# Patient Record
Sex: FEMALE | Race: WHITE | NOT HISPANIC OR LATINO | Employment: OTHER | URBAN - METROPOLITAN AREA
[De-identification: names, ages, dates, MRNs, and addresses within clinical notes are randomized per-mention and may not be internally consistent; named-entity substitution may affect disease eponyms.]

---

## 2017-02-27 ENCOUNTER — APPOINTMENT (OUTPATIENT)
Dept: CT IMAGING | Facility: HOSPITAL | Age: 67
End: 2017-02-27

## 2017-02-27 ENCOUNTER — HOSPITAL ENCOUNTER (INPATIENT)
Facility: HOSPITAL | Age: 67
LOS: 4 days | Discharge: NURSING, REHAB OR LONG-TERM CARE | End: 2017-03-03
Attending: EMERGENCY MEDICINE | Admitting: INTERNAL MEDICINE

## 2017-02-27 ENCOUNTER — APPOINTMENT (OUTPATIENT)
Dept: GENERAL RADIOLOGY | Facility: HOSPITAL | Age: 67
End: 2017-02-27

## 2017-02-27 DIAGNOSIS — S72.002A HIP FX, LEFT, CLOSED, INITIAL ENCOUNTER (HCC): Primary | ICD-10-CM

## 2017-02-27 DIAGNOSIS — Z74.09 IMPAIRED FUNCTIONAL MOBILITY, BALANCE, GAIT, AND ENDURANCE: ICD-10-CM

## 2017-02-27 PROBLEM — G62.9 NEUROPATHY: Status: ACTIVE | Noted: 2017-02-27

## 2017-02-27 PROBLEM — S72.009A HIP FX: Status: ACTIVE | Noted: 2017-02-27

## 2017-02-27 PROBLEM — K21.9 GERD (GASTROESOPHAGEAL REFLUX DISEASE): Status: ACTIVE | Noted: 2017-02-27
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
## 2017-02-27 PROBLEM — Z79.01 CHRONIC ANTICOAGULATION: Status: ACTIVE | Noted: 2017-02-27

## 2017-02-27 PROBLEM — S72.92XA CLOSED FRACTURE OF LEFT FEMUR: Status: ACTIVE | Noted: 2017-02-27

## 2017-02-27 PROBLEM — M79.7 FIBROMYALGIA: Status: ACTIVE | Noted: 2017-02-27

## 2017-02-27 PROBLEM — G43.909 MIGRAINE: Status: ACTIVE | Noted: 2017-02-27

## 2017-02-27 PROBLEM — I10 HYPERTENSION: Status: ACTIVE | Noted: 2017-02-27

## 2017-02-27 PROBLEM — E78.5 HYPERLIPIDEMIA: Status: ACTIVE | Noted: 2017-02-27

## 2017-02-27 PROBLEM — I63.9 STROKE (HCC): Status: ACTIVE | Noted: 2017-02-27

## 2017-02-27 PROBLEM — I48.91 ATRIAL FIBRILLATION: Status: ACTIVE | Noted: 2017-02-27

## 2017-02-27 LAB
ABO GROUP BLD: NORMAL
ABO GROUP BLD: NORMAL
ALBUMIN SERPL-MCNC: 4.5 G/DL (ref 3.2–4.8)
ALBUMIN/GLOB SERPL: 1.3 G/DL (ref 1.5–2.5)
ALP SERPL-CCNC: 59 U/L (ref 25–100)
ALT SERPL W P-5'-P-CCNC: 16 U/L (ref 7–40)
ANION GAP SERPL CALCULATED.3IONS-SCNC: 7 MMOL/L (ref 3–11)
APTT PPP: 27.4 SECONDS (ref 24–31)
AST SERPL-CCNC: 23 U/L (ref 0–33)
BASOPHILS # BLD AUTO: 0 10*3/MM3 (ref 0–0.2)
BASOPHILS NFR BLD AUTO: 0 % (ref 0–1)
BILIRUB SERPL-MCNC: 1.2 MG/DL (ref 0.3–1.2)
BILIRUB UR QL STRIP: NEGATIVE
BLD GP AB SCN SERPL QL: NEGATIVE
BUN BLD-MCNC: 10 MG/DL (ref 9–23)
BUN/CREAT SERPL: 14.3 (ref 7–25)
CALCIUM SPEC-SCNC: 9.6 MG/DL (ref 8.7–10.4)
CHLORIDE SERPL-SCNC: 112 MMOL/L (ref 99–109)
CLARITY UR: CLEAR
CO2 SERPL-SCNC: 25 MMOL/L (ref 20–31)
COLOR UR: YELLOW
CREAT BLD-MCNC: 0.7 MG/DL (ref 0.6–1.3)
DEPRECATED RDW RBC AUTO: 47.1 FL (ref 37–54)
EOSINOPHIL # BLD AUTO: 0.03 10*3/MM3 (ref 0.1–0.3)
EOSINOPHIL NFR BLD AUTO: 0.2 % (ref 0–3)
ERYTHROCYTE [DISTWIDTH] IN BLOOD BY AUTOMATED COUNT: 12.9 % (ref 11.3–14.5)
GFR SERPL CREATININE-BSD FRML MDRD: 84 ML/MIN/1.73
GLOBULIN UR ELPH-MCNC: 3.4 GM/DL
GLUCOSE BLD-MCNC: 100 MG/DL (ref 70–100)
GLUCOSE UR STRIP-MCNC: NEGATIVE MG/DL
HCT VFR BLD AUTO: 47.6 % (ref 34.5–44)
HGB BLD-MCNC: 15.8 G/DL (ref 11.5–15.5)
HGB UR QL STRIP.AUTO: NEGATIVE
IMM GRANULOCYTES # BLD: 0.03 10*3/MM3 (ref 0–0.03)
IMM GRANULOCYTES NFR BLD: 0.2 % (ref 0–0.6)
INR PPP: 1.07
KETONES UR QL STRIP: NEGATIVE
LEUKOCYTE ESTERASE UR QL STRIP.AUTO: NEGATIVE
LYMPHOCYTES # BLD AUTO: 1.69 10*3/MM3 (ref 0.6–4.8)
LYMPHOCYTES NFR BLD AUTO: 13.4 % (ref 24–44)
MCH RBC QN AUTO: 33.1 PG (ref 27–31)
MCHC RBC AUTO-ENTMCNC: 33.2 G/DL (ref 32–36)
MCV RBC AUTO: 99.6 FL (ref 80–99)
MONOCYTES # BLD AUTO: 0.8 10*3/MM3 (ref 0–1)
MONOCYTES NFR BLD AUTO: 6.4 % (ref 0–12)
NEUTROPHILS # BLD AUTO: 10.03 10*3/MM3 (ref 1.5–8.3)
NEUTROPHILS NFR BLD AUTO: 79.8 % (ref 41–71)
NITRITE UR QL STRIP: NEGATIVE
PH UR STRIP.AUTO: 7.5 [PH] (ref 5–8)
PLATELET # BLD AUTO: 173 10*3/MM3 (ref 150–450)
PMV BLD AUTO: 12.6 FL (ref 6–12)
POTASSIUM BLD-SCNC: 4.2 MMOL/L (ref 3.5–5.5)
PROT SERPL-MCNC: 7.9 G/DL (ref 5.7–8.2)
PROT UR QL STRIP: NEGATIVE
PROTHROMBIN TIME: 11.7 SECONDS (ref 9.6–11.5)
RBC # BLD AUTO: 4.78 10*6/MM3 (ref 3.89–5.14)
RH BLD: NEGATIVE
RH BLD: NEGATIVE
SODIUM BLD-SCNC: 144 MMOL/L (ref 132–146)
SP GR UR STRIP: 1.02 (ref 1–1.03)
UROBILINOGEN UR QL STRIP: NORMAL
WBC NRBC COR # BLD: 12.58 10*3/MM3 (ref 3.5–10.8)

## 2017-02-27 PROCEDURE — 80053 COMPREHEN METABOLIC PANEL: CPT | Performed by: PHYSICIAN ASSISTANT

## 2017-02-27 PROCEDURE — 25010000002 PROMETHAZINE PER 50 MG: Performed by: INTERNAL MEDICINE

## 2017-02-27 PROCEDURE — 71010 HC CHEST PA OR AP: CPT

## 2017-02-27 PROCEDURE — 86901 BLOOD TYPING SEROLOGIC RH(D): CPT

## 2017-02-27 PROCEDURE — 25010000002 HYDROMORPHONE PER 4 MG

## 2017-02-27 PROCEDURE — 25010000002 HYDROMORPHONE PER 4 MG: Performed by: FAMILY MEDICINE

## 2017-02-27 PROCEDURE — 99223 1ST HOSP IP/OBS HIGH 75: CPT | Performed by: FAMILY MEDICINE

## 2017-02-27 PROCEDURE — 72170 X-RAY EXAM OF PELVIS: CPT

## 2017-02-27 PROCEDURE — 86900 BLOOD TYPING SEROLOGIC ABO: CPT

## 2017-02-27 PROCEDURE — 85730 THROMBOPLASTIN TIME PARTIAL: CPT | Performed by: PHYSICIAN ASSISTANT

## 2017-02-27 PROCEDURE — 85610 PROTHROMBIN TIME: CPT | Performed by: PHYSICIAN ASSISTANT

## 2017-02-27 PROCEDURE — 85025 COMPLETE CBC W/AUTO DIFF WBC: CPT | Performed by: PHYSICIAN ASSISTANT

## 2017-02-27 PROCEDURE — 25010000002 ONDANSETRON PER 1 MG: Performed by: EMERGENCY MEDICINE

## 2017-02-27 PROCEDURE — 94799 UNLISTED PULMONARY SVC/PX: CPT

## 2017-02-27 PROCEDURE — 25010000002 HYDROMORPHONE PER 4 MG: Performed by: EMERGENCY MEDICINE

## 2017-02-27 PROCEDURE — 70450 CT HEAD/BRAIN W/O DYE: CPT

## 2017-02-27 PROCEDURE — 25010000002 ONDANSETRON PER 1 MG: Performed by: FAMILY MEDICINE

## 2017-02-27 PROCEDURE — 73552 X-RAY EXAM OF FEMUR 2/>: CPT

## 2017-02-27 PROCEDURE — 72192 CT PELVIS W/O DYE: CPT

## 2017-02-27 PROCEDURE — 81003 URINALYSIS AUTO W/O SCOPE: CPT | Performed by: PHYSICIAN ASSISTANT

## 2017-02-27 PROCEDURE — 99284 EMERGENCY DEPT VISIT MOD MDM: CPT

## 2017-02-27 PROCEDURE — 86850 RBC ANTIBODY SCREEN: CPT

## 2017-02-27 PROCEDURE — 93005 ELECTROCARDIOGRAM TRACING: CPT | Performed by: PHYSICIAN ASSISTANT

## 2017-02-27 RX ORDER — RANITIDINE 300 MG/1
300 TABLET ORAL NIGHTLY
COMMUNITY
End: 2021-06-15

## 2017-02-27 RX ORDER — PREGABALIN 300 MG/1
300 CAPSULE ORAL 2 TIMES DAILY
COMMUNITY
End: 2022-04-26 | Stop reason: SDUPTHER

## 2017-02-27 RX ORDER — ESOMEPRAZOLE MAGNESIUM 40 MG/1
40 CAPSULE, DELAYED RELEASE ORAL
COMMUNITY
End: 2019-07-03 | Stop reason: HOSPADM

## 2017-02-27 RX ORDER — ONDANSETRON 2 MG/ML
4 INJECTION INTRAMUSCULAR; INTRAVENOUS EVERY 6 HOURS PRN
Status: DISCONTINUED | OUTPATIENT
Start: 2017-02-27 | End: 2017-03-03 | Stop reason: HOSPADM

## 2017-02-27 RX ORDER — PROMETHAZINE HYDROCHLORIDE 25 MG/ML
12.5 INJECTION, SOLUTION INTRAMUSCULAR; INTRAVENOUS EVERY 6 HOURS PRN
Status: DISCONTINUED | OUTPATIENT
Start: 2017-02-27 | End: 2017-03-03 | Stop reason: HOSPADM

## 2017-02-27 RX ORDER — DOCUSATE SODIUM 100 MG/1
100 CAPSULE, LIQUID FILLED ORAL 2 TIMES DAILY
Status: DISCONTINUED | OUTPATIENT
Start: 2017-02-27 | End: 2017-03-03 | Stop reason: HOSPADM

## 2017-02-27 RX ORDER — NALOXONE HCL 0.4 MG/ML
0.4 VIAL (ML) INJECTION
Status: DISCONTINUED | OUTPATIENT
Start: 2017-02-27 | End: 2017-03-03 | Stop reason: HOSPADM

## 2017-02-27 RX ORDER — OXYCODONE HYDROCHLORIDE AND ACETAMINOPHEN 5; 325 MG/1; MG/1
1 TABLET ORAL EVERY 4 HOURS PRN
Status: DISCONTINUED | OUTPATIENT
Start: 2017-02-27 | End: 2017-03-03 | Stop reason: HOSPADM

## 2017-02-27 RX ORDER — FAMOTIDINE 20 MG/1
20 TABLET, FILM COATED ORAL NIGHTLY
Status: DISCONTINUED | OUTPATIENT
Start: 2017-02-27 | End: 2017-03-03 | Stop reason: HOSPADM

## 2017-02-27 RX ORDER — ONDANSETRON 2 MG/ML
4 INJECTION INTRAMUSCULAR; INTRAVENOUS ONCE
Status: COMPLETED | OUTPATIENT
Start: 2017-02-27 | End: 2017-02-27

## 2017-02-27 RX ORDER — SENNOSIDES 8.6 MG
650 CAPSULE ORAL 2 TIMES DAILY
COMMUNITY
End: 2018-10-08 | Stop reason: HOSPADM

## 2017-02-27 RX ORDER — DOCUSATE SODIUM 100 MG/1
100 CAPSULE, LIQUID FILLED ORAL 2 TIMES DAILY
COMMUNITY

## 2017-02-27 RX ORDER — SODIUM CHLORIDE 9 MG/ML
100 INJECTION, SOLUTION INTRAVENOUS CONTINUOUS
Status: DISCONTINUED | OUTPATIENT
Start: 2017-02-27 | End: 2017-02-28 | Stop reason: ALTCHOICE

## 2017-02-27 RX ORDER — DILTIAZEM HYDROCHLORIDE 180 MG/1
360 CAPSULE, EXTENDED RELEASE ORAL DAILY
Status: DISCONTINUED | OUTPATIENT
Start: 2017-02-27 | End: 2017-03-02 | Stop reason: CLARIF

## 2017-02-27 RX ORDER — SODIUM CHLORIDE 0.9 % (FLUSH) 0.9 %
1-10 SYRINGE (ML) INJECTION AS NEEDED
Status: DISCONTINUED | OUTPATIENT
Start: 2017-02-27 | End: 2017-03-03 | Stop reason: HOSPADM

## 2017-02-27 RX ORDER — BACLOFEN 10 MG/1
5 TABLET ORAL 2 TIMES DAILY
Status: DISCONTINUED | OUTPATIENT
Start: 2017-02-27 | End: 2017-03-03 | Stop reason: HOSPADM

## 2017-02-27 RX ORDER — ATORVASTATIN CALCIUM 10 MG/1
10 TABLET, FILM COATED ORAL NIGHTLY
Status: DISCONTINUED | OUTPATIENT
Start: 2017-02-27 | End: 2017-03-03 | Stop reason: HOSPADM

## 2017-02-27 RX ORDER — DIAPER,BRIEF,INFANT-TODD,DISP
1 EACH MISCELLANEOUS 4 TIMES DAILY PRN
COMMUNITY

## 2017-02-27 RX ORDER — TOPIRAMATE 25 MG/1
100 TABLET ORAL 2 TIMES DAILY
Status: DISCONTINUED | OUTPATIENT
Start: 2017-02-27 | End: 2017-03-03 | Stop reason: HOSPADM

## 2017-02-27 RX ORDER — PREGABALIN 100 MG/1
300 CAPSULE ORAL 2 TIMES DAILY
Status: DISCONTINUED | OUTPATIENT
Start: 2017-02-27 | End: 2017-03-03 | Stop reason: HOSPADM

## 2017-02-27 RX ORDER — ASPIRIN 81 MG/1
81 TABLET ORAL DAILY
Status: DISCONTINUED | OUTPATIENT
Start: 2017-02-27 | End: 2017-03-03 | Stop reason: HOSPADM

## 2017-02-27 RX ORDER — BUDESONIDE AND FORMOTEROL FUMARATE DIHYDRATE 80; 4.5 UG/1; UG/1
2 AEROSOL RESPIRATORY (INHALATION)
Status: DISCONTINUED | OUTPATIENT
Start: 2017-02-27 | End: 2017-03-03 | Stop reason: HOSPADM

## 2017-02-27 RX ORDER — PANTOPRAZOLE SODIUM 40 MG/1
40 TABLET, DELAYED RELEASE ORAL
Status: DISCONTINUED | OUTPATIENT
Start: 2017-02-28 | End: 2017-03-03 | Stop reason: HOSPADM

## 2017-02-27 RX ORDER — CETIRIZINE HYDROCHLORIDE 10 MG/1
10 TABLET ORAL DAILY
Status: DISCONTINUED | OUTPATIENT
Start: 2017-02-27 | End: 2017-03-03 | Stop reason: HOSPADM

## 2017-02-27 RX ORDER — LORATADINE 10 MG/1
10 TABLET ORAL DAILY
COMMUNITY

## 2017-02-27 RX ORDER — ACETAMINOPHEN 500 MG
500 TABLET ORAL EVERY 6 HOURS PRN
COMMUNITY
End: 2017-02-27

## 2017-02-27 RX ORDER — GUAIFENESIN 600 MG/1
1200 TABLET, EXTENDED RELEASE ORAL 2 TIMES DAILY
Status: ON HOLD | COMMUNITY
End: 2018-10-08

## 2017-02-27 RX ORDER — ONDANSETRON 2 MG/ML
4 INJECTION INTRAMUSCULAR; INTRAVENOUS ONCE
Status: DISCONTINUED | OUTPATIENT
Start: 2017-02-27 | End: 2017-03-03 | Stop reason: HOSPADM

## 2017-02-27 RX ORDER — ACETAMINOPHEN 325 MG/1
650 TABLET ORAL EVERY 4 HOURS PRN
Status: DISCONTINUED | OUTPATIENT
Start: 2017-02-27 | End: 2017-03-03 | Stop reason: HOSPADM

## 2017-02-27 RX ORDER — DILTIAZEM HYDROCHLORIDE 360 MG/1
360 CAPSULE, EXTENDED RELEASE ORAL DAILY
COMMUNITY
End: 2021-06-15 | Stop reason: SDUPTHER

## 2017-02-27 RX ORDER — BACLOFEN 10 MG/1
5 TABLET ORAL 2 TIMES DAILY
Status: ON HOLD | COMMUNITY
End: 2018-09-27

## 2017-02-27 RX ORDER — LOPERAMIDE HYDROCHLORIDE 2 MG/1
2 CAPSULE ORAL DAILY PRN
COMMUNITY

## 2017-02-27 RX ORDER — RANITIDINE 150 MG/1
300 TABLET ORAL NIGHTLY
COMMUNITY
End: 2017-02-27

## 2017-02-27 RX ORDER — SODIUM CHLORIDE 9 MG/ML
125 INJECTION, SOLUTION INTRAVENOUS CONTINUOUS
Status: DISCONTINUED | OUTPATIENT
Start: 2017-02-27 | End: 2017-02-28

## 2017-02-27 RX ORDER — BUDESONIDE AND FORMOTEROL FUMARATE DIHYDRATE 80; 4.5 UG/1; UG/1
2 AEROSOL RESPIRATORY (INHALATION)
COMMUNITY
End: 2022-06-01

## 2017-02-27 RX ORDER — LEVOTHYROXINE SODIUM 0.15 MG/1
150 TABLET ORAL DAILY
Status: DISCONTINUED | OUTPATIENT
Start: 2017-02-27 | End: 2017-03-03 | Stop reason: HOSPADM

## 2017-02-27 RX ADMIN — SODIUM CHLORIDE 125 ML/HR: 9 INJECTION, SOLUTION INTRAVENOUS at 11:16

## 2017-02-27 RX ADMIN — ONDANSETRON 4 MG: 2 INJECTION INTRAMUSCULAR; INTRAVENOUS at 22:09

## 2017-02-27 RX ADMIN — BUDESONIDE AND FORMOTEROL FUMARATE DIHYDRATE 2 PUFF: 80; 4.5 AEROSOL RESPIRATORY (INHALATION) at 19:32

## 2017-02-27 RX ADMIN — HYDROMORPHONE HYDROCHLORIDE 0.5 MG: 1 INJECTION, SOLUTION INTRAMUSCULAR; INTRAVENOUS; SUBCUTANEOUS at 22:17

## 2017-02-27 RX ADMIN — PROMETHAZINE HYDROCHLORIDE 12.5 MG: 25 INJECTION, SOLUTION INTRAMUSCULAR; INTRAVENOUS at 18:19

## 2017-02-27 RX ADMIN — SODIUM CHLORIDE 125 ML/HR: 9 INJECTION, SOLUTION INTRAVENOUS at 22:16

## 2017-02-27 RX ADMIN — ONDANSETRON 4 MG: 2 INJECTION INTRAMUSCULAR; INTRAVENOUS at 11:19

## 2017-02-27 RX ADMIN — FAMOTIDINE 20 MG: 20 TABLET ORAL at 20:29

## 2017-02-27 RX ADMIN — Medication 0.5 MG: at 14:30

## 2017-02-27 RX ADMIN — HYDROMORPHONE HYDROCHLORIDE 0.5 MG: 1 INJECTION, SOLUTION INTRAMUSCULAR; INTRAVENOUS; SUBCUTANEOUS at 11:19

## 2017-02-27 RX ADMIN — HYDROMORPHONE HYDROCHLORIDE 0.5 MG: 1 INJECTION, SOLUTION INTRAMUSCULAR; INTRAVENOUS; SUBCUTANEOUS at 14:30

## 2017-02-27 RX ADMIN — ATORVASTATIN CALCIUM 10 MG: 10 TABLET, FILM COATED ORAL at 20:29

## 2017-02-28 ENCOUNTER — ANESTHESIA (OUTPATIENT)
Dept: PERIOP | Facility: HOSPITAL | Age: 67
End: 2017-02-28

## 2017-02-28 ENCOUNTER — APPOINTMENT (OUTPATIENT)
Dept: GENERAL RADIOLOGY | Facility: HOSPITAL | Age: 67
End: 2017-02-28

## 2017-02-28 ENCOUNTER — ANESTHESIA EVENT (OUTPATIENT)
Dept: PERIOP | Facility: HOSPITAL | Age: 67
End: 2017-02-28

## 2017-02-28 PROBLEM — S72.009A HIP FX: Status: RESOLVED | Noted: 2017-02-27 | Resolved: 2017-02-28

## 2017-02-28 LAB
HCT VFR BLD AUTO: 45.9 % (ref 34.5–44)
HGB BLD-MCNC: 14.9 G/DL (ref 11.5–15.5)

## 2017-02-28 PROCEDURE — 25010000002 ENOXAPARIN PER 10 MG: Performed by: FAMILY MEDICINE

## 2017-02-28 PROCEDURE — 25010000002 PROMETHAZINE PER 50 MG: Performed by: INTERNAL MEDICINE

## 2017-02-28 PROCEDURE — 25010000002 DEXAMETHASONE PER 1 MG: Performed by: NURSE ANESTHETIST, CERTIFIED REGISTERED

## 2017-02-28 PROCEDURE — 25010000002 PROPOFOL 10 MG/ML EMULSION: Performed by: NURSE ANESTHETIST, CERTIFIED REGISTERED

## 2017-02-28 PROCEDURE — C1713 ANCHOR/SCREW BN/BN,TIS/BN: HCPCS | Performed by: ORTHOPAEDIC SURGERY

## 2017-02-28 PROCEDURE — 85014 HEMATOCRIT: CPT | Performed by: FAMILY MEDICINE

## 2017-02-28 PROCEDURE — 93010 ELECTROCARDIOGRAM REPORT: CPT | Performed by: INTERNAL MEDICINE

## 2017-02-28 PROCEDURE — 93005 ELECTROCARDIOGRAM TRACING: CPT | Performed by: NURSE PRACTITIONER

## 2017-02-28 PROCEDURE — 94799 UNLISTED PULMONARY SVC/PX: CPT

## 2017-02-28 PROCEDURE — 25010000002 HYDROMORPHONE PER 4 MG: Performed by: FAMILY MEDICINE

## 2017-02-28 PROCEDURE — 85018 HEMOGLOBIN: CPT | Performed by: FAMILY MEDICINE

## 2017-02-28 PROCEDURE — 25010000002 FENTANYL CITRATE (PF) 100 MCG/2ML SOLUTION: Performed by: NURSE ANESTHETIST, CERTIFIED REGISTERED

## 2017-02-28 PROCEDURE — 76000 FLUOROSCOPY <1 HR PHYS/QHP: CPT

## 2017-02-28 PROCEDURE — 94760 N-INVAS EAR/PLS OXIMETRY 1: CPT

## 2017-02-28 PROCEDURE — 0QH734Z INSERTION OF INTERNAL FIXATION DEVICE INTO LEFT UPPER FEMUR, PERCUTANEOUS APPROACH: ICD-10-PCS | Performed by: ORTHOPAEDIC SURGERY

## 2017-02-28 PROCEDURE — 94640 AIRWAY INHALATION TREATMENT: CPT

## 2017-02-28 PROCEDURE — 99233 SBSQ HOSP IP/OBS HIGH 50: CPT | Performed by: INTERNAL MEDICINE

## 2017-02-28 PROCEDURE — 25010000002 DIGOXIN PER 500 MCG: Performed by: INTERNAL MEDICINE

## 2017-02-28 PROCEDURE — 25010000002 PHENYLEPHRINE PER 1 ML: Performed by: NURSE ANESTHETIST, CERTIFIED REGISTERED

## 2017-02-28 PROCEDURE — 25010000002 ONDANSETRON PER 1 MG: Performed by: FAMILY MEDICINE

## 2017-02-28 PROCEDURE — 73502 X-RAY EXAM HIP UNI 2-3 VIEWS: CPT

## 2017-02-28 DEVICE — CANNULATED SCREW
Type: IMPLANTABLE DEVICE | Status: FUNCTIONAL
Brand: ASNIS

## 2017-02-28 DEVICE — WASHER: Type: IMPLANTABLE DEVICE | Status: FUNCTIONAL

## 2017-02-28 RX ORDER — CLINDAMYCIN PHOSPHATE 600 MG/50ML
600 INJECTION INTRAVENOUS ONCE
Status: COMPLETED | OUTPATIENT
Start: 2017-02-28 | End: 2017-02-28

## 2017-02-28 RX ORDER — NALOXONE HCL 0.4 MG/ML
0.4 VIAL (ML) INJECTION AS NEEDED
Status: DISCONTINUED | OUTPATIENT
Start: 2017-02-28 | End: 2017-02-28 | Stop reason: HOSPADM

## 2017-02-28 RX ORDER — HYDROMORPHONE HYDROCHLORIDE 1 MG/ML
0.5 INJECTION, SOLUTION INTRAMUSCULAR; INTRAVENOUS; SUBCUTANEOUS
Status: DISCONTINUED | OUTPATIENT
Start: 2017-02-28 | End: 2017-02-28 | Stop reason: HOSPADM

## 2017-02-28 RX ORDER — SODIUM CHLORIDE, SODIUM LACTATE, POTASSIUM CHLORIDE, CALCIUM CHLORIDE 600; 310; 30; 20 MG/100ML; MG/100ML; MG/100ML; MG/100ML
100 INJECTION, SOLUTION INTRAVENOUS CONTINUOUS
Status: DISCONTINUED | OUTPATIENT
Start: 2017-02-28 | End: 2017-03-02

## 2017-02-28 RX ORDER — LABETALOL HYDROCHLORIDE 5 MG/ML
5 INJECTION, SOLUTION INTRAVENOUS
Status: DISCONTINUED | OUTPATIENT
Start: 2017-02-28 | End: 2017-02-28 | Stop reason: HOSPADM

## 2017-02-28 RX ORDER — DEXAMETHASONE SODIUM PHOSPHATE 4 MG/ML
INJECTION, SOLUTION INTRA-ARTICULAR; INTRALESIONAL; INTRAMUSCULAR; INTRAVENOUS; SOFT TISSUE AS NEEDED
Status: DISCONTINUED | OUTPATIENT
Start: 2017-02-28 | End: 2017-02-28 | Stop reason: SURG

## 2017-02-28 RX ORDER — MEPERIDINE HYDROCHLORIDE 25 MG/ML
12.5 INJECTION INTRAMUSCULAR; INTRAVENOUS; SUBCUTANEOUS
Status: DISCONTINUED | OUTPATIENT
Start: 2017-02-28 | End: 2017-02-28 | Stop reason: HOSPADM

## 2017-02-28 RX ORDER — SODIUM CHLORIDE 0.9 % (FLUSH) 0.9 %
1-10 SYRINGE (ML) INJECTION AS NEEDED
Status: DISCONTINUED | OUTPATIENT
Start: 2017-02-28 | End: 2017-02-28 | Stop reason: HOSPADM

## 2017-02-28 RX ORDER — FENTANYL CITRATE 50 UG/ML
50 INJECTION, SOLUTION INTRAMUSCULAR; INTRAVENOUS
Status: DISCONTINUED | OUTPATIENT
Start: 2017-02-28 | End: 2017-02-28 | Stop reason: HOSPADM

## 2017-02-28 RX ORDER — DIGOXIN 0.25 MG/ML
125 INJECTION INTRAMUSCULAR; INTRAVENOUS ONCE
Status: COMPLETED | OUTPATIENT
Start: 2017-02-28 | End: 2017-02-28

## 2017-02-28 RX ORDER — METOPROLOL TARTRATE 5 MG/5ML
2.5 INJECTION INTRAVENOUS ONCE
Status: COMPLETED | OUTPATIENT
Start: 2017-02-28 | End: 2017-02-28

## 2017-02-28 RX ORDER — PROPOFOL 10 MG/ML
VIAL (ML) INTRAVENOUS AS NEEDED
Status: DISCONTINUED | OUTPATIENT
Start: 2017-02-28 | End: 2017-02-28 | Stop reason: SURG

## 2017-02-28 RX ORDER — FENTANYL CITRATE 50 UG/ML
INJECTION, SOLUTION INTRAMUSCULAR; INTRAVENOUS AS NEEDED
Status: DISCONTINUED | OUTPATIENT
Start: 2017-02-28 | End: 2017-02-28 | Stop reason: SURG

## 2017-02-28 RX ORDER — DILTIAZEM HCL/D5W 125 MG/125
5-15 PLASTIC BAG, INJECTION (ML) INTRAVENOUS
Status: DISCONTINUED | OUTPATIENT
Start: 2017-02-28 | End: 2017-03-02

## 2017-02-28 RX ORDER — CLINDAMYCIN PHOSPHATE 600 MG/50ML
600 INJECTION INTRAVENOUS EVERY 8 HOURS
Status: COMPLETED | OUTPATIENT
Start: 2017-03-01 | End: 2017-03-01

## 2017-02-28 RX ORDER — FAMOTIDINE 20 MG/1
20 TABLET, FILM COATED ORAL
Status: DISCONTINUED | OUTPATIENT
Start: 2017-02-28 | End: 2017-02-28 | Stop reason: HOSPADM

## 2017-02-28 RX ORDER — LIDOCAINE HYDROCHLORIDE 10 MG/ML
INJECTION, SOLUTION EPIDURAL; INFILTRATION; INTRACAUDAL; PERINEURAL AS NEEDED
Status: DISCONTINUED | OUTPATIENT
Start: 2017-02-28 | End: 2017-02-28 | Stop reason: SURG

## 2017-02-28 RX ORDER — ONDANSETRON 2 MG/ML
4 INJECTION INTRAMUSCULAR; INTRAVENOUS ONCE AS NEEDED
Status: DISCONTINUED | OUTPATIENT
Start: 2017-02-28 | End: 2017-02-28 | Stop reason: HOSPADM

## 2017-02-28 RX ORDER — SODIUM CHLORIDE, SODIUM LACTATE, POTASSIUM CHLORIDE, CALCIUM CHLORIDE 600; 310; 30; 20 MG/100ML; MG/100ML; MG/100ML; MG/100ML
9 INJECTION, SOLUTION INTRAVENOUS CONTINUOUS PRN
Status: DISCONTINUED | OUTPATIENT
Start: 2017-02-28 | End: 2017-03-03 | Stop reason: HOSPADM

## 2017-02-28 RX ADMIN — DILTIAZEM HYDROCHLORIDE 360 MG: 180 CAPSULE, EXTENDED RELEASE ORAL at 01:35

## 2017-02-28 RX ADMIN — FENTANYL CITRATE 100 MCG: 50 INJECTION, SOLUTION INTRAMUSCULAR; INTRAVENOUS at 16:12

## 2017-02-28 RX ADMIN — ONDANSETRON 4 MG: 2 INJECTION INTRAMUSCULAR; INTRAVENOUS at 17:26

## 2017-02-28 RX ADMIN — SODIUM CHLORIDE, POTASSIUM CHLORIDE, SODIUM LACTATE AND CALCIUM CHLORIDE 9 ML/HR: 600; 310; 30; 20 INJECTION, SOLUTION INTRAVENOUS at 14:22

## 2017-02-28 RX ADMIN — CLINDAMYCIN PHOSPHATE 600 MG: 12 INJECTION, SOLUTION INTRAVENOUS at 16:16

## 2017-02-28 RX ADMIN — PANTOPRAZOLE SODIUM 40 MG: 40 TABLET, DELAYED RELEASE ORAL at 05:21

## 2017-02-28 RX ADMIN — FAMOTIDINE 20 MG: 20 TABLET, FILM COATED ORAL at 14:22

## 2017-02-28 RX ADMIN — BACLOFEN 5 MG: 10 TABLET ORAL at 21:24

## 2017-02-28 RX ADMIN — PHENYLEPHRINE HYDROCHLORIDE 100 MCG: 10 INJECTION INTRAVENOUS at 16:20

## 2017-02-28 RX ADMIN — METOPROLOL TARTRATE 2.5 MG: 5 INJECTION, SOLUTION INTRAVENOUS at 02:43

## 2017-02-28 RX ADMIN — ATORVASTATIN CALCIUM 10 MG: 10 TABLET, FILM COATED ORAL at 21:24

## 2017-02-28 RX ADMIN — PROPOFOL 150 MG: 10 INJECTION, EMULSION INTRAVENOUS at 16:12

## 2017-02-28 RX ADMIN — Medication 5 MG/HR: at 07:44

## 2017-02-28 RX ADMIN — PHENYLEPHRINE HYDROCHLORIDE 100 MCG: 10 INJECTION INTRAVENOUS at 17:00

## 2017-02-28 RX ADMIN — BUDESONIDE AND FORMOTEROL FUMARATE DIHYDRATE 2 PUFF: 80; 4.5 AEROSOL RESPIRATORY (INHALATION) at 20:42

## 2017-02-28 RX ADMIN — FAMOTIDINE 20 MG: 20 TABLET ORAL at 21:23

## 2017-02-28 RX ADMIN — PHENYLEPHRINE HYDROCHLORIDE 100 MCG: 10 INJECTION INTRAVENOUS at 17:05

## 2017-02-28 RX ADMIN — BACLOFEN 5 MG: 10 TABLET ORAL at 08:18

## 2017-02-28 RX ADMIN — PHENYLEPHRINE HYDROCHLORIDE 100 MCG: 10 INJECTION INTRAVENOUS at 16:47

## 2017-02-28 RX ADMIN — APIXABAN 5 MG: 5 TABLET, FILM COATED ORAL at 21:22

## 2017-02-28 RX ADMIN — PHENYLEPHRINE HYDROCHLORIDE 100 MCG: 10 INJECTION INTRAVENOUS at 16:42

## 2017-02-28 RX ADMIN — PHENYLEPHRINE HYDROCHLORIDE 200 MCG: 10 INJECTION INTRAVENOUS at 16:35

## 2017-02-28 RX ADMIN — LEVOTHYROXINE SODIUM 150 MCG: 150 TABLET ORAL at 08:18

## 2017-02-28 RX ADMIN — OXYCODONE AND ACETAMINOPHEN 1 TABLET: 5; 325 TABLET ORAL at 22:18

## 2017-02-28 RX ADMIN — ASPIRIN 81 MG: 81 TABLET, COATED ORAL at 08:19

## 2017-02-28 RX ADMIN — HYDROMORPHONE HYDROCHLORIDE 0.5 MG: 1 INJECTION, SOLUTION INTRAMUSCULAR; INTRAVENOUS; SUBCUTANEOUS at 05:21

## 2017-02-28 RX ADMIN — METOPROLOL TARTRATE 2.5 MG: 5 INJECTION, SOLUTION INTRAVENOUS at 04:36

## 2017-02-28 RX ADMIN — ENOXAPARIN SODIUM 40 MG: 40 INJECTION SUBCUTANEOUS at 08:16

## 2017-02-28 RX ADMIN — ENOXAPARIN SODIUM 40 MG: 40 INJECTION SUBCUTANEOUS at 01:16

## 2017-02-28 RX ADMIN — BUDESONIDE AND FORMOTEROL FUMARATE DIHYDRATE 2 PUFF: 80; 4.5 AEROSOL RESPIRATORY (INHALATION) at 09:11

## 2017-02-28 RX ADMIN — LIDOCAINE HYDROCHLORIDE 30 MG: 10 INJECTION, SOLUTION EPIDURAL; INFILTRATION; INTRACAUDAL; PERINEURAL at 16:12

## 2017-02-28 RX ADMIN — DIGOXIN 125 MCG: 0.25 INJECTION INTRAMUSCULAR; INTRAVENOUS at 12:38

## 2017-02-28 RX ADMIN — TOPIRAMATE 100 MG: 25 TABLET, FILM COATED ORAL at 08:21

## 2017-02-28 RX ADMIN — PROMETHAZINE HYDROCHLORIDE 12.5 MG: 25 INJECTION, SOLUTION INTRAMUSCULAR; INTRAVENOUS at 05:22

## 2017-02-28 RX ADMIN — PHENYLEPHRINE HYDROCHLORIDE 100 MCG: 10 INJECTION INTRAVENOUS at 16:30

## 2017-02-28 RX ADMIN — SODIUM CHLORIDE, POTASSIUM CHLORIDE, SODIUM LACTATE AND CALCIUM CHLORIDE 100 ML/HR: 600; 310; 30; 20 INJECTION, SOLUTION INTRAVENOUS at 18:43

## 2017-02-28 RX ADMIN — CETIRIZINE HYDROCHLORIDE 10 MG: 10 TABLET, FILM COATED ORAL at 08:19

## 2017-02-28 RX ADMIN — PREGABALIN 300 MG: 100 CAPSULE ORAL at 21:23

## 2017-02-28 RX ADMIN — DEXAMETHASONE SODIUM PHOSPHATE 8 MG: 4 INJECTION, SOLUTION INTRAMUSCULAR; INTRAVENOUS at 16:12

## 2017-02-28 RX ADMIN — DILTIAZEM HYDROCHLORIDE 360 MG: 180 CAPSULE, EXTENDED RELEASE ORAL at 09:00

## 2017-02-28 RX ADMIN — PREGABALIN 300 MG: 100 CAPSULE ORAL at 08:17

## 2017-03-01 LAB
ANION GAP SERPL CALCULATED.3IONS-SCNC: 9 MMOL/L (ref 3–11)
BUN BLD-MCNC: 11 MG/DL (ref 9–23)
BUN/CREAT SERPL: 15.7 (ref 7–25)
CALCIUM SPEC-SCNC: 8.9 MG/DL (ref 8.7–10.4)
CHLORIDE SERPL-SCNC: 112 MMOL/L (ref 99–109)
CO2 SERPL-SCNC: 19 MMOL/L (ref 20–31)
CREAT BLD-MCNC: 0.7 MG/DL (ref 0.6–1.3)
GFR SERPL CREATININE-BSD FRML MDRD: 84 ML/MIN/1.73
GLUCOSE BLD-MCNC: 144 MG/DL (ref 70–100)
HCT VFR BLD AUTO: 37.9 % (ref 34.5–44)
HGB BLD-MCNC: 12.7 G/DL (ref 11.5–15.5)
POTASSIUM BLD-SCNC: 3.8 MMOL/L (ref 3.5–5.5)
SODIUM BLD-SCNC: 140 MMOL/L (ref 132–146)

## 2017-03-01 PROCEDURE — 25010000002 PROMETHAZINE PER 50 MG: Performed by: INTERNAL MEDICINE

## 2017-03-01 PROCEDURE — 85018 HEMOGLOBIN: CPT | Performed by: ORTHOPAEDIC SURGERY

## 2017-03-01 PROCEDURE — 97167 OT EVAL HIGH COMPLEX 60 MIN: CPT

## 2017-03-01 PROCEDURE — 94760 N-INVAS EAR/PLS OXIMETRY 1: CPT

## 2017-03-01 PROCEDURE — 85014 HEMATOCRIT: CPT | Performed by: ORTHOPAEDIC SURGERY

## 2017-03-01 PROCEDURE — 25010000002 HYDROMORPHONE PER 4 MG: Performed by: FAMILY MEDICINE

## 2017-03-01 PROCEDURE — 94799 UNLISTED PULMONARY SVC/PX: CPT

## 2017-03-01 PROCEDURE — 94640 AIRWAY INHALATION TREATMENT: CPT

## 2017-03-01 PROCEDURE — 80048 BASIC METABOLIC PNL TOTAL CA: CPT | Performed by: ORTHOPAEDIC SURGERY

## 2017-03-01 PROCEDURE — 99232 SBSQ HOSP IP/OBS MODERATE 35: CPT | Performed by: INTERNAL MEDICINE

## 2017-03-01 PROCEDURE — 97163 PT EVAL HIGH COMPLEX 45 MIN: CPT

## 2017-03-01 RX ADMIN — FAMOTIDINE 20 MG: 20 TABLET ORAL at 20:40

## 2017-03-01 RX ADMIN — ATORVASTATIN CALCIUM 10 MG: 10 TABLET, FILM COATED ORAL at 20:40

## 2017-03-01 RX ADMIN — OXYCODONE AND ACETAMINOPHEN 1 TABLET: 5; 325 TABLET ORAL at 21:07

## 2017-03-01 RX ADMIN — HYDROMORPHONE HYDROCHLORIDE 0.5 MG: 1 INJECTION, SOLUTION INTRAMUSCULAR; INTRAVENOUS; SUBCUTANEOUS at 04:05

## 2017-03-01 RX ADMIN — CLINDAMYCIN PHOSPHATE 600 MG: 12 INJECTION, SOLUTION INTRAVENOUS at 15:11

## 2017-03-01 RX ADMIN — CETIRIZINE HYDROCHLORIDE 10 MG: 10 TABLET, FILM COATED ORAL at 09:33

## 2017-03-01 RX ADMIN — SODIUM CHLORIDE, POTASSIUM CHLORIDE, SODIUM LACTATE AND CALCIUM CHLORIDE 100 ML/HR: 600; 310; 30; 20 INJECTION, SOLUTION INTRAVENOUS at 17:31

## 2017-03-01 RX ADMIN — DILTIAZEM HYDROCHLORIDE 360 MG: 180 CAPSULE, EXTENDED RELEASE ORAL at 12:35

## 2017-03-01 RX ADMIN — TOPIRAMATE 100 MG: 25 TABLET, FILM COATED ORAL at 11:21

## 2017-03-01 RX ADMIN — BACLOFEN 5 MG: 10 TABLET ORAL at 09:32

## 2017-03-01 RX ADMIN — BACLOFEN 5 MG: 10 TABLET ORAL at 17:24

## 2017-03-01 RX ADMIN — DOCUSATE SODIUM 100 MG: 100 CAPSULE, LIQUID FILLED ORAL at 09:31

## 2017-03-01 RX ADMIN — APIXABAN 5 MG: 5 TABLET, FILM COATED ORAL at 09:31

## 2017-03-01 RX ADMIN — PANTOPRAZOLE SODIUM 40 MG: 40 TABLET, DELAYED RELEASE ORAL at 05:24

## 2017-03-01 RX ADMIN — CLINDAMYCIN PHOSPHATE 600 MG: 12 INJECTION, SOLUTION INTRAVENOUS at 00:45

## 2017-03-01 RX ADMIN — SODIUM CHLORIDE, POTASSIUM CHLORIDE, SODIUM LACTATE AND CALCIUM CHLORIDE 100 ML/HR: 600; 310; 30; 20 INJECTION, SOLUTION INTRAVENOUS at 04:09

## 2017-03-01 RX ADMIN — TOPIRAMATE 100 MG: 25 TABLET, FILM COATED ORAL at 17:25

## 2017-03-01 RX ADMIN — PROMETHAZINE HYDROCHLORIDE 12.5 MG: 25 INJECTION, SOLUTION INTRAMUSCULAR; INTRAVENOUS at 09:38

## 2017-03-01 RX ADMIN — PREGABALIN 300 MG: 100 CAPSULE ORAL at 09:33

## 2017-03-01 RX ADMIN — ASPIRIN 81 MG: 81 TABLET, COATED ORAL at 09:33

## 2017-03-01 RX ADMIN — PREGABALIN 300 MG: 100 CAPSULE ORAL at 17:25

## 2017-03-01 RX ADMIN — LEVOTHYROXINE SODIUM 150 MCG: 150 TABLET ORAL at 09:33

## 2017-03-01 RX ADMIN — BUDESONIDE AND FORMOTEROL FUMARATE DIHYDRATE 2 PUFF: 80; 4.5 AEROSOL RESPIRATORY (INHALATION) at 21:06

## 2017-03-01 RX ADMIN — CLINDAMYCIN PHOSPHATE 600 MG: 12 INJECTION, SOLUTION INTRAVENOUS at 09:30

## 2017-03-01 RX ADMIN — OXYCODONE AND ACETAMINOPHEN 1 TABLET: 5; 325 TABLET ORAL at 13:54

## 2017-03-01 RX ADMIN — APIXABAN 5 MG: 5 TABLET, FILM COATED ORAL at 20:40

## 2017-03-01 RX ADMIN — TOPIRAMATE 100 MG: 25 TABLET, FILM COATED ORAL at 00:47

## 2017-03-01 RX ADMIN — DOCUSATE SODIUM 100 MG: 100 CAPSULE, LIQUID FILLED ORAL at 17:25

## 2017-03-01 RX ADMIN — BUDESONIDE AND FORMOTEROL FUMARATE DIHYDRATE 2 PUFF: 80; 4.5 AEROSOL RESPIRATORY (INHALATION) at 08:35

## 2017-03-01 NOTE — OP NOTE
DATE OF PROCEDURE:  02/28/2017    PREOPERATIVE DIAGNOSIS: Left minimally displaced valgus impacted femoral neck fracture.    POSTOPERATIVE DIAGNOSIS: Left minimally displaced valgus impacted femoral neck fracture.    PROCEDURE PERFORMED: Closed reduction with percutaneous fixation of the left minimally displaced valgus impacted femoral neck fracture.    SURGEON: Ezra Barlow MD    ASSISTANT: None.    ANESTHESIA: General.    ESTIMATED BLOOD LOSS: 50 mL.    IMPLANTS: [place] 6.5 mm cannulated screws, all length 80 mm, 2 short partially threaded and 1 long partially threaded.    FINDINGS: Minimally displaced valgus impacted femoral neck fracture.    COMPLICATIONS: None.    INDICATIONS: This patient was previously status post a stroke with significant weakness and poor utilization of her left upper and lower extremities. She sustained a fall from her bed on the day of admission on 02/27/2017, resulting in a minimally displaced valgus impacted left femoral neck fracture. The decision was made to proceed with operative fixation to provide stability across the hip for pain control and mobilization.     Additional details available in the consultation note.    DESCRIPTION OF PROCEDURE: The patient was transported to the operating room and intubated in the supine position on her hospital stretcher. She was then transferred over to the [place] fracture table where both feet were padded and secured in the traction boots. The right leg was dropped out of the way to provide adequate visualization of the left hip with fluoroscopy. A preoperative time was completed prior to initiation of the procedure. Slight traction in rotation was performed on the operative extremity and the C-arm was used to obtain preoperative imaging to verify adequate visualization of the hip in both the AP and lateral planes. The left leg was then prepped and draped in the normal sterile fashion. The patient did receive IV clindamycin 600 mg within 30  minutes prior to the incision. The C-arm was used to raghav the location for the percutaneous screw insertion and a small longitudinal incision was made in this location. The guidewire for the inferior calcar screw was then placed with positioning verified on both the AP and lateral planes. A cannulated guide was then used to position the 2 superior pins with 1 running along the anterior portion of the femoral neck and 1 running along the posterior portion of the femoral neck. Following placement of all 3 guidewires and confirmation of adequate placement and alignment on fluoroscopy, they were then measured for length and the [place] 6.5 mm cannulated screws were placed over the guidewires beginning first with the inferior calcar screw. She did have significantly soft osteoporotic bone and this drilling was not necessary. In addition to this, decision was made to proceed with utilization of washers given her poor underlying bone quality. An 80 mm length short partially threaded screw was then inserted over the inferior calcar screw. Similarly, an 80 mm length partially threaded 6.5 mm cannulated screw was inserted over the posterior guidewire. Initially, an 85 mm length short partially threaded 6.5 mm cannulated screw was inserted over the anterior guidewire, but this was felt to be too close to the articular surface and this was exchanged for an 80 mm length screw. However, there is no short threaded 80 mm screws and thus the decision was made to proceed with insertion of a long partially threaded 80 mm screw over the interior guidewire as compression had already been obtained through placement of the 2 previous short threaded screws. This completed the fixation construct and final x-rays were obtained, verifying adequate fracture alignment and hardware placement without evidence of complication. The hip was brought through full range of motion under live fluoroscopy with no evidence of intraarticular penetration of  the screws. The wound was then thoroughly irrigated with normal sterile saline and was closed in layers with staple closure of the skin. The wound was covered with clean dry sterile bandage. The patient was transferred back to her hospital bed and awakened from anesthesia and transported to PACU in stable condition.    DISPOSITION: The patient will be nonweightbearing on her left lower extremity for 6 weeks. She will require inpatient rehab for mobilization and transfers. She can resume her Eliquis on postoperative day #1. She will followup with me in the clinic in 2 weeks for repeat evaluation, x-rays and staple removal.          Ezra Barlow M.D.  Ang  DD: 02/28/2017 21:14:57  DT: 02/28/2017 22:54:02  Voice Rec. ID #[id number]  Voice Original ID #[id number]  Doc ID #[id number]  Rev. #0  cc:

## 2017-03-01 NOTE — PROGRESS NOTES
Subjective:  The patient rested comfortably overnight.  No events. Minimal pain in the hip at this time.  She is tolerating PO intake, voiding on own, and passing flatus.  She is afebrile.    Medications/Allergies:  Scheduled Meds:  apixaban 5 mg Oral Q12H   aspirin 81 mg Oral Daily   atorvastatin 10 mg Oral Nightly   baclofen 5 mg Oral BID   budesonide-formoterol 2 puff Inhalation BID - RT   cetirizine 10 mg Oral Daily   clindamycin 600 mg Intravenous Q8H   diltiazem  mg Oral Daily   docusate sodium 100 mg Oral BID   famotidine 20 mg Oral Nightly   levothyroxine 150 mcg Oral Daily   ondansetron 4 mg Intravenous Once   pantoprazole 40 mg Oral Q AM   pregabalin 300 mg Oral BID   topiramate 100 mg Oral BID     Continuous Infusions:  diltiaZEM 5-15 mg/hr Last Rate: Stopped (02/28/17 0815)   lactated ringers 9 mL/hr Last Rate: 9 mL/hr (02/28/17 1422)   lactated ringers 100 mL/hr Last Rate: 100 mL/hr (03/01/17 0409)     PRN Meds:.•  acetaminophen  •  HYDROmorphone **AND** naloxone  •  lactated ringers  •  ondansetron  •  oxyCODONE-acetaminophen  •  promethazine  •  sodium chloride  Cephalexin; Penicillins; and Sulfa antibiotics    Objective:  Vital Signs   Temp:  [97.4 °F (36.3 °C)-99.9 °F (37.7 °C)] 97.4 °F (36.3 °C)  Heart Rate:  [62-87] 84  Resp:  [16] 16  BP: ()/(65-86) 121/80    Results Review:   I reviewed the patient's new clinical results.    Results from last 7 days  Lab Units 03/01/17  0516  02/27/17  1102   WBC 10*3/mm3  --   --  12.58*   HEMOGLOBIN g/dL 12.7  < > 15.8*   HEMATOCRIT % 37.9  < > 47.6*   PLATELETS 10*3/mm3  --   --  173   < > = values in this interval not displayed.    Results from last 7 days  Lab Units 03/01/17  0516   SODIUM mmol/L 140   POTASSIUM mmol/L 3.8   CHLORIDE mmol/L 112*   TOTAL CO2 mmol/L 19.0*   BUN mg/dL 11   CREATININE mg/dL 0.70   GLUCOSE mg/dL 144*   CALCIUM mg/dL 8.9       Results from last 7 days  Lab Units 02/27/17  1102   INR  1.07       Results from last 7  days  Lab Units 03/01/17  0516 02/27/17  1102   SODIUM mmol/L 140 144   POTASSIUM mmol/L 3.8 4.2   CHLORIDE mmol/L 112* 112*   TOTAL CO2 mmol/L 19.0* 25.0   BUN mg/dL 11 10   CREATININE mg/dL 0.70 0.70   CALCIUM mg/dL 8.9 9.6   ALK PHOS U/L  --  59   ALT (SGPT) U/L  --  16   AST (SGOT) U/L  --  23   GLUCOSE mg/dL 144* 100       Imaging Results (last 24 hours)     Procedure Component Value Units Date/Time    XR Chest 1 View [[id number]] Collected:  02/27/17 1118     Updated:  02/28/17 1143    Narrative:       EXAMINATION: XR CHEST 1 VW- 02/27/2017     INDICATION: fall hip fx      COMPARISON: 12/17/2015     FINDINGS: Portable chest reveals cardiac and mediastinal silhouettes  within normal limits. The lung fields are grossly clear. No focal  parenchymal opacification present. No pleural effusion or pneumothorax.  Degenerative changes seen within the spine. Pulmonary vascularity is  within normal limits.           Impression:       No acute cardiopulmonary disease.     D:  02/27/2017  E:  02/27/2017     This report was finalized on 2/28/2017 11:41 AM by Dr. Rae Cheung MD.       CT Head Without Contrast [[id number]] Collected:  02/27/17 1226     Updated:  02/28/17 1144    Narrative:       EXAMINATION: CT HEAD WITHOUT CONTRAST-02/27/2017:      INDICATION: Head injury on [place], head trauma.     TECHNIQUE: Multiple axial CT imaging was obtained of the head from the  skull base to the skull vertex without the administration of intravenous  contrast.     COMPARISON: NONE     FINDINGS: Old encephalomalacic change seen within the right basal  ganglia from prior insult. There is no hemorrhage or hydrocephalus. No  mass, mass effect, or midline shift. No abnormal extra-axial fluid  collection is identified. The bony structures are unremarkable. The  visualized paranasal sinuses are clear. The mastoid air cells are  patent.       Impression:       Old insult seen to the right basal ganglia. No acute  intracranial  abnormality is identified.     D:  02/27/2017  E:  02/27/2017           This report was finalized on 2/28/2017 11:42 AM by Dr. Rae Cheung MD.       CT Pelvis Without Contrast [[id number]] Collected:  02/27/17 1228     Updated:  02/28/17 1144    Narrative:       EXAMINATION: CT PELVIS WITHOUT CONTRAST-02/27/2017:      INDICATION: Left hip fracture, left hip pain, fall.     TECHNIQUE: Multiple axial CT imaging was obtained of the pelvis without  the administration of intravenous contrast.     The radiation dose reduction device was turned on for each scan per the  ALARA (As Low as Reasonably Achievable) protocol.     COMPARISON: NONE     FINDINGS: The pelvic organs are unremarkable in appearance.  Diverticulosis of the colon without evidence of diverticulitis. No free  fluid or free air. No abnormal mass or fluid collection is identified.  The bony structures reveal nondisplaced fractures seen of the left  femoral neck. The remainder of the pelvis is unremarkable. No additional  bony abnormality is identified. Some degenerative changes seen within  the sacroiliac joints bilaterally and lower lumbar spine. Severe  osteopenia identified of the bony structures. There is soft tissue  swelling seen in the surrounding subcutaneous tissues of the left hip.       Impression:       Nondisplaced fracture seen of the left femoral neck with  surrounding hematoma. No acute intrapelvic abnormality is identified.     D:  02/27/2017  E:  02/27/2017     This report was finalized on 2/28/2017 11:42 AM by Dr. Rae Cheung MD.       FL C Arm During Surgery [[id number]]      Updated:  02/28/17 1745    XR Hip With or Without Pelvis 2 - 3 View Left [[id number]]      Updated:  02/28/17 1809                                    Physical Exam:  Focused exam of the left lower extremity reveals postoperative bandage place, which is dry and intact.  Mild tenderness over the surgical site.  No significant pain with gentle log roll of the  leg.  Demonstrates active toe motion, has minimal motion around the ankle at baseline, which is unchanged.  Sensation grossly intact to light touch throughout the foot.  Foot is well-perfused.    Assessment/Plan:     66-year-old female with previous history of stroke and significant left-sided weakness, admitted status post fall with left minimally displaced valgus impacted femoral neck fracture.  Patient is now status post closed reduction and percutaneous fixation of her left femoral neck fracture on 2/28/2017     - Ok to resume patient's home [facility] today.  - She will be touchdown weightbearing on the left lower extremity for a total of 6 weeks with transition to weightbearing as tolerated at that time pending adequate fracture healing. However, she reports minimal weightbearing on her left lower extremity at baseline.  - PT/OT in house  - H/H stable  - She will require disposition to rehabilitation facility at time of discharge.  - Patient is okay for discharge from an orthopedic perspective once cleared by her primary medical team.  She will follow up with me in the office on 3/16/17.  Please contact me with any additional questions or concerns.      I discussed the patients findings and my recommendations with patient    Ezra Barlow MD  03/01/17  9:30 AM

## 2017-03-01 NOTE — PROGRESS NOTES
[facility] Medicine Services  INPATIENT PROGRESS NOTE    Date of Admission: 2/27/2017  Length of Stay: 2  Primary Care Physician: Dav Bryan MD    Subjective     Chief Complaint: fall  HPI:  Surgery went well.  Complains that the food is hurting her stomach.  Working with PT.  Pain is ok.    Review Of Systems:   Review of Systems   Constitutional: Negative for fever.   Cardiovascular: Negative for chest pain and palpitations.   Gastrointestinal: Negative for abdominal distention.   Musculoskeletal: Positive for arthralgias.   Neurological: Positive for weakness.   All other systems reviewed and are negative.      Objective      Temp:  [97 °F (36.1 °C)-99.9 °F (37.7 °C)] 97 °F (36.1 °C)  Heart Rate:  [62-99] 99  Resp:  [16-18] 18  BP: ()/(65-86) 104/72  Physical Exam   Constitutional: She is oriented to person, place, and time. She appears well-developed and well-nourished. No distress.   HENT:   Head: Normocephalic.   Eyes: Pupils are equal, round, and reactive to light.   Cardiovascular: Normal rate.    Rate around , irregular  No m/r/g   Pulmonary/Chest: Effort normal and breath sounds normal. No respiratory distress.   Abdominal: Soft. Bowel sounds are normal. She exhibits no distension.   Musculoskeletal: She exhibits no edema.   Neurological: She is alert and oriented to person, place, and time.   Skin: Skin is warm and dry.   Vitals reviewed.        Results Review:    I have reviewed the labs, radiology results and diagnostic studies.      Results from last 7 days  Lab Units 03/01/17  0516  02/27/17  1102   WBC 10*3/mm3  --   --  12.58*   HEMOGLOBIN g/dL 12.7  < > 15.8*   PLATELETS 10*3/mm3  --   --  173   < > = values in this interval not displayed.    Results from last 7 days  Lab Units 03/01/17  0516   SODIUM mmol/L 140   POTASSIUM mmol/L 3.8   TOTAL CO2 mmol/L 19.0*   CREATININE mg/dL 0.70   GLUCOSE mg/dL 144*     ekg - rapid a-fib    Culture Data:     Radiology Data:   CT pelvis reviewed  I have reviewed the medications.    Assessment/Plan     Assessment/Problem List  Principal Problem:    Closed fracture of neck of left femur  Active Problems:    Rapid atrial fibrillation    Fall    Hyperlipidemia    Hypertension    Fibromyalgia    Neuropathy    Chronic anticoagulation on Eliquis    Hx of Stroke with residual left hemiparesis    Plan  - POD #0 percutaneous fixation  - TDWB LLE x 6 weeks  - home eliquis resumed  - a-fib better rate controlled, continue PO dilt  - continue PT/OT  - await word from [facility], will be medically ready when bed found.    DVT prophylaxis: robert Green MD   03/01/17   1:54 PM    Please note that portions of this note may have been completed with a voice recognition program. Efforts were made to edit the dictations, but occasionally words are mistranscribed.

## 2017-03-01 NOTE — PLAN OF CARE
Problem: Patient Care Overview (Adult)  Goal: Plan of Care Review  Outcome: Ongoing (interventions implemented as appropriate)    03/01/17 1415   Coping/Psychosocial Response Interventions   Plan Of Care Reviewed With patient   Outcome Evaluation   Outcome Summary/Follow up Plan Pt presents with fxl decline from PLOF, deficits in ADL performance, fxl mobility, occupational endurance; will benefit from skilled OT services to address deficits, facilitate increased fxl I, safe transition to next level of care. Recommend IP rehab prior to return home.         Problem: Inpatient Occupational Therapy  Goal: Bed Mobility Goal LTG- OT  Outcome: Ongoing (interventions implemented as appropriate)    03/01/17 1415   Bed Mobility OT LTG   Bed Mobility OT LTG, Date Established 03/01/17   Bed Mobility OT LTG, Time to Achieve 1 wk   Bed Mobility OT LTG, Activity Type supine to sit/sit to supine   Bed Mobility OT LTG, [place] Level moderate assist (50% patient effort);2 person assist required   Bed Mobility OT LTG, Outcome goal ongoing       Goal: Transfer Training Goal 1 LTG- OT  Outcome: Ongoing (interventions implemented as appropriate)    03/01/17 1415   Transfer Training OT LTG   Transfer Training OT LTG, Date Established 03/01/17   Transfer Training OT LTG, Time to Achieve 1 wk   Transfer Training OT LTG, Activity Type bed to chair /chair to bed;sit to stand/stand to sit;toilet   Transfer Training OT LTG, [place] Level moderate assist (50% patient effort);2 person assist required   Transfer Training OT LTG, Assist Device walker, daisy   Transfer Training OT LTG, Outcome goal ongoing       Goal: Patient Education Goal LTG- OT  Outcome: Ongoing (interventions implemented as appropriate)    03/01/17 1415   Patient Education OT LTG   Patient Education OT LTG, Date Established 03/01/17   Patient Education OT LTG, Time to Achieve by discharge   Patient Education OT LTG, Education Type HEP;precautions per surgeon   Patient  Education OT LTG, Education Understanding verbalizes understanding   Patient Education OT LTG Outcome goal ongoing       Goal: Grooming Goal LTG- OT  Outcome: Ongoing (interventions implemented as appropriate)    03/01/17 1415   Grooming OT LTG   Grooming Goal OT LTG, Date Established 03/01/17   Grooming Goal OT LTG, Time to Achieve 1 wk   Grooming Goal OT LTG, [place] Level set up required;minimum assist (75% patient effort)   Grooming Goal OT LTG, Outcome goal ongoing       Goal: UB Dressing Goal STG- OT  Outcome: Ongoing (interventions implemented as appropriate)    03/01/17 1415   UB Dressing OT STG   UB Dressing Goal OT STG, Date Established 03/01/17   UB Dressing Goal OT STG, Time to Achieve 1 wk   UB Dressing Goal OT STG, [place] Level maximum assist (25% patient effort)  (Georges technique)   UB Dressing Goal OT STG, Outcome goal ongoing

## 2017-03-01 NOTE — PLAN OF CARE
Problem: Patient Care Overview (Adult)  Goal: Adult Individualization and Mutuality  Outcome: Ongoing (interventions implemented as appropriate)    Problem: Fractured Hip (Adult)  Goal: Signs and Symptoms of Listed Potential Problems Will be Absent or Manageable (Fractured Hip)  Outcome: Ongoing (interventions implemented as appropriate)    03/01/17 1631   Fractured Hip   Problems Assessed (Fractured Hip) all   Problems Present (Fractured Hip) situational response         Problem: Pressure Ulcer Risk (Joni Scale) (Adult,Obstetrics,Pediatric)  Goal: Skin Integrity  Outcome: Ongoing (interventions implemented as appropriate)    03/01/17 1631   Pressure Ulcer Risk (Joni Scale) (Adult,Obstetrics,Pediatric)   Skin Integrity making progress toward outcome         Problem: Fall Risk (Adult)  Goal: Absence of Falls  Outcome: Ongoing (interventions implemented as appropriate)    03/01/17 1631   Fall Risk (Adult)   Absence of Falls making progress toward outcome         Problem: Perioperative Period (Adult)  Goal: Signs and Symptoms of Listed Potential Problems Will be Absent or Manageable (Perioperative Period)  Outcome: Ongoing (interventions implemented as appropriate)    03/01/17 1631   Perioperative Period   Problems Assessed (Perioperative Period) all   Problems Present (Perioperative Period) pain;situational response;physiologic stress response

## 2017-03-01 NOTE — PROGRESS NOTES
Acute Care - Occupational Therapy Initial Evaluation   [place]     Patient Name: Vickie Joshi  : 1950  MRN: [id number]  Today's Date: 3/1/2017  Onset of Illness/Injury or Date of Surgery Date: 17  Date of Referral to OT: 17  Referring Physician: Dr Barlow    Admit Date: 2017       ICD-10-CM ICD-9-CM   1. Hip fx, left, closed, initial encounter S72.002A 820.8     Patient Active Problem List   Diagnosis   • Fall   • Closed fracture of neck of left femur   • Hyperlipidemia   • Hypertension   • Hx of Migraine   • GERD (gastroesophageal reflux disease)   • Fibromyalgia   • Neuropathy   • Rapid atrial fibrillation   • Chronic anticoagulation on Eliquis   • Hx of Stroke with residual left hemiparesis     Past Medical History   Diagnosis Date   • Arthritis    • Atrial fibrillation    • Cardiac disorder    • Disease of thyroid gland    • Fibromyalgia    • GERD (gastroesophageal reflux disease)    • Hyperlipidemia    • Hypertension    • Migraine    • Neuropathy    • Stroke      Past Surgical History   Procedure Laterality Date   • Shoulder surgery     • Cholecystectomy     • Hysterectomy     • Pr closed rx traumatic hip dislocatn Left 2017     Procedure: LEFT HIP CLOSED REDUCTION;  Surgeon: Ezra Barlow MD;  Location:  [facility];  Service: Orthopedics   • Hip percutaneous pinning Left 2017     Procedure: LEFT HIP PERCUTANEOUS PINNING FEMORAL NECK;  Surgeon: Ezra Barlow MD;  Location:  [facility];  Service:           OT ASSESSMENT FLOWSHEET (last 72 hours)      OT Evaluation       17 1310 17 1309 17 1214 17 1035 17 0940    Rehab Evaluation    Document Type evaluation  -TA evaluation  -SJ       Subjective Information agree to therapy;complains of;fatigue;pain  -TA agree to therapy;complains of;pain  -SJ       Patient Effort, Rehab Treatment adequate  -TA adequate  -SJ       Symptoms Noted During/After Treatment increased pain  -TA increased pain  -SJ        General Information    Patient Profile Review yes  -TA yes  -SJ       Onset of Illness/Injury or Date of Surgery Date 02/27/17  -TA 02/27/17  -SJ       Referring Physician Dr Barlow  -TA Dr. Barlow  -SJ       General Observations Pt supine, HOB elevated, IV intact RUE.  -TA Pt supine, awake, with IV, tele,  present  -SJ       Pertinent History Of Current Problem Pt recently discharged from [facility], was transferring with spouse and fell to L side and experienced immediate pain; admitted with L femur fx, now s/p L repair. H/o CVA with L side residual hemiparesis.  -TA 66 y.o.F with fall at home attempting to get OOB, sustained femoral neck fx s/p repair  -SJ       Precautions/Limitations fall precautions;oxygen therapy device and L/min;hip precautions- left  -TA fall precautions;oxygen therapy device and L/min;hip precautions- left;other (see comments)   TDWB LLE  -SJ       Prior Level of Function max assist:;ADL's;min assist:;all household mobility;gait;transfer;bed mobility  -TA min assist:;all household mobility;gait;transfer;bed mobility;max assist:;ADL's;dependent:;community mobility  -SJ       Equipment Currently Used at Home cane, straight;shower chair;commode;hospital bed;wheelchair   Lift chair  -TA shower chair;commode;hospital bed;cane, straight;wheelchair;other (see comments)   Pt used AFO and knee brace at home  -SJ       Plans/Goals Discussed With patient and family;agreed upon  -TA patient;spouse/S.O.;agreed upon  -SJ       Risks Reviewed patient:;LOB;dizziness;change in vital signs;increased discomfort;lines disloged  -TA patient:;family:;LOB;increased discomfort;lines disloged  -SJ       Benefits Reviewed patient:;improve function;increase independence;increase strength;increase balance;decrease pain;decrease risk of DVT;increase knowledge  -TA patient:;spouse/S.O.:;improve function;increase independence;increase strength;increase balance;decrease pain;decrease risk of DVT;increase knowledge   -SJ       Barriers to Rehab previous functional deficit  -TA medically complex;previous functional deficit;ineffective coping  -SJ       Living Environment    Lives With spouse;child(jeremy), dependent  -TA spouse;child(jeremy), dependent  -SJ       Living Arrangements house  -TA house  -SJ       Home Accessibility ramps present at home  -TA no concerns;ramps present at home  -SJ       Clinical Impression    Date of Referral to OT 02/28/17  -TA        OT Diagnosis Impaired mobility and ADLs  -TA        Impairments Found (describe specific impairments) aerobic capacity/endurance;ergonomics and body mechanics;gait, locomotion, and balance;motor function;muscle performance  -TA        Patient/Family Goals Statement [facility] to [facility]  -TA        Criteria for Skilled Therapeutic Interventions Met yes;treatment indicated  -TA        Rehab Potential good, to achieve stated therapy goals  -TA        Therapy Frequency daily   Per priority policy  -TA        Anticipated Equipment Needs At Discharge --   ADL kit issued, education initiated.  -TA        Anticipated Discharge Disposition inpatient rehabilitation facility  -TA        Vital Signs    Pre Systolic BP Rehab 104  -  -SJ       Pre Treatment Diastolic BP 72  -TA 72  -SJ       Pretreatment Heart Rate (beats/min) 101  -  -SJ       Posttreatment Heart Rate (beats/min) 98  -TA        Pre Patient Position Supine  -TA Supine  -SJ       Intra Patient Position Sitting  -TA Sitting  -SJ       Post Patient Position Supine  -TA Supine  -SJ       Pain Assessment    Pain Assessment 0-10  -TA 0-10  -SJ       Pain Score 2  -TA 2  -SJ       Post Pain Score 5  -TA 5  -SJ       Pain Type Acute pain  -TA Acute pain  -SJ       Pain Location Hip  -TA Hip  -SJ       Pain Orientation Left  -TA Left  -SJ       Pain Intervention(s) Medication (See MAR);Repositioned;Ambulation/increased activity  -TA Repositioned;Ambulation/increased activity;Elevated  -SJ       Response to Interventions  tolerated  -TA        Vision Assessment/Intervention    Visual Impairment visual impairment, left;inattention to the left;needs cues to attend visually  -TA        Cognitive Assessment/Intervention    Current Cognitive/Communication Assessment functional  -TA functional  -SJ       Orientation Status oriented x 4  -TA oriented x 4  -SJ       Follows Commands/Answers Questions 100% of the time;able to follow single-step instructions;needs cueing;needs increased time;needs repetition  -TA 75% of the time;able to follow single-step instructions;needs cueing;needs increased time  -SJ       Personal Safety mild impairment;decreased insight to deficits  -TA mild impairment;decreased awareness, need for assist  -SJ       Personal Safety Interventions fall prevention program maintained;gait belt;nonskid shoes/slippers when out of bed;supervised activity  -TA fall prevention program maintained;gait belt;muscle strengthening facilitated;nonskid shoes/slippers when out of bed  -SJ       ROM (Range of Motion)    General ROM upper extremity range of motion deficits identified  -TA lower extremity range of motion deficits identified  -SJ       General ROM Detail RUE WFLs, LUE PROM WFL  -TA RLE WFL, LLE limited by 50%  -       MMT (Manual Muscle Testing)    General MMT Assessment upper extremity strength deficits identified  -TA lower extremity strength deficits identified  -SJ       General MMT Assessment Detail RUE 4/5, LUE 1-/5 (baseline)  -TA LLE 0/5, RLE 4-/5  -SJ       Muscle Tone Assessment    Left-Side Extremities Muscle Tone Assessment   flaccid  -AH flaccid  -AH flaccid  -AH    Mobility Assessment/Training    Extremity Weight-Bearing Status left lower extremity  -TA left lower extremity  -SJ       Left Lower Extremity Weight-Bearing touch down weight-bearing  -TA touch down weight-bearing  -SJ       Bed Mobility, Assessment/Treatment    Bed Mobility, Assistive Device bed rails;draw sheet;head of bed elevated  -TA bed  rails;head of bed elevated;draw sheet  -SJ       Bed Mobility, Roll Left, [place] maximum assist (25% patient effort);2 person assist required;verbal cues required  -TA maximum assist (25% patient effort);2 person assist required;verbal cues required  -SJ       Bed Mobility, Roll Right, [place] maximum assist (25% patient effort);2 person assist required;verbal cues required  -TA maximum assist (25% patient effort);2 person assist required;verbal cues required  -SJ       Bed Mobility, Scoot/Bridge, [place] dependent (less than 25% patient effort);2 person assist required  -TA maximum assist (25% patient effort);2 person assist required;verbal cues required  -SJ       Bed Mob, Supine to Sit, [place] maximum assist (25% patient effort);2 person assist required;verbal cues required  -TA maximum assist (25% patient effort);2 person assist required;verbal cues required  -SJ       Bed Mob, Sit to Supine, [place] maximum assist (25% patient effort);2 person assist required;verbal cues required  -TA maximum assist (25% patient effort);2 person assist required;verbal cues required  -SJ       Bed Mobility, Safety Issues decreased use of arms for pushing/pulling;decreased use of legs for bridging/pushing;impaired trunk control for bed mobility  -TA decreased use of arms for pushing/pulling;decreased use of legs for bridging/pushing;impaired trunk control for bed mobility  -SJ       Bed Mobility, Impairments muscle tone abnormal;strength decreased;impaired balance;coordination impaired;motor control impaired;postural control impaired;impaired vision;pain  -TA ROM decreased;strength decreased;impaired balance;coordination impaired;muscle tone abnormal  -       Bed Mobility, Comment Pt with L hemiparesis from CVA; VCs for sequencing.  -TA Pt req max cues for bed mobility, assist for LLE, pt very fearful of falling  -SJ       Transfer Assessment/Treatment    Transfers, Bed-Chair [place]  not tested   -SJ       Transfers, Sit-Stand [place] not tested  -TA not tested   deferred due to pain  -SJ       Transfers, Stand-Sit [place] not tested  -TA        Transfer, Comment Deferred 2/2 pain  -TA        Lower Body Dressing Assessment/Training    LB Dressing Assess/Train, Clothing Type donning:;slipper socks  -TA        LB Dressing Assess/Train, Position supine  -TA        LB Dressing Assess/Train, [place] dependent (less than 25% patient effort)  -TA        LB Dressing Assess/Train, Comment Near total dependence at baseline  -TA        Motor Skills/Interventions    Additional Documentation Balance Skills Training (Group);Fine Motor Coordination Training (Group)  -TA Balance Skills Training (Group)  -SJ       Balance Skills Training    Sitting-Level of Assistance Maximum assistance   Max A progressed to CGA briefly  -TA Maximum assistance  -SJ       Sitting-Balance Support Right upper extremity supported;Feet supported  -TA Right upper extremity supported;Feet supported  -SJ       Sitting-Balance Activities Forward lean;Lateral lean;Right UE Weight Bearing;Left UE Weight Bearing;Trunk control activities  -TA Trunk control activities  -SJ       Sitting # of Minutes 7  -TA 7  -SJ       Fine Motor Coordination Training    Opposition Left:;impaired;other (comment)   L [id number] impaired (baseline)  -TA        Orthotics Prosthetics    Additional Documentation Orthosis Location (Group)  -TA        Orthosis Location    Orthosis Location/Type lower extremity  -TA        Orthosis, Lower Extremity Left:;AFO (ankle foot orthosis);knee orthosis  -TA        Sensory Assessment/Intervention    Light Touch LUE;RUE  -TA        LUE Light Touch mild impairment  -TA        RUE Light Touch WNL  -TA        General Therapy Interventions    Planned Therapy Interventions activity intolerance;adaptive equipment training;ADL retraining;balance training;bed mobility training;home exercise program;ROM (Range of  Motion);strengthening;transfer training  -TA        Positioning and Restraints    Pre-Treatment Position in bed  -TA in bed  -SJ       Post Treatment Position bed  -TA bed  -SJ       In Bed notified nsg;supine;call light within reach;encouraged to call for assist;with family/caregiver;side rails up x2;LUE elevated;SCD pump applied;LLE elevated;L heel elevated  -TA notified nsg;supine;encouraged to call for assist;call light within reach;with family/caregiver;SCD pump applied;L heel elevated  -SJ         02/28/17 2030 02/28/17 1200 02/28/17 0800 02/27/17 2010 02/27/17 1900    Living Environment    Lives With     spouse;child(jeremy), dependent  -KL    Living Arrangements     house  -KL    Home Accessibility     no concerns  -KL    Stair Railings at Home     other (see comments)   ramps in place  -KL    Type of Financial/Environmental Concern     none  -KL    Transportation Available     family or friend will provide  -KL    Living Environment Comment     na  -KL    Functional Level Prior    Ambulation     3-->assistive equipment and person  -KL    Transferring     3-->assistive equipment and person  -KL    Toileting     3-->assistive equipment and person  -KL    Bathing     2-->assistive person  -KL    Dressing     2-->assistive person  -KL    Eating     0-->independent  -KL    Communication     0-->understands/communicates without difficulty  -KL    Swallowing     0-->swallows foods/liquids without difficulty  -KL    Prior Functional Level Comment     na  -KL    Muscle Tone Assessment    Muscle Tone Assessment    Left-Side Extremities  -TL     Left-Side Extremities Muscle Tone Assessment flaccid  -TL flaccid  -AS flaccid  -AS flaccid  -TL       02/27/17 1135 02/27/17 1134             General Information    Equipment Currently Used at Home shower chair;commode;hospital bed;cane, straight;wheelchair;other (see comments)   Standing recliner  -ST        Living Environment    Lives With spouse;child(jeremy), dependent  -ST         Living Arrangements house  -ST        Transportation Available family or friend will provide  -ST        Functional Level Prior    Ambulation  3-->assistive equipment and person  -ST       Transferring  3-->assistive equipment and person  -ST       Toileting  0-->independent  -ST       Bathing  0-->independent  -ST       Dressing  2-->assistive person  -ST       Eating  0-->independent  -ST       Communication  0-->understands/communicates without difficulty  -ST       Swallowing  0-->swallows foods/liquids without difficulty  -ST         User Key  (r) = Recorded By, (t) = Taken By, (c) = Cosigned By    Initials Name Effective Dates    SJ Jacki Royal, PT 06/19/15 -     ST Trice Chang, RN 03/03/16 -     TA Dav Jackman OT 03/14/16 -     AVILA Mullins, RN 06/16/16 -     TERESITA Wiseman LPN 03/14/16 -     ABHIJIT Thibodeaux RN 06/16/16 -     AS Joaquim Gonzalez, CRISTINO 06/16/16 -            Occupational Therapy Education     Title: PT OT SLP Therapies (Active)     Topic: Occupational Therapy (Done)     Point: ADL training (Done)    Description: Instruct learner(s) on proper safety adaptation and remediation techniques during self care or transfers.   Instruct in proper use of assistive devices.    Learning Progress Summary    Learner Readiness Method Response Comment Documented by Status   Patient Acceptance E,D VU,NR ADL kit issued and education initiated; body mechanics with bed mobility; reinforced neeed for call for assist for OOB activities. TA 03/01/17 1409 Done               Point: Home exercise program (Done)    Description: Instruct learner(s) on appropriate technique for monitoring, assisting and/or progressing therapeutic exercises/activities.    Learning Progress Summary    Learner Readiness Method Response Comment Documented by Status   Patient Acceptance E,D VU,NR ADL kit issued and education initiated; body mechanics with bed mobility; reinforced neeed for call for assist for OOB  activities. TA 03/01/17 1409 Done               Point: Precautions (Done)    Description: Instruct learner(s) on prescribed precautions during self-care and functional transfers.    Learning Progress Summary    Learner Readiness Method Response Comment Documented by Status   Patient Acceptance E,D VU,NR ADL kit issued and education initiated; body mechanics with bed mobility; reinforced neeed for call for assist for OOB activities. TA 03/01/17 1409 Done               Point: Body mechanics (Done)    Description: Instruct learner(s) on proper positioning and spine alignment during self-care, functional mobility activities and/or exercises.    Learning Progress Summary    Learner Readiness Method Response Comment Documented by Status   Patient Acceptance E,D VU,NR ADL kit issued and education initiated; body mechanics with bed mobility; reinforced neeed for call for assist for OOB activities. TA 03/01/17 1409 Done                      User Key     Initials Effective Dates Name Provider Type Discipline    TA 03/14/16 -  Dav Jackman OT Occupational Therapist OT                  OT Recommendation and Plan  Anticipated Equipment Needs At Discharge:  (ADL kit issued, education initiated.)  Anticipated Discharge Disposition: inpatient rehabilitation facility  Planned Therapy Interventions: activity intolerance, adaptive equipment training, ADL retraining, balance training, bed mobility training, home exercise program, ROM (Range of Motion), strengthening, transfer training  Therapy Frequency: daily (Per priority policy)  Plan of Care Review  Plan Of Care Reviewed With: patient  Outcome Summary/Follow up Plan: Pt presents with fxl decline from PLOF, deficits in ADL performance, fxl mobility, occupational endurance; will benefit from skilled OT services to address deficits, facilitate increased fxl I, safe transition to next level of care. Recommend IP rehab prior to return home.          OT Goals       03/01/17 0314           Bed Mobility OT LTG    Bed Mobility OT LTG, Date Established 03/01/17  -TA      Bed Mobility OT LTG, Time to Achieve 1 wk  -TA      Bed Mobility OT LTG, Activity Type supine to sit/sit to supine  -TA      Bed Mobility OT LTG, [place] Level moderate assist (50% patient effort);2 person assist required  -TA      Bed Mobility OT LTG, Outcome goal ongoing  -TA      Transfer Training OT LTG    Transfer Training OT LTG, Date Established 03/01/17  -TA      Transfer Training OT LTG, Time to Achieve 1 wk  -TA      Transfer Training OT LTG, Activity Type bed to chair /chair to bed;sit to stand/stand to sit;toilet  -TA      Transfer Training OT LTG, [place] Level moderate assist (50% patient effort);2 person assist required  -TA      Transfer Training OT LTG, Assist Device walker, [place]  -TA      Transfer Training OT LTG, Outcome goal ongoing  -TA      Patient Education OT LTG    Patient Education OT LTG, Date Established 03/01/17  -TA      Patient Education OT LTG, Time to Achieve by discharge  -TA      Patient Education OT LTG, Education Type HEP;precautions per surgeon  -TA      Patient Education OT LTG, Education Understanding verbalizes understanding  -TA      Patient Education OT LTG Outcome goal ongoing  -TA      Grooming OT LTG    Grooming Goal OT LTG, Date Established 03/01/17  -TA      Grooming Goal OT LTG, Time to Achieve 1 wk  -TA      Grooming Goal OT LTG, [place] Level set up required;minimum assist (75% patient effort)  -TA      Grooming Goal OT LTG, Outcome goal ongoing  -TA      UB Dressing OT STG    UB Dressing Goal OT STG, Date Established 03/01/17  -TA      UB Dressing Goal OT STG, Time to Achieve 1 wk  -TA      UB Dressing Goal OT STG, [place] Level maximum assist (25% patient effort)   [place] technique  -TA      UB Dressing Goal OT STG, Outcome goal ongoing  -TA        User Key  (r) = Recorded By, (t) = Taken By, (c) = Cosigned By    Initials Name Provider Type    NICHOLAS BOTELLO  RAMAKRISHNA Jackman Occupational Therapist                Outcome Measures       03/01/17 1310 03/01/17 1309       How much help from another person do you currently need...    Turning from your back to your side while in flat bed without using bedrails?  2  -SJ     Moving from lying on back to sitting on the side of a flat bed without bedrails?  2  -SJ     Moving to and from a bed to a chair (including a wheelchair)?  1  -SJ     Standing up from a chair using your arms (e.g., wheelchair, bedside chair)?  1  -SJ     Climbing 3-5 steps with a railing?  1  -SJ     To walk in hospital room?  1  -SJ     AM-PAC 6 Clicks Score  8  -SJ     How much help from another is currently needed...    Putting on and taking off regular lower body clothing? 1  -TA      Bathing (including washing, rinsing, and drying) 2  -TA      Toileting (which includes using toilet bed pan or urinal) 1  -TA      Putting on and taking off regular upper body clothing 2  -TA      Taking care of personal grooming (such as brushing teeth) 2  -TA      Eating meals 3  -TA      Score 11  -TA      Functional Assessment    Outcome Measure Options AM-PAC 6 Clicks Daily Activity (OT)  -TA AM-PAC 6 Clicks Basic Mobility (PT)  -SJ       User Key  (r) = Recorded By, (t) = Taken By, (c) = Cosigned By    Initials Name Provider Type    MARIO Royal, PT Physical Therapist    TA Dav Jackman OT Occupational Therapist          Time Calculation:   OT Start Time: 1310 (ttc 0 minutes)    Therapy Charges for Today     Code Description Service Date Service Provider Modifiers Qty    [id number]  OT EVAL HIGH COMPLEXITY 4 3/1/2017 Dav Jackman OT GO 1               Dav Jackman OT  3/1/2017

## 2017-03-01 NOTE — PROGRESS NOTES
Acute Care - Physical Therapy Initial Evaluation  [facility]     Patient Name: Vickie Joshi  : 1950  MRN: [id number]  Today's Date: 3/1/2017   Onset of Illness/Injury or Date of Surgery Date: 17  Date of Referral to PT: 17  Referring Physician: Dr Barlow      Admit Date: 2017     Visit Dx:    ICD-10-CM ICD-9-CM   1. Hip fx, left, closed, initial encounter S72.002A 820.8     Patient Active Problem List   Diagnosis   • Fall   • Closed fracture of neck of left femur   • Hyperlipidemia   • Hypertension   • Hx of Migraine   • GERD (gastroesophageal reflux disease)   • Fibromyalgia   • Neuropathy   • Rapid atrial fibrillation   • Chronic anticoagulation on Eliquis   • Hx of Stroke with residual left hemiparesis     Past Medical History   Diagnosis Date   • Arthritis    • Atrial fibrillation    • Cardiac disorder    • Disease of thyroid gland    • Fibromyalgia    • GERD (gastroesophageal reflux disease)    • Hyperlipidemia    • Hypertension    • Migraine    • Neuropathy    • Stroke      Past Surgical History   Procedure Laterality Date   • Shoulder surgery     • Cholecystectomy     • Hysterectomy     • Pr closed rx traumatic hip dislocatn Left 2017     Procedure: LEFT HIP CLOSED REDUCTION;  Surgeon: Ezra Barlow MD;  Location: [facility];  Service: Orthopedics   • Hip percutaneous pinning Left 2017     Procedure: LEFT HIP PERCUTANEOUS PINNING FEMORAL NECK;  Surgeon: Ezra Barlow MD;  Location: [facility];  Service:           PT ASSESSMENT (last 72 hours)      PT Evaluation       17 1310 17 1309    Rehab Evaluation    Document Type evaluation  -TA evaluation  -SJ    Subjective Information agree to therapy;complains of;fatigue;pain  -TA agree to therapy;complains of;pain  -SJ    Patient Effort, Rehab Treatment adequate  -TA adequate  -SJ    Symptoms Noted During/After Treatment increased pain  -TA increased pain  -SJ    General Information    Patient Profile Review  yes  -TA yes  -SJ    Onset of Illness/Injury or Date of Surgery Date 02/27/17  -TA 02/27/17  -SJ    Referring Physician Dr Barlow  -TA Dr. Barlow  -SJ    General Observations Pt supine, HOB elevated, IV intact RUE.  -TA Pt supine, awake, with IV, tele,  present  -SJ    Pertinent History Of Current Problem Pt recently discharged from [facility], was transferring with spouse and fell to L side and experienced immediate pain; admitted with L femur fx, now s/p L repair. H/o CVA with L side residual hemiparesis.  -TA 66 y.o.F with fall at home attempting to get OOB, sustained femoral neck fx s/p repair  -SJ    Precautions/Limitations fall precautions;oxygen therapy device and L/min;hip precautions- left  -TA fall precautions;oxygen therapy device and L/min;hip precautions- left;other (see comments)   TDWB LLE  -SJ    Prior Level of Function max assist:;ADL's;min assist:;all household mobility;gait;transfer;bed mobility  -TA min assist:;all household mobility;gait;transfer;bed mobility;max assist:;ADL's;dependent:;community mobility  -SJ    Equipment Currently Used at Home cane, straight;shower chair;commode;hospital bed;wheelchair   Lift chair  -TA shower chair;commode;hospital bed;cane, straight;wheelchair;other (see comments)   Pt used AFO and knee brace at home  -SJ    Plans/Goals Discussed With patient and family;agreed upon  -TA patient;spouse/S.O.;agreed upon  -SJ    Risks Reviewed patient:;LOB;dizziness;change in vital signs;increased discomfort;lines disloged  -TA patient:;family:;LOB;increased discomfort;lines disloged  -SJ    Benefits Reviewed patient:;improve function;increase independence;increase strength;increase balance;decrease pain;decrease risk of DVT;increase knowledge  -TA patient:;spouse/S.O.:;improve function;increase independence;increase strength;increase balance;decrease pain;decrease risk of DVT;increase knowledge  -SJ    Barriers to Rehab previous functional deficit  -TA medically  complex;previous functional deficit;ineffective coping  -SJ    Living Environment    Lives With spouse;child(jeremy), dependent  -TA spouse;child(jeremy), dependent  -SJ    Living Arrangements house  -TA house  -SJ    Home Accessibility ramps present at home  -TA no concerns;ramps present at home  -SJ    Clinical Impression    Date of Referral to PT  02/28/17  -SJ    PT Diagnosis  impaired mobility  -SJ    Patient/Family Goals Statement  d/c to rehab then home  -SJ    Criteria for Skilled Therapeutic Interventions Met  yes;treatment indicated  -SJ    Pathology/Pathophysiology Noted (Describe Specifically for Each System)  musculoskeletal;neuromuscular  -SJ    Impairments Found (describe specific impairments)  aerobic capacity/endurance;gait, locomotion, and balance  -SJ    Functional Limitations in Following Categories (Describe Specific Limitations)  self-care  -SJ    Rehab Potential  fair, will monitor progress closely  -SJ    Predicted Duration of Therapy Intervention (days/wks)  2wks  -SJ    Vital Signs    Pre Systolic BP Rehab 104  -  -SJ    Pre Treatment Diastolic BP 72  -TA 72  -SJ    Pretreatment Heart Rate (beats/min) 101  -  -SJ    Posttreatment Heart Rate (beats/min) 98  -TA     Pre Patient Position Supine  -TA Supine  -SJ    Intra Patient Position Sitting  -TA Sitting  -SJ    Post Patient Position Supine  -TA Supine  -SJ    Pain Assessment    Pain Assessment 0-10  -TA 0-10  -SJ    Pain Score 2  -TA 2  -SJ    Post Pain Score 5  -TA 5  -SJ    Pain Type Acute pain  -TA Acute pain  -SJ    Pain Location Hip  -TA Hip  -SJ    Pain Orientation Left  -TA Left  -SJ    Pain Intervention(s) Medication (See MAR);Repositioned;Ambulation/increased activity  -TA Repositioned;Ambulation/increased activity;Elevated  -SJ    Response to Interventions tolerated  -TA     Vision Assessment/Intervention    Visual Impairment visual impairment, left;inattention to the left;needs cues to attend visually  -TA     Cognitive  Assessment/Intervention    Current Cognitive/Communication Assessment functional  -TA functional  -SJ    Orientation Status oriented x 4  -TA oriented x 4  -SJ    Follows Commands/Answers Questions 100% of the time;able to follow single-step instructions;needs cueing;needs increased time;needs repetition  -TA 75% of the time;able to follow single-step instructions;needs cueing;needs increased time  -SJ    Personal Safety mild impairment;decreased insight to deficits  -TA mild impairment;decreased awareness, need for assist  -SJ    Personal Safety Interventions fall prevention program maintained;gait belt;nonskid shoes/slippers when out of bed;supervised activity  -TA fall prevention program maintained;gait belt;muscle strengthening facilitated;nonskid shoes/slippers when out of bed  -SJ    ROM (Range of Motion)    General ROM upper extremity range of motion deficits identified  -TA lower extremity range of motion deficits identified  -SJ    General ROM Detail RUE WFLs, LUE PROM WFL  -TA RLE WFL, LLE limited by 50%  -SJ    MMT (Manual Muscle Testing)    General MMT Assessment upper extremity strength deficits identified  -TA lower extremity strength deficits identified  -SJ    General MMT Assessment Detail RUE 4/5, LUE 1-/5 (baseline)  -TA LLE 0/5, RLE 4-/5  -SJ    Mobility Assessment/Training    Extremity Weight-Bearing Status left lower extremity  -TA left lower extremity  -SJ    Left Lower Extremity Weight-Bearing touch down weight-bearing  -TA touch down weight-bearing  -SJ    Bed Mobility, Assessment/Treatment    Bed Mobility, Assistive Device bed rails;draw sheet;head of bed elevated  -TA bed rails;head of bed elevated;draw sheet  -SJ    Bed Mobility, Roll Left, [place] maximum assist (25% patient effort);2 person assist required;verbal cues required  -TA maximum assist (25% patient effort);2 person assist required;verbal cues required  -SJ    Bed Mobility, Roll Right, [place] maximum assist (25%  patient effort);2 person assist required;verbal cues required  -TA maximum assist (25% patient effort);2 person assist required;verbal cues required  -SJ    Bed Mobility, Scoot/Bridge, [place] dependent (less than 25% patient effort);2 person assist required  -TA maximum assist (25% patient effort);2 person assist required;verbal cues required  -SJ    Bed Mob, Supine to Sit, [place] maximum assist (25% patient effort);2 person assist required;verbal cues required  -TA maximum assist (25% patient effort);2 person assist required;verbal cues required  -SJ    Bed Mob, Sit to Supine, [place] maximum assist (25% patient effort);2 person assist required;verbal cues required  -TA maximum assist (25% patient effort);2 person assist required;verbal cues required  -SJ    Bed Mobility, Safety Issues decreased use of arms for pushing/pulling;decreased use of legs for bridging/pushing;impaired trunk control for bed mobility  -TA decreased use of arms for pushing/pulling;decreased use of legs for bridging/pushing;impaired trunk control for bed mobility  -SJ    Bed Mobility, Impairments muscle tone abnormal;strength decreased;impaired balance;coordination impaired;motor control impaired;postural control impaired;impaired vision;pain  -TA ROM decreased;strength decreased;impaired balance;coordination impaired;muscle tone abnormal  -SJ    Bed Mobility, Comment Pt with L hemiparesis from CVA; VCs for sequencing.  -TA Pt req max cues for bed mobility, assist for LLE, pt very fearful of falling  -SJ    Transfer Assessment/Treatment    Transfers, Bed-Chair [place]  not tested  -SJ    Transfers, Sit-Stand [place] not tested  -TA not tested   deferred due to pain  -SJ    Transfers, Stand-Sit [place] not tested  -TA     Transfer, Comment Deferred 2/2 pain  -TA     Gait Assessment/Treatment    Gait, [place] Level  not tested  -SJ    Motor Skills/Interventions    Additional Documentation Balance Skills  Training (Group);Fine Motor Coordination Training (Group)  -TA Balance Skills Training (Group)  -SJ    Balance Skills Training    Sitting-Level of Assistance Maximum assistance   Max A progressed to CGA briefly  -TA Maximum assistance  -SJ    Sitting-Balance Support Right upper extremity supported;Feet supported  -TA Right upper extremity supported;Feet supported  -SJ    Sitting-Balance Activities Forward lean;Lateral lean;Right UE Weight Bearing;Left UE Weight Bearing;Trunk control activities  -TA Trunk control activities  -SJ    Sitting # of Minutes 7  -TA 7  -SJ    Fine Motor Coordination Training    Opposition Left:;impaired;other (comment)   L [id number] impaired (baseline)  -TA     Orthotics Prosthetics    Additional Documentation Orthosis Location (Group)  -TA     Orthosis Location    Orthosis Location/Type lower extremity  -TA     Orthosis, Lower Extremity Left:;AFO (ankle foot orthosis);knee orthosis  -TA     Sensory Assessment/Intervention    Light Touch LUE;RUE  -TA     LUE Light Touch mild impairment  -TA     RUE Light Touch WNL  -TA     Positioning and Restraints    Pre-Treatment Position in bed  -TA in bed  -SJ    Post Treatment Position bed  -TA bed  -SJ    In Bed notified nsg;supine;call light within reach;encouraged to call for assist;with family/caregiver;side rails up x2;LUE elevated;SCD pump applied;LLE elevated;L heel elevated  -TA notified nsg;supine;encouraged to call for assist;call light within reach;with family/caregiver;SCD pump applied;L heel elevated  -SJ      03/01/17 1214 03/01/17 1035    Muscle Tone Assessment    Left-Side Extremities Muscle Tone Assessment flaccid  -AH flaccid  -AH      03/01/17 0940 02/28/17 2030    Muscle Tone Assessment    Left-Side Extremities Muscle Tone Assessment flaccid  -AH flaccid  -TL      02/28/17 1200 02/28/17 0800    Muscle Tone Assessment    Left-Side Extremities Muscle Tone Assessment flaccid  -AS flaccid  -AS      02/27/17 2010 02/27/17 1900     Living Environment    Lives With  spouse;child(jeremy), dependent  -KL    Living Arrangements  house  -KL    Home Accessibility  no concerns  -KL    Stair Railings at Home  other (see comments)   ramps in place  -    Type of Financial/Environmental Concern  none  -KL    Transportation Available  family or friend will provide  -KL    Living Environment Comment  na  -KL    Muscle Tone Assessment    Muscle Tone Assessment Left-Side Extremities  -TL     Left-Side Extremities Muscle Tone Assessment flaccid  -TL       02/27/17 1135       General Information    Equipment Currently Used at Home shower chair;commode;hospital bed;cane, straight;wheelchair;other (see comments)   Standing recliner  -ST     Living Environment    Lives With spouse;child(jeremy), dependent  -ST     Living Arrangements house  -ST     Transportation Available family or friend will provide  -       User Key  (r) = Recorded By, (t) = Taken By, (c) = Cosigned By    Initials Name Provider Type    MARIO Royal, PT Physical Therapist    ST Trice Chang, RN Case Manager    NICHOLAS Jackman, OT Occupational Therapist    AVILA Mullins, RN Registered Nurse    TERESITA Wiseman LPN Licensed Nurse    ABHIJIT Thibodeaux RN Registered Nurse    AS Joaquim Gonzalez RN Registered Nurse          Physical Therapy Education     Title: PT OT SLP Therapies (Active)     Topic: Physical Therapy (Active)     Point: Mobility training (Active)    Learning Progress Summary    Learner Readiness Method Response Comment Documented by Status   Patient Acceptance E NR   03/01/17 1415 Active   Significant Other Acceptance E NR   03/01/17 1415 Active               Point: Body mechanics (Active)    Learning Progress Summary    Learner Readiness Method Response Comment Documented by Status   Patient Acceptance E NR   03/01/17 1415 Active   Significant Other Acceptance E NR   03/01/17 1415 Active               Point: Precautions (Active)    Learning Progress  Summary    Learner Readiness Method Response Comment Documented by Status   Patient Acceptance E NR   03/01/17 1415 Active   Significant Other Acceptance E NR   03/01/17 1415 Active                      User Key     Initials Effective Dates Name Provider Type Discipline     06/19/15 -  Jacki Royal PT Physical Therapist PT                PT Recommendation and Plan  Anticipated Discharge Disposition: inpatient rehabilitation facility  Planned Therapy Interventions: balance training, bed mobility training, gait training, home exercise program, patient/family education, strengthening, transfer training  PT Frequency: daily, per priority policy  Plan of Care Review  Plan Of Care Reviewed With: patient  Progress: progress toward functional goals as expected  Outcome Summary/Follow up Plan: PT eval completed. Pt dem pain, weakness, balance deficits, and decreased independence with mobility per PLOF and will benefit from PT services to address limitations to allow for safe return to home with . PT recommends acute rehab at d/c.          IP PT Goals       03/01/17 1410          Transfer Training PT LTG    Transfer Training PT LTG, Date Established 03/01/17  -SJ      Transfer Training PT LTG, Time to Achieve 2 wks  -SJ      Transfer Training PT LTG, Activity Type bed to chair /chair to bed;sit to stand/stand to sit  -SJ      Transfer Training PT LTG, [place] Level moderate assist (50% patient effort);2 person assist required  -SJ      Transfer Training PT LTG, Assist Device walker, daisy  -SJ      Gait Training PT LTG    Gait Training Goal PT LTG, Date Established 03/01/17  -SJ      Gait Training Goal PT LTG, Time to Achieve 2 wks  -SJ      Gait Training Goal PT LTG, [place] Level moderate assist (50% patient effort);2 person assist required  -SJ      Gait Training Goal PT LTG, Assist Device walker, daisy  -SJ      Gait Training Goal PT LTG, Distance to Achieve 10  -SJ      Static Sitting Balance PT  LTG    Static Sitting Balance PT LTG, Date Established 03/01/17  -      Static Sitting Balance PT LTG, Time to Achieve 2 wks  -      Static Sitting Balance PT LTG, [place] Level supervision required  -      Static Sitting Balance PT LTG, Assist Device UE Support  -        User Key  (r) = Recorded By, (t) = Taken By, (c) = Cosigned By    Initials Name Provider Type    MARIO Royal PT Physical Therapist                Outcome Measures       03/01/17 1310 03/01/17 1309       How much help from another person do you currently need...    Turning from your back to your side while in flat bed without using bedrails?  2  -SJ     Moving from lying on back to sitting on the side of a flat bed without bedrails?  2  -SJ     Moving to and from a bed to a chair (including a wheelchair)?  1  -SJ     Standing up from a chair using your arms (e.g., wheelchair, bedside chair)?  1  -SJ     Climbing 3-5 steps with a railing?  1  -SJ     To walk in hospital room?  1  -SJ     AM-PAC 6 Clicks Score  8  -SJ     How much help from another is currently needed...    Putting on and taking off regular lower body clothing? 1  -TA      Bathing (including washing, rinsing, and drying) 2  -TA      Toileting (which includes using toilet bed pan or urinal) 1  -TA      Putting on and taking off regular upper body clothing 2  -TA      Taking care of personal grooming (such as brushing teeth) 2  -TA      Eating meals 3  -TA      Score 11  -TA      Functional Assessment    Outcome Measure Options AM-PAC 6 Clicks Daily Activity (OT)  -TA AM-PAC 6 Clicks Basic Mobility (PT)  -       User Key  (r) = Recorded By, (t) = Taken By, (c) = Cosigned By    Initials Name Provider Type    MARIO Royal PT Physical Therapist    NICHOLAS Jackman OT Occupational Therapist           Time Calculation:         PT Charges       03/01/17 1416          Time Calculation    Start Time 1309  -SJ      PT Received On 03/01/17  -      PT Goal  Re-Cert Due Date 03/11/17  -        User Key  (r) = Recorded By, (t) = Taken By, (c) = Cosigned By    Initials Name Provider Type     Jacki Royal PT Physical Therapist          Therapy Charges for Today     Code Description Service Date Service Provider Modifiers Qty    82128566214 HC PT EVAL HIGH COMPLEXITY 4 3/1/2017 Jakci Royal, PT GP 1          PT G-Codes  Outcome Measure Options: AM-PAC 6 Clicks Daily Activity (OT)      Jacki Royal PT  3/1/2017

## 2017-03-01 NOTE — PLAN OF CARE
Problem: Patient Care Overview (Adult)  Goal: Plan of Care Review  Outcome: Ongoing (interventions implemented as appropriate)    03/01/17 1410   Coping/Psychosocial Response Interventions   Plan Of Care Reviewed With patient   Patient Care Overview   Progress progress toward functional goals as expected   Outcome Evaluation   Outcome Summary/Follow up Plan PT eval completed. Pt dem pain, weakness, balance deficits, and decreased independence with mobility per PLOF and will benefit from PT services to address limitations to allow for safe return to home with . PT recommends acute rehab at d/c.         Problem: Inpatient Physical Therapy  Goal: Transfer Training Goal 1 LTG- PT  Outcome: Ongoing (interventions implemented as appropriate)    03/01/17 1410   Transfer Training PT LTG   Transfer Training PT LTG, Date Established 03/01/17   Transfer Training PT LTG, Time to Achieve 2 wks   Transfer Training PT LTG, Activity Type bed to chair /chair to bed;sit to stand/stand to sit   Transfer Training PT LTG, [place] Level moderate assist (50% patient effort);2 person assist required   Transfer Training PT LTG, Assist Device walker, daisy       Goal: Gait Training Goal LTG- PT  Outcome: Ongoing (interventions implemented as appropriate)    03/01/17 1410   Gait Training PT LTG   Gait Training Goal PT LTG, Date Established 03/01/17   Gait Training Goal PT LTG, Time to Achieve 2 wks   Gait Training Goal PT LTG, [place] Level moderate assist (50% patient effort);2 person assist required   Gait Training Goal PT LTG, Assist Device walker, daisy   Gait Training Goal PT LTG, Distance to Achieve 10       Goal: Static Sitting Balance Goal LTG- PT  Outcome: Ongoing (interventions implemented as appropriate)    03/01/17 1410   Static Sitting Balance PT LTG   Static Sitting Balance PT LTG, Date Established 03/01/17   Static Sitting Balance PT LTG, Time to Achieve 2 wks   Static Sitting Balance PT LTG, [place] Level  supervision required   Static Sitting Balance PT LTG, Assist Device UE Support

## 2017-03-02 PROCEDURE — 25010000002 PROMETHAZINE PER 50 MG: Performed by: INTERNAL MEDICINE

## 2017-03-02 PROCEDURE — 99232 SBSQ HOSP IP/OBS MODERATE 35: CPT | Performed by: INTERNAL MEDICINE

## 2017-03-02 PROCEDURE — 97110 THERAPEUTIC EXERCISES: CPT

## 2017-03-02 PROCEDURE — 25010000002 HYDROMORPHONE PER 4 MG: Performed by: FAMILY MEDICINE

## 2017-03-02 PROCEDURE — 97530 THERAPEUTIC ACTIVITIES: CPT | Performed by: OCCUPATIONAL THERAPIST

## 2017-03-02 PROCEDURE — 94640 AIRWAY INHALATION TREATMENT: CPT

## 2017-03-02 PROCEDURE — 25010000002 ONDANSETRON PER 1 MG: Performed by: FAMILY MEDICINE

## 2017-03-02 RX ORDER — DILTIAZEM HYDROCHLORIDE 180 MG/1
360 CAPSULE, COATED, EXTENDED RELEASE ORAL
Status: DISCONTINUED | OUTPATIENT
Start: 2017-03-02 | End: 2017-03-03 | Stop reason: HOSPADM

## 2017-03-02 RX ADMIN — TOPIRAMATE 100 MG: 25 TABLET, FILM COATED ORAL at 09:44

## 2017-03-02 RX ADMIN — ASPIRIN 81 MG: 81 TABLET, COATED ORAL at 09:44

## 2017-03-02 RX ADMIN — OXYCODONE AND ACETAMINOPHEN 1 TABLET: 5; 325 TABLET ORAL at 02:23

## 2017-03-02 RX ADMIN — ONDANSETRON 4 MG: 2 INJECTION INTRAMUSCULAR; INTRAVENOUS at 13:48

## 2017-03-02 RX ADMIN — OXYCODONE AND ACETAMINOPHEN 1 TABLET: 5; 325 TABLET ORAL at 13:25

## 2017-03-02 RX ADMIN — APIXABAN 5 MG: 5 TABLET, FILM COATED ORAL at 09:44

## 2017-03-02 RX ADMIN — BUDESONIDE AND FORMOTEROL FUMARATE DIHYDRATE 2 PUFF: 80; 4.5 AEROSOL RESPIRATORY (INHALATION) at 08:08

## 2017-03-02 RX ADMIN — HYDROMORPHONE HYDROCHLORIDE 0.5 MG: 1 INJECTION, SOLUTION INTRAMUSCULAR; INTRAVENOUS; SUBCUTANEOUS at 16:47

## 2017-03-02 RX ADMIN — DOCUSATE SODIUM 100 MG: 100 CAPSULE, LIQUID FILLED ORAL at 09:45

## 2017-03-02 RX ADMIN — DILTIAZEM HYDROCHLORIDE 360 MG: 180 CAPSULE, EXTENDED RELEASE ORAL at 13:48

## 2017-03-02 RX ADMIN — PREGABALIN 300 MG: 100 CAPSULE ORAL at 09:44

## 2017-03-02 RX ADMIN — LEVOTHYROXINE SODIUM 150 MCG: 150 TABLET ORAL at 09:44

## 2017-03-02 RX ADMIN — BACLOFEN 10 MG: 10 TABLET ORAL at 09:45

## 2017-03-02 RX ADMIN — PROMETHAZINE HYDROCHLORIDE 12.5 MG: 25 INJECTION, SOLUTION INTRAMUSCULAR; INTRAVENOUS at 16:47

## 2017-03-02 RX ADMIN — OXYCODONE AND ACETAMINOPHEN 1 TABLET: 5; 325 TABLET ORAL at 09:44

## 2017-03-02 RX ADMIN — PANTOPRAZOLE SODIUM 40 MG: 40 TABLET, DELAYED RELEASE ORAL at 06:00

## 2017-03-02 RX ADMIN — CETIRIZINE HYDROCHLORIDE 10 MG: 10 TABLET, FILM COATED ORAL at 09:44

## 2017-03-02 NOTE — PLAN OF CARE
Problem: Patient Care Overview (Adult)  Goal: Plan of Care Review  Outcome: Ongoing (interventions implemented as appropriate)    03/02/17 1659   Coping/Psychosocial Response Interventions   Plan Of Care Reviewed With patient;spouse   Patient Care Overview   Progress progress toward functional goals as expected   Outcome Evaluation   Outcome Summary/Follow up Plan Pt required max assist x 3 for transfer to chair. Recommend pt bemoved to lift room, nurse manager notified. Pt with edema/warmth to LUE- PROM deferred and nurse notified. Pt anxious d/t fear of falling, yet reponds well to gentle encouragement. Recommend IP rehab at [facility].          03/02/17 1659   Coping/Psychosocial Response Interventions   Plan Of Care Reviewed With patient;spouse   Patient Care Overview   Progress progress toward functional goals as expected   Outcome Evaluation   Outcome Summary/Follow up Plan Pt required max assist x 3 for transfer to chair. Recommend pt be moved to lift room, nurse manager notified. Pt with edema/warmth to LUE (hemiparetic arm)- PROM deferred and nurse notified. Pt anxious d/t fear of falling, yet reponds well to gentle encouragement. Recommend IP rehab at [facility].          Problem: Fractured Hip (Adult)  Goal: Signs and Symptoms of Listed Potential Problems Will be Absent or Manageable (Fractured Hip)  Outcome: Ongoing (interventions implemented as appropriate)    Problem: Inpatient Occupational Therapy  Goal: Bed Mobility Goal LTG- OT  Outcome: Ongoing (interventions implemented as appropriate)    03/01/17 1415 03/02/17 1659   Bed Mobility OT LTG   Bed Mobility OT LTG, Date Established 03/01/17 --    Bed Mobility OT LTG, Time to Achieve 1 wk --    Bed Mobility OT LTG, Activity Type supine to sit/sit to supine --    Bed Mobility OT LTG, [place] Level moderate assist (50% patient effort);2 person assist required --    Bed Mobility OT LTG, Outcome --  goal ongoing       Goal: Transfer Training Goal 1 LTG- OT  Outcome:  Ongoing (interventions implemented as appropriate)    03/01/17 1415 03/02/17 1659   Transfer Training OT LTG   Transfer Training OT LTG, Date Established 03/01/17 --    Transfer Training OT LTG, Time to Achieve 1 wk --    Transfer Training OT LTG, Activity Type bed to chair /chair to bed;sit to stand/stand to sit;toilet --    Transfer Training OT LTG, [place] Level moderate assist (50% patient effort);2 person assist required --    Transfer Training OT LTG, Assist Device walker, georges --    Transfer Training OT LTG, Outcome --  goal ongoing       Goal: Patient Education Goal LTG- OT  Outcome: Ongoing (interventions implemented as appropriate)    03/01/17 1415 03/02/17 1659   Patient Education OT LTG   Patient Education OT LTG, Date Established 03/01/17 --    Patient Education OT LTG, Time to Achieve by discharge --    Patient Education OT LTG, Education Type HEP;precautions per surgeon --    Patient Education OT LTG, Education Understanding verbalizes understanding --    Patient Education OT LTG Outcome --  goal ongoing       Goal: Grooming Goal LTG- OT  Outcome: Ongoing (interventions implemented as appropriate)    03/01/17 1415 03/02/17 1659   Grooming OT LTG   Grooming Goal OT LTG, Date Established 03/01/17 --    Grooming Goal OT LTG, Time to Achieve 1 wk --    Grooming Goal OT LTG, [place] Level set up required;minimum assist (75% patient effort) --    Grooming Goal OT LTG, Outcome --  goal ongoing       Goal: UB Dressing Goal STG- OT  Outcome: Ongoing (interventions implemented as appropriate)    03/01/17 1415 03/02/17 1659   UB Dressing OT STG   UB Dressing Goal OT STG, Date Established 03/01/17 --    UB Dressing Goal OT STG, Time to Achieve 1 wk --    UB Dressing Goal OT STG, [place] Level maximum assist (25% patient effort)  (Georges technique) --    UB Dressing Goal OT STG, Outcome --  goal ongoing

## 2017-03-02 NOTE — PROGRESS NOTES
Discharge Planning Assessment  [facility]     Patient Name: Vickie Joshi  MRN: [id number]  Today's Date: 3/2/2017    Admit Date: 2/27/2017          Discharge Needs Assessment     None            Discharge Plan       03/02/17 1137    Case Management/Social Work Plan    Additional Comments [facility] is attempting to get precert from pts insurance for transfer.  CM to follow.        Discharge Placement     No information found        Expected Discharge Date and Time     Expected Discharge Date Expected Discharge Time    Mar 2, 2017               Demographic Summary     None            Functional Status     None            Psychosocial     None            Abuse/Neglect     None            Legal     None            Substance Abuse     None            Patient Forms     None          Libby Milton RN

## 2017-03-02 NOTE — PLAN OF CARE
Problem: Patient Care Overview (Adult)  Goal: Plan of Care Review  Outcome: Ongoing (interventions implemented as appropriate)    Problem: Fractured Hip (Adult)  Goal: Signs and Symptoms of Listed Potential Problems Will be Absent or Manageable (Fractured Hip)  Outcome: Ongoing (interventions implemented as appropriate)    Problem: Pressure Ulcer Risk (Joni Scale) (Adult,Obstetrics,Pediatric)  Goal: Identify Related Risk Factors and Signs and Symptoms  Outcome: Ongoing (interventions implemented as appropriate)    Problem: Fall Risk (Adult)  Goal: Identify Related Risk Factors and Signs and Symptoms  Outcome: Ongoing (interventions implemented as appropriate)    Problem: Perioperative Period (Adult)  Goal: Signs and Symptoms of Listed Potential Problems Will be Absent or Manageable (Perioperative Period)  Outcome: Ongoing (interventions implemented as appropriate)    03/02/17 0227   Perioperative Period   Problems Assessed (Perioperative Period) all   Problems Present (Perioperative Period) pain

## 2017-03-02 NOTE — PROGRESS NOTES
Subjective:  Patient just received phenergan and is very lethargic. Arouses to answer that she is not having pain in the hip at this time, but otherwise will not stay awake long enough to participate in exam    Medications/Allergies:  Scheduled Meds:    apixaban 5 mg Oral Q12H   aspirin 81 mg Oral Daily   atorvastatin 10 mg Oral Nightly   baclofen 5 mg Oral BID   budesonide-formoterol 2 puff Inhalation BID - RT   cetirizine 10 mg Oral Daily   diltiaZEM  mg Oral Q24H   docusate sodium 100 mg Oral BID   famotidine 20 mg Oral Nightly   levothyroxine 150 mcg Oral Daily   ondansetron 4 mg Intravenous Once   pantoprazole 40 mg Oral Q AM   pregabalin 300 mg Oral BID   topiramate 100 mg Oral BID     Continuous Infusions:    lactated ringers 9 mL/hr Last Rate: 9 mL/hr (02/28/17 1422)     PRN Meds:.•  acetaminophen  •  HYDROmorphone **AND** naloxone  •  lactated ringers  •  ondansetron  •  oxyCODONE-acetaminophen  •  promethazine  •  sodium chloride  Cephalexin; Penicillins; and Sulfa antibiotics    Objective:  Vital Signs   Temp:  [97.3 °F (36.3 °C)-97.9 °F (36.6 °C)] 97.7 °F (36.5 °C)  Heart Rate:  [] 90  Resp:  [16-18] 16  BP: (117-154)/() 139/83    Results Review:   I reviewed the patient's new clinical results.    Results from last 7 days  Lab Units 03/01/17  0516  02/27/17  1102   WBC 10*3/mm3  --   --  12.58*   HEMOGLOBIN g/dL 12.7  < > 15.8*   HEMATOCRIT % 37.9  < > 47.6*   PLATELETS 10*3/mm3  --   --  173   < > = values in this interval not displayed.    Results from last 7 days  Lab Units 03/01/17  0516   SODIUM mmol/L 140   POTASSIUM mmol/L 3.8   CHLORIDE mmol/L 112*   TOTAL CO2 mmol/L 19.0*   BUN mg/dL 11   CREATININE mg/dL 0.70   GLUCOSE mg/dL 144*   CALCIUM mg/dL 8.9       Results from last 7 days  Lab Units 02/27/17  1102   INR  1.07       Results from last 7 days  Lab Units 03/01/17  0516 02/27/17  1102   SODIUM mmol/L 140 144   POTASSIUM mmol/L 3.8 4.2   CHLORIDE mmol/L 112* 112*   TOTAL CO2  mmol/L 19.0* 25.0   BUN mg/dL 11 10   CREATININE mg/dL 0.70 0.70   CALCIUM mg/dL 8.9 9.6   ALK PHOS U/L  --  59   ALT (SGPT) U/L  --  16   AST (SGOT) U/L  --  23   GLUCOSE mg/dL 144* 100       Imaging Results (last 24 hours)     Procedure Component Value Units Date/Time    XR Chest 1 View [[id number]] Collected:  02/27/17 1118     Updated:  02/28/17 1143    Narrative:       EXAMINATION: XR CHEST 1 VW- 02/27/2017     INDICATION: fall hip fx      COMPARISON: 12/17/2015     FINDINGS: Portable chest reveals cardiac and mediastinal silhouettes  within normal limits. The lung fields are grossly clear. No focal  parenchymal opacification present. No pleural effusion or pneumothorax.  Degenerative changes seen within the spine. Pulmonary vascularity is  within normal limits.           Impression:       No acute cardiopulmonary disease.     D:  02/27/2017  E:  02/27/2017     This report was finalized on 2/28/2017 11:41 AM by Dr. Rae Cheung MD.       CT Head Without Contrast [[id number]] Collected:  02/27/17 1226     Updated:  02/28/17 1144    Narrative:       EXAMINATION: CT HEAD WITHOUT CONTRAST-02/27/2017:      INDICATION: Head injury on [place], head trauma.     TECHNIQUE: Multiple axial CT imaging was obtained of the head from the  skull base to the skull vertex without the administration of intravenous  contrast.     COMPARISON: NONE     FINDINGS: Old encephalomalacic change seen within the right basal  ganglia from prior insult. There is no hemorrhage or hydrocephalus. No  mass, mass effect, or midline shift. No abnormal extra-axial fluid  collection is identified. The bony structures are unremarkable. The  visualized paranasal sinuses are clear. The mastoid air cells are  patent.       Impression:       Old insult seen to the right basal ganglia. No acute  intracranial abnormality is identified.     D:  02/27/2017  E:  02/27/2017           This report was finalized on 2/28/2017 11:42 AM by   Rae Cheung MD.       CT Pelvis Without Contrast [[id number]] Collected:  02/27/17 1228     Updated:  02/28/17 1144    Narrative:       EXAMINATION: CT PELVIS WITHOUT CONTRAST-02/27/2017:      INDICATION: Left hip fracture, left hip pain, fall.     TECHNIQUE: Multiple axial CT imaging was obtained of the pelvis without  the administration of intravenous contrast.     The radiation dose reduction device was turned on for each scan per the  ALARA (As Low as Reasonably Achievable) protocol.     COMPARISON: NONE     FINDINGS: The pelvic organs are unremarkable in appearance.  Diverticulosis of the colon without evidence of diverticulitis. No free  fluid or free air. No abnormal mass or fluid collection is identified.  The bony structures reveal nondisplaced fractures seen of the left  femoral neck. The remainder of the pelvis is unremarkable. No additional  bony abnormality is identified. Some degenerative changes seen within  the sacroiliac joints bilaterally and lower lumbar spine. Severe  osteopenia identified of the bony structures. There is soft tissue  swelling seen in the surrounding subcutaneous tissues of the left hip.       Impression:       Nondisplaced fracture seen of the left femoral neck with  surrounding hematoma. No acute intrapelvic abnormality is identified.     D:  02/27/2017  E:  02/27/2017     This report was finalized on 2/28/2017 11:42 AM by Dr. Rae Cheung MD.       FL C Arm During Surgery [[id number]]      Updated:  02/28/17 1745    XR Hip With or Without Pelvis 2 - 3 View Left [[id number]]      Updated:  02/28/17 1809                                    Physical Exam:  Focused exam of the left lower extremity reveals bandage in place, which is dry and intact. Does not around to pain with gentle log roll of the leg. Foot is well-perfused.    Assessment/Plan:     66-year-old female with previous history of stroke and significant left-sided weakness, admitted status post fall with left  minimally displaced valgus impacted femoral neck fracture.  Patient is now status post closed reduction and percutaneous fixation of her left femoral neck fracture on 2/28/2017     - DVT proph: home dose Eliquis   - She will be touchdown weightbearing on the left lower extremity for a total of 6 weeks with transition to weightbearing as tolerated at that time pending adequate fracture healing. However, she reports minimal weightbearing on her left lower extremity at baseline.  - PT/OT in house  - She will require disposition to rehabilitation facility at time of discharge.  - Patient is okay for discharge from an orthopedic perspective once cleared by her primary medical team. Currently awaiting placement. She will follow up with me in the office on 3/16/17.  Please contact me with any additional questions or concerns.      I discussed the patients findings and my recommendations with patient    Ezra Barlow MD  03/02/17  6:21 PM

## 2017-03-02 NOTE — PLAN OF CARE
Problem: Patient Care Overview (Adult)  Goal: Plan of Care Review  Outcome: Ongoing (interventions implemented as appropriate)    03/02/17 1553   Coping/Psychosocial Response Interventions   Plan Of Care Reviewed With patient   Patient Care Overview   Progress improving   Outcome Evaluation   Outcome Summary/Follow up Plan Pt performs sit to stand t/f then bed to chair transfer for total of 2 t/f with assist of 3 people. Maintains WB status well, shaking with anxiety immediately prior and after t/f. BP WNL post t/f. Swelling/redness present in LUE.          Problem: Inpatient Physical Therapy  Goal: Transfer Training Goal 1 LTG- PT  Outcome: Ongoing (interventions implemented as appropriate)    03/01/17 1410 03/02/17 1553   Transfer Training PT LTG   Transfer Training PT LTG, Date Established 03/01/17 --    Transfer Training PT LTG, Time to Achieve 2 wks --    Transfer Training PT LTG, Activity Type bed to chair /chair to bed;sit to stand/stand to sit --    Transfer Training PT LTG, [place] Level moderate assist (50% patient effort);2 person assist required --    Transfer Training PT LTG, Assist Device walker, daisy --    Transfer Training PT LTG, Outcome --  goal ongoing       Goal: Gait Training Goal LTG- PT  Outcome: Ongoing (interventions implemented as appropriate)    03/01/17 1410 03/02/17 1553   Gait Training PT LTG   Gait Training Goal PT LTG, Date Established 03/01/17 --    Gait Training Goal PT LTG, Time to Achieve 2 wks --    Gait Training Goal PT LTG, [place] Level moderate assist (50% patient effort);2 person assist required --    Gait Training Goal PT LTG, Assist Device walker, daisy --    Gait Training Goal PT LTG, Distance to Achieve 10 --    Gait Training Goal PT LTG, Outcome --  goal ongoing       Goal: Static Sitting Balance Goal LTG- PT  Outcome: Ongoing (interventions implemented as appropriate)    03/01/17 1410 03/02/17 1553   Static Sitting Balance PT LTG   Static Sitting Balance PT  LTG, Date Established 03/01/17 --    Static Sitting Balance PT LTG, Time to Achieve 2 wks --    Static Sitting Balance PT LTG, [place] Level supervision required --    Static Sitting Balance PT LTG, Assist Device UE Support --    Static Sitting Balance PT LTG, Outcome --  goal ongoing

## 2017-03-02 NOTE — PROGRESS NOTES
Acute Care - Occupational Therapy Treatment Note  [facility]     Patient Name: Vickie Joshi  : 1950  MRN: [id number]  Today's Date: 3/2/2017  Onset of Illness/Injury or Date of Surgery Date: 17  Date of Referral to OT: 17  Referring Physician: Dr Barlow      Admit Date: 2017    Visit Dx:     ICD-10-CM ICD-9-CM   1. Hip fx, left, closed, initial encounter S72.002A 820.8   2. Impaired functional mobility, balance, gait, and endurance Z74.09 V49.89     Patient Active Problem List   Diagnosis   • Fall   • Closed fracture of neck of left femur   • Hyperlipidemia   • Hypertension   • Hx of Migraine   • GERD (gastroesophageal reflux disease)   • Fibromyalgia   • Neuropathy   • Rapid atrial fibrillation   • Chronic anticoagulation on Eliquis   • Hx of Stroke with residual left hemiparesis             Adult Rehabilitation Note       17 1430 17 1429       Rehab Assessment/Intervention    Discipline occupational therapist  -AR physical therapist  -EH     Document Type therapy note (daily note)  -AR      Subjective Information agree to therapy;complains of;fatigue;swelling  -AR      Patient Effort, Rehab Treatment excellent  -AR      Symptoms Noted During/After Treatment none  -AR      Symptoms Noted Comment swelling/renedd/warmth LUE (hemiparetic arm), pt reports this is not baseline; RN notified  -AR Swelling in LUE noted; OT notified NSG  -EH     Precautions/Limitations fall precautions;other (see comments);non-weight bearing status   hemiplegic LUE/LLE, TTWB LLE  -AR fall precautions   hemiplegic with L side weakness  -EH     Recorded by [AR] Annalise Avila, OT [EH] Rachelle Jolly, PT     Vital Signs    Pre Systolic BP Rehab 139  -  -EH     Pre Treatment Diastolic BP 83  -AR 83  -EH     Recorded by [AR] Annalise Avila, OT [EH] Rachelle Jolly, PT     Pain Assessment    Pain Assessment 0-10  -AR Williamson-Estes FACES  -EH     Williamson-Baker FACES Pain Rating  4  -EH      Pain Score 2  -AR      Post Pain Score 2  -AR 6   WBF  -EH     Pain Type Acute pain  -AR Acute pain  -EH     Pain Location --   headache  -AR Hip  -EH     Pain Orientation  Left  -EH     Pain Intervention(s) Medication (See MAR);Repositioned;Ambulation/increased activity  -AR Medication (See MAR);Repositioned;Ambulation/increased activity  -EH     Response to Interventions tolerated, RN notified of c/o headache  -AR tolerated  -EH     Recorded by [AR] Annalise Avila OT [EH] Rachelle Jolly, PT     Vision Assessment/Intervention    Visual Impairment  visual impairment, left;inattention to the left;needs cues to attend visually  -EH     Recorded by  [EH] Rachelle Jolly PT     Cognitive Assessment/Intervention    Current Cognitive/Communication Assessment functional  -AR functional  -EH     Orientation Status oriented x 4  -AR oriented x 4  -EH     Follows Commands/Answers Questions 100% of the time;able to follow single-step instructions  -% of the time;able to follow single-step instructions;needs cueing;needs repetition;needs increased time  -EH     Personal Safety mild impairment;other (see comments)   significant anxiety d/t fear of falling  -AR mild impairment;decreased awareness, need for assist;decreased awareness, need for safety  -EH     Personal Safety Interventions fall prevention program maintained  -AR fall prevention program maintained;gait belt;nonskid shoes/slippers when out of bed;supervised activity  -EH     Recorded by [AR] Annalise Avila OT [EH] Rachelle Jolly, PT     Mobility Assessment/Training    Extremity Weight-Bearing Status left lower extremity  -AR left lower extremity  -EH     Left Lower Extremity Weight-Bearing toe touch weight-bearing  -AR touch down weight-bearing  -EH     Recorded by [AR] Annalise Avila OT [EH] Rachelle Jolly, PT     Bed Mobility, Assessment/Treatment    Bed Mobility, Assistive Device bed rails;head of bed elevated;draw sheet  -AR  bed rails;draw sheet;head of bed elevated  -     Bed Mobility, Roll Left, [place] verbal cues required;moderate assist (50% patient effort)  -AR minimum assist (75% patient effort);verbal cues required   used RLE knee bent; RUE reach across;2x for bed pan  -EH     Bed Mobility, Scoot/Bridge, [place] moderate assist (50% patient effort);2 person assist required;verbal cues required  -AR moderate assist (50% patient effort);2 person assist required  -EH     Bed Mob, Supine to Sit, [place] maximum assist (25% patient effort);2 person assist required;verbal cues required  -AR maximum assist (25% patient effort);2 person assist required  -EH     Bed Mob, Sit to Supine, [place]  not tested  -     Bed Mobility, Safety Issues  decreased use of arms for pushing/pulling;decreased use of legs for bridging/pushing  -     Bed Mobility, Impairments ROM decreased;strength decreased;impaired balance;pain  -AR pain;strength decreased;impaired balance;ROM decreased;impaired vision  -     Bed Mobility, Comment cues to sequence and maintain NWB LLE, pt prefers to exit bed on right side   -AR VC for sequencing, perfers to exit to R EOB.  -EH     Recorded by [AR] Annalise Avila, OT [EH] Rachelle Jolly PT     Transfer Assessment/Treatment    Transfers, Bed-Chair [place] maximum assist (25% patient effort);2 person assist required;verbal cues required  -AR maximum assist (25% patient effort);other (see comments)   3 assisted; pt foot on PT foot,1 assisting behind pt;R pivot  -     Transfers, Chair-Bed [place]  not tested;other (see comments)   Recommended to NSG to use lift.  -     Transfers, Bed-Chair-Bed, Assist Device elevated surface;other (see comments)   RUE support  -AR elevated surface;other (see comments)   RUE on therapist for support  -     Transfers, Stand-Sit [place] maximum assist (25% patient effort);verbal cues required;2 person assist required  -AR maximum assist  (25% patient effort);other (see comments)   3 assisted. Pt demos good strength in R side  -EH     Transfers, Sit-Stand-Sit, Assist Device elevated surface;other (see comments)   RUE support  -AR elevated surface  -EH     Transfer, Impairments ROM decreased;strength decreased;impaired balance;other (see comments)   TTWB LLE  -AR      Transfer, Comment pt required cues to sequence, assist x 3, pt very fearful of falling, recommend pt be moved to lift room- nurse manager notified  -AR Sit<>stand followed by stand pivot with assist of 3; LLE on foot of therapist to ensure maintanace of weightbearing.  -EH     Recorded by [AR] Annalise Avila OT [EH] Rachelle Jolly, PT     Gait Assessment/Treatment    Gait, Comment  not tested  -EH     Recorded by  [EH] Rachelle Jolly, PT     Lower Body Dressing Assessment/Training    LB Dressing Assess/Train, Clothing Type socks;shoes   RLE only  -AR      LB Dressing Assess/Train, Position supine  -AR      LB Dressing Assess/Train, [place] dependent (less than 25% patient effort)  -AR      LB Dressing Assess/Train, Comment Pt prefers to have R shoe on for xfer, donned RLE for height and LLE in slipper sock. Pt normally wears AFO and knee brace to LLE- deferred due to NWB status and pt in agreement with this. She required dependence to doff at end of session per pt request  -AR      Recorded by [AR] Annalise Avila OT      Toileting Assessment/Training    Toileting Assess/Train, Assistive Device other (see comments)   bedpan  -AR      Toileting Assess/Train, Position supine  -AR      Toileting Assess/Train, Indepen Level dependent (less than 25% patient effort)  -AR      Recorded by [AR] Annalise Avila OT      Motor Skills/Interventions    Additional Documentation Balance Skills Training (Group)  -AR Balance Skills Training (Group)  -EH     Recorded by [AR] Annalise Avila OT [EH] Rachelle Jolly, PT     Balance Skills Training    Sitting-Level of  Assistance Maximum assistance   progressed to CGA with RUE support  -AR Maximum assistance   with progression to close supervision  -EH     Sitting-Balance Support Right upper extremity supported  -AR Right upper extremity supported  -EH     Sitting-Balance Activities Lateral lean   left lateral lean   -AR Lateral lean   practicing for sit>stand  -EH     Standing-Level of Assistance Maximum assistance   x3,   -AR Maximum assistance   3 assisting for safety/pt anxiety  -EH     Static Standing Balance Support Right upper extremity supported  -AR Right upper extremity supported   on therapist  -     Standing-Balance Activities  Weight Shift A-P   pt initially leans on bed for balance;forward WS to prevent.  -EH     Recorded by [AR] Annalise Avila OT [EH] Rachelle Jolly PT     Therapy Exercises    Left Lower Extremity  10 reps;PROM:;LAQ  -     Left Upper Extremity --   deferred d/t edema/warmth and notified nurse  -AR      Recorded by [AR] Annalise Avila OT [EH] Rachelle Jolly PT     Orthosis Location    Orthosis Location/Type  lower extremity  -EH     Orthosis, Lower Extremity  AFO (ankle foot orthosis);knee orthosis   NOT DONNED FOR THERAPY 2/2 TTWB  -EH     Recorded by  [EH] Rachelle Jolly PT     Positioning and Restraints    Pre-Treatment Position in bed  -AR in bed  -     Post Treatment Position chair  -AR chair  -EH     In Chair notified nsg;reclined;call light within reach;encouraged to call for assist;exit alarm on;on mechanical lift sling;waffle cushion;LUE elevated   pt declined SCD, notified RN and PCAof useof lift for xfers   -AR notified nsg;reclined;call light within reach;encouraged to call for assist;exit alarm on;LUE elevated;on mechanical lift sling;waffle cushion  -     Recorded by [AR] Annalise Avila OT [EH] Rachelle Jolly PT       User Key  (r) = Recorded By, (t) = Taken By, (c) = Cosigned By    Initials Name Effective Dates     Rachelle Jolly PT  06/19/15 -     AR Annaliseniurka Mishra [place], OT 06/22/15 -                 OT Goals       03/02/17 1659 03/01/17 1415       Bed Mobility OT LTG    Bed Mobility OT LTG, Date Established  03/01/17  -TA     Bed Mobility OT LTG, Time to Achieve  1 wk  -TA     Bed Mobility OT LTG, Activity Type  supine to sit/sit to supine  -TA     Bed Mobility OT LTG, [place] Level  moderate assist (50% patient effort);2 person assist required  -TA     Bed Mobility OT LTG, Outcome goal ongoing  -AR goal ongoing  -TA     Transfer Training OT LTG    Transfer Training OT LTG, Date Established  03/01/17  -TA     Transfer Training OT LTG, Time to Achieve  1 wk  -TA     Transfer Training OT LTG, Activity Type  bed to chair /chair to bed;sit to stand/stand to sit;toilet  -TA     Transfer Training OT LTG, [place] Level  moderate assist (50% patient effort);2 person assist required  -TA     Transfer Training OT LTG, Assist Device  walker, [place]  -TA     Transfer Training OT LTG, Outcome goal ongoing  -AR goal ongoing  -TA     Patient Education OT LTG    Patient Education OT LTG, Date Established  03/01/17  -TA     Patient Education OT LTG, Time to Achieve  by discharge  -TA     Patient Education OT LTG, Education Type  HEP;precautions per surgeon  -TA     Patient Education OT LTG, Education Understanding  verbalizes understanding  -TA     Patient Education OT LTG Outcome goal ongoing  -AR goal ongoing  -TA     Grooming OT LTG    Grooming Goal OT LTG, Date Established  03/01/17  -TA     Grooming Goal OT LTG, Time to Achieve  1 wk  -TA     Grooming Goal OT LTG, [place] Level  set up required;minimum assist (75% patient effort)  -TA     Grooming Goal OT LTG, Outcome goal ongoing  -AR goal ongoing  -TA     UB Dressing OT STG    UB Dressing Goal OT STG, Date Established  03/01/17  -TA     UB Dressing Goal OT STG, Time to Achieve  1 wk  -TA     UB Dressing Goal OT STG, [place] Level  maximum assist (25% patient effort)   [place] technique   -TA     UB Dressing Goal OT STG, Outcome goal ongoing  -AR goal ongoing  -TA       User Key  (r) = Recorded By, (t) = Taken By, (c) = Cosigned By    Initials Name Provider Type    AR Annalise Avila, OT Occupational Therapist    NICHOLAS Jackman, OT Occupational Therapist          Occupational Therapy Education     Title: PT OT SLP Therapies (Active)     Topic: Occupational Therapy (Done)     Point: ADL training (Done)    Description: Instruct learner(s) on proper safety adaptation and remediation techniques during self care or transfers.   Instruct in proper use of assistive devices.    Learning Progress Summary    Learner Readiness Method Response Comment Documented by Status   Patient Eager E,AMY,ALYSHA VU,NR Reviewed TTWB LLE, ADL retraining, transfer training, bed mobility, use of lift for xfer back to bed AR 03/02/17 1658 Done    Acceptance ALYSHA PERALTA,NR ADL kit issued and education initiated; body mechanics with bed mobility; reinforced neeed for call for assist for OOB activities. TA 03/01/17 1409 Done   Significant Other Suhaer AMY PERALTA D VU,NR Reviewed TTWB LLE, ADL retraining, transfer training, bed mobility, use of lift for xfer back to bed AR 03/02/17 1658 Done               Point: Home exercise program (Done)    Description: Instruct learner(s) on appropriate technique for monitoring, assisting and/or progressing therapeutic exercises/activities.    Learning Progress Summary    Learner Readiness Method Response Comment Documented by Status   Patient Eager E,AMY,ALYSHA BURKS,NR Reviewed TTWB LLE, ADL retraining, transfer training, bed mobility, use of lift for xfer back to bed AR 03/02/17 1658 Done    Acceptance ALYSHA PERALTA,NR ADL kit issued and education initiated; body mechanics with bed mobility; reinforced neeed for call for assist for OOB activities. TA 03/01/17 1409 Done   Significant Other Eager EAMY D VU,NR Reviewed TTWB LLE, ADL retraining, transfer training, bed mobility, use of lift for xfer back to bed AR  03/02/17 1658 Done               Point: Precautions (Done)    Description: Instruct learner(s) on prescribed precautions during self-care and functional transfers.    Learning Progress Summary    Learner Readiness Method Response Comment Documented by Status   Patient Eager AMY PERALTA D VU,NR Reviewed TTWB LLE, ADL retraining, transfer training, bed mobility, use of lift for xfer back to bed AR 03/02/17 1658 Done    Acceptance ALYSHA PERALTA,NR ADL kit issued and education initiated; body mechanics with bed mobility; reinforced neeed for call for assist for OOB activities. TA 03/01/17 1409 Done   Significant Other Suhaer AMY PERALTA D VU,NR Reviewed TTWB LLE, ADL retraining, transfer training, bed mobility, use of lift for xfer back to bed AR 03/02/17 1658 Done               Point: Body mechanics (Done)    Description: Instruct learner(s) on proper positioning and spine alignment during self-care, functional mobility activities and/or exercises.    Learning Progress Summary    Learner Readiness Method Response Comment Documented by Status   Patient Suhaer AMY PERALTA D VU,NR Reviewed TTWB LLE, ADL retraining, transfer training, bed mobility, use of lift for xfer back to bed AR 03/02/17 1658 Done    Acceptance ALYSHA PERALTA,NR ADL kit issued and education initiated; body mechanics with bed mobility; reinforced neeed for call for assist for OOB activities. TA 03/01/17 1409 Done   Significant Other AMY Bull D VU,NR Reviewed TTWB LLE, ADL retraining, transfer training, bed mobility, use of lift for xfer back to bed AR 03/02/17 1658 Done                      User Key     Initials Effective Dates Name Provider Type Discipline    AR 06/22/15 -  Annalise Avila, OT Occupational Therapist OT    TA 03/14/16 -  Dav Jackman, OT Occupational Therapist OT                  OT Recommendation and Plan  Anticipated Equipment Needs At Discharge:  (ADL kit issued, education initiated.)  Anticipated Discharge Disposition: inpatient rehabilitation  facility  Planned Therapy Interventions: activity intolerance, adaptive equipment training, ADL retraining, balance training, bed mobility training, home exercise program, ROM (Range of Motion), strengthening, transfer training  Therapy Frequency: daily (Per priority policy)  Plan of Care Review  Plan Of Care Reviewed With: patient, spouse  Progress: progress toward functional goals as expected  Outcome Summary/Follow up Plan: Pt required max assist x 3 for transfer to chair. Recommend pt be moved to lift room, nurse manager notified. Pt with edema/warmth to LUE (hemiparetic arm)- PROM deferred and nurse notified. Pt anxious d/t fear of falling, yet reponds well to gentle encouragement. Recommend IP rehab at [place].          Outcome Measures       03/02/17 1430 03/02/17 1429 03/01/17 1310    How much help from another person do you currently need...    Turning from your back to your side while in flat bed without using bedrails?  2  -EH     Moving from lying on back to sitting on the side of a flat bed without bedrails?  2  -EH     Moving to and from a bed to a chair (including a wheelchair)?  2  -EH     Standing up from a chair using your arms (e.g., wheelchair, bedside chair)?  2  -EH     Climbing 3-5 steps with a railing?  1  -EH     To walk in hospital room?  1  -EH     AM-PAC 6 Clicks Score  10  -EH     How much help from another is currently needed...    Putting on and taking off regular lower body clothing? 1  -AR  1  -TA    Bathing (including washing, rinsing, and drying) 1  -AR  2  -TA    Toileting (which includes using toilet bed pan or urinal) 1  -AR  1  -TA    Putting on and taking off regular upper body clothing 2  -AR  2  -TA    Taking care of personal grooming (such as brushing teeth) 2  -AR  2  -TA    Eating meals 3  -AR  3  -TA    Score 10  -AR  11  -TA    Functional Assessment    Outcome Measure Options AM-PAC 6 Clicks Daily Activity (OT)  -AR AM-PAC 6 Clicks Basic Mobility (PT)  -EH AM-PAC 6 Clicks  Daily Activity (OT)  -TA      03/01/17 1309          How much help from another person do you currently need...    Turning from your back to your side while in flat bed without using bedrails? 2  -SJ      Moving from lying on back to sitting on the side of a flat bed without bedrails? 2  -SJ      Moving to and from a bed to a chair (including a wheelchair)? 1  -SJ      Standing up from a chair using your arms (e.g., wheelchair, bedside chair)? 1  -SJ      Climbing 3-5 steps with a railing? 1  -SJ      To walk in hospital room? 1  -SJ      AM-PAC 6 Clicks Score 8  -SJ      Functional Assessment    Outcome Measure Options AM-PAC 6 Clicks Basic Mobility (PT)  -SJ        User Key  (r) = Recorded By, (t) = Taken By, (c) = Cosigned By    Initials Name Provider Type     Rachelle Jolly, PT Physical Therapist    SJ Jacki Royal, PT Physical Therapist    AR Annalise Avila, OT Occupational Therapist    NICHOLAS Jackman, OT Occupational Therapist           Time Calculation:         Time Calculation- OT       03/02/17 1703          Time Calculation- OT    OT Start Time 1430  -AR      Total Timed Code Minutes- OT 35 minute(s)  -AR      OT Received On 03/02/17  -AR      OT Goal Re-Cert Due Date 03/11/17  -AR        User Key  (r) = Recorded By, (t) = Taken By, (c) = Cosigned By    Initials Name Provider Type    AR Annalise Avila, OT Occupational Therapist           Therapy Charges for Today     Code Description Service Date Service Provider Modifiers Qty    [id number]  OT THERAPEUTIC ACT EA 15 MIN 3/2/2017 Annalise Avila OT GO 2    [id number]  OT THER SUPP EA 15 MIN 3/2/2017 Annalise Avila OT GO 1               Annalise Avila OT  3/2/2017

## 2017-03-02 NOTE — PROGRESS NOTES
Acute Care - Physical Therapy Treatment Note  [facility]     Patient Name: Vickie Joshi  : 1950  MRN: [id number]  Today's Date: 3/2/2017  Onset of Illness/Injury or Date of Surgery Date: 17  Date of Referral to PT: 17  Referring Physician: Dr Barlow    Admit Date: 2017    Visit Dx:    ICD-10-CM ICD-9-CM   1. Hip fx, left, closed, initial encounter S72.002A 820.8   2. Impaired functional mobility, balance, gait, and endurance Z74.09 V49.89     Patient Active Problem List   Diagnosis   • Fall   • Closed fracture of neck of left femur   • Hyperlipidemia   • Hypertension   • Hx of Migraine   • GERD (gastroesophageal reflux disease)   • Fibromyalgia   • Neuropathy   • Rapid atrial fibrillation   • Chronic anticoagulation on Eliquis   • Hx of Stroke with residual left hemiparesis               Adult Rehabilitation Note       17 1429          Rehab Assessment/Intervention    Discipline physical therapist  -      Symptoms Noted Comment Swelling in LUE noted; OT notified NSG  -      Precautions/Limitations fall precautions   hemiplegic with L side weakness  -EH      Recorded by [EH] Rachelle Jolly, PT      Vital Signs    Pre Systolic BP Rehab 139  -EH      Pre Treatment Diastolic BP 83  -EH      Recorded by [] Rachelle Jolly, PT      Pain Assessment    Pain Assessment Williamson-Estes FACES  -      Williamson-Baker FACES Pain Rating 4  -EH      Post Pain Score 6   WBF  -EH      Pain Type Acute pain  -EH      Pain Location Hip  -EH      Pain Orientation Left  -EH      Pain Intervention(s) Medication (See MAR);Repositioned;Ambulation/increased activity  -EH      Response to Interventions tolerated  -EH      Recorded by [EH] Rachelle Jolly, PT      Vision Assessment/Intervention    Visual Impairment visual impairment, left;inattention to the left;needs cues to attend visually  -EH      Recorded by [EH] Rachelle Jolly, PT      Cognitive Assessment/Intervention    Current  Cognitive/Communication Assessment functional  -      Orientation Status oriented x 4  -      Follows Commands/Answers Questions 100% of the time;able to follow single-step instructions;needs cueing;needs repetition;needs increased time  -      Personal Safety mild impairment;decreased awareness, need for assist;decreased awareness, need for safety  -      Personal Safety Interventions fall prevention program maintained;gait belt;nonskid shoes/slippers when out of bed;supervised activity  -      Recorded by [] Rachelle Jolly, PT      Mobility Assessment/Training    Extremity Weight-Bearing Status left lower extremity  -      Left Lower Extremity Weight-Bearing touch down weight-bearing  -      Recorded by [] Rachelle Jolly, PT      Bed Mobility, Assessment/Treatment    Bed Mobility, Assistive Device bed rails;draw sheet;head of bed elevated  -      Bed Mobility, Roll Left, [place] minimum assist (75% patient effort);verbal cues required   used RLE knee bent; RUE reach across;2x for bed pan  -      Bed Mobility, Scoot/Bridge, [place] moderate assist (50% patient effort);2 person assist required  -      Bed Mob, Supine to Sit, [place] maximum assist (25% patient effort);2 person assist required  -      Bed Mob, Sit to Supine, [place] not tested  -      Bed Mobility, Safety Issues decreased use of arms for pushing/pulling;decreased use of legs for bridging/pushing  -      Bed Mobility, Impairments pain;strength decreased;impaired balance;ROM decreased;impaired vision  -      Bed Mobility, Comment VC for sequencing, perfers to exit to R EOB.  -      Recorded by [] Rachelle Jolly, PT      Transfer Assessment/Treatment    Transfers, Bed-Chair [place] maximum assist (25% patient effort);other (see comments)   3 assisted; pt foot on PT foot,1 assisting behind pt;R pivot  -      Transfers, Chair-Bed [place] not tested;other (see comments)    Recommended to NSG to use lift.  -EH      Transfers, Bed-Chair-Bed, Assist Device elevated surface;other (see comments)   RUE on therapist for support  -      Transfers, Stand-Sit [place] maximum assist (25% patient effort);other (see comments)   3 assisted. Pt demos good strength in R side  -EH      Transfers, Sit-Stand-Sit, Assist Device elevated surface  -      Transfer, Comment Sit<>stand followed by stand pivot with assist of 3; LLE on foot of therapist to ensure maintanace of weightbearing.  -EH      Recorded by [] Rachelle Jolly PT      Gait Assessment/Treatment    Gait, Comment not tested  -EH      Recorded by [] Rachelle Jolly PT      Motor Skills/Interventions    Additional Documentation Balance Skills Training (Group)  -EH      Recorded by [] Rachelle Jolly PT      Balance Skills Training    Sitting-Level of Assistance Maximum assistance   with progression to close supervision  -      Sitting-Balance Support Right upper extremity supported  -      Sitting-Balance Activities Lateral lean   practicing for sit>stand  -      Standing-Level of Assistance Maximum assistance   3 assisting for safety/pt anxiety  -      Static Standing Balance Support Right upper extremity supported   on therapist  -      Standing-Balance Activities Weight Shift A-P   pt initially leans on bed for balance;forward WS to prevent.  -EH      Recorded by [] Rachelle Jolly PT      Therapy Exercises    Left Lower Extremity 10 reps;PROM:;LAQ  -EH      Recorded by [] Rachelle Jolly PT      Orthosis Location    Orthosis Location/Type lower extremity  -      Orthosis, Lower Extremity AFO (ankle foot orthosis);knee orthosis   NOT DONNED FOR THERAPY 2/2 TTWB  -EH      Recorded by [] Rachelle Jolly PT      Positioning and Restraints    Pre-Treatment Position in bed  -EH      Post Treatment Position chair  -EH      In Chair notified nsg;reclined;call light within reach;encouraged  to call for assist;exit alarm on;LUE elevated;on mechanical lift sling;waffle cushion  -      Recorded by [] Rachelle Jolly, PT        User Key  (r) = Recorded By, (t) = Taken By, (c) = Cosigned By    Initials Name Effective Dates     Rachelle Jolly, PT 06/19/15 -                 IP PT Goals       03/02/17 1553 03/01/17 1410       Transfer Training PT LTG    Transfer Training PT LTG, Date Established  03/01/17  -SJ     Transfer Training PT LTG, Time to Achieve  2 wks  -SJ     Transfer Training PT LTG, Activity Type  bed to chair /chair to bed;sit to stand/stand to sit  -SJ     Transfer Training PT LTG, [place] Level  moderate assist (50% patient effort);2 person assist required  -SJ     Transfer Training PT LTG, Assist Device  walker, daisy  -SJ     Transfer Training PT LTG, Outcome goal ongoing  [facility]      Gait Training PT LTG    Gait Training Goal PT LTG, Date Established  03/01/17  -SJ     Gait Training Goal PT LTG, Time to Achieve  2 wks  -SJ     Gait Training Goal PT LTG, [place] Level  moderate assist (50% patient effort);2 person assist required  -SJ     Gait Training Goal PT LTG, Assist Device  walker, daisy  -SJ     Gait Training Goal PT LTG, Distance to Achieve  10  -SJ     Gait Training Goal PT LTG, Outcome goal ongoing  -      Static Sitting Balance PT LTG    Static Sitting Balance PT LTG, Date Established  03/01/17  -SJ     Static Sitting Balance PT LTG, Time to Achieve  2 wks  -SJ     Static Sitting Balance PT LTG, [place] Level  supervision required  -SJ     Static Sitting Balance PT LTG, Assist Device  UE Support  -SJ     Static Sitting Balance PT LTG, Outcome goal ongoing  -        User Key  (r) = Recorded By, (t) = Taken By, (c) = Cosigned By    Initials Name Provider Type     Rachelle Jolly, PT Physical Therapist     Jacki Royal PT Physical Therapist          Physical Therapy Education     Title: PT OT SLP Therapies (Active)     Topic: Physical Therapy  (Active)     Point: Mobility training (Active)    Learning Progress Summary    Learner Readiness Method Response Comment Documented by Status   Patient Acceptance E NR   03/02/17 1553 Active    Acceptance E NR   03/01/17 1415 Active   Significant Other Acceptance E [facility] 03/01/17 1415 Active               Point: Body mechanics (Active)    Learning Progress Summary    Learner Readiness Method Response Comment Documented by Status   Patient Acceptance E NR   03/02/17 1553 Active    Acceptance E NR   03/01/17 1415 Active   Significant Other Acceptance E NR   03/01/17 1415 Active               Point: Precautions (Active)    Learning Progress Summary    Learner Readiness Method Response Comment Documented by Status   Patient Acceptance E NR   03/02/17 1553 Active    Acceptance E NR   03/01/17 1415 Active   Significant Other Acceptance E [facility] 03/01/17 1415 Active                      User Key     Initials Effective Dates Name Provider Type Discipline     06/19/15 -  Rachelle Jolly, PT Physical Therapist PT     06/19/15 -  Jacki Royal, PT Physical Therapist PT                    PT Recommendation and Plan  Anticipated Discharge Disposition: inpatient rehabilitation facility  Planned Therapy Interventions: balance training, bed mobility training, gait training, home exercise program, patient/family education, strengthening, transfer training  PT Frequency: daily, per priority policy  Plan of Care Review  Plan Of Care Reviewed With: patient  Progress: improving  Outcome Summary/Follow up Plan: Pt performs sit to stand t/f then bed to chair transfer for total of 2 t/f with assist of 3 people. Maintains WB status well, shaking with anxiety immediately prior and after t/f.  BP WNL post t/f. Swelling/redness present in LUE.           Outcome Measures       03/02/17 1429 03/01/17 1310 03/01/17 1309    How much help from another person do you currently need...    Turning from your back to your side  while in flat bed without using bedrails? 2  -EH  2  -SJ    Moving from lying on back to sitting on the side of a flat bed without bedrails? 2  -EH  2  -SJ    Moving to and from a bed to a chair (including a wheelchair)? 2  -EH  1  -SJ    Standing up from a chair using your arms (e.g., wheelchair, bedside chair)? 2  -EH  1  -SJ    Climbing 3-5 steps with a railing? 1  -EH  1  -SJ    To walk in hospital room? 1  -EH  1  -SJ    AM-PAC 6 Clicks Score 10  -EH  8  -SJ    How much help from another is currently needed...    Putting on and taking off regular lower body clothing?  1  -TA     Bathing (including washing, rinsing, and drying)  2  -TA     Toileting (which includes using toilet bed pan or urinal)  1  -TA     Putting on and taking off regular upper body clothing  2  -TA     Taking care of personal grooming (such as brushing teeth)  2  -TA     Eating meals  3  -TA     Score  11  -TA     Functional Assessment    Outcome Measure Options AM-PAC 6 Clicks Basic Mobility (PT)  -EH AM-PAC 6 Clicks Daily Activity (OT)  -TA AM-PAC 6 Clicks Basic Mobility (PT)  -      User Key  (r) = Recorded By, (t) = Taken By, (c) = Cosigned By    Initials Name Provider Type     Rachelle Jolly, PT Physical Therapist    MARIO Royal, PT Physical Therapist    NICHOLAS Jackman, OT Occupational Therapist           Time Calculation:         PT Charges       03/02/17 1557          Time Calculation    Start Time 1429  -      PT Received On 03/02/17  -      PT Goal Re-Cert Due Date 03/11/17  [facility]      Time Calculation- PT    Total Timed Code Minutes- PT 23 minute(s)  -        User Key  (r) = Recorded By, (t) = Taken By, (c) = Cosigned By    Initials Name Provider Type     Rachelle Jolly, PT Physical Therapist          Therapy Charges for Today     Code Description Service Date Service Provider Modifiers Qty    49303269711  PT THER PROC EA 15 MIN 3/2/2017 Rachelle Jolly, PT GP 2    [id number]  PT THER SUPP EA  15 MIN 3/2/2017 Rachelle Jolly, PT GP 1          PT G-Codes  Outcome Measure Options: AM-PAC 6 Clicks Basic Mobility (PT)    Rachelle Jolly, PT  3/2/2017

## 2017-03-02 NOTE — PROGRESS NOTES
[facility] Medicine Services  INPATIENT PROGRESS NOTE    Date of Admission: 2/27/2017  Length of Stay: 3  Primary Care Physician: Dav Bryan MD    Subjective     Chief Complaint: fall  HPI:  No problems overnight.  Eating better overall, BM x 1 so far.    Review Of Systems:   Review of Systems   Constitutional: Negative for fever.   Cardiovascular: Negative for chest pain and palpitations.   Gastrointestinal: Negative for abdominal distention.   Musculoskeletal: Positive for arthralgias.   Neurological: Positive for weakness.   All other systems reviewed and are negative.      Objective      Temp:  [97.3 °F (36.3 °C)-98.3 °F (36.8 °C)] 97.7 °F (36.5 °C)  Heart Rate:  [] 74  Resp:  [16-18] 16  BP: (112-154)/() 154/100  Physical Exam   Constitutional: She is oriented to person, place, and time. She appears well-developed and well-nourished. No distress.   Sitting up in bed, feeding herself   HENT:   Head: Normocephalic.   Eyes: Pupils are equal, round, and reactive to light.   Cardiovascular: Normal rate.    Irregular, rate in 70s now  No m/r/g   Pulmonary/Chest: Effort normal and breath sounds normal. No respiratory distress.   Abdominal: Soft. Bowel sounds are normal. She exhibits no distension.   Musculoskeletal: She exhibits no edema.   Neurological: She is alert and oriented to person, place, and time.   Chronic left sided weakness   Skin: Skin is warm and dry.   Psychiatric: She has a normal mood and affect.   Vitals reviewed.    Results Review:    I have reviewed the labs, radiology results and diagnostic studies.      Results from last 7 days  Lab Units 03/01/17  0516  02/27/17  1102   WBC 10*3/mm3  --   --  12.58*   HEMOGLOBIN g/dL 12.7  < > 15.8*   PLATELETS 10*3/mm3  --   --  173   < > = values in this interval not displayed.    Results from last 7 days  Lab Units 03/01/17  0516   SODIUM mmol/L 140   POTASSIUM mmol/L 3.8   TOTAL CO2 mmol/L 19.0*   CREATININE  mg/dL 0.70   GLUCOSE mg/dL 144*     ekg - rapid a-fib    Culture Data:    Radiology Data:   CT pelvis reviewed  I have reviewed the medications.    Assessment/Plan     Assessment/Problem List  Principal Problem:    Closed fracture of neck of left femur  Active Problems:    Rapid atrial fibrillation    Fall    Hyperlipidemia    Hypertension    Fibromyalgia    Neuropathy    Chronic anticoagulation on Eliquis    Hx of Stroke with residual left hemiparesis    Plan  - POD #2 percutaneous fixation of left femur fracture  - TDWB LLE x 6 weeks  - home eliquis resumed  - a-fib better rate controlled, continue PO dilt  - continue PT/OT  - reviewed CM note, awaiting insurance pre-cert.  Patient is medically ready for transfer to [facility] when bed available.    DVT prophylaxis: robert Green MD   03/02/17   12:49 PM    Please note that portions of this note may have been completed with a voice recognition program. Efforts were made to edit the dictations, but occasionally words are mistranscribed.

## 2017-03-02 NOTE — PROGRESS NOTES
Discharge Planning Assessment  [facility]     Patient Name: Vikcie Joshi  MRN: [id number]  Today's Date: 3/2/2017    Admit Date: 2/27/2017          Discharge Needs Assessment     None            Discharge Plan       03/02/17 1433    Case Management/Social Work Plan    Additional Comments  has arranged an ambulance transfer via [facility] pending insurance precert for 3/3/17 at 1500      03/02/17 1137    Case Management/Social Work Plan    Additional Comments [facility] is attempting to get precert from pts insurance for transfer.  CM to follow.        Discharge Placement     No information found        Expected Discharge Date and Time     Expected Discharge Date Expected Discharge Time    Mar 2, 2017               Demographic Summary     None            Functional Status     None            Psychosocial     None            Abuse/Neglect     None            Legal     None            Substance Abuse     None            Patient Forms     None          Libby Milton RN

## 2017-03-03 VITALS
OXYGEN SATURATION: 97 % | HEIGHT: 65 IN | DIASTOLIC BLOOD PRESSURE: 105 MMHG | BODY MASS INDEX: 33.32 KG/M2 | WEIGHT: 200 LBS | SYSTOLIC BLOOD PRESSURE: 120 MMHG | RESPIRATION RATE: 20 BRPM | HEART RATE: 78 BPM | TEMPERATURE: 96.9 F

## 2017-03-03 PROCEDURE — 97110 THERAPEUTIC EXERCISES: CPT

## 2017-03-03 PROCEDURE — 97530 THERAPEUTIC ACTIVITIES: CPT

## 2017-03-03 PROCEDURE — 99239 HOSP IP/OBS DSCHRG MGMT >30: CPT | Performed by: NURSE PRACTITIONER

## 2017-03-03 RX ORDER — OXYCODONE HYDROCHLORIDE AND ACETAMINOPHEN 5; 325 MG/1; MG/1
1 TABLET ORAL EVERY 4 HOURS PRN
Refills: 0 | Status: CANCELLED
Start: 2017-03-03 | End: 2017-03-09

## 2017-03-03 RX ORDER — OXYCODONE HYDROCHLORIDE AND ACETAMINOPHEN 5; 325 MG/1; MG/1
1 TABLET ORAL EVERY 4 HOURS PRN
Refills: 0
Start: 2017-03-03 | End: 2018-10-08 | Stop reason: HOSPADM

## 2017-03-03 RX ADMIN — OXYCODONE AND ACETAMINOPHEN 1 TABLET: 5; 325 TABLET ORAL at 10:23

## 2017-03-03 RX ADMIN — PREGABALIN 300 MG: 100 CAPSULE ORAL at 08:30

## 2017-03-03 RX ADMIN — ATORVASTATIN CALCIUM 10 MG: 10 TABLET, FILM COATED ORAL at 00:52

## 2017-03-03 RX ADMIN — OXYCODONE AND ACETAMINOPHEN 1 TABLET: 5; 325 TABLET ORAL at 01:29

## 2017-03-03 RX ADMIN — ASPIRIN 81 MG: 81 TABLET, COATED ORAL at 08:30

## 2017-03-03 RX ADMIN — PANTOPRAZOLE SODIUM 40 MG: 40 TABLET, DELAYED RELEASE ORAL at 05:33

## 2017-03-03 RX ADMIN — LEVOTHYROXINE SODIUM 150 MCG: 150 TABLET ORAL at 08:30

## 2017-03-03 RX ADMIN — BACLOFEN 5 MG: 10 TABLET ORAL at 08:30

## 2017-03-03 RX ADMIN — DOCUSATE SODIUM 100 MG: 100 CAPSULE, LIQUID FILLED ORAL at 08:30

## 2017-03-03 RX ADMIN — APIXABAN 5 MG: 5 TABLET, FILM COATED ORAL at 08:30

## 2017-03-03 RX ADMIN — FAMOTIDINE 20 MG: 20 TABLET ORAL at 00:52

## 2017-03-03 RX ADMIN — APIXABAN 5 MG: 5 TABLET, FILM COATED ORAL at 00:52

## 2017-03-03 RX ADMIN — CETIRIZINE HYDROCHLORIDE 10 MG: 10 TABLET, FILM COATED ORAL at 08:30

## 2017-03-03 RX ADMIN — TOPIRAMATE 100 MG: 25 TABLET, FILM COATED ORAL at 08:30

## 2017-03-03 RX ADMIN — DILTIAZEM HYDROCHLORIDE 360 MG: 180 CAPSULE, EXTENDED RELEASE ORAL at 08:30

## 2017-03-03 NOTE — PROGRESS NOTES
Acute Care - Physical Therapy Treatment Note  [facility]     Patient Name: Vickie Joshi  : 1950  MRN: [id number]  Today's Date: 3/3/2017  Onset of Illness/Injury or Date of Surgery Date: 17  Date of Referral to PT: 17  Referring Physician: Dr Barlow    Admit Date: 2017    Visit Dx:    ICD-10-CM ICD-9-CM   1. Hip fx, left, closed, initial encounter S72.002A 820.8   2. Impaired functional mobility, balance, gait, and endurance Z74.09 V49.89     Patient Active Problem List   Diagnosis   • Fall   • Closed fracture of neck of left femur   • Hyperlipidemia   • Hypertension   • Hx of Migraine   • GERD (gastroesophageal reflux disease)   • Fibromyalgia   • Neuropathy   • Rapid atrial fibrillation   • Chronic anticoagulation on Eliquis   • Hx of Stroke with residual left hemiparesis               Adult Rehabilitation Note       17 1330 17 1430 17 1429    Rehab Assessment/Intervention    Discipline physical therapist  -EH occupational therapist  -AR physical therapist  -    Document Type therapy note (daily note)  -EH therapy note (daily note)  -AR     Subjective Information agree to therapy  -EH agree to therapy;complains of;fatigue;swelling  -AR     Patient Effort, Rehab Treatment  excellent  -AR     Symptoms Noted During/After Treatment other (see comments)  - none  -AR     Symptoms Noted Comment Headache upon sitting  -EH swelling/renedd/warmth LUE (hemiparetic arm), pt reports this is not baseline; RN notified  -AR Swelling in LUE noted; OT notified NSG  -EH    Precautions/Limitations fall precautions;other (see comments);non-weight bearing status   TTWB hemiplegic LUE/LLE.   -EH fall precautions;other (see comments);non-weight bearing status   hemiplegic LUE/LLE, TTWB LLE  -AR fall precautions   hemiplegic with L side weakness  -EH    Recorded by [EH] Rachelle Jolly, PT [AR] Annalise Avila, OT [EH] Rachelle Jolly, PT    Vital Signs    Pre Systolic BP  Rehab 143  -  -  -EH    Pre Treatment Diastolic BP 88  -EH 83  -AR 83  -EH    Post Systolic BP Rehab 140  -EH      Post Treatment Diastolic BP 83  -EH      Pre Patient Position Supine  -EH      Intra Patient Position Sitting  -EH      Post Patient Position Sitting  -EH      Recorded by [] Rachelle Jolly, PT [AR] Annalise Avila, OT [EH] Rachelle Jolly, PT    Pain Assessment    Pain Assessment 0-10  -EH 0-10  -AR Williamson-Baker FACES  -EH    Williamson-Baker FACES Pain Rating 0  -EH  4  -EH    Pain Score 3  -EH 2  -AR     Post Pain Score  2  -AR 6   WBF  -EH    Pain Type Acute pain  -EH Acute pain  -AR Acute pain  -EH    Pain Location Head  -EH --   headache  -AR Hip  -EH    Pain Orientation   Left  -EH    Pain Intervention(s) --   notified NS  - Medication (See MAR);Repositioned;Ambulation/increased activity  -AR Medication (See MAR);Repositioned;Ambulation/increased activity  -    Response to Interventions pt calming in chair when reclined  - tolerated, RN notified of c/o headache  -AR tolerated  -EH    Recorded by [] Rachelle Jolly, PT [AR] Annalise Avila, OT [EH] Rachelle Jolly, PT    Vision Assessment/Intervention    Visual Impairment   visual impairment, left;inattention to the left;needs cues to attend visually  -    Recorded by   [] Rachelle Jolly PT    Cognitive Assessment/Intervention    Current Cognitive/Communication Assessment functional  -EH functional  -AR functional  -EH    Orientation Status oriented x 4  -EH oriented x 4  -AR oriented x 4  -EH    Follows Commands/Answers Questions 100% of the time;able to follow single-step instructions  -% of the time;able to follow single-step instructions  -% of the time;able to follow single-step instructions;needs cueing;needs repetition;needs increased time  -EH    Personal Safety mild impairment  -EH mild impairment;other (see comments)   significant anxiety d/t fear of falling  -AR mild  impairment;decreased awareness, need for assist;decreased awareness, need for safety  -    Personal Safety Interventions fall prevention program maintained;elopement precautions initiated;gait belt;nonskid shoes/slippers when out of bed  -EH fall prevention program maintained  -AR fall prevention program maintained;gait belt;nonskid shoes/slippers when out of bed;supervised activity  -EH    Recorded by [EH] Rachelle Jolly, PT [AR] Annalise Avila OT [EH] Rachelle Jolly PT    ROM (Range of Motion)    General ROM --  -EH      Recorded by [EH] Rachelle Jolly PT      Mobility Assessment/Training    Extremity Weight-Bearing Status  left lower extremity  -AR left lower extremity  -EH    Left Lower Extremity Weight-Bearing  toe touch weight-bearing  -AR touch down weight-bearing  -EH    Recorded by  [AR] Annalise Avila OT [EH] Rachelle Jolly PT    Bed Mobility, Assessment/Treatment    Bed Mobility, Assistive Device bed rails;head of bed elevated;draw sheet  - bed rails;head of bed elevated;draw sheet  -AR bed rails;draw sheet;head of bed elevated  -    Bed Mobility, Roll Left, [place] maximum assist (25% patient effort);2 person assist required  - verbal cues required;moderate assist (50% patient effort)  -AR minimum assist (75% patient effort);verbal cues required   used RLE knee bent; RUE reach across;2x for bed pan  -    Bed Mobility, Roll Right, [place] maximum assist (25% patient effort);2 person assist required  -      Bed Mobility, Scoot/Bridge, [place] maximum assist (25% patient effort);2 person assist required  - moderate assist (50% patient effort);2 person assist required;verbal cues required  -AR moderate assist (50% patient effort);2 person assist required  -    Bed Mob, Supine to Sit, [place] maximum assist (25% patient effort);2 person assist required  -EH maximum assist (25% patient effort);2 person assist required;verbal cues required  -AR  maximum assist (25% patient effort);2 person assist required  -    Bed Mob, Sit to Supine, [place] not tested  -EH  not tested  -    Bed Mobility, Safety Issues decreased use of arms for pushing/pulling;decreased use of legs for bridging/pushing  -EH  decreased use of arms for pushing/pulling;decreased use of legs for bridging/pushing  -    Bed Mobility, Impairments ROM decreased;strength decreased;impaired balance;pain  -EH ROM decreased;strength decreased;impaired balance;pain  -AR pain;strength decreased;impaired balance;ROM decreased;impaired vision  -    Bed Mobility, Comment Exits bed toward R; Cues for sequencing. Needs assist for movement of LLE.  -EH cues to sequence and maintain NWB LLE, pt prefers to exit bed on right side   -AR VC for sequencing, perfers to exit to R EOB.  -EH    Recorded by [EH] Rachelle Jolly, PT [AR] Annalise Avila, OT [EH] Rachelle Jolly, PT    Transfer Assessment/Treatment    Transfers, Bed-Chair [place] maximum assist (25% patient effort);verbal cues required;other (see comments)   3 person assist  -EH maximum assist (25% patient effort);2 person assist required;verbal cues required  -AR maximum assist (25% patient effort);other (see comments)   3 assisted; pt foot on PT foot,1 assisting behind pt;R pivot  -    Transfers, Chair-Bed [place] not tested  -EH  not tested;other (see comments)   Recommended to NSG to use lift.  -    Transfers, Bed-Chair-Bed, Assist Device elevated surface  -EH elevated surface;other (see comments)   RUE support  -AR elevated surface;other (see comments)   RUE on therapist for support  -    Transfers, Sit-Stand [place] maximum assist (25% patient effort);other (see comments)   3 person assist  -EH      Transfers, Stand-Sit [place] maximum assist (25% patient effort)   3 person assist  -EH maximum assist (25% patient effort);verbal cues required;2 person assist required  -AR maximum assist (25% patient  effort);other (see comments)   3 assisted. Pt demos good strength in R side  -EH    Transfers, Sit-Stand-Sit, Assist Device elevated surface  -EH elevated surface;other (see comments)   RUE support  -AR elevated surface  -EH    Transfer, Impairments ROM decreased;strength decreased;pain  -EH ROM decreased;strength decreased;impaired balance;other (see comments)   TTWB LLE  -AR     Transfer, Comment  pt required cues to sequence, assist x 3, pt very fearful of falling, recommend pt be moved to lift room- nurse manager notified  -AR Sit<>stand followed by stand pivot with assist of 3; LLE on foot of therapist to ensure maintanace of weightbearing.  -EH    Recorded by [EH] Rachelle Jolly, PT [AR] Annalise Avila OT [EH] Rachelle Jolly, PT    Gait Assessment/Treatment    Gait, [place] Level not tested  -EH      Gait, Comment   not tested  -EH    Recorded by [EH] Rachelle Jolly, PT  [EH] Rachelle Jolly, PT    Lower Body Dressing Assessment/Training    LB Dressing Assess/Train, Clothing Type  socks;shoes   RLE only  -AR     LB Dressing Assess/Train, Position  supine  -AR     LB Dressing Assess/Train, [place]  dependent (less than 25% patient effort)  -AR     LB Dressing Assess/Train, Comment  Pt prefers to have R shoe on for xfer, donned RLE for height and LLE in slipper sock. Pt normally wears AFO and knee brace to LLE- deferred due to NWB status and pt in agreement with this. She required dependence to doff at end of session per pt request  -AR     Recorded by  [AR] Annalise Avila OT     Toileting Assessment/Training    Toileting Assess/Train, Assistive Device  other (see comments)   bedpan  -AR     Toileting Assess/Train, Position  supine  -AR     Toileting Assess/Train, Indepen Level  dependent (less than 25% patient effort)  -AR     Recorded by  [AR] Annalise Avila OT     Motor Skills/Interventions    Additional Documentation  Balance Skills Training (Group)  -AR Balance Skills  Training (Group)  -    Recorded by  [AR] Annalise Avila OT [] Rachelle Jolly, PT    Balance Skills Training    Sitting-Level of Assistance  Maximum assistance   progressed to [facility] with RUE support  -AR Maximum assistance   with progression to close supervision  -    Sitting-Balance Support  Right upper extremity supported  -AR Right upper extremity supported  -    Sitting-Balance Activities  Lateral lean   left lateral lean   -AR Lateral lean   practicing for sit>stand  -    Standing-Level of Assistance  Maximum assistance   x3,   -AR Maximum assistance   3 assisting for safety/pt anxiety  -EH    Static Standing Balance Support  Right upper extremity supported  -AR Right upper extremity supported   on therapist  -    Standing-Balance Activities   Weight Shift A-P   pt initially leans on bed for balance;forward WS to prevent.  -    Recorded by  [AR] Annalise Avila OT [] Rachelle Jolly PT    Therapy Exercises    Left Lower Extremity PROM:;ankle pumps/circles   in addition to AROM stated in BLEs  -  10 reps;PROM:;LAQ  -    Bilateral Lower Extremities AROM:;10 reps;ankle pumps/circles;glut sets;quad sets  -      Left Upper Extremity  --   deferred d/t edema/warmth and notified nurse  -AR     Recorded by [EH] Rachelle Jolly, PT [AR] Annalise Avila OT [] Rachelle Jolly PT    Orthosis Location    Orthosis Location/Type   lower extremity  -    Orthosis, Lower Extremity   AFO (ankle foot orthosis);knee orthosis   NOT DONNED FOR THERAPY 2/2 TTWB  -    Recorded by   [] Rachelle Jolly PT    Positioning and Restraints    Pre-Treatment Position in bed  - in bed  -AR in bed  -    Post Treatment Position chair  - chair  -AR chair  -    In Chair notified nsg;reclined;call light within reach;encouraged to call for assist;exit alarm on;on mechanical lift sling;with other staff  - notified nsg;reclined;call light within reach;encouraged to call for assist;exit  alarm on;on mechanical lift sling;waffle cushion;LUE elevated   pt declined SCD, notified RN and PCAof useof lift for xfers   -AR notified nsg;reclined;call light within reach;encouraged to call for assist;exit alarm on;LUE elevated;on mechanical lift sling;waffle cushion  -EH    Recorded by [EH] Rachelle Jolly, PT [AR] Annalise Avila, OT [EH] Rachelle Jolly, PT      User Key  (r) = Recorded By, (t) = Taken By, (c) = Cosigned By    Initials Name Effective Dates     Rachelle Jolly, PT 06/19/15 -     AR Annalise Aivla, OT 06/22/15 -                 IP PT Goals       03/03/17 1435 03/02/17 1553 03/01/17 1410    Transfer Training PT LTG    Transfer Training PT LTG, Date Established   03/01/17  -SJ    Transfer Training PT LTG, Time to Achieve   2 wks  -SJ    Transfer Training PT LTG, Activity Type   bed to chair /chair to bed;sit to stand/stand to sit  -SJ    Transfer Training PT LTG, [place] Level   moderate assist (50% patient effort);2 person assist required  -SJ    Transfer Training PT LTG, Assist Device   walker, daisy  -SJ    Transfer Training PT LTG, Outcome goal not met  - goal ongoing  -     Gait Training PT LTG    Gait Training Goal PT LTG, Date Established   03/01/17  -SJ    Gait Training Goal PT LTG, Time to Achieve   2 wks  -SJ    Gait Training Goal PT LTG, [place] Level   moderate assist (50% patient effort);2 person assist required  -SJ    Gait Training Goal PT LTG, Assist Device   walker, daisy  -SJ    Gait Training Goal PT LTG, Distance to Achieve   10  -SJ    Gait Training Goal PT LTG, Outcome goal not met  - goal ongoing  -     Static Sitting Balance PT LTG    Static Sitting Balance PT LTG, Date Established   03/01/17  -SJ    Static Sitting Balance PT LTG, Time to Achieve   2 wks  -SJ    Static Sitting Balance PT LTG, [place] Level   supervision required  -SJ    Static Sitting Balance PT LTG, Assist Device   UE Support  -SJ    Static Sitting Balance PT LTG,  Outcome goal not met  - goal ongoing  -       User Key  (r) = Recorded By, (t) = Taken By, (c) = Cosigned By    Initials Name Provider Type     Rachelle Jolly, PT Physical Therapist     Jacki Royal, PT Physical Therapist          Physical Therapy Education     Title: PT OT SLP Therapies (Active)     Topic: Physical Therapy (Active)     Point: Mobility training (Active)    Learning Progress Summary    Learner Readiness Method Response Comment Documented by Status   Patient Acceptance E VU,NR   03/03/17 1434 Done    Acceptance E [facility] 03/02/17 1553 Active    Acceptance E [facility] 03/01/17 1415 Active   Significant Other Acceptance E [facility] 03/01/17 1415 Active               Point: Home exercise program (Done)    Learning Progress Summary    Learner Readiness Method Response Comment Documented by Status   Patient Acceptance E VU,[facility] 03/03/17 1434 Done               Point: Body mechanics (Active)    Learning Progress Summary    Learner Readiness Method Response Comment Documented by Status   Patient Acceptance E VU,[facility] 03/03/17 1434 Done    Acceptance E [facility] 03/02/17 1553 Active    Acceptance E [facility] 03/01/17 1415 Active   Significant Other Acceptance E [facility] 03/01/17 1415 Active               Point: Precautions (Active)    Learning Progress Summary    Learner Readiness Method Response Comment Documented by Status   Patient Acceptance E VU,NR   03/03/17 1434 Done    Acceptance E [facility] 03/02/17 1553 Active    Acceptance E [facility] 03/01/17 1415 Active   Significant Other Acceptance E [facility] 03/01/17 1415 Active                      User Key     Initials Effective Dates Name Provider Type [facility] 06/19/15 -  Rachelle Jolly, PT Physical Therapist PT     06/19/15 -  Jacki Royal PT Physical Therapist PT                    PT Recommendation and Plan  Anticipated Discharge Disposition: inpatient rehabilitation facility  Planned Therapy Interventions: balance training, bed  mobility training, gait training, home exercise program, patient/family education, strengthening, transfer training  PT Frequency: daily, per priority policy  Plan of Care Review  Plan Of Care Reviewed With: patient  Progress: improving  Outcome Summary/Follow up Plan: Pt with max assist x 3 for t/f to chair. C/o Head ache upon sitting initially. NSG notified. BP stable. LUE edema/warmth decreased. Pt to d/c to [facility] today.          Outcome Measures       03/03/17 1330 03/02/17 1430 03/02/17 1429    How much help from another person do you currently need...    Turning from your back to your side while in flat bed without using bedrails? 2  -EH  2  -EH    Moving from lying on back to sitting on the side of a flat bed without bedrails? 2  -EH  2  -EH    Moving to and from a bed to a chair (including a wheelchair)? 2  -EH  2  -EH    Standing up from a chair using your arms (e.g., wheelchair, bedside chair)? 2  -EH  2  -EH    Climbing 3-5 steps with a railing? 1  -EH  1  -EH    To walk in hospital room? 1  -EH  1  -EH    AM-PAC 6 Clicks Score 10  -EH  10  -EH    How much help from another is currently needed...    Putting on and taking off regular lower body clothing?  1  -AR     Bathing (including washing, rinsing, and drying)  1  -AR     Toileting (which includes using toilet bed pan or urinal)  1  -AR     Putting on and taking off regular upper body clothing  2  -AR     Taking care of personal grooming (such as brushing teeth)  2  -AR     Eating meals  3  -AR     Score  10  -AR     Functional Assessment    Outcome Measure Options AM-PAC 6 Clicks Basic Mobility (PT)  -EH AM-PAC 6 Clicks Daily Activity (OT)  -AR AM-PAC 6 Clicks Basic Mobility (PT)  -EH      03/01/17 1310 03/01/17 1309       How much help from another person do you currently need...    Turning from your back to your side while in flat bed without using bedrails?  2  -SJ     Moving from lying on back to sitting on the side of a flat bed without bedrails?   2  -SJ     Moving to and from a bed to a chair (including a wheelchair)?  1  -SJ     Standing up from a chair using your arms (e.g., wheelchair, bedside chair)?  1  -SJ     Climbing 3-5 steps with a railing?  1  -SJ     To walk in hospital room?  1  -SJ     AM-PAC 6 Clicks Score  8  -SJ     How much help from another is currently needed...    Putting on and taking off regular lower body clothing? 1  -TA      Bathing (including washing, rinsing, and drying) 2  -TA      Toileting (which includes using toilet bed pan or urinal) 1  -TA      Putting on and taking off regular upper body clothing 2  -TA      Taking care of personal grooming (such as brushing teeth) 2  -TA      Eating meals 3  -TA      Score 11  -TA      Functional Assessment    Outcome Measure Options AM-PAC 6 Clicks Daily Activity (OT)  -TA AM-PAC 6 Clicks Basic Mobility (PT)  -       User Key  (r) = Recorded By, (t) = Taken By, (c) = Cosigned By    Initials Name Provider Type     Rachelle Jolly, PT Physical Therapist    MARIO Royal, PT Physical Therapist    AR Annalise Avila, OT Occupational Therapist    TA Dav Jackman, OT Occupational Therapist           Time Calculation:         PT Charges       03/03/17 1439          Time Calculation    Start Time 1330  -      PT Received On 03/03/17  -      PT Goal Re-Cert Due Date 03/11/17  -      Time Calculation- PT    Total Timed Code Minutes- PT 17 minute(s)  -        User Key  (r) = Recorded By, (t) = Taken By, (c) = Cosigned By    Initials Name Provider Type     Rachelle Jolly, PT Physical Therapist          Therapy Charges for Today     Code Description Service Date Service Provider Modifiers Qty    66641739074 HC PT THER PROC EA 15 MIN 3/2/2017 Rachelle Jolly, PT GP 2    30207609924 HC PT THER SUPP EA 15 MIN 3/2/2017 Rachelle Jolly, PT GP 1    72194028132 HC PT THER PROC EA 15 MIN 3/3/2017 Rachelle Jolly, PT GP 1    33214514186 HC PT THER SUPP EA 15 MIN  3/3/2017 Rachelle Jolly, PT GP 1          PT G-Codes  Outcome Measure Options: AM-PAC 6 Clicks Basic Mobility (PT)    Rachelle Jolly, PT  3/3/2017

## 2017-03-03 NOTE — PROGRESS NOTES
Continued Stay Note   [facility]     Patient Name: Vickie Joshi  MRN: [id number]  Today's Date: 3/3/2017    Admit Date: 2/27/2017          Discharge Plan       03/03/17 1409    Case Management/Social Work Plan    Plan [facility]    Patient/Family In Agreement With Plan yes    Additional Comments Patient is approved for [facility] and has a bed today on the subacute unit if medically ready. Ambulance scheduled for 1500. Nurse to call report to [phone], dc summary to [phone]. CM will follow.               Discharge Codes     None        Expected Discharge Date and Time     Expected Discharge Date Expected Discharge Time    Mar 3, 2017             Mandi Champagne RN

## 2017-03-03 NOTE — THERAPY DISCHARGE NOTE
Acute Care - Physical Therapy Discharge Summary  [facility]       Patient Name: Vickie Joshi  : 1950  MRN: [id number]    Today's Date: 3/3/2017  Onset of Illness/Injury or Date of Surgery Date: 17    Date of Referral to PT: 17  Referring Physician: Dr Barlow      Admit Date: 2017      PT Recommendation and Plan    Visit Dx:    ICD-10-CM ICD-9-CM   1. Hip fx, left, closed, initial encounter S72.002A 820.8   2. Impaired functional mobility, balance, gait, and endurance Z74.09 V49.89             Outcome Measures       17 1330 17 1430 17 1429    How much help from another person do you currently need...    Turning from your back to your side while in flat bed without using bedrails? 2  -EH  2  -EH    Moving from lying on back to sitting on the side of a flat bed without bedrails? 2  -EH  2  -EH    Moving to and from a bed to a chair (including a wheelchair)? 2  -EH  2  -EH    Standing up from a chair using your arms (e.g., wheelchair, bedside chair)? 2  -EH  2  -EH    Climbing 3-5 steps with a railing? 1  -EH  1  -EH    To walk in hospital room? 1  -EH  1  -EH    AM-PAC 6 Clicks Score 10  -EH  10  -EH    How much help from another is currently needed...    Putting on and taking off regular lower body clothing?  1  -AR     Bathing (including washing, rinsing, and drying)  1  -AR     Toileting (which includes using toilet bed pan or urinal)  1  -AR     Putting on and taking off regular upper body clothing  2  -AR     Taking care of personal grooming (such as brushing teeth)  2  -AR     Eating meals  3  -AR     Score  10  -AR     Functional Assessment    Outcome Measure Options AM-PAC 6 Clicks Basic Mobility (PT)  -EH AM-PAC 6 Clicks Daily Activity (OT)  -AR AM-PAC 6 Clicks Basic Mobility (PT)  -      17 1310 17 1309       How much help from another person do you currently need...    Turning from your back to your side while in flat bed without using bedrails?   2  -SJ     Moving from lying on back to sitting on the side of a flat bed without bedrails?  2  -SJ     Moving to and from a bed to a chair (including a wheelchair)?  1  -SJ     Standing up from a chair using your arms (e.g., wheelchair, bedside chair)?  1  -SJ     Climbing 3-5 steps with a railing?  1  -SJ     To walk in hospital room?  1  -SJ     AM-PAC 6 Clicks Score  8  -SJ     How much help from another is currently needed...    Putting on and taking off regular lower body clothing? 1  -TA      Bathing (including washing, rinsing, and drying) 2  -TA      Toileting (which includes using toilet bed pan or urinal) 1  -TA      Putting on and taking off regular upper body clothing 2  -TA      Taking care of personal grooming (such as brushing teeth) 2  -TA      Eating meals 3  -TA      Score 11  -TA      Functional Assessment    Outcome Measure Options AM-PAC 6 Clicks Daily Activity (OT)  -TA AM-[facility] 6 Clicks Basic Mobility (PT)  -       User Key  (r) = Recorded By, (t) = Taken By, (c) = Cosigned By    Initials Name Provider Type     Rachelle Jolly, PT Physical Therapist    MARIO Royal, PT Physical Therapist    AR Annalise Avila, OT Occupational Therapist    TA Dav Jackman, OT Occupational Therapist                PT Charges       03/03/17 1439          Time Calculation    Start Time 1330  -      PT Received On 03/03/17  -      PT Goal Re-Cert Due Date 03/11/17  -      Time Calculation- PT    Total Timed Code Minutes- PT 17 minute(s)  -        User Key  (r) = Recorded By, (t) = Taken By, (c) = Cosigned By    Initials Name Provider Type     Rachelle Jolly, PT Physical Therapist                  IP PT Goals       03/03/17 1435 03/02/17 1553 03/01/17 1410    Transfer Training PT LTG    Transfer Training PT LTG, Date Established   03/01/17  -    Transfer Training PT LTG, Time to Achieve   2 wks  -    Transfer Training PT LTG, Activity Type   bed to chair /chair to bed;sit to  stand/stand to sit  -SJ    Transfer Training PT LTG, [place] Level   moderate assist (50% patient effort);2 person assist required  -SJ    Transfer Training PT LTG, Assist Device   walker, daisy  -SJ    Transfer Training PT LTG, Outcome goal not met  - goal ongoing  -     Gait Training PT LTG    Gait Training Goal PT LTG, Date Established   03/01/17  -SJ    Gait Training Goal PT LTG, Time to Achieve   2 wks  -SJ    Gait Training Goal PT LTG, [place] Level   moderate assist (50% patient effort);2 person assist required  -SJ    Gait Training Goal PT LTG, Assist Device   walker, daisy  -SJ    Gait Training Goal PT LTG, Distance to Achieve   10  -SJ    Gait Training Goal PT LTG, Outcome goal not met  - goal ongoing  -     Static Sitting Balance PT LTG    Static Sitting Balance PT LTG, Date Established   03/01/17  -SJ    Static Sitting Balance PT LTG, Time to Achieve   2 wks  -SJ    Static Sitting Balance PT LTG, [place] Level   supervision required  -SJ    Static Sitting Balance PT LTG, Assist Device   UE Support  -SJ    Static Sitting Balance PT LTG, Outcome goal not met  - goal ongoing  -       User Key  (r) = Recorded By, (t) = Taken By, (c) = Cosigned By    Initials Name Provider Type     Rachelle Jolly, PT Physical Therapist     Jacki Royal PT Physical Therapist          Therapy Charges for Today     Code Description Service Date Service Provider Modifiers Qty    63605056636 HC PT THER PROC EA 15 MIN 3/2/2017 Rachelle Jolly, PT GP 2    00082072759 HC PT THER SUPP EA 15 MIN 3/2/2017 Rachelle Jolly, PT GP 1    47122971825 HC PT THER PROC EA 15 MIN 3/3/2017 Rachelle Jolly, PT GP 1    06475872763 HC PT THER SUPP EA 15 MIN 3/3/2017 Rachelle Jolly, PT GP 1          PT Discharge Summary  Anticipated Discharge Disposition: inpatient rehabilitation facility  Reason for Discharge: Discharge from facility  Outcomes Achieved: Other (pt had 2 sessions; very complex condition  with L sided weakness and fx on L hip.)  Discharge Destination: Inpatient rehabilitation facility      Rachelle Jolly, PT   3/3/2017

## 2017-03-03 NOTE — PROGRESS NOTES
[facility] Medicine Services  INPATIENT PROGRESS NOTE    Date of Admission: 2/27/2017  Length of Stay: 4  Primary Care Physician: Dav Bryan MD    Subjective     Chief Complaint: fall  HPI:  No new issues or complaints.  Eating better, last BM two days ago.   at bedside.  Pain is controlled with PRN medications.  Awaiting insurance approval for [facility].     Review Of Systems:   Review of Systems   Constitutional: Negative for fever.   Cardiovascular: Negative for chest pain and palpitations.   Gastrointestinal: Negative for abdominal distention.   Musculoskeletal: Positive for arthralgias.   Neurological: Positive for weakness.   All other systems reviewed and are negative.      Objective      Temp:  [96.5 °F (35.8 °C)-98.6 °F (37 °C)] 97.6 °F (36.4 °C)  Heart Rate:  [74-90] 74  Resp:  [16-20] 20  BP: (121-139)/() 127/86  Physical Exam   Constitutional: She is oriented to person, place, and time. She appears well-developed and well-nourished. No distress.   Sitting up in bed,  at bedside   HENT:   Head: Normocephalic.   Eyes: Pupils are equal, round, and reactive to light.   Cardiovascular: Normal rate.  An irregularly irregular rhythm present. Exam reveals no friction rub.    No murmur heard.  Pulmonary/Chest: Effort normal and breath sounds normal. No respiratory distress. She has no wheezes.   Abdominal: Soft. Bowel sounds are normal. She exhibits no distension. There is no tenderness.   Musculoskeletal: She exhibits no edema.   Neurological: She is alert and oriented to person, place, and time.   Chronic left sided weakness   Skin: Skin is warm and dry.   Psychiatric: She has a normal mood and affect. Her behavior is normal.   Vitals reviewed.    Results Review:    I have reviewed the labs, radiology results and diagnostic studies.      Results from last 7 days  Lab Units 03/01/17  0516  02/27/17  1102   WBC 10*3/mm3  --   --  12.58*   HEMOGLOBIN g/dL 12.7  < >  15.8*   PLATELETS 10*3/mm3  --   --  173   < > = values in this interval not displayed.    Results from last 7 days  Lab Units 03/01/17  0516   SODIUM mmol/L 140   POTASSIUM mmol/L 3.8   TOTAL CO2 mmol/L 19.0*   CREATININE mg/dL 0.70   GLUCOSE mg/dL 144*     ekg - rapid a-fib    Culture Data:    Radiology Data:   CT pelvis reviewed  I have reviewed the medications.    Assessment/Plan     Assessment/Problem List  Principal Problem:    Closed fracture of neck of left femur  Active Problems:    Fall    Hyperlipidemia    Hypertension    Fibromyalgia    Neuropathy    Rapid atrial fibrillation    Chronic anticoagulation on Eliquis    Hx of Stroke with residual left hemiparesis    Plan  - POD #3 percutaneous fixation of left femur fracture, follow up Dr. Barlow on 3/16/17.  - TDWB LLE x 6 weeks  - home eliquis resumed  - a-fib better rate controlled, continue PO dilt  - continue PT/OT  - reviewed CM note, awaiting insurance pre-cert, hopefully with be able to go tomorrow.  Patient is medically ready for transfer to [facility] when bed available.    DVT prophylaxis: robert Will, APRN   03/03/17   11:51 AM    Please note that portions of this note may have been completed with a voice recognition program. Efforts were made to edit the dictations, but occasionally words are mistranscribed.

## 2017-03-03 NOTE — PLAN OF CARE
Problem: Patient Care Overview (Adult)  Goal: Plan of Care Review  Outcome: Ongoing (interventions implemented as appropriate)    03/03/17 1603   Coping/Psychosocial Response Interventions   Plan Of Care Reviewed With patient   Patient Care Overview   Progress progress toward functional goals as expected   Outcome Evaluation   Outcome Summary/Follow up Plan Pt demo bed mobility and transfer to chair with max assist x3 person assist this date. Demo improved understanding of TTWB and improved sitting balance. Made good progress toward IP OT goals during admission.         Problem: Fractured Hip (Adult)  Goal: Signs and Symptoms of Listed Potential Problems Will be Absent or Manageable (Fractured Hip)  Outcome: Ongoing (interventions implemented as appropriate)    03/03/17 1603   Fractured Hip   Problems Assessed (Fractured Hip) functional deficit/self-care deficit   Problems Present (Fractured Hip) functional deficit/self-care deficit         Problem: Inpatient Occupational Therapy  Goal: Bed Mobility Goal LTG- OT  Outcome: Unable to achieve outcome(s) by discharge Date Met:  03/03/17 03/01/17 1415 03/03/17 1603   Bed Mobility OT LTG   Bed Mobility OT LTG, Date Established 03/01/17 --    Bed Mobility OT LTG, Time to Achieve 1 wk --    Bed Mobility OT LTG, Activity Type supine to sit/sit to supine --    Bed Mobility OT LTG, [place] Level moderate assist (50% patient effort);2 person assist required --    Bed Mobility OT LTG, Outcome --  goal not met   Bed Mobility OT LTG, Reason Goal Not Met --  discharged from facility       Goal: Transfer Training Goal 1 LTG- OT  Outcome: Unable to achieve outcome(s) by discharge Date Met:  03/03/17 03/01/17 1415 03/03/17 1603   Transfer Training OT LTG   Transfer Training OT LTG, Date Established 03/01/17 --    Transfer Training OT LTG, Time to Achieve 1 wk --    Transfer Training OT LTG, Activity Type bed to chair /chair to bed;sit to stand/stand to sit;toilet --     Transfer Training OT LTG, [place] Level moderate assist (50% patient effort);2 person assist required --    Transfer Training OT LTG, Assist Device walker, georges --    Transfer Training OT LTG, Outcome --  goal not met   Transfer Training OT LTG, Reason Goal Not Met --  discharged from facility       Goal: Patient Education Goal LTG- OT  Outcome: Outcome(s) achieved Date Met:  03/03/17 03/01/17 1415 03/03/17 1603   Patient Education OT LTG   Patient Education OT LTG, Date Established 03/01/17 --    Patient Education OT LTG, Time to Achieve by discharge --    Patient Education OT LTG, Education Type HEP;precautions per surgeon --    Patient Education OT LTG, Education Understanding verbalizes understanding --    Patient Education OT LTG Outcome --  goal met       Goal: Grooming Goal LTG- OT  Outcome: Unable to achieve outcome(s) by discharge Date Met:  03/03/17 03/01/17 1415 03/03/17 1603   Grooming OT LTG   Grooming Goal OT LTG, Date Established 03/01/17 --    Grooming Goal OT LTG, Time to Achieve 1 wk --    Grooming Goal OT LTG, [place] Level set up required;minimum assist (75% patient effort) --    Grooming Goal OT LTG, Outcome --  goal not met   Grooming Goal OT LTG, Reason Goal Not Met --  discharged from facility       Goal: UB Dressing Goal STG- OT  Outcome: Unable to achieve outcome(s) by discharge Date Met:  03/03/17 03/01/17 1415 03/03/17 1603   UB Dressing OT STG   UB Dressing Goal OT STG, Date Established 03/01/17 --    UB Dressing Goal OT STG, Time to Achieve 1 wk --    UB Dressing Goal OT STG, [place] Level maximum assist (25% patient effort)  (Georges technique) --    UB Dressing Goal OT STG, Outcome --  goal not met   UB Dressing Goal OT STG, Reason Goal Not Met --  discharged from facility

## 2017-03-03 NOTE — PLAN OF CARE
Problem: Patient Care Overview (Adult)  Goal: Plan of Care Review  Outcome: Ongoing (interventions implemented as appropriate)    Problem: Fractured Hip (Adult)  Goal: Signs and Symptoms of Listed Potential Problems Will be Absent or Manageable (Fractured Hip)  Outcome: Ongoing (interventions implemented as appropriate)    Problem: Pressure Ulcer Risk (Joni Scale) (Adult,Obstetrics,Pediatric)  Goal: Identify Related Risk Factors and Signs and Symptoms  Outcome: Ongoing (interventions implemented as appropriate)    Problem: Fall Risk (Adult)  Goal: Identify Related Risk Factors and Signs and Symptoms  Outcome: Ongoing (interventions implemented as appropriate)    03/03/17 0449   Fall Risk   Fall Risk: Related Risk Factors history of falls

## 2017-03-03 NOTE — DISCHARGE SUMMARY
[facility] Medicine Services  DISCHARGE SUMMARY       Date of Admission: 2/27/2017  Date of Discharge:  3/3/2017  Primary Care Physician: Dav Bryan MD    Discharge Diagnoses:  Active Hospital Problems (** Indicates Principal Problem)    Diagnosis Date Noted   • **Closed fracture of neck of left femur [S72.002A] 02/27/2017   • Fall [W19.XXXA] 02/27/2017   • Hyperlipidemia [E78.5] 02/27/2017   • Hypertension [I10] 02/27/2017   • Fibromyalgia [M79.7] 02/27/2017   • Neuropathy [G62.9] 02/27/2017   • Rapid atrial fibrillation [I48.91] 02/27/2017   • Chronic anticoagulation on Eliquis [Z79.01] 02/27/2017   • Hx of Stroke with residual left hemiparesis [I63.9] 02/27/2017      Resolved Hospital Problems    Diagnosis Date Noted Date Resolved   • Hip fx [S72.009A] 02/27/2017 02/28/2017       Presenting Problem/History of Present Illness  Hip fx, left, closed, initial encounter [S72.002A]     Chief Complaint on Day of Discharge: Fall    History of Present Illness on Day of Discharge: She without any new complaints or issues.  Stating she's eating better.  Has been at bedside.  Pain is controlled with when necessary medications.  She is ready to go to [facility] today.  Denies chest pain, shortness of breath, or abdominal pain.    Hospital Course  Patient is a 66 y.o. female presented to [facility] ED after experiencing a fall at home.  Patient has a known history of previous CVA with residual left hemiparesis, she was recently discharged from [facility] to home.  Her  was attempting to transfer her from the bed when she fell on her left side and immediately had left hip pain.  In [facility] ED patient's x-ray were consistent with closed nondisplaced left femoral neck fracture and Dr. Barlow the orthopedic surgeon was consulted to see the patient for operative intervention.  Patient has a history of chronic A. fib and takes  Eliquis.  She was admitted to the hospital medicine service for further evaluation and treatment.  Patient underwent a closed reduction with percutaneous fixation of the left femoral neck on 2/27/17.  Patient tolerated procedure well and has improved clinically.  Her Eliquis was held initially for surgery but has been resumed.  Patient has been working with PT and will continue with touchdown weightbearing of left lower extremity for 6 weeks.  She has a follow-up appointment with Dr. Barlow on 3/16/17.  She will follow-up with her PCP within 1 week of discharge from [facility].  Patient will be continued on current home medications.  She has been taking Percocet for pain.  Discharge plans and instructions were reviewed with patient and  and they verbalize understanding.      Procedures Performed  Procedure(s):  LEFT HIP CLOSED REDUCTION  LEFT HIP PERCUTANEOUS PINNING FEMORAL NECK       Consults:   Consults     Date and Time Order Name Status Description    2/27/2017 1521 Inpatient Consult to Orthopedic Surgery Completed         Pertinent Test Results:    Results from last 7 days  Lab Units 03/01/17  0516 02/28/17  0549 02/27/17  1102   WBC 10*3/mm3  --   --  12.58*   HEMOGLOBIN g/dL 12.7 14.9 15.8*   HEMATOCRIT % 37.9 45.9* 47.6*   PLATELETS 10*3/mm3  --   --  173       Results from last 7 days  Lab Units 03/01/17  0516 02/27/17  1102   SODIUM mmol/L 140 144   POTASSIUM mmol/L 3.8 4.2   CHLORIDE mmol/L 112* 112*   TOTAL CO2 mmol/L 19.0* 25.0   BUN mg/dL 11 10   CREATININE mg/dL 0.70 0.70   GLUCOSE mg/dL 144* 100   CALCIUM mg/dL 8.9 9.6     Imaging Results (last 72 hours)     Procedure Component Value Units Date/Time    XR Hip With or Without Pelvis 2 - 3 View Left [[id number]] Collected:  03/01/17 1100     Updated:  03/01/17 1208    Narrative:       EXAMINATION: AP PELVIS AND LEFT HIP SERIES     HISTORY: Followup surgery, left hip pinning.     FINDINGS:  1. Three compression pins have been placed  "through the left femoral neck  and the left femoral head in perfect position. The transcervical portion  of the left femoral neck has been well stabilized and is well aligned.  2. Pelvic bone structures and right hip are unremarkable. No other acute  findings are noted.       Impression:       Well positioned compression pins left femoral neck and head,  reduction and overall alignment excellent.     D:  02/28/2017  E:  03/01/2017     This report was finalized on 3/1/2017 12:06 PM by Dr. Joe Samaniego MD.       FL C Arm During Surgery [[id number]] Collected:  03/01/17 1406     Updated:  03/03/17 0947    Narrative:       EXAMINATION: FL C ARM DURING SURGERY- 02/28/2017     INDICATION: S72.002A-Fracture of unspecified part of neck of left femur,  initial encounter for closed fracture; left hip pain.       COMPARISON: NONE     FINDINGS: 3.4 minutes of fluoroscopy and 6 images used for pinning of  the left hip. Please see the procedure report for full details.       Impression:       Fluoroscopy for pinning of the left hip. Please see the  procedure report for full details.          D:  03/01/2017  E:  03/01/2017     This report was finalized on 3/3/2017 9:45 AM by Dr. aRe Cheung MD.           Condition on Discharge: stable    Physical Exam on Discharge:  Visit Vitals   • /83   • Pulse 78   • Temp 96.9 °F (36.1 °C) (Oral)   • Resp 20   • Ht 65\" (165.1 cm)   • Wt 200 lb (90.7 kg)   • SpO2 97%   • BMI 33.28 kg/m2     Physical Exam   Constitutional: She is oriented to person, place, and time. She appears well-developed and well-nourished. No distress.   HENT:   Head: Normocephalic and atraumatic.   Eyes: Conjunctivae are normal.   Neck: Normal range of motion. Neck supple.   Cardiovascular: Normal rate.    No murmur heard.  Irregular rhythm    Pulmonary/Chest: Effort normal and breath sounds normal. No respiratory distress. She has no wheezes.   Abdominal: Soft. Bowel sounds are normal. She exhibits no " distension. There is no tenderness.   Musculoskeletal: She exhibits no edema.   Chronic left leg edema-mild   Neurological: She is alert and oriented to person, place, and time.   Skin: Skin is warm and dry.   Left hip dressing c/d/i   Psychiatric: She has a normal mood and affect. Her behavior is normal. Judgment and thought content normal.     Discharge Disposition  Rehab Facility or Unit (DC - External)    Discharge Medications   Vickie Joshi   Home Medication Instructions LEXX:[id number]    Printed on:03/03/17 7914   Medication Information                      acetaminophen (TYLENOL) 650 MG 8 hr tablet  Take 650 mg by mouth 2 (Two) Times a Day.             apixaban (ELIQUIS) 5 MG tablet tablet  Take 5 mg by mouth 2 (Two) Times a Day.             aspirin 81 MG EC tablet  Take 81 mg by mouth daily.             atorvastatin (LIPITOR) 10 MG tablet  Take 10 mg by mouth Every Night.             baclofen (LIORESAL) 10 MG tablet  Take 5 mg by mouth 2 (Two) Times a Day.             budesonide-formoterol (SYMBICORT) 80-4.5 MCG/ACT inhaler  Inhale 2 puffs 2 (Two) Times a Day.             diltiaZEM (TIAZAC) 360 MG 24 hr capsule  Take 360 mg by mouth Daily.             docusate sodium (COLACE) 100 MG capsule  Take 100 mg by mouth 2 (Two) Times a Day.             esomeprazole (nexIUM) 40 MG capsule  Take 40 mg by mouth Every Morning Before Breakfast.             guaiFENesin (MUCINEX) 600 MG 12 hr tablet  Take 1,200 mg by mouth 2 (Two) Times a Day.             hydrocortisone (PREPARATION H HYDROCORTISONE) 1 % cream  Apply 1 application topically 4 (Four) Times a Day As Needed.             levothyroxine (SYNTHROID, LEVOTHROID) 150 MCG tablet  Take 150 mcg by mouth Daily.             loperamide (IMODIUM) 2 MG capsule  Take 2 mg by mouth Daily As Needed for diarrhea.             loratadine (CLARITIN) 10 MG tablet  Take 10 mg by mouth Daily.             oxyCODONE-acetaminophen (PERCOCET) 5-325 MG per tablet  Take 1 tablet  by mouth Every 4 (Four) Hours As Needed for severe pain (7-10).             pregabalin (LYRICA) 300 MG capsule  Take 300 mg by mouth 2 (Two) Times a Day.             raNITIdine (ZANTAC) 300 MG tablet  Take 300 mg by mouth Every Night.             topiramate (TOPAMAX) 100 MG tablet  Take 100 mg by mouth 2 (Two) Times a Day.                 Discharge Diet:   Diet Instructions     Diet: Regular, Cardiac; Thin Liquids, No Restrictions       Discharge Diet:   Regular  Cardiac      Fluid Consistency:  Thin Liquids, No Restrictions                 Activity at Discharge:   Activity Instructions     Other Instructions (Specify)       Touchdown weight bearing on LLE for 6 weeks                 Follow-up Appointments  No future appointments.  Additional Instructions for the Follow-ups that You Need to Schedule     Discharge Follow-up with PCP    As directed    Follow Up Details:  within 1 week after discharge from [facility]       Discharge Follow-up with Specialty    As directed    Specialty:  Dr. Barlow   Follow Up Details:  3/16/17                 Test Results Pending at Discharge       FABIANA Park 03/03/17 2:15 PM    Time: Discharge 50 min    Please note that portions of this note may have been completed with a voice recognition program. Efforts were made to edit the dictations, but occasionally words are mistranscribed.

## 2017-03-03 NOTE — THERAPY DISCHARGE NOTE
Acute Care - Occupational Therapy Treatment Note/Discharge   [place]     Patient Name: Vickie Joshi  : 1950  MRN: [id number]  Today's Date: 3/3/2017  Onset of Illness/Injury or Date of Surgery Date: 17  Date of Referral to OT: 17  Referring Physician: Dr Barlow      Admit Date: 2017    Visit Dx:     ICD-10-CM ICD-9-CM   1. Hip fx, left, closed, initial encounter S72.002A 820.8   2. Impaired functional mobility, balance, gait, and endurance Z74.09 V49.89     Patient Active Problem List   Diagnosis   • Fall   • Closed fracture of neck of left femur   • Hyperlipidemia   • Hypertension   • Hx of Migraine   • GERD (gastroesophageal reflux disease)   • Fibromyalgia   • Neuropathy   • Rapid atrial fibrillation   • Chronic anticoagulation on Eliquis   • Hx of Stroke with residual left hemiparesis             Adult Rehabilitation Note       17 1331 17 1330 17 1430    Rehab Assessment/Intervention    Discipline occupational therapist  -EL physical therapist  -EH occupational therapist  -AR    Document Type therapy note (daily note);discharge summary  -EL therapy note (daily note)  -EH therapy note (daily note)  -AR    Subjective Information agree to therapy;complains of;pain  -EL agree to therapy  -EH agree to therapy;complains of;fatigue;swelling  -AR    Patient Effort, Rehab Treatment   excellent  -AR    Symptoms Noted During/After Treatment increased pain   headache upon sitting EOB   -EL other (see comments)  -EH none  -AR    Symptoms Noted Comment  Headache upon sitting  -EH swelling/renedd/warmth LUE (hemiparetic arm), pt reports this is not baseline; RN notified  -AR    Precautions/Limitations fall precautions;non-weight bearing status   L LE TTWB; L side hemiplegia from previous CVA   -EL fall precautions;other (see comments);non-weight bearing status   TTWB hemiplegic LUE/LLE.   -EH fall precautions;other (see comments);non-weight bearing status   hemiplegic  LUE/LLE, TTWB LLE  -AR    Recorded by [EL] Josephine Fisher, OT [EH] Rachelle Jolly, PT [AR] Annalise Avila, OT    Vital Signs    Pre Systolic BP Rehab 143  -  -  -AR    Pre Treatment Diastolic BP 88  -EL 88  -EH 83  -AR    Post Systolic BP Rehab 140  -  -EH     Post Treatment Diastolic BP 83  -EL 83  -EH     Pre Patient Position Supine  -EL Supine  -EH     Intra Patient Position Sitting  -EL Sitting  -EH     Post Patient Position Sitting  -EL Sitting  -EH     Recorded by [EL] Josephine Fisher, OT [EH] Rachelle Jolly, PT [AR] Annalise Avila, OT    Pain Assessment    Pain Assessment 0-10  -EL 0-10  -EH 0-10  -AR    [place] FACES Pain Rating 0  -EL 0  -EH     Pain Score 3  -EL 3  -EH 2  -AR    Post Pain Score   2  -AR    Pain Type Acute pain  -EL Acute pain  -EH Acute pain  -AR    Pain Location Head  -EL Head  -EH --   headache  -AR    Pain Intervention(s) Repositioned  -EL --   notified NSG  - Medication (See MAR);Repositioned;Ambulation/increased activity  -AR    Response to Interventions tolerated   -EL pt calming in chair when reclined  -EH tolerated, RN notified of c/o headache  -AR    Recorded by [EL] Josephine Fisher, OT [EH] Rachelle Jolly, PT [AR] Annalise Avila, OT    Cognitive Assessment/Intervention    Current Cognitive/Communication Assessment functional  -EL functional  -EH functional  -AR    Orientation Status oriented x 4  -EL oriented x 4  -EH oriented x 4  -AR    Follows Commands/Answers Questions able to follow single-step instructions;100% of the time;needs cueing;needs increased time  -% of the time;able to follow single-step instructions  - 100% of the time;able to follow single-step instructions  -AR    Personal Safety mild impairment  -EL mild impairment  -EH mild impairment;other (see comments)   significant anxiety d/t fear of falling  -AR    Personal Safety Interventions fall prevention program maintained;gait belt;nonskid shoes/slippers when  out of bed  -EL fall prevention program maintained;elopement precautions initiated;gait belt;nonskid shoes/slippers when out of bed  -EH fall prevention program maintained  -AR    Recorded by [EL] Josephine Fisher OT [EH] Rachelle Jolly, PT [AR] Annalise Avila OT    ROM (Range of Motion)    General ROM  --  -EH     Recorded by  [EH] Rachelle Jolly, PT     Mobility Assessment/Training    Extremity Weight-Bearing Status   left lower extremity  -AR    Left Lower Extremity Weight-Bearing   toe touch weight-bearing  -AR    Recorded by   [AR] Annalise Avila OT    Bed Mobility, Assessment/Treatment    Bed Mobility, Assistive Device bed rails;head of bed elevated;draw sheet  -EL bed rails;head of bed elevated;draw sheet  -EH bed rails;head of bed elevated;draw sheet  -AR    Bed Mobility, Roll Left, [place] maximum assist (25% patient effort);2 person assist required;verbal cues required  -EL maximum assist (25% patient effort);2 person assist required  - verbal cues required;moderate assist (50% patient effort)  -AR    Bed Mobility, Roll Right, [place] maximum assist (25% patient effort);2 person assist required;verbal cues required  -EL maximum assist (25% patient effort);2 person assist required  -EH     Bed Mobility, Scoot/Bridge, [place] maximum assist (25% patient effort);2 person assist required;verbal cues required  -EL maximum assist (25% patient effort);2 person assist required  - moderate assist (50% patient effort);2 person assist required;verbal cues required  -AR    Bed Mob, Supine to Sit, [place] maximum assist (25% patient effort);2 person assist required;verbal cues required  -EL maximum assist (25% patient effort);2 person assist required  -EH maximum assist (25% patient effort);2 person assist required;verbal cues required  -AR    Bed Mob, Sit to Supine, [place]  not tested  -EH     Bed Mobility, Safety Issues decreased use of arms for pushing/pulling;decreased  use of legs for bridging/pushing;impaired trunk control for bed mobility  -EL decreased use of arms for pushing/pulling;decreased use of legs for bridging/pushing  -EH     Bed Mobility, Impairments ROM decreased;strength decreased;impaired balance  -EL ROM decreased;strength decreased;impaired balance;pain  -EH ROM decreased;strength decreased;impaired balance;pain  -AR    Bed Mobility, Comment transfer supine to sit to R side (L sided hemiplegia), requires max VC for one step sequencing due to anxiety with activity   -EL Exits bed toward R; Cues for sequencing. Needs assist for movement of LLE.  -EH cues to sequence and maintain NWB LLE, pt prefers to exit bed on right side   -AR    Recorded by [EL] Josephine Fisher, OT [EH] Rachelle Jolly, PT [AR] Annalise Avila, RAMAKRISHNA    Transfer Assessment/Treatment    Transfers, Bed-Chair [place] maximum assist (25% patient effort);verbal cues required   3 person assist   -EL maximum assist (25% patient effort);verbal cues required;other (see comments)   3 person assist  -EH maximum assist (25% patient effort);2 person assist required;verbal cues required  -AR    Transfers, Chair-Bed [place]  not tested  -EH     Transfers, Bed-Chair-Bed, Assist Device elevated surface  -EL elevated surface  -EH elevated surface;other (see comments)   RUE support  -AR    Transfers, Sit-Stand [place] maximum assist (25% patient effort)   3 person assist   -EL maximum assist (25% patient effort);other (see comments)   3 person assist  -EH     Transfers, Stand-Sit [place] maximum assist (25% patient effort)   3 person assist   -EL maximum assist (25% patient effort)   3 person assist  -EH maximum assist (25% patient effort);verbal cues required;2 person assist required  -AR    Transfers, Sit-Stand-Sit, Assist Device elevated surface  -EL elevated surface  -EH elevated surface;other (see comments)   RUE support  -AR    Transfer, Impairments strength decreased;impaired  balance;pain  -EL ROM decreased;strength decreased;pain  -EH ROM decreased;strength decreased;impaired balance;other (see comments)   TTWB LLE  -AR    Transfer, Comment max VC for sequencing   -EL  pt required cues to sequence, assist x 3, pt very fearful of falling, recommend pt be moved to lift room- nurse manager notified  -AR    Recorded by [EL] Josephine Fisher, OT [EH] Rachelle Jolly, PT [AR] Annalise Avila, RAMAKRISHNA    Gait Assessment/Treatment    Gait, [place] Level  not tested  -EH     Recorded by  [EH] Rachelle Jolly, PT     Lower Body Dressing Assessment/Training    LB Dressing Assess/Train, Clothing Type donning:;socks;shoes  -EL  socks;shoes   RLE only  -AR    LB Dressing Assess/Train, Position supine  -EL  supine  -AR    LB Dressing Assess/Train, [place] dependent (less than 25% patient effort)  -EL  dependent (less than 25% patient effort)  -AR    LB Dressing Assess/Train, Impairments strength decreased;impaired balance;ROM decreased;pain  -EL      LB Dressing Assess/Train, Comment   Pt prefers to have R shoe on for xfer, donned RLE for height and LLE in slipper sock. Pt normally wears AFO and knee brace to LLE- deferred due to NWB status and pt in agreement with this. She required dependence to doff at end of session per pt request  -AR    Recorded by [EL] Josephine Fisher, OT  [AR] Annalise Avila, RAMAKRISHNA    Toileting Assessment/Training    Toileting Assess/Train, Assistive Device   other (see comments)   bedpan  -AR    Toileting Assess/Train, Position   supine  -AR    Toileting Assess/Train, Indepen Level   dependent (less than 25% patient effort)  -AR    Recorded by   [AR] Annalise Avila OT    Motor Skills/Interventions    Additional Documentation   Balance Skills Training (Group)  -AR    Recorded by   [AR] Annalise Avila OT    Balance Skills Training    Sitting-Level of Assistance Moderate assistance  -EL  Maximum assistance   progressed to CGA with RUE support  -AR     Sitting-Balance Support Feet supported;Right upper extremity supported  -EL  Right upper extremity supported  -AR    Sitting-Balance Activities Trunk control activities  -EL  Lateral lean   left lateral lean   -AR    Standing-Level of Assistance Maximum assistance;x2  -EL  Maximum assistance   x3,   -AR    Static Standing Balance Support Right upper extremity supported;Left upper extremity supported  -EL  Right upper extremity supported  -AR    Standing-Balance Activities Weight Shift A-P;Weight Shift R-L  -EL      Recorded by [EL] Josephine Fisher, OT  [AR] Annalise Avila, OT    Therapy Exercises    Left Lower Extremity  PROM:;ankle pumps/circles   in addition to AROM stated in BLEs  -     Bilateral Lower Extremities  AROM:;10 reps;ankle pumps/circles;glut sets;quad sets  -     Left Upper Extremity   --   deferred d/t edema/warmth and notified nurse  -AR    Recorded by  [EH] Rachelle Jolly, PT [AR] Annalise Avila, RAMAKRISHNA    Positioning and Restraints    Pre-Treatment Position in bed  - in bed  - in bed  -AR    Post Treatment Position chair  -EL chair  - chair  -AR    In Chair notified nsg;reclined;sitting;call light within reach;encouraged to call for assist;with nsg;RUE elevated;LUE elevated;waffle cushion;on mechanical lift sling;legs elevated  - notified nsg;reclined;call light within reach;encouraged to call for assist;exit alarm on;on mechanical lift sling;with other staff  - notified nsg;reclined;call light within reach;encouraged to call for assist;exit alarm on;on mechanical lift sling;waffle cushion;LUE elevated   pt declined SCD, notified RN and PCAof useof lift for xfers   -AR    Recorded by [EL] Josephine Fisher, OT [EH] Rachelle Jolly, PT [AR] Annalise Avila, OT      03/02/17 1421          Rehab Assessment/Intervention    Discipline physical therapist  -      Symptoms Noted Comment Swelling in LUE noted; OT notified NSG  -      Precautions/Limitations fall precautions    hemiplegic with L side weakness  -EH      Recorded by [] Rachelle Jolly, PT      Vital Signs    Pre Systolic BP Rehab 139  -EH      Pre Treatment Diastolic BP 83  -EH      Recorded by [] Rachelle Jolly PT      Pain Assessment    Pain Assessment [place]es FACES  -      [place] FACES Pain Rating 4  -EH      Post Pain Score 6   WBF  -      Pain Type Acute pain  -EH      Pain Location Hip  -EH      Pain Orientation Left  -EH      Pain Intervention(s) Medication (See MAR);Repositioned;Ambulation/increased activity  -      Response to Interventions tolerated  -EH      Recorded by [] Rachelle Jolly, PT      Vision Assessment/Intervention    Visual Impairment visual impairment, left;inattention to the left;needs cues to attend visually  -EH      Recorded by [] Rachelle Jolly PT      Cognitive Assessment/Intervention    Current Cognitive/Communication Assessment functional  -      Orientation Status oriented x 4  -EH      Follows Commands/Answers Questions 100% of the time;able to follow single-step instructions;needs cueing;needs repetition;needs increased time  -      Personal Safety mild impairment;decreased awareness, need for assist;decreased awareness, need for safety  -      Personal Safety Interventions fall prevention program maintained;gait belt;nonskid shoes/slippers when out of bed;supervised activity  -EH      Recorded by [] Rachelle Jolly, PT      Mobility Assessment/Training    Extremity Weight-Bearing Status left lower extremity  -      Left Lower Extremity Weight-Bearing touch down weight-bearing  -EH      Recorded by [] Rachelle Jolly, PT      Bed Mobility, Assessment/Treatment    Bed Mobility, Assistive Device bed rails;draw sheet;head of bed elevated  -      Bed Mobility, Roll Left, [place] minimum assist (75% patient effort);verbal cues required   used RLE knee bent; RUE reach across;2x for bed pan  -      Bed Mobility, Scoot/Bridge,  [place] moderate assist (50% patient effort);2 person assist required  -      Bed Mob, Supine to Sit, [place] maximum assist (25% patient effort);2 person assist required  -      Bed Mob, Sit to Supine, [place] not tested  -      Bed Mobility, Safety Issues decreased use of arms for pushing/pulling;decreased use of legs for bridging/pushing  -      Bed Mobility, Impairments pain;strength decreased;impaired balance;ROM decreased;impaired vision  -      Bed Mobility, Comment VC for sequencing, perfers to exit to R EOB.  -EH      Recorded by [] Rachelle Jolly, PT      Transfer Assessment/Treatment    Transfers, Bed-Chair [place] maximum assist (25% patient effort);other (see comments)   3 assisted; pt foot on PT foot,1 assisting behind pt;R pivot  -      Transfers, Chair-Bed [place] not tested;other (see comments)   Recommended to NSG to use lift.  -      Transfers, Bed-Chair-Bed, Assist Device elevated surface;other (see comments)   RUE on therapist for support  -      Transfers, Stand-Sit [place] maximum assist (25% patient effort);other (see comments)   3 assisted. Pt demos good strength in R side  -EH      Transfers, Sit-Stand-Sit, Assist Device elevated surface  -      Transfer, Comment Sit<>stand followed by stand pivot with assist of 3; LLE on foot of therapist to ensure maintanace of weightbearing.  -EH      Recorded by [] Rachelle Jolly PT      Gait Assessment/Treatment    Gait, Comment not tested  -EH      Recorded by [] Rachelle Jolly PT      Motor Skills/Interventions    Additional Documentation Balance Skills Training (Group)  -      Recorded by [] Rachelle Jolly PT      Balance Skills Training    Sitting-Level of Assistance Maximum assistance   with progression to close supervision  -      Sitting-Balance Support Right upper extremity supported  -      Sitting-Balance Activities Lateral lean   practicing for sit>stand  -       Standing-Level of Assistance Maximum assistance   3 assisting for safety/pt anxiety  -EH      Static Standing Balance Support Right upper extremity supported   on therapist  -EH      Standing-Balance Activities Weight Shift A-P   pt initially leans on bed for balance;forward WS to prevent.  -EH      Recorded by [] Rachelle Jolly PT      Therapy Exercises    Left Lower Extremity 10 reps;PROM:;LAQ  -EH      Recorded by [] Rachelle Jolly PT      Orthosis Location    Orthosis Location/Type lower extremity  -EH      Orthosis, Lower Extremity AFO (ankle foot orthosis);knee orthosis   NOT DONNED FOR THERAPY 2/2 TTWB  -EH      Recorded by [] Rachelle Jolly PT      Positioning and Restraints    Pre-Treatment Position in bed  -EH      Post Treatment Position chair  -EH      In Chair notified nsg;reclined;call light within reach;encouraged to call for assist;exit alarm on;LUE elevated;on mechanical lift sling;waffle cushion  -EH      Recorded by [] Rachelle Jolly PT        User Key  (r) = Recorded By, (t) = Taken By, (c) = Cosigned By    Initials Name Effective Dates     Rachelle Jolly, PT 06/19/15 -     AR Annalise Avila, OT 06/22/15 -     EL Josephine Fisher, OT 06/08/16 -                 OT Goals       03/03/17 1603 03/02/17 1659 03/01/17 1415    Bed Mobility OT LTG    Bed Mobility OT LTG, Date Established   03/01/17  -TA    Bed Mobility OT LTG, Time to Achieve   1 wk  -TA    Bed Mobility OT LTG, Activity Type   supine to sit/sit to supine  -TA    Bed Mobility OT LTG, [place] Level   moderate assist (50% patient effort);2 person assist required  -TA    Bed Mobility OT LTG, Outcome goal not met  -EL goal ongoing  -AR goal ongoing  -TA    Bed Mobility OT LTG, Reason Goal Not Met discharged from facility  -EL      Transfer Training OT LTG    Transfer Training OT LTG, Date Established   03/01/17  -TA    Transfer Training OT LTG, Time to Achieve   1 wk  -TA    Transfer Training OT LTG,  Activity Type   bed to chair /chair to bed;sit to stand/stand to sit;toilet  -TA    Transfer Training OT LTG, [place] Level   moderate assist (50% patient effort);2 person assist required  -TA    Transfer Training OT LTG, Assist Device   walker, georges  -TA    Transfer Training OT LTG, Outcome goal not met  -EL goal ongoing  -AR goal ongoing  -TA    Transfer Training OT LTG, Reason Goal Not Met discharged from facility  -EL      Patient Education OT LTG    Patient Education OT LTG, Date Established   03/01/17  -TA    Patient Education OT LTG, Time to Achieve   by discharge  -TA    Patient Education OT LTG, Education Type   HEP;precautions per surgeon  -TA    Patient Education OT LTG, Education Understanding   verbalizes understanding  -TA    Patient Education OT LTG Outcome goal met  -EL goal ongoing  -AR goal ongoing  -TA    Grooming OT LTG    Grooming Goal OT LTG, Date Established   03/01/17  -TA    Grooming Goal OT LTG, Time to Achieve   1 wk  -TA    Grooming Goal OT LTG, [place] Level   set up required;minimum assist (75% patient effort)  -TA    Grooming Goal OT LTG, Outcome goal not met  -EL goal ongoing  -AR goal ongoing  -TA    Grooming Goal OT LTG, Reason Goal Not Met discharged from facility  -EL      UB Dressing OT STG    UB Dressing Goal OT STG, Date Established   03/01/17  -TA    UB Dressing Goal OT STG, Time to Achieve   1 wk  -TA    UB Dressing Goal OT STG, [place] Level   maximum assist (25% patient effort)   [place] technique  -TA    UB Dressing Goal OT STG, Outcome goal not met  -EL goal ongoing  -AR goal ongoing  -TA    UB Dressing Goal OT STG, Reason Goal Not Met discharged from facility  -EL        User Key  (r) = Recorded By, (t) = Taken By, (c) = Cosigned By    Initials Name Provider Type    WANDA Avila, OT Occupational Therapist    NICHOLAS Jackman, OT Occupational Therapist    SPENCER Fisher, OT Occupational Therapist          Occupational Therapy Education      Title: PT OT SLP Therapies (Active)     Topic: Occupational Therapy (Done)     Point: ADL training (Done)    Description: Instruct learner(s) on proper safety adaptation and remediation techniques during self care or transfers.   Instruct in proper use of assistive devices.    Learning Progress Summary    Learner Readiness Method Response Comment Documented by Status   Patient Acceptance FABIAN BURKS,GURJIT Educated on TTWB precautions, body mechanics for safe transfers, benefits of activity. EL 03/03/17 1603 Done    AMY Bull D VU,NR Reviewed TTWB LLE, ADL retraining, transfer training, bed mobility, use of lift for xfer back to bed AR 03/02/17 1658 Done    Acceptance ALYSHA PERALTA,NR ADL kit issued and education initiated; body mechanics with bed mobility; reinforced neeed for call for assist for OOB activities. TA 03/01/17 1409 Done   Significant Other AMY Bull D VU,NR Reviewed TTWB LLE, ADL retraining, transfer training, bed mobility, use of lift for xfer back to bed AR 03/02/17 1658 Done               Point: Home exercise program (Done)    Description: Instruct learner(s) on appropriate technique for monitoring, assisting and/or progressing therapeutic exercises/activities.    Learning Progress Summary    Learner Readiness Method Response Comment Documented by Status   Patient AMY Bull D VU,NR Reviewed TTWB LLE, ADL retraining, transfer training, bed mobility, use of lift for xfer back to bed AR 03/02/17 1658 Done    Acceptance ALYSHA PERALTANR ADL kit issued and education initiated; body mechanics with bed mobility; reinforced neeed for call for assist for OOB activities. TA 03/01/17 1409 Done   Significant Other AMY Bull D VU,NR Reviewed TTWB LLE, ADL retraining, transfer training, bed mobility, use of lift for xfer back to bed AR 03/02/17 1658 Done               Point: Precautions (Done)    Description: Instruct learner(s) on prescribed precautions during self-care and functional transfers.    Learning Progress Summary     Learner Readiness Method Response Comment Documented by Status   Patient Acceptance GURJIT HAMPTON Educated on TTWB precautions, body mechanics for safe transfers, benefits of activity. EL 03/03/17 1603 Done    AMY Bull D VU, NR Reviewed TTWB LLE, ADL retraining, transfer training, bed mobility, use of lift for xfer back to bed AR 03/02/17 1658 Done    Acceptance ALYSHA PERALTA NR ADL kit issued and education initiated; body mechanics with bed mobility; reinforced neeed for call for assist for OOB activities. TA 03/01/17 1409 Done   Significant Other AMY Bull D VU,GURJIT Reviewed TTWB LLE, ADL retraining, transfer training, bed mobility, use of lift for xfer back to bed AR 03/02/17 1658 Done               Point: Body mechanics (Done)    Description: Instruct learner(s) on proper positioning and spine alignment during self-care, functional mobility activities and/or exercises.    Learning Progress Summary    Learner Readiness Method Response Comment Documented by Status   Patient Acceptance GURJIT HAMPTON Educated on TTWB precautions, body mechanics for safe transfers, benefits of activity.  03/03/17 1603 Done    AMY Bull D VU, NR Reviewed TTWB LLE, ADL retraining, transfer training, bed mobility, use of lift for xfer back to bed AR 03/02/17 1658 Done    Acceptance ALYSHA PERALTA NR ADL kit issued and education initiated; body mechanics with bed mobility; reinforced neeed for call for assist for OOB activities. TA 03/01/17 1409 Done   Significant Other AMY Bull D VU, NR Reviewed TTWB LLE, ADL retraining, transfer training, bed mobility, use of lift for xfer back to bed AR 03/02/17 1658 Done                      User Key     Initials Effective Dates Name Provider Type Discipline    AR 06/22/15 -  Annalise Avila, OT Occupational Therapist OT    TA 03/14/16 -  Dav Jackman, OT Occupational Therapist OT    EL 06/08/16 -  Josephine Fisher, OT Occupational Therapist OT                OT Recommendation and Plan  Anticipated Equipment Needs At  Discharge:  (ADL kit issued, education initiated.)  Anticipated Discharge Disposition: inpatient rehabilitation facility  Planned Therapy Interventions: activity intolerance, adaptive equipment training, ADL retraining, balance training, bed mobility training, home exercise program, ROM (Range of Motion), strengthening, transfer training  Therapy Frequency: daily (Per priority policy)  Plan of Care Review  Plan Of Care Reviewed With: patient  Progress: progress toward functional goals as expected  Outcome Summary/Follow up Plan: Pt demo bed mobility and transfer to chair with max assist x3 person assist this date. Demo improved understanding of TTWB and improved sitting balance. Made good progress toward IP OT goals during admission.          Outcome Measures       03/03/17 1331 03/03/17 1330 03/02/17 1430    How much help from another person do you currently need...    Turning from your back to your side while in flat bed without using bedrails?  2  -EH     Moving from lying on back to sitting on the side of a flat bed without bedrails?  2  -EH     Moving to and from a bed to a chair (including a wheelchair)?  2  -EH     Standing up from a chair using your arms (e.g., wheelchair, bedside chair)?  2  -EH     Climbing 3-5 steps with a railing?  1  -EH     To walk in hospital room?  1  -EH     AM-PAC 6 Clicks Score  10  -EH     How much help from another is currently needed...    Putting on and taking off regular lower body clothing? 1  -EL  1  -AR    Bathing (including washing, rinsing, and drying) 1  -EL  1  -AR    Toileting (which includes using toilet bed pan or urinal) 1  -EL  1  -AR    Putting on and taking off regular upper body clothing 2  -EL  2  -AR    Taking care of personal grooming (such as brushing teeth) 2  -EL  2  -AR    Eating meals 3  -EL  3  -AR    Score 10  -EL  10  -AR    Functional Assessment    Outcome Measure Options AM-PAC 6 Clicks Daily Activity (OT)  -EL AM-PAC 6 Clicks Basic Mobility (PT)   - AM-PAC 6 Clicks Daily Activity (OT)  -AR      03/02/17 1429 03/01/17 1310 03/01/17 1309    How much help from another person do you currently need...    Turning from your back to your side while in flat bed without using bedrails? 2  -EH  2  -SJ    Moving from lying on back to sitting on the side of a flat bed without bedrails? 2  -EH  2  -SJ    Moving to and from a bed to a chair (including a wheelchair)? 2  -EH  1  -SJ    Standing up from a chair using your arms (e.g., wheelchair, bedside chair)? 2  -EH  1  -SJ    Climbing 3-5 steps with a railing? 1  -EH  1  -SJ    To walk in hospital room? 1  -EH  1  -SJ    AM-PAC 6 Clicks Score 10  -EH  8  -SJ    How much help from another is currently needed...    Putting on and taking off regular lower body clothing?  1  -TA     Bathing (including washing, rinsing, and drying)  2  -TA     Toileting (which includes using toilet bed pan or urinal)  1  -TA     Putting on and taking off regular upper body clothing  2  -TA     Taking care of personal grooming (such as brushing teeth)  2  -TA     Eating meals  3  -TA     Score  11  -TA     Functional Assessment    Outcome Measure Options AM-PAC 6 Clicks Basic Mobility (PT)  - AM-PAC 6 Clicks Daily Activity (OT)  -TA AM-PAC 6 Clicks Basic Mobility (PT)  -      User Key  (r) = Recorded By, (t) = Taken By, (c) = Cosigned By    Initials Name Provider Type     Rachelle Jolly, PT Physical Therapist    MARIO Royal, PT Physical Therapist    AR Annalise Avila, OT Occupational Therapist    TA Dav Jackman, OT Occupational Therapist    EL Josephine Fisher, OT Occupational Therapist           Time Calculation:          Time Calculation- OT       03/03/17 1606          Time Calculation- OT    OT Start Time 1331  -EL      Total Timed Code Minutes- OT 15 minute(s)  -EL      OT Received On 03/03/17  -EL      OT Goal Re-Cert Due Date 03/11/17  -EL        User Key  (r) = Recorded By, (t) = Taken By, (c) = Cosigned By     Initials Name Provider Type    SPENCER Fisher OT Occupational Therapist          Therapy Charges for Today     Code Description Service Date Service Provider Modifiers Qty    [id number]  OT THERAPEUTIC ACT EA 15 MIN 3/3/2017 Josephine Fisher OT GO 1    [id number]  OT THER SUPP EA 15 MIN 3/3/2017 Josephine Fisher OT GO 1                    Josephine Fisher OT  3/3/2017

## 2017-03-03 NOTE — PLAN OF CARE
Problem: Patient Care Overview (Adult)  Goal: Plan of Care Review  Outcome: Unable to achieve outcome(s) by discharge Date Met:  03/03/17 03/03/17 1435   Coping/Psychosocial Response Interventions   Plan Of Care Reviewed With patient   Patient Care Overview   Progress improving   Outcome Evaluation   Outcome Summary/Follow up Plan Pt with max assist x 3 for t/f to chair. C/o Head ache upon sitting initially. NSG notified. BP stable. LUE edema/warmth decreased. Pt to d/c to [facility] today.         Problem: Inpatient Physical Therapy  Goal: Transfer Training Goal 1 LTG- PT  Outcome: Unable to achieve outcome(s) by discharge Date Met:  03/03/17 03/01/17 1410 03/03/17 1435   Transfer Training PT LTG   Transfer Training PT LTG, Date Established 03/01/17 --    Transfer Training PT LTG, Time to Achieve 2 wks --    Transfer Training PT LTG, Activity Type bed to chair /chair to bed;sit to stand/stand to sit --    Transfer Training PT LTG, [place] Level moderate assist (50% patient effort);2 person assist required --    Transfer Training PT LTG, Assist Device walker, daisy --    Transfer Training PT LTG, Outcome --  goal not met       Goal: Gait Training Goal LTG- PT  Outcome: Unable to achieve outcome(s) by discharge Date Met:  03/03/17 03/01/17 1410 03/03/17 1435   Gait Training PT LTG   Gait Training Goal PT LTG, Date Established 03/01/17 --    Gait Training Goal PT LTG, Time to Achieve 2 wks --    Gait Training Goal PT LTG, [place] Level moderate assist (50% patient effort);2 person assist required --    Gait Training Goal PT LTG, Assist Device walker, daisy --    Gait Training Goal PT LTG, Distance to Achieve 10 --    Gait Training Goal PT LTG, Outcome --  goal not met       Goal: Static Sitting Balance Goal LTG- PT  Outcome: Unable to achieve outcome(s) by discharge Date Met:  03/03/17 03/01/17 1410 03/03/17 1435   Static Sitting Balance PT LTG   Static Sitting Balance PT LTG, Date Established 03/01/17  --    Static Sitting Balance PT LTG, Time to Achieve 2 wks --    Static Sitting Balance PT LTG, [place] Level supervision required --    Static Sitting Balance PT LTG, Assist Device UE Support --    Static Sitting Balance PT LTG, Outcome --  goal not met

## 2018-06-07 ENCOUNTER — LAB REQUISITION (OUTPATIENT)
Dept: LAB | Facility: HOSPITAL | Age: 68
End: 2018-06-07

## 2018-06-07 DIAGNOSIS — Z00.00 ROUTINE GENERAL MEDICAL EXAMINATION AT A HEALTH CARE FACILITY: ICD-10-CM

## 2018-06-07 PROCEDURE — 36415 COLL VENOUS BLD VENIPUNCTURE: CPT | Performed by: INTERNAL MEDICINE

## 2018-09-27 ENCOUNTER — APPOINTMENT (OUTPATIENT)
Dept: GENERAL RADIOLOGY | Facility: HOSPITAL | Age: 68
End: 2018-09-27

## 2018-09-27 ENCOUNTER — APPOINTMENT (OUTPATIENT)
Dept: CT IMAGING | Facility: HOSPITAL | Age: 68
End: 2018-09-27

## 2018-09-27 ENCOUNTER — HOSPITAL ENCOUNTER (INPATIENT)
Facility: HOSPITAL | Age: 68
LOS: 11 days | Discharge: HOME | End: 2018-10-08
Attending: EMERGENCY MEDICINE | Admitting: HOSPITALIST
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
## 2018-09-27 DIAGNOSIS — I48.20 ATRIAL FIBRILLATION, CHRONIC (HCC): ICD-10-CM

## 2018-09-27 DIAGNOSIS — Z78.9 IMPAIRED MOBILITY AND ADLS: ICD-10-CM

## 2018-09-27 DIAGNOSIS — Z74.09 IMPAIRED FUNCTIONAL MOBILITY, BALANCE, GAIT, AND ENDURANCE: ICD-10-CM

## 2018-09-27 DIAGNOSIS — R13.10 DYSPHAGIA, UNSPECIFIED TYPE: Primary | ICD-10-CM

## 2018-09-27 DIAGNOSIS — Z74.09 IMPAIRED MOBILITY AND ADLS: ICD-10-CM

## 2018-09-27 DIAGNOSIS — J18.9 PNEUMONIA OF RIGHT LUNG DUE TO INFECTIOUS ORGANISM, UNSPECIFIED PART OF LUNG: ICD-10-CM

## 2018-09-27 DIAGNOSIS — I63.9 CEREBROVASCULAR ACCIDENT (CVA), UNSPECIFIED MECHANISM (HCC): ICD-10-CM

## 2018-09-27 DIAGNOSIS — A41.9 SEPSIS, DUE TO UNSPECIFIED ORGANISM: ICD-10-CM

## 2018-09-27 PROBLEM — I48.91 ATRIAL FIBRILLATION: Status: ACTIVE | Noted: 2018-09-27

## 2018-09-27 LAB
ALBUMIN SERPL-MCNC: 4.18 G/DL (ref 3.2–4.8)
ALBUMIN/GLOB SERPL: 1.5 G/DL (ref 1.5–2.5)
ALP SERPL-CCNC: 80 U/L (ref 25–100)
ALT SERPL W P-5'-P-CCNC: 22 U/L (ref 7–40)
ANION GAP SERPL CALCULATED.3IONS-SCNC: 8 MMOL/L (ref 3–11)
AST SERPL-CCNC: 29 U/L (ref 0–33)
BASOPHILS # BLD AUTO: 0.01 10*3/MM3 (ref 0–0.2)
BASOPHILS NFR BLD AUTO: 0.1 % (ref 0–1)
BILIRUB SERPL-MCNC: 0.9 MG/DL (ref 0.3–1.2)
BNP SERPL-MCNC: 94 PG/ML (ref 0–100)
BUN BLD-MCNC: 10 MG/DL (ref 9–23)
BUN/CREAT SERPL: 11.8 (ref 7–25)
CALCIUM SPEC-SCNC: 8.3 MG/DL (ref 8.7–10.4)
CHLORIDE SERPL-SCNC: 112 MMOL/L (ref 99–109)
CO2 SERPL-SCNC: 18 MMOL/L (ref 20–31)
CREAT BLD-MCNC: 0.85 MG/DL (ref 0.6–1.3)
DEPRECATED RDW RBC AUTO: 48.6 FL (ref 37–54)
EOSINOPHIL # BLD AUTO: 0.01 10*3/MM3 (ref 0–0.3)
EOSINOPHIL NFR BLD AUTO: 0.1 % (ref 0–3)
ERYTHROCYTE [DISTWIDTH] IN BLOOD BY AUTOMATED COUNT: 13.7 % (ref 11.3–14.5)
GFR SERPL CREATININE-BSD FRML MDRD: 67 ML/MIN/1.73
GLOBULIN UR ELPH-MCNC: 2.7 GM/DL
GLUCOSE BLD-MCNC: 139 MG/DL (ref 70–100)
HBA1C MFR BLD: 6 % (ref 4.8–5.6)
HCT VFR BLD AUTO: 38.7 % (ref 34.5–44)
HGB BLD-MCNC: 12 G/DL (ref 11.5–15.5)
HOLD SPECIMEN: NORMAL
HOLD SPECIMEN: NORMAL
IMM GRANULOCYTES # BLD: 0.03 10*3/MM3 (ref 0–0.03)
IMM GRANULOCYTES NFR BLD: 0.2 % (ref 0–0.6)
LYMPHOCYTES # BLD AUTO: 0.81 10*3/MM3 (ref 0.6–4.8)
LYMPHOCYTES NFR BLD AUTO: 5.1 % (ref 24–44)
MAGNESIUM SERPL-MCNC: 1.6 MG/DL (ref 1.3–2.7)
MCH RBC QN AUTO: 29.8 PG (ref 27–31)
MCHC RBC AUTO-ENTMCNC: 31 G/DL (ref 32–36)
MCV RBC AUTO: 96 FL (ref 80–99)
MONOCYTES # BLD AUTO: 0.83 10*3/MM3 (ref 0–1)
MONOCYTES NFR BLD AUTO: 5.2 % (ref 0–12)
NEUTROPHILS # BLD AUTO: 14.19 10*3/MM3 (ref 1.5–8.3)
NEUTROPHILS NFR BLD AUTO: 89.5 % (ref 41–71)
PLATELET # BLD AUTO: 194 10*3/MM3 (ref 150–450)
PMV BLD AUTO: 12.8 FL (ref 6–12)
POTASSIUM BLD-SCNC: 4.3 MMOL/L (ref 3.5–5.5)
PROCALCITONIN SERPL-MCNC: <0.05 NG/ML
PROT SERPL-MCNC: 6.9 G/DL (ref 5.7–8.2)
RBC # BLD AUTO: 4.03 10*6/MM3 (ref 3.89–5.14)
SODIUM BLD-SCNC: 138 MMOL/L (ref 132–146)
TROPONIN I SERPL-MCNC: 0 NG/ML (ref 0–0.07)
TSH SERPL DL<=0.05 MIU/L-ACNC: 0.64 MIU/ML (ref 0.35–5.35)
WBC NRBC COR # BLD: 15.85 10*3/MM3 (ref 3.5–10.8)
WHOLE BLOOD HOLD SPECIMEN: NORMAL
WHOLE BLOOD HOLD SPECIMEN: NORMAL

## 2018-09-27 PROCEDURE — 93005 ELECTROCARDIOGRAM TRACING: CPT | Performed by: EMERGENCY MEDICINE

## 2018-09-27 PROCEDURE — 25010000002 METHYLPREDNISOLONE PER 125 MG: Performed by: EMERGENCY MEDICINE

## 2018-09-27 PROCEDURE — 87633 RESP VIRUS 12-25 TARGETS: CPT | Performed by: NURSE PRACTITIONER

## 2018-09-27 PROCEDURE — 84145 PROCALCITONIN (PCT): CPT | Performed by: NURSE PRACTITIONER

## 2018-09-27 PROCEDURE — 83735 ASSAY OF MAGNESIUM: CPT | Performed by: NURSE PRACTITIONER

## 2018-09-27 PROCEDURE — 0 IOPAMIDOL PER 1 ML: Performed by: HOSPITALIST

## 2018-09-27 PROCEDURE — 94799 UNLISTED PULMONARY SVC/PX: CPT

## 2018-09-27 PROCEDURE — 87205 SMEAR GRAM STAIN: CPT | Performed by: NURSE PRACTITIONER

## 2018-09-27 PROCEDURE — 25010000002 VANCOMYCIN 10 G RECONSTITUTED SOLUTION: Performed by: HOSPITALIST

## 2018-09-27 PROCEDURE — 87070 CULTURE OTHR SPECIMN AEROBIC: CPT | Performed by: NURSE PRACTITIONER

## 2018-09-27 PROCEDURE — 94760 N-INVAS EAR/PLS OXIMETRY 1: CPT

## 2018-09-27 PROCEDURE — 87147 CULTURE TYPE IMMUNOLOGIC: CPT | Performed by: EMERGENCY MEDICINE

## 2018-09-27 PROCEDURE — 92611 MOTION FLUOROSCOPY/SWALLOW: CPT

## 2018-09-27 PROCEDURE — 85025 COMPLETE CBC W/AUTO DIFF WBC: CPT

## 2018-09-27 PROCEDURE — 71045 X-RAY EXAM CHEST 1 VIEW: CPT

## 2018-09-27 PROCEDURE — 99223 1ST HOSP IP/OBS HIGH 75: CPT | Performed by: INTERNAL MEDICINE

## 2018-09-27 PROCEDURE — 25010000002 LEVOFLOXACIN PER 250 MG: Performed by: EMERGENCY MEDICINE

## 2018-09-27 PROCEDURE — 80053 COMPREHEN METABOLIC PANEL: CPT | Performed by: EMERGENCY MEDICINE

## 2018-09-27 PROCEDURE — 71275 CT ANGIOGRAPHY CHEST: CPT

## 2018-09-27 PROCEDURE — 84484 ASSAY OF TROPONIN QUANT: CPT

## 2018-09-27 PROCEDURE — 87040 BLOOD CULTURE FOR BACTERIA: CPT | Performed by: EMERGENCY MEDICINE

## 2018-09-27 PROCEDURE — 83880 ASSAY OF NATRIURETIC PEPTIDE: CPT | Performed by: EMERGENCY MEDICINE

## 2018-09-27 PROCEDURE — 83036 HEMOGLOBIN GLYCOSYLATED A1C: CPT | Performed by: NURSE PRACTITIONER

## 2018-09-27 PROCEDURE — 87150 DNA/RNA AMPLIFIED PROBE: CPT | Performed by: EMERGENCY MEDICINE

## 2018-09-27 PROCEDURE — 74230 X-RAY XM SWLNG FUNCJ C+: CPT

## 2018-09-27 PROCEDURE — 92610 EVALUATE SWALLOWING FUNCTION: CPT

## 2018-09-27 PROCEDURE — 25010000002 METHYLPREDNISOLONE PER 125 MG: Performed by: NURSE PRACTITIONER

## 2018-09-27 PROCEDURE — 84443 ASSAY THYROID STIM HORMONE: CPT | Performed by: NURSE PRACTITIONER

## 2018-09-27 PROCEDURE — 94640 AIRWAY INHALATION TREATMENT: CPT

## 2018-09-27 PROCEDURE — 99284 EMERGENCY DEPT VISIT MOD MDM: CPT

## 2018-09-27 PROCEDURE — 93005 ELECTROCARDIOGRAM TRACING: CPT

## 2018-09-27 PROCEDURE — 25010000002 VANCOMYCIN 10 G RECONSTITUTED SOLUTION: Performed by: EMERGENCY MEDICINE

## 2018-09-27 RX ORDER — DILTIAZEM HYDROCHLORIDE 180 MG/1
360 CAPSULE, COATED, EXTENDED RELEASE ORAL
Status: DISCONTINUED | OUTPATIENT
Start: 2018-09-27 | End: 2018-10-08 | Stop reason: HOSPADM

## 2018-09-27 RX ORDER — ONDANSETRON 2 MG/ML
4 INJECTION INTRAMUSCULAR; INTRAVENOUS EVERY 6 HOURS PRN
Status: DISCONTINUED | OUTPATIENT
Start: 2018-09-27 | End: 2018-10-08 | Stop reason: HOSPADM

## 2018-09-27 RX ORDER — FLUTICASONE PROPIONATE 50 MCG
2 SPRAY, SUSPENSION (ML) NASAL DAILY
Status: DISCONTINUED | OUTPATIENT
Start: 2018-09-27 | End: 2018-10-08 | Stop reason: HOSPADM

## 2018-09-27 RX ORDER — METHYLPREDNISOLONE SODIUM SUCCINATE 125 MG/2ML
60 INJECTION, POWDER, LYOPHILIZED, FOR SOLUTION INTRAMUSCULAR; INTRAVENOUS EVERY 12 HOURS
Status: DISCONTINUED | OUTPATIENT
Start: 2018-09-27 | End: 2018-09-27

## 2018-09-27 RX ORDER — FAMOTIDINE 20 MG/1
40 TABLET, FILM COATED ORAL NIGHTLY
Status: DISCONTINUED | OUTPATIENT
Start: 2018-09-27 | End: 2018-10-08 | Stop reason: HOSPADM

## 2018-09-27 RX ORDER — TOPIRAMATE 100 MG/1
100 TABLET, FILM COATED ORAL EVERY 12 HOURS SCHEDULED
Status: DISCONTINUED | OUTPATIENT
Start: 2018-09-27 | End: 2018-10-08 | Stop reason: HOSPADM

## 2018-09-27 RX ORDER — ONDANSETRON 4 MG/1
4 TABLET, FILM COATED ORAL EVERY 6 HOURS PRN
Status: DISCONTINUED | OUTPATIENT
Start: 2018-09-27 | End: 2018-10-08 | Stop reason: HOSPADM

## 2018-09-27 RX ORDER — GUAIFENESIN 600 MG/1
600 TABLET, EXTENDED RELEASE ORAL 2 TIMES DAILY
Status: DISCONTINUED | OUTPATIENT
Start: 2018-09-27 | End: 2018-09-28

## 2018-09-27 RX ORDER — DIAPER,BRIEF,INFANT-TODD,DISP
1 EACH MISCELLANEOUS 4 TIMES DAILY PRN
Status: DISCONTINUED | OUTPATIENT
Start: 2018-09-27 | End: 2018-10-08 | Stop reason: HOSPADM

## 2018-09-27 RX ORDER — PREGABALIN 100 MG/1
300 CAPSULE ORAL EVERY 12 HOURS SCHEDULED
Status: DISCONTINUED | OUTPATIENT
Start: 2018-09-27 | End: 2018-10-08 | Stop reason: HOSPADM

## 2018-09-27 RX ORDER — METHYLPREDNISOLONE SODIUM SUCCINATE 40 MG/ML
40 INJECTION, POWDER, LYOPHILIZED, FOR SOLUTION INTRAMUSCULAR; INTRAVENOUS DAILY
Status: DISCONTINUED | OUTPATIENT
Start: 2018-09-28 | End: 2018-09-29

## 2018-09-27 RX ORDER — IPRATROPIUM BROMIDE AND ALBUTEROL SULFATE 2.5; .5 MG/3ML; MG/3ML
3 SOLUTION RESPIRATORY (INHALATION) ONCE
Status: COMPLETED | OUTPATIENT
Start: 2018-09-27 | End: 2018-09-27

## 2018-09-27 RX ORDER — ALBUTEROL SULFATE 2.5 MG/3ML
2.5 SOLUTION RESPIRATORY (INHALATION) 4 TIMES DAILY PRN
Status: DISCONTINUED | OUTPATIENT
Start: 2018-09-27 | End: 2018-10-08 | Stop reason: HOSPADM

## 2018-09-27 RX ORDER — L.ACID,PARA/B.BIFIDUM/S.THERM 8B CELL
1 CAPSULE ORAL DAILY
Status: DISCONTINUED | OUTPATIENT
Start: 2018-09-27 | End: 2018-10-08 | Stop reason: HOSPADM

## 2018-09-27 RX ORDER — METHYLPREDNISOLONE SODIUM SUCCINATE 125 MG/2ML
125 INJECTION, POWDER, LYOPHILIZED, FOR SOLUTION INTRAMUSCULAR; INTRAVENOUS ONCE
Status: COMPLETED | OUTPATIENT
Start: 2018-09-27 | End: 2018-09-27

## 2018-09-27 RX ORDER — FAMOTIDINE 20 MG/1
20 TABLET, FILM COATED ORAL DAILY PRN
Status: DISCONTINUED | OUTPATIENT
Start: 2018-09-27 | End: 2018-10-08 | Stop reason: HOSPADM

## 2018-09-27 RX ORDER — CETIRIZINE HYDROCHLORIDE 10 MG/1
10 TABLET ORAL NIGHTLY
Status: DISCONTINUED | OUTPATIENT
Start: 2018-09-27 | End: 2018-10-08 | Stop reason: HOSPADM

## 2018-09-27 RX ORDER — ATORVASTATIN CALCIUM 10 MG/1
10 TABLET, FILM COATED ORAL NIGHTLY
Status: DISCONTINUED | OUTPATIENT
Start: 2018-09-27 | End: 2018-10-08 | Stop reason: HOSPADM

## 2018-09-27 RX ORDER — LEVOFLOXACIN 5 MG/ML
750 INJECTION, SOLUTION INTRAVENOUS ONCE
Status: COMPLETED | OUTPATIENT
Start: 2018-09-27 | End: 2018-09-27

## 2018-09-27 RX ORDER — MONTELUKAST SODIUM 10 MG/1
10 TABLET ORAL NIGHTLY
Status: DISCONTINUED | OUTPATIENT
Start: 2018-09-27 | End: 2018-10-08 | Stop reason: HOSPADM

## 2018-09-27 RX ORDER — SODIUM CHLORIDE 0.9 % (FLUSH) 0.9 %
1-10 SYRINGE (ML) INJECTION AS NEEDED
Status: DISCONTINUED | OUTPATIENT
Start: 2018-09-27 | End: 2018-10-08 | Stop reason: HOSPADM

## 2018-09-27 RX ORDER — ACETAMINOPHEN 325 MG/1
650 TABLET ORAL EVERY 4 HOURS PRN
Status: DISCONTINUED | OUTPATIENT
Start: 2018-09-27 | End: 2018-10-08 | Stop reason: HOSPADM

## 2018-09-27 RX ORDER — ASPIRIN 81 MG/1
81 TABLET ORAL DAILY
Status: DISCONTINUED | OUTPATIENT
Start: 2018-09-27 | End: 2018-10-08 | Stop reason: HOSPADM

## 2018-09-27 RX ORDER — DOCUSATE SODIUM 100 MG/1
100 CAPSULE, LIQUID FILLED ORAL 2 TIMES DAILY PRN
Status: DISCONTINUED | OUTPATIENT
Start: 2018-09-27 | End: 2018-10-08 | Stop reason: HOSPADM

## 2018-09-27 RX ORDER — BUDESONIDE AND FORMOTEROL FUMARATE DIHYDRATE 160; 4.5 UG/1; UG/1
2 AEROSOL RESPIRATORY (INHALATION)
Status: DISCONTINUED | OUTPATIENT
Start: 2018-09-27 | End: 2018-10-08 | Stop reason: HOSPADM

## 2018-09-27 RX ORDER — PANTOPRAZOLE SODIUM 40 MG/1
40 TABLET, DELAYED RELEASE ORAL
Status: DISCONTINUED | OUTPATIENT
Start: 2018-09-27 | End: 2018-10-08 | Stop reason: HOSPADM

## 2018-09-27 RX ORDER — LEVOFLOXACIN 5 MG/ML
750 INJECTION, SOLUTION INTRAVENOUS EVERY 24 HOURS
Status: DISCONTINUED | OUTPATIENT
Start: 2018-09-28 | End: 2018-10-01

## 2018-09-27 RX ORDER — BISACODYL 5 MG/1
5 TABLET, DELAYED RELEASE ORAL DAILY PRN
Status: DISCONTINUED | OUTPATIENT
Start: 2018-09-27 | End: 2018-10-08 | Stop reason: HOSPADM

## 2018-09-27 RX ORDER — IPRATROPIUM BROMIDE AND ALBUTEROL SULFATE 2.5; .5 MG/3ML; MG/3ML
3 SOLUTION RESPIRATORY (INHALATION)
Status: DISCONTINUED | OUTPATIENT
Start: 2018-09-27 | End: 2018-09-29

## 2018-09-27 RX ORDER — LEVOTHYROXINE SODIUM 0.15 MG/1
150 TABLET ORAL
Status: DISCONTINUED | OUTPATIENT
Start: 2018-09-27 | End: 2018-10-08 | Stop reason: HOSPADM

## 2018-09-27 RX ORDER — SODIUM CHLORIDE 0.9 % (FLUSH) 0.9 %
10 SYRINGE (ML) INJECTION AS NEEDED
Status: DISCONTINUED | OUTPATIENT
Start: 2018-09-27 | End: 2018-10-08 | Stop reason: HOSPADM

## 2018-09-27 RX ADMIN — BARIUM SULFATE 20 ML: 400 PASTE ORAL at 15:14

## 2018-09-27 RX ADMIN — APIXABAN 5 MG: 5 TABLET, FILM COATED ORAL at 20:13

## 2018-09-27 RX ADMIN — LEVOFLOXACIN 750 MG: 5 INJECTION, SOLUTION INTRAVENOUS at 04:18

## 2018-09-27 RX ADMIN — GUAIFENESIN 600 MG: 600 TABLET, EXTENDED RELEASE ORAL at 20:13

## 2018-09-27 RX ADMIN — ATORVASTATIN CALCIUM 10 MG: 10 TABLET, FILM COATED ORAL at 20:14

## 2018-09-27 RX ADMIN — BUDESONIDE AND FORMOTEROL FUMARATE DIHYDRATE 2 PUFF: 160; 4.5 AEROSOL RESPIRATORY (INHALATION) at 20:38

## 2018-09-27 RX ADMIN — ASPIRIN 81 MG: 81 TABLET, COATED ORAL at 10:15

## 2018-09-27 RX ADMIN — SODIUM CHLORIDE 2 G: 9 INJECTION, SOLUTION INTRAVENOUS at 07:11

## 2018-09-27 RX ADMIN — LEVOTHYROXINE SODIUM 150 MCG: 150 TABLET ORAL at 10:15

## 2018-09-27 RX ADMIN — TOPIRAMATE 100 MG: 100 TABLET, FILM COATED ORAL at 20:16

## 2018-09-27 RX ADMIN — IPRATROPIUM BROMIDE AND ALBUTEROL SULFATE 3 ML: 2.5; .5 SOLUTION RESPIRATORY (INHALATION) at 19:11

## 2018-09-27 RX ADMIN — TOPIRAMATE 100 MG: 100 TABLET, FILM COATED ORAL at 10:39

## 2018-09-27 RX ADMIN — FAMOTIDINE 40 MG: 20 TABLET ORAL at 20:14

## 2018-09-27 RX ADMIN — APIXABAN 5 MG: 5 TABLET, FILM COATED ORAL at 10:15

## 2018-09-27 RX ADMIN — VANCOMYCIN HYDROCHLORIDE 1750 MG: 10 INJECTION, POWDER, LYOPHILIZED, FOR SOLUTION INTRAVENOUS at 08:11

## 2018-09-27 RX ADMIN — IPRATROPIUM BROMIDE AND ALBUTEROL SULFATE 3 ML: 2.5; .5 SOLUTION RESPIRATORY (INHALATION) at 13:24

## 2018-09-27 RX ADMIN — CETIRIZINE HYDROCHLORIDE 10 MG: 10 TABLET, FILM COATED ORAL at 20:14

## 2018-09-27 RX ADMIN — BARIUM SULFATE 100 ML: 0.81 POWDER, FOR SUSPENSION ORAL at 15:14

## 2018-09-27 RX ADMIN — IPRATROPIUM BROMIDE AND ALBUTEROL SULFATE 3 ML: .5; 3 SOLUTION RESPIRATORY (INHALATION) at 03:50

## 2018-09-27 RX ADMIN — VANCOMYCIN HYDROCHLORIDE 1250 MG: 10 INJECTION, POWDER, LYOPHILIZED, FOR SOLUTION INTRAVENOUS at 20:14

## 2018-09-27 RX ADMIN — GUAIFENESIN 600 MG: 600 TABLET, EXTENDED RELEASE ORAL at 10:16

## 2018-09-27 RX ADMIN — PANTOPRAZOLE SODIUM 40 MG: 40 TABLET, DELAYED RELEASE ORAL at 10:39

## 2018-09-27 RX ADMIN — ALBUTEROL SULFATE 2.5 MG: 2.5 SOLUTION RESPIRATORY (INHALATION) at 17:40

## 2018-09-27 RX ADMIN — METHYLPREDNISOLONE SODIUM SUCCINATE 60 MG: 125 INJECTION, POWDER, FOR SOLUTION INTRAMUSCULAR; INTRAVENOUS at 08:11

## 2018-09-27 RX ADMIN — METHYLPREDNISOLONE SODIUM SUCCINATE 125 MG: 125 INJECTION, POWDER, FOR SOLUTION INTRAMUSCULAR; INTRAVENOUS at 04:17

## 2018-09-27 RX ADMIN — PREGABALIN 300 MG: 100 CAPSULE ORAL at 10:15

## 2018-09-27 RX ADMIN — MONTELUKAST SODIUM 10 MG: 10 TABLET, COATED ORAL at 20:14

## 2018-09-27 RX ADMIN — DILTIAZEM HYDROCHLORIDE 360 MG: 180 CAPSULE, COATED, EXTENDED RELEASE ORAL at 10:15

## 2018-09-27 RX ADMIN — FLUTICASONE PROPIONATE 2 SPRAY: 50 SPRAY, METERED NASAL at 10:15

## 2018-09-27 RX ADMIN — Medication 1 CAPSULE: at 10:16

## 2018-09-27 RX ADMIN — PREGABALIN 300 MG: 100 CAPSULE ORAL at 20:13

## 2018-09-27 RX ADMIN — IOPAMIDOL 70 ML: 755 INJECTION, SOLUTION INTRAVENOUS at 06:55

## 2018-09-27 RX ADMIN — SODIUM CHLORIDE 2 G: 900 INJECTION INTRAVENOUS at 15:54

## 2018-09-28 ENCOUNTER — APPOINTMENT (OUTPATIENT)
Dept: CARDIOLOGY | Facility: HOSPITAL | Age: 68
End: 2018-09-28

## 2018-09-28 LAB
ANION GAP SERPL CALCULATED.3IONS-SCNC: 7 MMOL/L (ref 3–11)
BACTERIA BLD CULT: ABNORMAL
BUN BLD-MCNC: 10 MG/DL (ref 9–23)
BUN/CREAT SERPL: 13.2 (ref 7–25)
CALCIUM SPEC-SCNC: 8.4 MG/DL (ref 8.7–10.4)
CHLORIDE SERPL-SCNC: 116 MMOL/L (ref 99–109)
CO2 SERPL-SCNC: 17 MMOL/L (ref 20–31)
CREAT BLD-MCNC: 0.76 MG/DL (ref 0.6–1.3)
DEPRECATED RDW RBC AUTO: 49.2 FL (ref 37–54)
ERYTHROCYTE [DISTWIDTH] IN BLOOD BY AUTOMATED COUNT: 14.2 % (ref 11.3–14.5)
GFR SERPL CREATININE-BSD FRML MDRD: 76 ML/MIN/1.73
GLUCOSE BLD-MCNC: 148 MG/DL (ref 70–100)
HCT VFR BLD AUTO: 35.9 % (ref 34.5–44)
HGB BLD-MCNC: 11 G/DL (ref 11.5–15.5)
MAGNESIUM SERPL-MCNC: 1.9 MG/DL (ref 1.3–2.7)
MCH RBC QN AUTO: 29.3 PG (ref 27–31)
MCHC RBC AUTO-ENTMCNC: 30.6 G/DL (ref 32–36)
MCV RBC AUTO: 95.5 FL (ref 80–99)
PLATELET # BLD AUTO: 192 10*3/MM3 (ref 150–450)
PMV BLD AUTO: 13.2 FL (ref 6–12)
POTASSIUM BLD-SCNC: 3.6 MMOL/L (ref 3.5–5.5)
RBC # BLD AUTO: 3.76 10*6/MM3 (ref 3.89–5.14)
SODIUM BLD-SCNC: 140 MMOL/L (ref 132–146)
WBC NRBC COR # BLD: 10.82 10*3/MM3 (ref 3.5–10.8)

## 2018-09-28 PROCEDURE — 97535 SELF CARE MNGMENT TRAINING: CPT

## 2018-09-28 PROCEDURE — 97162 PT EVAL MOD COMPLEX 30 MIN: CPT

## 2018-09-28 PROCEDURE — 25010000002 SULFUR HEXAFLUORIDE MICROSPH 60.7-25 MG RECONSTITUTED SUSPENSION: Performed by: HOSPITALIST

## 2018-09-28 PROCEDURE — 93306 TTE W/DOPPLER COMPLETE: CPT

## 2018-09-28 PROCEDURE — 94760 N-INVAS EAR/PLS OXIMETRY 1: CPT

## 2018-09-28 PROCEDURE — 80048 BASIC METABOLIC PNL TOTAL CA: CPT | Performed by: NURSE PRACTITIONER

## 2018-09-28 PROCEDURE — 25010000002 VANCOMYCIN 10 G RECONSTITUTED SOLUTION

## 2018-09-28 PROCEDURE — 97166 OT EVAL MOD COMPLEX 45 MIN: CPT

## 2018-09-28 PROCEDURE — 25010000002 METHYLPREDNISOLONE PER 40 MG: Performed by: HOSPITALIST

## 2018-09-28 PROCEDURE — 85027 COMPLETE CBC AUTOMATED: CPT | Performed by: NURSE PRACTITIONER

## 2018-09-28 PROCEDURE — 94799 UNLISTED PULMONARY SVC/PX: CPT

## 2018-09-28 PROCEDURE — 99233 SBSQ HOSP IP/OBS HIGH 50: CPT | Performed by: NURSE PRACTITIONER

## 2018-09-28 PROCEDURE — 83735 ASSAY OF MAGNESIUM: CPT

## 2018-09-28 PROCEDURE — 25010000002 LEVOFLOXACIN PER 250 MG: Performed by: HOSPITALIST

## 2018-09-28 RX ORDER — METOPROLOL TARTRATE 5 MG/5ML
2.5 INJECTION INTRAVENOUS ONCE
Status: COMPLETED | OUTPATIENT
Start: 2018-09-28 | End: 2018-09-28

## 2018-09-28 RX ORDER — GUAIFENESIN AND DEXTROMETHORPHAN HYDROBROMIDE 600; 30 MG/1; MG/1
1 TABLET, EXTENDED RELEASE ORAL 2 TIMES DAILY PRN
Status: DISCONTINUED | OUTPATIENT
Start: 2018-09-28 | End: 2018-10-08 | Stop reason: HOSPADM

## 2018-09-28 RX ADMIN — BUDESONIDE AND FORMOTEROL FUMARATE DIHYDRATE 2 PUFF: 160; 4.5 AEROSOL RESPIRATORY (INHALATION) at 18:38

## 2018-09-28 RX ADMIN — ASPIRIN 81 MG: 81 TABLET, COATED ORAL at 08:04

## 2018-09-28 RX ADMIN — MONTELUKAST SODIUM 10 MG: 10 TABLET, COATED ORAL at 21:29

## 2018-09-28 RX ADMIN — VANCOMYCIN HYDROCHLORIDE 1250 MG: 10 INJECTION, POWDER, LYOPHILIZED, FOR SOLUTION INTRAVENOUS at 12:07

## 2018-09-28 RX ADMIN — IPRATROPIUM BROMIDE AND ALBUTEROL SULFATE 3 ML: 2.5; .5 SOLUTION RESPIRATORY (INHALATION) at 07:09

## 2018-09-28 RX ADMIN — PREGABALIN 300 MG: 100 CAPSULE ORAL at 07:59

## 2018-09-28 RX ADMIN — APIXABAN 5 MG: 5 TABLET, FILM COATED ORAL at 08:00

## 2018-09-28 RX ADMIN — METOPROLOL TARTRATE 12.5 MG: 25 TABLET, FILM COATED ORAL at 21:29

## 2018-09-28 RX ADMIN — METHYLPREDNISOLONE SODIUM SUCCINATE 40 MG: 40 INJECTION, POWDER, FOR SOLUTION INTRAMUSCULAR; INTRAVENOUS at 07:58

## 2018-09-28 RX ADMIN — IPRATROPIUM BROMIDE AND ALBUTEROL SULFATE 3 ML: 2.5; .5 SOLUTION RESPIRATORY (INHALATION) at 18:38

## 2018-09-28 RX ADMIN — PREGABALIN 300 MG: 100 CAPSULE ORAL at 21:29

## 2018-09-28 RX ADMIN — TOPIRAMATE 100 MG: 100 TABLET, FILM COATED ORAL at 21:29

## 2018-09-28 RX ADMIN — SULFUR HEXAFLUORIDE 2 ML: KIT at 11:00

## 2018-09-28 RX ADMIN — GUAIFENESIN 600 MG: 600 TABLET, EXTENDED RELEASE ORAL at 07:59

## 2018-09-28 RX ADMIN — SODIUM CHLORIDE 2 G: 900 INJECTION INTRAVENOUS at 15:54

## 2018-09-28 RX ADMIN — BUDESONIDE AND FORMOTEROL FUMARATE DIHYDRATE 2 PUFF: 160; 4.5 AEROSOL RESPIRATORY (INHALATION) at 09:02

## 2018-09-28 RX ADMIN — IPRATROPIUM BROMIDE AND ALBUTEROL SULFATE 3 ML: 2.5; .5 SOLUTION RESPIRATORY (INHALATION) at 15:32

## 2018-09-28 RX ADMIN — Medication 1 CAPSULE: at 07:58

## 2018-09-28 RX ADMIN — FAMOTIDINE 40 MG: 20 TABLET ORAL at 21:29

## 2018-09-28 RX ADMIN — SODIUM CHLORIDE 2 G: 900 INJECTION INTRAVENOUS at 07:13

## 2018-09-28 RX ADMIN — IPRATROPIUM BROMIDE AND ALBUTEROL SULFATE 3 ML: 2.5; .5 SOLUTION RESPIRATORY (INHALATION) at 11:03

## 2018-09-28 RX ADMIN — FLUTICASONE PROPIONATE 2 SPRAY: 50 SPRAY, METERED NASAL at 07:57

## 2018-09-28 RX ADMIN — PANTOPRAZOLE SODIUM 40 MG: 40 TABLET, DELAYED RELEASE ORAL at 05:27

## 2018-09-28 RX ADMIN — CETIRIZINE HYDROCHLORIDE 10 MG: 10 TABLET, FILM COATED ORAL at 21:29

## 2018-09-28 RX ADMIN — APIXABAN 5 MG: 5 TABLET, FILM COATED ORAL at 21:29

## 2018-09-28 RX ADMIN — IPRATROPIUM BROMIDE AND ALBUTEROL SULFATE 3 ML: 2.5; .5 SOLUTION RESPIRATORY (INHALATION) at 23:02

## 2018-09-28 RX ADMIN — METOPROLOL TARTRATE 2.5 MG: 1 INJECTION, SOLUTION INTRAVENOUS at 19:46

## 2018-09-28 RX ADMIN — ATORVASTATIN CALCIUM 10 MG: 10 TABLET, FILM COATED ORAL at 21:29

## 2018-09-28 RX ADMIN — LEVOFLOXACIN 750 MG: 5 INJECTION, SOLUTION INTRAVENOUS at 05:27

## 2018-09-28 RX ADMIN — LEVOTHYROXINE SODIUM 150 MCG: 150 TABLET ORAL at 05:27

## 2018-09-28 RX ADMIN — GUAIFENESIN AND DEXTROMETHORPHAN HYDROBROMIDE 1 TABLET: 600; 30 TABLET, EXTENDED RELEASE ORAL at 21:30

## 2018-09-28 RX ADMIN — IPRATROPIUM BROMIDE AND ALBUTEROL SULFATE 3 ML: 2.5; .5 SOLUTION RESPIRATORY (INHALATION) at 00:03

## 2018-09-28 RX ADMIN — DILTIAZEM HYDROCHLORIDE 360 MG: 180 CAPSULE, COATED, EXTENDED RELEASE ORAL at 07:59

## 2018-09-28 RX ADMIN — SODIUM CHLORIDE 2 G: 900 INJECTION INTRAVENOUS at 01:38

## 2018-09-28 RX ADMIN — IPRATROPIUM BROMIDE AND ALBUTEROL SULFATE 3 ML: 2.5; .5 SOLUTION RESPIRATORY (INHALATION) at 03:12

## 2018-09-28 RX ADMIN — SODIUM CHLORIDE 2 G: 900 INJECTION INTRAVENOUS at 22:53

## 2018-09-28 RX ADMIN — TOPIRAMATE 100 MG: 100 TABLET, FILM COATED ORAL at 07:58

## 2018-09-29 LAB
AV VENA CONTRACTA: 0.33 CM
BACTERIA SPEC RESP CULT: NORMAL
BASOPHILS # BLD AUTO: 0 10*3/MM3 (ref 0–0.2)
BASOPHILS NFR BLD AUTO: 0 % (ref 0–1)
BH CV ECHO MEAS - AO MAX PG (FULL): 4.1 MMHG
BH CV ECHO MEAS - AO MAX PG: 6 MMHG
BH CV ECHO MEAS - AO MEAN PG (FULL): 2.3 MMHG
BH CV ECHO MEAS - AO MEAN PG: 3.5 MMHG
BH CV ECHO MEAS - AO ROOT AREA (BSA CORRECTED): 1.4
BH CV ECHO MEAS - AO ROOT AREA: 6.4 CM^2
BH CV ECHO MEAS - AO ROOT DIAM: 2.9 CM
BH CV ECHO MEAS - AO V2 MAX: 120.7 CM/SEC
BH CV ECHO MEAS - AO V2 MEAN: 85.3 CM/SEC
BH CV ECHO MEAS - AO V2 VTI: 21.4 CM
BH CV ECHO MEAS - AVA(I,A): 1.8 CM^2
BH CV ECHO MEAS - AVA(I,D): 1.8 CM^2
BH CV ECHO MEAS - AVA(V,A): 1.9 CM^2
BH CV ECHO MEAS - AVA(V,D): 1.9 CM^2
BH CV ECHO MEAS - BSA(HAYCOCK): 2.1 M^2
BH CV ECHO MEAS - BSA: 2 M^2
BH CV ECHO MEAS - BZI_BMI: 33.3 KILOGRAMS/M^2
BH CV ECHO MEAS - BZI_METRIC_HEIGHT: 165.1 CM
BH CV ECHO MEAS - BZI_METRIC_WEIGHT: 90.7 KG
BH CV ECHO MEAS - EDV(CUBED): 62.5 ML
BH CV ECHO MEAS - EDV(MOD-SP2): 110 ML
BH CV ECHO MEAS - EDV(MOD-SP4): 105 ML
BH CV ECHO MEAS - EDV(TEICH): 68.7 ML
BH CV ECHO MEAS - EF(CUBED): 47.6 %
BH CV ECHO MEAS - EF(MOD-BP): 61 %
BH CV ECHO MEAS - EF(MOD-SP2): 60 %
BH CV ECHO MEAS - EF(MOD-SP4): 61 %
BH CV ECHO MEAS - EF(TEICH): 40.4 %
BH CV ECHO MEAS - ESV(CUBED): 32.7 ML
BH CV ECHO MEAS - ESV(MOD-SP2): 44 ML
BH CV ECHO MEAS - ESV(MOD-SP4): 41 ML
BH CV ECHO MEAS - ESV(TEICH): 40.9 ML
BH CV ECHO MEAS - FS: 19.4 %
BH CV ECHO MEAS - IVS/LVPW: 0.97
BH CV ECHO MEAS - IVSD: 0.9 CM
BH CV ECHO MEAS - LA DIMENSION: 4.5 CM
BH CV ECHO MEAS - LA/AO: 1.6
BH CV ECHO MEAS - LAD MAJOR: 6 CM
BH CV ECHO MEAS - LAT PEAK E' VEL: 6.2 CM/SEC
BH CV ECHO MEAS - LATERAL E/E' RATIO: 25.2
BH CV ECHO MEAS - LV DIASTOLIC VOL/BSA (35-75): 53.1 ML/M^2
BH CV ECHO MEAS - LV MASS(C)D: 111.4 GRAMS
BH CV ECHO MEAS - LV MASS(C)DI: 56.3 GRAMS/M^2
BH CV ECHO MEAS - LV MAX PG: 1.9 MMHG
BH CV ECHO MEAS - LV MEAN PG: 1.2 MMHG
BH CV ECHO MEAS - LV SYSTOLIC VOL/BSA (12-30): 20.7 ML/M^2
BH CV ECHO MEAS - LV V1 MAX: 69.7 CM/SEC
BH CV ECHO MEAS - LV V1 MEAN: 50.5 CM/SEC
BH CV ECHO MEAS - LV V1 VTI: 11.9 CM
BH CV ECHO MEAS - LVIDD: 4 CM
BH CV ECHO MEAS - LVIDS: 3.2 CM
BH CV ECHO MEAS - LVLD AP2: 8.5 CM
BH CV ECHO MEAS - LVLD AP4: 8 CM
BH CV ECHO MEAS - LVLS AP2: 6.7 CM
BH CV ECHO MEAS - LVLS AP4: 7.1 CM
BH CV ECHO MEAS - LVOT AREA (M): 3.1 CM^2
BH CV ECHO MEAS - LVOT AREA: 3.3 CM^2
BH CV ECHO MEAS - LVOT DIAM: 2 CM
BH CV ECHO MEAS - LVPWD: 0.93 CM
BH CV ECHO MEAS - MED PEAK E' VEL: 7.3 CM/SEC
BH CV ECHO MEAS - MEDIAL E/E' RATIO: 21.6
BH CV ECHO MEAS - MV DEC TIME: 0.18 SEC
BH CV ECHO MEAS - MV E MAX VEL: 159.1 CM/SEC
BH CV ECHO MEAS - PA ACC SLOPE: 581.5 CM/SEC^2
BH CV ECHO MEAS - PA ACC TIME: 0.12 SEC
BH CV ECHO MEAS - PA MAX PG: 8.1 MMHG
BH CV ECHO MEAS - PA PR(ACCEL): 25.5 MMHG
BH CV ECHO MEAS - PA V2 MAX: 142.3 CM/SEC
BH CV ECHO MEAS - PI END-D VEL: 133.6 CM/SEC
BH CV ECHO MEAS - RAP SYSTOLE: 15 MMHG
BH CV ECHO MEAS - RVSP: 44.5 MMHG
BH CV ECHO MEAS - SI(AO): 69.5 ML/M^2
BH CV ECHO MEAS - SI(CUBED): 15 ML/M^2
BH CV ECHO MEAS - SI(LVOT): 19.5 ML/M^2
BH CV ECHO MEAS - SI(MOD-SP2): 33.4 ML/M^2
BH CV ECHO MEAS - SI(MOD-SP4): 32.4 ML/M^2
BH CV ECHO MEAS - SI(TEICH): 14 ML/M^2
BH CV ECHO MEAS - SV(AO): 137.5 ML
BH CV ECHO MEAS - SV(CUBED): 29.7 ML
BH CV ECHO MEAS - SV(LVOT): 38.6 ML
BH CV ECHO MEAS - SV(MOD-SP2): 66 ML
BH CV ECHO MEAS - SV(MOD-SP4): 64 ML
BH CV ECHO MEAS - SV(TEICH): 27.7 ML
BH CV ECHO MEAS - TAPSE (>1.6): 1.2 CM2
BH CV ECHO MEAS - TR MAX PG: 29.5 MMHG
BH CV ECHO MEAS - TR MAX VEL: 271.5 CM/SEC
BH CV ECHO MEASUREMENTS AVERAGE E/E' RATIO: 23.57
BH CV VAS BP LEFT ARM: NORMAL MMHG
BH CV XLRA - RV BASE: 3.3 CM
BH CV XLRA - RV LENGTH: 7.6 CM
BH CV XLRA - RV MID: 2.8 CM
BH CV XLRA - TDI S': 7.01 CM/SEC
DEPRECATED RDW RBC AUTO: 51.5 FL (ref 37–54)
EOSINOPHIL # BLD AUTO: 0 10*3/MM3 (ref 0–0.3)
EOSINOPHIL NFR BLD AUTO: 0 % (ref 0–3)
ERYTHROCYTE [DISTWIDTH] IN BLOOD BY AUTOMATED COUNT: 14.5 % (ref 11.3–14.5)
GRAM STN SPEC: NORMAL
HCT VFR BLD AUTO: 37.2 % (ref 34.5–44)
HGB BLD-MCNC: 11.3 G/DL (ref 11.5–15.5)
IMM GRANULOCYTES # BLD: 0.03 10*3/MM3 (ref 0–0.03)
IMM GRANULOCYTES NFR BLD: 0.2 % (ref 0–0.6)
LEFT ATRIUM VOLUME INDEX: 35.4 ML/M^2
LYMPHOCYTES # BLD AUTO: 1.67 10*3/MM3 (ref 0.6–4.8)
LYMPHOCYTES NFR BLD AUTO: 12.8 % (ref 24–44)
MCH RBC QN AUTO: 29.7 PG (ref 27–31)
MCHC RBC AUTO-ENTMCNC: 30.4 G/DL (ref 32–36)
MCV RBC AUTO: 97.6 FL (ref 80–99)
MONOCYTES # BLD AUTO: 1.56 10*3/MM3 (ref 0–1)
MONOCYTES NFR BLD AUTO: 12 % (ref 0–12)
MRSA DNA SPEC QL NAA+PROBE: POSITIVE
MV VENA CONTRACTA: 0.39 CM
NEUTROPHILS # BLD AUTO: 9.78 10*3/MM3 (ref 1.5–8.3)
NEUTROPHILS NFR BLD AUTO: 75 % (ref 41–71)
PLATELET # BLD AUTO: 207 10*3/MM3 (ref 150–450)
PMV BLD AUTO: 12.5 FL (ref 6–12)
RBC # BLD AUTO: 3.81 10*6/MM3 (ref 3.89–5.14)
VANCOMYCIN TROUGH SERPL-MCNC: 19.4 MCG/ML (ref 10–20)
WBC NRBC COR # BLD: 13.04 10*3/MM3 (ref 3.5–10.8)

## 2018-09-29 PROCEDURE — 94799 UNLISTED PULMONARY SVC/PX: CPT

## 2018-09-29 PROCEDURE — 25010000002 LEVOFLOXACIN PER 250 MG: Performed by: HOSPITALIST

## 2018-09-29 PROCEDURE — 85025 COMPLETE CBC W/AUTO DIFF WBC: CPT | Performed by: NURSE PRACTITIONER

## 2018-09-29 PROCEDURE — 87641 MR-STAPH DNA AMP PROBE: CPT

## 2018-09-29 PROCEDURE — 93010 ELECTROCARDIOGRAM REPORT: CPT | Performed by: INTERNAL MEDICINE

## 2018-09-29 PROCEDURE — 93005 ELECTROCARDIOGRAM TRACING: CPT | Performed by: NURSE PRACTITIONER

## 2018-09-29 PROCEDURE — 80202 ASSAY OF VANCOMYCIN: CPT

## 2018-09-29 PROCEDURE — 92526 ORAL FUNCTION THERAPY: CPT

## 2018-09-29 PROCEDURE — 25010000002 VANCOMYCIN 10 G RECONSTITUTED SOLUTION

## 2018-09-29 PROCEDURE — 94760 N-INVAS EAR/PLS OXIMETRY 1: CPT

## 2018-09-29 PROCEDURE — 94640 AIRWAY INHALATION TREATMENT: CPT

## 2018-09-29 PROCEDURE — 99233 SBSQ HOSP IP/OBS HIGH 50: CPT | Performed by: HOSPITALIST

## 2018-09-29 PROCEDURE — 25010000002 METHYLPREDNISOLONE PER 40 MG: Performed by: HOSPITALIST

## 2018-09-29 RX ORDER — METHYLPREDNISOLONE SODIUM SUCCINATE 40 MG/ML
20 INJECTION, POWDER, LYOPHILIZED, FOR SOLUTION INTRAMUSCULAR; INTRAVENOUS DAILY
Status: DISCONTINUED | OUTPATIENT
Start: 2018-09-30 | End: 2018-10-01

## 2018-09-29 RX ADMIN — GUAIFENESIN AND DEXTROMETHORPHAN HYDROBROMIDE 1 TABLET: 600; 30 TABLET, EXTENDED RELEASE ORAL at 22:37

## 2018-09-29 RX ADMIN — IPRATROPIUM BROMIDE AND ALBUTEROL SULFATE 3 ML: 2.5; .5 SOLUTION RESPIRATORY (INHALATION) at 02:50

## 2018-09-29 RX ADMIN — PREGABALIN 300 MG: 100 CAPSULE ORAL at 22:37

## 2018-09-29 RX ADMIN — SODIUM CHLORIDE 2 G: 900 INJECTION INTRAVENOUS at 14:43

## 2018-09-29 RX ADMIN — DILTIAZEM HYDROCHLORIDE 360 MG: 180 CAPSULE, COATED, EXTENDED RELEASE ORAL at 09:04

## 2018-09-29 RX ADMIN — PREGABALIN 300 MG: 100 CAPSULE ORAL at 09:04

## 2018-09-29 RX ADMIN — MONTELUKAST SODIUM 10 MG: 10 TABLET, COATED ORAL at 22:37

## 2018-09-29 RX ADMIN — SODIUM CHLORIDE 2 G: 900 INJECTION INTRAVENOUS at 09:04

## 2018-09-29 RX ADMIN — GUAIFENESIN AND DEXTROMETHORPHAN HYDROBROMIDE 1 TABLET: 600; 30 TABLET, EXTENDED RELEASE ORAL at 09:15

## 2018-09-29 RX ADMIN — FAMOTIDINE 40 MG: 20 TABLET ORAL at 22:38

## 2018-09-29 RX ADMIN — VANCOMYCIN HYDROCHLORIDE 1250 MG: 10 INJECTION, POWDER, LYOPHILIZED, FOR SOLUTION INTRAVENOUS at 11:53

## 2018-09-29 RX ADMIN — SODIUM CHLORIDE 2 G: 900 INJECTION INTRAVENOUS at 22:41

## 2018-09-29 RX ADMIN — PANTOPRAZOLE SODIUM 40 MG: 40 TABLET, DELAYED RELEASE ORAL at 06:31

## 2018-09-29 RX ADMIN — ASPIRIN 81 MG: 81 TABLET, COATED ORAL at 09:04

## 2018-09-29 RX ADMIN — METOPROLOL TARTRATE 12.5 MG: 25 TABLET, FILM COATED ORAL at 22:38

## 2018-09-29 RX ADMIN — TOPIRAMATE 100 MG: 100 TABLET, FILM COATED ORAL at 22:41

## 2018-09-29 RX ADMIN — CETIRIZINE HYDROCHLORIDE 10 MG: 10 TABLET, FILM COATED ORAL at 22:38

## 2018-09-29 RX ADMIN — IPRATROPIUM BROMIDE AND ALBUTEROL SULFATE 3 ML: 2.5; .5 SOLUTION RESPIRATORY (INHALATION) at 06:59

## 2018-09-29 RX ADMIN — APIXABAN 5 MG: 5 TABLET, FILM COATED ORAL at 22:38

## 2018-09-29 RX ADMIN — IPRATROPIUM BROMIDE AND ALBUTEROL SULFATE 3 ML: 2.5; .5 SOLUTION RESPIRATORY (INHALATION) at 11:58

## 2018-09-29 RX ADMIN — Medication 1 CAPSULE: at 09:04

## 2018-09-29 RX ADMIN — METHYLPREDNISOLONE SODIUM SUCCINATE 40 MG: 40 INJECTION, POWDER, FOR SOLUTION INTRAMUSCULAR; INTRAVENOUS at 09:04

## 2018-09-29 RX ADMIN — BUDESONIDE AND FORMOTEROL FUMARATE DIHYDRATE 2 PUFF: 160; 4.5 AEROSOL RESPIRATORY (INHALATION) at 18:53

## 2018-09-29 RX ADMIN — LEVOFLOXACIN 750 MG: 5 INJECTION, SOLUTION INTRAVENOUS at 06:34

## 2018-09-29 RX ADMIN — TOPIRAMATE 100 MG: 100 TABLET, FILM COATED ORAL at 09:04

## 2018-09-29 RX ADMIN — METOPROLOL TARTRATE 12.5 MG: 25 TABLET, FILM COATED ORAL at 09:04

## 2018-09-29 RX ADMIN — VANCOMYCIN HYDROCHLORIDE 1250 MG: 10 INJECTION, POWDER, LYOPHILIZED, FOR SOLUTION INTRAVENOUS at 02:44

## 2018-09-29 RX ADMIN — FLUTICASONE PROPIONATE 2 SPRAY: 50 SPRAY, METERED NASAL at 09:04

## 2018-09-29 RX ADMIN — ATORVASTATIN CALCIUM 10 MG: 10 TABLET, FILM COATED ORAL at 22:37

## 2018-09-29 RX ADMIN — ALBUTEROL SULFATE 2.5 MG: 2.5 SOLUTION RESPIRATORY (INHALATION) at 23:12

## 2018-09-29 RX ADMIN — APIXABAN 5 MG: 5 TABLET, FILM COATED ORAL at 09:04

## 2018-09-29 RX ADMIN — LEVOTHYROXINE SODIUM 150 MCG: 150 TABLET ORAL at 06:31

## 2018-09-29 RX ADMIN — BUDESONIDE AND FORMOTEROL FUMARATE DIHYDRATE 2 PUFF: 160; 4.5 AEROSOL RESPIRATORY (INHALATION) at 06:59

## 2018-09-30 PROCEDURE — 94799 UNLISTED PULMONARY SVC/PX: CPT

## 2018-09-30 PROCEDURE — 99233 SBSQ HOSP IP/OBS HIGH 50: CPT | Performed by: HOSPITALIST

## 2018-09-30 PROCEDURE — 25010000002 LEVOFLOXACIN PER 250 MG: Performed by: HOSPITALIST

## 2018-09-30 PROCEDURE — 25010000002 METHYLPREDNISOLONE PER 40 MG: Performed by: HOSPITALIST

## 2018-09-30 PROCEDURE — 92526 ORAL FUNCTION THERAPY: CPT

## 2018-09-30 PROCEDURE — 25010000002 VANCOMYCIN 10 G RECONSTITUTED SOLUTION

## 2018-09-30 PROCEDURE — 94640 AIRWAY INHALATION TREATMENT: CPT

## 2018-09-30 RX ADMIN — BISACODYL 5 MG: 5 TABLET, COATED ORAL at 06:10

## 2018-09-30 RX ADMIN — SODIUM CHLORIDE 2 G: 900 INJECTION INTRAVENOUS at 06:18

## 2018-09-30 RX ADMIN — Medication 1 CAPSULE: at 09:01

## 2018-09-30 RX ADMIN — VANCOMYCIN HYDROCHLORIDE 1250 MG: 10 INJECTION, POWDER, LYOPHILIZED, FOR SOLUTION INTRAVENOUS at 01:42

## 2018-09-30 RX ADMIN — LEVOTHYROXINE SODIUM 150 MCG: 150 TABLET ORAL at 06:10

## 2018-09-30 RX ADMIN — FLUTICASONE PROPIONATE 2 SPRAY: 50 SPRAY, METERED NASAL at 08:59

## 2018-09-30 RX ADMIN — BUDESONIDE AND FORMOTEROL FUMARATE DIHYDRATE 2 PUFF: 160; 4.5 AEROSOL RESPIRATORY (INHALATION) at 21:06

## 2018-09-30 RX ADMIN — METOPROLOL TARTRATE 12.5 MG: 25 TABLET, FILM COATED ORAL at 09:01

## 2018-09-30 RX ADMIN — LEVOFLOXACIN 750 MG: 5 INJECTION, SOLUTION INTRAVENOUS at 08:59

## 2018-09-30 RX ADMIN — ASPIRIN 81 MG: 81 TABLET, COATED ORAL at 09:01

## 2018-09-30 RX ADMIN — FAMOTIDINE 40 MG: 20 TABLET ORAL at 21:23

## 2018-09-30 RX ADMIN — SODIUM CHLORIDE 2 G: 900 INJECTION INTRAVENOUS at 16:02

## 2018-09-30 RX ADMIN — PREGABALIN 300 MG: 100 CAPSULE ORAL at 09:00

## 2018-09-30 RX ADMIN — TOPIRAMATE 100 MG: 100 TABLET, FILM COATED ORAL at 21:23

## 2018-09-30 RX ADMIN — CETIRIZINE HYDROCHLORIDE 10 MG: 10 TABLET, FILM COATED ORAL at 21:23

## 2018-09-30 RX ADMIN — TOPIRAMATE 100 MG: 100 TABLET, FILM COATED ORAL at 09:01

## 2018-09-30 RX ADMIN — PREGABALIN 300 MG: 100 CAPSULE ORAL at 21:23

## 2018-09-30 RX ADMIN — METOPROLOL TARTRATE 12.5 MG: 25 TABLET, FILM COATED ORAL at 21:23

## 2018-09-30 RX ADMIN — APIXABAN 5 MG: 5 TABLET, FILM COATED ORAL at 21:23

## 2018-09-30 RX ADMIN — DILTIAZEM HYDROCHLORIDE 360 MG: 180 CAPSULE, COATED, EXTENDED RELEASE ORAL at 09:00

## 2018-09-30 RX ADMIN — PANTOPRAZOLE SODIUM 40 MG: 40 TABLET, DELAYED RELEASE ORAL at 06:10

## 2018-09-30 RX ADMIN — APIXABAN 5 MG: 5 TABLET, FILM COATED ORAL at 09:01

## 2018-09-30 RX ADMIN — SODIUM CHLORIDE 2 G: 900 INJECTION INTRAVENOUS at 23:35

## 2018-09-30 RX ADMIN — ALBUTEROL SULFATE 2.5 MG: 2.5 SOLUTION RESPIRATORY (INHALATION) at 21:06

## 2018-09-30 RX ADMIN — METHYLPREDNISOLONE SODIUM SUCCINATE 20 MG: 40 INJECTION, POWDER, FOR SOLUTION INTRAMUSCULAR; INTRAVENOUS at 09:00

## 2018-09-30 RX ADMIN — BUDESONIDE AND FORMOTEROL FUMARATE DIHYDRATE 2 PUFF: 160; 4.5 AEROSOL RESPIRATORY (INHALATION) at 11:55

## 2018-09-30 RX ADMIN — ATORVASTATIN CALCIUM 10 MG: 10 TABLET, FILM COATED ORAL at 21:23

## 2018-09-30 RX ADMIN — VANCOMYCIN HYDROCHLORIDE 1250 MG: 10 INJECTION, POWDER, LYOPHILIZED, FOR SOLUTION INTRAVENOUS at 12:57

## 2018-09-30 RX ADMIN — MONTELUKAST SODIUM 10 MG: 10 TABLET, COATED ORAL at 21:23

## 2018-10-01 LAB
ANION GAP SERPL CALCULATED.3IONS-SCNC: 7 MMOL/L (ref 3–11)
BUN BLD-MCNC: 13 MG/DL (ref 9–23)
BUN/CREAT SERPL: 19.1 (ref 7–25)
CALCIUM SPEC-SCNC: 8 MG/DL (ref 8.7–10.4)
CHLORIDE SERPL-SCNC: 117 MMOL/L (ref 99–109)
CO2 SERPL-SCNC: 20 MMOL/L (ref 20–31)
CREAT BLD-MCNC: 0.68 MG/DL (ref 0.6–1.3)
GFR SERPL CREATININE-BSD FRML MDRD: 87 ML/MIN/1.73
GLUCOSE BLD-MCNC: 104 MG/DL (ref 70–100)
POTASSIUM BLD-SCNC: 3.8 MMOL/L (ref 3.5–5.5)
SODIUM BLD-SCNC: 144 MMOL/L (ref 132–146)
VANCOMYCIN TROUGH SERPL-MCNC: 25.5 MCG/ML (ref 10–20)

## 2018-10-01 PROCEDURE — 80048 BASIC METABOLIC PNL TOTAL CA: CPT

## 2018-10-01 PROCEDURE — 87486 CHLMYD PNEUM DNA AMP PROBE: CPT

## 2018-10-01 PROCEDURE — 25010000002 VANCOMYCIN 10 G RECONSTITUTED SOLUTION

## 2018-10-01 PROCEDURE — 99232 SBSQ HOSP IP/OBS MODERATE 35: CPT | Performed by: HOSPITALIST

## 2018-10-01 PROCEDURE — 87449 NOS EACH ORGANISM AG IA: CPT | Performed by: INTERNAL MEDICINE

## 2018-10-01 PROCEDURE — 80202 ASSAY OF VANCOMYCIN: CPT

## 2018-10-01 PROCEDURE — 94640 AIRWAY INHALATION TREATMENT: CPT

## 2018-10-01 PROCEDURE — 25010000002 LEVOFLOXACIN PER 250 MG: Performed by: HOSPITALIST

## 2018-10-01 PROCEDURE — 97535 SELF CARE MNGMENT TRAINING: CPT

## 2018-10-01 PROCEDURE — 87633 RESP VIRUS 12-25 TARGETS: CPT | Performed by: HOSPITALIST

## 2018-10-01 PROCEDURE — 87798 DETECT AGENT NOS DNA AMP: CPT

## 2018-10-01 PROCEDURE — 87581 M.PNEUMON DNA AMP PROBE: CPT

## 2018-10-01 PROCEDURE — 25010000002 METHYLPREDNISOLONE PER 40 MG: Performed by: HOSPITALIST

## 2018-10-01 PROCEDURE — 97116 GAIT TRAINING THERAPY: CPT

## 2018-10-01 PROCEDURE — 92526 ORAL FUNCTION THERAPY: CPT

## 2018-10-01 PROCEDURE — 94799 UNLISTED PULMONARY SVC/PX: CPT

## 2018-10-01 PROCEDURE — 97110 THERAPEUTIC EXERCISES: CPT

## 2018-10-01 PROCEDURE — 87899 AGENT NOS ASSAY W/OPTIC: CPT | Performed by: INTERNAL MEDICINE

## 2018-10-01 RX ORDER — DOXYCYCLINE 100 MG/1
100 CAPSULE ORAL EVERY 12 HOURS SCHEDULED
Status: DISCONTINUED | OUTPATIENT
Start: 2018-10-01 | End: 2018-10-08 | Stop reason: HOSPADM

## 2018-10-01 RX ADMIN — PANTOPRAZOLE SODIUM 40 MG: 40 TABLET, DELAYED RELEASE ORAL at 06:34

## 2018-10-01 RX ADMIN — METHYLPREDNISOLONE SODIUM SUCCINATE 20 MG: 40 INJECTION, POWDER, FOR SOLUTION INTRAMUSCULAR; INTRAVENOUS at 08:20

## 2018-10-01 RX ADMIN — APIXABAN 5 MG: 5 TABLET, FILM COATED ORAL at 20:29

## 2018-10-01 RX ADMIN — DILTIAZEM HYDROCHLORIDE 360 MG: 180 CAPSULE, COATED, EXTENDED RELEASE ORAL at 08:20

## 2018-10-01 RX ADMIN — APIXABAN 5 MG: 5 TABLET, FILM COATED ORAL at 08:20

## 2018-10-01 RX ADMIN — PREGABALIN 300 MG: 100 CAPSULE ORAL at 20:23

## 2018-10-01 RX ADMIN — PREGABALIN 300 MG: 100 CAPSULE ORAL at 08:23

## 2018-10-01 RX ADMIN — MONTELUKAST SODIUM 10 MG: 10 TABLET, COATED ORAL at 20:23

## 2018-10-01 RX ADMIN — ATORVASTATIN CALCIUM 10 MG: 10 TABLET, FILM COATED ORAL at 20:23

## 2018-10-01 RX ADMIN — DOXYCYCLINE 100 MG: 100 CAPSULE ORAL at 20:23

## 2018-10-01 RX ADMIN — FAMOTIDINE 40 MG: 20 TABLET ORAL at 20:23

## 2018-10-01 RX ADMIN — METOPROLOL TARTRATE 12.5 MG: 25 TABLET, FILM COATED ORAL at 20:23

## 2018-10-01 RX ADMIN — ALBUTEROL SULFATE 2.5 MG: 2.5 SOLUTION RESPIRATORY (INHALATION) at 11:39

## 2018-10-01 RX ADMIN — BUDESONIDE AND FORMOTEROL FUMARATE DIHYDRATE 2 PUFF: 160; 4.5 AEROSOL RESPIRATORY (INHALATION) at 20:41

## 2018-10-01 RX ADMIN — CETIRIZINE HYDROCHLORIDE 10 MG: 10 TABLET, FILM COATED ORAL at 20:23

## 2018-10-01 RX ADMIN — ALBUTEROL SULFATE 2.5 MG: 2.5 SOLUTION RESPIRATORY (INHALATION) at 08:56

## 2018-10-01 RX ADMIN — VANCOMYCIN HYDROCHLORIDE 1250 MG: 10 INJECTION, POWDER, LYOPHILIZED, FOR SOLUTION INTRAVENOUS at 01:24

## 2018-10-01 RX ADMIN — ALBUTEROL SULFATE 2.5 MG: 2.5 SOLUTION RESPIRATORY (INHALATION) at 20:41

## 2018-10-01 RX ADMIN — ASPIRIN 81 MG: 81 TABLET, COATED ORAL at 08:20

## 2018-10-01 RX ADMIN — LEVOTHYROXINE SODIUM 150 MCG: 150 TABLET ORAL at 06:34

## 2018-10-01 RX ADMIN — Medication 1 CAPSULE: at 08:21

## 2018-10-01 RX ADMIN — TOPIRAMATE 100 MG: 100 TABLET, FILM COATED ORAL at 08:20

## 2018-10-01 RX ADMIN — SODIUM CHLORIDE 2 G: 900 INJECTION INTRAVENOUS at 14:49

## 2018-10-01 RX ADMIN — FLUTICASONE PROPIONATE 2 SPRAY: 50 SPRAY, METERED NASAL at 08:21

## 2018-10-01 RX ADMIN — SODIUM CHLORIDE 2 G: 900 INJECTION INTRAVENOUS at 06:34

## 2018-10-01 RX ADMIN — BUDESONIDE AND FORMOTEROL FUMARATE DIHYDRATE 2 PUFF: 160; 4.5 AEROSOL RESPIRATORY (INHALATION) at 08:32

## 2018-10-01 RX ADMIN — METOPROLOL TARTRATE 12.5 MG: 25 TABLET, FILM COATED ORAL at 08:21

## 2018-10-01 RX ADMIN — LEVOFLOXACIN 750 MG: 5 INJECTION, SOLUTION INTRAVENOUS at 06:34

## 2018-10-01 RX ADMIN — TOPIRAMATE 100 MG: 100 TABLET, FILM COATED ORAL at 20:23

## 2018-10-01 RX ADMIN — DOXYCYCLINE 100 MG: 100 CAPSULE ORAL at 08:20

## 2018-10-01 NOTE — PROGRESS NOTES
Continued Stay Note  JUSTA Clark     Patient Name: Vickie Joshi  MRN: [id number]  Today's Date: 10/1/2018    Admit Date: 9/27/2018          Discharge Plan     Row Name 10/01/18 1446       Plan    Plan ONGOING    Patient/Family in Agreement with Plan yes    Plan Comments Met with pt and  at bedside to f/u DCP.  Goal remains to return home with OP therapy per [facility].  ID consult today.  Cont IV Vanc, Azactam, and add PO Doxy.  CM will cont to follow hospital course and assist with DCP as appropriate.      Final Discharge Disposition Code 01 - home or self-care              Discharge Codes    No documentation.       Expected Discharge Date and Time     Expected Discharge Date Expected Discharge Time    Oct 4, 2018             Jacki March

## 2018-10-01 NOTE — THERAPY TREATMENT NOTE
Acute Care - Occupational Therapy Treatment Note  [facility]     Patient Name: Vickie Joshi  : 1950  MRN: [id number]  Today's Date: 10/1/2018  Onset of Illness/Injury or Date of Surgery: 18  Date of Referral to OT: 18  Referring Physician: FABIANA Chen    Admit Date: 2018       ICD-10-CM ICD-9-CM   1. Dysphagia, unspecified type R13.10 787.20   2. Impaired mobility and ADLs Z74.09 799.89   3. Impaired functional mobility, balance, gait, and endurance Z74.09 V49.89   4. Cerebrovascular accident (CVA), unspecified mechanism (CMS/[facility]) I63.9 434.91   5. Fall, subsequent encounter W19.XXXD V58.89     E888.9   6. Pneumonia of right lung due to infectious organism, unspecified part of lung J18.9 483.8   7. Sepsis, due to unspecified organism (CMS/[facility]) A41.9 038.9     995.91   8. Atrial fibrillation, chronic (CMS/[facility]) I48.2 427.31     Patient Active Problem List   Diagnosis   • Fall   • Closed fracture of neck of left femur (CMS/HCC)   • Hyperlipidemia   • Hypertension   • Hx of Migraine   • GERD (gastroesophageal reflux disease)   • Fibromyalgia   • Neuropathy   • Rapid atrial fibrillation (CMS/HCC)   • Chronic anticoagulation on Eliquis   • Hx of Stroke with residual left hemiparesis   • Sepsis (CMS/HCC)   • Atrial fibrillation (CMS/HCC)   • Pneumonia     Past Medical History:   Diagnosis Date   • Arthritis    • Atrial fibrillation (CMS/HCC)    • Cardiac disorder    • Disease of thyroid gland    • Fibromyalgia    • GERD (gastroesophageal reflux disease)    • Hyperlipidemia    • Hypertension    • Migraine    • Neuropathy    • Stroke (CMS/HCC)      Past Surgical History:   Procedure Laterality Date   • CHOLECYSTECTOMY     • HIP PERCUTANEOUS PINNING Left 2017    Procedure: LEFT HIP PERCUTANEOUS PINNING FEMORAL NECK;  Surgeon: Ezra Barlow MD;  Location: Counts include [facility] beds at the [facility];  Service:    • HYSTERECTOMY     • [place] CLOSED RX TRAUMATIC HIP DISLOCATN Left 2017    Procedure: LEFT HIP CLOSED  REDUCTION;  Surgeon: Ezra Barlow MD;  Location: [facility];  Service: Orthopedics   • SHOULDER SURGERY         Therapy Treatment          Rehabilitation Treatment Summary     Row Name 10/01/18 0805             Treatment Time/Intention    Discipline occupational therapist  -KF      Document Type therapy note (daily note)  -KF      Subjective Information complains of;weakness;fatigue  -KF      Mode of Treatment individual therapy;occupational therapy  -KF      Patient/Family Observations  present, IV intact, RN present at end of session, Pt supine in bed, RA  -KF      Care Plan Review evaluation/treatment results reviewed;care plan/treatment goals reviewed;current/potential barriers reviewed;risks/benefits reviewed;patient/other agree to care plan  -KF      Care Plan Review, Other Participant(s) spouse  -KF      Patient Effort good  -KF      Comment Pt with wheezing upon sitting up requiring sitting rest break post bed mobility with 2 minute rest to recover prior to standing   -KF      Existing Precautions/Restrictions fall;other (see comments)   h/o CVA with L hemiparesis  -KF      Treatment Considerations/Comments required rest break  -KF      Patient Response to Treatment tolerated  -KF      Recorded by [KF] Ramona Dee OT 10/01/18 0853      Row Name 10/01/18 0805             Vital Signs    Pre Systolic BP Rehab --   RN cleared vitals stable throughout   -KF      Intratreatment Heart Rate (beats/min) 132  -KF      Pre SpO2 (%) 95  -KF      O2 Delivery Pre Treatment room air  -KF      Intra SpO2 (%) 88  -KF      O2 Delivery Intra Treatment room air  -KF      Post SpO2 (%) 99  -KF      O2 Delivery Post Treatment room air  -KF      Pre Patient Position Supine  -KF      Intra Patient Position Standing  -KF      Post Patient Position Sitting  -KF      Recorded by [KF] Ramona Dee OT 10/01/18 0853      Row Name 10/01/18 0805             Cognitive Assessment/Intervention    Additional  Documentation Cognitive Assessment/Intervention (Group)  -KF      Recorded by [KF] Ramona Dee, OT 10/01/18 0853      Row Name 10/01/18 0805             Cognitive Assessment/Intervention- PT/OT    Affect/Mental Status (Cognitive) WNL  -KF      Orientation Status (Cognition) oriented x 4  -KF      Follows Commands (Cognition) follows one step commands;over 90% accuracy  -KF      Cognitive Function (Cognitive) safety deficit  -KF      Safety Deficit (Cognitive) mild deficit;safety precautions awareness  -KF      Cognitive Interventions (Cognitive) occupation/activity based interventions;process/task specific training  -KF      Personal Safety Interventions fall prevention program maintained;gait belt;muscle strengthening facilitated;nonskid shoes/slippers when out of bed  -KF      Recorded by [KF] Ramona Dee, OT 10/01/18 0853      Row Name 10/01/18 0805             Safety Issues, Functional Mobility    Safety Issues Affecting Function (Mobility) safety precaution awareness  -KF      Impairments Affecting Function (Mobility) balance;strength;muscle tone abnormal;endurance/activity tolerance;grasp;range of motion (ROM)  -KF      Recorded by [KF] Ramona Dee, OT 10/01/18 0853      Row Name 10/01/18 0805             Bed Mobility Assessment/Treatment    Bed Mobility Assessment/Treatment rolling right;scooting/bridging;supine-sit  -KF      Rolling Right [place] (Bed Mobility) maximum assist (25% patient effort)  -KF      Scooting/Bridging [place] (Bed Mobility) maximum assist (25% patient effort)  -KF      Supine-Sit [place] (Bed Mobility) maximum assist (25% patient effort)  -KF      Bed Mobility, Safety Issues decreased use of arms for pushing/pulling;decreased use of legs for bridging/pushing;impaired trunk control for bed mobility  -KF      Assistive Device (Bed Mobility) bed rails;draw sheet  -KF      Comment (Bed Mobility) required assist for bed mobility to raise trunk into  sitting, max A for scooting hips with reqwuired use of draw sheet   -KF      Recorded by [KF] Ramona Dee, OT 10/01/18 0853      Row Name 10/01/18 0805             Functional Mobility    Functional Mobility- Ind. Level not tested  -KF      Functional Mobility- Comment deferred further mobility to PT  -KF      Recorded by [KF] Ramona Dee, OT 10/01/18 0853      Row Name 10/01/18 0805             Transfer Assessment/Treatment    Transfer Assessment/Treatment sit-stand transfer;stand-sit transfer;stand pivot/stand step transfer  -KF      Comment (Transfers) from EOB  -KF      Recorded by [KF] Ramona Dee, OT 10/01/18 0853      Row Name 10/01/18 0805             Bed-Chair Transfer    Bed-Chair [place] (Transfers) 2 person assist;moderate assist (50% patient effort)  -KF      Assistive Device (Bed-Chair Transfers) other (see comments)   BUE support  -KF      Recorded by [KF] Ramona Dee, OT 10/01/18 0853      Row Name 10/01/18 0805             Sit-Stand Transfer    Sit-Stand [place] (Transfers) verbal cues;2 person assist;minimum assist (75% patient effort)  -KF      Assistive Device (Sit-Stand Transfers) other (see comments)   BUE support  -KF      Recorded by [KF] Ramona Dee, OT 10/01/18 0853      Row Name 10/01/18 0805             Stand-Sit Transfer    Stand-Sit [place] (Transfers) minimum assist (75% patient effort);2 person assist  -KF      Assistive Device (Stand-Sit Transfers) other (see comments)   BUE support  -KF      Recorded by [KF] Ramona Dee, OT 10/01/18 0853      Row Name 10/01/18 0805             Stand Pivot/Stand Step Transfer    Stand Pivot/Stand Step [place] moderate assist (50% patient effort);verbal cues;2 person assist  -KF      Assistive Device (Stand Pivot Stand Step Transfer) --   BUE support  -KF      Recorded by [KF] Ramona Dee, OT 10/01/18 0853      Row Name 10/01/18 0805             ADL Assessment/Intervention    11720 -  OT Self Care/Mgmt Minutes 10  -KF      BADL Assessment/Intervention toileting;upper body dressing;lower body dressing  -KF      Recorded by [] Ramona Dee, OT 10/01/18 0853      Row Name 10/01/18 0805             Upper Body Dressing Assessment/Training    Upper Body Dressing [place] Level don;front opening garment;minimum assist (75% patient effort)  -KF      Upper Body Dressing Position edge of bed sitting  -KF      Comment (Upper Body Dressing) decreased sitting balance, required assist min A progressed to CGA sitting   -KF      Recorded by [KF] Ramona Dee, OT 10/01/18 0853      Row Name 10/01/18 0805             Lower Body Dressing Assessment/Training    Lower Body Dressing [place] Level don;shoes/slippers;undergarment;dependent (less than 25% patient effort);two person assist required  -KF      Lower Body Dressing Position supine;edge of bed sitting  -KF      Comment (Lower Body Dressing) able to stand to don brief but unable to assist requiring UE support  maintained in standing   -KF      Recorded by [] Ramona Dee, OT 10/01/18 0853      Row Name 10/01/18 0805             Toileting Assessment/Training    [place] (Toileting) adjust/manage clothing;perform perineal hygiene;dependent (less than 25% patient effort);two person assist required  -KF      Assistive Devices (Toileting) bedpan  -KF      Recorded by [KF] Ramona Dee, OT 10/01/18 0853      Row Name 10/01/18 0805             BADL Safety/Performance    Impairments, BADL Safety/Performance balance;endurance/activity tolerance;strength  -KF      Skilled BADL Treatment/Intervention BADL process/adaptation training;cognitive/safety deficit modifications  -KF      Recorded by [KF] Ramona Dee, OT 10/01/18 0853      Row Name 10/01/18 0805             Motor Skills Assessment/Interventions    Additional Documentation Balance (Group);Balance Interventions (Group)  -KF      Recorded by [] Ramona Dee  EDEL[place], OT 10/01/18 0853      Row Name 10/01/18 0805             Balance    Balance static sitting balance;static standing balance;dynamic sitting balance;dynamic standing balance  -KF      Recorded by [KF] Ramona Dee, OT 10/01/18 0853      Row Name 10/01/18 0805             Static Sitting Balance    Level of [place] (Unsupported Sitting, Static Balance) contact guard assist  -KF      Sitting Position (Unsupported Sitting, Static Balance) sitting on edge of bed  -KF      Time Able to Maintain Position (Unsupported Sitting, Static Balance) 3 to 4 minutes  -KF      Comment (Unsupported Sitting, Static Balance) posterior lean initially  -KF      Recorded by [KF] Ramona Dee, OT 10/01/18 0853      Row Name 10/01/18 0805             Dynamic Sitting Balance    Level of [place], Reaches Outside Midline (Sitting, Dynamic Balance) minimal assist, 75% patient effort  -KF      Sitting Position, Reaches Outside Midline (Sitting, Dynamic Balance) sitting on edge of bed  -KF      Recorded by [KF] Ramona Dee, OT 10/01/18 0853      Row Name 10/01/18 0805             Static Standing Balance    Level of [place] (Supported Standing, Static Balance) minimal assist, 75% patient effort  -KF      Time Able to Maintain Position (Supported Standing, Static Balance) 30 to 45 seconds  -KF      Assistive Device Utilized (Supported Standing, Static Balance) other (see comments)   BUE support  -KF      Recorded by [KF] Ramona Dee, OT 10/01/18 0853      Row Name 10/01/18 0805             Dynamic Standing Balance    Level of [place], Reaches Outside Midline (Standing, Dynamic Balance) moderate assist, 50 to 74% patient effort  -KF      Time Able to Maintain Position, Reaches Outside Midline (Standing, Dynamic Balance) 15 to 30 seconds  -KF      Recorded by [KF] Ramona Dee, OT 10/01/18 0853      Row Name 10/01/18 0805             Balance Interventions    Training Strategies (Balance) ADL tasks  and balance challenges in standing   -KF      Recorded by [] Ramona Dee, OT 10/01/18 0853      Row Name 10/01/18 0805             Positioning and Restraints    Pre-Treatment Position in bed  -KF      Post Treatment Position chair  -KF      In Chair notified nsg;reclined;sitting;call light within reach;encouraged to call for assist;with family/caregiver;with nsg;legs elevated;heels elevated;LUE elevated  -KF      Recorded by [] Ramona Dee, OT 10/01/18 0853      Row Name 10/01/18 0805             Pain Assessment    Additional Documentation Pain Scale: Numbers Pre/Post-Treatment (Group)  -KF      Recorded by [] Ramona Dee, OT 10/01/18 0853      Row Name 10/01/18 0805             Pain Scale: Numbers Pre/Post-Treatment    Pain Scale: Numbers, Pretreatment 0/10 - no pain  -KF      Pain Scale: Numbers, Post-Treatment 0/10 - no pain  -KF      Pain Intervention(s) Repositioned;Ambulation/increased activity  -KF      Recorded by [] Ramona Dee, OT 10/01/18 0853      Row Name 10/01/18 05             Plan of Care Review    Plan of Care Reviewed With patient;spouse  -KF      Recorded by [] Ramona Dee, OT 10/01/18 0853      Row Name 10/01/18 [facility]             Outcome Summary/Treatment Plan (OT)    Daily Summary of Progress (OT) progress toward functional goals as expected  -KF      Recorded by [] Ramona Dee, OT 10/01/18 0853        User Key  (r) = Recorded By, (t) = Taken By, (c) = Cosigned By    Initials Name Effective Dates Discipline    Ramona Hill, OT 04/03/18 -  OT               Occupational Therapy Education     Title: PT OT SLP Therapies (Active)     Topic: Occupational Therapy (Active)     Point: ADL training (Done)     Description: Instruct learner(s) on proper safety adaptation and remediation techniques during self care or transfers.   Instruct in proper use of assistive devices.   Learning Progress Summary     Learner Status Readiness Method  Response Comment Documented by    Patient Done Acceptance E VU Educated on georges dressing, safe functional transfer sequencing, compensatory strategies to improve bed mobility KF 10/01/18 0805     Done Acceptance E VU,NR [place] dressing techniquess, safe sequencing of transfers, purpose of OT skilled services KF 09/28/18 0947    Family Done Acceptance E VU,NR [place]es dressing techniquess, safe sequencing of transfers, purpose of OT skilled services KF 09/28/18 0947    Significant Other Done Acceptance E VU Educated on georges dressing, safe functional transfer sequencing, compensatory strategies to improve bed mobility KF 10/01/18 0805          Point: Precautions (Done)     Description: Instruct learner(s) on prescribed precautions during self-care and functional transfers.   Learning Progress Summary     Learner Status Readiness Method Response Comment Documented by    Patient Done Acceptance E VU Educated on georges dressing, safe functional transfer sequencing, compensatory strategies to improve bed mobility KF 10/01/18 0805     Done Acceptance E VU,NR [place] dressing techniquess, safe sequencing of transfers, purpose of OT skilled services KF 09/28/18 0947    Family Done Acceptance E VU,NR [place] dressing techniquess, safe sequencing of transfers, purpose of OT skilled services KF 09/28/18 0947    Significant Other Done Acceptance E VU Educated on georges dressing, safe functional transfer sequencing, compensatory strategies to improve bed mobility KF 10/01/18 0805          Point: Body mechanics (Done)     Description: Instruct learner(s) on proper positioning and spine alignment during self-care, functional mobility activities and/or exercises.   Learning Progress Summary     Learner Status Readiness Method Response Comment Documented by    Patient Done Acceptance E VU Educated on georges dressing, safe functional transfer sequencing, compensatory strategies to improve bed mobility KF 10/01/18 0805     Done Acceptance E VU,NR Georges  dressing techniquess, safe sequencing of transfers, purpose of OT skilled services  09/28/18 0947    Family Done Acceptance E VU,NR [place] dressing techniquess, safe sequencing of transfers, purpose of OT skilled services  09/28/18 0947    Significant Other Done Acceptance E VU Educated on georges dressing, safe functional transfer sequencing, compensatory strategies to improve bed mobility  10/01/18 0805                      User Key     Initials Effective Dates Name Provider Type Discipline     04/03/18 -  Ramona Dee, OT Occupational Therapist OT                OT Recommendation and Plan  Outcome Summary/Treatment Plan (OT)  Daily Summary of Progress (OT): progress toward functional goals as expected  Anticipated Equipment Needs at Discharge (OT):  (none anticipated at this time)  Anticipated Discharge Disposition (OT): home with 24/7 care, home with OP services  Planned Therapy Interventions (OT Eval): activity tolerance training, adaptive equipment training, BADL retraining, cognitive/visual perception retraining, edema control/reduction, functional balance retraining, IADL retraining, neuromuscular control/coordination retraining, occupation/activity based interventions, patient/caregiver education/training, ROM/therapeutic exercise, strengthening exercise, transfer/mobility retraining  Therapy Frequency (OT Eval): daily  Daily Summary of Progress (OT): progress toward functional goals as expected  Plan of Care Review  Plan of Care Reviewed With: patient, spouse  Plan of Care Reviewed With: patient, spouse  Outcome Summary: Pt max A for bed mobility, dependent to don shoes and undergarment but able to maintain balance with min A and UE support to don brief; Pt mod A x2 stand pivot transfer to chair today, CGA for sitting unsupported. Cont IPOT per POC        Outcome Measures     Row Name 10/01/18 0805 09/28/18 0938          How much help from another person do you currently need...    Turning from  your back to your side while in flat bed without using bedrails?  -- 2  -SC     Moving from lying on back to sitting on the side of a flat bed without bedrails?  -- 1  -SC     Moving to and from a bed to a chair (including a wheelchair)?  -- 2  -SC     Standing up from a chair using your arms (e.g., wheelchair, bedside chair)?  -- 2  -SC     Climbing 3-5 steps with a railing?  -- 1  -SC     To walk in hospital room?  -- 1  -SC     AM-PAC 6 Clicks Score  -- 9  -SC        How much help from another is currently needed...    Putting on and taking off regular lower body clothing? 1  -KF  --     Bathing (including washing, rinsing, and drying) 2  -KF  --     Toileting (which includes using toilet bed pan or urinal) 1  -KF  --     Putting on and taking off regular upper body clothing 2  -KF  --     Taking care of personal grooming (such as brushing teeth) 3  -KF  --     Eating meals 3  -KF  --     Score 12  -KF  --        Functional Assessment    Outcome Measure Options AM-PAC 6 Clicks Daily Activity (OT)  -KF AM-[facility] 6 Clicks Basic Mobility (PT)  -SC       User Key  (r) = Recorded By, (t) = Taken By, (c) = Cosigned By    Initials Name Provider Type    SC Charlie Monson, PT Physical Therapist    KF Ramona Dee, OT Occupational Therapist           Time Calculation:         Time Calculation- OT     Row Name 10/01/18 0805             Time Calculation- OT    OT Start Time 0805  -      Total Timed Code Minutes- OT 23 minute(s)  -KF      OT Received On 10/01/18  -      OT Goal Re-Cert Due Date 10/08/18  -         Timed Charges    62637 - OT Therapeutic Activity Minutes 13  -KF      40515 - OT Self Care/Mgmt Minutes 10  -KF        User Key  (r) = Recorded By, (t) = Taken By, (c) = Cosigned By    Initials Name Provider Type    Ramona Hill, OT Occupational Therapist           Therapy Suggested Charges     Code   Minutes Charges    77288 (CPT®)  Ot Neuromusc Re Education Ea 15 Min      71337 (CPT®) Hc Ot  Ther Proc Ea 15 Min      76272 (CPT®) Hc Ot Therapeutic Act Ea 15 Min 13 1    98230 (CPT®) Hc Ot Manual Therapy Ea 15 Min      41506 (CPT®) Hc Ot Iontophoresis Ea 15 Min      51894 (CPT®) Hc Ot Elec Stim Ea-Per 15 Min      91706 (CPT®) Hc Ot Ultrasound Ea 15 Min      22170 (CPT®) Hc Ot Self Care/Mgmt/Train Ea 15 Min 10 1    Total  23 2        Therapy Charges for Today     Code Description Service Date Service Provider Modifiers Qty    [id number] HC OT SELF CARE/MGMT/TRAIN EA 15 MIN 10/1/2018 Ramona Dee, OT GO 1               Ramona Dee OT  10/1/2018

## 2018-10-01 NOTE — PLAN OF CARE
Problem: Patient Care Overview  Goal: Plan of Care Review  Outcome: Ongoing (interventions implemented as appropriate)   10/01/18 5406   Coping/Psychosocial   Plan of Care Reviewed With patient   Plan of Care Review   Progress no change   OTHER   Outcome Summary VSS, Levaquin D/C'd, Doxycycline PO added, Vanc dose held

## 2018-10-01 NOTE — PLAN OF CARE
Problem: Patient Care Overview  Goal: Plan of Care Review  Outcome: Ongoing (interventions implemented as appropriate)   10/01/18 4591   Coping/Psychosocial   Plan of Care Reviewed With patient   SLP treatment completed. Will continue to address dysphagia. Please see note for further details and recommendations.

## 2018-10-01 NOTE — PLAN OF CARE
Problem: Patient Care Overview  Goal: Plan of Care Review  Outcome: Ongoing (interventions implemented as appropriate)   10/01/18 9227   Coping/Psychosocial   Plan of Care Reviewed With patient   Plan of Care Review   Progress improving   OTHER   Outcome Summary Pt. w/ good effort, demonstrates some improvement w/ mobility. Pt. performed sit to stand transfers w/ LBQC and mod A, (care to block L knee), and amb 3 steps to bed w/ LBQC and mod A x 2. Pt. continues to require max A for bed mobility. Recommend cont IPPT per POC.

## 2018-10-01 NOTE — THERAPY TREATMENT NOTE
Acute Care - Speech Language Pathology   Swallow Treatment Note [facility]     Patient Name: Vickie Joshi  : 1950  MRN: [id number]  Today's Date: 10/1/2018  Onset of Illness/Injury or Date of Surgery: 18     Referring Physician: FABIANA Chen      Admit Date: 2018    Visit Dx:      ICD-10-CM ICD-9-CM   1. Dysphagia, unspecified type R13.10 787.20   2. Impaired mobility and ADLs Z74.09 799.89   3. Impaired functional mobility, balance, gait, and endurance Z74.09 V49.89   4. Cerebrovascular accident (CVA), unspecified mechanism (CMS/HCC) I63.9 434.91   5. Fall, subsequent encounter W19.XXXD V58.89     E888.9   6. Pneumonia of right lung due to infectious organism, unspecified part of lung J18.9 483.8   7. Sepsis, due to unspecified organism (CMS/[facility]) A41.9 038.9     995.91   8. Atrial fibrillation, chronic (CMS/HCC) I48.2 427.31     Patient Active Problem List   Diagnosis   • Fall   • Closed fracture of neck of left femur (CMS/HCC)   • Hyperlipidemia   • Hypertension   • Hx of Migraine   • GERD (gastroesophageal reflux disease)   • Fibromyalgia   • Neuropathy   • Rapid atrial fibrillation (CMS/HCC)   • Chronic anticoagulation on Eliquis   • Hx of Stroke with residual left hemiparesis   • Sepsis (CMS/HCC)   • Atrial fibrillation (CMS/HCC)   • Pneumonia       Therapy Treatment        Rehabilitation Treatment Summary     Row Name 10/01/18 1540 10/01/18 1025 10/01/18 0805       Treatment Time/Intention    Discipline (P)  speech language pathologist  -MF physical therapist  -MB occupational therapist  -KF    Document Type (P)  therapy note (daily note)  -MF therapy note (daily note)  -MB therapy note (daily note)  -KF    Subjective Information (P)  no complaints  -MF complains of;weakness;fatigue  -MB complains of;weakness;fatigue  -KF    Mode of Treatment (P)  speech-language pathology  - physical therapy;individual therapy  -MB individual therapy;occupational therapy  -KF    Patient/Family  Observations  -- Pt. sitting UIC, on RA, IV intact L UE,  present at bedside.  RN consent for PT.  -MB2  present, IV intact, RN present at end of session, Pt supine in bed, RA  -KF    Care Plan Review (P)  evaluation/treatment results reviewed  - care plan/treatment goals reviewed;risks/benefits reviewed;current/potential barriers reviewed;patient/other agree to care plan  -MB2 evaluation/treatment results reviewed;care plan/treatment goals reviewed;current/potential barriers reviewed;risks/benefits reviewed;patient/other agree to care plan  -KF    Care Plan Review, Other Participant(s)  -- spouse  -MB2 spouse  -KF    Therapy Frequency (PT Clinical Impression)  -- daily  -MB2  --    Therapy Frequency (Swallow) (P)  5 days per week  -  --  --    Patient Effort (P)  good  -MF good  -MB2 good  -KF    Comment  --  -- Pt with wheezing upon sitting up requiring sitting rest break post bed mobility with 2 minute rest to recover prior to standing   -KF    Existing Precautions/Restrictions  -- fall   L hemiparesis  -MB2 fall;other (see comments)   h/o CVA with L hemiparesis  -KF    Treatment Considerations/Comments  --  -- required rest break  -KF    Patient Response to Treatment  --  -- tolerated  -KF    Recorded by [MF] Lavonne Felix, Speech Therapy Student 10/01/18 1607 [MB] Anjelica Frederick, PT 10/01/18 1313  [MB2] Anjelica Frederick, PT 10/01/18 1333 [KF] Ramona Dee, OT 10/01/18 0853    Row Name 10/01/18 1025 10/01/18 0805          Vital Signs    Pre Systolic BP Rehab 147  -MB --   RN cleared vitals stable throughout   -KF     Pre Treatment Diastolic BP 92  -MB  --     Pretreatment Heart Rate (beats/min) 76  -MB  --     Intratreatment Heart Rate (beats/min) 92  -  -KF     Posttreatment Heart Rate (beats/min) 88  -MB  --     Pre SpO2 (%) 95  -MB 95  -KF     O2 Delivery Pre Treatment room air  -MB room air  -KF     Intra SpO2 (%)  -- 88  -KF     O2 Delivery Intra Treatment room air   -MB room air  -KF     Post SpO2 (%) 94  -MB 99  -KF     O2 Delivery Post Treatment room air  -MB room air  -KF     Pre Patient Position  -- Supine  -KF     Intra Patient Position  -- Standing  -KF     Post Patient Position  -- Sitting  -KF     Recorded by [MB] Anjelica Frederick, PT 10/01/18 1333 [KF] Ramona Dee, OT 10/01/18 0853     Row Name 10/01/18 0805             Cognitive Assessment/Intervention    Additional Documentation Cognitive Assessment/Intervention (Group)  -KF      Recorded by [KF] Ramona Dee, OT 10/01/18 0853      Row Name 10/01/18 1025 10/01/18 0805          Cognitive Assessment/Intervention- PT/OT    Affect/Mental Status (Cognitive) WFL  -MB WNL  -KF     Orientation Status (Cognition) oriented x 4  -MB oriented x 4  -KF     Follows Commands (Cognition) follows one step commands;over 90% accuracy  -MB follows one step commands;over 90% accuracy  -KF     Cognitive Function (Cognitive)  -- safety deficit  -KF     Safety Deficit (Cognitive) mild deficit;safety precautions awareness  -MB mild deficit;safety precautions awareness  -KF     Cognitive Interventions (Cognitive)  -- occupation/activity based interventions;process/task specific training  -KF     Personal Safety Interventions fall prevention program maintained;gait belt;muscle strengthening facilitated;nonskid shoes/slippers when out of bed  -MB fall prevention program maintained;gait belt;muscle strengthening facilitated;nonskid shoes/slippers when out of bed  -KF     Recorded by [MB] Anjelica Frederick, PT 10/01/18 1333 [KF] Ramona Dee, OT 10/01/18 0853     Row Name 10/01/18 0805             Safety Issues, Functional Mobility    Safety Issues Affecting Function (Mobility) safety precaution awareness  -KF      Impairments Affecting Function (Mobility) balance;strength;muscle tone abnormal;endurance/activity tolerance;grasp;range of motion (ROM)  -KF      Recorded by [KF] Ramona Dee, OT 10/01/18 0853      Row  Name 10/01/18 1025 10/01/18 0805          Bed Mobility Assessment/Treatment    Bed Mobility Assessment/Treatment sit-supine  -MB rolling right;scooting/bridging;supine-sit  -KF     Rolling Right [place] (Bed Mobility)  -- maximum assist (25% patient effort)  -KF     Scooting/Bridging [place] (Bed Mobility)  -- maximum assist (25% patient effort)  -KF     Supine-Sit [place] (Bed Mobility)  -- maximum assist (25% patient effort)  -KF     Sit-Supine [place] (Bed Mobility) maximum assist (25% patient effort)  -MB  --     Bed Mobility, Safety Issues decreased use of arms for pushing/pulling;decreased use of legs for bridging/pushing;impaired trunk control for bed mobility  -MB decreased use of arms for pushing/pulling;decreased use of legs for bridging/pushing;impaired trunk control for bed mobility  -KF     Assistive Device (Bed Mobility) bed rails;draw sheet  -MB bed rails;draw sheet  -KF     Comment (Bed Mobility) Pt. required assist to lower trunk/shoulders and lift BLEs for return to supine.    -MB required assist for bed mobility to raise trunk into sitting, max A for scooting hips with reqwuired use of draw sheet   -KF     Recorded by [MB] Anjelica Frederick, PT 10/01/18 1333 [KF] Ramona Dee, OT 10/01/18 0853     Row Name 10/01/18 0805             Functional Mobility    Functional Mobility- Ind. Level not tested  -KF      Functional Mobility- Comment deferred further mobility to PT  -KF      Recorded by [KF] Ramona Dee, OT 10/01/18 0853      Row Name 10/01/18 1025 10/01/18 0805          Transfer Assessment/Treatment    Transfer Assessment/Treatment  -- sit-stand transfer;stand-sit transfer;stand pivot/stand step transfer  -KF     Comment (Transfers) Pt. transferred sit to stand from chair and amb 3 steps to bed.  Pt. demo good safety awareness.  -MB from EOB  -KF     Recorded by [MB] Anjelica Frederick, PT 10/01/18 1347 [KF] Ramona Dee, OT 10/01/18 0853     Row Name  10/01/18 0805             Bed-Chair Transfer    Bed-Chair [place] (Transfers) 2 person assist;moderate assist (50% patient effort)  -KF      Assistive Device (Bed-Chair Transfers) other (see comments)   BUE support  -KF      Recorded by [KF] Ramona Dee, OT 10/01/18 0853      Row Name 10/01/18 1025 10/01/18 0805          Sit-Stand Transfer    Sit-Stand [place] (Transfers) moderate assist (50% patient effort);verbal cues  -MB verbal cues;2 person assist;minimum assist (75% patient effort)  -KF     Assistive Device (Sit-Stand Transfers) cane, quad   LBQC  -MB other (see comments)   BUE support  -KF     Recorded by [MB] Anjelica Frederick, PT 10/01/18 1347 [KF] Ramona Dee, OT 10/01/18 0853     Row Name 10/01/18 1025 10/01/18 0805          Stand-Sit Transfer    Stand-Sit [place] (Transfers) moderate assist (50% patient effort);verbal cues  -MB minimum assist (75% patient effort);2 person assist  -KF     Assistive Device (Stand-Sit Transfers) cane, quad  -MB other (see comments)   BUE support  -KF     Recorded by [MB] Anjelica Frederick, PT 10/01/18 1347 [KF] Ramona Dee, OT 10/01/18 0853     Row Name 10/01/18 0805             Stand Pivot/Stand Step Transfer    Stand Pivot/Stand Step Grant moderate assist (50% patient effort);verbal cues;2 person assist  -KF      Assistive Device (Stand Pivot Stand Step Transfer) --   BUE support  -KF      Recorded by [KF] Ramona Dee, OT 10/01/18 0853      Row Name 10/01/18 1025             Gait/Stairs Assessment/Training    Grant Level (Gait) moderate assist (50% patient effort);2 person assist;verbal cues;nonverbal cues (demo/gesture)  -MB      Assistive Device (Gait) cane, quad  -MB2      Distance in Feet (Gait) 3  -MB2      Pattern (Gait) step-to  -MB2      Deviations/Abnormal Patterns (Gait) left sided deviations;ataxic;base of support, narrow;steppage;festinating/shuffling  -MB2      Bilateral Gait Deviations weight shift  ability decreased  -MB2      Left Sided Gait Deviations foot drop/toe drag;hip circumduction;hip hiking;weight shift ability decreased;knee buckling, left side  -MB2      Comment (Gait/Stairs) Pt. amb 3 steps to chair w/ LBQC and assist for weight-shifting and to facilitate LLE swing phase, care to block L knee during stance.  Pt. very unsteady.  -MB2      Recorded by [MB] Anjelica Frederick, PT 10/01/18 1350  [MB2] Anjelica Frederick, PT 10/01/18 1347      Row Name 10/01/18 0805             ADL Assessment/Intervention    38552 - OT Self Care/Mgmt Minutes 10  -KF      BADL Assessment/Intervention toileting;upper body dressing;lower body dressing  -KF      Recorded by [KF] Ramona Dee, OT 10/01/18 0853      Row Name 10/01/18 0805             Upper Body Dressing Assessment/Training    Upper Body Dressing [place] Level don;front opening garment;minimum assist (75% patient effort)  -KF      Upper Body Dressing Position edge of bed sitting  -KF      Comment (Upper Body Dressing) decreased sitting balance, required assist min A progressed to CGA sitting   -KF      Recorded by [KF] Ramona Dee, OT 10/01/18 0853      Row Name 10/01/18 08             Lower Body Dressing Assessment/Training    Lower Body Dressing [place] Level don;shoes/slippers;undergarment;dependent (less than 25% patient effort);two person assist required  -KF      Lower Body Dressing Position supine;edge of bed sitting  -KF      Comment (Lower Body Dressing) able to stand to don brief but unable to assist requiring UE support  maintained in standing   -KF      Recorded by [KF] Ramona Dee, OT 10/01/18 0853      Row Name 10/01/18 08             Toileting Assessment/Training    [place] Level (Toileting) adjust/manage clothing;perform perineal hygiene;dependent (less than 25% patient effort);two person assist required  -KF      Assistive Devices (Toileting) bedpan  -KF      Recorded by [KF] Ramona Dee, OT  10/01/18 0853      Row Name 10/01/18 0805             BADL Safety/Performance    Impairments, BADL Safety/Performance balance;endurance/activity tolerance;strength  -KF      Skilled BADL Treatment/Intervention BADL process/adaptation training;cognitive/safety deficit modifications  -KF      Recorded by [KF] Ramona Dee, OT 10/01/18 0853      Row Name 10/01/18 1025 10/01/18 0805          Motor Skills Assessment/Interventions    Additional Documentation Balance (Group);Therapeutic Exercise (Group)  -MB Balance (Group);Balance Interventions (Group)  -KF     Recorded by [MB] Anjelica Frederick, PT 10/01/18 1347 [KF] Ramona Dee, OT 10/01/18 0853     Row Name 10/01/18 1025             Therapeutic Exercise    Lower Extremity (Therapeutic Exercise) gluteal sets;LAQ (long arc quad), bilateral  -MB      Lower Extremity Range of Motion (Therapeutic Exercise) hip abduction/adduction, bilateral;ankle dorsiflexion/plantar flexion, bilateral  -MB      Exercise Type (Therapeutic Exercise) AROM (active range of motion);AAROM (active assistive range of motion);PROM (passive range of motion)  -MB      Position (Therapeutic Exercise) seated  -MB      Sets/Reps (Therapeutic Exercise) 1/10 BLEs  -MB      Comment (Therapeutic Exercise) min A RLE to complete full ROM, max A LLE  -MB      Recorded by [MB] Anjelica Frederick, PT 10/01/18 1347      Row Name 10/01/18 1025             Gross Motor Coordination    Gross Motor Impairments balance;coordination;motor control;strength  -MB      Recorded by [MB] Anjelica Frederick, PT 10/01/18 1347      Row Name 10/01/18 1025 10/01/18 0805          Balance    Balance static standing balance  -MB static sitting balance;static standing balance;dynamic sitting balance;dynamic standing balance  -KF     Recorded by [MB] Anjelica Frederick, PT 10/01/18 1347 [KF] Ramona Dee, OT 10/01/18 0853     Row Name 10/01/18 0805             Static Sitting Balance    Level of [place]  (Unsupported Sitting, Static Balance) contact guard assist  -KF      Sitting Position (Unsupported Sitting, Static Balance) sitting on edge of bed  -KF      Time Able to Maintain Position (Unsupported Sitting, Static Balance) 3 to 4 minutes  -KF      Comment (Unsupported Sitting, Static Balance) posterior lean initially  -KF      Recorded by [KF] Ramona Dee, OT 10/01/18 0853      Row Name 10/01/18 0805             Dynamic Sitting Balance    Level of [place], Reaches Outside Midline (Sitting, Dynamic Balance) minimal assist, 75% patient effort  -KF      Sitting Position, Reaches Outside Midline (Sitting, Dynamic Balance) sitting on edge of bed  -KF      Recorded by [KF] Ramona Dee, OT 10/01/18 0853      Row Name 10/01/18 1025 10/01/18 0805          Static Standing Balance    Level of [place] (Supported Standing, Static Balance) moderate assist, 50 to 74% patient effort  -MB minimal assist, 75% patient effort  -KF     Time Able to Maintain Position (Supported Standing, Static Balance) 45 to 60 seconds  -MB 30 to 45 seconds  -KF     Assistive Device Utilized (Supported Standing, Static Balance) quad cane  -MB other (see comments)   BUE support  -KF     Comment (Supported Standing, Static Balance) lateral weight shifting, blocking L knee  -MB  --     Recorded by [MB] Anjelica Frederick, PT 10/01/18 1347 [KF] Ramona Dee, OT 10/01/18 0853     Row Name 10/01/18 0805             Dynamic Standing Balance    Level of [place], Reaches Outside Midline (Standing, Dynamic Balance) moderate assist, 50 to 74% patient effort  -KF      Time Able to Maintain Position, Reaches Outside Midline (Standing, Dynamic Balance) 15 to 30 seconds  -KF      Recorded by [KF] Ramona Dee, OT 10/01/18 0853      Row Name 10/01/18 0805             Balance Interventions    Training Strategies (Balance) ADL tasks and balance challenges in standing   -KF      Recorded by [KF] Ramona Dee, OT 10/01/18  0853      Row Name 10/01/18 1025 10/01/18 0805          Positioning and Restraints    Pre-Treatment Position sitting in chair/recliner  -MB in bed  -KF     Post Treatment Position bed  -MB chair  -KF     In Bed notified nsg;supine;call light within reach;encouraged to call for assist;with family/caregiver   RN deferred exit alarm w/ spouse present.  -MB  --     In Chair  -- notified nsg;reclined;sitting;call light within reach;encouraged to call for assist;with family/caregiver;with nsg;legs elevated;heels elevated;LUE elevated  -KF     Recorded by [MB] Anjelica Frederick, PT 10/01/18 1347 [KF] Ramona Dee, OT 10/01/18 0853     Row Name 10/01/18 1540 10/01/18 0805          Pain Assessment    Additional Documentation (P)  Pain Scale: FACES Pre/Post-Treatment (Group)  -MF Pain Scale: Numbers Pre/Post-Treatment (Group)  -KF     Recorded by [MF] Lavonne Felix, Speech Therapy Student 10/01/18 1607 [KF] Ramona Dee, OT 10/01/18 0853     Row Name 10/01/18 1025 10/01/18 0805          Pain Scale: Numbers Pre/Post-Treatment    Pain Scale: Numbers, Pretreatment 0/10 - no pain  -MB 0/10 - no pain  -KF     Pain Scale: Numbers, Post-Treatment 0/10 - no pain  -MB 0/10 - no pain  -KF     Pain Intervention(s)  -- Repositioned;Ambulation/increased activity  -KF     Recorded by [MB] Anjelica Frederick, PT 10/01/18 1347 [KF] Ramona Dee, OT 10/01/18 0853     Row Name 10/01/18 1540             Pain Scale: FACES Pre/Post-Treatment    Pain: FACES Scale, Pretreatment (P)  0-->no hurt  -MF      Pain: FACES Scale, Post-Treatment (P)  0-->no hurt  -MF      Recorded by [MF] Lavonne Felix, Speech Therapy Student 10/01/18 1607      Row Name 10/01/18 1025 10/01/18 0805          Plan of Care Review    Plan of Care Reviewed With patient;spouse  -MB patient;spouse  -KF     Recorded by [MB] Anjelica Frederick, PT 10/01/18 1347 [KF] Ramona Dee, OT 10/01/18 0853     Row Name 10/01/18 0805             Outcome  Summary/Treatment Plan (OT)    Daily Summary of Progress (OT) progress toward functional goals as expected  -KF      Recorded by [KF] Ramona Dee, OT 10/01/18 0853      Row Name 10/01/18 1025             Outcome Summary/Treatment Plan (PT)    Daily Summary of Progress (PT) progress toward functional goals as expected  -MB      Plan for Continued Treatment (PT) Cont IPPT per POC.  -MB      Recorded by [MB] Anjelica Frederick, PT 10/01/18 1347      Row Name 10/01/18 1540             Outcome Summary/Treatment Plan (SLP)    Daily Summary of Progress (SLP) (P)  progress toward functional goals is good  -MF      Anticipated Dischage Disposition (P)  unknown;anticipate therapy at next level of care  -MF      Recorded by [MF] Lavonne Felix, Speech Therapy Student 10/01/18 1607        User Key  (r) = Recorded By, (t) = Taken By, (c) = Cosigned By    Initials Name Effective Dates Discipline    MB Anjelica Frederick, PT 03/14/16 -  PT    KF Ramona Dee, OT 04/03/18 -  OT     Lavonne Felix, Speech Therapy Student 08/06/18 -  SLP          Outcome Summary  Outcome Summary/Treatment Plan (SLP)  Daily Summary of Progress (SLP): (P) progress toward functional goals is good (10/01/18 1540 : Lavonne Felix, Speech Therapy Student)  Anticipated Dischage Disposition: (P) unknown, anticipate therapy at next level of care (10/01/18 1540 : Lavnone Felix, Speech Therapy Student)            SLP GOALS     Row Name 10/01/18 1540 09/30/18 1400 09/29/18 1445       Oral Nutrition/Hydration Goal 1 (SLP)    Oral Nutrition/Hydration Goal 1, SLP (P)  LTG: Pt will tolerate regular diet and thin liquids w/ no s/s aspiration or discomfort w/ 100% acc and no cues  -MF LTG: Pt will tolerate regular diet and thin liquids w/ no s/s aspiration or discomfort w/ 100% acc and no cues  -SG LTG: Pt will tolerate regular diet and thin liquids w/ no s/s aspiration or discomfort w/ 100% acc and no cues  -HG    Time Frame (Oral  Nutrition/Hydration Goal 1, SLP) (P)  by discharge  -MF by discharge  -SG by discharge  -HG    Progress/Outcomes (Oral Nutrition/Hydration Goal 1, SLP) (P)  good progress toward goal  -MF good progress toward goal  -SG  --       Pharyngeal Strengthening Exercise Goal 1 (SLP)    Activity (Pharyngeal Strengthening Goal 1, SLP) (P)  increase superior movement of the hyolaryngeal complex;increase anterior movement of the hyolaryngeal complex;increase squeeze/positive pressure generation;increase tongue base retraction  -MF increase superior movement of the hyolaryngeal complex;increase anterior movement of the hyolaryngeal complex;increase squeeze/positive pressure generation;increase tongue base retraction  -SG increase superior movement of the hyolaryngeal complex;increase anterior movement of the hyolaryngeal complex;increase squeeze/positive pressure generation;increase tongue base retraction  -HG    Increase Superior Movement of the Hyolaryngeal Complex  -- effortful pitch glide (falsetto + pharyngeal squeeze)  -SG effortful pitch glide (falsetto + pharyngeal squeeze)  -HG    Increase Anterior Movement of the Hyolaryngeal Complex (P)  chin tuck against resistance (CTAR)  -MF chin tuck against resistance (CTAR)  -SG chin tuck against resistance (CTAR)  -HG    Increase Squeeze/Positive Pressure Generation (P)  hard effortful swallow  -MF hard effortful swallow  -SG hard effortful swallow  -HG    Increase Tongue Base Retraction (P)  michael  -MF michael  -SG michael  -HG    [place]/Accuracy (Pharyngeal Strengthening Goal 1, SLP) (P)  with minimal cues (75-90% accuracy)  -MF with moderate cues (50-74% accuracy)  -SG with moderate cues (50-74% accuracy)  -HG    Time Frame (Pharyngeal Strengthening Goal 1, SLP) (P)  short term goal (STG);by discharge  -MF short term goal (STG);by discharge  -SG short term goal (STG);by discharge  -HG    Barriers (Pharyngeal Strengthening Goal 1, SLP) (P)  CTAR: 2 reps Effortful:1 rep  [place]: 6 reps  - Fatigue (pt just woke up from nap when SLP entered the room)  -  --    Progress/Outcomes (Pharyngeal Strengthening Goal 1, SLP) (P)  good progress toward goal  - good progress toward goal   focused on compensations, diet tolerance and BOT tasks  - goal ongoing   Pt completed exercises with modeling, tactile, visual cues  -      User Key  (r) = Recorded By, (t) = Taken By, (c) = Cosigned By    Initials Name Provider Type     Jonathan-Sherley Murillo, MS CCC-SLP Speech and Language Pathologist     Amelia Ellison, MS CCC-SLP Speech and Language Pathologist     Lavonne Felix, Speech Therapy Student Speech Therapy Student          EDUCATION  The patient has been educated in the following areas:   Dysphagia (Swallowing Impairment).    SLP Recommendation and Plan                       Anticipated Dischage Disposition: (P) unknown, anticipate therapy at next level of care     Therapy Frequency (Swallow): (P) 5 days per week          Plan of Care Reviewed With: (P) patient  Plan of Care Review  Plan of Care Reviewed With: (P) patient  Daily Summary of Progress (SLP): (P) progress toward functional goals is good           Time Calculation:         Time Calculation- SLP     Row Name 10/01/18 1609             Time Calculation- SLP    SLP Start Time (P)  1540  -      SLP Received On (P)  10/01/18  -        User Key  (r) = Recorded By, (t) = Taken By, (c) = Cosigned By    Initials Name Provider Type     Lavonne Felix, Speech Therapy Student Speech Therapy Student          Therapy Charges for Today     Code Description Service Date Service Provider Modifiers Qty    [id number]  ST TREATMENT SWALLOW 2 10/1/2018 Lavonne Felix, Speech Therapy Student GN 1                 Lavonne Felix Speech Therapy Student  10/1/2018

## 2018-10-01 NOTE — PLAN OF CARE
Problem: Patient Care Overview  Goal: Plan of Care Review  Outcome: Ongoing (interventions implemented as appropriate)   10/01/18 0702   Coping/Psychosocial   Plan of Care Reviewed With patient;spouse   Plan of Care Review   Progress improving   OTHER   Outcome Summary Pt max A for bed mobility, dependent to don shoes and undergarment but able to maintain balance with min A and UE support to don brief; Pt mod A x2 stand pivot transfer to chair today, CGA for sitting unsupported. Cont IPOT per POC

## 2018-10-01 NOTE — PLAN OF CARE
Problem: Fall Risk (Adult)  Goal: Identify Related Risk Factors and Signs and Symptoms  Outcome: Outcome(s) achieved Date Met: 10/01/18    Goal: Absence of Fall  Outcome: Ongoing (interventions implemented as appropriate)      Problem: Skin Injury Risk (Adult)  Goal: Identify Related Risk Factors and Signs and Symptoms  Outcome: Outcome(s) achieved Date Met: 10/01/18    Goal: Skin Health and Integrity  Outcome: Ongoing (interventions implemented as appropriate)      Problem: Patient Care Overview  Goal: Plan of Care Review  Outcome: Ongoing (interventions implemented as appropriate)   10/01/18 0712   Coping/Psychosocial   Plan of Care Reviewed With patient   Plan of Care Review   Progress improving   OTHER   Outcome Summary Patient has rested well tonight with no complaints of any kind. The patient is on room air and her vital signs are stable.        Problem: Sepsis/Septic Shock (Adult)  Goal: Signs and Symptoms of Listed Potential Problems Will be Absent, Minimized or Managed (Sepsis/Septic Shock)  Outcome: Outcome(s) achieved Date Met: 10/01/18

## 2018-10-01 NOTE — CONSULTS
Vickie Joshi  1950  [id number]    Date of Consult: 10/1/2018    9/27/2018      Requesting Provider: No ref. provider found  Evaluating Physician: Royal Mohamud MD    Chief Complaint: cough and sob    Reason for Consultation: pneumonia    History of present illness:    Patient is a 68 y.o.  Yr old female with history of prior CVA and chronic/residual left-sided hemiparesis, chronically debilitated with multiple comorbidity as outlined below.  She was admitted September 27, 2018 with acute cough/shortness of breath and fever over 102 per admission H&P.  Initial blood culture data from September 27 had coag-negative staph species in 1 of 2 sets, sputum with normal talya, and MRSA nasal screen positive.  Chest x-ray with right greater than left findings to suggest bronchopneumonia and CT scan with airspace disease in the right lower lobe per radiology.  In addition patient reports her  also with upper respiratory type infection but no specific microbiologic diagnosis.    Shortness of breath/cough persists but not worse.  No hemoptysis.  No headache photophobia or neck stiffness.  No nausea vomiting or diarrhea.  No new skin rash.    No raw or undercooked food.  No sheep/goat/cattle/livestock exposure No unpasteurized milk or milk products.  No animal insect or arthropod exposure.  No tick bites.  No outdoor camping or hunting exposure.  No travel exposure.No history of TB or TB exposure.   Denies a history of VRE and no history of C. difficile or ESBL/KPC  organisms.    Past Medical History:   Diagnosis Date   • Arthritis    • Atrial fibrillation (CMS/HCC)    • Cardiac disorder    • Disease of thyroid gland    • Fibromyalgia    • GERD (gastroesophageal reflux disease)    • Hyperlipidemia    • Hypertension    • Migraine    • Neuropathy    • Stroke (CMS/HCC)        Past Surgical History:   Procedure Laterality Date   • CHOLECYSTECTOMY     • HIP PERCUTANEOUS PINNING Left 2/28/2017    Procedure: LEFT  HIP PERCUTANEOUS PINNING FEMORAL NECK;  Surgeon: Ezra Barlow MD;  Location:  [facility];  Service:    • HYSTERECTOMY     • SD CLOSED RX TRAUMATIC HIP DISLOCATN Left 2/28/2017    Procedure: LEFT HIP CLOSED REDUCTION;  Surgeon: Ezra Barlow MD;  Location: [facility] OR;  Service: Orthopedics   • SHOULDER SURGERY         Pediatric History   Patient Guardian Status   • Not on file.     Other Topics Concern   • Not on file     Social History Narrative   • No narrative on file   She denies tobacco alcohol or illicit drugs    family history includes Alcohol abuse in her mother; Cancer in her mother; Diabetes in her mother; Heart disease in her mother; Hypertension in her mother; Parkinsonism in her mother; Stroke in her mother.    Allergies   Allergen Reactions   • Cephalexin Swelling   • Penicillins Hives   • Sulfa Antibiotics Hives       Medication:  Current Facility-Administered Medications   Medication Dose Route Frequency Provider Last Rate Last Dose   • ! Nursing: Vancomycin trough due on 10/1 at 1100. Please hold 10/1 12noon dose until notified by Pharmacy.   Does not apply Once Lanny Boateng, [facility]       • acetaminophen (TYLENOL) tablet 650 mg  650 mg Oral Q4H PRN Caterina Chen APRN       • albuterol (PROVENTIL) nebulizer solution 0.083% 2.5 mg/3mL  2.5 mg Nebulization 4x Daily PRN Caterina Chen APRN   2.5 mg at 09/30/18 2106   • apixaban (ELIQUIS) tablet 5 mg  5 mg Oral Q12H Caterina Chen APRN   5 mg at 09/30/18 2123   • aspirin EC tablet 81 mg  81 mg Oral Daily Caterina Chen APRN   81 mg at 09/30/18 0901   • atorvastatin (LIPITOR) tablet 10 mg  10 mg Oral Nightly Caterina Chen APRN   10 mg at 09/30/18 2123   • aztreonam (AZACTAM) 2 g in sodium chloride 0.9 % 100 mL IVPB-MBP  2 g Intravenous Q8H Herb Avila MD   2 g at 10/01/18 0634   • bisacodyl (DULCOLAX) EC tablet 5 mg  5 mg Oral Daily PRN Caterina Chen APRN   5 mg at 09/30/18 0610   • budesonide-formoterol  (SYMBICORT) 160-4.5 MCG/ACT inhaler 2 puff  2 puff Inhalation BID - RT Caterina Chen APRN   2 puff at 09/30/18 2106   • cetirizine (zyrTEC) tablet 10 mg  10 mg Oral Nightly Caterina Chen APRN   10 mg at 09/30/18 2123   • diltiaZEM CD (CARDIZEM CD) 24 hr capsule 360 mg  360 mg Oral Q24H Caterina Cehn APRN   360 mg at 09/30/18 0900   • docusate sodium (COLACE) capsule 100 mg  100 mg Oral BID PRN Caterina Chen APRN       • doxycycline (MONODOX) capsule 100 mg  100 mg Oral Q12H Royal Mohamud MD       • famotidine (PEPCID) tablet 20 mg  20 mg Oral Daily PRN Caterina Chen APRN       • famotidine (PEPCID) tablet 40 mg  40 mg Oral Nightly Caterina Chen APRN   40 mg at 09/30/18 2123   • fluticasone (FLONASE) 50 MCG/ACT nasal spray 2 spray  2 spray Each Nare Daily Caterina Chen APRN   2 spray at 09/30/18 0859   • guaifenesin-dextromethorphan (MUCINEX DM) tablet 1 tablet  1 tablet Oral BID PRN Pretty Moreno APRN   1 tablet at 09/29/18 2237   • hydrocortisone 1 % cream 1 application  1 application Topical 4x Daily PRN Caterina Chen APRN       • lactobacillus acidophilus (RISAQUAD) capsule 1 capsule  1 capsule Oral Daily Caterina Chen APRN   1 capsule at 09/30/18 0901   • levothyroxine (SYNTHROID, LEVOTHROID) tablet 150 mcg  150 mcg Oral Q AM Caterina Chen APRN   150 mcg at 10/01/18 0634   • methylPREDNISolone sodium succinate (SOLU-Medrol) injection 20 mg  20 mg Intravenous Daily Herb Avila MD   20 mg at 09/30/18 0900   • metoprolol tartrate (LOPRESSOR) half tablet 12.5 mg  12.5 mg Oral Q12H Pretty Moreno APRN   12.5 mg at 09/30/18 2123   • montelukast (SINGULAIR) tablet 10 mg  10 mg Oral Nightly Caterina Chen APRN   10 mg at 09/30/18 2123   • ondansetron (ZOFRAN) tablet 4 mg  4 mg Oral Q6H PRN Caterina Chen APRN        Or   • ondansetron (ZOFRAN) injection 4 mg  4 mg Intravenous Q6H PRN Gustavo,  FABIANA Lobo       • pantoprazole (PROTONIX) EC tablet 40 mg  40 mg Oral Q AM Caterina Chen APRN   40 mg at 10/01/18 0634   • Pharmacy to dose vancomycin   Does not apply Continuous PRN Herb Avila MD       • pregabalin (LYRICA) capsule 300 mg  300 mg Oral Q12H Caterina Chen APRN   300 mg at 09/30/18 2123   • sodium chloride 0.9 % flush 1-10 mL  1-10 mL Intravenous PRN Caterina Chen APRN       • sodium chloride 0.9 % flush 10 mL  10 mL Intravenous PRN Eliot Recio DO       • topiramate (TOPAMAX) tablet 100 mg  100 mg Oral Q12H Caterina Chen APRN   100 mg at 09/30/18 2123   • vancomycin 1250 mg/250 mL 0.9% NS IVPB ([facility])  1,250 mg Intravenous Q12H Rachelle Salinas RPH   1,250 mg at 10/01/18 0124       Antibiotics:  Anti-Infectives     Ordered     Dose/Rate Route Frequency Start Stop    10/01/18 0810  doxycycline (MONODOX) capsule 100 mg     Ordering Provider:  Royal Mohamud MD    100 mg Oral Every 12 Hours Scheduled 10/01/18 0900 10/08/18 0859    09/28/18 0822  vancomycin 1250 mg/250 mL 0.9% NS IVPB ([facility])     Ordering Provider:  Rachelle Salinas RPH    1,250 mg  over 90 Minutes Intravenous Every 12 Hours 09/28/18 1200 10/04/18 1159    09/27/18 1201  aztreonam (AZACTAM) 2 g in sodium chloride 0.9 % 100 mL IVPB-MBP     Ordering Provider:  Herb Avila MD    2 g  over 4 Hours Intravenous Every 8 Hours 09/27/18 1500 10/04/18 1459    09/27/18 1201  Pharmacy to dose vancomycin     Ordering Provider:  Herb Avila MD     Does not apply Continuous PRN 09/27/18 1149 10/04/18 1148    09/27/18 0537  aztreonam (AZACTAM) 2 g in sodium chloride 0.9 % 100 mL IVPB-MBP     Ordering Provider:  Newberg, Eliot, DO    2 g  200 mL/hr over 30 Minutes Intravenous Once 09/27/18 0539 09/27/18 0753    09/27/18 0537  vancomycin 1750 mg/500 mL 0.9% NS IVPB ([facility])     Ordering Provider:  Eliot Recio DO    20 mg/kg × 90.7 kg Intravenous Once 09/27/18 0539 09/27/18 0811    09/27/18 0316   "levoFLOXacin (LEVAQUIN) 750 mg/150 mL D5W (premix) (LEVAQUIN) 750 mg     Ordering Provider:  Eliot Recio DO    750 mg  over 90 Minutes Intravenous Once 09/27/18 0318 09/27/18 0600            Review of Systems    Constitutional-- at present, No Fever, chills or sweats.  Appetite diminished with generalized malaise and fatigue  Heent-- No new vision, hearing or throat complaints.  No epistaxis or oral sores.  Denies odynophagia or dysphagia.  No flashers, floaters or eye pain. No odynophagia or dysphagia. No headache, photophobia or neck stiffness.  CV-- No chest pain, palpitation or syncope  Resp-- as above  GI- No nausea, vomiting, or diarrhea.  No hematochezia, melena, or hematemesis. Denies jaundice or chronic liver disease.  -- No dysuria, hematuria, or flank pain.  Denies hesitancy, urgency or flank pain.  Lymph- no swollen lymph nodes in neck/axilla or groin.   Heme- No active bruising or bleeding; no Hx of DVT or PE.  MS-- no swelling or pain in the bones or joints of arms/legs.  No new back pain.  Neuro-- No acute focal weakness or numbness in the arms or legs.  No seizures.  Chronic left-sided hemiparesis after stroke    Full 12 point review of systems reviewed and negative otherwise for acute complaints, except for above    Physical Exam: Nursing/chaperone present  Vital Signs   /92 (BP Location: Right leg, Patient Position: Lying)   Pulse 75   Temp 97.8 °F (36.6 °C) (Oral)   Resp 18   Ht 165.1 cm (65\")   Wt 90.7 kg (200 lb)   SpO2 96%   BMI 33.28 kg/m²     GENERAL: Awake and alert, in no acute distress.  Obese and chronically ill-appearing  HEENT: Normocephalic, atraumatic.  PERRL. EOMI. No conjunctival injection. No icterus. Oropharynx clear without evidence of thrush or exudate. No evidence of peridontal disease.    NECK: Supple without nuchal rigidity. No mass.  LYMPH: No cervical, axillary or inguinal lymphadenopathy.  HEART: RRR; No murmur, rubs, gallops.   LUNGS: Diminished at right " base more so than left with scattered rhonchi. Normal respiratory effort. Nonlabored. No dullness.  ABDOMEN: Soft, nontender, nondistended. Positive bowel sounds. No rebound or guarding. NO mass or HSM.  EXT:  No cyanosis, clubbing or edema. No cord.  : Genitalia generally unremarkable.  Without Gunderson catheter.  MSK: FROM without joint effusions noted arms/legs.    SKIN: Warm and dry without cutaneous eruptions on Inspection/palpation.    NEURO: Oriented to PPT.  Chronic left-sided hemiparesis noted  PSYCHIATRIC: Normal insight and judgement. Cooperative with PE    No peripheral stigmata/phenomena of endocarditis    IV without obvious redness or drainage      Laboratory Data      Results from last 7 days  Lab Units 09/29/18  1007 09/28/18  0343 09/27/18  0204   WBC 10*3/mm3 13.04* 10.82* 15.85*   HEMOGLOBIN g/dL 11.3* 11.0* 12.0   HEMATOCRIT % 37.2 35.9 38.7   PLATELETS 10*3/mm3 207 192 194       Results from last 7 days  Lab Units 10/01/18  0342   SODIUM mmol/L 144   POTASSIUM mmol/L 3.8   CHLORIDE mmol/L 117*   CO2 mmol/L 20.0   BUN mg/dL 13   CREATININE mg/dL 0.68   GLUCOSE mg/dL 104*   CALCIUM mg/dL 8.0*       Results from last 7 days  Lab Units 09/27/18  0204   ALK PHOS U/L 80   BILIRUBIN mg/dL 0.9   ALT (SGPT) U/L 22   AST (SGOT) U/L 29               Estimated Creatinine Clearance: 77 mL/min (by C-G formula based on SCr of 0.68 mg/dL).      Microbiology:      Radiology:  Imaging Results (last 72 hours)     Procedure Component Value Units Date/Time    FL Video Swallow With Speech [[id number]] Collected:  09/27/18 1518     Updated:  09/28/18 0957    Narrative:       EXAMINATION: FL VIDEO SWALLOW W SPEECH-     INDICATION: dysphagia     TECHNIQUE: 30 seconds of fluoroscopic time was used for this exam. 1  associated image was saved. The patient was evaluated in the seated  lateral position while taking a variety of consistencies of barium by  mouth under the direction of speech pathology.     COMPARISON: NONE      FINDINGS: There was penetration that cleared without aspiration with  multiple large sized sips of thin barium from a straw. There was no  penetration and no aspiration with single sips of thin barium, pudding,  or solid consistency barium.          Impression:       Fluoroscopy provided for a modified barium swallow. Please  see speech therapy report for full details and recommendations.         This report was finalized on 9/28/2018 9:55 AM by Dr. Rae Cheung MD.               Impression:     --Acute pneumonia, SIRS+ at admission, bilateral with right greater than left infiltrates on chest x-ray, possible viral exposure to  and concern for primary -v- secondary bacterial process as well; empiric treatment for CAP/MRSA and Hx PCN/ceph allergies.  Empiric antibiotics ongoing with vancomycin/Azactam and doxycycline.  MRSA screen positive and certainly could be a pathogen in this setting but sputum did not corroborate that.  Empiric antibiotics would cover that in any case.  If significant pleural effusions evolve, you should give consideration to diagnostic/therapeutic thoracentesis.  Patient understands high risk for further serious morbidity and other serious sequela; she understands antibiotics are not a guarantee for cure.  If not steadily improving, you should give consideration to repeat imaging to rule out other evolving parapneumonic complication.    --Acute blood culture positivity, coag-negative staph per microbiology preliminarily and only in one of 2 samples suggest contamination.  No obvious endovascular focus by exam findings.  No chronic line.  She denies acute focal joint complaints to suggest septic joint etc.    --Atrial fibrillation/RVR, paroxysmal per internal medicine.  Treatment per them    --History CVA with chronic left-sided hemiparesis    PLAN: Thank you for asking us to see Vickie Joshi, I recommend the following:    --IV vancomycin/Azactam and oral doxycycline    --I  discussed potential risks and benefits of the prescribed antibiotics that include, but are not limited to, solid organ toxicity, neuro/sosa/vestibular toxicity, renal toxicity, CDiff, cytopenias, hypersensitivity,  etc.. Patient/Family voice understanding and agree to proceed.    --Check/review labs cultures and scans    --Discussed with microbiology    --History per nursing staff and     --Respiratory panel PCR, urine Legionella and strep antigens ordered    --Highly complex set of issues with high risk for further serious morbidity and other serious sequela       Royal Mohamud MD  10/1/2018

## 2018-10-01 NOTE — THERAPY TREATMENT NOTE
Acute Care - Physical Therapy Treatment Note  [facility]     Patient Name: Vickie Joshi  : 1950  MRN: [id number]  Today's Date: 10/1/2018  Onset of Illness/Injury or Date of Surgery: 18  Date of Referral to PT: 18  Referring Physician: FABIANA Chen    Admit Date: 2018    Visit Dx:    ICD-10-CM ICD-9-CM   1. Dysphagia, unspecified type R13.10 787.20   2. Impaired mobility and ADLs Z74.09 799.89   3. Impaired functional mobility, balance, gait, and endurance Z74.09 V49.89   4. Cerebrovascular accident (CVA), unspecified mechanism (CMS/HCC) I63.9 434.91   5. Fall, subsequent encounter W19.XXXD V58.89     E888.9   6. Pneumonia of right lung due to infectious organism, unspecified part of lung J18.9 483.8   7. Sepsis, due to unspecified organism (CMS/HCC) A41.9 038.9     995.91   8. Atrial fibrillation, chronic (CMS/HCC) I48.2 427.31     Patient Active Problem List   Diagnosis   • Fall   • Closed fracture of neck of left femur (CMS/HCC)   • Hyperlipidemia   • Hypertension   • Hx of Migraine   • GERD (gastroesophageal reflux disease)   • Fibromyalgia   • Neuropathy   • Rapid atrial fibrillation (CMS/HCC)   • Chronic anticoagulation on Eliquis   • Hx of Stroke with residual left hemiparesis   • Sepsis (CMS/HCC)   • Atrial fibrillation (CMS/HCC)   • Pneumonia       Therapy Treatment          Rehabilitation Treatment Summary     Row Name 10/01/18 1025 10/01/18 0805          Treatment Time/Intention    Discipline physical therapist  -MB occupational therapist  -KF     Document Type therapy note (daily note)  -MB therapy note (daily note)  -KF     Subjective Information complains of;weakness;fatigue  -MB complains of;weakness;fatigue  -KF     Mode of Treatment physical therapy;individual therapy  -MB individual therapy;occupational therapy  -KF     Patient/Family Observations Pt. sitting UIC, on RA, IV intact L UE,  present at bedside.  RN consent for PT.  -MB2  present, IV  jessica, RN present at end of session, Pt supine in bed, RA  -KF     Care Plan Review care plan/treatment goals reviewed;risks/benefits reviewed;current/potential barriers reviewed;patient/other agree to care plan  -MB2 evaluation/treatment results reviewed;care plan/treatment goals reviewed;current/potential barriers reviewed;risks/benefits reviewed;patient/other agree to care plan  -KF     Care Plan Review, Other Participant(s) spouse  -MB2 spouse  -KF     Therapy Frequency (PT Clinical Impression) daily  -MB2  --     Patient Effort good  -MB2 good  -KF     Comment  -- Pt with wheezing upon sitting up requiring sitting rest break post bed mobility with 2 minute rest to recover prior to standing   -KF     Existing Precautions/Restrictions fall   L hemiparesis  -MB2 fall;other (see comments)   h/o CVA with L hemiparesis  -KF     Treatment Considerations/Comments  -- required rest break  -KF     Patient Response to Treatment  -- tolerated  -KF     Recorded by [MB] Anjelica Frederick, PT 10/01/18 1313  [MB2] Anjelica Frederick, PT 10/01/18 1333 [KF] Ramona Dee, OT 10/01/18 0853     Row Name 10/01/18 1025 10/01/18 0805          Vital Signs    Pre Systolic BP Rehab 147  -MB --   RN cleared vitals stable throughout   -KF     Pre Treatment Diastolic BP 92  -MB  --     Pretreatment Heart Rate (beats/min) 76  -MB  --     Intratreatment Heart Rate (beats/min) 92  -  -KF     Posttreatment Heart Rate (beats/min) 88  -MB  --     Pre SpO2 (%) 95  -MB 95  -KF     O2 Delivery Pre Treatment room air  -MB room air  -KF     Intra SpO2 (%)  -- 88  -KF     O2 Delivery Intra Treatment room air  -MB room air  -KF     Post SpO2 (%) 94  -MB 99  -KF     O2 Delivery Post Treatment room air  -MB room air  -KF     Pre Patient Position  -- Supine  -KF     Intra Patient Position  -- Standing  -KF     Post Patient Position  -- Sitting  -KF     Recorded by [MB] Anjelica Frederick, PT 10/01/18 1333 [KF] Ramona Dee, OT  10/01/18 0853     Row Name 10/01/18 0805             Cognitive Assessment/Intervention    Additional Documentation Cognitive Assessment/Intervention (Group)  -KF      Recorded by [KF] Ramona Dee, OT 10/01/18 0853      Row Name 10/01/18 1025 10/01/18 0805          Cognitive Assessment/Intervention- PT/OT    Affect/Mental Status (Cognitive) WFL  -MB WNL  -KF     Orientation Status (Cognition) oriented x 4  -MB oriented x 4  -KF     Follows Commands (Cognition) follows one step commands;over 90% accuracy  -MB follows one step commands;over 90% accuracy  -KF     Cognitive Function (Cognitive)  -- safety deficit  -KF     Safety Deficit (Cognitive) mild deficit;safety precautions awareness  -MB mild deficit;safety precautions awareness  -KF     Cognitive Interventions (Cognitive)  -- occupation/activity based interventions;process/task specific training  -KF     Personal Safety Interventions fall prevention program maintained;gait belt;muscle strengthening facilitated;nonskid shoes/slippers when out of bed  -MB fall prevention program maintained;gait belt;muscle strengthening facilitated;nonskid shoes/slippers when out of bed  -KF     Recorded by [MB] Anjelica Frederick, PT 10/01/18 1333 [KF] Ramona Dee, OT 10/01/18 0853     Row Name 10/01/18 0805             Safety Issues, Functional Mobility    Safety Issues Affecting Function (Mobility) safety precaution awareness  -KF      Impairments Affecting Function (Mobility) balance;strength;muscle tone abnormal;endurance/activity tolerance;grasp;range of motion (ROM)  -KF      Recorded by [KF] Ramona Dee OT 10/01/18 0853      Row Name 10/01/18 1025 10/01/18 0805          Bed Mobility Assessment/Treatment    Bed Mobility Assessment/Treatment sit-supine  -MB rolling right;scooting/bridging;supine-sit  -KF     Rolling Right [place] (Bed Mobility)  -- maximum assist (25% patient effort)  -KF     Scooting/Bridging [place] (Bed Mobility)  --  maximum assist (25% patient effort)  -KF     Supine-Sit [place] (Bed Mobility)  -- maximum assist (25% patient effort)  -KF     Sit-Supine [place] (Bed Mobility) maximum assist (25% patient effort)  -MB  --     Bed Mobility, Safety Issues decreased use of arms for pushing/pulling;decreased use of legs for bridging/pushing;impaired trunk control for bed mobility  -MB decreased use of arms for pushing/pulling;decreased use of legs for bridging/pushing;impaired trunk control for bed mobility  -KF     Assistive Device (Bed Mobility) bed rails;draw sheet  -MB bed rails;draw sheet  -KF     Comment (Bed Mobility) Pt. required assist to lower trunk/shoulders and lift BLEs for return to supine.    -MB required assist for bed mobility to raise trunk into sitting, max A for scooting hips with reqwuired use of draw sheet   -KF     Recorded by [MB] Anjelica Frederick, PT 10/01/18 1333 [KF] Ramona Dee, OT 10/01/18 0853     Row Name 10/01/18 0805             Functional Mobility    Functional Mobility- Ind. Level not tested  -KF      Functional Mobility- Comment deferred further mobility to PT  -KF      Recorded by [KF] Ramona Dee, OT 10/01/18 0853      Row Name 10/01/18 1025 10/01/18 0805          Transfer Assessment/Treatment    Transfer Assessment/Treatment  -- sit-stand transfer;stand-sit transfer;stand pivot/stand step transfer  -KF     Comment (Transfers) Pt. transferred sit to stand from chair and amb 3 steps to bed.  Pt. demo good safety awareness.  -MB from EOB  -KF     Recorded by [MB] Anjelica Frederick, PT 10/01/18 1347 [KF] Ramona Dee, OT 10/01/18 0853     Row Name 10/01/18 0805             Bed-Chair Transfer    Bed-Chair [place] (Transfers) 2 person assist;moderate assist (50% patient effort)  -KF      Assistive Device (Bed-Chair Transfers) other (see comments)   BUE support  -KF      Recorded by [KF] Ramona Dee, OT 10/01/18 0853      Row Name 10/01/18 1025 10/01/18  0805          Sit-Stand Transfer    Sit-Stand [place] (Transfers) moderate assist (50% patient effort);verbal cues  -MB verbal cues;2 person assist;minimum assist (75% patient effort)  -KF     Assistive Device (Sit-Stand Transfers) cane, quad   LBQC  -MB other (see comments)   BUE support  -KF     Recorded by [MB] Anjelica Frederick, PT 10/01/18 1347 [KF] Ramona Dee, OT 10/01/18 0853     Row Name 10/01/18 1025 10/01/18 0805          Stand-Sit Transfer    Stand-Sit [place] (Transfers) moderate assist (50% patient effort);verbal cues  -MB minimum assist (75% patient effort);2 person assist  -KF     Assistive Device (Stand-Sit Transfers) cane, quad  -MB other (see comments)   BUE support  -KF     Recorded by [MB] Anjelica Frederick, PT 10/01/18 1347 [KF] Ramona Dee, OT 10/01/18 0853     Row Name 10/01/18 0805             Stand Pivot/Stand Step Transfer    Stand Pivot/Stand Step [place] moderate assist (50% patient effort);verbal cues;2 person assist  -KF      Assistive Device (Stand Pivot Stand Step Transfer) --   BUE support  -KF      Recorded by [KF] Ramona Dee, OT 10/01/18 0853      Row Name 10/01/18 1025             Gait/Stairs Assessment/Training    [place] Level (Gait) moderate assist (50% patient effort);2 person assist;verbal cues;nonverbal cues (demo/gesture)  -MB      Assistive Device (Gait) cane, quad  -MB2      Distance in Feet (Gait) 3  -MB2      Pattern (Gait) step-to  -MB2      Deviations/Abnormal Patterns (Gait) left sided deviations;ataxic;base of support, narrow;steppage;festinating/shuffling  -MB2      Bilateral Gait Deviations weight shift ability decreased  -MB2      Left Sided Gait Deviations foot drop/toe drag;hip circumduction;hip hiking;weight shift ability decreased;knee buckling, left side  -MB2      Comment (Gait/Stairs) Pt. amb 3 steps to chair w/ LBQC and assist for weight-shifting and to facilitate LLE swing phase, care to block L knee during  stance.  Pt. very unsteady.  -MB2      Recorded by [MB] Anjelica Frederick, PT 10/01/18 1350  [MB2] Anjelica Frederick, PT 10/01/18 1347      Row Name 10/01/18 0805             ADL Assessment/Intervention    62091 - OT Self Care/Mgmt Minutes 10  -KF      BADL Assessment/Intervention toileting;upper body dressing;lower body dressing  -KF      Recorded by [KF] Ramona Dee, OT 10/01/18 0853      Row Name 10/01/18 0805             Upper Body Dressing Assessment/Training    Upper Body Dressing [place] Level don;front opening garment;minimum assist (75% patient effort)  -KF      Upper Body Dressing Position edge of bed sitting  -KF      Comment (Upper Body Dressing) decreased sitting balance, required assist min A progressed to CGA sitting   -KF      Recorded by [KF] Ramona Dee, OT 10/01/18 0853      Row Name 10/01/18 0805             Lower Body Dressing Assessment/Training    Lower Body Dressing [place] Level don;shoes/slippers;undergarment;dependent (less than 25% patient effort);two person assist required  -KF      Lower Body Dressing Position supine;edge of bed sitting  -KF      Comment (Lower Body Dressing) able to stand to don brief but unable to assist requiring UE support  maintained in standing   -KF      Recorded by [KF] Ramona Dee, OT 10/01/18 0853      Row Name 10/01/18 0805             Toileting Assessment/Training    [place] Level (Toileting) adjust/manage clothing;perform perineal hygiene;dependent (less than 25% patient effort);two person assist required  -KF      Assistive Devices (Toileting) bedpan  -KF      Recorded by [KF] Ramona Dee, OT 10/01/18 0853      Row Name 10/01/18 0805             BADL Safety/Performance    Impairments, BADL Safety/Performance balance;endurance/activity tolerance;strength  -KF      Skilled BADL Treatment/Intervention BADL process/adaptation training;cognitive/safety deficit modifications  -KF      Recorded by [KF] Luiz  Ramona HICKEY, OT 10/01/18 0853      Row Name 10/01/18 1025 10/01/18 0805          Motor Skills Assessment/Interventions    Additional Documentation Balance (Group);Therapeutic Exercise (Group)  -MB Balance (Group);Balance Interventions (Group)  -KF     Recorded by [MB] Anjelica Frederick, PT 10/01/18 1347 [KF] Ramona Dee, OT 10/01/18 0853     Row Name 10/01/18 1025             Therapeutic Exercise    Lower Extremity (Therapeutic Exercise) gluteal sets;LAQ (long arc quad), bilateral  -MB      Lower Extremity Range of Motion (Therapeutic Exercise) hip abduction/adduction, bilateral;ankle dorsiflexion/plantar flexion, bilateral  -MB      Exercise Type (Therapeutic Exercise) AROM (active range of motion);AAROM (active assistive range of motion);PROM (passive range of motion)  -MB      Position (Therapeutic Exercise) seated  -MB      Sets/Reps (Therapeutic Exercise) 1/10 BLEs  -MB      Comment (Therapeutic Exercise) min A RLE to complete full ROM, max A LLE  -MB      Recorded by [MB] Anjelica Frederick, PT 10/01/18 1347      Row Name 10/01/18 1025             Gross Motor Coordination    Gross Motor Impairments balance;coordination;motor control;strength  -MB      Recorded by [MB] Anjelica Frederick, PT 10/01/18 1347      Row Name 10/01/18 1025 10/01/18 0805          Balance    Balance static standing balance  -MB static sitting balance;static standing balance;dynamic sitting balance;dynamic standing balance  -KF     Recorded by [MB] Anjelica Frederick, PT 10/01/18 1347 [KF] Ramona Dee, OT 10/01/18 0853     Row Name 10/01/18 0805             Static Sitting Balance    Level of [place] (Unsupported Sitting, Static Balance) contact guard assist  -KF      Sitting Position (Unsupported Sitting, Static Balance) sitting on edge of bed  -KF      Time Able to Maintain Position (Unsupported Sitting, Static Balance) 3 to 4 minutes  -KF      Comment (Unsupported Sitting, Static Balance) posterior lean initially   -KF      Recorded by [KF] Ramona Dee, OT 10/01/18 0853      Row Name 10/01/18 0805             Dynamic Sitting Balance    Level of [place], Reaches Outside Midline (Sitting, Dynamic Balance) minimal assist, 75% patient effort  -KF      Sitting Position, Reaches Outside Midline (Sitting, Dynamic Balance) sitting on edge of bed  -KF      Recorded by [KF] Ramona Dee, OT 10/01/18 0853      Row Name 10/01/18 1025 10/01/18 0805          Static Standing Balance    Level of [place] (Supported Standing, Static Balance) moderate assist, 50 to 74% patient effort  -MB minimal assist, 75% patient effort  -KF     Time Able to Maintain Position (Supported Standing, Static Balance) 45 to 60 seconds  -MB 30 to 45 seconds  -KF     Assistive Device Utilized (Supported Standing, Static Balance) quad cane  -MB other (see comments)   BUE support  -KF     Comment (Supported Standing, Static Balance) lateral weight shifting, blocking L knee  -MB  --     Recorded by [MB] Anjelica Frederick, PT 10/01/18 1347 [KF] Ramona Dee, OT 10/01/18 0853     Row Name 10/01/18 0805             Dynamic Standing Balance    Level of [place], Reaches Outside Midline (Standing, Dynamic Balance) moderate assist, 50 to 74% patient effort  -KF      Time Able to Maintain Position, Reaches Outside Midline (Standing, Dynamic Balance) 15 to 30 seconds  -KF      Recorded by [KF] Ramona Dee, OT 10/01/18 0853      Row Name 10/01/18 0805             Balance Interventions    Training Strategies (Balance) ADL tasks and balance challenges in standing   -KF      Recorded by [KF] Ramona Dee, OT 10/01/18 0853      Row Name 10/01/18 1025 10/01/18 0805          Positioning and Restraints    Pre-Treatment Position sitting in chair/recliner  -MB in bed  -KF     Post Treatment Position bed  -MB chair  -KF     In Bed notified nsg;supine;call light within reach;encouraged to call for assist;with family/caregiver   RN deferred  exit alarm w/ spouse present.  -MB  --     In Chair  -- notified nsg;reclined;sitting;call light within reach;encouraged to call for assist;with family/caregiver;with nsg;legs elevated;heels elevated;LUE elevated  -KF     Recorded by [MB] Anjelica Frederick, PT 10/01/18 1347 [KF] Ramona Dee, OT 10/01/18 0853     Row Name 10/01/18 0805             Pain Assessment    Additional Documentation Pain Scale: Numbers Pre/Post-Treatment (Group)  -KF      Recorded by [KF] Ramona Dee, OT 10/01/18 0853      Row Name 10/01/18 1025 10/01/18 0805          Pain Scale: Numbers Pre/Post-Treatment    Pain Scale: Numbers, Pretreatment 0/10 - no pain  -MB 0/10 - no pain  -KF     Pain Scale: Numbers, Post-Treatment 0/10 - no pain  -MB 0/10 - no pain  -KF     Pain Intervention(s)  -- Repositioned;Ambulation/increased activity  -KF     Recorded by [MB] Anjelica Frederick, PT 10/01/18 1347 [KF] Ramona Dee, OT 10/01/18 0853     Row Name 10/01/18 1025 10/01/18 0805          Plan of Care Review    Plan of Care Reviewed With patient;spouse  -MB patient;spouse  -KF     Recorded by [MB] Anjelica Frederick, PT 10/01/18 1347 [KF] Ramona Dee, OT 10/01/18 0853     Row Name 10/01/18 0805             Outcome Summary/Treatment Plan (OT)    Daily Summary of Progress (OT) progress toward functional goals as expected  -KF      Recorded by [KF] Ramona Dee, OT 10/01/18 0853      Row Name 10/01/18 1025             Outcome Summary/Treatment Plan (PT)    Daily Summary of Progress (PT) progress toward functional goals as expected  -MB      Plan for Continued Treatment (PT) Cont IPPT per POC.  -MB      Recorded by [MB] Anjelica Frederick, PT 10/01/18 1347        User Key  (r) = Recorded By, (t) = Taken By, (c) = Cosigned By    Initials Name Effective Dates Discipline    Anjelica Camara, PT 03/14/16 -  PT    KF Ramona Dee, OT 04/03/18 -  OT                     Physical Therapy Education     Title: PT OT  SLP Therapies (Active)     Topic: Physical Therapy (Done)     Point: Mobility training (Done)    Learning Progress Summary     Learner Status Readiness Method Response Comment Documented by    Patient Done FRANCI Machado,GURJIT reviewed bbefits of activity SC 09/28/18 0938          Point: Home exercise program (Done)    Learning Progress Summary     Learner Status Readiness Method Response Comment Documented by    Patient Done FRANCI Machado,GURJIT reviewed bbefits of activity SC 09/28/18 0938          Point: Body mechanics (Done)    Learning Progress Summary     Learner Status Readiness Method Response Comment Documented by    Patient Done FRANCI Machado,GURJIT reviewed bbefits of activity SC 09/28/18 0938          Point: Precautions (Done)    Learning Progress Summary     Learner Status Readiness Method Response Comment Documented by    Patient Done FRANCI Machado NR reviewed bbefits of activity SC 09/28/18 0938                      User Key     Initials Effective Dates Name Provider Type Discipline    SC 06/19/15 -  Charlie Monson, PT Physical Therapist PT                    PT Recommendation and Plan  Therapy Frequency (PT Clinical Impression): daily  Outcome Summary/Treatment Plan (PT)  Daily Summary of Progress (PT): progress toward functional goals as expected  Plan for Continued Treatment (PT): Cont IPPT per POC.  Plan of Care Reviewed With: patient  Progress: improving  Outcome Summary: Pt. w/ good effort, demonstrates some improvement w/ mobility.  Pt. performed sit to stand transfers w/ LBQC and mod A, (care to block L knee), and amb 3 steps to bed w/ LBQC and mod A x 2.  Pt. continues to require max A for bed mobility.  Recommend cont IPPT per POC.          Outcome Measures     Row Name 10/01/18 1025 10/01/18 0805          How much help from another person do you currently need...    Turning from your back to your side while in flat bed without using bedrails? 2  -MB  --     Moving from lying on back to sitting on the  side of a flat bed without bedrails? 1  -MB  --     Moving to and from a bed to a chair (including a wheelchair)? 2  -MB  --     Standing up from a chair using your arms (e.g., wheelchair, bedside chair)? 2  -MB  --     Climbing 3-5 steps with a railing? 1  -MB  --     To walk in hospital room? 2  -MB  --     AM-PAC 6 Clicks Score 10  -MB  --        How much help from another is currently needed...    Putting on and taking off regular lower body clothing?  -- 1  -KF     Bathing (including washing, rinsing, and drying)  -- 2  -KF     Toileting (which includes using toilet bed pan or urinal)  -- 1  -KF     Putting on and taking off regular upper body clothing  -- 2  -KF     Taking care of personal grooming (such as brushing teeth)  -- 3  -KF     Eating meals  -- 3  -KF     Score  -- 12  -KF        Functional Assessment    Outcome Measure Options AM-PAC 6 Clicks Basic Mobility (PT)  -MB AM-PAC 6 Clicks Daily Activity (OT)  -KF       User Key  (r) = Recorded By, (t) = Taken By, (c) = Cosigned By    Initials Name Provider Type    Anjelica Camara, PT Physical Therapist    KF Ramona Dee, OT Occupational Therapist           Time Calculation:         PT Charges     Row Name 10/01/18 1025             Time Calculation    Start Time 1025  -MB      PT Received On 10/01/18  -MB      PT Goal Re-Cert Due Date 11/08/18  -MB         Time Calculation- PT    Total Timed Code Minutes- PT 34 minute(s)  -MB         Timed Charges    61445 - PT Therapeutic Exercise Minutes 20  -MB      77453 - Gait Training Minutes  14  -MB        User Key  (r) = Recorded By, (t) = Taken By, (c) = Cosigned By    Initials Name Provider Type    Anjelica Camara, PT Physical Therapist        Therapy Suggested Charges     Code   Minutes Charges    25315 (CPT®) Hc Pt Neuromusc Re Education Ea 15 Min      26066 (CPT®) Hc Pt Ther Proc Ea 15 Min 20 1    88643 (CPT®) Hc Gait Training Ea 15 Min 14 1    87801 (CPT®) Hc Pt Therapeutic Act Ea 15 Min       28233 (CPT®) Hc Pt Manual Therapy Ea 15 Min      75913 (CPT®) Hc Pt Iontophoresis Ea 15 Min      50338 (CPT®) Hc Pt Elec Stim Ea-Per 15 Min      60558 (CPT®) Hc Pt Ultrasound Ea 15 Min      76626 (CPT®) Hc Pt Self Care/Mgmt/Train Ea 15 Min      86492 (CPT®) Hc Pt Prosthetic (S) Train Initial Encounter, Each 15 Min      47186 (CPT®) Hc Pt Orthotic(S)/Prosthetic(S) Encounter, Each 15 Min      10526 (CPT®) Hc Orthotic(S) Mgmt/Train Initial Encounter, Each 15min      Total  34 2        Therapy Charges for Today     Code Description Service Date Service Provider Modifiers Qty    14718573690 HC PT THER PROC EA 15 MIN 10/1/2018 Anjelica Frederick, PT GP 1    21782985598 HC GAIT TRAINING EA 15 MIN 10/1/2018 Anjelica Frederick, PT GP 1          PT G-Codes  Outcome Measure Options: AM-PAC 6 Clicks Basic Mobility (PT)  AM-PAC 6 Clicks Score: 10  Score: 12    Anjelica Frederick, PT  10/1/2018

## 2018-10-01 NOTE — PROGRESS NOTES
"Pharmacy Consult-Vancomycin Dosing    Vickie Joshi is a  68 y.o. female receiving vancomycin therapy.     Indication: Sepsis secondary to pneumonia  Consulting Provider: Austin SAMUEL Consult: No  Goal Trough: 15-20mcg/mL    Current Antimicrobial Therapy    PTD Vanc = day 5  Azactam = day 5  Doxycycline PO = day 1  S/p Levaquin x5 days    Allergies    Allergies as of 09/27/2018 - Reviewed 09/27/2018   Allergen Reaction Noted   • Cephalexin Swelling 04/15/2016   • Penicillins Hives 04/15/2016   • Sulfa antibiotics Hives 04/15/2016     Labs  Results from last 7 days   Lab Units 10/01/18  0342 09/28/18  0343 09/27/18  0204   BUN mg/dL 13 10 10   CREATININE mg/dL 0.68 0.76 0.85   Serum creatinine: 0.68 mg/dL 10/01/18 0342  Estimated creatinine clearance: 77 mL/min.  Results from last 7 days   Lab Units 09/29/18  1007 09/28/18  0343 09/27/18  0204   WBC 10*3/mm3 13.04* 10.82* 15.85*     Evaluation of Dosing     Last Dose Received in the ED: 9/27 @ 0811 vancomycin 1750mg (20mg/kg) x1    Ht - 165.1 cm (65\")  Wt - 90.7 kg (200 lb)    I/O last 3 completed shifts:  In: 600 [IV Piggyback:600]  Out: 1590 [Urine:1590] + unmeasured    Microbiology and Radiology    Microbiology Results (last 10 days)       Procedure Component Value - Date/Time    MRSA Screen, PCR - Swab, Nares [[id number]]  (Abnormal) Collected:  09/29/18 1448    Lab Status:  Final result Specimen:  Swab from Nares Updated:  09/29/18 2125     MRSA, PCR Positive (C)    Respiratory Culture - Sputum, Cough [[id number]] Collected:  09/27/18 1733    Lab Status:  Final result Specimen:  Sputum from Cough Updated:  09/29/18 0730     Respiratory Culture Light growth (2+) Normal Respiratory Sandrine     Gram Stain Result Many (4+) WBCs per low power field      Few (2+) Epithelial cells per low power field      Rare (1+) Gram positive cocci in pairs    Blood Culture - Blood, [[id number]]  (Normal) Collected:  09/27/18 2875    Lab Status:  Preliminary result Specimen:  Blood " from Arm, Left Updated:  10/01/18 0346     Blood Culture No growth at 4 days    Blood Culture - Blood, [[id number]]  (Abnormal) Collected:  09/27/18 0325    Lab Status:  Preliminary result Specimen:  Blood from Arm, Right Updated:  09/28/18 0319     Blood Culture Abnormal Stain (A)     Gram Stain Result Anaerobic Bottle Gram positive cocci in clusters    Blood Culture ID, PCR - Blood, [[id number]]  (Abnormal) Collected:  09/27/18 0325    Lab Status:  Final result Specimen:  Blood from Arm, Right Updated:  09/28/18 0459     BCID, PCR Staphylococcus spp, not aureus. Identification by BCID PCR. (C)          Evaluation of Level    Results from last 7 days   Lab Units 10/01/18  1041 09/29/18  0019   VANCOMYCIN TR mcg/mL 25.50* 19.40     10/1 trough @ 1041 = 25.5mcg/mL (~9hr level, drawn ~1hr early)    Assessment/Plan:    1. Pharmacy to dose vancomycin for sepsis. Patient received a loading dose of vancomycin 1750mg 9/27 in the ED as well as a dose of aztreonam and Levaquin, then started on a  maintenance dose of vancomycin 1250mg q12h.   2. Vancomycin trough this AM was SUPRAtherapeutic at 25.5mcg/mL, likely accumulation despite stable renal function (of note, patient bed bound).  3. Will decrease dose to vancomycin 1250mg q24h and order a trough for Thursday 10/4.  4. Monitor renal function, cultures and sensitivities, and clinical status, and pharmacy will adjust regimen as necessary.    Tanya Salinas, PharmD  Pharmacy Resident  10/1/2018  12:22 PM

## 2018-10-01 NOTE — PROGRESS NOTES
[facility] Medicine Services  PROGRESS NOTE    Patient Name: Vickie Joshi  : 1950  MRN: [id number]    Date of Admission: 2018  Length of Stay: 4  Primary Care Physician: Dav Bryan MD    Subjective   Subjective     CC:  Shortness of air    HPI:  Slow to improve.  No fevers.  No chills.  Family in room but sleeping on couch.  Seen by ID today.          Review of Systems  Gen- No fevers, chills  CV- No chest pain, palpitations. + tachy   Resp- positive cough, dyspnea  GI- No N/V/D, abd pain    Otherwise ROS is negative except as mentioned in the HPI.    Objective   Objective     Vital Signs:   Temp:  [97.8 °F (36.6 °C)] 97.8 °F (36.6 °C)  Heart Rate:  [75-77] 75  Resp:  [18] 18  BP: (128-147)/(71-92) 147/92  Total (NIH Stroke Scale): 9     Physical Exam:  Constitutional: No acute distress, awake, alert.  Eyes: PERRLA, sclerae anicteric  HENT: NCAT, mucous membranes moist  Neck: Supple,  trachea midline  Respiratory: Scattered exp wheezes.  No crackles, nonlabored respirations on RA   Cardiovascular: RRR, s1 and s2  Gastrointestinal: Positive bowel sounds, soft, nontender, nondistended.  Obese  Musculoskeletal: No bilateral ankle edema, no clubbing or cyanosis to extremities. Moves Rt side wnl.  Hx cva and only gross mvmt of Lt side.    Psychiatric: Appropriate affect, cooperative and calm   Neurologic: Oriented x 3, moves right side WNL.  Left side upper and lower extremity with gross motor movement only (history of CVA with left hemiparesis),speech clear and appropriate.  Follows commands   Skin: No rashes    Results Reviewed:  I have personally reviewed current lab, radiology, and data and agree.      Results from last 7 days  Lab Units 18  1007 18  0343 18  0204   WBC 10*3/mm3 13.04* 10.82* 15.85*   HEMOGLOBIN g/dL 11.3* 11.0* 12.0   HEMATOCRIT % 37.2 35.9 38.7   PLATELETS 10*3/mm3 207 192 194       Results from last 7 days  Lab Units  10/01/18  0342 09/28/18  0343 09/27/18  0204   SODIUM mmol/L 144 140 138   POTASSIUM mmol/L 3.8 3.6 4.3   CHLORIDE mmol/L 117* 116* 112*   CO2 mmol/L 20.0 17.0* 18.0*   BUN mg/dL 13 10 10   CREATININE mg/dL 0.68 0.76 0.85   GLUCOSE mg/dL 104* 148* 139*   CALCIUM mg/dL 8.0* 8.4* 8.3*   ALT (SGPT) U/L  --   --  22   AST (SGOT) U/L  --   --  29     Estimated Creatinine Clearance: 77 mL/min (by C-G formula based on SCr of 0.68 mg/dL).  No results found for: BNP    Microbiology Results Abnormal     Procedure Component Value - Date/Time    Blood Culture - Blood, [[id number]]  (Normal) Collected:  09/27/18 0325    Lab Status:  Preliminary result Specimen:  Blood from Arm, Left Updated:  10/01/18 0346     Blood Culture No growth at 4 days    MRSA Screen, PCR - Swab, Nares [[id number]]  (Abnormal) Collected:  09/29/18 1448    Lab Status:  Final result Specimen:  Swab from Nares Updated:  09/29/18 2125     MRSA, PCR Positive (C)    Respiratory Culture - Sputum, Cough [[id number]] Collected:  09/27/18 1733    Lab Status:  Final result Specimen:  Sputum from Cough Updated:  09/29/18 0730     Respiratory Culture Light growth (2+) Normal Respiratory Sandrine     Gram Stain Result Many (4+) WBCs per low power field      Few (2+) Epithelial cells per low power field      Rare (1+) Gram positive cocci in pairs    Blood Culture ID, PCR - Blood, [[id number]]  (Abnormal) Collected:  09/27/18 0325    Lab Status:  Final result Specimen:  Blood from Arm, Right Updated:  09/28/18 0459     BCID, PCR Staphylococcus spp, not aureus. Identification by BCID PCR. (C)    Blood Culture - Blood, [[id number]]  (Abnormal) Collected:  09/27/18 0325    Lab Status:  Preliminary result Specimen:  Blood from Arm, Right Updated:  09/28/18 0319     Blood Culture Abnormal Stain (A)     Gram Stain Result Anaerobic Bottle Gram positive cocci in clusters          Imaging Results (last 24 hours)     ** No results found for the last 24 hours. **        Results for orders  placed during the hospital encounter of 09/27/18   Adult Transthoracic Echo Complete W/ Cont if Necessary Per Protocol    Narrative · Moderate tricuspid valve regurgitation is present.  · Mild pulmonic valve regurgitation is present.  · There is calcification of the aortic valve.  · Mild aortic valve regurgitation is present.  · Left atrial cavity size is borderline dilated.  · Moderate mitral valve regurgitation is present          I have reviewed the medications.      [START ON 10/4/2018] Pharmacy Consult  Does not apply Once   apixaban 5 mg Oral Q12H   aspirin 81 mg Oral Daily   atorvastatin 10 mg Oral Nightly   aztreonam 2 g Intravenous Q8H   budesonide-formoterol 2 puff Inhalation BID - RT   cetirizine 10 mg Oral Nightly   diltiaZEM  mg Oral Q24H   doxycycline 100 mg Oral Q12H   famotidine 40 mg Oral Nightly   fluticasone 2 spray Each Nare Daily   lactobacillus acidophilus 1 capsule Oral Daily   levothyroxine 150 mcg Oral Q AM   methylPREDNISolone sodium succinate 20 mg Intravenous Daily   metoprolol tartrate 12.5 mg Oral Q12H   montelukast 10 mg Oral Nightly   pantoprazole 40 mg Oral Q AM   pregabalin 300 mg Oral Q12H   topiramate 100 mg Oral Q12H   [START ON 10/2/2018] vancomycin 1,250 mg Intravenous Q24H         Assessment/Plan   Assessment / Plan     Hospital Problem List     * (Principal)Sepsis (CMS/HCC)    Hypertension    GERD (gastroesophageal reflux disease)    Fibromyalgia    Hx of Stroke with residual left hemiparesis    Atrial fibrillation (CMS/HCC)    Pneumonia        Brief Hospital Course to date:  Vickie Joshi is a 68 y.o. female with history of CVA and left hemiparesis.  Also with hx of Afib.  Presents with shortness of air found to be septic.  Bilateral lower lobe pneumonia right greater than left.  Admitted to hospital medicine service.    1) Sepsis  - , temp 102.7, WBC 15.85 on admit  -continue vancomycin and azactam  -blood cultures pending  -secondary to underlying  pneumonia     2) Pneumonia  -CXR shows bronchopneumonia Right >Left  -continue IV abx, nebs   -blood cultures pending  -sputum culture  -consult SLP--passed swallow   --add cough meds      3) Atrial fibrillation with RVR   -chronic now with in-and-out of RVR  -continue eliquis  -continue diltiazem  -low dose metoprolol     4) Fibromyalgia  -continue home medications     5) GERD  -continue PPI     6) History of CVA   -with residual left sided hemiparesis  -currently does outpatient rehab at [facility]  -fall precautions      Discontinue steroids today  Stop standing nebs every 4 hours due to tachycardia  Started on metoprolol 12.5 mg every 12 hours day before yesterday  HR normalized    DVT prophylaxis:cora/scds, eliquis     CODE STATUS:   Code Status and Medical Interventions:   Ordered at: 09/27/18 0605     Level Of Support Discussed With:    Patient     Code Status:    CPR     Medical Interventions (Level of Support Prior to Arrest):    Full     Disposition: I expect the patient to be discharged home soon    Electronically signed by Herb Avila MD, 10/01/18, 7:25 PM.

## 2018-10-02 LAB
ALBUMIN SERPL-MCNC: 3.74 G/DL (ref 3.2–4.8)
ALBUMIN/GLOB SERPL: 1.6 G/DL (ref 1.5–2.5)
ALP SERPL-CCNC: 57 U/L (ref 25–100)
ALT SERPL W P-5'-P-CCNC: 23 U/L (ref 7–40)
ANION GAP SERPL CALCULATED.3IONS-SCNC: 8 MMOL/L (ref 3–11)
AST SERPL-CCNC: 20 U/L (ref 0–33)
BACTERIA SPEC AEROBE CULT: ABNORMAL
BACTERIA SPEC AEROBE CULT: NORMAL
BILIRUB SERPL-MCNC: 0.4 MG/DL (ref 0.3–1.2)
BUN BLD-MCNC: 12 MG/DL (ref 9–23)
BUN/CREAT SERPL: 18.2 (ref 7–25)
CALCIUM SPEC-SCNC: 8.4 MG/DL (ref 8.7–10.4)
CHLORIDE SERPL-SCNC: 113 MMOL/L (ref 99–109)
CO2 SERPL-SCNC: 21 MMOL/L (ref 20–31)
CREAT BLD-MCNC: 0.66 MG/DL (ref 0.6–1.3)
DEPRECATED RDW RBC AUTO: 47.7 FL (ref 37–54)
ERYTHROCYTE [DISTWIDTH] IN BLOOD BY AUTOMATED COUNT: 13.9 % (ref 11.3–14.5)
FLUAV RNA SPEC QL NAA+PROBE: NEGATIVE
FLUBV RNA SPEC QL NAA+PROBE: NEGATIVE
GFR SERPL CREATININE-BSD FRML MDRD: 90 ML/MIN/1.73
GLOBULIN UR ELPH-MCNC: 2.4 GM/DL
GLUCOSE BLD-MCNC: 108 MG/DL (ref 70–100)
GRAM STN SPEC: ABNORMAL
HADV DNA SPEC QL NAA+PROBE: NEGATIVE
HCT VFR BLD AUTO: 37.8 % (ref 34.5–44)
HGB BLD-MCNC: 11.6 G/DL (ref 11.5–15.5)
HMPV RNA SPEC QL NAA+PROBE: NEGATIVE
HPIV1 RNA SPEC QL NAA+PROBE: NEGATIVE
HPIV2 SPEC QL CULT: NEGATIVE
HPIV3 SPEC QL CULT: NEGATIVE
ISOLATED FROM: ABNORMAL
MCH RBC QN AUTO: 28.8 PG (ref 27–31)
MCHC RBC AUTO-ENTMCNC: 30.7 G/DL (ref 32–36)
MCV RBC AUTO: 93.8 FL (ref 80–99)
PLATELET # BLD AUTO: 228 10*3/MM3 (ref 150–450)
PMV BLD AUTO: 12.8 FL (ref 6–12)
POTASSIUM BLD-SCNC: 3.4 MMOL/L (ref 3.5–5.5)
POTASSIUM BLD-SCNC: 3.9 MMOL/L (ref 3.5–5.5)
PROT SERPL-MCNC: 6.1 G/DL (ref 5.7–8.2)
RBC # BLD AUTO: 4.03 10*6/MM3 (ref 3.89–5.14)
REF LAB TEST RESULTS: NORMAL
RHINOVIRUS RNA SPEC QL NAA+PROBE: POSITIVE
RSV A: NEGATIVE
RSV B RNA SPEC QL NAA+PROBE: NEGATIVE
SODIUM BLD-SCNC: 142 MMOL/L (ref 132–146)
WBC NRBC COR # BLD: 11.54 10*3/MM3 (ref 3.5–10.8)

## 2018-10-02 PROCEDURE — 84132 ASSAY OF SERUM POTASSIUM: CPT | Performed by: INTERNAL MEDICINE

## 2018-10-02 PROCEDURE — 94760 N-INVAS EAR/PLS OXIMETRY 1: CPT

## 2018-10-02 PROCEDURE — 25010000002 VANCOMYCIN 10 G RECONSTITUTED SOLUTION

## 2018-10-02 PROCEDURE — 85027 COMPLETE CBC AUTOMATED: CPT | Performed by: INTERNAL MEDICINE

## 2018-10-02 PROCEDURE — 80053 COMPREHEN METABOLIC PANEL: CPT | Performed by: INTERNAL MEDICINE

## 2018-10-02 PROCEDURE — 94799 UNLISTED PULMONARY SVC/PX: CPT

## 2018-10-02 PROCEDURE — 94640 AIRWAY INHALATION TREATMENT: CPT

## 2018-10-02 PROCEDURE — 99232 SBSQ HOSP IP/OBS MODERATE 35: CPT | Performed by: NURSE PRACTITIONER

## 2018-10-02 RX ORDER — POTASSIUM CHLORIDE 1.5 G/1.77G
40 POWDER, FOR SOLUTION ORAL AS NEEDED
Status: DISCONTINUED | OUTPATIENT
Start: 2018-10-02 | End: 2018-10-08 | Stop reason: HOSPADM

## 2018-10-02 RX ORDER — POTASSIUM CHLORIDE 750 MG/1
40 CAPSULE, EXTENDED RELEASE ORAL AS NEEDED
Status: DISCONTINUED | OUTPATIENT
Start: 2018-10-02 | End: 2018-10-08 | Stop reason: HOSPADM

## 2018-10-02 RX ADMIN — BUDESONIDE AND FORMOTEROL FUMARATE DIHYDRATE 2 PUFF: 160; 4.5 AEROSOL RESPIRATORY (INHALATION) at 06:36

## 2018-10-02 RX ADMIN — CETIRIZINE HYDROCHLORIDE 10 MG: 10 TABLET, FILM COATED ORAL at 21:14

## 2018-10-02 RX ADMIN — PANTOPRAZOLE SODIUM 40 MG: 40 TABLET, DELAYED RELEASE ORAL at 05:30

## 2018-10-02 RX ADMIN — SODIUM CHLORIDE 2 G: 900 INJECTION INTRAVENOUS at 05:00

## 2018-10-02 RX ADMIN — POTASSIUM CHLORIDE 40 MEQ: 750 CAPSULE, EXTENDED RELEASE ORAL at 15:56

## 2018-10-02 RX ADMIN — ALBUTEROL SULFATE 2.5 MG: 2.5 SOLUTION RESPIRATORY (INHALATION) at 14:32

## 2018-10-02 RX ADMIN — Medication 1 CAPSULE: at 09:39

## 2018-10-02 RX ADMIN — ALBUTEROL SULFATE 2.5 MG: 2.5 SOLUTION RESPIRATORY (INHALATION) at 02:24

## 2018-10-02 RX ADMIN — DOXYCYCLINE 100 MG: 100 CAPSULE ORAL at 09:38

## 2018-10-02 RX ADMIN — LEVOTHYROXINE SODIUM 150 MCG: 150 TABLET ORAL at 05:38

## 2018-10-02 RX ADMIN — SODIUM CHLORIDE 2 G: 900 INJECTION INTRAVENOUS at 21:18

## 2018-10-02 RX ADMIN — TOPIRAMATE 100 MG: 100 TABLET, FILM COATED ORAL at 21:18

## 2018-10-02 RX ADMIN — DOXYCYCLINE 100 MG: 100 CAPSULE ORAL at 21:14

## 2018-10-02 RX ADMIN — DILTIAZEM HYDROCHLORIDE 360 MG: 180 CAPSULE, COATED, EXTENDED RELEASE ORAL at 09:39

## 2018-10-02 RX ADMIN — PREGABALIN 300 MG: 100 CAPSULE ORAL at 21:14

## 2018-10-02 RX ADMIN — ATORVASTATIN CALCIUM 10 MG: 10 TABLET, FILM COATED ORAL at 21:13

## 2018-10-02 RX ADMIN — ASPIRIN 81 MG: 81 TABLET, COATED ORAL at 09:39

## 2018-10-02 RX ADMIN — BUDESONIDE AND FORMOTEROL FUMARATE DIHYDRATE 2 PUFF: 160; 4.5 AEROSOL RESPIRATORY (INHALATION) at 20:08

## 2018-10-02 RX ADMIN — ALBUTEROL SULFATE 2.5 MG: 2.5 SOLUTION RESPIRATORY (INHALATION) at 20:05

## 2018-10-02 RX ADMIN — APIXABAN 5 MG: 5 TABLET, FILM COATED ORAL at 21:13

## 2018-10-02 RX ADMIN — SODIUM CHLORIDE 2 G: 900 INJECTION INTRAVENOUS at 15:56

## 2018-10-02 RX ADMIN — TOPIRAMATE 100 MG: 100 TABLET, FILM COATED ORAL at 10:30

## 2018-10-02 RX ADMIN — FLUTICASONE PROPIONATE 2 SPRAY: 50 SPRAY, METERED NASAL at 09:39

## 2018-10-02 RX ADMIN — MONTELUKAST SODIUM 10 MG: 10 TABLET, COATED ORAL at 21:14

## 2018-10-02 RX ADMIN — POTASSIUM CHLORIDE 40 MEQ: 750 CAPSULE, EXTENDED RELEASE ORAL at 12:51

## 2018-10-02 RX ADMIN — FAMOTIDINE 40 MG: 20 TABLET ORAL at 21:14

## 2018-10-02 RX ADMIN — APIXABAN 5 MG: 5 TABLET, FILM COATED ORAL at 09:39

## 2018-10-02 RX ADMIN — ALBUTEROL SULFATE 2.5 MG: 2.5 SOLUTION RESPIRATORY (INHALATION) at 06:36

## 2018-10-02 RX ADMIN — ALBUTEROL SULFATE 2.5 MG: 2.5 SOLUTION RESPIRATORY (INHALATION) at 10:45

## 2018-10-02 RX ADMIN — VANCOMYCIN HYDROCHLORIDE 1250 MG: 10 INJECTION, POWDER, LYOPHILIZED, FOR SOLUTION INTRAVENOUS at 06:59

## 2018-10-02 RX ADMIN — METOPROLOL TARTRATE 12.5 MG: 25 TABLET, FILM COATED ORAL at 09:38

## 2018-10-02 RX ADMIN — PREGABALIN 300 MG: 100 CAPSULE ORAL at 09:39

## 2018-10-02 RX ADMIN — METOPROLOL TARTRATE 12.5 MG: 25 TABLET, FILM COATED ORAL at 21:13

## 2018-10-02 NOTE — PROGRESS NOTES
[facility] Medicine Services  PROGRESS NOTE    Patient Name: Vickie Joshi  : 1950  MRN: [id number]    Date of Admission: 2018  Length of Stay: 5  Primary Care Physician: Dav Bryan MD    Subjective   Subjective     CC:  Shortness of air    HPI:    She is seen resting upright in bed in no acute distress.  No visitors at bedside.  Slow to improve.  Orts still having occasional cough and expiratory wheezes.  Tolerating diet.  No nausea or vomiting.  No chest pain, shortness of air, rash.  Reports being weak and not feeling ready for discharge home today.  Awaiting infectious disease recommendations.      Review of Systems  Gen- No fevers, chills.  + general fatigue  CV- No chest pain, palpitations.  Resp- positive cough, dyspnea  GI- No N/V/D, abd pain    Otherwise ROS is negative except as mentioned in the HPI.    Objective   Objective     Vital Signs:   Temp:  [97.6 °F (36.4 °C)-98.5 °F (36.9 °C)] 97.6 °F (36.4 °C)  Heart Rate:  [71-86] 86  Resp:  [18] 18  BP: (137-156)/(71-88) 156/88  Total (NIH Stroke Scale): 9     Physical Exam:  Constitutional: No acute distress, awake, alert. Sitting up in bed.  No visitors at bs  Eyes: PERRLA, sclerae anicteric  HENT: NCAT, mucous membranes moist  Neck: Supple,  trachea midline  Respiratory: Scattered exp wheezes.  No crackles, nonlabored respirations on RA.  Occ non-productive cough on exam.  Talking in full sentences   Cardiovascular: RRR, s1 and s2  Gastrointestinal: Positive bowel sounds, soft, nontender, nondistended.  Obese  Musculoskeletal: No bilateral ankle edema, no clubbing or cyanosis to extremities. Moves Rt side wnl.  Hx cva and only gross mvmt of Lt side.    Psychiatric: Appropriate affect, cooperative and calm   Neurologic: Oriented x 3, moves right side WNL.  Left side upper and lower extremity with gross motor movement only (history of CVA with left hemiparesis),speech clear and appropriate.  Follows  commands   Skin: No rashes    Results Reviewed:  I have personally reviewed current lab, radiology, and data and agree.      Results from last 7 days  Lab Units 10/02/18  0424 09/29/18  1007 09/28/18  0343   WBC 10*3/mm3 11.54* 13.04* 10.82*   HEMOGLOBIN g/dL 11.6 11.3* 11.0*   HEMATOCRIT % 37.8 37.2 35.9   PLATELETS 10*3/mm3 228 207 192       Results from last 7 days  Lab Units 10/02/18  0424 10/01/18  0342 09/28/18  0343 09/27/18  0204   SODIUM mmol/L 142 144 140 138   POTASSIUM mmol/L 3.4* 3.8 3.6 4.3   CHLORIDE mmol/L 113* 117* 116* 112*   CO2 mmol/L 21.0 20.0 17.0* 18.0*   BUN mg/dL 12 13 10 10   CREATININE mg/dL 0.66 0.68 0.76 0.85   GLUCOSE mg/dL 108* 104* 148* 139*   CALCIUM mg/dL 8.4* 8.0* 8.4* 8.3*   ALT (SGPT) U/L 23  --   --  22   AST (SGOT) U/L 20  --   --  29     Estimated Creatinine Clearance: 77 mL/min (by C-G formula based on SCr of 0.66 mg/dL).  No results found for: BNP    Microbiology Results Abnormal     Procedure Component Value - Date/Time    Blood Culture - Blood, [[id number]]  (Abnormal) Collected:  09/27/18 0325    Lab Status:  Final result Specimen:  Blood from Arm, Right Updated:  10/02/18 1333     Blood Culture Staphylococcus, coagulase negative (A)     Comment: Suggests contamination  Susceptibility will not be done unless Doctor notifies Lab          Isolated from Anaerobic Bottle     Gram Stain Result Anaerobic Bottle Gram positive cocci in clusters    Resp Virus Profile (RVP) PCR - Swab, Nasopharynx [[id number]]  (Abnormal) Collected:  09/27/18 0941    Lab Status:  Final result Specimen:  Swab from Nasopharynx Updated:  10/02/18 0615     Influenza A PCR Negative     Influenza B PCR Negative     RSV A Negative     RSV B Negative     Parainfluenza Virus 1 Negative     Parainfluenza Virus 2 Negative     Parainfluenza Virus 3 Negative     Human Rhinovirus/Enterovirus Positive (A)     Human Metapneumovirus Negative     Adenovirus Detection by PCR Negative    Narrative:       Performed at:   [facility] - [facility] [id number] [place], [place]  [id number]  : Erik Gillette MD, Phone:  [phone]    Blood Culture - Blood, [[id number]]  (Normal) Collected:  09/27/18 0325    Lab Status:  Final result Specimen:  Blood from Arm, Left Updated:  10/02/18 0346     Blood Culture No growth at 5 days    MRSA Screen, PCR - Swab, Nares [[id number]]  (Abnormal) Collected:  09/29/18 1448    Lab Status:  Final result Specimen:  Swab from Nares Updated:  09/29/18 2125     MRSA, PCR Positive (C)    Respiratory Culture - Sputum, Cough [[id number]] Collected:  09/27/18 1733    Lab Status:  Final result Specimen:  Sputum from Cough Updated:  09/29/18 0730     Respiratory Culture Light growth (2+) Normal Respiratory Sandrine     Gram Stain Result Many (4+) WBCs per low power field      Few (2+) Epithelial cells per low power field      Rare (1+) Gram positive cocci in pairs    Blood Culture ID, PCR - Blood, [[id number]]  (Abnormal) Collected:  09/27/18 0325    Lab Status:  Final result Specimen:  Blood from Arm, Right Updated:  09/28/18 0459     BCID, PCR Staphylococcus spp, not aureus. Identification by BCID PCR. (C)          Imaging Results (last 24 hours)     ** No results found for the last 24 hours. **        Results for orders placed during the hospital encounter of 09/27/18   Adult Transthoracic Echo Complete W/ Cont if Necessary Per Protocol    Narrative · Moderate tricuspid valve regurgitation is present.  · Mild pulmonic valve regurgitation is present.  · There is calcification of the aortic valve.  · Mild aortic valve regurgitation is present.  · Left atrial cavity size is borderline dilated.  · Moderate mitral valve regurgitation is present          I have reviewed the medications.      [START ON 10/4/2018] Pharmacy Consult  Does not apply Once   apixaban 5 mg Oral Q12H   aspirin 81 mg Oral Daily   atorvastatin 10 mg Oral Nightly   aztreonam 2 g Intravenous Q8H   budesonide-formoterol 2 puff Inhalation  BID - RT   cetirizine 10 mg Oral Nightly   diltiaZEM  mg Oral Q24H   doxycycline 100 mg Oral Q12H   famotidine 40 mg Oral Nightly   fluticasone 2 spray Each Nare Daily   lactobacillus acidophilus 1 capsule Oral Daily   levothyroxine 150 mcg Oral Q AM   metoprolol tartrate 12.5 mg Oral Q12H   montelukast 10 mg Oral Nightly   pantoprazole 40 mg Oral Q AM   pregabalin 300 mg Oral Q12H   topiramate 100 mg Oral Q12H   vancomycin 1,250 mg Intravenous Q24H         Assessment/Plan   Assessment / Plan     Hospital Problem List     * (Principal)Sepsis (CMS/[facility])    Hypertension    GERD (gastroesophageal reflux disease)    Fibromyalgia    Hx of Stroke with residual left hemiparesis    Atrial fibrillation (CMS/[facility])    Pneumonia        Brief Hospital Course to date:  Vickie Joshi is a 68 y.o. female with history of CVA and left hemiparesis.  Also with hx of Afib.  Presents with shortness of air found to be septic.  Bilateral lower lobe pneumonia right greater than left.  Admitted to hospital medicine service.    1) Sepsis  - , temp 102.7, WBC 15.85 on admit  -continue vancomycin and azactam  -blood cultures pending  -secondary to underlying pneumonia  --improving  --ID following for abx mgmt since 10/1     2) Pneumonia  -CXR shows bronchopneumonia Right >Left  -continue IV abx, nebs   -blood cultures pending  -sputum culture  -consult SLP--passed swallow   --added cough meds      3) Atrial fibrillation with RVR   -chronic now with in-and-out of RVR  -continue eliquis  -continue diltiazem  -new low dose metoprolol this admit. HR improved     4) Fibromyalgia  -continue home medications     5) GERD  -continue PPI     6) History of CVA   -with residual left sided hemiparesis  -currently does outpatient rehab at [facility]  -fall precautions      Discontinued steroids yesterday  Stopped standing nebs every 4 hours due to tachycardia, still with prn nebs  Continue on metoprolol 12.5 mg every 12 hours as it appears  to be working well  HR normalized  Plan is home with HH/PT/OT and IV abx, all per ID final recs.  (?abx through 10/24 possibly)    DVT prophylaxis:cora/scds, eliquis     CODE STATUS:   Code Status and Medical Interventions:   Ordered at: 09/27/18 0605     Level Of Support Discussed With:    Patient     Code Status:    CPR     Medical Interventions (Level of Support Prior to Arrest):    Full     Disposition: I expect the patient to be discharged home with HH when medically ready. Pending final ID recs.     Electronically signed by FABIANA Johnson, 10/02/18, 2:47 PM.

## 2018-10-02 NOTE — PLAN OF CARE
Problem: Patient Care Overview  Goal: Plan of Care Review  Outcome: Ongoing (interventions implemented as appropriate)   10/02/18 5815   OTHER   Outcome Summary vss no complaints at this time

## 2018-10-02 NOTE — PROGRESS NOTES
[facility] Progress Note        Antibiotics:  Anti-Infectives     Ordered     Dose/Rate Route Frequency Start Stop    10/01/18 1225  vancomycin 1250 mg/250 mL 0.9% NS IVPB ([facility])     Royal Mohamud MD reviewed the order on 10/02/18 0727.   Ordering Provider:  Royal Mohamud MD    1,250 mg  over 90 Minutes Intravenous Every 24 Hours 10/02/18 0600 10/06/18 0559    10/01/18 0810  doxycycline (MONODOX) capsule 100 mg     Ordering Provider:  Royal Mohamud MD    100 mg Oral Every 12 Hours Scheduled 10/01/18 0900 10/08/18 0859    09/27/18 1201  aztreonam (AZACTAM) 2 g in sodium chloride 0.9 % 100 mL IVPB-MBP     Royal Mohamud MD reviewed the order on 10/02/18 0727.   Ordering Provider:  Royal Mohamud MD    2 g  over 4 Hours Intravenous Every 8 Hours 09/27/18 1500 10/09/18 0726    09/27/18 1201  Pharmacy to dose vancomycin     Ordering Provider:  Herb Avila MD     Does not apply Continuous PRN 09/27/18 1149 10/04/18 1148    09/27/18 0537  aztreonam (AZACTAM) 2 g in sodium chloride 0.9 % 100 mL IVPB-MBP     Ordering Provider:  Eliot Recio DO    2 g  200 mL/hr over 30 Minutes Intravenous Once 09/27/18 0539 09/27/18 0753    09/27/18 0537  vancomycin 1750 mg/500 mL 0.9% NS IVPB ([facility])     Ordering Provider:  Eliot Recio DO    20 mg/kg × 90.7 kg Intravenous Once 09/27/18 0539 09/27/18 0811    09/27/18 0316  levoFLOXacin (LEVAQUIN) 750 mg/150 mL D5W (premix) (LEVAQUIN) 750 mg     Ordering Provider:  Eliot Recio DO    750 mg  over 90 Minutes Intravenous Once 09/27/18 0318 09/27/18 0600          CC: pneumonia    HPI:  Patient is a 68 y.o.  Yr old female with history of prior CVA and chronic/residual left-sided hemiparesis, chronically debilitated with multiple comorbidity as outlined below.  She was admitted September 27, 2018 with acute cough/shortness of breath and fever over 102 per admission H&P.  Initial blood culture data from September 27 had coag-negative staph species in 1 of 2 sets, sputum with  "normal talya, and MRSA nasal screen positive.  Chest x-ray with right greater than left findings to suggest bronchopneumonia and CT scan with airspace disease in the right lower lobe per radiology.  In addition patient reports her  also with upper respiratory type infection but no specific microbiologic diagnosis.     10/2/18 Shortness of breath/cough persists but less.  No hemoptysis.  No headache photophobia or neck stiffness.  No nausea vomiting or diarrhea.  No new skin rash.    ROS:      10/2/18 No f/c/s. No n/v/d. No rash. No new ADR to Abx.     Constitutional-- at present, No Fever, chills or sweats.  Appetite diminished with generalized malaise and fatigue  Heent-- No new vision, hearing or throat complaints.  No epistaxis or oral sores.  Denies odynophagia or dysphagia.  No flashers, floaters or eye pain. No odynophagia or dysphagia. No headache, photophobia or neck stiffness.  CV-- No chest pain, palpitation or syncope  Resp-- as above  GI- No nausea, vomiting, or diarrhea.  No hematochezia, melena, or hematemesis. Denies jaundice or chronic liver disease.  -- No dysuria, hematuria, or flank pain.  Denies hesitancy, urgency or flank pain.  Lymph- no swollen lymph nodes in neck/axilla or groin.   Heme- No active bruising or bleeding; no Hx of DVT or PE.  MS-- no swelling or pain in the bones or joints of arms/legs.  No new back pain.  Neuro-- No acute focal weakness or numbness in the arms or legs.  No seizures.  Chronic left-sided hemiparesis after stroke     Full 12 point review of systems reviewed and negative otherwise for acute complaints, except for above       PE: Nursing/chaperone present  /88   Pulse 86   Temp 97.6 °F (36.4 °C) (Oral)   Resp 18   Ht 165.1 cm (65\")   Wt 90.7 kg (200 lb)   SpO2 96%   BMI 33.28 kg/m²     GENERAL: Awake and alert, in no acute distress.  Obese and chronically ill-appearing  HEENT: Normocephalic, atraumatic.  PERRL. EOMI. No conjunctival injection. " No icterus. Oropharynx clear without evidence of thrush or exudate. No evidence of peridontal disease.    NECK: Supple without nuchal rigidity. No mass.  LYMPH: No cervical, axillary or inguinal lymphadenopathy.  HEART: RRR; No murmur, rubs, gallops.   LUNGS: Diminished at right base more so than left with scattered rhonchi. Normal respiratory effort. Nonlabored. No dullness.  ABDOMEN: Soft, nontender, nondistended. Positive bowel sounds. No rebound or guarding. NO mass or HSM.  EXT:  No cyanosis, clubbing or edema. No cord.  : Genitalia generally unremarkable.  Without Gunderson catheter.  MSK: FROM without joint effusions noted arms/legs.    SKIN: Warm and dry without cutaneous eruptions on Inspection/palpation.    NEURO: Oriented to PPT.  Chronic left-sided hemiparesis noted  PSYCHIATRIC: Normal insight and judgement. Cooperative with PE     No peripheral stigmata/phenomena of endocarditis     IV without obvious redness or drainage    Laboratory Data      Results from last 7 days  Lab Units 10/02/18  0424 09/29/18  1007 09/28/18  0343   WBC 10*3/mm3 11.54* 13.04* 10.82*   HEMOGLOBIN g/dL 11.6 11.3* 11.0*   HEMATOCRIT % 37.8 37.2 35.9   PLATELETS 10*3/mm3 228 207 192       Results from last 7 days  Lab Units 10/02/18  0424   SODIUM mmol/L 142   POTASSIUM mmol/L 3.4*   CHLORIDE mmol/L 113*   CO2 mmol/L 21.0   BUN mg/dL 12   CREATININE mg/dL 0.66   GLUCOSE mg/dL 108*   CALCIUM mg/dL 8.4*       Results from last 7 days  Lab Units 10/02/18  0424   ALK PHOS U/L 57   BILIRUBIN mg/dL 0.4   ALT (SGPT) U/L 23   AST (SGOT) U/L 20               Estimated Creatinine Clearance: 77 mL/min (by C-G formula based on SCr of 0.66 mg/dL).      Microbiology:      Radiology:  Imaging Results (last 72 hours)     ** No results found for the last 72 hours. **            Impression:   --Acute pneumonia, SIRS+ at admission, bilateral with right greater than left infiltrates on chest x-ray, rhinovirus/enterovisrus + and  exposure to   and concern for  secondary bacterial process as well; empiric treatment for CAP/MRSA and Hx PCN/ceph allergies.  Empiric antibiotics ongoing with vancomycin/Azactam and doxycycline.  MRSA screen positive and certainly could be a pathogen in this setting but sputum did not corroborate that.  Empiric antibiotics would cover that in any case.  If significant pleural effusions evolve, you should give consideration to diagnostic/therapeutic thoracentesis.  Patient understands high risk for further serious morbidity and other serious sequela; she understands antibiotics are not a guarantee for cure.  If not steadily improving, you should give consideration to repeat imaging to rule out other evolving parapneumonic complication.     --Acute blood culture positivity, coag-negative staph per microbiology preliminarily and only in one of 2 samples suggest contamination.  No obvious endovascular focus by exam findings.  No chronic line.  She denies acute focal joint complaints to suggest septic joint etc.     --Atrial fibrillation/RVR, paroxysmal per internal medicine.  Treatment per them     --History CVA with chronic left-sided hemiparesis    PLAN:     --IV vancomycin/Azactam and oral doxycycline     --I discussed potential risks and benefits of the prescribed antibiotics that include, but are not limited to, solid organ toxicity, neuro/sosa/vestibular toxicity, renal toxicity, CDiff, cytopenias, hypersensitivity,  etc.. Patient/Family voice understanding and agree to proceed.     --Check/review labs cultures and scans     --Discussed with microbiology     --History per nursing staff and      --Respiratory panel PCR, urine Legionella and strep antigens ordered     --Highly complex set of issues with high risk for further serious morbidity and other serious sequela         Royal Mohamud MD  10/2/2018

## 2018-10-02 NOTE — PLAN OF CARE
Problem: Patient Care Overview  Goal: Plan of Care Review  Outcome: Ongoing (interventions implemented as appropriate)   10/02/18 2849   Coping/Psychosocial   Plan of Care Reviewed With patient   Plan of Care Review   Progress no change   OTHER   Outcome Summary VSS, no other changes

## 2018-10-03 LAB
ANION GAP SERPL CALCULATED.3IONS-SCNC: 7 MMOL/L (ref 3–11)
BACTERIA FLD CULT: NORMAL
BUN BLD-MCNC: 16 MG/DL (ref 9–23)
BUN/CREAT SERPL: 18.2 (ref 7–25)
CALCIUM SPEC-SCNC: 8.2 MG/DL (ref 8.7–10.4)
CHLORIDE SERPL-SCNC: 113 MMOL/L (ref 99–109)
CO2 SERPL-SCNC: 24 MMOL/L (ref 20–31)
CREAT BLD-MCNC: 0.88 MG/DL (ref 0.6–1.3)
GFR SERPL CREATININE-BSD FRML MDRD: 64 ML/MIN/1.73
GLUCOSE BLD-MCNC: 83 MG/DL (ref 70–100)
L PNEUMO1 AG UR QL IA: NEGATIVE
Lab: NORMAL
ORGANISM ID: NORMAL
POTASSIUM BLD-SCNC: 4.3 MMOL/L (ref 3.5–5.5)
S PNEUM AG SPEC QL LA: NEGATIVE
SODIUM BLD-SCNC: 144 MMOL/L (ref 132–146)
SPECIMEN SOURCE: NORMAL

## 2018-10-03 PROCEDURE — 97535 SELF CARE MNGMENT TRAINING: CPT

## 2018-10-03 PROCEDURE — 25010000002 VANCOMYCIN 10 G RECONSTITUTED SOLUTION: Performed by: INTERNAL MEDICINE

## 2018-10-03 PROCEDURE — 94799 UNLISTED PULMONARY SVC/PX: CPT

## 2018-10-03 PROCEDURE — 94640 AIRWAY INHALATION TREATMENT: CPT

## 2018-10-03 PROCEDURE — 92526 ORAL FUNCTION THERAPY: CPT

## 2018-10-03 PROCEDURE — 94760 N-INVAS EAR/PLS OXIMETRY 1: CPT

## 2018-10-03 PROCEDURE — 80048 BASIC METABOLIC PNL TOTAL CA: CPT | Performed by: NURSE PRACTITIONER

## 2018-10-03 PROCEDURE — 99232 SBSQ HOSP IP/OBS MODERATE 35: CPT | Performed by: INTERNAL MEDICINE

## 2018-10-03 PROCEDURE — 97530 THERAPEUTIC ACTIVITIES: CPT

## 2018-10-03 RX ADMIN — BUDESONIDE AND FORMOTEROL FUMARATE DIHYDRATE 2 PUFF: 160; 4.5 AEROSOL RESPIRATORY (INHALATION) at 18:20

## 2018-10-03 RX ADMIN — VANCOMYCIN HYDROCHLORIDE 1250 MG: 10 INJECTION, POWDER, LYOPHILIZED, FOR SOLUTION INTRAVENOUS at 05:22

## 2018-10-03 RX ADMIN — ALBUTEROL SULFATE 2.5 MG: 2.5 SOLUTION RESPIRATORY (INHALATION) at 10:42

## 2018-10-03 RX ADMIN — PREGABALIN 300 MG: 100 CAPSULE ORAL at 21:35

## 2018-10-03 RX ADMIN — SODIUM CHLORIDE 2 G: 900 INJECTION INTRAVENOUS at 16:09

## 2018-10-03 RX ADMIN — METOPROLOL TARTRATE 12.5 MG: 25 TABLET, FILM COATED ORAL at 08:37

## 2018-10-03 RX ADMIN — APIXABAN 5 MG: 5 TABLET, FILM COATED ORAL at 21:36

## 2018-10-03 RX ADMIN — ASPIRIN 81 MG: 81 TABLET, COATED ORAL at 08:37

## 2018-10-03 RX ADMIN — Medication 1 CAPSULE: at 08:37

## 2018-10-03 RX ADMIN — ATORVASTATIN CALCIUM 10 MG: 10 TABLET, FILM COATED ORAL at 21:36

## 2018-10-03 RX ADMIN — TOPIRAMATE 100 MG: 100 TABLET, FILM COATED ORAL at 10:19

## 2018-10-03 RX ADMIN — LEVOTHYROXINE SODIUM 150 MCG: 150 TABLET ORAL at 05:22

## 2018-10-03 RX ADMIN — APIXABAN 5 MG: 5 TABLET, FILM COATED ORAL at 08:37

## 2018-10-03 RX ADMIN — CETIRIZINE HYDROCHLORIDE 10 MG: 10 TABLET, FILM COATED ORAL at 21:35

## 2018-10-03 RX ADMIN — PANTOPRAZOLE SODIUM 40 MG: 40 TABLET, DELAYED RELEASE ORAL at 05:22

## 2018-10-03 RX ADMIN — MONTELUKAST SODIUM 10 MG: 10 TABLET, COATED ORAL at 21:36

## 2018-10-03 RX ADMIN — ALBUTEROL SULFATE 2.5 MG: 2.5 SOLUTION RESPIRATORY (INHALATION) at 22:30

## 2018-10-03 RX ADMIN — FAMOTIDINE 40 MG: 20 TABLET ORAL at 21:35

## 2018-10-03 RX ADMIN — METOPROLOL TARTRATE 12.5 MG: 25 TABLET, FILM COATED ORAL at 21:35

## 2018-10-03 RX ADMIN — DOXYCYCLINE 100 MG: 100 CAPSULE ORAL at 21:35

## 2018-10-03 RX ADMIN — SODIUM CHLORIDE 2 G: 900 INJECTION INTRAVENOUS at 08:37

## 2018-10-03 RX ADMIN — ALBUTEROL SULFATE 2.5 MG: 2.5 SOLUTION RESPIRATORY (INHALATION) at 06:33

## 2018-10-03 RX ADMIN — DILTIAZEM HYDROCHLORIDE 360 MG: 180 CAPSULE, COATED, EXTENDED RELEASE ORAL at 08:37

## 2018-10-03 RX ADMIN — DOXYCYCLINE 100 MG: 100 CAPSULE ORAL at 08:37

## 2018-10-03 RX ADMIN — BUDESONIDE AND FORMOTEROL FUMARATE DIHYDRATE 2 PUFF: 160; 4.5 AEROSOL RESPIRATORY (INHALATION) at 06:33

## 2018-10-03 RX ADMIN — PREGABALIN 300 MG: 100 CAPSULE ORAL at 08:37

## 2018-10-03 RX ADMIN — FLUTICASONE PROPIONATE 2 SPRAY: 50 SPRAY, METERED NASAL at 08:37

## 2018-10-03 RX ADMIN — SODIUM CHLORIDE 2 G: 900 INJECTION INTRAVENOUS at 21:37

## 2018-10-03 RX ADMIN — TOPIRAMATE 100 MG: 100 TABLET, FILM COATED ORAL at 21:35

## 2018-10-03 NOTE — THERAPY TREATMENT NOTE
Acute Care - Occupational Therapy Treatment Note  [facility]     Patient Name: Vickie Joshi  : 1950  MRN: [id number]  Today's Date: 10/3/2018  Onset of Illness/Injury or Date of Surgery: 18  Date of Referral to OT: 18  Referring Physician: FABIANA Chen    Admit Date: 2018       ICD-10-CM ICD-9-CM   1. Dysphagia, unspecified type R13.10 787.20   2. Impaired mobility and ADLs Z74.09 799.89   3. Impaired functional mobility, balance, gait, and endurance Z74.09 V49.89   4. Cerebrovascular accident (CVA), unspecified mechanism (CMS/[facility]) I63.9 434.91   5. Fall, subsequent encounter W19.XXXD V58.89     E888.9   6. Pneumonia of right lung due to infectious organism, unspecified part of lung J18.9 483.8   7. Sepsis, due to unspecified organism (CMS/[facility]) A41.9 038.9     995.91   8. Atrial fibrillation, chronic (CMS/[facility]) I48.2 427.31     Patient Active Problem List   Diagnosis   • Fall   • Closed fracture of neck of left femur (CMS/HCC)   • Hyperlipidemia   • Hypertension   • Hx of Migraine   • GERD (gastroesophageal reflux disease)   • Fibromyalgia   • Neuropathy   • Rapid atrial fibrillation (CMS/HCC)   • Chronic anticoagulation on Eliquis   • Hx of Stroke with residual left hemiparesis   • Sepsis (CMS/HCC)   • Atrial fibrillation (CMS/HCC)   • Pneumonia     Past Medical History:   Diagnosis Date   • Arthritis    • Atrial fibrillation (CMS/HCC)    • Cardiac disorder    • Disease of thyroid gland    • Fibromyalgia    • GERD (gastroesophageal reflux disease)    • Hyperlipidemia    • Hypertension    • Migraine    • Neuropathy    • Stroke (CMS/HCC)      Past Surgical History:   Procedure Laterality Date   • CHOLECYSTECTOMY     • HIP PERCUTANEOUS PINNING Left 2017    Procedure: LEFT HIP PERCUTANEOUS PINNING FEMORAL NECK;  Surgeon: Ezra Barlow MD;  Location: [facility];  Service:    • HYSTERECTOMY     • ND CLOSED RX TRAUMATIC HIP DISLOCATN Left 2017    Procedure: LEFT HIP CLOSED  REDUCTION;  Surgeon: Ezra Barlow MD;  Location: [facility];  Service: Orthopedics   • SHOULDER SURGERY         Therapy Treatment          Rehabilitation Treatment Summary     Row Name 10/03/18 0817             Treatment Time/Intention    Discipline occupational therapist  -KF      Document Type therapy note (daily note)  -KF      Subjective Information complains of;weakness;fatigue  -KF      Mode of Treatment individual therapy;occupational therapy  -KF      Patient/Family Observations Pt supine in bed,  present throughout, IV intact throughout  -KF      Care Plan Review evaluation/treatment results reviewed;care plan/treatment goals reviewed;current/potential barriers reviewed;risks/benefits reviewed;patient/other agree to care plan  -KF      Care Plan Review, Other Participant(s) spouse  -KF      Patient Effort excellent  -KF      Comment SOA upon sitting EOB and with exertion, required rest breaks for breathing and elevated HR  -KF      Existing Precautions/Restrictions fall;other (see comments)   L hemiparesis  -KF      Patient Response to Treatment tolerated  -KF      Recorded by [KF] Ramona Dee OT 10/03/18 0908      Row Name 10/03/18 0817             Vital Signs    Pre Systolic BP Rehab --   RN cleared vitals stable, HR monitored  -KF      Intratreatment Heart Rate (beats/min) 133  -KF      Posttreatment Heart Rate (beats/min) 119  -KF      O2 Delivery Pre Treatment room air  -KF      Post SpO2 (%) 94  -KF      O2 Delivery Post Treatment room air  -KF      Pre Patient Position Supine  -KF      Intra Patient Position Standing  -KF      Post Patient Position Sitting  -KF      Recorded by [KF] Ramona Dee OT 10/03/18 0908      Row Name 10/03/18 0817             Cognitive Assessment/Intervention    Additional Documentation Cognitive Assessment/Intervention (Group)  -KF      Recorded by [KF] Ramona Dee OT 10/03/18 0908      Row Name 10/03/18 0817             Cognitive  Assessment/Intervention- PT/OT    Affect/Mental Status (Cognitive) WFL  -KF      Orientation Status (Cognition) oriented x 4  -KF      Follows Commands (Cognition) WFL;follows one step commands;over 90% accuracy  -KF      Cognitive Function (Cognitive) safety deficit  -KF      Safety Deficit (Cognitive) mild deficit;safety precautions awareness;insight into deficits/self awareness;awareness of need for assistance  -KF      Cognitive Interventions (Cognitive) occupation/activity based interventions;process/task specific training  -KF      Personal Safety Interventions fall prevention program maintained;gait belt;muscle strengthening facilitated;nonskid shoes/slippers when out of bed  -KF      Recorded by [KF] Ramona Dee, OT 10/03/18 0908      Row Name 10/03/18 0817             Safety Issues, Functional Mobility    Safety Issues Affecting Function (Mobility) safety precaution awareness;insight into deficits/self awareness  -KF      Impairments Affecting Function (Mobility) balance;coordination;endurance/activity tolerance;strength  -KF      Recorded by [KF] Ramona Dee, OT 10/03/18 0908      Row Name 10/03/18 0817             Bed Mobility Assessment/Treatment    Bed Mobility Assessment/Treatment sit-supine;scooting/bridging  -KF      Rolling Right [place] (Bed Mobility) moderate assist (50% patient effort);verbal cues  -KF      Scooting/Bridging [place] (Bed Mobility) maximum assist (25% patient effort);verbal cues  -KF      Supine-Sit [place] (Bed Mobility) moderate assist (50% patient effort);verbal cues  -KF      Bed Mobility, Safety Issues decreased use of legs for bridging/pushing;decreased use of arms for pushing/pulling;impaired trunk control for bed mobility  -KF      Assistive Device (Bed Mobility) bed rails;draw sheet;head of bed elevated  -KF      Comment (Bed Mobility) improved ability to roll to R side, improved ability to move LEs toward EOB, max A to scoot  -KF       Recorded by [KF] Ramona Dee, OT 10/03/18 0908      Row Name 10/03/18 0817             Functional Mobility    Functional Mobility- Ind. Level not tested  -KF      Functional Mobility- Comment deferred to PT  -KF      Recorded by [KF] Ramona Dee, OT 10/03/18 0908      Row Name 10/03/18 0817             Transfer Assessment/Treatment    Transfer Assessment/Treatment sit-stand transfer;stand-sit transfer;bed-chair transfer  -KF      Comment (Transfers) VC's for postural control and seq throughout  -KF      Recorded by [KF] Ramona Dee, OT 10/03/18 0908      Row Name 10/03/18 0817             Bed-Chair Transfer    Bed-Chair [place] (Transfers) moderate assist (50% patient effort);verbal cues  -KF      Assistive Device (Bed-Chair Transfers) cane, quad  -KF      Recorded by [KF] Ramona Dee, OT 10/03/18 0908      Row Name 10/03/18 0817             Sit-Stand Transfer    Sit-Stand [place] (Transfers) minimum assist (75% patient effort);verbal cues  -KF      Assistive Device (Sit-Stand Transfers) cane, quad  -KF      Recorded by [KF] Ramona Dee, OT 10/03/18 0908      Row Name 10/03/18 0817             Stand-Sit Transfer    Stand-Sit [place] (Transfers) minimum assist (75% patient effort);verbal cues  -KF      Assistive Device (Stand-Sit Transfers) cane, quad  -KF      Recorded by [KF] Ramona Dee, OT 10/03/18 0908      Row Name 10/03/18 0817             Stand Pivot/Stand Step Transfer    Stand Pivot/Stand Step [place] moderate assist (50% patient effort);verbal cues  -KF      Assistive Device (Stand Pivot Stand Step Transfer) cane, quad  -KF      Recorded by [KF] Ramona Dee, OT 10/03/18 0908      Row Name 10/03/18 0817             ADL Assessment/Intervention    BADL Assessment/Intervention lower body dressing  -KF      Recorded by [KF] Ramona Dee, OT 10/03/18 0908      Row Name 10/03/18 0817             Lower Body Dressing Assessment/Training     Lower Body Dressing [place] Level don;socks;shoes/slippers;dependent (less than 25% patient effort);pants/bottoms;undergarment;maximum assist (25% patient effort);verbal cues  -KF      Lower Body Dressing Position edge of bed sitting  -KF      Comment (Lower Body Dressing) able to maintain balance unsupported and begin participation in ADL s  -KF      Recorded by [KF] Ramona Dee, OT 10/03/18 0908      Row Name 10/03/18 0817             BADL Safety/Performance    Impairments, BADL Safety/Performance balance;endurance/activity tolerance;strength;range of motion;motor control;motor planning;muscle tone abnormality  -KF      Skilled BADL Treatment/Intervention BADL process/adaptation training;cognitive/safety deficit modifications  -KF      Progress in BADL Status improvement noted  -KF      Recorded by [KF] Ramona Dee, OT 10/03/18 0908      Row Name 10/03/18 0817             Motor Skills Assessment/Interventions    Additional Documentation Balance (Group);Balance Interventions (Group);Therapeutic Exercise (Group);Therapeutic Exercise Interventions (Group)  -KF      Recorded by [KF] Ramona Dee, OT 10/03/18 0908      Row Name 10/03/18 0817             Therapeutic Exercise    Therapeutic Exercise seated, upper extremities  -KF      Additional Documentation Therapeutic Exercise (Row)  -KF      Recorded by [KF] Ramona Dee, OT 10/03/18 0908      Row Name 10/03/18 0817             Upper Extremity Seated Therapeutic Exercise    Performed, Seated Upper Extremity (Therapeutic Exercise) scapular protraction/retraction;scapular stabilization;elbow flexion/extension;digit flexion/extension;shoulder flexion/extension  -KF      Exercise Type, Seated Upper Extremity (Therapeutic Exercise) AAROM (active assistive range of motion);AROM (active range of motion)  -KF      Expected Outcomes, Seated Upper Extremity (Therapeutic Exercise) improve functional tolerance, self-care activity;improve  performance, BADLs  -KF      Sets/Reps Detail, Seated Upper Extremity (Therapeutic Exercise) 2/5  -KF      Transfers Skills, Training to Functional Activity, Seated Upper Extremity (Therapeutic Exercise) beginning to transfer skills to functional activity  -KF      Recorded by [KF] Ramona Dee, OT 10/03/18 0908      Row Name 10/03/18 0817             Balance    Balance static standing balance;static sitting balance;dynamic sitting balance;dynamic standing balance  -KF      Recorded by [KF] Ramona Dee, OT 10/03/18 0908      Row Name 10/03/18 0817             Static Sitting Balance    Level of [place] (Unsupported Sitting, Static Balance) standby assist  -KF      Sitting Position (Unsupported Sitting, Static Balance) sitting on edge of bed  -KF      Time Able to Maintain Position (Unsupported Sitting, Static Balance) 3 to 4 minutes  -KF      Comment (Unsupported Sitting, Static Balance) posterior lean, able to self correct with cues  -KF      Recorded by [KF] Ramona Dee, OT 10/03/18 0908      Row Name 10/03/18 0817             Dynamic Sitting Balance    Level of [place], Reaches Outside Midline (Sitting, Dynamic Balance) contact guard assist  -KF      Sitting Position, Reaches Outside Midline (Sitting, Dynamic Balance) sitting on edge of bed  -KF      Comment, Reaches Outside Midline (Sitting, Dynamic Balance) ADL tasks, LBD  -KF      Recorded by [KF] Ramona Dee, OT 10/03/18 0908      Row Name 10/03/18 0817             Static Standing Balance    Level of [place] (Supported Standing, Static Balance) minimal assist, 75% patient effort  -KF      Time Able to Maintain Position (Supported Standing, Static Balance) 45 to 60 seconds  -KF      Assistive Device Utilized (Supported Standing, Static Balance) quad cane  -KF      Recorded by [KF] Ramona Dee, OT 10/03/18 0908      Row Name 10/03/18 0817             Dynamic Standing Balance    Level of [place], Reaches  Outside Midline (Standing, Dynamic Balance) moderate assist, 50 to 74% patient effort  -KF      Time Able to Maintain Position, Reaches Outside Midline (Standing, Dynamic Balance) 30 to 45 seconds  -KF      Comment, Reaches Outside Midline (Standing, Dynamic Balance) LB dressing tasks in standing, maintaining balance during resistance of LBD  -KF      Recorded by [] Ramona Dee, OT 10/03/18 0908      Row Name 10/03/18 0817             Positioning and Restraints    Pre-Treatment Position in bed  -KF      Post Treatment Position chair  -KF      In Chair notified nsg;reclined;sitting;call light within reach;encouraged to call for assist;with family/caregiver;with other staff;with nsg;LUE elevated;legs elevated  -KF      Recorded by [] Ramona Dee, OT 10/03/18 0908      Row Name 10/03/18 0817             Pain Assessment    Additional Documentation Pain Scale: Numbers Pre/Post-Treatment (Group)  -KF      Recorded by [] Ramona Dee, OT 10/03/18 0908      Row Name 10/03/18 0817             Pain Scale: Numbers Pre/Post-Treatment    Pain Scale: Numbers, Pretreatment 0/10 - no pain  -KF      Pain Scale: Numbers, Post-Treatment 0/10 - no pain  -KF      Pain Intervention(s) Repositioned;Ambulation/increased activity  -KF      Recorded by [] Ramona Dee, OT 10/03/18 0908      Row Name 10/03/18 0817             Plan of Care Review    Plan of Care Reviewed With patient;spouse  -KF      Recorded by [] Ramona Dee, OT 10/03/18 0908      Row Name 10/03/18 0817             Outcome Summary/Treatment Plan (OT)    Daily Summary of Progress (OT) progress toward functional goals as expected  -KF      Recorded by [] Ramona Dee, OT 10/03/18 0908        User Key  (r) = Recorded By, (t) = Taken By, (c) = Cosigned By    Initials Name Effective Dates Discipline    Ramona Hill, OT 04/03/18 -  OT               Occupational Therapy Education     Title: PT OT SLP Therapies (Done)      Topic: Occupational Therapy (Done)     Point: ADL training (Done)     Description: Instruct learner(s) on proper safety adaptation and remediation techniques during self care or transfers.   Instruct in proper use of assistive devices.   Learning Progress Summary     Learner Status Readiness Method Response Comment Documented by    Patient Done Acceptance E VU,DU BUE HEP and LUE HEP sitting EOB, LB dressing EOB retraining with affected extremity first, seq of safe functional transfers KF 10/03/18 0817     Done Eager E VU  KM 10/02/18 0148     Done Eager E VU  KM 10/02/18 0145     Done Acceptance E VU Educated on [place]org[place] dressing, safe functional transfer sequencing, compensatory strategies to improve bed mobility KF 10/01/18 0805     Done Acceptance E VU,NR [place] dressing techniquess, safe sequencing of transfers, purpose of OT skilled services KF 09/28/18 0947    Family Done Acceptance E VU,NR [place] dressing techniquess, safe sequencing of transfers, purpose of OT skilled services KF 09/28/18 0947    Significant Other Done Acceptance E VU,DU BUE HEP and LUE HEP sitting EOB, LB dressing EOB retraining with affected extremity first, seq of safe functional transfers KF 10/03/18 0817     Done Acceptance E VU Educated on [place] dressing, safe functional transfer sequencing, compensatory strategies to improve bed mobility KF 10/01/18 0805          Point: Home exercise program (Done)     Description: Instruct learner(s) on appropriate technique for monitoring, assisting and/or progressing therapeutic exercises/activities.   Learning Progress Summary     Learner Status Readiness Method Response Comment Documented by    Patient Done Acceptance E VU,DU BUE HEP and LUE HEP sitting EOB, LB dressing EOB retraining with affected extremity first, seq of safe functional transfers KF 10/03/18 0817     Done Eager E VU   10/02/18 0148     Done Eager E VU  KM 10/02/18 0145    Significant Other Done Acceptance E VU,DU BUE HEP and LUE HEP  sitting EOB, LB dressing EOB retraining with affected extremity first, seq of safe functional transfers KF 10/03/18 0817          Point: Precautions (Done)     Description: Instruct learner(s) on prescribed precautions during self-care and functional transfers.   Learning Progress Summary     Learner Status Readiness Method Response Comment Documented by    Patient Done Acceptance E VU,DU BUE HEP and LUE HEP sitting EOB, LB dressing EOB retraining with affected extremity first, seq of safe functional transfers KF 10/03/18 0817     Done Eager E VU  KM 10/02/18 0148     Done Eager E VU  KM 10/02/18 0145     Done Acceptance E VU Educated on [place] dressing, safe functional transfer sequencing, compensatory strategies to improve bed mobility KF 10/01/18 0805     Done Acceptance E VU,NR [place] dressing techniquess, safe sequencing of transfers, purpose of OT skilled services KF 09/28/18 0947    Family Done Acceptance E VU,NR [place] dressing techniquess, safe sequencing of transfers, purpose of OT skilled services KF 09/28/18 0947    Significant Other Done Acceptance E VU,DU BUE HEP and LUE HEP sitting EOB, LB dressing EOB retraining with affected extremity first, seq of safe functional transfers KF 10/03/18 0817     Done Acceptance E VU Educated on [place] dressing, safe functional transfer sequencing, compensatory strategies to improve bed mobility KF 10/01/18 0805          Point: Body mechanics (Done)     Description: Instruct learner(s) on proper positioning and spine alignment during self-care, functional mobility activities and/or exercises.   Learning Progress Summary     Learner Status Readiness Method Response Comment Documented by    Patient Done Acceptance E VU,DU BUE HEP and LUE HEP sitting EOB, LB dressing EOB retraining with affected extremity first, seq of safe functional transfers KF 10/03/18 0817     Done Eager E VU  KM 10/02/18 0148     Done Eager E VU  KM 10/02/18 0145     Done Acceptance E VU Educated on [place]  dressing, safe functional transfer sequencing, compensatory strategies to improve bed mobility  10/01/18 0805     Done Acceptance E VU,NR [place] dressing techniquess, safe sequencing of transfers, purpose of OT skilled services  09/28/18 0947    Family Done Acceptance E VU,NR [place] dressing techniquess, safe sequencing of transfers, purpose of OT skilled services  09/28/18 0947    Significant Other Done Acceptance E VU,DU BUE HEP and LUE HEP sitting EOB, LB dressing EOB retraining with affected extremity first, seq of safe functional transfers  10/03/18 0817     Done Acceptance E VU Educated on [place] dressing, safe functional transfer sequencing, compensatory strategies to improve bed mobility  10/01/18 0805                      User Key     Initials Effective Dates Name Provider Type Discipline     06/16/16 -  Xin Quiles RN Registered Nurse Nurse     04/03/18 -  Ramona Dee OT Occupational Therapist OT                OT Recommendation and Plan  Outcome Summary/Treatment Plan (OT)  Daily Summary of Progress (OT): progress toward functional goals as expected  Anticipated Equipment Needs at Discharge (OT):  (none anticipated at this time)  Anticipated Discharge Disposition (OT): home with 24/7 care, home with OP services  Planned Therapy Interventions (OT Eval): activity tolerance training, adaptive equipment training, BADL retraining, cognitive/visual perception retraining, edema control/reduction, functional balance retraining, IADL retraining, neuromuscular control/coordination retraining, occupation/activity based interventions, patient/caregiver education/training, ROM/therapeutic exercise, strengthening exercise, transfer/mobility retraining  Therapy Frequency (OT Eval): daily  Daily Summary of Progress (OT): progress toward functional goals as expected  Plan of Care Review  Plan of Care Reviewed With: patient, spouse  Plan of Care Reviewed With: patient, spouse  Outcome Summary: Pt  modA with quad cane stand pivot transfer bed to chair, min A progressing towards CGA for sit to stands and maintaining balance in standing during ADLs, max A for donning undergarments and pants, dependent for socks and shoes. Cont IPOT per POC        Outcome Measures     Row Name 10/03/18 0817 10/01/18 1025 10/01/18 0805       How much help from another person do you currently need...    Turning from your back to your side while in flat bed without using bedrails?  -- 2  -MB  --    Moving from lying on back to sitting on the side of a flat bed without bedrails?  -- 1  -MB  --    Moving to and from a bed to a chair (including a wheelchair)?  -- 2  -MB  --    Standing up from a chair using your arms (e.g., wheelchair, bedside chair)?  -- 2  -MB  --    Climbing 3-5 steps with a railing?  -- 1  -MB  --    To walk in hospital room?  -- 2  -MB  --    AM-PAC 6 Clicks Score  -- 10  -MB  --       How much help from another is currently needed...    Putting on and taking off regular lower body clothing? 2  -KF  -- 1  -KF    Bathing (including washing, rinsing, and drying) 2  -KF  -- 2  -KF    Toileting (which includes using toilet bed pan or urinal) 1  -KF  -- 1  -KF    Putting on and taking off regular upper body clothing 2  -KF  -- 2  -KF    Taking care of personal grooming (such as brushing teeth) 3  -KF  -- 3  -KF    Eating meals 3  -KF  -- 3  -KF    Score 13  -KF  -- 12  -KF       Functional Assessment    Outcome Measure Options AM-PAC 6 Clicks Daily Activity (OT)  -KF AM-PAC 6 Clicks Basic Mobility (PT)  -MB AM-PAC 6 Clicks Daily Activity (OT)  -KF      User Key  (r) = Recorded By, (t) = Taken By, (c) = Cosigned By    Initials Name Provider Type    Anjelica Camara, PT Physical Therapist    KF Ramona Dee, OT Occupational Therapist           Time Calculation:         Time Calculation- OT     Row Name 10/03/18 0817             Time Calculation- OT    OT Start Time 0817  -KF      OT Stop Time 0841  -KF       OT Time Calculation (min) 24 min  -KF      Total Timed Code Minutes- OT 24 minute(s)  -KF      OT Received On 10/03/18  -KF      OT Goal Re-Cert Due Date 10/08/18  -KF         Timed Charges    37689 - OT Therapeutic Exercise Minutes 4  -KF      41412 - OT Therapeutic Activity Minutes 10  -KF      93674 - OT Self Care/Mgmt Minutes 10  -KF        User Key  (r) = Recorded By, (t) = Taken By, (c) = Cosigned By    Initials Name Provider Type    KF Ramona Dee, OT Occupational Therapist           Therapy Suggested Charges     Code   Minutes Charges    17627 (CPT®) Hc Ot Neuromusc Re Education Ea 15 Min      84103 (CPT®) Hc Ot Ther Proc Ea 15 Min 4     17588 (CPT®) Hc Ot Therapeutic Act Ea 15 Min 10 1    84899 (CPT®) Hc Ot Manual Therapy Ea 15 Min      70200 (CPT®) Hc Ot Iontophoresis Ea 15 Min      59509 (CPT®) Hc Ot Elec Stim Ea-Per 15 Min      97369 (CPT®) Hc Ot Ultrasound Ea 15 Min      77333 (CPT®) Hc Ot Self Care/Mgmt/Train Ea 15 Min 10 1    Total  24 2        Therapy Charges for Today     Code Description Service Date Service Provider Modifiers Qty    45163489025 HC OT THERAPEUTIC ACT EA 15 MIN 10/3/2018 Ramona Dee OT GO 1    90299536261 HC OT SELF CARE/MGMT/TRAIN EA 15 MIN 10/3/2018 Ramona Dee OT GO 1               Ramona Dee OT  10/3/2018

## 2018-10-03 NOTE — PLAN OF CARE
Problem: Fall Risk (Adult)  Goal: Absence of Fall  Outcome: Ongoing (interventions implemented as appropriate)      Problem: Skin Injury Risk (Adult)  Goal: Skin Health and Integrity  Outcome: Ongoing (interventions implemented as appropriate)      Problem: Patient Care Overview  Goal: Plan of Care Review  Outcome: Ongoing (interventions implemented as appropriate)   10/03/18 1523   Coping/Psychosocial   Plan of Care Reviewed With patient;spouse   Plan of Care Review   Progress improving   OTHER   Outcome Summary Patient's vitals are stable. Patient denies any pain/discomfort currently. Awaiting final recommendations from ID for ABX therapy. Will continue to monitor.

## 2018-10-03 NOTE — PROGRESS NOTES
[facility] Progress Note        Antibiotics:  Anti-Infectives     Ordered     Dose/Rate Route Frequency Start Stop    10/01/18 1225  vancomycin 1250 mg/250 mL 0.9% NS IVPB ([facility])     Royal Mohamud MD reviewed the order on 10/03/18 0732.   Ordering Provider:  Royal Mohamud MD    1,250 mg  over 90 Minutes Intravenous Every 24 Hours 10/02/18 0600 10/07/18 0559    10/01/18 0810  doxycycline (MONODOX) capsule 100 mg     Ordering Provider:  Royal Mohamud MD    100 mg Oral Every 12 Hours Scheduled 10/01/18 0900 10/08/18 0859    09/27/18 1201  aztreonam (AZACTAM) 2 g in sodium chloride 0.9 % 100 mL IVPB-MBP     Royal Mohamud MD reviewed the order on 10/02/18 0727.   Ordering Provider:  Royal Mohamud MD    2 g  over 4 Hours Intravenous Every 8 Hours 09/27/18 1500 10/09/18 0726    09/27/18 1201  Pharmacy to dose vancomycin     Aleksandr Thomas [facility] reviewed the order on 10/03/18 0734.   Ordering Provider:  Royal Mohamud MD     Does not apply Continuous PRN 09/27/18 1149 10/10/18 0733    09/27/18 0537  aztreonam (AZACTAM) 2 g in sodium chloride 0.9 % 100 mL IVPB-MBP     Ordering Provider:  Eliot Recio DO    2 g  200 mL/hr over 30 Minutes Intravenous Once 09/27/18 0539 09/27/18 0753    09/27/18 0537  vancomycin 1750 mg/500 mL 0.9% NS IVPB ([facility])     Ordering Provider:  Eliot Recio DO    20 mg/kg × 90.7 kg Intravenous Once 09/27/18 0539 09/27/18 0811    09/27/18 0316  levoFLOXacin (LEVAQUIN) 750 mg/150 mL D5W (premix) (LEVAQUIN) 750 mg     Ordering Provider:  Eliot Recio DO    750 mg  over 90 Minutes Intravenous Once 09/27/18 0318 09/27/18 0600          CC: pneumonia    HPI:  Patient is a 68 y.o.  Yr old female with history of prior CVA and chronic/residual left-sided hemiparesis, chronically debilitated with multiple comorbidity as outlined below.  She was admitted September 27, 2018 with acute cough/shortness of breath and fever over 102 per admission H&P.  Initial blood culture data from  "September 27 had coag-negative staph species in 1 of 2 sets, sputum with normal talya, and MRSA nasal screen positive.  Chest x-ray with right greater than left findings to suggest bronchopneumonia and CT scan with airspace disease in the right lower lobe per radiology.  In addition patient reports her  also with upper respiratory type infection but no specific microbiologic diagnosis.     10/3/18 Shortness of breath/cough persists but less, still limited significantly with any exertion.  No hemoptysis.  No headache photophobia or neck stiffness.  No nausea vomiting or diarrhea.  No new skin rash.    ROS:      10/3/18 No f/c/s. No n/v/d. No rash. No new ADR to Abx.     Constitutional-- at present, No Fever, chills or sweats.  Appetite diminished with generalized malaise and fatigue  Heent-- No new vision, hearing or throat complaints.  No epistaxis or oral sores.  Denies odynophagia or dysphagia.  No flashers, floaters or eye pain. No odynophagia or dysphagia. No headache, photophobia or neck stiffness.  CV-- No chest pain, palpitation or syncope  Resp-- as above  GI- No nausea, vomiting, or diarrhea.  No hematochezia, melena, or hematemesis. Denies jaundice or chronic liver disease.  -- No dysuria, hematuria, or flank pain.  Denies hesitancy, urgency or flank pain.  Lymph- no swollen lymph nodes in neck/axilla or groin.   Heme- No active bruising or bleeding; no Hx of DVT or PE.  MS-- no swelling or pain in the bones or joints of arms/legs.  No new back pain.  Neuro-- No acute focal weakness or numbness in the arms or legs.  No seizures.  Chronic left-sided hemiparesis after stroke     Full 12 point review of systems reviewed and negative otherwise for acute complaints, except for above       PE: Nursing/chaperone present  /86 (BP Location: Right leg, Patient Position: Lying)   Pulse 87   Temp 97.8 °F (36.6 °C) (Oral)   Resp 18   Ht 165.1 cm (65\")   Wt 98.1 kg (216 lb 3.2 oz)   SpO2 93%   BMI " 35.98 kg/m²     GENERAL: Awake and alert, in no acute distress.  Obese and chronically ill-appearing  HEENT: Normocephalic, atraumatic.  PERRL. EOMI. No conjunctival injection. No icterus. Oropharynx clear without evidence of thrush or exudate. No evidence of peridontal disease.    NECK: Supple without nuchal rigidity. No mass.  LYMPH: No cervical, axillary or inguinal lymphadenopathy.  HEART: RRR; No murmur, rubs, gallops.   LUNGS: Diminished at right base more so than left with scattered rhonchi. Normal respiratory effort. Nonlabored. No dullness.  ABDOMEN: Soft, nontender, nondistended. Positive bowel sounds. No rebound or guarding. NO mass or HSM.  EXT:  No cyanosis, clubbing or edema. No cord.  : Genitalia generally unremarkable.  Without Gunderson catheter.  MSK: FROM without joint effusions noted arms/legs.    SKIN: Warm and dry without cutaneous eruptions on Inspection/palpation.    NEURO: Oriented to PPT.  Chronic left-sided hemiparesis noted  PSYCHIATRIC: Normal insight and judgement. Cooperative with PE     No peripheral stigmata/phenomena of endocarditis     IV without obvious redness or drainage    Laboratory Data      Results from last 7 days  Lab Units 10/02/18  0424 09/29/18  1007 09/28/18  0343   WBC 10*3/mm3 11.54* 13.04* 10.82*   HEMOGLOBIN g/dL 11.6 11.3* 11.0*   HEMATOCRIT % 37.8 37.2 35.9   PLATELETS 10*3/mm3 228 207 192       Results from last 7 days  Lab Units 10/03/18  0543   SODIUM mmol/L 144   POTASSIUM mmol/L 4.3   CHLORIDE mmol/L 113*   CO2 mmol/L 24.0   BUN mg/dL 16   CREATININE mg/dL 0.88   GLUCOSE mg/dL 83   CALCIUM mg/dL 8.2*       Results from last 7 days  Lab Units 10/02/18  0424   ALK PHOS U/L 57   BILIRUBIN mg/dL 0.4   ALT (SGPT) U/L 23   AST (SGOT) U/L 20               Estimated Creatinine Clearance: 72.9 mL/min (by C-G formula based on SCr of 0.88 mg/dL).      Microbiology:      Radiology:  Imaging Results (last 72 hours)     ** No results found for the last 72 hours. **             Impression:   --Acute pneumonia, SIRS+ at admission, bilateral with right greater than left infiltrates on chest x-ray, rhinovirus/enterovisrus + and  exposure to  and concern for  secondary bacterial process as well; empiric treatment for CAP/MRSA and Hx PCN/ceph allergies.  Empiric antibiotics ongoing with vancomycin/Azactam and doxycycline.  MRSA screen positive and certainly could be a pathogen in this setting but sputum did not corroborate that.  Empiric antibiotics would cover that in any case.  If significant pleural effusions evolve, you should give consideration to diagnostic/therapeutic thoracentesis.  Patient understands high risk for further serious morbidity and other serious sequela; she understands antibiotics are not a guarantee for cure.  If not steadily improving, you should give consideration to repeat imaging to rule out other evolving parapneumonic complication.     --Acute blood culture positivity, coag-negative staph per microbiology preliminarily and only in one of 2 samples suggest contamination.  No obvious endovascular focus by exam findings.  No chronic line.  She denies acute focal joint complaints to suggest septic joint etc.     --Atrial fibrillation/RVR, paroxysmal per internal medicine.  Treatment per them     --History CVA with chronic left-sided hemiparesis    PLAN:     --IV vancomycin/Azactam and oral doxycycline     --I discussed potential risks and benefits of the prescribed antibiotics that include, but are not limited to, solid organ toxicity, neuro/sosa/vestibular toxicity, renal toxicity, CDiff, cytopenias, hypersensitivity,  etc.. Patient/Family voice understanding and agree to proceed.     --Check/review labs cultures and scans     --Discussed with microbiology     --History per nursing staff and       --Highly complex set of issues with high risk for further serious morbidity and other serious sequela         Royal Mohamud MD  10/3/2018

## 2018-10-03 NOTE — THERAPY TREATMENT NOTE
Acute Care - Speech Language Pathology   Swallow Treatment Note [facility]     Patient Name: Vickie Joshi  : 1950  MRN: [id number]  Today's Date: 10/3/2018  Onset of Illness/Injury or Date of Surgery: 18     Referring Physician: FABIANA Chen      Admit Date: 2018    Visit Dx:      ICD-10-CM ICD-9-CM   1. Dysphagia, unspecified type R13.10 787.20   2. Impaired mobility and ADLs Z74.09 799.89   3. Impaired functional mobility, balance, gait, and endurance Z74.09 V49.89   4. Cerebrovascular accident (CVA), unspecified mechanism (CMS/[facility]) I63.9 434.91   5. Fall, subsequent encounter W19.XXXD V58.89     E888.9   6. Pneumonia of right lung due to infectious organism, unspecified part of lung J18.9 483.8   7. Sepsis, due to unspecified organism (CMS/[facility]) A41.9 038.9     995.91   8. Atrial fibrillation, chronic (CMS/HCC) I48.2 427.31     Patient Active Problem List   Diagnosis   • Fall   • Closed fracture of neck of left femur (CMS/HCC)   • Hyperlipidemia   • Hypertension   • Hx of Migraine   • GERD (gastroesophageal reflux disease)   • Fibromyalgia   • Neuropathy   • Rapid atrial fibrillation (CMS/HCC)   • Chronic anticoagulation on Eliquis   • Hx of Stroke with residual left hemiparesis   • Sepsis (CMS/HCC)   • Atrial fibrillation (CMS/HCC)   • Pneumonia       Therapy Treatment        Rehabilitation Treatment Summary     Row Name 10/03/18 1330 10/03/18 0817          Treatment Time/Intention    Discipline speech language pathologist  -MP occupational therapist  -KF     Document Type therapy note (daily note)  -MP therapy note (daily note)  -KF     Subjective Information no complaints  -MP complains of;weakness;fatigue  -KF     Mode of Treatment  -- individual therapy;occupational therapy  -KF     Patient/Family Observations  -- Pt supine in bed,  present throughout, IV intact throughout  -KF     Care Plan Review care plan/treatment goals reviewed;patient/other agree to care plan  -MP  evaluation/treatment results reviewed;care plan/treatment goals reviewed;current/potential barriers reviewed;risks/benefits reviewed;patient/other agree to care plan  -KF     Care Plan Review, Other Participant(s)  -- spouse  -KF     Therapy Frequency (Swallow) 5 days per week  -MP  --     Patient Effort good  -MP excellent  -KF     Comment  -- SOA upon sitting EOB and with exertion, required rest breaks for breathing and elevated HR  -KF     Existing Precautions/Restrictions  -- fall;other (see comments)   L hemiparesis  -KF     Patient Response to Treatment  -- tolerated  -KF     Recorded by [MP] Flex Camargo, MS, CFY-SLP 10/03/18 1404 [KF] Ramona Dee, OT 10/03/18 0908     Row Name 10/03/18 0817             Vital Signs    Pre Systolic BP Rehab --   RN cleared vitals stable, HR monitored  -KF      Intratreatment Heart Rate (beats/min) 133  -KF      Posttreatment Heart Rate (beats/min) 119  -KF      O2 Delivery Pre Treatment room air  -KF      Post SpO2 (%) 94  -KF      O2 Delivery Post Treatment room air  -KF      Pre Patient Position Supine  -KF      Intra Patient Position Standing  -KF      Post Patient Position Sitting  -KF      Recorded by [KF] Ramona Dee, OT 10/03/18 0908      Row Name 10/03/18 0817             Cognitive Assessment/Intervention    Additional Documentation Cognitive Assessment/Intervention (Group)  -KF      Recorded by [KF] Ramona Dee, OT 10/03/18 0908      Row Name 10/03/18 0817             Cognitive Assessment/Intervention- PT/OT    Affect/Mental Status (Cognitive) WFL  -KF      Orientation Status (Cognition) oriented x 4  -KF      Follows Commands (Cognition) WFL;follows one step commands;over 90% accuracy  -KF      Cognitive Function (Cognitive) safety deficit  -KF      Safety Deficit (Cognitive) mild deficit;safety precautions awareness;insight into deficits/self awareness;awareness of need for assistance  -KF      Cognitive Interventions (Cognitive)  occupation/activity based interventions;process/task specific training  -KF      Personal Safety Interventions fall prevention program maintained;gait belt;muscle strengthening facilitated;nonskid shoes/slippers when out of bed  -KF      Recorded by [KF] Ramona Dee, OT 10/03/18 0908      Row Name 10/03/18 0817             Safety Issues, Functional Mobility    Safety Issues Affecting Function (Mobility) safety precaution awareness;insight into deficits/self awareness  -KF      Impairments Affecting Function (Mobility) balance;coordination;endurance/activity tolerance;strength  -KF      Recorded by [KF] Ramona Dee, OT 10/03/18 0908      Row Name 10/03/18 0817             Bed Mobility Assessment/Treatment    Bed Mobility Assessment/Treatment sit-supine;scooting/bridging  -KF      Rolling Right [place] (Bed Mobility) moderate assist (50% patient effort);verbal cues  -KF      Scooting/Bridging [place] (Bed Mobility) maximum assist (25% patient effort);verbal cues  -KF      Supine-Sit [place] (Bed Mobility) moderate assist (50% patient effort);verbal cues  -KF      Bed Mobility, Safety Issues decreased use of legs for bridging/pushing;decreased use of arms for pushing/pulling;impaired trunk control for bed mobility  -KF      Assistive Device (Bed Mobility) bed rails;draw sheet;head of bed elevated  -KF      Comment (Bed Mobility) improved ability to roll to R side, improved ability to move LEs toward EOB, max A to scoot  -KF      Recorded by [KF] Ramona Dee, OT 10/03/18 0908      Row Name 10/03/18 0817             Functional Mobility    Functional Mobility- Ind. Level not tested  -KF      Functional Mobility- Comment deferred to PT  -KF      Recorded by [KF] Ramona Dee, OT 10/03/18 0908      Row Name 10/03/18 0817             Transfer Assessment/Treatment    Transfer Assessment/Treatment sit-stand transfer;stand-sit transfer;bed-chair transfer  -KF      Comment (Transfers)  VC's for postural control and seq throughout  -KF      Recorded by [KF] Ramona Dee, OT 10/03/18 0908      Row Name 10/03/18 0817             Bed-Chair Transfer    Bed-Chair [place] (Transfers) moderate assist (50% patient effort);verbal cues  -KF      Assistive Device (Bed-Chair Transfers) cane, quad  -KF      Recorded by [KF] Ramona Dee, OT 10/03/18 0908      Row Name 10/03/18 0817             Sit-Stand Transfer    Sit-Stand [place] (Transfers) minimum assist (75% patient effort);verbal cues  -KF      Assistive Device (Sit-Stand Transfers) cane, quad  -KF      Recorded by [KF] Ramona Dee, OT 10/03/18 0908      Row Name 10/03/18 0817             Stand-Sit Transfer    Stand-Sit [place] (Transfers) minimum assist (75% patient effort);verbal cues  -KF      Assistive Device (Stand-Sit Transfers) cane, quad  -KF      Recorded by [KF] Ramona Dee, OT 10/03/18 0908      Row Name 10/03/18 0817             Stand Pivot/Stand Step Transfer    Stand Pivot/Stand Step [place] moderate assist (50% patient effort);verbal cues  -KF      Assistive Device (Stand Pivot Stand Step Transfer) cane, quad  -KF      Recorded by [KF] Ramona Dee, OT 10/03/18 0908      Row Name 10/03/18 0817             ADL Assessment/Intervention    BADL Assessment/Intervention lower body dressing  -KF      Recorded by [KF] Ramona Dee, OT 10/03/18 0908      Row Name 10/03/18 0817             Lower Body Dressing Assessment/Training    Lower Body Dressing [place] Level don;socks;shoes/slippers;dependent (less than 25% patient effort);pants/bottoms;undergarment;maximum assist (25% patient effort);verbal cues  -KF      Lower Body Dressing Position edge of bed sitting  -KF      Comment (Lower Body Dressing) able to maintain balance unsupported and begin participation in ADL s  -KF      Recorded by [KF] Ramona Dee, OT 10/03/18 0908      Row Name 10/03/18 0817             BADL  Safety/Performance    Impairments, BADL Safety/Performance balance;endurance/activity tolerance;strength;range of motion;motor control;motor planning;muscle tone abnormality  -KF      Skilled BADL Treatment/Intervention BADL process/adaptation training;cognitive/safety deficit modifications  -KF      Progress in BADL Status improvement noted  -KF      Recorded by [KF] Ramona Dee, OT 10/03/18 0908      Row Name 10/03/18 0817             Motor Skills Assessment/Interventions    Additional Documentation Balance (Group);Balance Interventions (Group);Therapeutic Exercise (Group);Therapeutic Exercise Interventions (Group)  -KF      Recorded by [KF] Ramona Dee, OT 10/03/18 0908      Row Name 10/03/18 0817             Therapeutic Exercise    Therapeutic Exercise seated, upper extremities  -KF      Additional Documentation Therapeutic Exercise (Row)  -KF      Recorded by [KF] Ramona Dee, OT 10/03/18 0908      Row Name 10/03/18 0817             Upper Extremity Seated Therapeutic Exercise    Performed, Seated Upper Extremity (Therapeutic Exercise) scapular protraction/retraction;scapular stabilization;elbow flexion/extension;digit flexion/extension;shoulder flexion/extension  -KF      Exercise Type, Seated Upper Extremity (Therapeutic Exercise) AAROM (active assistive range of motion);AROM (active range of motion)  -KF      Expected Outcomes, Seated Upper Extremity (Therapeutic Exercise) improve functional tolerance, self-care activity;improve performance, BADLs  -KF      Sets/Reps Detail, Seated Upper Extremity (Therapeutic Exercise) 2/5  -KF      Transfers Skills, Training to Functional Activity, Seated Upper Extremity (Therapeutic Exercise) beginning to transfer skills to functional activity  -KF      Recorded by [KF] Ramona Dee, OT 10/03/18 0908      Row Name 10/03/18 0817             Balance    Balance static standing balance;static sitting balance;dynamic sitting balance;dynamic standing  balance  -KF      Recorded by [KF] Ramona Dee, OT 10/03/18 0908      Row Name 10/03/18 0817             Static Sitting Balance    Level of [place] (Unsupported Sitting, Static Balance) standby assist  -KF      Sitting Position (Unsupported Sitting, Static Balance) sitting on edge of bed  -KF      Time Able to Maintain Position (Unsupported Sitting, Static Balance) 3 to 4 minutes  -KF      Comment (Unsupported Sitting, Static Balance) posterior lean, able to self correct with cues  -KF      Recorded by [KF] Ramona Dee, OT 10/03/18 0908      Row Name 10/03/18 0817             Dynamic Sitting Balance    Level of [place], Reaches Outside Midline (Sitting, Dynamic Balance) contact guard assist  -KF      Sitting Position, Reaches Outside Midline (Sitting, Dynamic Balance) sitting on edge of bed  -KF      Comment, Reaches Outside Midline (Sitting, Dynamic Balance) ADL tasks, LBD  -KF      Recorded by [KF] Ramona Dee, OT 10/03/18 0908      Row Name 10/03/18 0817             Static Standing Balance    Level of [place] (Supported Standing, Static Balance) minimal assist, 75% patient effort  -KF      Time Able to Maintain Position (Supported Standing, Static Balance) 45 to 60 seconds  -KF      Assistive Device Utilized (Supported Standing, Static Balance) quad cane  -KF      Recorded by [KF] Ramona Dee, OT 10/03/18 0908      Row Name 10/03/18 0817             Dynamic Standing Balance    Level of [place], Reaches Outside Midline (Standing, Dynamic Balance) moderate assist, 50 to 74% patient effort  -KF      Time Able to Maintain Position, Reaches Outside Midline (Standing, Dynamic Balance) 30 to 45 seconds  -KF      Comment, Reaches Outside Midline (Standing, Dynamic Balance) LB dressing tasks in standing, maintaining balance during resistance of LBD  -KF      Recorded by [KF] Ramona Dee, OT 10/03/18 0908      Row Name 10/03/18 0817             Positioning and  Restraints    Pre-Treatment Position in bed  -KF      Post Treatment Position chair  -KF      In Chair notified nsg;reclined;sitting;call light within reach;encouraged to call for assist;with family/caregiver;with other staff;with nsg;LUE elevated;legs elevated  -KF      Recorded by [KF] Ramona Dee, OT 10/03/18 0908      Row Name 10/03/18 1330 10/03/18 0817          Pain Assessment    Additional Documentation Pain Scale: Numbers Pre/Post-Treatment (Group)  -MP Pain Scale: Numbers Pre/Post-Treatment (Group)  -KF     Recorded by [MP] Flex Camargo MS, CFY-SLP 10/03/18 1404 [KF] Ramona Dee, OT 10/03/18 0908     Row Name 10/03/18 1330 10/03/18 0817          Pain Scale: Numbers Pre/Post-Treatment    Pain Scale: Numbers, Pretreatment 0/10 - no pain  -MP 0/10 - no pain  -KF     Pain Scale: Numbers, Post-Treatment 0/10 - no pain  -MP 0/10 - no pain  -KF     Pain Intervention(s)  -- Repositioned;Ambulation/increased activity  -KF     Recorded by [MP] Flex Camargo MS, CFY-SLP 10/03/18 1404 [KF] Ramona Dee, OT 10/03/18 0908     Row Name 10/03/18 0817             Plan of Care Review    Plan of Care Reviewed With patient;spouse  -KF      Recorded by [KF] Ramona Dee, OT 10/03/18 0908      Row Name 10/03/18 0817             Outcome Summary/Treatment Plan (OT)    Daily Summary of Progress (OT) progress toward functional goals as expected  -KF      Recorded by [KF] Ramona Dee, OT 10/03/18 0908      Row Name 10/03/18 1330             Outcome Summary/Treatment Plan (SLP)    Daily Summary of Progress (SLP) progress toward functional goals is good  -MP      Plan for Continued Treatment (SLP) Continue dysphagia tx for pharyngeal strengthening  -MP      Anticipated Dischage Disposition unknown;anticipate therapy at next level of care  -MP      Recorded by [MP] Flex Camargo MS, CFY-SLP 10/03/18 1401        User Key  (r) = Recorded By, (t) = Taken By, (c) = Cosigned By    Initials Name  Effective Dates Discipline    Ramona Hill, OT 04/03/18 -  OT    Flex Estrada, MS, CFY-SLP 08/15/18 -  SLP          Outcome Summary  Outcome Summary/Treatment Plan (SLP)  Daily Summary of Progress (SLP): progress toward functional goals is good (10/03/18 1330 : Flex Camargo MS, CFY-SLP)  Plan for Continued Treatment (SLP): Continue dysphagia tx for pharyngeal strengthening (10/03/18 1330 : Flex Camargo MS, CFY-SLP)  Anticipated Dischage Disposition: unknown, anticipate therapy at next level of care (10/03/18 1330 : Flex Camargo MS, CFY-SLP)            SLP GOALS     Row Name 10/03/18 1330 10/01/18 1540          Oral Nutrition/Hydration Goal 1 (SLP)    Oral Nutrition/Hydration Goal 1, SLP LTG: Pt will tolerate regular diet and thin liquids w/ no s/s aspiration or discomfort w/ 100% acc and no cues  -MP LTG: Pt will tolerate regular diet and thin liquids w/ no s/s aspiration or discomfort w/ 100% acc and no cues  -AV (r) MF (t) AV (c)     Time Frame (Oral Nutrition/Hydration Goal 1, SLP) by discharge  -MP by discharge  -AV (r) MF (t) AV (c)     Progress/Outcomes (Oral Nutrition/Hydration Goal 1, SLP) good progress toward goal  -MP good progress toward goal  -AV (r) MF (t) AV (c)        Pharyngeal Strengthening Exercise Goal 1 (SLP)    Activity (Pharyngeal Strengthening Goal 1, SLP) increase superior movement of the hyolaryngeal complex;increase anterior movement of the hyolaryngeal complex;increase squeeze/positive pressure generation;increase tongue base retraction  -MP increase superior movement of the hyolaryngeal complex;increase anterior movement of the hyolaryngeal complex;increase squeeze/positive pressure generation;increase tongue base retraction  -AV (r) MF (t) AV (c)     Increase Superior Movement of the Hyolaryngeal Complex effortful pitch glide (falsetto + pharyngeal squeeze)  -MP  --     Increase Anterior Movement of the Hyolaryngeal Complex chin tuck against resistance  (CTAR)  -MP chin tuck against resistance (CTAR)  -AV (r) MF (t) AV (c)     Increase Squeeze/Positive Pressure Generation hard effortful swallow  -MP hard effortful swallow  -AV (r) MF (t) AV (c)     Increase Tongue Base Retraction [place]  -MP [place]  -AV (r) MF (t) AV (c)     [place]/Accuracy (Pharyngeal Strengthening Goal 1, SLP) with minimal cues (75-90% accuracy)  -MP with minimal cues (75-90% accuracy)  -AV (r) MF (t) AV (c)     Time Frame (Pharyngeal Strengthening Goal 1, SLP) short term goal (STG);by discharge  -MP short term goal (STG);by discharge  -AV (r) MF (t) AV (c)     Barriers (Pharyngeal Strengthening Goal 1, SLP) Minimal trials of each exercise completed 2' lethargy   -MP CTAR: 2 reps Effortful:1 rep [place]: 6 reps  -AV (r) MF (t) AV (c)     Progress/Outcomes (Pharyngeal Strengthening Goal 1, SLP) good progress toward goal  -MP good progress toward goal  -AV (r) MF (t) AV (c)       User Key  (r) = Recorded By, (t) = Taken By, (c) = Cosigned By    Initials Name Provider Type    Vika Isaac, MS CCC-SLP Speech and Language Pathologist    Lavonne Gunn, Speech Therapy Student Speech Therapy Student    Flex Estrada, MS, CFY-SLP Speech and Language Pathologist          EDUCATION  The patient has been educated in the following areas:   Dysphagia (Swallowing Impairment).    SLP Recommendation and Plan                       Anticipated Dischage Disposition: unknown, anticipate therapy at next level of care     Therapy Frequency (Swallow): 5 days per week          Plan of Care Reviewed With: patient, spouse  Plan of Care Review  Plan of Care Reviewed With: patient, spouse  Daily Summary of Progress (SLP): progress toward functional goals is good  Plan for Continued Treatment (SLP): Continue dysphagia tx for pharyngeal strengthening           Time Calculation:         Time Calculation- SLP     Row Name 10/03/18 1410             Time Calculation- SLP    SLP Start Time 1330  -       SLP Received On 10/03/18  -STEPHANIE        User Key  (r) = Recorded By, (t) = Taken By, (c) = Cosigned By    Initials Name Provider Type    Flex Estrada MS, CFY-SLP Speech and Language Pathologist          Therapy Charges for Today     Code Description Service Date Service Provider Modifiers Qty    [id number]  ST TREATMENT SWALLOW 3 10/3/2018 Flex Camargo MS, ADENIKE-SLP GN 1                 Flex Camargo MS, ADENIKE-RODGER  10/3/2018

## 2018-10-03 NOTE — PLAN OF CARE
Problem: Patient Care Overview  Goal: Plan of Care Review  Outcome: Ongoing (interventions implemented as appropriate)   10/03/18 7622   Coping/Psychosocial   Plan of Care Reviewed With patient;spouse   SLP treatment completed. Good participation in dysphagia tx despite lethargy. Pt w/ good understanding of compensatory strategies. SLP will continue to follow for dysphagia tx. Please see note for further details and recommendations.

## 2018-10-03 NOTE — PROGRESS NOTES
[facility] Medicine Services  PROGRESS NOTE    Patient Name: Vickie Joshi  : 1950  MRN: [id number]    Date of Admission: 2018  Length of Stay: 6  Primary Care Physician: Dav Bryan MD    Subjective   Subjective     CC:  Shortness of air    HPI:    Sitting up in chair near bed. No overnight events reported. Reports she is feeling better, though still feels tired.      Review of Systems  Gen- No fevers, chills.  +  fatigue  CV- No chest pain, palpitations.  Resp- positive cough, dyspnea  GI- No N/V/D, abd pain    Otherwise ROS is negative except as mentioned in the HPI.    Objective   Objective     Vital Signs:   Temp:  [97.8 °F (36.6 °C)-98.4 °F (36.9 °C)] 97.8 °F (36.6 °C)  Heart Rate:  [78-87] 82  Resp:  [17-18] 18  BP: (124-149)/(75-86) 124/86  Total (NIH Stroke Scale): 9     Physical Exam:  Constitutional: No acute distress, awake, alert  HENT: NCAT, mucous membranes moist  Respiratory: Clear to auscultation bilaterally, respiratory effort normal   Cardiovascular: on tele monitoring, no murmurs  Gastrointestinal: Positive bowel sounds, soft, nontender, nondistended  Musculoskeletal: No bilateral ankle edema  Psychiatric: Appropriate affect, cooperative  Neurologic: Oriented x 3,  Cranial Nerves grossly intact to confrontation, speech clear  Skin: No rashes      Results Reviewed:  I have personally reviewed current lab, radiology, and data and agree.      Results from last 7 days  Lab Units 10/02/18  0424 09/29/18  1007 18  0343   WBC 10*3/mm3 11.54* 13.04* 10.82*   HEMOGLOBIN g/dL 11.6 11.3* 11.0*   HEMATOCRIT % 37.8 37.2 35.9   PLATELETS 10*3/mm3 228 207 192       Results from last 7 days  Lab Units 10/03/18  0543 10/02/18  1941 10/02/18  0424 10/01/18  0342  18  0204   SODIUM mmol/L 144  --  142 144  < > 138   POTASSIUM mmol/L 4.3 3.9 3.4* 3.8  < > 4.3   CHLORIDE mmol/L 113*  --  113* 117*  < > 112*   CO2 mmol/L 24.0  --  21.0 20.0  < > 18.0*    BUN mg/dL 16  --  12 13  < > 10   CREATININE mg/dL 0.88  --  0.66 0.68  < > 0.85   GLUCOSE mg/dL 83  --  108* 104*  < > 139*   CALCIUM mg/dL 8.2*  --  8.4* 8.0*  < > 8.3*   ALT (SGPT) U/L  --   --  23  --   --  22   AST (SGOT) U/L  --   --  20  --   --  29   < > = values in this interval not displayed.  Estimated Creatinine Clearance: 72.9 mL/min (by C-G formula based on SCr of 0.88 mg/dL).  No results found for: BNP    Microbiology Results Abnormal     Procedure Component Value - Date/Time    Blood Culture - Blood, [[id number]]  (Abnormal) Collected:  09/27/18 0325    Lab Status:  Final result Specimen:  Blood from Arm, Right Updated:  10/02/18 1333     Blood Culture Staphylococcus, coagulase negative (A)     Comment: Suggests contamination  Susceptibility will not be done unless Doctor notifies Lab          Isolated from Anaerobic Bottle     Gram Stain Result Anaerobic Bottle Gram positive cocci in clusters    Resp Virus Profile (RVP) PCR - Swab, Nasopharynx [[id number]]  (Abnormal) Collected:  09/27/18 0941    Lab Status:  Final result Specimen:  Swab from Nasopharynx Updated:  10/02/18 0615     Influenza A PCR Negative     Influenza B PCR Negative     RSV A Negative     RSV B Negative     Parainfluenza Virus 1 Negative     Parainfluenza Virus 2 Negative     Parainfluenza Virus 3 Negative     Human Rhinovirus/Enterovirus Positive (A)     Human Metapneumovirus Negative     Adenovirus Detection by PCR Negative    Narrative:       Performed at:  [address] [place] [address]  [id number]  : Erik Gillette MD, Phone:  [phone]    Blood Culture - Blood, [[id number]]  (Normal) Collected:  09/27/18 0325    Lab Status:  Final result Specimen:  Blood from Arm, Left Updated:  10/02/18 0346     Blood Culture No growth at 5 days    MRSA Screen, PCR - Swab, Nares [[id number]]  (Abnormal) Collected:  09/29/18 1441    Lab Status:  Final result Specimen:  Swab from Nares Updated:  09/29/18  2125     MRSA, PCR Positive (C)    Respiratory Culture - Sputum, Cough [[id number]] Collected:  09/27/18 1733    Lab Status:  Final result Specimen:  Sputum from Cough Updated:  09/29/18 0730     Respiratory Culture Light growth (2+) Normal Respiratory Sandrine     Gram Stain Result Many (4+) WBCs per low power field      Few (2+) Epithelial cells per low power field      Rare (1+) Gram positive cocci in pairs    Blood Culture ID, PCR - Blood, [[id number]]  (Abnormal) Collected:  09/27/18 0325    Lab Status:  Final result Specimen:  Blood from Arm, Right Updated:  09/28/18 0459     BCID, PCR Staphylococcus spp, not aureus. Identification by BCID PCR. (C)          Imaging Results (last 24 hours)     ** No results found for the last 24 hours. **        Results for orders placed during the hospital encounter of 09/27/18   Adult Transthoracic Echo Complete W/ Cont if Necessary Per Protocol    Narrative · Moderate tricuspid valve regurgitation is present.  · Mild pulmonic valve regurgitation is present.  · There is calcification of the aortic valve.  · Mild aortic valve regurgitation is present.  · Left atrial cavity size is borderline dilated.  · Moderate mitral valve regurgitation is present          I have reviewed the medications.      [START ON 10/4/2018] Pharmacy Consult  Does not apply Once   apixaban 5 mg Oral Q12H   aspirin 81 mg Oral Daily   atorvastatin 10 mg Oral Nightly   aztreonam 2 g Intravenous Q8H   budesonide-formoterol 2 puff Inhalation BID - RT   cetirizine 10 mg Oral Nightly   diltiaZEM  mg Oral Q24H   doxycycline 100 mg Oral Q12H   famotidine 40 mg Oral Nightly   fluticasone 2 spray Each Nare Daily   lactobacillus acidophilus 1 capsule Oral Daily   levothyroxine 150 mcg Oral Q AM   metoprolol tartrate 12.5 mg Oral Q12H   montelukast 10 mg Oral Nightly   pantoprazole 40 mg Oral Q AM   pregabalin 300 mg Oral Q12H   topiramate 100 mg Oral Q12H   vancomycin 1,250 mg Intravenous Q24H          Assessment/Plan   Assessment / Plan     Hospital Problem List     * (Principal)Sepsis (CMS/[facility])    Hypertension    GERD (gastroesophageal reflux disease)    Fibromyalgia    Hx of Stroke with residual left hemiparesis    Atrial fibrillation (CMS/HCC)    Pneumonia        Brief Hospital Course to date:  Vickie Joshi is a 68 y.o. female with history of CVA and left hemiparesis.  Also with hx of Afib.  Presents with shortness of air found to be septic.  Bilateral lower lobe pneumonia right greater than left.  Admitted to hospital medicine service.    1) Sepsis  -continue vancomycin and azactam, oral doxy  -blood cultures + staph epi in one set 9/27 (felt to be contaminant), repeat cx NGTD  -secondary to underlying pneumonia  --improving  --ID following for abx mgmt since 10/1     2) Pneumonia  -CXR shows bronchopneumonia Right >Left  -continue IV abx, nebs   -blood cultures pending  -sputum culture  -consult SLP--passed swallow   --added cough meds      3) Atrial fibrillation with RVR   -chronic now with in-and-out of RVR  -continue eliquis  -continue diltiazem  -new low dose metoprolol this admit. HR improved     4) Fibromyalgia  -continue home medications     5) GERD  -continue PPI     6) History of CVA   -with residual left sided hemiparesis  -currently does outpatient rehab at [facility]  -fall precautions      Discontinued steroids 10/1  Stopped standing nebs every 4 hours due to tachycardia, still with prn nebs  Continue on metoprolol 12.5 mg every 12 hours as it appears to be working well  HR normalized  Plan is home with HH/PT/OT and IV abx, all per ID final recs.  (?abx through 10/24 possibly)    DVT prophylaxis:cora/scds, eliquis     CODE STATUS:   Code Status and Medical Interventions:   Ordered at: 09/27/18 0605     Level Of Support Discussed With:    Patient     Code Status:    CPR     Medical Interventions (Level of Support Prior to Arrest):    Full     Disposition: I expect the patient to be  discharged home with HH when medically ready. Pending final ID recs.     Electronically signed by Sherley Bowling MD, 10/03/18, 11:52 AM.

## 2018-10-03 NOTE — PROGRESS NOTES
Continued Stay Note  [facility]     Patient Name: Vickie Joshi  MRN: [id number]  Today's Date: 10/3/2018    Admit Date: 9/27/2018          Discharge Plan     Row Name 10/03/18 1502       Plan    Plan ONGOING    Patient/Family in Agreement with Plan yes    Plan Comments Unable to finalize DCP until final abx course has been determined per ID.  CM will cont to follow and assist with DCP as appropriate.                Discharge Codes    No documentation.       Expected Discharge Date and Time     Expected Discharge Date Expected Discharge Time    Oct 4, 2018             Jacki March

## 2018-10-03 NOTE — PLAN OF CARE
Problem: Patient Care Overview  Goal: Plan of Care Review  Outcome: Ongoing (interventions implemented as appropriate)   10/03/18 4984   Coping/Psychosocial   Plan of Care Reviewed With patient;spouse   Plan of Care Review   Progress improving   OTHER   Outcome Summary Pt modA with quad cane stand pivot transfer bed to chair, min A progressing towards CGA for sit to stands and maintaining balance in standing during ADLs, max A for donning undergarments and pants, dependent for socks and shoes. Cont IPOT per POC

## 2018-10-03 NOTE — PLAN OF CARE
Problem: Patient Care Overview  Goal: Plan of Care Review  Outcome: Ongoing (interventions implemented as appropriate)   10/03/18 0356   Coping/Psychosocial   Plan of Care Reviewed With patient   OTHER   Outcome Summary vss, pt resting quietly

## 2018-10-04 LAB
ALBUMIN SERPL-MCNC: 3.9 G/DL (ref 3.2–4.8)
ALBUMIN/GLOB SERPL: 1.6 G/DL (ref 1.5–2.5)
ALP SERPL-CCNC: 55 U/L (ref 25–100)
ALT SERPL W P-5'-P-CCNC: 22 U/L (ref 7–40)
ANION GAP SERPL CALCULATED.3IONS-SCNC: 11 MMOL/L (ref 3–11)
AST SERPL-CCNC: 18 U/L (ref 0–33)
BASOPHILS # BLD AUTO: 0.01 10*3/MM3 (ref 0–0.2)
BASOPHILS NFR BLD AUTO: 0.1 % (ref 0–1)
BILIRUB SERPL-MCNC: 0.5 MG/DL (ref 0.3–1.2)
BUN BLD-MCNC: 13 MG/DL (ref 9–23)
BUN/CREAT SERPL: 17.3 (ref 7–25)
CALCIUM SPEC-SCNC: 8.4 MG/DL (ref 8.7–10.4)
CHLORIDE SERPL-SCNC: 109 MMOL/L (ref 99–109)
CO2 SERPL-SCNC: 24 MMOL/L (ref 20–31)
CREAT BLD-MCNC: 0.75 MG/DL (ref 0.6–1.3)
DEPRECATED RDW RBC AUTO: 49.5 FL (ref 37–54)
EOSINOPHIL # BLD AUTO: 0.36 10*3/MM3 (ref 0–0.3)
EOSINOPHIL NFR BLD AUTO: 2.5 % (ref 0–3)
ERYTHROCYTE [DISTWIDTH] IN BLOOD BY AUTOMATED COUNT: 14.4 % (ref 11.3–14.5)
GFR SERPL CREATININE-BSD FRML MDRD: 77 ML/MIN/1.73
GLOBULIN UR ELPH-MCNC: 2.5 GM/DL
GLUCOSE BLD-MCNC: 80 MG/DL (ref 70–100)
HCT VFR BLD AUTO: 41.3 % (ref 34.5–44)
HGB BLD-MCNC: 12.7 G/DL (ref 11.5–15.5)
IMM GRANULOCYTES # BLD: 0.11 10*3/MM3 (ref 0–0.03)
IMM GRANULOCYTES NFR BLD: 0.8 % (ref 0–0.6)
LYMPHOCYTES # BLD AUTO: 3.05 10*3/MM3 (ref 0.6–4.8)
LYMPHOCYTES NFR BLD AUTO: 21.3 % (ref 24–44)
MCH RBC QN AUTO: 29.3 PG (ref 27–31)
MCHC RBC AUTO-ENTMCNC: 30.8 G/DL (ref 32–36)
MCV RBC AUTO: 95.4 FL (ref 80–99)
MONOCYTES # BLD AUTO: 1.1 10*3/MM3 (ref 0–1)
MONOCYTES NFR BLD AUTO: 7.7 % (ref 0–12)
NEUTROPHILS # BLD AUTO: 9.83 10*3/MM3 (ref 1.5–8.3)
NEUTROPHILS NFR BLD AUTO: 68.4 % (ref 41–71)
PLATELET # BLD AUTO: 261 10*3/MM3 (ref 150–450)
PMV BLD AUTO: 12.8 FL (ref 6–12)
POTASSIUM BLD-SCNC: 3.8 MMOL/L (ref 3.5–5.5)
PROT SERPL-MCNC: 6.4 G/DL (ref 5.7–8.2)
RBC # BLD AUTO: 4.33 10*6/MM3 (ref 3.89–5.14)
SODIUM BLD-SCNC: 144 MMOL/L (ref 132–146)
VANCOMYCIN TROUGH SERPL-MCNC: 11.3 MCG/ML (ref 10–20)
WBC NRBC COR # BLD: 14.35 10*3/MM3 (ref 3.5–10.8)

## 2018-10-04 PROCEDURE — 80202 ASSAY OF VANCOMYCIN: CPT

## 2018-10-04 PROCEDURE — 25010000002 VANCOMYCIN

## 2018-10-04 PROCEDURE — 94799 UNLISTED PULMONARY SVC/PX: CPT

## 2018-10-04 PROCEDURE — 94640 AIRWAY INHALATION TREATMENT: CPT

## 2018-10-04 PROCEDURE — 94760 N-INVAS EAR/PLS OXIMETRY 1: CPT

## 2018-10-04 PROCEDURE — 85025 COMPLETE CBC W/AUTO DIFF WBC: CPT | Performed by: INTERNAL MEDICINE

## 2018-10-04 PROCEDURE — 99232 SBSQ HOSP IP/OBS MODERATE 35: CPT | Performed by: NURSE PRACTITIONER

## 2018-10-04 PROCEDURE — 80053 COMPREHEN METABOLIC PANEL: CPT | Performed by: INTERNAL MEDICINE

## 2018-10-04 RX ADMIN — APIXABAN 5 MG: 5 TABLET, FILM COATED ORAL at 09:21

## 2018-10-04 RX ADMIN — TOPIRAMATE 100 MG: 100 TABLET, FILM COATED ORAL at 20:48

## 2018-10-04 RX ADMIN — SODIUM CHLORIDE 2 G: 900 INJECTION INTRAVENOUS at 14:59

## 2018-10-04 RX ADMIN — VANCOMYCIN HYDROCHLORIDE 1750 MG: 10 INJECTION, POWDER, LYOPHILIZED, FOR SOLUTION INTRAVENOUS at 12:21

## 2018-10-04 RX ADMIN — ALBUTEROL SULFATE 2.5 MG: 2.5 SOLUTION RESPIRATORY (INHALATION) at 07:34

## 2018-10-04 RX ADMIN — TOPIRAMATE 100 MG: 100 TABLET, FILM COATED ORAL at 09:21

## 2018-10-04 RX ADMIN — FAMOTIDINE 40 MG: 20 TABLET ORAL at 20:48

## 2018-10-04 RX ADMIN — CETIRIZINE HYDROCHLORIDE 10 MG: 10 TABLET, FILM COATED ORAL at 20:48

## 2018-10-04 RX ADMIN — DILTIAZEM HYDROCHLORIDE 360 MG: 180 CAPSULE, COATED, EXTENDED RELEASE ORAL at 09:21

## 2018-10-04 RX ADMIN — SODIUM CHLORIDE 2 G: 900 INJECTION INTRAVENOUS at 23:08

## 2018-10-04 RX ADMIN — APIXABAN 5 MG: 5 TABLET, FILM COATED ORAL at 20:48

## 2018-10-04 RX ADMIN — Medication 1 CAPSULE: at 09:21

## 2018-10-04 RX ADMIN — LEVOTHYROXINE SODIUM 150 MCG: 150 TABLET ORAL at 06:23

## 2018-10-04 RX ADMIN — FLUTICASONE PROPIONATE 2 SPRAY: 50 SPRAY, METERED NASAL at 09:22

## 2018-10-04 RX ADMIN — MONTELUKAST SODIUM 10 MG: 10 TABLET, COATED ORAL at 20:48

## 2018-10-04 RX ADMIN — BUDESONIDE AND FORMOTEROL FUMARATE DIHYDRATE 2 PUFF: 160; 4.5 AEROSOL RESPIRATORY (INHALATION) at 07:34

## 2018-10-04 RX ADMIN — ASPIRIN 81 MG: 81 TABLET, COATED ORAL at 09:21

## 2018-10-04 RX ADMIN — DOXYCYCLINE 100 MG: 100 CAPSULE ORAL at 09:21

## 2018-10-04 RX ADMIN — PANTOPRAZOLE SODIUM 40 MG: 40 TABLET, DELAYED RELEASE ORAL at 06:23

## 2018-10-04 RX ADMIN — BUDESONIDE AND FORMOTEROL FUMARATE DIHYDRATE 2 PUFF: 160; 4.5 AEROSOL RESPIRATORY (INHALATION) at 20:04

## 2018-10-04 RX ADMIN — ALBUTEROL SULFATE 2.5 MG: 2.5 SOLUTION RESPIRATORY (INHALATION) at 20:04

## 2018-10-04 RX ADMIN — METOPROLOL TARTRATE 12.5 MG: 25 TABLET, FILM COATED ORAL at 09:21

## 2018-10-04 RX ADMIN — ATORVASTATIN CALCIUM 10 MG: 10 TABLET, FILM COATED ORAL at 20:48

## 2018-10-04 RX ADMIN — PREGABALIN 300 MG: 100 CAPSULE ORAL at 09:21

## 2018-10-04 RX ADMIN — SODIUM CHLORIDE 2 G: 900 INJECTION INTRAVENOUS at 06:24

## 2018-10-04 RX ADMIN — METOPROLOL TARTRATE 12.5 MG: 25 TABLET, FILM COATED ORAL at 20:48

## 2018-10-04 RX ADMIN — DOXYCYCLINE 100 MG: 100 CAPSULE ORAL at 20:48

## 2018-10-04 RX ADMIN — PREGABALIN 300 MG: 100 CAPSULE ORAL at 20:48

## 2018-10-04 NOTE — PROGRESS NOTES
"Pharmacy Consult-Vancomycin Dosing    Vickie Joshi is a  68 y.o. female receiving vancomycin therapy.     Indication: Sepsis secondary to pneumonia  Consulting Provider: Austin SAMUEL Consult: Yes  Goal Trough: 15-20mcg/mL    Current Antimicrobial Therapy    PTD Vanc = day 8  Azactam = day 8  Doxycycline PO = day 4  S/p Levaquin x5 days    Allergies    Allergies as of 09/27/2018 - Reviewed 09/27/2018   Allergen Reaction Noted   • Cephalexin Swelling 04/15/2016   • Penicillins Hives 04/15/2016   • Sulfa antibiotics Hives 04/15/2016     Labs  Results from last 7 days   Lab Units 10/04/18  0549 10/03/18  0543 10/02/18  0424   BUN mg/dL 13 16 12   CREATININE mg/dL 0.75 0.88 0.66   Serum creatinine: 0.75 mg/dL 10/04/18 0549  Estimated creatinine clearance: 80.2 mL/min.  Results from last 7 days   Lab Units 10/04/18  0547 10/02/18  0424 09/29/18  1007   WBC 10*3/mm3 14.35* 11.54* 13.04*     Evaluation of Dosing     Last Dose Received in the ED: 9/27 @ 0811 vancomycin 1750mg (20mg/kg) x1    Ht - 165.1 cm (65\")  Wt - 98.1 kg (216 lb 3.2 oz)    I/O last 3 completed shifts:  In: -   Out: 1020 [Urine:1020] + unmeasured    Microbiology and Radiology    Microbiology Results (last 10 days)       Procedure Component Value - Date/Time    Legionella Antigen, Urine - Urine, Urine, Clean Catch [[id number]] Collected:  10/01/18 1707    Lab Status:  Final result Specimen:  Urine from Urine, Clean Catch Updated:  10/03/18 1518     L. pneumophila Serogp 1 Ur Ag Negative     Comment: Presumptive negative for L. pneumophila serogroup 1 antigen in urine,  suggesting no recent or current infection. Legionnaires' disease  cannot be ruled out since other serogroups and species may also cause  disease.       Narrative:       Performed at:  [facility]  [id number]  : Erik Gillette MD, Phone:  [phone]    S. Pneumo Ag Urine or CSF - Urine, Urine, Clean Catch [[id number]] Collected:  " 10/01/18 1707    Lab Status:  Final result Specimen:  Urine from Urine, Clean Catch Updated:  10/03/18 1518     Specimen Source Urine     STREP PNEUMONIAE ANTIGEN Negative     Body Fluid Culture, Sterile Not Indicated     Organism ID Not indicated.     Please note Comment     Comment: College [employer]ologists standards require a culture to be  performed on CSF specimens submitted for bacterial antigen testing.  (CAP ELLE.74783) Urine specimens will not be cultured.       Narrative:       Performed at:  [address] [place] [address]  [id number]  : Erik Gillette MD, Phone:  [phone]    MRSA Screen, PCR - Swab, Nares [[id number]]  (Abnormal) Collected:  09/29/18 1448    Lab Status:  Final result Specimen:  Swab from Nares Updated:  09/29/18 2125     MRSA, PCR Positive (C)    Respiratory Culture - Sputum, Cough [[id number]] Collected:  09/27/18 1733    Lab Status:  Final result Specimen:  Sputum from Cough Updated:  09/29/18 0730     Respiratory Culture Light growth (2+) Normal Respiratory Sandrine     Gram Stain Result Many (4+) WBCs per low power field      Few (2+) Epithelial cells per low power field      Rare (1+) Gram positive cocci in pairs    Resp Virus Profile (RVP) PCR - Swab, Nasopharynx [[id number]]  (Abnormal) Collected:  09/27/18 0941    Lab Status:  Final result Specimen:  Swab from Nasopharynx Updated:  10/02/18 0615     Influenza A PCR Negative     Influenza B PCR Negative     RSV A Negative     RSV B Negative     Parainfluenza Virus 1 Negative     Parainfluenza Virus 2 Negative     Parainfluenza Virus 3 Negative     Human Rhinovirus/Enterovirus Positive (A)     Human Metapneumovirus Negative     Adenovirus Detection by PCR Negative    Narrative:       Performed at:   - [facility] [address] [place], [place]  [id number]  : Erik Gillette MD, Phone:  [phone]    Blood Culture - Blood, [[id number]]  (Normal) Collected:  09/27/18  0325    Lab Status:  Final result Specimen:  Blood from Arm, Left Updated:  10/02/18 0346     Blood Culture No growth at 5 days    Blood Culture - Blood, [[id number]]  (Abnormal) Collected:  09/27/18 0325    Lab Status:  Final result Specimen:  Blood from Arm, Right Updated:  10/02/18 1333     Blood Culture Staphylococcus, coagulase negative (A)     Comment: Suggests contamination  Susceptibility will not be done unless Doctor notifies Lab          Isolated from Anaerobic Bottle     Gram Stain Result Anaerobic Bottle Gram positive cocci in clusters    Blood Culture ID, PCR - Blood, [[id number]]  (Abnormal) Collected:  09/27/18 0325    Lab Status:  Final result Specimen:  Blood from Arm, Right Updated:  09/28/18 0459     BCID, PCR Staphylococcus spp, not aureus. Identification by BCID PCR. (C)          Evaluation of Level    Results from last 7 days   Lab Units 10/04/18  0547 10/01/18  1041 09/29/18  0019   VANCOMYCIN TR mcg/mL 11.30 25.50* 19.40     9/29 trough @ 0019 = 19.4mcg/mL (~12hr level)  10/1 trough @ 1041 = 25.5mcg/mL (~9hr level, drawn ~1hr early)  10/4 trough @ 0547 = 11.3mcg/mL (~24hr level)    Assessment/Plan:    1. Pharmacy to dose vancomycin for sepsis. Initially on 9/27, patient received a loading dose of vancomycin 1750mg in the ED, then started on a maintenance dose of vancomycin 1250mg q12h. On 10/1 the dose was decreased to vancomycin 1250mg q24h due to a supratherapeutic trough.   2. Trough today was drawn appropriately and SUBtherapeutic at 11.3mcg/mL.   3. Will increase dose to vancomycin 1750mg q24h today based on stable SCr, UOP and slight WBC elevation.   4. Next trough ordered for Saturday 10/6.  5. Monitor renal function, cultures and sensitivities, and clinical status, and pharmacy will adjust regimen as necessary.    Tanya Salinas, PharmD  Pharmacy Resident  10/4/2018  12:02 PM

## 2018-10-04 NOTE — PLAN OF CARE
Problem: Fall Risk (Adult)  Goal: Absence of Fall  Outcome: Outcome(s) achieved Date Met: 10/04/18   10/04/18 1530   Fall Risk (Adult)   Absence of Fall achieves outcome       Problem: Patient Care Overview  Goal: Plan of Care Review  Outcome: Ongoing (interventions implemented as appropriate)   10/04/18 1530   Coping/Psychosocial   Plan of Care Reviewed With patient   Plan of Care Review   Progress no change   OTHER   Outcome Summary VSS

## 2018-10-04 NOTE — PROGRESS NOTES
[facility] Progress Note        Antibiotics:  Anti-Infectives     Ordered     Dose/Rate Route Frequency Start Stop    10/04/18 0742  vancomycin 1750 mg/500 mL 0.9% NS IVPB ([facility])     Ordering Provider:  Rachelle Salinas RPH    1,750 mg  over 120 Minutes Intravenous Every 24 Hours 10/04/18 1100 10/11/18 1059    10/01/18 0810  doxycycline (MONODOX) capsule 100 mg     Ordering Provider:  Royal Mohamud MD    100 mg Oral Every 12 Hours Scheduled 10/01/18 0900 10/08/18 0859    09/27/18 1201  aztreonam (AZACTAM) 2 g in sodium chloride 0.9 % 100 mL IVPB-MBP     Royal Mohamud MD reviewed the order on 10/02/18 0727.   Ordering Provider:  Royal Mohamud MD    2 g  over 4 Hours Intravenous Every 8 Hours 09/27/18 1500 10/09/18 0726    09/27/18 1201  Pharmacy to dose vancomycin     Aleksandr Thomas RP reviewed the order on 10/03/18 0734.   Ordering Provider:  Royal Mohamud MD     Does not apply Continuous PRN 09/27/18 1149 10/10/18 0733    09/27/18 0537  aztreonam (AZACTAM) 2 g in sodium chloride 0.9 % 100 mL IVPB-MBP     Ordering Provider:  Eliot Recio DO    2 g  200 mL/hr over 30 Minutes Intravenous Once 09/27/18 0539 09/27/18 0753    09/27/18 0537  vancomycin 1750 mg/500 mL 0.9% NS IVPB ([facility])     Ordering Provider:  Eliot Recio DO    20 mg/kg × 90.7 kg Intravenous Once 09/27/18 0539 09/27/18 0811    09/27/18 0316  levoFLOXacin (LEVAQUIN) 750 mg/150 mL D5W (premix) (LEVAQUIN) 750 mg     Ordering Provider:  Eliot Recio DO    750 mg  over 90 Minutes Intravenous Once 09/27/18 0318 09/27/18 0600          CC: pneumonia    HPI:  Patient is a 68 y.o.  Yr old female with history of prior CVA and chronic/residual left-sided hemiparesis, chronically debilitated with multiple comorbidity as outlined below.  She was admitted September 27, 2018 with acute cough/shortness of breath and fever over 102 per admission H&P.  Initial blood culture data from September 27 had coag-negative staph species in 1 of 2 sets,  "sputum with normal talya, and MRSA nasal screen positive.  Chest x-ray with right greater than left findings to suggest bronchopneumonia and CT scan with airspace disease in the right lower lobe per radiology.  In addition patient reports her  also with upper respiratory type infection but no specific microbiologic diagnosis.     10/4/18 Shortness of breath/cough persists , still limited significantly with any exertion.  No hemoptysis.  No headache photophobia or neck stiffness.  No nausea vomiting or diarrhea. No new skin rash.    ROS:      10/4/18 No f/c/s. No n/v/d. No rash. No new ADR to Abx.     Constitutional-- at present, No Fever, chills or sweats.  Appetite diminished with generalized malaise and fatigue  Heent-- No new vision, hearing or throat complaints.  No epistaxis or oral sores.  Denies odynophagia or dysphagia.  No flashers, floaters or eye pain. No odynophagia or dysphagia. No headache, photophobia or neck stiffness.  CV-- No chest pain, palpitation or syncope  Resp-- as above  GI- No nausea, vomiting, or diarrhea.  No hematochezia, melena, or hematemesis. Denies jaundice or chronic liver disease.  -- No dysuria, hematuria, or flank pain.  Denies hesitancy, urgency or flank pain.  Lymph- no swollen lymph nodes in neck/axilla or groin.   Heme- No active bruising or bleeding; no Hx of DVT or PE.  MS-- no swelling or pain in the bones or joints of arms/legs.  No new back pain.  Neuro-- No acute focal weakness or numbness in the arms or legs.  No seizures.  Chronic left-sided hemiparesis after stroke     Full 12 point review of systems reviewed and negative otherwise for acute complaints, except for above       PE: Nursing/chaperone present  /99   Pulse 87   Temp 97.9 °F (36.6 °C) (Oral)   Resp 16   Ht 165.1 cm (65\")   Wt 98.1 kg (216 lb 3.2 oz)   SpO2 93%   BMI 35.98 kg/m²     GENERAL: Awake and alert, in no acute distress.  Obese and chronically ill-appearing  HEENT: " Normocephalic, atraumatic.  PERRL. EOMI. No conjunctival injection. No icterus. Oropharynx clear without evidence of thrush or exudate. No evidence of peridontal disease.    NECK: Supple without nuchal rigidity. No mass.  LYMPH: No cervical, axillary or inguinal lymphadenopathy.  HEART: RRR; No murmur, rubs, gallops.   LUNGS: Diminished at right base more so than left with scattered rhonchi and exp wheeze. Normal respiratory effort. Nonlabored. No dullness.  ABDOMEN: Soft, nontender, nondistended. Positive bowel sounds. No rebound or guarding. NO mass or HSM.  EXT:  No cyanosis, clubbing or edema. No cord.  : Genitalia generally unremarkable.  Without Gunderson catheter.  MSK: FROM without joint effusions noted arms/legs.    SKIN: Warm and dry without cutaneous eruptions on Inspection/palpation.    NEURO: Oriented to PPT.  Chronic left-sided hemiparesis noted  PSYCHIATRIC: Normal insight and judgement. Cooperative with PE     No peripheral stigmata/phenomena of endocarditis     IV without obvious redness or drainage    Laboratory Data      Results from last 7 days  Lab Units 10/04/18  0547 10/02/18  0424 09/29/18  1007   WBC 10*3/mm3 14.35* 11.54* 13.04*   HEMOGLOBIN g/dL 12.7 11.6 11.3*   HEMATOCRIT % 41.3 37.8 37.2   PLATELETS 10*3/mm3 261 228 207       Results from last 7 days  Lab Units 10/04/18  0549   SODIUM mmol/L 144   POTASSIUM mmol/L 3.8   CHLORIDE mmol/L 109   CO2 mmol/L 24.0   BUN mg/dL 13   CREATININE mg/dL 0.75   GLUCOSE mg/dL 80   CALCIUM mg/dL 8.4*       Results from last 7 days  Lab Units 10/04/18  0549   ALK PHOS U/L 55   BILIRUBIN mg/dL 0.5   ALT (SGPT) U/L 22   AST (SGOT) U/L 18               Estimated Creatinine Clearance: 80.2 mL/min (by C-G formula based on SCr of 0.75 mg/dL).      Microbiology:      Radiology:  Imaging Results (last 72 hours)     ** No results found for the last 72 hours. **            Impression:   --Acute pneumonia, SIRS+ at admission, bilateral with right greater than left  infiltrates on chest x-ray, rhinovirus/enterovisrus + and  exposure to  and concern for  secondary bacterial process as well; empiric treatment for CAP/MRSA and Hx PCN/ceph allergies.  Empiric antibiotics ongoing with vancomycin/Azactam and doxycycline.  MRSA screen positive and certainly could be a pathogen in this setting but sputum did not corroborate that.  Empiric antibiotics would cover that in any case.  If significant pleural effusions evolve, you should give consideration to diagnostic/therapeutic thoracentesis.  Patient understands high risk for further serious morbidity and other serious sequela; she understands antibiotics are not a guarantee for cure.  If not steadily improving, you should give consideration to repeat imaging to rule out other evolving parapneumonic complication.     --Acute blood culture positivity, coag-negative staph per microbiology preliminarily and only in one of 2 samples suggest contamination.  No obvious endovascular focus by exam findings.  No chronic line.  She denies acute focal joint complaints to suggest septic joint etc.     --Atrial fibrillation/RVR, paroxysmal per internal medicine.  Treatment per them     --History CVA with chronic left-sided hemiparesis    PLAN:     --IV vancomycin/Azactam and oral doxycycline     --I discussed potential risks and benefits of the prescribed antibiotics that include, but are not limited to, solid organ toxicity, neuro/sosa/vestibular toxicity, renal toxicity, CDiff, cytopenias, hypersensitivity,  etc.. Patient/Family voice understanding and agree to proceed.     --Check/review labs cultures and scans     --Discussed with microbiology     --History per nursing staff and       --Highly complex set of issues with high risk for further serious morbidity and other serious sequela    --White blood cell count slightly up October 4, still limited with any element exertion.  Continue broad antimicrobials and monitor.  If not making  additional improvements, you need to give consideration to further imaging to rule out parapneumonic complication or other evolving occult process; abdomen benign, no diarrhea, no new urinary symptoms and no new skin/soft tissue symptoms etc.         Royal Mohamud MD  10/4/2018

## 2018-10-04 NOTE — PROGRESS NOTES
[facility] Medicine Services  PROGRESS NOTE    Patient Name: Vickie Joshi  : 1950  MRN: [id number]    Date of Admission: 2018  Length of Stay: 7  Primary Care Physician: Dav Bryan MD    Subjective   Subjective     CC:  Shortness of air    HPI:    Sitting up in bed, eating breakfast.  States she feels better but still tired.   at bedside.  No new issues or concerns.        Review of Systems  Gen- No fevers, chills. +fatigue  CV- No chest pain, palpitations.  Resp- +cough, dyspnea  GI- No N/V/D, abd pain    Otherwise ROS is negative except as mentioned in the HPI.    Objective   Objective     Vital Signs:   Temp:  [97.9 °F (36.6 °C)-98.8 °F (37.1 °C)] 97.9 °F (36.6 °C)  Heart Rate:  [70-94] 87  Resp:  [16-22] 16  BP: (102-144)/(68-99) 140/99  Total (NIH Stroke Scale): 9     Physical Exam:  Constitutional: No acute distress, awake, alert  HENT: NCAT, mucous membranes moist  Respiratory: Few scattered wheezes, respiratory effort normal on room air  Cardiovascular: on tele monitoring, no murmurs  Gastrointestinal: Positive bowel sounds, soft, nontender, nondistended  Musculoskeletal: No bilateral ankle edema  Psychiatric: Appropriate affect, cooperative  Neurologic: Oriented x 3,  Cranial Nerves grossly intact to confrontation, speech clear  Skin: No rashes      Results Reviewed:  I have personally reviewed current lab, radiology, and data and agree.      Results from last 7 days  Lab Units 10/04/18  0547 10/02/18  0424 18  1007   WBC 10*3/mm3 14.35* 11.54* 13.04*   HEMOGLOBIN g/dL 12.7 11.6 11.3*   HEMATOCRIT % 41.3 37.8 37.2   PLATELETS 10*3/mm3 261 228 207       Results from last 7 days  Lab Units 10/04/18  0549 10/03/18  0543 10/02/18  1941 10/02/18  0424   SODIUM mmol/L 144 144  --  142   POTASSIUM mmol/L 3.8 4.3 3.9 3.4*   CHLORIDE mmol/L 109 113*  --  113*   CO2 mmol/L 24.0 24.0  --  21.0   BUN mg/dL 13 16  --  12   CREATININE mg/dL 0.75 0.88  --   0.66   GLUCOSE mg/dL 80 83  --  108*   CALCIUM mg/dL 8.4* 8.2*  --  8.4*   ALT (SGPT) U/L 22  --   --  23   AST (SGOT) U/L 18  --   --  20     Estimated Creatinine Clearance: 80.2 mL/min (by C-G formula based on SCr of 0.75 mg/dL).  No results found for: BNP    Microbiology Results Abnormal     Procedure Component Value - Date/Time    Legionella Antigen, Urine - Urine, Urine, Clean Catch [[id number]] Collected:  10/01/18 1707    Lab Status:  Final result Specimen:  Urine from Urine, Clean Catch Updated:  10/03/18 1518     L. pneumophila Serogp 1 Ur Ag Negative     Comment: Presumptive negative for L. pneumophila serogroup 1 antigen in urine,  suggesting no recent or current infection. Legionnaires' disease  cannot be ruled out since other serogroups and species may also cause  disease.       Narrative:       Performed at:  [address] [place] [address]  [id number]  : Erik Gillette MD, Phone:  [phone]    S. Pneumo Ag Urine or CSF - Urine, Urine, Clean Catch [[id number]] Collected:  10/01/18 1707    Lab Status:  Final result Specimen:  Urine from Urine, Clean Catch Updated:  10/03/18 1518     Specimen Source Urine     STREP PNEUMONIAE ANTIGEN Negative     Body Fluid Culture, Sterile Not Indicated     Organism ID Not indicated.     Please note Comment     Comment: College of [employer] Pathologists standards require a culture to be  performed on CSF specimens submitted for bacterial antigen testing.  (CAP ELLE.67163) Urine specimens will not be cultured.       Narrative:       Performed at:   - [address]  [id number]  : Erik Gillette MD, Phone:  [phone]    Blood Culture - Blood, [[id number]]  (Abnormal) Collected:  09/27/18 0151    Lab Status:  Final result Specimen:  Blood from Arm, Right Updated:  10/02/18 1333     Blood Culture Staphylococcus, coagulase negative (A)     Comment: Suggests contamination  Susceptibility  will not be done unless Doctor notifies Lab          Isolated from Anaerobic Bottle     Gram Stain Result Anaerobic Bottle Gram positive cocci in clusters    Resp Virus Profile (RVP) PCR - Swab, Nasopharynx [[id number]]  (Abnormal) Collected:  09/27/18 0941    Lab Status:  Final result Specimen:  Swab from Nasopharynx Updated:  10/02/18 0615     Influenza A PCR Negative     Influenza B PCR Negative     RSV A Negative     RSV B Negative     Parainfluenza Virus 1 Negative     Parainfluenza Virus 2 Negative     Parainfluenza Virus 3 Negative     Human Rhinovirus/Enterovirus Positive (A)     Human Metapneumovirus Negative     Adenovirus Detection by PCR Negative    Narrative:       Performed at:  [address] [place] [address]  [id number]  : Erik Gillette MD, Phone:  [phone]    Blood Culture - Blood, [[id number]]  (Normal) Collected:  09/27/18 0328    Lab Status:  Final result Specimen:  Blood from Arm, Left Updated:  10/02/18 0346     Blood Culture No growth at 5 days    MRSA Screen, PCR - Swab, Nares [[id number]]  (Abnormal) Collected:  09/29/18 1448    Lab Status:  Final result Specimen:  Swab from Nares Updated:  09/29/18 2125     MRSA, PCR Positive (C)    Respiratory Culture - Sputum, Cough [[id number]] Collected:  09/27/18 1733    Lab Status:  Final result Specimen:  Sputum from Cough Updated:  09/29/18 0730     Respiratory Culture Light growth (2+) Normal Respiratory Sandrine     Gram Stain Result Many (4+) WBCs per low power field      Few (2+) Epithelial cells per low power field      Rare (1+) Gram positive cocci in pairs    Blood Culture ID, PCR - Blood, [[id number]]  (Abnormal) Collected:  09/27/18 0325    Lab Status:  Final result Specimen:  Blood from Arm, Right Updated:  09/28/18 0459     BCID, PCR Staphylococcus spp, not aureus. Identification by BCID PCR. (C)          Imaging Results (last 24 hours)     ** No results found for the last 24 hours. **        Results for  orders placed during the hospital encounter of 09/27/18   Adult Transthoracic Echo Complete W/ Cont if Necessary Per Protocol    Narrative · Moderate tricuspid valve regurgitation is present.  · Mild pulmonic valve regurgitation is present.  · There is calcification of the aortic valve.  · Mild aortic valve regurgitation is present.  · Left atrial cavity size is borderline dilated.  · Moderate mitral valve regurgitation is present          I have reviewed the medications.      [START ON 10/6/2018] Pharmacy Consult  Does not apply Once   apixaban 5 mg Oral Q12H   aspirin 81 mg Oral Daily   atorvastatin 10 mg Oral Nightly   aztreonam 2 g Intravenous Q8H   budesonide-formoterol 2 puff Inhalation BID - RT   cetirizine 10 mg Oral Nightly   diltiaZEM  mg Oral Q24H   doxycycline 100 mg Oral Q12H   famotidine 40 mg Oral Nightly   fluticasone 2 spray Each Nare Daily   lactobacillus acidophilus 1 capsule Oral Daily   levothyroxine 150 mcg Oral Q AM   metoprolol tartrate 12.5 mg Oral Q12H   montelukast 10 mg Oral Nightly   pantoprazole 40 mg Oral Q AM   pregabalin 300 mg Oral Q12H   topiramate 100 mg Oral Q12H   vancomycin 1,750 mg Intravenous Q24H         Assessment/Plan   Assessment / Plan     Hospital Problem List     * (Principal)Sepsis (CMS/HCC)    Hypertension    GERD (gastroesophageal reflux disease)    Fibromyalgia    Hx of Stroke with residual left hemiparesis    Atrial fibrillation (CMS/HCC)    Pneumonia        Brief Hospital Course to date:  Vickie Joshi is a 68 y.o. female with history of CVA and left hemiparesis.  Also with hx of Afib.  Presents with shortness of air found to be septic.  Bilateral lower lobe pneumonia right greater than left.  Admitted to hospital medicine service.    1) Sepsis  -continue vancomycin and azactam, oral doxy  -blood cultures + staph epi in one set 9/27 (felt to be contaminant), repeat cx NGTD  -secondary to underlying pneumonia  -improving  -ID following for abx mgmt since  10/1     2) Pneumonia  -CXR shows bronchopneumonia Right >Left  -continue IV abx, nebs   -blood cultures pending  -sputum culture  -consult SLP--passed swallow   -cough meds prn     3) Atrial fibrillation with RVR   -chronic now with in-and-out of RVR  -continue eliquis  -continue diltiazem  -new low dose metoprolol this admit. HR improved     4) Fibromyalgia  -continue home medications     5) GERD  -continue PPI     6) History of CVA   -with residual left sided hemiparesis  -currently does outpatient rehab at [facility]  -fall precautions      Discontinued steroids 10/1  Stopped standing nebs every 4 hours due to tachycardia, still with prn nebs  Continue on metoprolol 12.5 mg every 12 hours as it appears to be working well  HR normalized  Plan is home with HH/PT/OT and IV abx, all per ID final recs.  (?abx through 10/24 possibly)    DVT prophylaxis:cora/scds, eliquis     CODE STATUS:   Code Status and Medical Interventions:   Ordered at: 09/27/18 0605     Level Of Support Discussed With:    Patient     Code Status:    CPR     Medical Interventions (Level of Support Prior to Arrest):    Full     Disposition: I expect the patient to be discharged home with HH when medically ready. Pending final ID recs.     Electronically signed by FABIANA Suh, 10/04/18, 12:39 PM.

## 2018-10-04 NOTE — PLAN OF CARE
Problem: Patient Care Overview  Goal: Plan of Care Review  Outcome: Ongoing (interventions implemented as appropriate)   10/04/18 0210   Coping/Psychosocial   Plan of Care Reviewed With patient   Plan of Care Review   Progress improving   OTHER   Outcome Summary Pt. VSS. Resting well throughout night. No reports of pain or discomfort. Will continue to monitor.      Goal: Individualization and Mutuality  Outcome: Ongoing (interventions implemented as appropriate)    Goal: Discharge Needs Assessment  Outcome: Ongoing (interventions implemented as appropriate)    Goal: Interprofessional Rounds/Family Conf  Outcome: Ongoing (interventions implemented as appropriate)

## 2018-10-05 LAB
ALBUMIN SERPL-MCNC: 4.05 G/DL (ref 3.2–4.8)
ALBUMIN/GLOB SERPL: 1.8 G/DL (ref 1.5–2.5)
ALP SERPL-CCNC: 61 U/L (ref 25–100)
ALT SERPL W P-5'-P-CCNC: 27 U/L (ref 7–40)
ANION GAP SERPL CALCULATED.3IONS-SCNC: 10 MMOL/L (ref 3–11)
AST SERPL-CCNC: 29 U/L (ref 0–33)
BILIRUB SERPL-MCNC: 0.6 MG/DL (ref 0.3–1.2)
BUN BLD-MCNC: 14 MG/DL (ref 9–23)
BUN/CREAT SERPL: 18.9 (ref 7–25)
CALCIUM SPEC-SCNC: 8.7 MG/DL (ref 8.7–10.4)
CHLORIDE SERPL-SCNC: 111 MMOL/L (ref 99–109)
CO2 SERPL-SCNC: 21 MMOL/L (ref 20–31)
CREAT BLD-MCNC: 0.74 MG/DL (ref 0.6–1.3)
DEPRECATED RDW RBC AUTO: 51.7 FL (ref 37–54)
ERYTHROCYTE [DISTWIDTH] IN BLOOD BY AUTOMATED COUNT: 14.5 % (ref 11.3–14.5)
GFR SERPL CREATININE-BSD FRML MDRD: 79 ML/MIN/1.73
GLOBULIN UR ELPH-MCNC: 2.3 GM/DL
GLUCOSE BLD-MCNC: 83 MG/DL (ref 70–100)
HCT VFR BLD AUTO: 45.4 % (ref 34.5–44)
HGB BLD-MCNC: 13.8 G/DL (ref 11.5–15.5)
MCH RBC QN AUTO: 29.4 PG (ref 27–31)
MCHC RBC AUTO-ENTMCNC: 30.4 G/DL (ref 32–36)
MCV RBC AUTO: 96.6 FL (ref 80–99)
PLATELET # BLD AUTO: 249 10*3/MM3 (ref 150–450)
PMV BLD AUTO: 12.4 FL (ref 6–12)
POTASSIUM BLD-SCNC: 3.9 MMOL/L (ref 3.5–5.5)
PROT SERPL-MCNC: 6.3 G/DL (ref 5.7–8.2)
RBC # BLD AUTO: 4.7 10*6/MM3 (ref 3.89–5.14)
SODIUM BLD-SCNC: 142 MMOL/L (ref 132–146)
WBC NRBC COR # BLD: 12.54 10*3/MM3 (ref 3.5–10.8)

## 2018-10-05 PROCEDURE — 97530 THERAPEUTIC ACTIVITIES: CPT

## 2018-10-05 PROCEDURE — 97112 NEUROMUSCULAR REEDUCATION: CPT

## 2018-10-05 PROCEDURE — 25010000002 VANCOMYCIN 10 G RECONSTITUTED SOLUTION

## 2018-10-05 PROCEDURE — 97110 THERAPEUTIC EXERCISES: CPT

## 2018-10-05 PROCEDURE — 92526 ORAL FUNCTION THERAPY: CPT

## 2018-10-05 PROCEDURE — 94799 UNLISTED PULMONARY SVC/PX: CPT

## 2018-10-05 PROCEDURE — 99233 SBSQ HOSP IP/OBS HIGH 50: CPT | Performed by: INTERNAL MEDICINE

## 2018-10-05 PROCEDURE — 85027 COMPLETE CBC AUTOMATED: CPT | Performed by: INTERNAL MEDICINE

## 2018-10-05 PROCEDURE — 94760 N-INVAS EAR/PLS OXIMETRY 1: CPT

## 2018-10-05 PROCEDURE — 80053 COMPREHEN METABOLIC PANEL: CPT | Performed by: INTERNAL MEDICINE

## 2018-10-05 PROCEDURE — 63710000001 PREDNISONE PER 1 MG: Performed by: INTERNAL MEDICINE

## 2018-10-05 RX ORDER — NYSTATIN 100000 [USP'U]/G
POWDER TOPICAL EVERY 8 HOURS SCHEDULED
Status: DISCONTINUED | OUTPATIENT
Start: 2018-10-05 | End: 2018-10-08 | Stop reason: HOSPADM

## 2018-10-05 RX ORDER — PREDNISONE 20 MG/1
40 TABLET ORAL
Status: DISCONTINUED | OUTPATIENT
Start: 2018-10-06 | End: 2018-10-08 | Stop reason: HOSPADM

## 2018-10-05 RX ORDER — PREDNISONE 20 MG/1
40 TABLET ORAL ONCE
Status: COMPLETED | OUTPATIENT
Start: 2018-10-05 | End: 2018-10-05

## 2018-10-05 RX ORDER — PREDNISONE 20 MG/1
20 TABLET ORAL ONCE
Status: COMPLETED | OUTPATIENT
Start: 2018-10-05 | End: 2018-10-05

## 2018-10-05 RX ADMIN — VANCOMYCIN HYDROCHLORIDE 1750 MG: 10 INJECTION, POWDER, LYOPHILIZED, FOR SOLUTION INTRAVENOUS at 13:00

## 2018-10-05 RX ADMIN — LEVOTHYROXINE SODIUM 150 MCG: 150 TABLET ORAL at 06:42

## 2018-10-05 RX ADMIN — PREGABALIN 300 MG: 100 CAPSULE ORAL at 10:07

## 2018-10-05 RX ADMIN — METOPROLOL TARTRATE 12.5 MG: 25 TABLET, FILM COATED ORAL at 10:07

## 2018-10-05 RX ADMIN — APIXABAN 5 MG: 5 TABLET, FILM COATED ORAL at 10:08

## 2018-10-05 RX ADMIN — NYSTATIN 1 APPLICATION: 100000 POWDER TOPICAL at 15:25

## 2018-10-05 RX ADMIN — TOPIRAMATE 100 MG: 100 TABLET, FILM COATED ORAL at 10:08

## 2018-10-05 RX ADMIN — FAMOTIDINE 40 MG: 20 TABLET ORAL at 20:54

## 2018-10-05 RX ADMIN — TOPIRAMATE 100 MG: 100 TABLET, FILM COATED ORAL at 20:54

## 2018-10-05 RX ADMIN — ATORVASTATIN CALCIUM 10 MG: 10 TABLET, FILM COATED ORAL at 20:54

## 2018-10-05 RX ADMIN — FLUTICASONE PROPIONATE 2 SPRAY: 50 SPRAY, METERED NASAL at 11:25

## 2018-10-05 RX ADMIN — DOXYCYCLINE 100 MG: 100 CAPSULE ORAL at 20:54

## 2018-10-05 RX ADMIN — CETIRIZINE HYDROCHLORIDE 10 MG: 10 TABLET, FILM COATED ORAL at 20:54

## 2018-10-05 RX ADMIN — Medication 1 CAPSULE: at 10:08

## 2018-10-05 RX ADMIN — APIXABAN 5 MG: 5 TABLET, FILM COATED ORAL at 20:54

## 2018-10-05 RX ADMIN — SODIUM CHLORIDE 2 G: 9 INJECTION, SOLUTION INTRAVENOUS at 15:25

## 2018-10-05 RX ADMIN — NYSTATIN: 100000 POWDER TOPICAL at 20:55

## 2018-10-05 RX ADMIN — ALBUTEROL SULFATE 2.5 MG: 2.5 SOLUTION RESPIRATORY (INHALATION) at 21:05

## 2018-10-05 RX ADMIN — METOPROLOL TARTRATE 12.5 MG: 25 TABLET, FILM COATED ORAL at 20:58

## 2018-10-05 RX ADMIN — SODIUM CHLORIDE 2 G: 9 INJECTION, SOLUTION INTRAVENOUS at 22:22

## 2018-10-05 RX ADMIN — PREDNISONE 20 MG: 20 TABLET ORAL at 16:01

## 2018-10-05 RX ADMIN — PANTOPRAZOLE SODIUM 40 MG: 40 TABLET, DELAYED RELEASE ORAL at 06:42

## 2018-10-05 RX ADMIN — BUDESONIDE AND FORMOTEROL FUMARATE DIHYDRATE 2 PUFF: 160; 4.5 AEROSOL RESPIRATORY (INHALATION) at 09:03

## 2018-10-05 RX ADMIN — PREDNISONE 40 MG: 20 TABLET ORAL at 15:25

## 2018-10-05 RX ADMIN — MONTELUKAST SODIUM 10 MG: 10 TABLET, COATED ORAL at 20:55

## 2018-10-05 RX ADMIN — ALBUTEROL SULFATE 2.5 MG: 2.5 SOLUTION RESPIRATORY (INHALATION) at 09:03

## 2018-10-05 RX ADMIN — DOXYCYCLINE 100 MG: 100 CAPSULE ORAL at 10:07

## 2018-10-05 RX ADMIN — PREGABALIN 300 MG: 100 CAPSULE ORAL at 20:54

## 2018-10-05 RX ADMIN — DILTIAZEM HYDROCHLORIDE 360 MG: 180 CAPSULE, COATED, EXTENDED RELEASE ORAL at 10:07

## 2018-10-05 RX ADMIN — ASPIRIN 81 MG: 81 TABLET, COATED ORAL at 10:07

## 2018-10-05 RX ADMIN — BUDESONIDE AND FORMOTEROL FUMARATE DIHYDRATE 2 PUFF: 160; 4.5 AEROSOL RESPIRATORY (INHALATION) at 18:21

## 2018-10-05 RX ADMIN — SODIUM CHLORIDE 2 G: 900 INJECTION INTRAVENOUS at 06:42

## 2018-10-05 NOTE — PLAN OF CARE
Problem: Patient Care Overview  Goal: Plan of Care Review  Outcome: Ongoing (interventions implemented as appropriate)   10/05/18 0345   Coping/Psychosocial   Plan of Care Reviewed With patient   Plan of Care Review   Progress no change   OTHER   Outcome Summary Pt. VSS. No reports of pain or discomfort. Will continue to monitor.      Goal: Individualization and Mutuality  Outcome: Ongoing (interventions implemented as appropriate)    Goal: Discharge Needs Assessment  Outcome: Ongoing (interventions implemented as appropriate)    Goal: Interprofessional Rounds/Family Conf  Outcome: Ongoing (interventions implemented as appropriate)

## 2018-10-05 NOTE — PROGRESS NOTES
[facility] Medicine Services  PROGRESS NOTE    Patient Name: Vickie Joshi  : 1950  MRN: [id number]    Date of Admission: 2018  Length of Stay: 8  Primary Care Physician: Dav Bryan MD    Subjective   Subjective     CC:  Shortness of air    HPI:    Sitting up in bed. Having a breathing treatment. Having some worsening wheezing today. Says she noticed it yesterday, but is worse today. Mild non-productive cough.      Review of Systems  Gen- No fevers, chills. +fatigue  CV- No chest pain, palpitations.  Resp- +cough, dyspnea  GI- No N/V/D, abd pain    Otherwise ROS is negative except as mentioned in the HPI.    Objective   Objective     Vital Signs:   Temp:  [98 °F (36.7 °C)-98.7 °F (37.1 °C)] 98 °F (36.7 °C)  Heart Rate:  [68-99] 99  Resp:  [17-20] 17  BP: (136-162)/(84-93) 136/90  Total (NIH Stroke Scale): 9     Physical Exam:  Constitutional: No acute distress, awake, alert  HENT: NCAT, mucous membranes moist  Respiratory: course wheezing bilaterally, some mild conversational dyspnea  Cardiovascular: on tele monitoring, no murmurs  Gastrointestinal: Positive bowel sounds, soft, nontender, nondistended  Musculoskeletal: No bilateral ankle edema  Psychiatric: Appropriate affect, cooperative  Neurologic: Oriented x 3,  Cranial Nerves grossly intact to confrontation, speech clear  Skin: No rashes      Results Reviewed:  I have personally reviewed current lab, radiology, and data and agree.      Results from last 7 days  Lab Units 10/05/18  0757 10/04/18  0547 10/02/18  0424   WBC 10*3/mm3 12.54* 14.35* 11.54*   HEMOGLOBIN g/dL 13.8 12.7 11.6   HEMATOCRIT % 45.4* 41.3 37.8   PLATELETS 10*3/mm3 249 261 228       Results from last 7 days  Lab Units 10/05/18  0757 10/04/18  0549 10/03/18  0543  10/02/18  0424   SODIUM mmol/L 142 144 144  --  142   POTASSIUM mmol/L 3.9 3.8 4.3  < > 3.4*   CHLORIDE mmol/L 111* 109 113*  --  113*   CO2 mmol/L 21.0 24.0 24.0  --  21.0   BUN  mg/dL 14 13 16  --  12   CREATININE mg/dL 0.74 0.75 0.88  --  0.66   GLUCOSE mg/dL 83 80 83  --  108*   CALCIUM mg/dL 8.7 8.4* 8.2*  --  8.4*   ALT (SGPT) U/L 27 22  --   --  23   AST (SGOT) U/L 29 18  --   --  20   < > = values in this interval not displayed.  Estimated Creatinine Clearance: 79.9 mL/min (by C-G formula based on SCr of 0.74 mg/dL).  No results found for: BNP    Microbiology Results Abnormal     Procedure Component Value - Date/Time    Legionella Antigen, Urine - Urine, Urine, Clean Catch [[id number]] Collected:  10/01/18 1707    Lab Status:  Final result Specimen:  Urine from Urine, Clean Catch Updated:  10/03/18 1518     L. pneumophila Serogp 1 Ur Ag Negative     Comment: Presumptive negative for L. pneumophila serogroup 1 antigen in urine,  suggesting no recent or current infection. Legionnaires' disease  cannot be ruled out since other serogroups and species may also cause  disease.       Narrative:       Performed at:  [address]  [id number]  : Erik Gillette MD, Phone:  [phone]    S. Pneumo Ag Urine or CSF - Urine, Urine, Clean Catch [[id number]] Collected:  10/01/18 1707    Lab Status:  Final result Specimen:  Urine from Urine, Clean Catch Updated:  10/03/18 1518     Specimen Source Urine     STREP PNEUMONIAE ANTIGEN Negative     Body Fluid Culture, Sterile Not Indicated     Organism ID Not indicated.     Please note Comment     Comment: College of [employer] Pathologists standards require a culture to be  performed on CSF specimens submitted for bacterial antigen testing.  (CAP ELLE.23012) Urine specimens will not be cultured.       Narrative:       Performed at:  [address]  [id number]  : Erik Gillette MD, Phone:  [phone]    Blood Culture - Blood, [[id number]]  (Abnormal) Collected:  09/27/18 7977    Lab Status:  Final result Specimen:  Blood from Arm, Right Updated:   10/02/18 1333     Blood Culture Staphylococcus, coagulase negative (A)     Comment: Suggests contamination  Susceptibility will not be done unless Doctor notifies Lab          Isolated from Anaerobic Bottle     Gram Stain Result Anaerobic Bottle Gram positive cocci in clusters    Resp Virus Profile (RVP) PCR - Swab, Nasopharynx [[id number]]  (Abnormal) Collected:  09/27/18 0941    Lab Status:  Final result Specimen:  Swab from Nasopharynx Updated:  10/02/18 0615     Influenza A PCR Negative     Influenza B PCR Negative     RSV A Negative     RSV B Negative     Parainfluenza Virus 1 Negative     Parainfluenza Virus 2 Negative     Parainfluenza Virus 3 Negative     Human Rhinovirus/Enterovirus Positive (A)     Human Metapneumovirus Negative     Adenovirus Detection by PCR Negative    Narrative:       Performed at:  [address] [place] [address]  [id number]  : Erik Gillette MD, Phone:  [phone]    Blood Culture - Blood, [[id number]]  (Normal) Collected:  09/27/18 0325    Lab Status:  Final result Specimen:  Blood from Arm, Left Updated:  10/02/18 0346     Blood Culture No growth at 5 days    MRSA Screen, PCR - Swab, Nares [[id number]]  (Abnormal) Collected:  09/29/18 1448    Lab Status:  Final result Specimen:  Swab from Nares Updated:  09/29/18 2125     MRSA, PCR Positive (C)    Respiratory Culture - Sputum, Cough [[id number]] Collected:  09/27/18 1733    Lab Status:  Final result Specimen:  Sputum from Cough Updated:  09/29/18 0730     Respiratory Culture Light growth (2+) Normal Respiratory Sandrine     Gram Stain Result Many (4+) WBCs per low power field      Few (2+) Epithelial cells per low power field      Rare (1+) Gram positive cocci in pairs    Blood Culture ID, PCR - Blood, [[id number]]  (Abnormal) Collected:  09/27/18 0325    Lab Status:  Final result Specimen:  Blood from Arm, Right Updated:  09/28/18 0459     BCID, PCR Staphylococcus spp, not aureus. Identification by  BCID PCR. (C)          Imaging Results (last 24 hours)     ** No results found for the last 24 hours. **        Results for orders placed during the hospital encounter of 09/27/18   Adult Transthoracic Echo Complete W/ Cont if Necessary Per Protocol    Narrative · Moderate tricuspid valve regurgitation is present.  · Mild pulmonic valve regurgitation is present.  · There is calcification of the aortic valve.  · Mild aortic valve regurgitation is present.  · Left atrial cavity size is borderline dilated.  · Moderate mitral valve regurgitation is present          I have reviewed the medications.      [START ON 10/6/2018] Pharmacy Consult  Does not apply Once   apixaban 5 mg Oral Q12H   aspirin 81 mg Oral Daily   atorvastatin 10 mg Oral Nightly   aztreonam 2 g Intravenous Q8H   budesonide-formoterol 2 puff Inhalation BID - RT   cetirizine 10 mg Oral Nightly   diltiaZEM  mg Oral Q24H   doxycycline 100 mg Oral Q12H   famotidine 40 mg Oral Nightly   fluticasone 2 spray Each Nare Daily   lactobacillus acidophilus 1 capsule Oral Daily   levothyroxine 150 mcg Oral Q AM   metoprolol tartrate 12.5 mg Oral Q12H   montelukast 10 mg Oral Nightly   pantoprazole 40 mg Oral Q AM   pregabalin 300 mg Oral Q12H   topiramate 100 mg Oral Q12H   vancomycin 1,750 mg Intravenous Q24H         Assessment/Plan   Assessment / Plan     Hospital Problem List     * (Principal)Sepsis (CMS/HCC)    Hypertension    GERD (gastroesophageal reflux disease)    Fibromyalgia    Hx of Stroke with residual left hemiparesis    Atrial fibrillation (CMS/HCC)    Pneumonia        Brief Hospital Course to date:  Vickie Joshi is a 68 y.o. female with history of CVA and left hemiparesis.  Also with hx of Afib.  Presents with shortness of air found to be septic.  Bilateral lower lobe pneumonia right greater than left.  Admitted to hospital medicine service.    1) Sepsis  -continue vancomycin and azactam, oral doxy  -blood cultures + staph epi in one set  9/27 (felt to be contaminant), repeat cx NGTD  -secondary to underlying pneumonia  -improving  -ID following for abx mgmt since 10/1     2) Pneumonia  -CXR shows bronchopneumonia Right >Left  -continue IV abx, nebs, stopped standing nebs secondary to tachycardia, still has them PRN  -blood cultures negative  -sputum culture neg  -consult SLP--passed swallow   -cough meds prn  -has some worsening wheezing since stopping IV solumedrol, will start PO prednisone taper (she mentions hx of asthma)     3) Atrial fibrillation with RVR   -continue eliquis  -continue diltiazem  -new low dose metoprolol this admit. HR improved     4) Fibromyalgia  -continue home medications     5) GERD  -continue PPI     6) History of CVA   -with residual left sided hemiparesis  -currently does outpatient rehab at [facility]  -fall precautions      DVT prophylaxis: eliquis     CODE STATUS:   Code Status and Medical Interventions:   Ordered at: 09/27/18 0605     Level Of Support Discussed With:    Patient     Code Status:    CPR     Medical Interventions (Level of Support Prior to Arrest):    Full     Disposition: I expect the patient to be discharged home with HH when medically ready. Pending final ID recs. Watch through the weekend.    Electronically signed by Claudia Larson DO, 10/05/18, 1:30 PM.

## 2018-10-05 NOTE — THERAPY TREATMENT NOTE
Acute Care - Physical Therapy Treatment Note  [facility]     Patient Name: Vickie Joshi  : 1950  MRN: [id number]  Today's Date: 10/5/2018  Onset of Illness/Injury or Date of Surgery: 18  Date of Referral to PT: 18  Referring Physician: FABIANA Chen    Admit Date: 2018    Visit Dx:    ICD-10-CM ICD-9-CM   1. Dysphagia, unspecified type R13.10 787.20   2. Impaired mobility and ADLs Z74.09 799.89   3. Impaired functional mobility, balance, gait, and endurance Z74.09 V49.89   4. Cerebrovascular accident (CVA), unspecified mechanism (CMS/Formerly [facility]) I63.9 434.91   5. Fall, subsequent encounter W19.XXXD V58.89     E888.9   6. Pneumonia of right lung due to infectious organism, unspecified part of lung J18.9 483.8   7. Sepsis, due to unspecified organism (CMS/Formerly [facility]) A41.9 038.9     995.91   8. Atrial fibrillation, chronic (CMS/Formerly [facility]) I48.2 427.31     Patient Active Problem List   Diagnosis   • Fall   • Closed fracture of neck of left femur (CMS/HCC)   • Hyperlipidemia   • Hypertension   • Hx of Migraine   • GERD (gastroesophageal reflux disease)   • Fibromyalgia   • Neuropathy   • Rapid atrial fibrillation (CMS/HCC)   • Chronic anticoagulation on Eliquis   • Hx of Stroke with residual left hemiparesis   • Sepsis (CMS/Formerly [facility])   • Atrial fibrillation (CMS/HCC)   • Pneumonia       Therapy Treatment          Rehabilitation Treatment Summary     Row Name 10/05/18 1420 10/05/18 1117 10/05/18 0915       Treatment Time/Intention    Discipline physical therapist  -SJ occupational therapist  -KF speech language pathologist  -ABHISHEK GALEANA,SG2    Document Type therapy note (daily note)  -SJ therapy note (daily note)  -KF therapy note (daily note)  -ABHISHEK GALEANA,SG2    Subjective Information no complaints  -SJ no complaints  -KF no complaints  -ABHISHEK GALEANA,SG2    Mode of Treatment individual therapy  - individual therapy;occupational therapy  -KF individual therapy;speech-language pathology  -KERVIN,ABHISHEK,SG2    Patient/Family Observations   present, pt reclined in chair  -SJ  present throughout  -KF no family present  -SG,MF,SG2    Care Plan Review care plan/treatment goals reviewed;risks/benefits reviewed;current/potential barriers reviewed;patient/other agree to care plan  -SJ evaluation/treatment results reviewed;risks/benefits reviewed;care plan/treatment goals reviewed;current/potential barriers reviewed;patient/other agree to care plan  -KF  --    Care Plan Review, Other Participant(s)  -- spouse  -KF  --    Therapy Frequency (PT Clinical Impression) daily  -SJ  --  --    Therapy Frequency ([place])  --  -- 5 days per week  -SG,MF,SG2    Patient Effort good  -SJ good  -KF good  -SG,MF,SG2    Existing Precautions/Restrictions fall;other (see comments)   L hemiparesis  -SJ fall;other (see comments)   L hemiparesis  -KF  --    Patient Response to Treatment farhan well  -SJ tolerated  -KF  --    Recorded by [SJ] Jacki Royal, PT 10/05/18 1510 [KF] Ramona Dee OT 10/05/18 1154 [SG,MF,SG2] Sherley Smalls MS CCC-SLP (r) Lavonne Felix, Speech Therapy Student (t) Sherley Smalls, MS BOWLES-SLP (c) 10/05/18 1004    Row Name 10/05/18 1420 10/05/18 1117          Vital Signs    Pre Systolic BP Rehab --   VSS  -  -KF     Pre Treatment Diastolic BP  -- 90   RN cleared for tx vitals stable throughout  -KF     O2 Delivery Pre Treatment  -- room air  -KF     O2 Delivery Post Treatment  -- room air  -KF     Pre Patient Position  -- Supine  -KF     Intra Patient Position  -- Supine  -KF     Post Patient Position  -- Supine  -KF     Recorded by [SJ] Jacki Royal, PT 10/05/18 1510 [KF] Ramona Dee OT 10/05/18 1154     Row Name 10/05/18 1117             Cognitive Assessment/Intervention    Additional Documentation Cognitive Assessment/Intervention (Group)  -KF      Recorded by [KF] Ramona Dee OT 10/05/18 1154      Row Name 10/05/18 1420 10/05/18 1117          Cognitive  Assessment/Intervention- PT/OT    Affect/Mental Status (Cognitive) WFL  -SJ WFL  -KF     Orientation Status (Cognition) oriented x 4  -SJ oriented x 4  -KF     Follows Commands (Cognition) WFL  -SJ WFL;follows one step commands;over 90% accuracy  -KF     Cognitive Function (Cognitive)  -- WFL  -KF     Cognitive Interventions (Cognitive)  -- process/task specific training  -KF     Personal Safety Interventions fall prevention program maintained;gait belt;muscle strengthening facilitated;nonskid shoes/slippers when out of bed  -SJ fall prevention program maintained;muscle strengthening facilitated;nonskid shoes/slippers when out of bed  -KF     Recorded by [SJ] Jacki Royal, PT 10/05/18 1510 [KF] Ramona Dee, OT 10/05/18 1154     Row Name 10/05/18 1420 10/05/18 1117          Safety Issues, Functional Mobility    Safety Issues Affecting Function (Mobility) insight into deficits/self awareness;sequencing abilities  -SJ insight into deficits/self awareness;awareness of need for assistance  -KF     Impairments Affecting Function (Mobility) coordination;endurance/activity tolerance;grasp;muscle tone abnormal;strength  -SJ coordination;endurance/activity tolerance;grasp;muscle tone abnormal;strength  -KF     Recorded by [SJ] Jacki Royal, PT 10/05/18 1510 [KF] Ramona Dee, OT 10/05/18 1154     Row Name 10/05/18 1420 10/05/18 1117          Bed Mobility Assessment/Treatment    Bed Mobility Assessment/Treatment  -- scooting/bridging  -KF     Scooting/Bridging [place] (Bed Mobility)  -- dependent (less than 25% patient effort);2 person assist   to reposition closer to HOB  -KF     Comment (Bed Mobility) [facility]  - deferred OOB activities and EOB for PT   -KF     Recorded by [SJ] Jacki Royal, PT 10/05/18 1510 [KF] Ramona Dee OT 10/05/18 1154     Row Name 10/05/18 1117             Functional Mobility    Functional Mobility- Comment deferred mobility to PT  -KF      Recorded by [KF] Luiz  Ramona HICKEY, OT 10/05/18 1154      Row Name 10/05/18 1420             Transfer Assessment/Treatment    Comment (Transfers) STS t/f training x 5reps, recliner turned to face bed, pt pulled up into standing using bedrail  -SJ      Recorded by [SJ] Jacki Royal, PT 10/05/18 1510      Row Name 10/05/18 1420             Sit-Stand Transfer    Sit-Stand [place] (Transfers) minimum assist (75% patient effort);verbal cues  -SJ      Assistive Device (Sit-Stand Transfers) other (see comments)   bed siderail  -SJ      Recorded by [SJ] Jacki Royal, PT 10/05/18 1510      Row Name 10/05/18 1420             Stand-Sit Transfer    Stand-Sit [place] (Transfers) minimum assist (75% patient effort);verbal cues  -SJ      Assistive Device (Stand-Sit Transfers) other (see comments)   siderail of bed  -SJ      Recorded by [SJ] Jacki Royal, PT 10/05/18 1510      Row Name 10/05/18 1420             Gait/Stairs Assessment/Training    [place] Level (Gait) not tested  -SJ      Recorded by [SJ] Jacki Royal, PT 10/05/18 1510      Row Name 10/05/18 1117             BADL Safety/Performance    Impairments, BADL Safety/Performance balance;endurance/activity tolerance;grasp/prehension;coordination;motor planning;motor control;muscle tone abnormality  -KF      Skilled BADL Treatment/Intervention cognitive/safety deficit modifications;hand-over-hand training/cues  -KF      Recorded by [KF] Ramona Dee, OT 10/05/18 1154      Row Name 10/05/18 1117             Motor Skills Assessment/Interventions    Additional Documentation Therapeutic Exercise (Group);Therapeutic Exercise Interventions (Group);Neuromuscular Re-education (Group)  -KF      Recorded by [KF] Ramona eDe, OT 10/05/18 1154      Row Name 10/05/18 1420 10/05/18 1117          Therapeutic Exercise    Therapeutic Exercise  -- supine, upper extremities  -KF     Additional Documentation  -- Therapeutic Exercise (Row)  -KF     82854 - PT Therapeutic  Exercise Minutes 10  -SJ  --     44548 - PT Therapeutic Activity Minutes 19  -SJ  --     Recorded by [SJ] Jacki Royal, PT 10/05/18 1510 [KF] Ramona Dee, OT 10/05/18 1154     Row Name 10/05/18 1117             Upper Extremity Seated Therapeutic Exercise    Performed, Seated Upper Extremity (Therapeutic Exercise) shoulder flexion/extension;scapular protraction/retraction;scapular stabilization;elbow flexion/extension;forearm supination/pronation;wrist flexion/extension;digit flexion/extension  -KF      Exercise Type, Seated Upper Extremity (Therapeutic Exercise) AAROM (active assistive range of motion);AROM (active range of motion);PROM (passive range of motion)  -KF      Expected Outcomes, Seated Upper Extremity (Therapeutic Exercise) improve motor control;improve functional tolerance, self-care activity;improve performance, BADLs;improve manipulation of objects, self care activity  -KF      Sets/Reps Detail, Seated Upper Extremity (Therapeutic Exercise) 2/10, 2/5  -KF      Transfers Skills, Training to Functional Activity, Seated Upper Extremity (Therapeutic Exercise) beginning to transfer skills to functional activity  -KF      Comment, Seated Upper Extremity (Therapeutic Exercise) LUE AAROM, AROM to mirror movements performed AAROM in L UE  -KF      Recorded by [KF] Ramona Dee, RAMAKRISHNA 10/05/18 1154      Row Name 10/05/18 1420             Therapeutic Exercise    Lower Extremity (Therapeutic Exercise) gluteal sets;gastroc stretch, left;LAQ (long arc quad), left  -SJ      Lower Extremity Range of Motion (Therapeutic Exercise) knee flexion/extension, left;ankle dorsiflexion/plantar flexion, left  -SJ      Exercise Type (Therapeutic Exercise) AAROM (active assistive range of motion);PROM (passive range of motion)  -SJ      Position (Therapeutic Exercise) prone  -SJ      Sets/Reps (Therapeutic Exercise) 2/10  -SJ      Recorded by [SJ] Jacki Royal, PT 10/05/18 1510      Row Name 10/05/18 1420              Static Standing Balance    Level of [place] (Supported Standing, Static Balance) minimal assist, 75% patient effort  -SJ      Time Able to Maintain Position (Supported Standing, Static Balance) 1 to 2 minutes  -SJ      Assistive Device Utilized (Supported Standing, Static Balance) other (see comments)  -SJ      Comment (Supported Standing, Static Balance) pt stood at bed siderail, performed weight shifting side to side, sidestepped to L with maxA to advance LLE  -SJ      Recorded by [SJ] Jacki Royal, PT 10/05/18 1510      Row Name 10/05/18 1117             Neuromuscular Re-education    Comment, Neuromuscular Re-education completed thumb flex/ext holds L hand, L digit flexion/extension holds, L wrist extension place and holds   -KF      Recorded by [KF] Ramona Dee, OT 10/05/18 1154      Row Name 10/05/18 1420 10/05/18 1117          Positioning and Restraints    Pre-Treatment Position sitting in chair/recliner  -SJ in bed  -KF     Post Treatment Position chair  -SJ bed  -KF     In Bed  -- notified nsg;supine;fowlers;call light within reach;encouraged to call for assist;exit alarm on;with family/caregiver;legs elevated;LUE elevated  -KF     In Chair reclined;call light within reach;encouraged to call for assist;with family/caregiver;LUE elevated  -SJ  --     Recorded by [SJ] Jacki Royal, PT 10/05/18 1510 [KF] Ramona Dee, OT 10/05/18 1154     Row Name 10/05/18 1117 10/05/18 0915          Pain Assessment    Additional Documentation Pain Scale: Numbers Pre/Post-Treatment (Group)  - Pain Scale: FACES Pre/Post-Treatment (Group)  -SG,MF,SG2     Recorded by [KF] Ramona Dee, OT 10/05/18 1154 [SG,MF,SG2] Sherley Smalls MS CCC-SLP (r) Lavonne Felix, Speech Therapy Student (t) Sherley Smalls MS CCC-SLP (c) 10/05/18 1004     Row Name 10/05/18 1420 10/05/18 1117          Pain Scale: Numbers Pre/Post-Treatment    Pain Scale: Numbers, Pretreatment 0/10  - no pain  -SJ 0/10 - no pain  -KF     Pain Scale: Numbers, Post-Treatment 0/10 - no pain  -SJ 0/10 - no pain  -KF     Pain Intervention(s)  -- Repositioned  -KF     Recorded by [SJ] Jacki Royal, PT 10/05/18 1510 [KF] Ramona Dee, OT 10/05/18 1154     Row Name 10/05/18 0915             Pain Scale: FACES Pre/Post-Treatment    Pain: FACES Scale, Pretreatment 0-->no hurt  -SG,MF,SG2      Pain: FACES Scale, Post-Treatment 0-->no hurt  -SG,MF,SG2      Recorded by [SG,MF,SG2] Sherley Smalls, MS CCC-SLP (r) Lavonne Felix, Speech Therapy Student (t) Sherley Smalls, MS CCC-SLP (c) 10/05/18 1004      Row Name 10/05/18 1420             Coping    Observed Emotional State accepting;calm;flat  -SJ      Verbalized Emotional State acceptance  -SJ      Recorded by [SJ] Jacki Royal, PT 10/05/18 1510      Row Name 10/05/18 1420 10/05/18 1117          Plan of Care Review    Plan of Care Reviewed With patient  -SJ patient;spouse  -KF     Recorded by [SJ] Jacki Royal, PT 10/05/18 1510 [KF] Ramona Dee, OT 10/05/18 1154     Row Name 10/05/18 1117             Outcome Summary/Treatment Plan (OT)    Daily Summary of Progress (OT) progress toward functional goals is good  -KF      Recorded by [KF] Ramona Dee, OT 10/05/18 1154      Row Name 10/05/18 1420             Outcome Summary/Treatment Plan (PT)    Daily Summary of Progress (PT) progress toward functional goals is good  -SJ      Recorded by [SJ] Jacki Royal, PT 10/05/18 1510      Row Name 10/05/18 0915             Outcome Summary/Treatment Plan (SLP)    Daily Summary of Progress (SLP) progress toward functional goals is good  -SG,MF,SG2      Plan for Continued Treatment (SLP) cont tx per POC  -SG,MF,SG2      Anticipated Dischage Disposition unknown;anticipate therapy at next level of care  -SG,MF,SG2      Recorded by [KERVIN,MF,SG2] Sherley Smalls, MS CCC-SLP (r) Lavonne Felix, Speech Therapy  Student (t) Sherley Smalls, MS CCC-SLP (c) 10/05/18 1004        User Key  (r) = Recorded By, (t) = Taken By, (c) = Cosigned By    Initials Name Effective Dates Discipline    SG Sherley Smalls, MS CCC-SLP 06/22/15 -  SLP    Jacki De Souza, PT 06/19/15 -  PT    Ramona Hill, OT 04/03/18 -  OT    Lavonne Gunn, Speech Therapy Student 08/06/18 -  SLP                     Physical Therapy Education     Title: PT OT SLP Therapies (Active)     Topic: Physical Therapy (Active)     Point: Mobility training (Active)    Learning Progress Summary     Learner Status Readiness Method Response Comment Documented by    Patient Active Acceptance E,D NR   10/05/18 1512     Done Eager E VU  KM 10/02/18 0148     Done Eager E VU  KM 10/02/18 0145     Done Eager E FRANCI BURKS,NR reviewed bbefits of activity SC 09/28/18 0938          Point: Home exercise program (Active)    Learning Progress Summary     Learner Status Readiness Method Response Comment Documented by    Patient Active Acceptance E,D NR   10/05/18 1512     Done Eager E VU  KM 10/02/18 0148     Done Eager E VU  KM 10/02/18 0145     Done Eager E FRANCI BURKS,NR reviewed bbefits of activity SC 09/28/18 0938          Point: Body mechanics (Active)    Learning Progress Summary     Learner Status Readiness Method Response Comment Documented by    Patient Active Acceptance E,D NR   10/05/18 1512     Done Eager E VU  KM 10/02/18 0148     Done Eager E VU  KM 10/02/18 0145     Done Eager E FRANCI BURKS,NR reviewed bbefits of activity SC 09/28/18 0938          Point: Precautions (Active)    Learning Progress Summary     Learner Status Readiness Method Response Comment Documented by    Patient Active Acceptance E,D NR   10/05/18 1512     Done Eager E VU  KM 10/02/18 0148     Done Eager E VU  KM 10/02/18 0145     Done Eager E FRANCI BURKS,NR reviewed bbefits of activity SC 09/28/18 0938                      User Key     Initials Effective Dates Name Provider  Type Discipline    SC 06/19/15 -  Charlie Monson PT Physical Therapist PT     06/19/15 -  Jacki Royal PT Physical Therapist PT     06/16/16 -  Xin Quiles, RN Registered Nurse Nurse                    PT Recommendation and Plan  Therapy Frequency (PT Clinical Impression): daily  Outcome Summary/Treatment Plan (PT)  Daily Summary of Progress (PT): progress toward functional goals is good  Plan of Care Reviewed With: patient  Progress: improving  Outcome Summary: Pt practiced sit <> stand t/f from recliner facing bedrail with Brad, L knee blocked. While standing, pt performed weight shifting side to side. Pt participated well. Continue POC.          Outcome Measures     Row Name 10/05/18 1420 10/05/18 1117 10/03/18 0817       How much help from another person do you currently need...    Turning from your back to your side while in flat bed without using bedrails? 2  -SJ  --  --    Moving from lying on back to sitting on the side of a flat bed without bedrails? 2  -SJ  --  --    Moving to and from a bed to a chair (including a wheelchair)? 2  -SJ  --  --    Standing up from a chair using your arms (e.g., wheelchair, bedside chair)? 2  -SJ  --  --    Climbing 3-5 steps with a railing? 1  -SJ  --  --    To walk in hospital room? 2  -SJ  --  --    AM-PAC 6 Clicks Score 11  -SJ  --  --       How much help from another is currently needed...    Putting on and taking off regular lower body clothing?  -- 2  -KF 2  -KF    Bathing (including washing, rinsing, and drying)  -- 2  -KF 2  -KF    Toileting (which includes using toilet bed pan or urinal)  -- 1  -KF 1  -KF    Putting on and taking off regular upper body clothing  -- 2  -KF 2  -KF    Taking care of personal grooming (such as brushing teeth)  -- 3  -KF 3  -KF    Eating meals  -- 3  -KF 3  -KF    Score  -- 13  -KF 13  -KF       Functional Assessment    Outcome Measure Options AM-PAC 6 Clicks Basic Mobility (PT)  -SJ AM-PAC 6 Clicks Daily Activity (OT)   -KF AM-PAC 6 Clicks Daily Activity (OT)  -KF      User Key  (r) = Recorded By, (t) = Taken By, (c) = Cosigned By    Initials Name Provider Type     Jacki Royal, PT Physical Therapist    KF Ramona Dee, OT Occupational Therapist           Time Calculation:         PT Charges     Row Name 10/05/18 1513 10/05/18 1420          Time Calculation    Start Time 1420  -  --     PT Received On 10/05/18  -  --     PT Goal Re-Cert Due Date 11/08/18  -  --        Time Calculation- PT    Total Timed Code Minutes- PT 29 minute(s)  -  --        Timed Charges    64130 - PT Therapeutic Exercise Minutes  -- 10  -     80980 - PT Therapeutic Activity Minutes  -- 19  -SJ       User Key  (r) = Recorded By, (t) = Taken By, (c) = Cosigned By    Initials Name Provider Type     Jacki Royal, PT Physical Therapist        Therapy Suggested Charges     Code   Minutes Charges    23368 (CPT®) Hc Pt Neuromusc Re Education Ea 15 Min      07993 (CPT®) Hc Pt Ther Proc Ea 15 Min 10 1    75326 (CPT®) Hc Gait Training Ea 15 Min      40868 (CPT®) Hc Pt Therapeutic Act Ea 15 Min 19 1    88825 (CPT®) Hc Pt Manual Therapy Ea 15 Min      19621 (CPT®) Hc Pt Iontophoresis Ea 15 Min      59136 (CPT®) Hc Pt Elec Stim Ea-Per 15 Min      09996 (CPT®) Hc Pt Ultrasound Ea 15 Min      28408 (CPT®) Hc Pt Self Care/Mgmt/Train Ea 15 Min      39880 (CPT®) Hc Pt Prosthetic (S) Train Initial Encounter, Each 15 Min      84931 (CPT®) Hc Pt Orthotic(S)/Prosthetic(S) Encounter, Each 15 Min      78042 (CPT®) Hc Orthotic(S) Mgmt/Train Initial Encounter, Each 15min      Total  29 2        Therapy Charges for Today     Code Description Service Date Service Provider Modifiers Qty    50446578491 HC PT THERAPEUTIC ACT EA 15 MIN 10/5/2018 Jacki Royal, PT GP 2          PT G-Codes  Outcome Measure Options: AM-PAC 6 Clicks Basic Mobility (PT)  AM-PAC 6 Clicks Score: 11  Score: 13    Jacki Royal PT  10/5/2018

## 2018-10-05 NOTE — PLAN OF CARE
Problem: Patient Care Overview  Goal: Plan of Care Review  Outcome: Ongoing (interventions implemented as appropriate)   10/05/18 0139   Coping/Psychosocial   Plan of Care Reviewed With patient   Plan of Care Review   Progress improving   SLP treatment completed. Will continue to address dysphagia. Dysphagia tx completed this date. Pt independently recalled compensatory strategies. Please see note for further details and recommendations.

## 2018-10-05 NOTE — PROGRESS NOTES
Continued Stay Note  Psychiatric     Patient Name: Vickie Joshi  MRN: [id number]  Today's Date: 10/5/2018    Admit Date: 9/27/2018          Discharge Plan     Row Name 10/05/18 1449       Plan    Plan update    Patient/Family in Agreement with Plan yes    Plan Comments If pt needs HH at time of d/c they have worked w [facility] in the past and would like to use them again if needed. Not medically ready for d/c at this time. CM will continue to follow.              Discharge Codes    No documentation.       Expected Discharge Date and Time     Expected Discharge Date Expected Discharge Time    Oct 7, 2018             Yancy Peters, RN

## 2018-10-05 NOTE — THERAPY TREATMENT NOTE
Acute Care - Occupational Therapy Treatment Note  [facility]     Patient Name: Vickie Joshi  : 1950  MRN: [id number]  Today's Date: 10/5/2018  Onset of Illness/Injury or Date of Surgery: 18  Date of Referral to OT: 18  Referring Physician: FABIANA Chen    Admit Date: 2018       ICD-10-CM ICD-9-CM   1. Dysphagia, unspecified type R13.10 787.20   2. Impaired mobility and ADLs Z74.09 799.89   3. Impaired functional mobility, balance, gait, and endurance Z74.09 V49.89   4. Cerebrovascular accident (CVA), unspecified mechanism (CMS/[facility]) I63.9 434.91   5. Fall, subsequent encounter W19.XXXD V58.89     E888.9   6. Pneumonia of right lung due to infectious organism, unspecified part of lung J18.9 483.8   7. Sepsis, due to unspecified organism (CMS/[facility]) A41.9 038.9     995.91   8. Atrial fibrillation, chronic (CMS/[facility]) I48.2 427.31     Patient Active Problem List   Diagnosis   • Fall   • Closed fracture of neck of left femur (CMS/HCC)   • Hyperlipidemia   • Hypertension   • Hx of Migraine   • GERD (gastroesophageal reflux disease)   • Fibromyalgia   • Neuropathy   • Rapid atrial fibrillation (CMS/HCC)   • Chronic anticoagulation on Eliquis   • Hx of Stroke with residual left hemiparesis   • Sepsis (CMS/HCC)   • Atrial fibrillation (CMS/HCC)   • Pneumonia     Past Medical History:   Diagnosis Date   • Arthritis    • Atrial fibrillation (CMS/HCC)    • Cardiac disorder    • Disease of thyroid gland    • Fibromyalgia    • GERD (gastroesophageal reflux disease)    • Hyperlipidemia    • Hypertension    • Migraine    • Neuropathy    • Stroke (CMS/HCC)      Past Surgical History:   Procedure Laterality Date   • CHOLECYSTECTOMY     • HIP PERCUTANEOUS PINNING Left 2017    Procedure: LEFT HIP PERCUTANEOUS PINNING FEMORAL NECK;  Surgeon: Ezra Barlow MD;  Location: [facility];  Service:    • HYSTERECTOMY     • RI CLOSED RX TRAUMATIC HIP DISLOCATN Left 2017    Procedure: LEFT HIP CLOSED  REDUCTION;  Surgeon: Ezra Barlow MD;  Location: [facility];  Service: Orthopedics   • SHOULDER SURGERY         Therapy Treatment          Rehabilitation Treatment Summary     Row Name 10/05/18 1117 10/05/18 0915          Treatment Time/Intention    Discipline occupational therapist  -KF speech language pathologist  -ABHISHEK GALEANA,SG2     Document Type therapy note (daily note)  -KF therapy note (daily note)  -ABHISHEK GALEANA,SG2     Subjective Information no complaints  -KF no complaints  -ABHISHEK GALEANA,SG2     Mode of Treatment individual therapy;occupational therapy  -KF individual therapy;speech-language pathology  -ABHISHEK GALEANA,SG2     Patient/Family Observations  present throughout  -KF no family present  -ABHISHEK GALEANA,SG2     Care Plan Review evaluation/treatment results reviewed;risks/benefits reviewed;care plan/treatment goals reviewed;current/potential barriers reviewed;patient/other agree to care plan  -KF  --     Care Plan Review, Other Participant(s) spouse  -KF  --     Therapy Frequency (Swallow)  -- 5 days per week  -ABHISHEK GALEANA,SG2     Patient Effort good  -KF good  -ABHISHEK GALEANA,SG2     Existing Precautions/Restrictions fall;other (see comments)   L hemiparesis  -KF  --     Patient Response to Treatment tolerated  -KF  --     Recorded by [KF] Ramona Dee OT 10/05/18 1154 [KERVIN,ABHISHEK,SG2] Sherley Smalls, MS CCC-SLP (r) Lavonne Felix, Speech Therapy Student (t) Sherley Smalls, MS BOWLES-SLP (c) 10/05/18 1004     Row Name 10/05/18 1117             Vital Signs    Pre Systolic BP Rehab 136  -KF      Pre Treatment Diastolic BP 90   RN cleared for tx vitals stable throughout  -KF      O2 Delivery Pre Treatment room air  -KF      O2 Delivery Post Treatment room air  -KF      Pre Patient Position Supine  -KF      Intra Patient Position Supine  -KF      Post Patient Position Supine  -KF      Recorded by [KF] Ramona Dee OT 10/05/18 1154      Row Name 10/05/18 1115             Cognitive Assessment/Intervention     Additional Documentation Cognitive Assessment/Intervention (Group)  -KF      Recorded by [KF] Ramona Dee, OT 10/05/18 1154      Row Name 10/05/18 1117             Cognitive Assessment/Intervention- PT/OT    Affect/Mental Status (Cognitive) WFL  -KF      Orientation Status (Cognition) oriented x 4  -KF      Follows Commands (Cognition) WFL;follows one step commands;over 90% accuracy  -KF      Cognitive Function (Cognitive) WFL  -KF      Cognitive Interventions (Cognitive) process/task specific training  -KF      Personal Safety Interventions fall prevention program maintained;muscle strengthening facilitated;nonskid shoes/slippers when out of bed  -KF      Recorded by [KF] Ramona Dee, OT 10/05/18 1154      Row Name 10/05/18 1117             Safety Issues, Functional Mobility    Safety Issues Affecting Function (Mobility) insight into deficits/self awareness;awareness of need for assistance  -KF      Impairments Affecting Function (Mobility) coordination;endurance/activity tolerance;grasp;muscle tone abnormal;strength  -KF      Recorded by [KF] Ramona Dee, OT 10/05/18 1154      Row Name 10/05/18 1117             Bed Mobility Assessment/Treatment    Bed Mobility Assessment/Treatment scooting/bridging  -KF      Scooting/Bridging [place] (Bed Mobility) dependent (less than 25% patient effort);2 person assist   to reposition closer to HOB  -KF      Comment (Bed Mobility) deferred OOB activities and EOB for PT   -KF      Recorded by [KF] Ramona Dee, OT 10/05/18 1154      Row Name 10/05/18 1117             Functional Mobility    Functional Mobility- Comment deferred mobility to PT  -KF      Recorded by [KF] Ramona Dee, OT 10/05/18 1154      Row Name 10/05/18 1117             BADL Safety/Performance    Impairments, BADL Safety/Performance balance;endurance/activity tolerance;grasp/prehension;coordination;motor planning;motor control;muscle tone abnormality  -KF      Skilled BADL  Treatment/Intervention cognitive/safety deficit modifications;hand-over-hand training/cues  -KF      Recorded by [KF] Ramona Dee, OT 10/05/18 1154      Row Name 10/05/18 1117             Motor Skills Assessment/Interventions    Additional Documentation Therapeutic Exercise (Group);Therapeutic Exercise Interventions (Group);Neuromuscular Re-education (Group)  -KF      Recorded by [KF] Ramona Dee, OT 10/05/18 1154      Row Name 10/05/18 1117             Therapeutic Exercise    Therapeutic Exercise supine, upper extremities  -KF      Additional Documentation Therapeutic Exercise (Row)  -KF      Recorded by [KF] Ramona Dee, OT 10/05/18 1154      Row Name 10/05/18 1117             Upper Extremity Seated Therapeutic Exercise    Performed, Seated Upper Extremity (Therapeutic Exercise) shoulder flexion/extension;scapular protraction/retraction;scapular stabilization;elbow flexion/extension;forearm supination/pronation;wrist flexion/extension;digit flexion/extension  -KF      Exercise Type, Seated Upper Extremity (Therapeutic Exercise) AAROM (active assistive range of motion);AROM (active range of motion);PROM (passive range of motion)  -KF      Expected Outcomes, Seated Upper Extremity (Therapeutic Exercise) improve motor control;improve functional tolerance, self-care activity;improve performance, BADLs;improve manipulation of objects, self care activity  -KF      Sets/Reps Detail, Seated Upper Extremity (Therapeutic Exercise) 2/10, 2/5  -KF      Transfers Skills, Training to Functional Activity, Seated Upper Extremity (Therapeutic Exercise) beginning to transfer skills to functional activity  -KF      Comment, Seated Upper Extremity (Therapeutic Exercise) LUFABIAN BOLES, AROM to mirror movements performed AAROM in L UE  -KF      Recorded by [KF] Ramona Dee, OT 10/05/18 1154      Row Name 10/05/18 1117             Neuromuscular Re-education    Comment, Neuromuscular Re-education completed thumb  flex/ext holds L hand, L digit flexion/extension holds, L wrist extension place and holds   -KF      Recorded by [KF] Ramona Dee, OT 10/05/18 1154      Row Name 10/05/18 1117             Positioning and Restraints    Pre-Treatment Position in bed  -KF      Post Treatment Position bed  -KF      In Bed notified nsg;supine;fowlers;call light within reach;encouraged to call for assist;exit alarm on;with family/caregiver;legs elevated;LUE elevated  -KF      Recorded by [KF] Ramona Dee, OT 10/05/18 1154      Row Name 10/05/18 1117 10/05/18 0915          Pain Assessment    Additional Documentation Pain Scale: Numbers Pre/Post-Treatment (Group)  -KF Pain Scale: FACES Pre/Post-Treatment (Group)  -SG,MF,SG2     Recorded by [KF] Ramona Dee, OT 10/05/18 1154 [SG,MF,SG2] Sherley Smalls MS CCC-SLP (r) Lavonne Felix, Speech Therapy Student (t) Sherley Smalls MS CCC-SLP (c) 10/05/18 1004     Row Name 10/05/18 1117             Pain Scale: Numbers Pre/Post-Treatment    Pain Scale: Numbers, Pretreatment 0/10 - no pain  -KF      Pain Scale: Numbers, Post-Treatment 0/10 - no pain  -KF      Pain Intervention(s) Repositioned  -KF      Recorded by [KF] Ramona Dee, OT 10/05/18 1154      Row Name 10/05/18 0915             Pain Scale: FACES Pre/Post-Treatment    Pain: FACES Scale, Pretreatment 0-->no hurt  -SG,MF,SG2      Pain: FACES Scale, Post-Treatment 0-->no hurt  -SG,MF,SG2      Recorded by [SG,MF,SG2] Sherley Smalls MS CCC-SLP (r) Lavonne Felix, Speech Therapy Student (t) Sherley Smalls MS CCC-SLP (c) 10/05/18 1004      Row Name 10/05/18 1114             Plan of Care Review    Plan of Care Reviewed With patient;spouse  -KF      Recorded by [KF] Ramona Dee, OT 10/05/18 1154      Row Name 10/05/18 1117             Outcome Summary/Treatment Plan (OT)    Daily Summary of Progress (OT) progress toward functional goals is good  -KF       Recorded by [KF] Ramona Dee, OT 10/05/18 1154      Row Name 10/05/18 0915             Outcome Summary/Treatment Plan (SLP)    Daily Summary of Progress (SLP) progress toward functional goals is good  -ABHISHEK GALEANA,SG2      Plan for Continued Treatment (SLP) cont tx per POC  -ABHISHEK GALEANA,SG2      Anticipated Dischage Disposition unknown;anticipate therapy at next level of care  -KERVIN,ABHISHEK,SG2      Recorded by [KERVIN,ABHISHEK,SG2] Sherley Smalls MS CCC-SLP (r) Lavonne Felix, Speech Therapy Student (t) Sherley Smalls, MS CCC-SLP (c) 10/05/18 1004        User Key  (r) = Recorded By, (t) = Taken By, (c) = Cosigned By    Initials Name Effective Dates Discipline     Sherley Smalls MS CCC-SLP 06/22/15 -  SLP    Raomna Hill OT 04/03/18 -  OT    Lavonne Gunn, Speech Therapy Student 08/06/18 -  SLP               Occupational Therapy Education     Title: PT OT SLP Therapies (Done)     Topic: Occupational Therapy (Done)     Point: ADL training (Done)     Description: Instruct learner(s) on proper safety adaptation and remediation techniques during self care or transfers.   Instruct in proper use of assistive devices.   Learning Progress Summary     Learner Status Readiness Method Response Comment Documented by    Patient Done Acceptance E VU,DU BUE HEP education provided to spouse and Pt with positioning education on L UE  10/05/18 1117     Done Acceptance E VU,DU BUE HEP and LUE HEP sitting EOB, LB dressing EOB retraining with affected extremity first, seq of safe functional transfers  10/03/18 0817     Done Eager E VU   10/02/18 0148     Done Eager E VU   10/02/18 0145     Done Acceptance E VU Educated on georges dressing, safe functional transfer sequencing, compensatory strategies to improve bed mobility KF 10/01/18 0805     Done Acceptance E VU,NR Georges dressing techniquess, safe sequencing of transfers, purpose of OT skilled services  09/28/18 0947    Family  Done Acceptance E VU,NR Georges dressing techniquess, safe sequencing of transfers, purpose of OT skilled services  09/28/18 0947    Significant Other Done Acceptance E VU,DU BUE HEP education provided to spouse and Pt with positioning education on L UE KF 10/05/18 1117     Done Acceptance E VU,DU BUE HEP and LUE HEP sitting EOB, LB dressing EOB retraining with affected extremity first, seq of safe functional transfers KF 10/03/18 0817     Done Acceptance E VU Educated on georges dressing, safe functional transfer sequencing, compensatory strategies to improve bed mobility KF 10/01/18 0805          Point: Home exercise program (Done)     Description: Instruct learner(s) on appropriate technique for monitoring, assisting and/or progressing therapeutic exercises/activities.   Learning Progress Summary     Learner Status Readiness Method Response Comment Documented by    Patient Done Acceptance E VU,DU BUE HEP education provided to spouse and Pt with positioning education on L UE KF 10/05/18 1117     Done Acceptance E VU,DU BUE HEP and LUE HEP sitting EOB, LB dressing EOB retraining with affected extremity first, seq of safe functional transfers KF 10/03/18 0817     Done Eager E VU   10/02/18 0148     Done Eager E VU   10/02/18 0145    Significant Other Done Acceptance E VU,DU BUE HEP education provided to spouse and Pt with positioning education on L UE KF 10/05/18 1117     Done Acceptance E VU,DU BUE HEP and LUE HEP sitting EOB, LB dressing EOB retraining with affected extremity first, seq of safe functional transfers KF 10/03/18 0817          Point: Precautions (Done)     Description: Instruct learner(s) on prescribed precautions during self-care and functional transfers.   Learning Progress Summary     Learner Status Readiness Method Response Comment Documented by    Patient Done Acceptance E VU,DU BUE HEP education provided to spouse and Pt with positioning education on L UE KF 10/05/18 1117     Done Acceptance E  VU,DU BUE HEP and LUE HEP sitting EOB, LB dressing EOB retraining with affected extremity first, seq of safe functional transfers KF 10/03/18 0817     Done Eager E VU  KM 10/02/18 0148     Done Eager E VU  KM 10/02/18 0145     Done Acceptance E VU Educated on georges dressing, safe functional transfer sequencing, compensatory strategies to improve bed mobility KF 10/01/18 0805     Done Acceptance E VU,NR Georges dressing techniquess, safe sequencing of transfers, purpose of OT skilled services KF 09/28/18 0947    Family Done Acceptance E VU,NR Georges dressing techniquess, safe sequencing of transfers, purpose of OT skilled services KF 09/28/18 0947    Significant Other Done Acceptance E VU,DU BUE HEP education provided to spouse and Pt with positioning education on L UE KF 10/05/18 1117     Done Acceptance E VU,DU BUE HEP and LUE HEP sitting EOB, LB dressing EOB retraining with affected extremity first, seq of safe functional transfers KF 10/03/18 0817     Done Acceptance E VU Educated on georges dressing, safe functional transfer sequencing, compensatory strategies to improve bed mobility KF 10/01/18 0805          Point: Body mechanics (Done)     Description: Instruct learner(s) on proper positioning and spine alignment during self-care, functional mobility activities and/or exercises.   Learning Progress Summary     Learner Status Readiness Method Response Comment Documented by    Patient Done Acceptance E VU,DU BUE HEP education provided to spouse and Pt with positioning education on L UE KF 10/05/18 1117     Done Acceptance E VU,DU BUE HEP and LUE HEP sitting EOB, LB dressing EOB retraining with affected extremity first, seq of safe functional transfers KF 10/03/18 0817     Done Eager E VU   10/02/18 0148     Done Eager E VU   10/02/18 0145     Done Acceptance E VU Educated on georges dressing, safe functional transfer sequencing, compensatory strategies to improve bed mobility KF 10/01/18 0805     Done Acceptance E VU,NR  Georges dressing techniquess, safe sequencing of transfers, purpose of OT skilled services  09/28/18 0947    Family Done Acceptance E VU,NR Georges dressing techniquess, safe sequencing of transfers, purpose of OT skilled services  09/28/18 0947    Significant Other Done Acceptance E VU,DU BUE HEP education provided to spouse and Pt with positioning education on L UE  10/05/18 1117     Done Acceptance E VU,DU BUE HEP and LUE HEP sitting EOB, LB dressing EOB retraining with affected extremity first, seq of safe functional transfers  10/03/18 0817     Done Acceptance E VU Educated on georges dressing, safe functional transfer sequencing, compensatory strategies to improve bed mobility  10/01/18 0805                      User Key     Initials Effective Dates Name Provider Type Discipline     06/16/16 -  Xin Quiles RN Registered Nurse Nurse     04/03/18 -  Ramona Dee OT Occupational Therapist OT                OT Recommendation and Plan  Outcome Summary/Treatment Plan (OT)  Daily Summary of Progress (OT): progress toward functional goals is good  Anticipated Equipment Needs at Discharge (OT):  (none anticipated at this time)  Anticipated Discharge Disposition (OT): home with 24/7 care, home with OP services  Planned Therapy Interventions (OT Eval): activity tolerance training, adaptive equipment training, BADL retraining, cognitive/visual perception retraining, edema control/reduction, functional balance retraining, IADL retraining, neuromuscular control/coordination retraining, occupation/activity based interventions, patient/caregiver education/training, ROM/therapeutic exercise, strengthening exercise, transfer/mobility retraining  Therapy Frequency (OT Eval): daily  Daily Summary of Progress (OT): progress toward functional goals is good  Plan of Care Review  Plan of Care Reviewed With: patient  Plan of Care Reviewed With: patient  Outcome Summary: Pt tolerated BUE and L UE HEP to improve  coordination and AROM in L UE for improved self-care tasks. Pt required dependent x2 to scoot up in bed towards HOB. Cont IPOT per POC        Outcome Measures     Row Name 10/05/18 1117 10/03/18 0817          How much help from another is currently needed...    Putting on and taking off regular lower body clothing? 2  -KF 2  -KF     Bathing (including washing, rinsing, and drying) 2  -KF 2  -KF     Toileting (which includes using toilet bed pan or urinal) 1  -KF 1  -KF     Putting on and taking off regular upper body clothing 2  -KF 2  -KF     Taking care of personal grooming (such as brushing teeth) 3  -KF 3  -KF     Eating meals 3  -KF 3  -KF     Score 13  -KF 13  -KF        Functional Assessment    Outcome Measure Options AM-PAC 6 Clicks Daily Activity (OT)  -KF AM-PAC 6 Clicks Daily Activity (OT)  -KF       User Key  (r) = Recorded By, (t) = Taken By, (c) = Cosigned By    Initials Name Provider Type    Ramona Hill OT Occupational Therapist           Time Calculation:         Time Calculation- OT     Row Name 10/05/18 1117             Time Calculation- OT    OT Start Time 1117  -KF      OT Stop Time 1143  -KF      OT Time Calculation (min) 26 min  -KF      Total Timed Code Minutes- OT 26 minute(s)  -KF      OT Received On 10/05/18  -KF      OT Goal Re-Cert Due Date 10/08/18  -KF         Timed Charges    94581 - OT Therapeutic Exercise Minutes 13  -KF      80032 -  OT Neuromuscular Reeducation Minutes 13  -KF        User Key  (r) = Recorded By, (t) = Taken By, (c) = Cosigned By    Initials Name Provider Type    Ramona Hill OT Occupational Therapist           Therapy Suggested Charges     Code   Minutes Charges    38229 (CPT®) Hc Ot Neuromusc Re Education Ea 15 Min 13 1    02976 (CPT®) Hc Ot Ther Proc Ea 15 Min 13 1    14274 (CPT®) Hc Ot Therapeutic Act Ea 15 Min      97763 (CPT®) Hc Ot Manual Therapy Ea 15 Min      23428 (CPT®) Hc Ot Iontophoresis Ea 15 Min      92171 (CPT®) Hc Ot Elec Stim  Ea-Per 15 Min      70246 (CPT®) Hc Ot Ultrasound Ea 15 Min      35868 (CPT®) Hc Ot Self Care/Mgmt/Train Ea 15 Min      Total  26 2        Therapy Charges for Today     Code Description Service Date Service Provider Modifiers Qty    53268966613 HC OT NEUROMUSC RE EDUCATION EA 15 MIN 10/5/2018 Ramona Dee, OT GO 1    55899989542 HC OT THER PROC EA 15 MIN 10/5/2018 Ramona Dee OT GO 1               Ramona Dee OT  10/5/2018

## 2018-10-05 NOTE — PLAN OF CARE
Problem: Patient Care Overview  Goal: Plan of Care Review  Outcome: Ongoing (interventions implemented as appropriate)   10/05/18 1511   Coping/Psychosocial   Plan of Care Reviewed With patient   Plan of Care Review   Progress improving   OTHER   Outcome Summary Pt practiced sit <> stand t/f from recliner facing bedrail with Brad, L knee blocked. While standing, pt performed weight shifting side to side. Pt participated well. Continue POC.

## 2018-10-05 NOTE — PLAN OF CARE
Problem: Patient Care Overview  Goal: Plan of Care Review  Outcome: Ongoing (interventions implemented as appropriate)   10/05/18 8081   Coping/Psychosocial   Plan of Care Reviewed With patient   Plan of Care Review   Progress improving   OTHER   Outcome Summary Pt tolerated BUE and L UE HEP to improve coordination and AROM in L UE for improved self-care tasks. Pt required dependent x2 to scoot up in bed towards HOB. Cont IPOT per POC

## 2018-10-05 NOTE — PROGRESS NOTES
[facility] Progress Note        Antibiotics:  Anti-Infectives     Ordered     Dose/Rate Route Frequency Start Stop    10/05/18 0848  aztreonam (AZACTAM) 2 g in sodium chloride 0.9 % 100 mL IVPB-MBP     Ordering Provider:  Royal Mohamud MD    2 g  over 4 Hours Intravenous Every 8 Hours 10/05/18 1500 10/09/18 0726    10/04/18 0742  vancomycin 1750 mg/500 mL 0.9% NS IVPB ([facility])     Ordering Provider:  Rachelle Salinas RPH    1,750 mg  over 120 Minutes Intravenous Every 24 Hours 10/04/18 1100 10/11/18 1059    10/01/18 0810  doxycycline (MONODOX) capsule 100 mg     Ordering Provider:  Royal Mohamud MD    100 mg Oral Every 12 Hours Scheduled 10/01/18 0900 10/08/18 0859    09/27/18 1201  Pharmacy to dose vancomycin     Aleksandr Thomas [facility] reviewed the order on 10/03/18 0734.   Ordering Provider:  Royal Mohamud MD     Does not apply Continuous PRN 09/27/18 1149 10/10/18 0733    09/27/18 0537  aztreonam (AZACTAM) 2 g in sodium chloride 0.9 % 100 mL IVPB-MBP     Ordering Provider:  Eliot Recio DO    2 g  200 mL/hr over 30 Minutes Intravenous Once 09/27/18 0539 09/27/18 0753    09/27/18 0537  vancomycin 1750 mg/500 mL 0.9% NS IVPB ([facility])     Ordering Provider:  Eliot Recio DO    20 mg/kg × 90.7 kg Intravenous Once 09/27/18 0539 09/27/18 0811    09/27/18 0316  levoFLOXacin (LEVAQUIN) 750 mg/150 mL D5W (premix) (LEVAQUIN) 750 mg     Ordering Provider:  Eliot Recio DO    750 mg  over 90 Minutes Intravenous Once 09/27/18 0318 09/27/18 0600          CC: pneumonia    HPI:  Patient is a 68 y.o.  Yr old female with history of prior CVA and chronic/residual left-sided hemiparesis, chronically debilitated with multiple comorbidity as outlined below.  She was admitted September 27, 2018 with acute cough/shortness of breath and fever over 102 per admission H&P.  Initial blood culture data from September 27 had coag-negative staph species in 1 of 2 sets, sputum with normal talya, and MRSA nasal screen positive.  Chest  "x-ray with right greater than left findings to suggest bronchopneumonia and CT scan with airspace disease in the right lower lobe per radiology.  In addition patient reports her  also with upper respiratory type infection but no specific microbiologic diagnosis.     10/5/18 Shortness of breath/cough persists , still limited significantly with any exertion and wheeze ongoing, making some slow improvements per her  and herself.  No hemoptysis.  No headache photophobia or neck stiffness.  No nausea vomiting or diarrhea. No new skin rash.    ROS:      10/5/18 No f/c/s. No n/v/d. No rash. No new ADR to Abx.     Constitutional-- at present, No Fever, chills or sweats.  Appetite diminished with generalized malaise and fatigue  Heent-- No new vision, hearing or throat complaints.  No epistaxis or oral sores.  Denies odynophagia or dysphagia.  No flashers, floaters or eye pain. No odynophagia or dysphagia. No headache, photophobia or neck stiffness.  CV-- No chest pain, palpitation or syncope  Resp-- as above  GI- No nausea, vomiting, or diarrhea.  No hematochezia, melena, or hematemesis. Denies jaundice or chronic liver disease.  -- No dysuria, hematuria, or flank pain.  Denies hesitancy, urgency or flank pain.  Lymph- no swollen lymph nodes in neck/axilla or groin.   Heme- No active bruising or bleeding; no Hx of DVT or PE.  MS-- no swelling or pain in the bones or joints of arms/legs.  No new back pain.  Neuro-- No acute focal weakness or numbness in the arms or legs.  No seizures.  Chronic left-sided hemiparesis after stroke     Full 12 point review of systems reviewed and negative otherwise for acute complaints, except for above       PE: Nursing/chaperone present  /90 (BP Location: Right leg, Patient Position: Lying)   Pulse 68   Temp 98 °F (36.7 °C) (Oral)   Resp 18   Ht 165.1 cm (65\")   Wt 97.4 kg (214 lb 12.8 oz)   SpO2 98%   BMI 35.74 kg/m²     GENERAL: Awake and alert, in no acute " distress.  Obese and chronically ill-appearing  HEENT: Normocephalic, atraumatic.  PERRL. EOMI. No conjunctival injection. No icterus. Oropharynx clear without evidence of thrush or exudate. No evidence of peridontal disease.    NECK: Supple without nuchal rigidity. No mass.  LYMPH: No cervical, axillary or inguinal lymphadenopathy.  HEART: RRR; No murmur, rubs, gallops.   LUNGS: Diminished at right base more so than left with scattered rhonchi and exp wheeze. Normal respiratory effort. Nonlabored. No dullness.  ABDOMEN: Soft, nontender, nondistended. Positive bowel sounds. No rebound or guarding. NO mass or HSM.  EXT:  No cyanosis, clubbing or edema. No cord.  : Genitalia generally unremarkable.  Without Gunderson catheter.  MSK: FROM without joint effusions noted arms/legs.    SKIN: Warm and dry without cutaneous eruptions on Inspection/palpation.    NEURO: Oriented to PPT.  Chronic left-sided hemiparesis noted  PSYCHIATRIC: Normal insight and judgement. Cooperative with PE     No peripheral stigmata/phenomena of endocarditis     IV without obvious redness or drainage    Laboratory Data      Results from last 7 days  Lab Units 10/05/18  0757 10/04/18  0547 10/02/18  0424   WBC 10*3/mm3 12.54* 14.35* 11.54*   HEMOGLOBIN g/dL 13.8 12.7 11.6   HEMATOCRIT % 45.4* 41.3 37.8   PLATELETS 10*3/mm3 249 261 228       Results from last 7 days  Lab Units 10/04/18  0549   SODIUM mmol/L 144   POTASSIUM mmol/L 3.8   CHLORIDE mmol/L 109   CO2 mmol/L 24.0   BUN mg/dL 13   CREATININE mg/dL 0.75   GLUCOSE mg/dL 80   CALCIUM mg/dL 8.4*       Results from last 7 days  Lab Units 10/04/18  0549   ALK PHOS U/L 55   BILIRUBIN mg/dL 0.5   ALT (SGPT) U/L 22   AST (SGOT) U/L 18               Estimated Creatinine Clearance: 79.9 mL/min (by C-G formula based on SCr of 0.75 mg/dL).      Microbiology:      Radiology:  Imaging Results (last 72 hours)     ** No results found for the last 72 hours. **            Impression:   --Acute pneumonia, SIRS+  at admission, bilateral with right greater than left infiltrates on chest x-ray, rhinovirus/enterovisrus + and  exposure to  and concern for  secondary bacterial process as well; empiric treatment for CAP/MRSA and Hx PCN/ceph allergies.  Empiric antibiotics ongoing with vancomycin/Azactam and doxycycline.  MRSA screen positive and certainly could be a pathogen in this setting but sputum did not corroborate that.  Empiric antibiotics would cover that in any case.  If significant pleural effusions evolve, you should give consideration to diagnostic/therapeutic thoracentesis.  Patient understands high risk for further serious morbidity and other serious sequela; she understands antibiotics are not a guarantee for cure.  If not steadily improving, you should give consideration to repeat imaging to rule out other evolving parapneumonic complication.     --Acute blood culture positivity, coag-negative staph per microbiology preliminarily and only in one of 2 samples suggest contamination.  No obvious endovascular focus by exam findings.  No chronic line.  She denies acute focal joint complaints to suggest septic joint etc.     --Atrial fibrillation/RVR, paroxysmal per internal medicine.  Treatment per them     --History CVA with chronic left-sided hemiparesis    PLAN:     --IV vancomycin/Azactam and oral doxycycline     --I discussed potential risks and benefits of the prescribed antibiotics that include, but are not limited to, solid organ toxicity, neuro/sosa/vestibular toxicity, renal toxicity, CDiff, cytopenias, hypersensitivity,  etc.. Patient/Family voice understanding and agree to proceed.     --Check/review labs cultures and scans     --Discussed with microbiology     --History per nursing staff and       --Highly complex set of issues with high risk for further serious morbidity and other serious sequela    --White blood cell count slightly up October 4 and slight decrease 10/5, still limited with  any element exertion.  Continue broad antimicrobials and monitor.  If not making additional improvements, you need to give consideration to further imaging to rule out parapneumonic complication or other evolving occult process; abdomen benign, no diarrhea, no new urinary symptoms and no new skin/soft tissue symptoms etc.         Royal Mohamud MD  10/5/2018

## 2018-10-05 NOTE — THERAPY TREATMENT NOTE
Acute Care - Speech Language Pathology   Swallow Treatment Note [facility]     Patient Name: Vickie Joshi  : 1950  MRN: [id number]  Today's Date: 10/5/2018  Onset of Illness/Injury or Date of Surgery: 18     Referring Physician: FABIANA Chen      Admit Date: 2018    Visit Dx:      ICD-10-CM ICD-9-CM   1. Dysphagia, unspecified type R13.10 787.20   2. Impaired mobility and ADLs Z74.09 799.89   3. Impaired functional mobility, balance, gait, and endurance Z74.09 V49.89   4. Cerebrovascular accident (CVA), unspecified mechanism (CMS/[facility]) I63.9 434.91   5. Fall, subsequent encounter W19.XXXD V58.89     E888.9   6. Pneumonia of right lung due to infectious organism, unspecified part of lung J18.9 483.8   7. Sepsis, due to unspecified organism (CMS/[facility]) A41.9 038.9     995.91   8. Atrial fibrillation, chronic (CMS/[facility]) I48.2 427.31     Patient Active Problem List   Diagnosis   • Fall   • Closed fracture of neck of left femur (CMS/[facility])   • Hyperlipidemia   • Hypertension   • Hx of Migraine   • GERD (gastroesophageal reflux disease)   • Fibromyalgia   • Neuropathy   • Rapid atrial fibrillation (CMS/HCC)   • Chronic anticoagulation on Eliquis   • Hx of Stroke with residual left hemiparesis   • Sepsis (CMS/[facility])   • Atrial fibrillation (CMS/[facility])   • Pneumonia       Therapy Treatment        Rehabilitation Treatment Summary     Row Name 10/05/18 0915             Treatment Time/Intention    Discipline (P)  speech language pathologist  -      Document Type (P)  therapy note (daily note)  -      Subjective Information (P)  no complaints  -      Mode of Treatment (P)  individual therapy;speech-language pathology  -      Patient/Family Observations (P)  no family present  -      Therapy Frequency (Swallow) (P)  5 days per week  -      Patient Effort (P)  good  -      Recorded by [MF] Lavonne Felix, Speech Therapy Student 10/05/18 0956      Row Name 10/05/18 0915             Pain  Assessment    Additional Documentation (P)  Pain Scale: FACES Pre/Post-Treatment (Group)  -      Recorded by [] Lavonne Felix Speech Therapy Student 10/05/18 0956      Row Name 10/05/18 0915             Pain Scale: FACES Pre/Post-Treatment    Pain: FACES Scale, Pretreatment (P)  0-->no hurt  -MF      Pain: FACES Scale, Post-Treatment (P)  0-->no hurt  -      Recorded by [] Lavonne Felix Speech Therapy Student 10/05/18 0956      Row Name 10/05/18 0915             Outcome Summary/Treatment Plan (SLP)    Daily Summary of Progress (SLP) (P)  progress toward functional goals is good  -      Plan for Continued Treatment (SLP) (P)  cont tx per POC  -      Anticipated Dischage Disposition (P)  unknown;anticipate therapy at next level of care  -      Recorded by [] Lavonne Felix Speech Therapy Student 10/05/18 0956        User Key  (r) = Recorded By, (t) = Taken By, (c) = Cosigned By    Initials Name Effective Dates Discipline     Lavonne Felix, Speech Therapy Student 08/06/18 -  SLP          Outcome Summary  Outcome Summary/Treatment Plan (SLP)  Daily Summary of Progress (SLP): (P) progress toward functional goals is good (10/05/18 0915 : Lavonne Felix, Speech Therapy Student)  Plan for Continued Treatment (SLP): (P) cont tx per POC (10/05/18 0915 : Lavonne Felix, Speech Therapy Student)  Anticipated Dischage Disposition: (P) unknown, anticipate therapy at next level of care (10/05/18 0915 : Lavonne Felix, Speech Therapy Student)            SLP GOALS     Row Name 10/05/18 0915 10/03/18 1330          Oral Nutrition/Hydration Goal 1 (SLP)    Oral Nutrition/Hydration Goal 1, SLP (P)  LTG: Pt will tolerate regular diet and thin liquids w/ no s/s aspiration or discomfort w/ 100% acc and no cues  - LTG: Pt will tolerate regular diet and thin liquids w/ no s/s aspiration or discomfort w/ 100% acc and no cues  -     Time Frame (Oral Nutrition/Hydration Goal 1, SLP) (P)  by  discharge  -MF by discharge  -MP     Barriers (Oral Nutrition/Hydration Goal 1, SLP) (P)  pt independently recalled alternating solids and liquids  -MF  --     Progress/Outcomes (Oral Nutrition/Hydration Goal 1, SLP) (P)  good progress toward goal  -MF good progress toward goal  -MP        Pharyngeal Strengthening Exercise Goal 1 (SLP)    Activity (Pharyngeal Strengthening Goal 1, SLP) (P)  increase superior movement of the hyolaryngeal complex;increase anterior movement of the hyolaryngeal complex;increase squeeze/positive pressure generation;increase tongue base retraction  -MF increase superior movement of the hyolaryngeal complex;increase anterior movement of the hyolaryngeal complex;increase squeeze/positive pressure generation;increase tongue base retraction  -MP     Increase Superior Movement of the Hyolaryngeal Complex  -- effortful pitch glide (falsetto + pharyngeal squeeze)  -MP     Increase Anterior Movement of the Hyolaryngeal Complex (P)  chin tuck against resistance (CTAR)  -MF chin tuck against resistance (CTAR)  -MP     Increase Squeeze/Positive Pressure Generation (P)  hard effortful swallow  -MF hard effortful swallow  -MP     Increase Tongue Base Retraction (P)  michael  -MF michael  -MP     [place]/Accuracy (Pharyngeal Strengthening Goal 1, SLP) (P)  with minimal cues (75-90% accuracy)  -MF with minimal cues (75-90% accuracy)  -MP     Time Frame (Pharyngeal Strengthening Goal 1, SLP) (P)  short term goal (STG);by discharge  -MF short term goal (STG);by discharge  -MP     Barriers (Pharyngeal Strengthening Goal 1, SLP) (P)  michael: 6 reps CTAR: 2 reps Effortful: 3 reps  -MF Minimal trials of each exercise completed 2' lethargy   -MP     Progress/Outcomes (Pharyngeal Strengthening Goal 1, SLP) (P)  good progress toward goal  -MF good progress toward goal  -MP       User Key  (r) = Recorded By, (t) = Taken By, (c) = Cosigned By    Initials Name Provider Type    Lavonne Gunn, Speech  Therapy Student Speech Therapy Student    Flex Estrada, MS, CFY-SLP Speech and Language Pathologist          EDUCATION  The patient has been educated in the following areas:   Dysphagia (Swallowing Impairment).    SLP Recommendation and Plan                       Anticipated Dischage Disposition: (P) unknown, anticipate therapy at next level of care     Therapy Frequency (Swallow): (P) 5 days per week          Plan of Care Reviewed With: (P) patient  Plan of Care Review  Plan of Care Reviewed With: (P) patient  Daily Summary of Progress (SLP): (P) progress toward functional goals is good  Plan for Continued Treatment (SLP): (P) cont tx per POC  Progress: (P) improving           Time Calculation:         Time Calculation- SLP     Row Name 10/05/18 0958 10/05/18 0949          Time Calculation- SLP    SLP Start Time (P)  0915  -MF (P)  0915  -MF     SLP Received On (P)  10/05/18  -MF (P)  10/05/18  -       User Key  (r) = Recorded By, (t) = Taken By, (c) = Cosigned By    Initials Name Provider Type     Lavonne Felix, Speech Therapy Student Speech Therapy Student          Therapy Charges for Today     Code Description Service Date Service Provider Modifiers Qty    [id number] HC ST TREATMENT SWALLOW 2 10/5/2018 Lavonne Felix, Speech Therapy Student GN 1                 Lavonne Felix Speech Therapy Student  10/5/2018

## 2018-10-06 LAB
ALBUMIN SERPL-MCNC: 3.81 G/DL (ref 3.2–4.8)
ALBUMIN/GLOB SERPL: 1.5 G/DL (ref 1.5–2.5)
ALP SERPL-CCNC: 52 U/L (ref 25–100)
ALT SERPL W P-5'-P-CCNC: 23 U/L (ref 7–40)
ANION GAP SERPL CALCULATED.3IONS-SCNC: 9 MMOL/L (ref 3–11)
AST SERPL-CCNC: 21 U/L (ref 0–33)
BASOPHILS # BLD AUTO: 0 10*3/MM3 (ref 0–0.2)
BASOPHILS NFR BLD AUTO: 0 % (ref 0–1)
BILIRUB SERPL-MCNC: 0.4 MG/DL (ref 0.3–1.2)
BUN BLD-MCNC: 11 MG/DL (ref 9–23)
BUN/CREAT SERPL: 18 (ref 7–25)
CALCIUM SPEC-SCNC: 8.6 MG/DL (ref 8.7–10.4)
CHLORIDE SERPL-SCNC: 115 MMOL/L (ref 99–109)
CO2 SERPL-SCNC: 19 MMOL/L (ref 20–31)
CREAT BLD-MCNC: 0.61 MG/DL (ref 0.6–1.3)
DEPRECATED RDW RBC AUTO: 47.9 FL (ref 37–54)
EOSINOPHIL # BLD AUTO: 0 10*3/MM3 (ref 0–0.3)
EOSINOPHIL NFR BLD AUTO: 0 % (ref 0–3)
ERYTHROCYTE [DISTWIDTH] IN BLOOD BY AUTOMATED COUNT: 13.8 % (ref 11.3–14.5)
GFR SERPL CREATININE-BSD FRML MDRD: 98 ML/MIN/1.73
GLOBULIN UR ELPH-MCNC: 2.5 GM/DL
GLUCOSE BLD-MCNC: 108 MG/DL (ref 70–100)
HCT VFR BLD AUTO: 40.9 % (ref 34.5–44)
HGB BLD-MCNC: 12.8 G/DL (ref 11.5–15.5)
IMM GRANULOCYTES # BLD: 0.05 10*3/MM3 (ref 0–0.03)
IMM GRANULOCYTES NFR BLD: 0.4 % (ref 0–0.6)
LYMPHOCYTES # BLD AUTO: 1.21 10*3/MM3 (ref 0.6–4.8)
LYMPHOCYTES NFR BLD AUTO: 9.1 % (ref 24–44)
MCH RBC QN AUTO: 29.6 PG (ref 27–31)
MCHC RBC AUTO-ENTMCNC: 31.3 G/DL (ref 32–36)
MCV RBC AUTO: 94.7 FL (ref 80–99)
MONOCYTES # BLD AUTO: 0.82 10*3/MM3 (ref 0–1)
MONOCYTES NFR BLD AUTO: 6.2 % (ref 0–12)
NEUTROPHILS # BLD AUTO: 11.15 10*3/MM3 (ref 1.5–8.3)
NEUTROPHILS NFR BLD AUTO: 84.3 % (ref 41–71)
PLATELET # BLD AUTO: 256 10*3/MM3 (ref 150–450)
PMV BLD AUTO: 12.5 FL (ref 6–12)
POTASSIUM BLD-SCNC: 4.2 MMOL/L (ref 3.5–5.5)
PROT SERPL-MCNC: 6.3 G/DL (ref 5.7–8.2)
RBC # BLD AUTO: 4.32 10*6/MM3 (ref 3.89–5.14)
SODIUM BLD-SCNC: 143 MMOL/L (ref 132–146)
VANCOMYCIN TROUGH SERPL-MCNC: 12.8 MCG/ML (ref 10–20)
WBC NRBC COR # BLD: 13.23 10*3/MM3 (ref 3.5–10.8)

## 2018-10-06 PROCEDURE — 85025 COMPLETE CBC W/AUTO DIFF WBC: CPT | Performed by: INTERNAL MEDICINE

## 2018-10-06 PROCEDURE — 94640 AIRWAY INHALATION TREATMENT: CPT

## 2018-10-06 PROCEDURE — 63710000001 PREDNISONE PER 1 MG: Performed by: INTERNAL MEDICINE

## 2018-10-06 PROCEDURE — 94799 UNLISTED PULMONARY SVC/PX: CPT

## 2018-10-06 PROCEDURE — 80053 COMPREHEN METABOLIC PANEL: CPT | Performed by: INTERNAL MEDICINE

## 2018-10-06 PROCEDURE — 80202 ASSAY OF VANCOMYCIN: CPT

## 2018-10-06 PROCEDURE — 99233 SBSQ HOSP IP/OBS HIGH 50: CPT | Performed by: INTERNAL MEDICINE

## 2018-10-06 PROCEDURE — 94760 N-INVAS EAR/PLS OXIMETRY 1: CPT

## 2018-10-06 PROCEDURE — 25010000002 VANCOMYCIN PER 500 MG

## 2018-10-06 RX ORDER — VANCOMYCIN HYDROCHLORIDE 1 G/200ML
1000 INJECTION, SOLUTION INTRAVENOUS EVERY 12 HOURS
Status: DISCONTINUED | OUTPATIENT
Start: 2018-10-06 | End: 2018-10-08 | Stop reason: HOSPADM

## 2018-10-06 RX ADMIN — NYSTATIN 1 APPLICATION: 100000 POWDER TOPICAL at 13:32

## 2018-10-06 RX ADMIN — FAMOTIDINE 40 MG: 20 TABLET ORAL at 20:29

## 2018-10-06 RX ADMIN — VANCOMYCIN HYDROCHLORIDE 1000 MG: 1 INJECTION, SOLUTION INTRAVENOUS at 12:15

## 2018-10-06 RX ADMIN — MONTELUKAST SODIUM 10 MG: 10 TABLET, COATED ORAL at 20:28

## 2018-10-06 RX ADMIN — METOPROLOL TARTRATE 12.5 MG: 25 TABLET, FILM COATED ORAL at 08:39

## 2018-10-06 RX ADMIN — ASPIRIN 81 MG: 81 TABLET, COATED ORAL at 08:40

## 2018-10-06 RX ADMIN — DOXYCYCLINE 100 MG: 100 CAPSULE ORAL at 08:39

## 2018-10-06 RX ADMIN — DOXYCYCLINE 100 MG: 100 CAPSULE ORAL at 20:29

## 2018-10-06 RX ADMIN — BUDESONIDE AND FORMOTEROL FUMARATE DIHYDRATE 2 PUFF: 160; 4.5 AEROSOL RESPIRATORY (INHALATION) at 09:31

## 2018-10-06 RX ADMIN — PANTOPRAZOLE SODIUM 40 MG: 40 TABLET, DELAYED RELEASE ORAL at 06:03

## 2018-10-06 RX ADMIN — PREGABALIN 300 MG: 100 CAPSULE ORAL at 20:28

## 2018-10-06 RX ADMIN — SODIUM CHLORIDE 2 G: 9 INJECTION, SOLUTION INTRAVENOUS at 22:29

## 2018-10-06 RX ADMIN — DILTIAZEM HYDROCHLORIDE 360 MG: 180 CAPSULE, COATED, EXTENDED RELEASE ORAL at 08:40

## 2018-10-06 RX ADMIN — NYSTATIN: 100000 POWDER TOPICAL at 20:28

## 2018-10-06 RX ADMIN — APIXABAN 5 MG: 5 TABLET, FILM COATED ORAL at 08:40

## 2018-10-06 RX ADMIN — SODIUM CHLORIDE 2 G: 9 INJECTION, SOLUTION INTRAVENOUS at 06:03

## 2018-10-06 RX ADMIN — METOPROLOL TARTRATE 12.5 MG: 25 TABLET, FILM COATED ORAL at 20:30

## 2018-10-06 RX ADMIN — TOPIRAMATE 100 MG: 100 TABLET, FILM COATED ORAL at 20:29

## 2018-10-06 RX ADMIN — ATORVASTATIN CALCIUM 10 MG: 10 TABLET, FILM COATED ORAL at 20:28

## 2018-10-06 RX ADMIN — CETIRIZINE HYDROCHLORIDE 10 MG: 10 TABLET, FILM COATED ORAL at 20:29

## 2018-10-06 RX ADMIN — TOPIRAMATE 100 MG: 100 TABLET, FILM COATED ORAL at 08:40

## 2018-10-06 RX ADMIN — Medication 1 CAPSULE: at 08:40

## 2018-10-06 RX ADMIN — PREDNISONE 40 MG: 20 TABLET ORAL at 08:40

## 2018-10-06 RX ADMIN — ALBUTEROL SULFATE 2.5 MG: 2.5 SOLUTION RESPIRATORY (INHALATION) at 21:41

## 2018-10-06 RX ADMIN — NYSTATIN: 100000 POWDER TOPICAL at 06:03

## 2018-10-06 RX ADMIN — FLUTICASONE PROPIONATE 2 SPRAY: 50 SPRAY, METERED NASAL at 08:40

## 2018-10-06 RX ADMIN — APIXABAN 5 MG: 5 TABLET, FILM COATED ORAL at 20:29

## 2018-10-06 RX ADMIN — SODIUM CHLORIDE 2 G: 9 INJECTION, SOLUTION INTRAVENOUS at 16:06

## 2018-10-06 RX ADMIN — BUDESONIDE AND FORMOTEROL FUMARATE DIHYDRATE 2 PUFF: 160; 4.5 AEROSOL RESPIRATORY (INHALATION) at 19:31

## 2018-10-06 RX ADMIN — PREGABALIN 300 MG: 100 CAPSULE ORAL at 08:40

## 2018-10-06 RX ADMIN — LEVOTHYROXINE SODIUM 150 MCG: 150 TABLET ORAL at 06:03

## 2018-10-06 NOTE — PROGRESS NOTES
[facility] Medicine Services  PROGRESS NOTE    Patient Name: Vickie Joshi  : 1950  MRN: [id number]    Date of Admission: 2018  Length of Stay: 9  Primary Care Physician: Dav Bryan MD    Subjective   Subjective     CC:  Shortness of air    HPI:    Feeling a little better. Wheezing better as is breathing. Wants to get stronger so she can go home.      Review of Systems  Gen- No fevers, chills. +fatigue  CV- No chest pain, palpitations.  Resp- +cough, dyspnea  GI- No N/V/D, abd pain    Otherwise ROS is negative except as mentioned in the HPI.    Objective   Objective     Vital Signs:   Temp:  [97.9 °F (36.6 °C)-98.5 °F (36.9 °C)] 97.9 °F (36.6 °C)  Heart Rate:  [] 96  Resp:  [16-20] 20  BP: (116-128)/(52-99) 128/52  Total (NIH Stroke Scale): 9     Physical Exam:  Constitutional: No acute distress, awake, alert  HENT: NCAT, mucous membranes moist  Respiratory: some mild scattered wheezing, much improved from yesterday, no increased WOB  Cardiovascular: on tele monitoring, no murmurs  Gastrointestinal: Positive bowel sounds, soft, nontender, nondistended  Musculoskeletal: No bilateral ankle edema  Psychiatric: Appropriate affect, cooperative  Neurologic: Oriented x 3,  Cranial Nerves grossly intact to confrontation, speech clear  Skin: No rashes      Results Reviewed:  I have personally reviewed current lab, radiology, and data and agree.      Results from last 7 days  Lab Units 10/06/18  0817 10/05/18  0757 10/04/18  0547   WBC 10*3/mm3 13.23* 12.54* 14.35*   HEMOGLOBIN g/dL 12.8 13.8 12.7   HEMATOCRIT % 40.9 45.4* 41.3   PLATELETS 10*3/mm3 256 249 261       Results from last 7 days  Lab Units 10/06/18  0817 10/05/18  0757 10/04/18  0549   SODIUM mmol/L 143 142 144   POTASSIUM mmol/L 4.2 3.9 3.8   CHLORIDE mmol/L 115* 111* 109   CO2 mmol/L 19.0* 21.0 24.0   BUN mg/dL 11 14 13   CREATININE mg/dL 0.61 0.74 0.75   GLUCOSE mg/dL 108* 83 80   CALCIUM mg/dL 8.6* 8.7  8.4*   ALT (SGPT) U/L 23 27 22   AST (SGOT) U/L 21 29 18     Estimated Creatinine Clearance: 79.9 mL/min (by C-G formula based on SCr of 0.61 mg/dL).  No results found for: BNP    Microbiology Results Abnormal     Procedure Component Value - Date/Time    Legionella Antigen, Urine - Urine, Urine, Clean Catch [[id number]] Collected:  10/01/18 1707    Lab Status:  Final result Specimen:  Urine from Urine, Clean Catch Updated:  10/03/18 1518     L. pneumophila Serogp 1 Ur Ag Negative     Comment: Presumptive negative for L. pneumophila serogroup 1 antigen in urine,  suggesting no recent or current infection. Legionnaires' disease  cannot be ruled out since other serogroups and species may also cause  disease.       Narrative:       Performed at:  [address] [place] [address]  [id number]  : Erik Gillette MD, Phone:  [phone]    S. Pneumo Ag Urine or CSF - Urine, Urine, Clean Catch [[id number]] Collected:  10/01/18 1707    Lab Status:  Final result Specimen:  Urine from Urine, Clean Catch Updated:  10/03/18 1518     Specimen Source Urine     STREP PNEUMONIAE ANTIGEN Negative     Body Fluid Culture, Sterile Not Indicated     Organism ID Not indicated.     Please note Comment     Comment: College of American Pathologists standards require a culture to be  performed on CSF specimens submitted for bacterial antigen testing.  (CAP ELLE.85114) Urine specimens will not be cultured.       Narrative:       Performed at:   - [address]  [id number]  : Erik Gillette MD, Phone:  [phone]    Blood Culture - Blood, [[id number]]  (Abnormal) Collected:  09/27/18 0325    Lab Status:  Final result Specimen:  Blood from Arm, Right Updated:  10/02/18 1333     Blood Culture Staphylococcus, coagulase negative (A)     Comment: Suggests contamination  Susceptibility will not be done unless Doctor notifies Lab          Isolated from Anaerobic  Bottle     Gram Stain Result Anaerobic Bottle Gram positive cocci in clusters    Resp Virus Profile (RVP) PCR - Swab, Nasopharynx [[id number]]  (Abnormal) Collected:  09/27/18 0941    Lab Status:  Final result Specimen:  Swab from Nasopharynx Updated:  10/02/18 0615     Influenza A PCR Negative     Influenza B PCR Negative     RSV A Negative     RSV B Negative     Parainfluenza Virus 1 Negative     Parainfluenza Virus 2 Negative     Parainfluenza Virus 3 Negative     Human Rhinovirus/Enterovirus Positive (A)     Human Metapneumovirus Negative     Adenovirus Detection by PCR Negative    Narrative:       Performed at:  [address] [place] [address]  [id number]  : Erik Gillette MD, Phone:  [phone]    Blood Culture - Blood, [[id number]]  (Normal) Collected:  09/27/18 0325    Lab Status:  Final result Specimen:  Blood from Arm, Left Updated:  10/02/18 0346     Blood Culture No growth at 5 days    MRSA Screen, PCR - Swab, Nares [[id number]]  (Abnormal) Collected:  09/29/18 1448    Lab Status:  Final result Specimen:  Swab from Nares Updated:  09/29/18 2125     MRSA, PCR Positive (C)    Respiratory Culture - Sputum, Cough [[id number]] Collected:  09/27/18 1733    Lab Status:  Final result Specimen:  Sputum from Cough Updated:  09/29/18 0730     Respiratory Culture Light growth (2+) Normal Respiratory Sandrine     Gram Stain Result Many (4+) WBCs per low power field      Few (2+) Epithelial cells per low power field      Rare (1+) Gram positive cocci in pairs    Blood Culture ID, PCR - Blood, [[id number]]  (Abnormal) Collected:  09/27/18 0325    Lab Status:  Final result Specimen:  Blood from Arm, Right Updated:  09/28/18 0459     BCID, PCR Staphylococcus spp, not aureus. Identification by BCID PCR. (C)          Imaging Results (last 24 hours)     ** No results found for the last 24 hours. **        Results for orders placed during the hospital encounter of 09/27/18   Adult Transthoracic  Echo Complete W/ Cont if Necessary Per Protocol    Narrative · Moderate tricuspid valve regurgitation is present.  · Mild pulmonic valve regurgitation is present.  · There is calcification of the aortic valve.  · Mild aortic valve regurgitation is present.  · Left atrial cavity size is borderline dilated.  · Moderate mitral valve regurgitation is present          I have reviewed the medications.      Pharmacy Consult  Does not apply Once   apixaban 5 mg Oral Q12H   aspirin 81 mg Oral Daily   atorvastatin 10 mg Oral Nightly   aztreonam 2 g Intravenous Q8H   budesonide-formoterol 2 puff Inhalation BID - RT   cetirizine 10 mg Oral Nightly   diltiaZEM  mg Oral Q24H   doxycycline 100 mg Oral Q12H   famotidine 40 mg Oral Nightly   fluticasone 2 spray Each Nare Daily   lactobacillus acidophilus 1 capsule Oral Daily   levothyroxine 150 mcg Oral Q AM   metoprolol tartrate 12.5 mg Oral Q12H   montelukast 10 mg Oral Nightly   nystatin  Topical Q8H   pantoprazole 40 mg Oral Q AM   predniSONE 40 mg Oral Daily With Breakfast   pregabalin 300 mg Oral Q12H   topiramate 100 mg Oral Q12H   vancomycin 1,750 mg Intravenous Q24H         Assessment/Plan   Assessment / Plan     Hospital Problem List     * (Principal)Sepsis (CMS/HCC)    Hypertension    GERD (gastroesophageal reflux disease)    Fibromyalgia    Hx of Stroke with residual left hemiparesis    Atrial fibrillation (CMS/HCC)    Pneumonia        Brief Hospital Course to date:  Vickie Joshi is a 68 y.o. female with history of CVA and left hemiparesis.  Also with hx of Afib.  Presents with shortness of air found to be septic.  Bilateral lower lobe pneumonia right greater than left.  Admitted to hospital medicine service.    1) Sepsis  -continue vancomycin and azactam, oral doxy  -blood cultures + staph epi in one set 9/27 (felt to be contaminant), repeat cx NGTD  -secondary to underlying pneumonia  -ID following      2) Pneumonia  -CXR shows bronchopneumonia Right  >Left  -continue IV abx, nebs, stopped standing nebs secondary to tachycardia, still has them PRN  -blood cultures negative  -sputum culture neg  -consult SLP--passed swallow   -cough meds prn  -continue PO prednisone, improved respiratory status today. Will taper at discharge.     3) Atrial fibrillation with RVR   -continue eliquis  -continue diltiazem  -new low dose metoprolol this admit. HR improved     4) Fibromyalgia  -continue home medications     5) GERD  -continue PPI     6) History of CVA   -with residual left sided hemiparesis  -currently does outpatient rehab at [facility]  -fall precautions      DVT prophylaxis: eliquis     CODE STATUS:   Code Status and Medical Interventions:   Ordered at: 09/27/18 0605     Level Of Support Discussed With:    Patient     Code Status:    CPR     Medical Interventions (Level of Support Prior to Arrest):    Full     Disposition: I expect the patient to be discharged home with HH when medically ready. Pending final ID recs. Watch through the weekend.    Electronically signed by Claudia Larson DO, 10/06/18, 11:03 AM.

## 2018-10-06 NOTE — PROGRESS NOTES
"Pharmacy Consult-Vancomycin Dosing    Vickie Joshi is a  68 y.o. female receiving vancomycin therapy.     Indication: Sepsis secondary to pneumonia  Consulting Provider: Austin SAMUEL Consult: Yes  Goal Trough: 15-20mcg/mL    Current Antimicrobial Therapy    PTD Vanc = day 10  Azactam = day 10  Doxycycline PO = day 6  S/p Levaquin x5 days    Allergies    Allergies as of 09/27/2018 - Reviewed 09/27/2018   Allergen Reaction Noted   • Cephalexin Swelling 04/15/2016   • Penicillins Hives 04/15/2016   • Sulfa antibiotics Hives 04/15/2016     Labs  Results from last 7 days   Lab Units 10/05/18  0757 10/04/18  0549 10/03/18  0543   BUN mg/dL 14 13 16   CREATININE mg/dL 0.74 0.75 0.88   Serum creatinine: 0.74 mg/dL 10/05/18 0757  Estimated creatinine clearance: 79.9 mL/min.  Results from last 7 days   Lab Units 10/05/18  0757 10/04/18  0547 10/02/18  0424   WBC 10*3/mm3 12.54* 14.35* 11.54*     Evaluation of Dosing     Last Dose Received in the ED: 9/27 @ 0811 vancomycin 1750mg (20mg/kg) x1    Ht - 165.1 cm (65\")  Wt - 97.4 kg (214 lb 12.8 oz)    I/O last 3 completed shifts:  In: 500 [IV Piggyback:500]  Out: -  + unmeasured    Microbiology and Radiology    Microbiology Results (last 10 days)       Procedure Component Value - Date/Time    Legionella Antigen, Urine - Urine, Urine, Clean Catch [[id number]] Collected:  10/01/18 1707    Lab Status:  Final result Specimen:  Urine from Urine, Clean Catch Updated:  10/03/18 1518     L. pneumophila Serogp 1 Ur Ag Negative     Comment: Presumptive negative for L. pneumophila serogroup 1 antigen in urine,  suggesting no recent or current infection. Legionnaires' disease  cannot be ruled out since other serogroups and species may also cause  disease.       Narrative:       Performed at:  [address]  [id number]  : Erik Gillette MD, Phone:  [phone]    S. Pneumo Ag Urine or CSF - Urine, Urine, Clean Catch [[id number]] " Collected:  10/01/18 1707    Lab Status:  Final result Specimen:  Urine from Urine, Clean Catch Updated:  10/03/18 1518     Specimen Source Urine     STREP PNEUMONIAE ANTIGEN Negative     Body Fluid Culture, Sterile Not Indicated     Organism ID Not indicated.     Please note Comment     Comment: College of American Pathologists standards require a culture to be  performed on CSF specimens submitted for bacterial antigen testing.  (CAP ELLE.46950) Urine specimens will not be cultured.       Narrative:       Performed at:  [facility]  [id number]  : Erik Gillette MD, Phone:  [phone]    MRSA Screen, PCR - Swab, Nares [[id number]]  (Abnormal) Collected:  09/29/18 1448    Lab Status:  Final result Specimen:  Swab from Nares Updated:  09/29/18 2125     MRSA, PCR Positive (C)    Respiratory Culture - Sputum, Cough [[id number]] Collected:  09/27/18 1733    Lab Status:  Final result Specimen:  Sputum from Cough Updated:  09/29/18 0730     Respiratory Culture Light growth (2+) Normal Respiratory Sandrine     Gram Stain Result Many (4+) WBCs per low power field      Few (2+) Epithelial cells per low power field      Rare (1+) Gram positive cocci in pairs    Resp Virus Profile (RVP) PCR - Swab, Nasopharynx [[id number]]  (Abnormal) Collected:  09/27/18 0941    Lab Status:  Final result Specimen:  Swab from Nasopharynx Updated:  10/02/18 0615     Influenza A PCR Negative     Influenza B PCR Negative     RSV A Negative     RSV B Negative     Parainfluenza Virus 1 Negative     Parainfluenza Virus 2 Negative     Parainfluenza Virus 3 Negative     Human Rhinovirus/Enterovirus Positive (A)     Human Metapneumovirus Negative     Adenovirus Detection by PCR Negative    Narrative:       Performed at:  [address] - [facility]  [id number]  : Erik Gillette MD, Phone:  [phone]    Blood Culture - Blood, [[id number]]  (Normal) Collected:   09/27/18 0325    Lab Status:  Final result Specimen:  Blood from Arm, Left Updated:  10/02/18 0346     Blood Culture No growth at 5 days    Blood Culture - Blood, [[id number]]  (Abnormal) Collected:  09/27/18 0325    Lab Status:  Final result Specimen:  Blood from Arm, Right Updated:  10/02/18 1333     Blood Culture Staphylococcus, coagulase negative (A)     Comment: Suggests contamination  Susceptibility will not be done unless Doctor notifies Lab          Isolated from Anaerobic Bottle     Gram Stain Result Anaerobic Bottle Gram positive cocci in clusters    Blood Culture ID, PCR - Blood, [[id number]]  (Abnormal) Collected:  09/27/18 0325    Lab Status:  Final result Specimen:  Blood from Arm, Right Updated:  09/28/18 0459     BCID, PCR Staphylococcus spp, not aureus. Identification by BCID PCR. (C)          Evaluation of Level    Results from last 7 days   Lab Units 10/04/18  0547 10/01/18  1041 09/29/18  0019   VANCOMYCIN TR mcg/mL 11.30 25.50* 19.40     9/29 trough @ 0019 = 19.4mcg/mL (~12hr level)- on Vancomycin 1250mg q12h  10/1 trough @ 1041 = 25.5mcg/mL (~9hr level, drawn ~1hr early)- decreased to vancomycin 1250mg q24h  10/4 trough @ 0547 = 11.3mcg/mL (~24hr level)-increased to vancomycin 1750mg IV q24h   10/6 trough at 0817= 12.8 mcg/mL (~19 hours)-    Assessment/Plan:    1. Pharmacy to dose vancomycin for sepsis. Vancomycin trough today is SUB-therapeutic at 12.8mcg/mL and was drawn early.   2. Increase to Vancomycin 1000mg IV q12h.  3. Will re-evaluated a trough on 10/8 at 1130.  4. Monitor renal function, cultures and sensitivities, and clinical status, and pharmacy will adjust regimen as necessary.    Luisa Landa, PharmD  10/6/2018  11:26 AM

## 2018-10-06 NOTE — PLAN OF CARE
Problem: Skin Injury Risk (Adult)  Goal: Skin Health and Integrity  Outcome: Ongoing (interventions implemented as appropriate)      Problem: Patient Care Overview  Goal: Plan of Care Review  Outcome: Ongoing (interventions implemented as appropriate)   10/06/18 0423   Coping/Psychosocial   Plan of Care Reviewed With patient   Plan of Care Review   Progress no change   OTHER   Outcome Summary pt sleeping without c/o pain or discomfort, vss

## 2018-10-06 NOTE — PROGRESS NOTES
[facility] Progress Note        Antibiotics:  Anti-Infectives     Ordered     Dose/Rate Route Frequency Start Stop    10/06/18 1134  vancomycin (VANCOCIN) in iso-osmotic dextrose IVPB 1 g (premix) 200 mL     Ordering Provider:  Luisa Landa RPH    1,000 mg  over 120 Minutes Intravenous Every 12 Hours 10/06/18 1200 10/10/18 1159    10/05/18 0848  aztreonam (AZACTAM) 2 g in sodium chloride 0.9 % 100 mL IVPB-MBP     Ordering Provider:  Royal Mohamud MD    2 g  over 4 Hours Intravenous Every 8 Hours 10/05/18 1500 10/09/18 0726    10/01/18 0810  doxycycline (MONODOX) capsule 100 mg     Royal Mohamud MD reviewed the order on 10/06/18 0905.   Ordering Provider:  Royal Mohamud MD    100 mg Oral Every 12 Hours Scheduled 10/01/18 0900 10/13/18 0903    09/27/18 1201  Pharmacy to dose vancomycin     Aleksandr Thomas RP reviewed the order on 10/03/18 0734.   Ordering Provider:  Royal Mohamud MD     Does not apply Continuous PRN 09/27/18 1149 10/10/18 0733    09/27/18 0537  aztreonam (AZACTAM) 2 g in sodium chloride 0.9 % 100 mL IVPB-MBP     Ordering Provider:  Eliot Recio DO    2 g  200 mL/hr over 30 Minutes Intravenous Once 09/27/18 0539 09/27/18 0753    09/27/18 0537  vancomycin 1750 mg/500 mL 0.9% NS IVPB ([facility])     Ordering Provider:  Eliot Recio DO    20 mg/kg × 90.7 kg Intravenous Once 09/27/18 0539 09/27/18 0811    09/27/18 0316  levoFLOXacin (LEVAQUIN) 750 mg/150 mL D5W (premix) (LEVAQUIN) 750 mg     Ordering Provider:  Eliot Recio DO    750 mg  over 90 Minutes Intravenous Once 09/27/18 0318 09/27/18 0600          CC: pneumonia    HPI:  Patient is a 68 y.o.  Yr old female with history of prior CVA and chronic/residual left-sided hemiparesis, chronically debilitated with multiple comorbidity as outlined below.  She was admitted September 27, 2018 with acute cough/shortness of breath and fever over 102 per admission H&P.  Initial blood culture data from September 27 had coag-negative staph  "species in 1 of 2 sets, sputum with normal talya, and MRSA nasal screen positive.  Chest x-ray with right greater than left findings to suggest bronchopneumonia and CT scan with airspace disease in the right lower lobe per radiology.  In addition patient reports her  also with upper respiratory type infection but no specific microbiologic diagnosis.     10/6/18 Shortness of breath/cough and wheeze improving  ,  making some slow improvements per her  and herself.  No hemoptysis.  No headache photophobia or neck stiffness.  No nausea vomiting or diarrhea. No new skin rash.    ROS:      10/6/18 No f/c/s. No n/v/d. No rash. No new ADR to Abx.     Constitutional-- at present, No Fever, chills or sweats.  Appetite diminished with generalized malaise and fatigue  Heent-- No new vision, hearing or throat complaints.  No epistaxis or oral sores.  Denies odynophagia or dysphagia.  No flashers, floaters or eye pain. No odynophagia or dysphagia. No headache, photophobia or neck stiffness.  CV-- No chest pain, palpitation or syncope  Resp-- as above  GI- No nausea, vomiting, or diarrhea.  No hematochezia, melena, or hematemesis. Denies jaundice or chronic liver disease.  -- No dysuria, hematuria, or flank pain.  Denies hesitancy, urgency or flank pain.  Lymph- no swollen lymph nodes in neck/axilla or groin.   Heme- No active bruising or bleeding; no Hx of DVT or PE.  MS-- no swelling or pain in the bones or joints of arms/legs.  No new back pain.  Neuro-- No acute focal weakness or numbness in the arms or legs.  No seizures.  Chronic left-sided hemiparesis after stroke     Full 12 point review of systems reviewed and negative otherwise for acute complaints, except for above       PE: Nursing/chaperone present  /52 (BP Location: Right arm, Patient Position: Lying)   Pulse 96   Temp 97.9 °F (36.6 °C) (Oral)   Resp 20   Ht 165.1 cm (65\")   Wt 97.4 kg (214 lb 12.8 oz)   SpO2 94%   BMI 35.74 kg/m² "     GENERAL: Awake and alert, in no acute distress.  Obese and chronically ill-appearing  HEENT: Normocephalic, atraumatic.  PERRL. EOMI. No conjunctival injection. No icterus. Oropharynx clear without evidence of thrush or exudate. No evidence of peridontal disease.    NECK: Supple without nuchal rigidity. No mass.  LYMPH: No cervical, axillary or inguinal lymphadenopathy.  HEART: RRR; No murmur, rubs, gallops.   LUNGS: Diminished at right base more so than left with scattered rhonchi and less exp wheeze. Normal respiratory effort. Nonlabored. No dullness.  ABDOMEN: Soft, nontender, nondistended. Positive bowel sounds. No rebound or guarding. NO mass or HSM.  EXT:  No cyanosis, clubbing or edema. No cord.  : Genitalia generally unremarkable.  Without Gunderson catheter.  MSK: FROM without joint effusions noted arms/legs.    SKIN: Warm and dry without cutaneous eruptions on Inspection/palpation.    NEURO: Oriented to PPT.  Chronic left-sided hemiparesis noted  PSYCHIATRIC: Normal insight and judgement. Cooperative with PE     No peripheral stigmata/phenomena of endocarditis     IV without obvious redness or drainage    Laboratory Data      Results from last 7 days  Lab Units 10/06/18  0817 10/05/18  0757 10/04/18  0547   WBC 10*3/mm3 13.23* 12.54* 14.35*   HEMOGLOBIN g/dL 12.8 13.8 12.7   HEMATOCRIT % 40.9 45.4* 41.3   PLATELETS 10*3/mm3 256 249 261       Results from last 7 days  Lab Units 10/06/18  0817   SODIUM mmol/L 143   POTASSIUM mmol/L 4.2   CHLORIDE mmol/L 115*   CO2 mmol/L 19.0*   BUN mg/dL 11   CREATININE mg/dL 0.61   GLUCOSE mg/dL 108*   CALCIUM mg/dL 8.6*       Results from last 7 days  Lab Units 10/06/18  0817   ALK PHOS U/L 52   BILIRUBIN mg/dL 0.4   ALT (SGPT) U/L 23   AST (SGOT) U/L 21               Estimated Creatinine Clearance: 79.9 mL/min (by C-G formula based on SCr of 0.61 mg/dL).      Microbiology:      Radiology:  Imaging Results (last 72 hours)     ** No results found for the last 72 hours.  **            Impression:   --Acute pneumonia, SIRS+ at admission, bilateral with right greater than left infiltrates on chest x-ray, rhinovirus/enterovirus + and  exposure to  and concern for  secondary bacterial process as well; empiric treatment for CAP/MRSA and Hx PCN/ceph allergies.  Empiric antibiotics ongoing with vancomycin/Azactam and doxycycline.  MRSA screen positive and certainly could be a pathogen in this setting but sputum did not corroborate that.  Empiric antibiotics would cover that in any case.  If significant pleural effusions evolve, you should give consideration to diagnostic/therapeutic thoracentesis.  Patient understands high risk for further serious morbidity and other serious sequela; she understands antibiotics are not a guarantee for cure.  If not steadily improving, you should give consideration to repeat imaging to rule out other evolving parapneumonic complication.     --Acute blood culture positivity, coag-negative staph per microbiology preliminarily and only in one of 2 samples suggest contamination.  No obvious endovascular focus by exam findings.  No chronic line.  She denies acute focal joint complaints to suggest septic joint etc.     --Atrial fibrillation/RVR, paroxysmal per internal medicine.  Treatment per them     --History CVA with chronic left-sided hemiparesis    PLAN:     --IV vancomycin/Azactam and oral doxycycline     --I discussed potential risks and benefits of the prescribed antibiotics that include, but are not limited to, solid organ toxicity, neuro/sosa/vestibular toxicity, renal toxicity, CDiff, cytopenias, hypersensitivity,  etc.. Patient/Family voice understanding and agree to proceed.     --Check/review labs cultures and scans     --Discussed with microbiology     --History per nursing staff and       --Highly complex set of issues with high risk for further serious morbidity and other serious sequela    --d/w Dr Larson 10/5         Royal CHOI  MD [place]  10/6/2018

## 2018-10-07 PROCEDURE — 25010000002 VANCOMYCIN PER 500 MG

## 2018-10-07 PROCEDURE — 63710000001 PREDNISONE PER 1 MG: Performed by: INTERNAL MEDICINE

## 2018-10-07 PROCEDURE — 94640 AIRWAY INHALATION TREATMENT: CPT

## 2018-10-07 PROCEDURE — 99232 SBSQ HOSP IP/OBS MODERATE 35: CPT | Performed by: INTERNAL MEDICINE

## 2018-10-07 RX ADMIN — BUDESONIDE AND FORMOTEROL FUMARATE DIHYDRATE 2 PUFF: 160; 4.5 AEROSOL RESPIRATORY (INHALATION) at 08:42

## 2018-10-07 RX ADMIN — PREGABALIN 300 MG: 100 CAPSULE ORAL at 08:58

## 2018-10-07 RX ADMIN — NYSTATIN: 100000 POWDER TOPICAL at 21:59

## 2018-10-07 RX ADMIN — ATORVASTATIN CALCIUM 10 MG: 10 TABLET, FILM COATED ORAL at 21:58

## 2018-10-07 RX ADMIN — APIXABAN 5 MG: 5 TABLET, FILM COATED ORAL at 08:57

## 2018-10-07 RX ADMIN — DOXYCYCLINE 100 MG: 100 CAPSULE ORAL at 21:58

## 2018-10-07 RX ADMIN — DILTIAZEM HYDROCHLORIDE 360 MG: 180 CAPSULE, COATED, EXTENDED RELEASE ORAL at 08:57

## 2018-10-07 RX ADMIN — FAMOTIDINE 40 MG: 20 TABLET ORAL at 21:58

## 2018-10-07 RX ADMIN — NYSTATIN: 100000 POWDER TOPICAL at 05:16

## 2018-10-07 RX ADMIN — PANTOPRAZOLE SODIUM 40 MG: 40 TABLET, DELAYED RELEASE ORAL at 05:16

## 2018-10-07 RX ADMIN — FLUTICASONE PROPIONATE 2 SPRAY: 50 SPRAY, METERED NASAL at 08:58

## 2018-10-07 RX ADMIN — SODIUM CHLORIDE 2 G: 9 INJECTION, SOLUTION INTRAVENOUS at 05:16

## 2018-10-07 RX ADMIN — METOPROLOL TARTRATE 12.5 MG: 25 TABLET, FILM COATED ORAL at 08:57

## 2018-10-07 RX ADMIN — TOPIRAMATE 100 MG: 100 TABLET, FILM COATED ORAL at 22:01

## 2018-10-07 RX ADMIN — PREGABALIN 300 MG: 100 CAPSULE ORAL at 21:58

## 2018-10-07 RX ADMIN — TOPIRAMATE 100 MG: 100 TABLET, FILM COATED ORAL at 08:58

## 2018-10-07 RX ADMIN — SODIUM CHLORIDE 2 G: 9 INJECTION, SOLUTION INTRAVENOUS at 15:18

## 2018-10-07 RX ADMIN — DOXYCYCLINE 100 MG: 100 CAPSULE ORAL at 08:57

## 2018-10-07 RX ADMIN — ASPIRIN 81 MG: 81 TABLET, COATED ORAL at 08:57

## 2018-10-07 RX ADMIN — VANCOMYCIN HYDROCHLORIDE 1000 MG: 1 INJECTION, SOLUTION INTRAVENOUS at 00:13

## 2018-10-07 RX ADMIN — VANCOMYCIN HYDROCHLORIDE 1000 MG: 1 INJECTION, SOLUTION INTRAVENOUS at 13:06

## 2018-10-07 RX ADMIN — Medication 1 CAPSULE: at 08:57

## 2018-10-07 RX ADMIN — APIXABAN 5 MG: 5 TABLET, FILM COATED ORAL at 21:58

## 2018-10-07 RX ADMIN — NYSTATIN: 100000 POWDER TOPICAL at 15:20

## 2018-10-07 RX ADMIN — PREDNISONE 40 MG: 20 TABLET ORAL at 08:58

## 2018-10-07 RX ADMIN — LEVOTHYROXINE SODIUM 150 MCG: 150 TABLET ORAL at 05:16

## 2018-10-07 RX ADMIN — METOPROLOL TARTRATE 12.5 MG: 25 TABLET, FILM COATED ORAL at 21:58

## 2018-10-07 RX ADMIN — MONTELUKAST SODIUM 10 MG: 10 TABLET, COATED ORAL at 21:58

## 2018-10-07 RX ADMIN — CETIRIZINE HYDROCHLORIDE 10 MG: 10 TABLET, FILM COATED ORAL at 22:02

## 2018-10-07 NOTE — PROGRESS NOTES
[facility] Progress Note        Antibiotics:  Anti-Infectives     Ordered     Dose/Rate Route Frequency Start Stop    10/06/18 1134  vancomycin (VANCOCIN) in iso-osmotic dextrose IVPB 1 g (premix) 200 mL     Ordering Provider:  Luisa Landa RPH    1,000 mg  over 120 Minutes Intravenous Every 12 Hours 10/06/18 1200 10/10/18 1159    10/05/18 0848  aztreonam (AZACTAM) 2 g in sodium chloride 0.9 % 100 mL IVPB-MBP     Ordering Provider:  Royal Mohamud MD    2 g  over 1 Hours Intravenous Every 8 Hours 10/05/18 1500 10/09/18 0726    10/01/18 0810  doxycycline (MONODOX) capsule 100 mg     Royal Mohamud MD reviewed the order on 10/06/18 0905.   Ordering Provider:  Royal Mohamud MD    100 mg Oral Every 12 Hours Scheduled 10/01/18 0900 10/13/18 0903    09/27/18 1201  Pharmacy to dose vancomycin     Aleksandr Thomas RP reviewed the order on 10/03/18 0734.   Ordering Provider:  Royal Mohamud MD     Does not apply Continuous PRN 09/27/18 1149 10/10/18 0733    09/27/18 0537  aztreonam (AZACTAM) 2 g in sodium chloride 0.9 % 100 mL IVPB-MBP     Ordering Provider:  Eliot Recio DO    2 g  200 mL/hr over 30 Minutes Intravenous Once 09/27/18 0539 09/27/18 0753    09/27/18 0537  vancomycin 1750 mg/500 mL 0.9% NS IVPB ([facility])     Ordering Provider:  Eliot Recio DO    20 mg/kg × 90.7 kg Intravenous Once 09/27/18 0539 09/27/18 0811    09/27/18 0316  levoFLOXacin (LEVAQUIN) 750 mg/150 mL D5W (premix) (LEVAQUIN) 750 mg     Ordering Provider:  Eliot Recio DO    750 mg  over 90 Minutes Intravenous Once 09/27/18 0318 09/27/18 0600          CC: pneumonia    HPI:  Patient is a 68 y.o.  Yr old female with history of prior CVA and chronic/residual left-sided hemiparesis, chronically debilitated with multiple comorbidity as outlined below.  She was admitted September 27, 2018 with acute cough/shortness of breath and fever over 102 per admission H&P.  Initial blood culture data from September 27 had coag-negative staph  "species in 1 of 2 sets, sputum with normal talya, and MRSA nasal screen positive.  Chest x-ray with right greater than left findings to suggest bronchopneumonia and CT scan with airspace disease in the right lower lobe per radiology.  In addition patient reports her  also with upper respiratory type infection but no specific microbiologic diagnosis.     10/7/18 she feels some better; Shortness of breath/cough and wheeze improving  ,  making some slow improvements per her  and herself.  No hemoptysis.  No headache photophobia or neck stiffness.  No nausea vomiting or diarrhea. No new skin rash.    ROS:      10/7/18 No f/c/s. No n/v/d. No rash. No new ADR to Abx.     Constitutional-- at present, No Fever, chills or sweats.  Appetite diminished with generalized malaise and fatigue  Heent-- No new vision, hearing or throat complaints.  No epistaxis or oral sores.  Denies odynophagia or dysphagia.  No flashers, floaters or eye pain. No odynophagia or dysphagia. No headache, photophobia or neck stiffness.  CV-- No chest pain, palpitation or syncope  Resp-- as above  GI- No nausea, vomiting, or diarrhea.  No hematochezia, melena, or hematemesis. Denies jaundice or chronic liver disease.  -- No dysuria, hematuria, or flank pain.  Denies hesitancy, urgency or flank pain.  Lymph- no swollen lymph nodes in neck/axilla or groin.   Heme- No active bruising or bleeding; no Hx of DVT or PE.  MS-- no swelling or pain in the bones or joints of arms/legs.  No new back pain.  Neuro-- No acute focal weakness or numbness in the arms or legs.  No seizures.  Chronic left-sided hemiparesis after stroke     Full 12 point review of systems reviewed and negative otherwise for acute complaints, except for above       PE: Nursing/chaperone present  /84   Pulse 88   Temp 97.9 °F (36.6 °C) (Oral)   Resp 18   Ht 165.1 cm (65\")   Wt 97.4 kg (214 lb 12.8 oz)   SpO2 98%   BMI 35.74 kg/m²     GENERAL: Awake and alert, in " no acute distress.  Obese and chronically ill-appearing  HEENT: Normocephalic, atraumatic.  PERRL. EOMI. No conjunctival injection. No icterus. Oropharynx clear without evidence of thrush or exudate. No evidence of peridontal disease.    NECK: Supple without nuchal rigidity. No mass.  LYMPH: No cervical, axillary or inguinal lymphadenopathy.  HEART: RRR; No murmur, rubs, gallops.   LUNGS: Diminished at right base more so than left with scattered rhonchi and less exp wheeze. Normal respiratory effort. Nonlabored. No dullness.  ABDOMEN: Soft, nontender, nondistended. Positive bowel sounds. No rebound or guarding. NO mass or HSM.  EXT:  No cyanosis, clubbing or edema. No cord.  : Genitalia generally unremarkable.  Without Gunderson catheter.  MSK: FROM without joint effusions noted arms/legs.    SKIN: Warm and dry without cutaneous eruptions on Inspection/palpation.    NEURO: Oriented to PPT.  Chronic left-sided hemiparesis noted  PSYCHIATRIC: Normal insight and judgement. Cooperative with PE     No peripheral stigmata/phenomena of endocarditis     IV without obvious redness or drainage    Laboratory Data      Results from last 7 days  Lab Units 10/06/18  0817 10/05/18  0757 10/04/18  0547   WBC 10*3/mm3 13.23* 12.54* 14.35*   HEMOGLOBIN g/dL 12.8 13.8 12.7   HEMATOCRIT % 40.9 45.4* 41.3   PLATELETS 10*3/mm3 256 249 261       Results from last 7 days  Lab Units 10/06/18  0817   SODIUM mmol/L 143   POTASSIUM mmol/L 4.2   CHLORIDE mmol/L 115*   CO2 mmol/L 19.0*   BUN mg/dL 11   CREATININE mg/dL 0.61   GLUCOSE mg/dL 108*   CALCIUM mg/dL 8.6*       Results from last 7 days  Lab Units 10/06/18  0817   ALK PHOS U/L 52   BILIRUBIN mg/dL 0.4   ALT (SGPT) U/L 23   AST (SGOT) U/L 21               Estimated Creatinine Clearance: 79.9 mL/min (by C-G formula based on SCr of 0.61 mg/dL).      Microbiology:      Radiology:  Imaging Results (last 72 hours)     ** No results found for the last 72 hours. **            Impression:    --Acute pneumonia, SIRS+ at admission, bilateral with right greater than left infiltrates on chest x-ray, rhinovirus/enterovirus + and  exposure to  and concern for  secondary bacterial process as well; empiric treatment for CAP/MRSA and Hx PCN/ceph allergies.  Empiric antibiotics ongoing with vancomycin/Azactam and doxycycline.  MRSA screen positive and certainly could be a pathogen in this setting but sputum did not corroborate that.  Empiric antibiotics would cover that in any case.  If significant pleural effusions evolve, you should give consideration to diagnostic/therapeutic thoracentesis.  Patient understands high risk for further serious morbidity and other serious sequela; she understands antibiotics are not a guarantee for cure.  If not steadily improving, you should give consideration to repeat imaging to rule out other evolving parapneumonic complication.     --Acute blood culture positivity, coag-negative staph per microbiology preliminarily and only in one of 2 samples suggest contamination.  No obvious endovascular focus by exam findings.  No chronic line.  She denies acute focal joint complaints to suggest septic joint etc.     --Atrial fibrillation/RVR, paroxysmal per internal medicine.  Treatment per them     --History CVA with chronic left-sided hemiparesis    PLAN:     --IV vancomycin/Azactam and oral doxycycline     --I discussed potential risks and benefits of the prescribed antibiotics that include, but are not limited to, solid organ toxicity, neuro/sosa/vestibular toxicity, renal toxicity, CDiff, cytopenias, hypersensitivity,  etc.. Patient/Family voice understanding and agree to proceed.     --Check/review labs cultures and scans     --Discussed with microbiology     --History per nursing staff and       --Highly complex set of issues with high risk for further serious morbidity and other serious sequela         Royal Mohamud MD  10/7/2018

## 2018-10-07 NOTE — PROGRESS NOTES
[facility] Medicine Services  PROGRESS NOTE    Patient Name: Vickie Joshi  : 1950  MRN: [id number]    Date of Admission: 2018  Length of Stay: 10  Primary Care Physician: Dav Bryan MD    Subjective   Subjective     CC:  Shortness of air    HPI:    Feels good today. Was up in the chair most of the day yesterday. Breathing much improved. Slept well.      Review of Systems  Gen- No fevers, chills. +fatigue  CV- No chest pain, palpitations.  Resp- +cough, dyspnea  GI- No N/V/D, abd pain    Otherwise ROS is negative except as mentioned in the HPI.    Objective   Objective     Vital Signs:   Temp:  [97.9 °F (36.6 °C)-98.5 °F (36.9 °C)] 97.9 °F (36.6 °C)  Heart Rate:  [69-90] 88  Resp:  [18-20] 18  BP: (108-112)/(77-84) 108/84  Total (NIH Stroke Scale): 9     Physical Exam:  Constitutional: No acute distress, awake, alert  HENT: NCAT, mucous membranes moist  Respiratory: some mild scattered wheezing, much improved from yesterday, no increased WOB  Cardiovascular: on tele monitoring, no murmurs  Gastrointestinal: Positive bowel sounds, soft, nontender, nondistended  Musculoskeletal: No bilateral ankle edema  Psychiatric: Appropriate affect, cooperative  Neurologic: Oriented x 3,  Cranial Nerves grossly intact to confrontation, speech clear  Skin: No rashes      Results Reviewed:  I have personally reviewed current lab, radiology, and data and agree.      Results from last 7 days  Lab Units 10/06/18  0817 10/05/18  0757 10/04/18  0547   WBC 10*3/mm3 13.23* 12.54* 14.35*   HEMOGLOBIN g/dL 12.8 13.8 12.7   HEMATOCRIT % 40.9 45.4* 41.3   PLATELETS 10*3/mm3 256 249 261       Results from last 7 days  Lab Units 10/06/18  0817 10/05/18  0757 10/04/18  0549   SODIUM mmol/L 143 142 144   POTASSIUM mmol/L 4.2 3.9 3.8   CHLORIDE mmol/L 115* 111* 109   CO2 mmol/L 19.0* 21.0 24.0   BUN mg/dL 11 14 13   CREATININE mg/dL 0.61 0.74 0.75   GLUCOSE mg/dL 108* 83 80   CALCIUM mg/dL 8.6*  8.7 8.4*   ALT (SGPT) U/L 23 27 22   AST (SGOT) U/L 21 29 18     Estimated Creatinine Clearance: 79.9 mL/min (by C-G formula based on SCr of 0.61 mg/dL).  No results found for: BNP    Microbiology Results Abnormal     Procedure Component Value - Date/Time    Legionella Antigen, Urine - Urine, Urine, Clean Catch [[id number]] Collected:  10/01/18 1707    Lab Status:  Final result Specimen:  Urine from Urine, Clean Catch Updated:  10/03/18 1518     L. pneumophila Serogp 1 Ur Ag Negative     Comment: Presumptive negative for L. pneumophila serogroup 1 antigen in urine,  suggesting no recent or current infection. Legionnaires' disease  cannot be ruled out since other serogroups and species may also cause  disease.       Narrative:       Performed at:  [address] [place] [address]  [id number]  : Erik Gillette MD, Phone:  [phone]    S. Pneumo Ag Urine or CSF - Urine, Urine, Clean Catch [[id number]] Collected:  10/01/18 1707    Lab Status:  Final result Specimen:  Urine from Urine, Clean Catch Updated:  10/03/18 1518     Specimen Source Urine     STREP PNEUMONIAE ANTIGEN Negative     Body Fluid Culture, Sterile Not Indicated     Organism ID Not indicated.     Please note Comment     Comment: College [employer] standards require a culture to be  performed on CSF specimens submitted for bacterial antigen testing.  (CAP ELLE.72038) Urine specimens will not be cultured.       Narrative:       Performed at:   - [address]  [id number]  : Erik Gillette MD, Phone:  [phone]    Blood Culture - Blood, [[id number]]  (Abnormal) Collected:  09/27/18 0323    Lab Status:  Final result Specimen:  Blood from Arm, Right Updated:  10/02/18 1333     Blood Culture Staphylococcus, coagulase negative (A)     Comment: Suggests contamination  Susceptibility will not be done unless Doctor notifies Lab          Isolated from Anaerobic  Bottle     Gram Stain Result Anaerobic Bottle Gram positive cocci in clusters    Resp Virus Profile (RVP) PCR - Swab, Nasopharynx [[id number]]  (Abnormal) Collected:  09/27/18 0941    Lab Status:  Final result Specimen:  Swab from Nasopharynx Updated:  10/02/18 0615     Influenza A PCR Negative     Influenza B PCR Negative     RSV A Negative     RSV B Negative     Parainfluenza Virus 1 Negative     Parainfluenza Virus 2 Negative     Parainfluenza Virus 3 Negative     Human Rhinovirus/Enterovirus Positive (A)     Human Metapneumovirus Negative     Adenovirus Detection by PCR Negative    Narrative:       Performed at:  [address] [place] [address]  [id number]  : Erik Gillette MD, Phone:  [phone]    Blood Culture - Blood, [[id number]]  (Normal) Collected:  09/27/18 0325    Lab Status:  Final result Specimen:  Blood from Arm, Left Updated:  10/02/18 0346     Blood Culture No growth at 5 days    MRSA Screen, PCR - Swab, Nares [[id number]]  (Abnormal) Collected:  09/29/18 1448    Lab Status:  Final result Specimen:  Swab from Nares Updated:  09/29/18 2125     MRSA, PCR Positive (C)    Respiratory Culture - Sputum, Cough [[id number]] Collected:  09/27/18 1733    Lab Status:  Final result Specimen:  Sputum from Cough Updated:  09/29/18 0730     Respiratory Culture Light growth (2+) Normal Respiratory Sandrine     Gram Stain Result Many (4+) WBCs per low power field      Few (2+) Epithelial cells per low power field      Rare (1+) Gram positive cocci in pairs    Blood Culture ID, PCR - Blood, [[id number]]  (Abnormal) Collected:  09/27/18 0325    Lab Status:  Final result Specimen:  Blood from Arm, Right Updated:  09/28/18 0459     BCID, PCR Staphylococcus spp, not aureus. Identification by BCID PCR. (C)          Imaging Results (last 24 hours)     ** No results found for the last 24 hours. **        Results for orders placed during the hospital encounter of 09/27/18   Adult Transthoracic  Echo Complete W/ Cont if Necessary Per Protocol    Narrative · Moderate tricuspid valve regurgitation is present.  · Mild pulmonic valve regurgitation is present.  · There is calcification of the aortic valve.  · Mild aortic valve regurgitation is present.  · Left atrial cavity size is borderline dilated.  · Moderate mitral valve regurgitation is present          I have reviewed the medications.      [START ON 10/8/2018] Pharmacy Consult  Does not apply Once   apixaban 5 mg Oral Q12H   aspirin 81 mg Oral Daily   atorvastatin 10 mg Oral Nightly   aztreonam 2 g Intravenous Q8H   budesonide-formoterol 2 puff Inhalation BID - RT   cetirizine 10 mg Oral Nightly   diltiaZEM  mg Oral Q24H   doxycycline 100 mg Oral Q12H   famotidine 40 mg Oral Nightly   fluticasone 2 spray Each Nare Daily   lactobacillus acidophilus 1 capsule Oral Daily   levothyroxine 150 mcg Oral Q AM   metoprolol tartrate 12.5 mg Oral Q12H   montelukast 10 mg Oral Nightly   nystatin  Topical Q8H   pantoprazole 40 mg Oral Q AM   predniSONE 40 mg Oral Daily With Breakfast   pregabalin 300 mg Oral Q12H   topiramate 100 mg Oral Q12H   vancomycin 1,000 mg Intravenous Q12H         Assessment/Plan   Assessment / Plan     Hospital Problem List     * (Principal)Sepsis (CMS/HCC)    Hypertension    GERD (gastroesophageal reflux disease)    Fibromyalgia    Hx of Stroke with residual left hemiparesis    Atrial fibrillation (CMS/HCC)    Pneumonia        Brief Hospital Course to date:  Vickie Joshi is a 68 y.o. female with history of CVA and left hemiparesis.  Also with hx of Afib.  Presents with shortness of air found to be septic.  Bilateral lower lobe pneumonia right greater than left.  Admitted to hospital medicine service.    1) Sepsis  -continue vancomycin and azactam, oral doxy per ID  -blood cultures + staph epi in one set 9/27 (felt to be contaminant), repeat cx NGTD  -secondary to underlying pneumonia  -ID following      2) Pneumonia R>L  -continue  IV abx, nebs, stopped standing nebs secondary to tachycardia, still has them PRN  -blood cultures negative, sputum culture neg  -SLP--passed swallow   -continue PO prednisone, improved respiratory status today. Will taper at discharge.     3) Atrial fibrillation with RVR   -continue eliquis  -continue diltiazem  -new low dose metoprolol this admit. HR improved     4) Fibromyalgia  -continue home medications     5) GERD  -continue PPI     6) History of CVA   -with residual left sided hemiparesis  -currently does outpatient rehab at [facility]  -fall precautions      DVT prophylaxis: eliquis     CODE STATUS:   Code Status and Medical Interventions:   Ordered at: 09/27/18 0605     Level Of Support Discussed With:    Patient     Code Status:    CPR     Medical Interventions (Level of Support Prior to Arrest):    Full     Disposition: I expect the patient to be discharged home with HH when medically ready. Pending final ID recs. Watch through the weekend. Hopefully Monday.    Electronically signed by Claudia Larson DO, 10/07/18, 10:24 AM.

## 2018-10-07 NOTE — PLAN OF CARE
Problem: Skin Injury Risk (Adult)  Goal: Skin Health and Integrity  Outcome: Ongoing (interventions implemented as appropriate)      Problem: Patient Care Overview  Goal: Plan of Care Review  Outcome: Ongoing (interventions implemented as appropriate)   10/07/18 0250   Coping/Psychosocial   Plan of Care Reviewed With patient   Plan of Care Review   Progress no change   OTHER   Outcome Summary pt sleeping without complaints, vss

## 2018-10-08 VITALS
WEIGHT: 214.8 LBS | RESPIRATION RATE: 18 BRPM | DIASTOLIC BLOOD PRESSURE: 77 MMHG | OXYGEN SATURATION: 99 % | SYSTOLIC BLOOD PRESSURE: 129 MMHG | HEART RATE: 82 BPM | HEIGHT: 65 IN | TEMPERATURE: 98.1 F | BODY MASS INDEX: 35.79 KG/M2

## 2018-10-08 PROBLEM — A41.9 SEPSIS: Status: RESOLVED | Noted: 2018-09-27 | Resolved: 2018-10-08

## 2018-10-08 LAB — VANCOMYCIN TROUGH SERPL-MCNC: 18.2 MCG/ML (ref 10–20)

## 2018-10-08 PROCEDURE — 94799 UNLISTED PULMONARY SVC/PX: CPT

## 2018-10-08 PROCEDURE — 80202 ASSAY OF VANCOMYCIN: CPT

## 2018-10-08 PROCEDURE — 25010000002 VANCOMYCIN PER 500 MG

## 2018-10-08 PROCEDURE — 63710000001 PREDNISONE PER 1 MG: Performed by: INTERNAL MEDICINE

## 2018-10-08 PROCEDURE — 99239 HOSP IP/OBS DSCHRG MGMT >30: CPT | Performed by: PHYSICIAN ASSISTANT

## 2018-10-08 PROCEDURE — 97116 GAIT TRAINING THERAPY: CPT

## 2018-10-08 PROCEDURE — 94760 N-INVAS EAR/PLS OXIMETRY 1: CPT

## 2018-10-08 PROCEDURE — 94640 AIRWAY INHALATION TREATMENT: CPT

## 2018-10-08 RX ORDER — L.ACID,PARA/B.BIFIDUM/S.THERM 8B CELL
1 CAPSULE ORAL DAILY
Qty: 14 CAPSULE | Refills: 0 | Status: SHIPPED | OUTPATIENT
Start: 2018-10-09 | End: 2018-10-23

## 2018-10-08 RX ORDER — DOXYCYCLINE 100 MG/1
100 CAPSULE ORAL EVERY 12 HOURS SCHEDULED
Qty: 10 CAPSULE | Refills: 0 | Status: SHIPPED | OUTPATIENT
Start: 2018-10-08 | End: 2018-10-13

## 2018-10-08 RX ORDER — PREDNISONE 10 MG/1
TABLET ORAL
Qty: 18 TABLET | Refills: 0 | Status: SHIPPED | OUTPATIENT
Start: 2018-10-08 | End: 2019-07-03 | Stop reason: HOSPADM

## 2018-10-08 RX ORDER — ACETAMINOPHEN 325 MG/1
650 TABLET ORAL EVERY 4 HOURS PRN
Start: 2018-10-08 | End: 2020-05-24

## 2018-10-08 RX ORDER — MONTELUKAST SODIUM 10 MG/1
10 TABLET ORAL NIGHTLY
Qty: 30 TABLET | Refills: 1 | Status: SHIPPED | OUTPATIENT
Start: 2018-10-08 | End: 2020-05-24

## 2018-10-08 RX ORDER — FLUTICASONE PROPIONATE 50 MCG
2 SPRAY, SUSPENSION (ML) NASAL DAILY
Qty: 1 BOTTLE | Refills: 11 | Status: SHIPPED | OUTPATIENT
Start: 2018-10-09 | End: 2020-05-24

## 2018-10-08 RX ORDER — FLUCONAZOLE 200 MG/1
200 TABLET ORAL DAILY
Qty: 3 TABLET | Refills: 0 | Status: SHIPPED | OUTPATIENT
Start: 2018-10-08 | End: 2018-10-11

## 2018-10-08 RX ORDER — GUAIFENESIN 600 MG/1
1200 TABLET, EXTENDED RELEASE ORAL 2 TIMES DAILY PRN
Start: 2018-10-08 | End: 2020-05-24

## 2018-10-08 RX ADMIN — BUDESONIDE AND FORMOTEROL FUMARATE DIHYDRATE 2 PUFF: 160; 4.5 AEROSOL RESPIRATORY (INHALATION) at 08:10

## 2018-10-08 RX ADMIN — SODIUM CHLORIDE 2 G: 9 INJECTION, SOLUTION INTRAVENOUS at 05:20

## 2018-10-08 RX ADMIN — Medication 1 CAPSULE: at 08:11

## 2018-10-08 RX ADMIN — APIXABAN 5 MG: 5 TABLET, FILM COATED ORAL at 08:11

## 2018-10-08 RX ADMIN — PREGABALIN 300 MG: 100 CAPSULE ORAL at 08:11

## 2018-10-08 RX ADMIN — DILTIAZEM HYDROCHLORIDE 360 MG: 180 CAPSULE, COATED, EXTENDED RELEASE ORAL at 08:11

## 2018-10-08 RX ADMIN — TOPIRAMATE 100 MG: 100 TABLET, FILM COATED ORAL at 08:10

## 2018-10-08 RX ADMIN — ASPIRIN 81 MG: 81 TABLET, COATED ORAL at 08:11

## 2018-10-08 RX ADMIN — METOPROLOL TARTRATE 12.5 MG: 25 TABLET, FILM COATED ORAL at 08:11

## 2018-10-08 RX ADMIN — FLUTICASONE PROPIONATE 2 SPRAY: 50 SPRAY, METERED NASAL at 08:12

## 2018-10-08 RX ADMIN — ALBUTEROL SULFATE 2.5 MG: 2.5 SOLUTION RESPIRATORY (INHALATION) at 02:25

## 2018-10-08 RX ADMIN — NYSTATIN: 100000 POWDER TOPICAL at 05:20

## 2018-10-08 RX ADMIN — BUDESONIDE AND FORMOTEROL FUMARATE DIHYDRATE 2 PUFF: 160; 4.5 AEROSOL RESPIRATORY (INHALATION) at 02:32

## 2018-10-08 RX ADMIN — PANTOPRAZOLE SODIUM 40 MG: 40 TABLET, DELAYED RELEASE ORAL at 05:20

## 2018-10-08 RX ADMIN — LEVOTHYROXINE SODIUM 150 MCG: 150 TABLET ORAL at 05:20

## 2018-10-08 RX ADMIN — VANCOMYCIN HYDROCHLORIDE 1000 MG: 1 INJECTION, SOLUTION INTRAVENOUS at 02:56

## 2018-10-08 RX ADMIN — DOXYCYCLINE 100 MG: 100 CAPSULE ORAL at 08:11

## 2018-10-08 RX ADMIN — SODIUM CHLORIDE 2 G: 9 INJECTION, SOLUTION INTRAVENOUS at 01:26

## 2018-10-08 RX ADMIN — PREDNISONE 40 MG: 20 TABLET ORAL at 08:11

## 2018-10-08 NOTE — DISCHARGE SUMMARY
[facility] Medicine Services  DISCHARGE SUMMARY    Patient Name: Vickie Joshi  : 1950  MRN: [id number]    Date of Admission: 2018  Date of Discharge:  10/8/2018  Primary Care Physician: Dav Bryan MD    Consults     Date and Time Order Name Status Description    10/1/2018 0030 Inpatient Infectious Diseases Consult Completed         Hospital Course     Presenting Problem:   Sepsis (CMS/HCC) [A41.9]    Active Hospital Problems    Diagnosis Date Noted   • Atrial fibrillation (CMS/HCC) [I48.91] 2018   • Pneumonia [J18.9] 2018   • Fibromyalgia [M79.7] 2017   • GERD (gastroesophageal reflux disease) [K21.9] 2017   • Hypertension [I10] 2017   • Hx of Stroke with residual left hemiparesis [I63.9] 2017   • Chronic anticoagulation on Eliquis [Z79.01] 2017      Resolved Hospital Problems    Diagnosis Date Noted Date Resolved   • **Sepsis (CMS/HCC) [A41.9] 2018 10/08/2018      Hospital Course:  Ms. Vickie Joshi is a 69yo female with PMH significant for HTN, hyperlipidemia, atrial fibrillation, hypothyroidism, GERD, fibromyalgia and prior CVA with residual left-sided weakness. She presented to [facility] ED on 2018 with complaints of dyspnea. Symptoms began as post-nasal drainage which she contributed to allergies. Over the course of four days, symptoms worsened. Cough became non-productive and she had fevers up to 102.7.     ED evaluation revealed leukocytosis with WBCs 15,000 and 89.5%. CXR suggestive of bilateral (R > L) bronchopneumonia. CTA chest negative for PE. Respiratory panel (+) for human rhinovirus. She was started on empiric vancomycin/azactam and doxycycline. SLP was consulted. Mild pharyngeal dysphagia with no aspiration on MBS. She was started on steroids due to persistent dyspnea.     Infectious disease consulted on  due to (+) blood cultures. Dr. Mohamud evaluated the patient. He felt  that the positive culture was most likely secondary to contaminant. Repeat blood cultures show NGTD. At discharge, he recommends transition to Docycycline 100mg PO BID x 5 days.     Ms. Joshi is improved. She will return home in stable condition on 10/8/2018.   PCP follow up in 1 week.     Day of Discharge     HPI:   Up on commode. Feeling well without chest pain, dyspnea, N/V or diarrhea. Anxious to go home. Cough sometimes productive. Still with nasal congestion.     Review of Systems  Gen- No fevers, chills  CV- No chest pain, palpitations  Resp- No cough, dyspnea  GI- No N/V/D, abd pain    Otherwise ROS is negative except as mentioned in the HPI.    Vital Signs:   Temp:  [98.1 °F (36.7 °C)-98.2 °F (36.8 °C)] 98.1 °F (36.7 °C)  Heart Rate:  [73-98] 82  Resp:  [18] 18  BP: (104-129)/(77-80) 129/77     Physical Exam:  Constitutional: No acute distress, awake, alert  HENT: NCAT, mucous membranes moist  Respiratory: Diminished breath sounds bilaterally without wheezes, rales or rhonchi. On room air. Normal respiratory effort.   Cardiovascular: RRR, no murmurs, rubs, or gallops, palpable pedal pulses bilaterally  Gastrointestinal: Positive bowel sounds, soft, nontender, nondistended  Musculoskeletal: No bilateral ankle edema  Psychiatric: Appropriate affect, cooperative  Neurologic: Oriented x 3, chronic residual L-sided weakness, Cranial Nerves grossly intact to confrontation, speech clear  Skin: No rashes    Pertinent  and/or Most Recent Results       Results from last 7 days  Lab Units 10/06/18  0817 10/05/18  0757 10/04/18  0549 10/04/18  0547 10/03/18  0543 10/02/18  1941 10/02/18  0424   WBC 10*3/mm3 13.23* 12.54*  --  14.35*  --   --  11.54*   HEMOGLOBIN g/dL 12.8 13.8  --  12.7  --   --  11.6   HEMATOCRIT % 40.9 45.4*  --  41.3  --   --  37.8   PLATELETS 10*3/mm3 256 249  --  261  --   --  228   SODIUM mmol/L 143 142 144  --  144  --  142   POTASSIUM mmol/L 4.2 3.9 3.8  --  4.3 3.9 3.4*   CHLORIDE mmol/L  115* 111* 109  --  113*  --  113*   CO2 mmol/L 19.0* 21.0 24.0  --  24.0  --  21.0   BUN mg/dL 11 14 13  --  16  --  12   CREATININE mg/dL 0.61 0.74 0.75  --  0.88  --  0.66   GLUCOSE mg/dL 108* 83 80  --  83  --  108*   CALCIUM mg/dL 8.6* 8.7 8.4*  --  8.2*  --  8.4*       Results from last 7 days  Lab Units 10/06/18  0817 10/05/18  0757 10/04/18  0549 10/02/18  0424   BILIRUBIN mg/dL 0.4 0.6 0.5 0.4   ALK PHOS U/L 52 61 55 57   ALT (SGPT) U/L 23 27 22 23   AST (SGOT) U/L 21 29 18 20         Brief Urine Lab Results     None        Microbiology Results Abnormal     Procedure Component Value - Date/Time    Legionella Antigen, Urine - Urine, Urine, Clean Catch [[id number]] Collected:  10/01/18 1707    Lab Status:  Final result Specimen:  Urine from Urine, Clean Catch Updated:  10/03/18 1518     L. pneumophila Serogp 1 Ur Ag Negative     Comment: Presumptive negative for L. pneumophila serogroup 1 antigen in urine,  suggesting no recent or current infection. Legionnaires' disease  cannot be ruled out since other serogroups and species may also cause  disease.       Narrative:       Performed at:  [address]  [id number]  : Erik Gillette MD, Phone:  [phone]    S. Pneumo Ag Urine or CSF - Urine, Urine, Clean Catch [[id number]] Collected:  10/01/18 1707    Lab Status:  Final result Specimen:  Urine from Urine, Clean Catch Updated:  10/03/18 1518     Specimen Source Urine     STREP PNEUMONIAE ANTIGEN Negative     Body Fluid Culture, Sterile Not Indicated     Organism ID Not indicated.     Please note Comment     Comment: [employer] standards require a culture to be  performed on CSF specimens submitted for bacterial antigen testing.  (CAP [id number]) Urine specimens will not be cultured.       Narrative:       Performed at:   - [address]  [id number]  : Erik Gillette MD, Phone:   [phone]    Blood Culture - Blood, [[id number]]  (Abnormal) Collected:  09/27/18 0325    Lab Status:  Final result Specimen:  Blood from Arm, Right Updated:  10/02/18 1333     Blood Culture Staphylococcus, coagulase negative (A)     Comment: Suggests contamination  Susceptibility will not be done unless Doctor notifies Lab          Isolated from Anaerobic Bottle     Gram Stain Result Anaerobic Bottle Gram positive cocci in clusters    Resp Virus Profile (RVP) PCR - Swab, Nasopharynx [[id number]]  (Abnormal) Collected:  09/27/18 0941    Lab Status:  Final result Specimen:  Swab from Nasopharynx Updated:  10/02/18 0615     Influenza A PCR Negative     Influenza B PCR Negative     RSV A Negative     RSV B Negative     Parainfluenza Virus 1 Negative     Parainfluenza Virus 2 Negative     Parainfluenza Virus 3 Negative     Human Rhinovirus/Enterovirus Positive (A)     Human Metapneumovirus Negative     Adenovirus Detection by PCR Negative    Narrative:       Performed at:  [address] [place] [address]  [id number]  : Erik Gillette MD, Phone:  [phone]    Blood Culture - Blood, [[id number]]  (Normal) Collected:  09/27/18 0325    Lab Status:  Final result Specimen:  Blood from Arm, Left Updated:  10/02/18 0346     Blood Culture No growth at 5 days    MRSA Screen, PCR - Swab, Nares [[id number]]  (Abnormal) Collected:  09/29/18 1448    Lab Status:  Final result Specimen:  Swab from Nares Updated:  09/29/18 2125     MRSA, PCR Positive (C)    Respiratory Culture - Sputum, Cough [[id number]] Collected:  09/27/18 1733    Lab Status:  Final result Specimen:  Sputum from Cough Updated:  09/29/18 0730     Respiratory Culture Light growth (2+) Normal Respiratory Sandrine     Gram Stain Result Many (4+) WBCs per low power field      Few (2+) Epithelial cells per low power field      Rare (1+) Gram positive cocci in pairs    Blood Culture ID, PCR - Blood, [[id number]]  (Abnormal) Collected:   09/27/18 0325    Lab Status:  Final result Specimen:  Blood from Arm, Right Updated:  09/28/18 0459     BCID, PCR Staphylococcus spp, not aureus. Identification by BCID PCR. (C)        Imaging Results (all)     Procedure Component Value Units Date/Time    FL Video Swallow With Speech [[id number]] Collected:  09/27/18 1518     Updated:  09/28/18 0957    Narrative:       EXAMINATION: FL VIDEO SWALLOW W SPEECH-     INDICATION: dysphagia     TECHNIQUE: 30 seconds of fluoroscopic time was used for this exam. 1  associated image was saved. The patient was evaluated in the seated  lateral position while taking a variety of consistencies of barium by  mouth under the direction of speech pathology.     COMPARISON: NONE     FINDINGS: There was penetration that cleared without aspiration with  multiple large sized sips of thin barium from a straw. There was no  penetration and no aspiration with single sips of thin barium, pudding,  or solid consistency barium.          Impression:       Fluoroscopy provided for a modified barium swallow. Please  see speech therapy report for full details and recommendations.         This report was finalized on 9/28/2018 9:55 AM by Dr. Rae Cheung MD.       CT Angiogram Chest With Contrast [[id number]] Collected:  09/27/18 0530     Updated:  09/27/18 0730    Narrative:       EXAM:    CT Angiography Chest With Intravenous Contrast    CLINICAL HISTORY:    68 years old, female; Signs and symptoms; Shortness of breath; Additional   info: SOA,    TECHNIQUE:    Axial computed tomographic angiography images of the chest with intravenous   contrast using pulmonary embolism protocol.  All CT scans at this facility use   at least one of these dose optimization techniques: automated exposure control;   mA and/or kV adjustment per patient size (includes targeted exams where dose is   matched to clinical indication); or iterative reconstruction.    MIP reconstructed images were created and  "reviewed.    CONTRAST:    70 mL of isovue 370 administered intravenously.      COMPARISON:    CR XR CHEST 1 VW 9/27/2018 2:06 AM    FINDINGS:    Pulmonary arteries:  There is no evidence of pulmonary embolism.    Aorta:  No acute findings.  No thoracic aortic aneurysm.    Lungs:  There is airspace disease right lower lobe. This may be secondary to   fluid, atelectasis, and/or infiltrate.  No mass.    Pleural space:  Unremarkable.  No significant effusion.  No pneumothorax.    Heart:  Unremarkable.  No cardiomegaly.  No significant pericardial effusion.    No evidence of RV dysfunction.    Mediastinum:  A small hiatal hernia is present.    Bones/joints:  Age-appropriate degenerative changes of the spine.The   visualized intra-abdominal structures are normal.  No acute fracture.  No   dislocation.    Soft tissues:  Unremarkable.    Lymph nodes:  There are numerous prominent but non-pathologic lymph nodes in   the mediastinum and axilla.  There are no nodes of pathologic dimensions.    Gallbladder and bile ducts:  There has been a cholecystectomy.      Impression:       1.  There is airspace disease right lower lobe. This may be secondary to fluid,   atelectasis, and/or infiltrate.  2.  There is no evidence of pulmonary embolism.    THIS DOCUMENT HAS BEEN ELECTRONICALLY SIGNED BY LAURIE AMES MD    XR Chest 1 View [[id number]] Collected:  09/27/18 0145     Updated:  09/27/18 0311    Narrative:       EXAM:    XR Chest, 1 View    CLINICAL HISTORY:    68 years old, female; Signs and symptoms; Cough and fever and shortness of   breath and wheezing; Patient HX: Patient complains of \"having a cough, fever,   body aches, body chills, wheezing and weakness x a week. \"; Additional info:   SOA triage protocol    TECHNIQUE:    Frontal view of the chest.    COMPARISON:    CR XR CHEST 1 VW 2/27/2017 10:17 AM    FINDINGS:    Confluent and focal airspace opacities in the lung bases bilaterally, RIGHT   worse than LEFT, small pleural " effusions.  Heart size is normal, cental   pulmonary vascular congestion.   Tortuous calcific aorta.  Osteopenia with   associated chronic degenerative changes in the visualized spine and shoulder   joints.      Impression:         Findings suggestive bronchopneumonia, RIGHT worse than LEFT.      Possibility of associated CHF cannot be excluded.  Recommend followup to   complete resolution.     THIS DOCUMENT HAS BEEN ELECTRONICALLY SIGNED BY VINAYAK FINLEY MD        Results for orders placed during the hospital encounter of 09/27/18   Adult Transthoracic Echo Complete W/ Cont if Necessary Per Protocol    Narrative · Moderate tricuspid valve regurgitation is present.  · Mild pulmonic valve regurgitation is present.  · There is calcification of the aortic valve.  · Mild aortic valve regurgitation is present.  · Left atrial cavity size is borderline dilated.  · Moderate mitral valve regurgitation is present        Discharge Details        Discharge Medications      New Medications      Instructions Start Date   acetaminophen 325 MG tablet  Commonly known as:  TYLENOL  Replaces:  acetaminophen 650 MG 8 hr tablet   650 mg, Oral, Every 4 Hours PRN      doxycycline 100 MG capsule  Commonly known as:  MONODOX   100 mg, Oral, Every 12 Hours Scheduled      fluconazole 200 MG tablet  Commonly known as:  DIFLUCAN   200 mg, Oral, Daily      fluticasone 50 MCG/ACT nasal spray  Commonly known as:  FLONASE   2 sprays, Each Nare, Daily      lactobacillus acidophilus capsule capsule   1 capsule, Oral, Daily      metoprolol tartrate 25 MG tablet  Commonly known as:  LOPRESSOR   12.5 mg, Oral, Every 12 Hours Scheduled, Additional refills per PCP      montelukast 10 MG tablet  Commonly known as:  SINGULAIR   10 mg, Oral, Nightly      predniSONE 10 MG tablet  Commonly known as:  DELTASONE   3 tabs by mouth daily x 3 days, 2 tabs by mouth daily x 3 days, 1 tab by mouth daily x 3 days, then stop         Changes to Medications       Instructions Start Date   guaiFENesin 600 MG 12 hr tablet  Commonly known as:  MUCINEX  What changed:  · when to take this  · reasons to take this   1,200 mg, Oral, 2 Times Daily PRN         Continue These Medications      Instructions Start Date   apixaban 5 MG tablet tablet  Commonly known as:  ELIQUIS   5 mg, Oral, 2 Times Daily      aspirin 81 MG EC tablet   81 mg, Oral, Daily      atorvastatin 10 MG tablet  Commonly known as:  LIPITOR   10 mg, Oral, Nightly      budesonide-formoterol 80-4.5 MCG/ACT inhaler  Commonly known as:  SYMBICORT   2 puffs, Inhalation, 2 Times Daily - RT      diltiaZEM 360 MG 24 hr capsule  Commonly known as:  TIAZAC   360 mg, Oral, Daily      docusate sodium 100 MG capsule  Commonly known as:  COLACE   100 mg, Oral, 2 Times Daily      esomeprazole 40 MG capsule  Commonly known as:  nexIUM   40 mg, Oral, Every Morning Before Breakfast      levothyroxine 150 MCG tablet  Commonly known as:  SYNTHROID, LEVOTHROID   150 mcg, Oral, Daily      loperamide 2 MG capsule  Commonly known as:  IMODIUM   2 mg, Oral, Daily PRN      loratadine 10 MG tablet  Commonly known as:  CLARITIN   10 mg, Oral, Daily      pregabalin 300 MG capsule  Commonly known as:  LYRICA   300 mg, Oral, 2 Times Daily      PREPARATION H HYDROCORTISONE 1 % cream  Generic drug:  hydrocortisone   1 application, Topical, 4 Times Daily PRN      topiramate 100 MG tablet  Commonly known as:  TOPAMAX   100 mg, Oral, 2 Times Daily      ZANTAC 300 MG tablet  Generic drug:  raNITIdine   300 mg, Oral, Nightly         Stop These Medications    acetaminophen 650 MG 8 hr tablet  Commonly known as:  TYLENOL  Replaced by:  acetaminophen 325 MG tablet     oxyCODONE-acetaminophen 5-325 MG per tablet  Commonly known as:  PERCOCET          Discharge Disposition:  Home or Self Care    Discharge Diet:  Diet Instructions     Diet: Regular, Cardiac; Thin       Discharge Diet:   Regular  Cardiac       Fluid Consistency:  Thin        Discharge Activity:    Activity Instructions     Activity as Tolerated           Code Status/Level of Support:  Code Status and Medical Interventions:   Ordered at: 09/27/18 0605     Level Of Support Discussed With:    Patient     Code Status:    CPR     Medical Interventions (Level of Support Prior to Arrest):    Full     No future appointments.    Additional Instructions for the Follow-ups that You Need to Schedule     Ambulatory Referral to Occupational Therapy    As directed      Ambulatory Referral to Physical Therapy Evaluate and treat    As directed      Specialty needed:  Evaluate and treat         Discharge Follow-up with PCP    As directed      Currently Documented PCP:  Dav Bryan MD  PCP Phone Number:  [phone]    Follow Up Details:  Follow up with PCP in 1 week             Time Spent on Discharge:  40 minutes    Electronically signed by Denita Bowling PA-C, 10/08/18, 2:08 PM.

## 2018-10-08 NOTE — PLAN OF CARE
Problem: Patient Care Overview  Goal: Plan of Care Review  Outcome: Ongoing (interventions implemented as appropriate)   10/08/18 1101   Coping/Psychosocial   Plan of Care Reviewed With patient   Plan of Care Review   Progress improving   OTHER   Outcome Summary Pt able to ambulate 2x5ft with Brad x2 with quad cane, with 1 seated rest break. Pt to d/c home today with outpatient PT services.

## 2018-10-08 NOTE — PROGRESS NOTES
[facility] Progress Note        Antibiotics:  Anti-Infectives     Ordered     Dose/Rate Route Frequency Start Stop    10/06/18 1134  vancomycin (VANCOCIN) in iso-osmotic dextrose IVPB 1 g (premix) 200 mL     Ordering Provider:  Luisa Landa RPH    1,000 mg  over 120 Minutes Intravenous Every 12 Hours 10/06/18 1200 10/10/18 1159    10/05/18 0848  aztreonam (AZACTAM) 2 g in sodium chloride 0.9 % 100 mL IVPB-MBP     Ordering Provider:  Royal Mohamud MD    2 g  over 1 Hours Intravenous Every 8 Hours 10/05/18 1500 10/09/18 0726    10/01/18 0810  doxycycline (MONODOX) capsule 100 mg     Royal Mohamud MD reviewed the order on 10/06/18 0905.   Ordering Provider:  Royal Mohamud MD    100 mg Oral Every 12 Hours Scheduled 10/01/18 0900 10/13/18 0903    18 1201  Pharmacy to dose vancomycin     Aleksandr Thomas RPH let the order  on 10/03/18 0734.   Ordering Provider:  Royal Mohamud MD     Does not apply Continuous PRN 18 1149 10/10/18 0733    18 0537  aztreonam (AZACTAM) 2 g in sodium chloride 0.9 % 100 mL IVPB-MBP     Ordering Provider:  Eliot Recio DO    2 g  200 mL/hr over 30 Minutes Intravenous Once 18 0539 18 0753    18 0537  vancomycin 1750 mg/500 mL 0.9% NS IVPB ([facility])     Ordering Provider:  Eliot Recio DO    20 mg/kg × 90.7 kg Intravenous Once 18 0539 18 0811    18 0316  levoFLOXacin (LEVAQUIN) 750 mg/150 mL D5W (premix) (LEVAQUIN) 750 mg     Ordering Provider:  Eliot Recio DO    750 mg  over 90 Minutes Intravenous Once 18 0318 18 0600          CC: pneumonia    HPI:  Patient is a 68 y.o.  Yr old female with history of prior CVA and chronic/residual left-sided hemiparesis, chronically debilitated with multiple comorbidity as outlined below.  She was admitted 2018 with acute cough/shortness of breath and fever over 102 per admission H&P.  Initial blood culture data from  had coag-negative staph  "species in 1 of 2 sets, sputum with normal talya, and MRSA nasal screen positive.  Chest x-ray with right greater than left findings to suggest bronchopneumonia and CT scan with airspace disease in the right lower lobe per radiology.  In addition patient reports her  also with upper respiratory type infection but no specific microbiologic diagnosis.     10/8/18 she feels some better; Shortness of breath/cough and wheeze improving  ,  making some slow improvements per her  and herself.  No hemoptysis.  No headache photophobia or neck stiffness.  No nausea vomiting or diarrhea. No new skin rash.    ROS:      10/8/18 No f/c/s. No n/v/d. No rash. No new ADR to Abx.     Constitutional-- at present, No Fever, chills or sweats.  Appetite diminished with generalized malaise and fatigue  Heent-- No new vision, hearing or throat complaints.  No epistaxis or oral sores.  Denies odynophagia or dysphagia.  No flashers, floaters or eye pain. No odynophagia or dysphagia. No headache, photophobia or neck stiffness.  CV-- No chest pain, palpitation or syncope  Resp-- as above  GI- No nausea, vomiting, or diarrhea.  No hematochezia, melena, or hematemesis. Denies jaundice or chronic liver disease.  -- No dysuria, hematuria, or flank pain.  Denies hesitancy, urgency or flank pain.  Lymph- no swollen lymph nodes in neck/axilla or groin.   Heme- No active bruising or bleeding; no Hx of DVT or PE.  MS-- no swelling or pain in the bones or joints of arms/legs.  No new back pain.  Neuro-- No acute focal weakness or numbness in the arms or legs.  No seizures.  Chronic left-sided hemiparesis after stroke     Full 12 point review of systems reviewed and negative otherwise for acute complaints, except for above       PE: Nursing/chaperone present  /77   Pulse 82   Temp 98.2 °F (36.8 °C) (Oral)   Resp 18   Ht 165.1 cm (65\")   Wt 97.4 kg (214 lb 12.8 oz)   SpO2 99%   BMI 35.74 kg/m²     GENERAL: Awake and alert, in " no acute distress.  Obese and chronically ill-appearing  HEENT: Normocephalic, atraumatic.  PERRL. EOMI. No conjunctival injection. No icterus. Oropharynx clear without evidence of thrush or exudate. No evidence of peridontal disease.    NECK: Supple without nuchal rigidity. No mass.  LYMPH: No cervical, axillary or inguinal lymphadenopathy.  HEART: RRR; No murmur, rubs, gallops.   LUNGS: Diminished at right base more so than left with scattered rhonchi and less exp wheeze. Normal respiratory effort. Nonlabored. No dullness.  ABDOMEN: Soft, nontender, nondistended. Positive bowel sounds. No rebound or guarding. NO mass or HSM.  EXT:  No cyanosis, clubbing or edema. No cord.  : Genitalia generally unremarkable.  Without Gunderson catheter.  MSK: FROM without joint effusions noted arms/legs.    SKIN: Warm and dry without cutaneous eruptions on Inspection/palpation.    NEURO: Oriented to PPT.  Chronic left-sided hemiparesis noted  PSYCHIATRIC: Normal insight and judgement. Cooperative with PE     No peripheral stigmata/phenomena of endocarditis     IV without obvious redness or drainage    Laboratory Data      Results from last 7 days  Lab Units 10/06/18  0817 10/05/18  0757 10/04/18  0547   WBC 10*3/mm3 13.23* 12.54* 14.35*   HEMOGLOBIN g/dL 12.8 13.8 12.7   HEMATOCRIT % 40.9 45.4* 41.3   PLATELETS 10*3/mm3 256 249 261       Results from last 7 days  Lab Units 10/06/18  0817   SODIUM mmol/L 143   POTASSIUM mmol/L 4.2   CHLORIDE mmol/L 115*   CO2 mmol/L 19.0*   BUN mg/dL 11   CREATININE mg/dL 0.61   GLUCOSE mg/dL 108*   CALCIUM mg/dL 8.6*       Results from last 7 days  Lab Units 10/06/18  0817   ALK PHOS U/L 52   BILIRUBIN mg/dL 0.4   ALT (SGPT) U/L 23   AST (SGOT) U/L 21               Estimated Creatinine Clearance: 79.9 mL/min (by C-G formula based on SCr of 0.61 mg/dL).      Microbiology:      Radiology:  Imaging Results (last 72 hours)     ** No results found for the last 72 hours. **            Impression:    --Acute pneumonia, SIRS+ at admission, bilateral with right greater than left infiltrates on chest x-ray, rhinovirus/enterovirus + and  exposure to  and concern for  secondary bacterial process as well; empiric treatment for CAP/MRSA and Hx PCN/ceph allergies.  Empiric antibiotics ongoing with vancomycin/Azactam and doxycycline.  MRSA screen positive and certainly could be a pathogen in this setting but sputum did not corroborate that.  Empiric antibiotics would cover that in any case.  If significant pleural effusions evolve, you should give consideration to diagnostic/therapeutic thoracentesis.  Patient understands high risk for further serious morbidity and other serious sequela; she understands antibiotics are not a guarantee for cure.  If not steadily improving, you should give consideration to repeat imaging to rule out other evolving parapneumonic complication.     --Acute blood culture positivity, coag-negative staph per microbiology preliminarily and only in one of 2 samples suggest contamination.  No obvious endovascular focus by exam findings.  No chronic line.  She denies acute focal joint complaints to suggest septic joint etc.     --Atrial fibrillation/RVR, paroxysmal per internal medicine.  Treatment per them     --History CVA with chronic left-sided hemiparesis    PLAN:     --IV vancomycin/Azactam and oral doxycycline     --I discussed potential risks and benefits of the prescribed antibiotics that include, but are not limited to, solid organ toxicity, neuro/sosa/vestibular toxicity, renal toxicity, CDiff, cytopenias, hypersensitivity,  etc.. Patient/Family voice understanding and agree to proceed.     --Check/review labs cultures and scans     --Discussed with microbiology     --History per nursing staff and       --Highly complex set of issues with high risk for further serious morbidity and other serious sequela    --you can consider taper to oral doxycycline for discharge for 5  additional days         Royal Mohamud MD  10/8/2018

## 2018-10-08 NOTE — PLAN OF CARE
Problem: Patient Care Overview  Goal: Plan of Care Review  Outcome: Ongoing (interventions implemented as appropriate)   10/08/18 0301   Coping/Psychosocial   Plan of Care Reviewed With patient   Plan of Care Review   Progress no change   OTHER   Outcome Summary Pt VSS. No reports of pain or discomfort. Resting well throughout night. Will continue to monitor.      Goal: Individualization and Mutuality  Outcome: Ongoing (interventions implemented as appropriate)    Goal: Discharge Needs Assessment  Outcome: Ongoing (interventions implemented as appropriate)    Goal: Interprofessional Rounds/Family Conf  Outcome: Ongoing (interventions implemented as appropriate)

## 2018-10-08 NOTE — PROGRESS NOTES
"Pharmacy Consult-Vancomycin Dosing    Vickie Joshi is a  68 y.o. female receiving vancomycin therapy.     Indication: Sepsis secondary to pneumonia  Consulting Provider: Austin SAMUEL Consult: Yes  Goal Trough: 15-20mcg/mL    Current Antimicrobial Therapy    PTD Vanc = day 12  Azactam = day 12  Doxycycline PO = day 8  S/p Levaquin x5 days    Allergies    Allergies as of 09/27/2018 - Reviewed 09/27/2018   Allergen Reaction Noted   • Cephalexin Swelling 04/15/2016   • Penicillins Hives 04/15/2016   • Sulfa antibiotics Hives 04/15/2016     Labs    Results from last 7 days  Lab Units 10/06/18  0817 10/05/18  0757 10/04/18  0549   BUN mg/dL 11 14 13   CREATININE mg/dL 0.61 0.74 0.75   Serum creatinine: 0.61 mg/dL 10/06/18 0817  Estimated creatinine clearance: 79.9 mL/min.    Results from last 7 days  Lab Units 10/06/18  0817 10/05/18  0757 10/04/18  0547   WBC 10*3/mm3 13.23* 12.54* 14.35*     Evaluation of Dosing     Last Dose Received in the ED: 9/27 @ 0811 vancomycin 1750mg (20mg/kg) x1    Ht - 165.1 cm (65\")  Wt - 97.4 kg (214 lb 12.8 oz)    I/O last 3 completed shifts:  In: 1440 [P.O.:1440]  Out: 800 [Urine:800] + unmeasured    Microbiology and Radiology    Microbiology Results (last 10 days)     Procedure Component Value - Date/Time    Legionella Antigen, Urine - Urine, Urine, Clean Catch [[id number]] Collected:  10/01/18 1707    Lab Status:  Final result Specimen:  Urine from Urine, Clean Catch Updated:  10/03/18 1518     L. pneumophila Serogp 1 Ur Ag Negative     Comment: Presumptive negative for L. pneumophila serogroup 1 antigen in urine,  suggesting no recent or current infection. Legionnaires' disease  cannot be ruled out since other serogroups and species may also cause  disease.       Narrative:       Performed at:  [address] [place] [address]  [id number]  : Erik Gillette MD, Phone:  [phone]    S. Pneumo Ag Urine or CSF - Urine, Urine, Clean Catch [[id number]] " Collected:  10/01/18 1707    Lab Status:  Final result Specimen:  Urine from Urine, Clean Catch Updated:  10/03/18 1518     Specimen Source Urine     STREP PNEUMONIAE ANTIGEN Negative     Body Fluid Culture, Sterile Not Indicated     Organism ID Not indicated.     Please note Comment     Comment: College [employer] standards require a culture to be  performed on CSF specimens submitted for bacterial antigen testing.  (CAP [id number]) Urine specimens will not be cultured.       Narrative:       Performed at:  [address] [place] [address]  [id number]  : Erik Gillette MD, Phone:  [phone]    MRSA Screen, PCR - Swab, Nares [[id number]]  (Abnormal) Collected:  09/29/18 1448    Lab Status:  Final result Specimen:  Swab from Nares Updated:  09/29/18 2125     MRSA, PCR Positive (C)        Evaluation of Level      Results from last 7 days  Lab Units 10/08/18  1149 10/06/18  0817 10/04/18  0547   VANCOMYCIN TR mcg/mL 18.20 12.80 11.30     9/29 trough @ 0019 = 19.4mcg/mL (~12hr level)- on Vancomycin 1250mg q12h  10/1 trough @ 1041 = 25.5mcg/mL (~9hr level, drawn ~1hr early)- decreased to vancomycin 1250mg q24h  10/4 trough @ 0547 = 11.3mcg/mL (~24hr level)-increased to vancomycin 1750mg IV q24h   10/6 trough at 0817= 12.8 mcg/mL (~19 hours)-increased to vancomycin 1000mg IV q12h  10/8 trough at 11:49= 18.20 mcg/mL (8.75hr level) will continue vancomycin 1000 mg IV every 12 hours.     Assessment/Plan:    1. Vancomycin for sepsis with goal trough 15-20mcg/mL.   2. Current maintenance dose of 1000mg IV every 12 hours based on age, renal function and indication.   3. Trough 18.20 drawn prior to fifth dose of new regimen. Level therapeutic based on goal trough but previous dose given ~ 3 hours late.    4. Will continue vancomycin regimen at this time given previous high trough on increased dose. Will schedule a trough for Friday 10/12 if patient remains on vancomycin.   5.  Monitor renal function, cultures and sensitivities, and clinical status, and adjust regimen as necessary. Pharmacy will continue to follow.    Thanks,  Phillip Wilkins, PharmD  Pharmacy Resident  10/8/2018  1:39 PM

## 2018-10-08 NOTE — PROGRESS NOTES
Continued Stay Note   [facility]     Patient Name: Vickie Joshi  MRN: [id number]  Today's Date: 10/8/2018    Admit Date: 9/27/2018          Discharge Plan     Row Name 10/08/18 1348       Plan    Plan HOME    Patient/Family in Agreement with Plan yes    Plan Comments Met with pt at bedside to f/u DCP.  Goal remains to return home with  and resume OP therapy at [facility].  CM arranged f/u PT appt (listed in AVS).  No other immediate needs identified/voiced.  CM will cont to follow.    Final Discharge Disposition Code 01 - home or self-care              Discharge Codes    No documentation.       Expected Discharge Date and Time     Expected Discharge Date Expected Discharge Time    Oct 7, 2018             Jacki March

## 2018-10-09 ENCOUNTER — READMISSION MANAGEMENT (OUTPATIENT)
Dept: CALL CENTER | Facility: HOSPITAL | Age: 68
End: 2018-10-09

## 2018-10-09 NOTE — OUTREACH NOTE
Prep Survey      Responses   Facility patient discharged from?  [facility]   Is patient eligible?  Yes   Discharge diagnosis  Pneumonia, afib, hx of stroke with residual left hemiparesis    Does the patient have one of the following disease processes/diagnoses(primary or secondary)?  COPD/Pneumonia   Does the patient have Home health ordered?  No   What is the Home health agency?   Has used [facility] in the past and would like to use again if needed   Is there a DME ordered?  No   What DME was ordered?  already has multiple DME   Comments regarding appointments  had + blood cultures but thought to be from contaminant.  Home on Doxy   Medication alerts for this patient  multiple new meds   General alerts for this patient  Lives with , had + blood cultures but thought to be from contaminant.  Home on Doxy   Prep survey completed?  Yes          Edith Miller RN

## 2018-10-11 ENCOUNTER — READMISSION MANAGEMENT (OUTPATIENT)
Dept: CALL CENTER | Facility: HOSPITAL | Age: 68
End: 2018-10-11

## 2018-10-11 NOTE — OUTREACH NOTE
COPD/PN Week 1 Survey      Responses   Facility patient discharged from?  [facility]   Does the patient have one of the following disease processes/diagnoses(primary or secondary)?  COPD/Pneumonia   Is there a successful TCM telephone encounter documented?  No   Was the primary reason for admission:  Pneumonia   Week 1 attempt successful?  No   Unsuccessful attempts  Attempt 1          Fariha Gagnon RN

## 2019-06-30 ENCOUNTER — APPOINTMENT (OUTPATIENT)
Dept: GENERAL RADIOLOGY | Facility: HOSPITAL | Age: 69
End: 2019-06-30

## 2019-06-30 ENCOUNTER — HOSPITAL ENCOUNTER (INPATIENT)
Facility: HOSPITAL | Age: 69
LOS: 3 days | Discharge: HOME | End: 2019-07-03
Attending: EMERGENCY MEDICINE | Admitting: INTERNAL MEDICINE

## 2019-06-30 ENCOUNTER — APPOINTMENT (OUTPATIENT)
Dept: CT IMAGING | Facility: HOSPITAL | Age: 69
End: 2019-06-30

## 2019-06-30 DIAGNOSIS — I69.359 HISTORY OF HEMORRHAGIC STROKE WITH RESIDUAL HEMIPARESIS (HCC): ICD-10-CM

## 2019-06-30 DIAGNOSIS — A41.9 SEPSIS, DUE TO UNSPECIFIED ORGANISM: ICD-10-CM

## 2019-06-30 DIAGNOSIS — G81.94 LEFT HEMIPARESIS (HCC): ICD-10-CM

## 2019-06-30 DIAGNOSIS — J45.901 ASTHMA WITH ACUTE EXACERBATION, UNSPECIFIED ASTHMA SEVERITY, UNSPECIFIED WHETHER PERSISTENT: ICD-10-CM

## 2019-06-30 DIAGNOSIS — Z74.09 IMPAIRED MOBILITY AND ADLS: ICD-10-CM

## 2019-06-30 DIAGNOSIS — D64.9 ANEMIA, UNSPECIFIED TYPE: ICD-10-CM

## 2019-06-30 DIAGNOSIS — R06.02 SHORTNESS OF BREATH: ICD-10-CM

## 2019-06-30 DIAGNOSIS — Z78.9 IMPAIRED MOBILITY AND ADLS: ICD-10-CM

## 2019-06-30 DIAGNOSIS — R09.02 HYPOXIA: ICD-10-CM

## 2019-06-30 DIAGNOSIS — Z74.09 IMPAIRED FUNCTIONAL MOBILITY, BALANCE, GAIT, AND ENDURANCE: ICD-10-CM

## 2019-06-30 DIAGNOSIS — D72.829 LEUKOCYTOSIS, UNSPECIFIED TYPE: ICD-10-CM

## 2019-06-30 DIAGNOSIS — J18.9 COMMUNITY ACQUIRED PNEUMONIA, UNSPECIFIED LATERALITY: Primary | ICD-10-CM

## 2019-06-30 PROBLEM — E78.5 HYPERLIPIDEMIA: Status: ACTIVE | Noted: 2019-06-30

## 2019-06-30 PROBLEM — R06.83 SNORING: Status: ACTIVE | Noted: 2019-06-30

## 2019-06-30 PROBLEM — J44.9 ASTHMA-CHRONIC OBSTRUCTIVE PULMONARY DISEASE OVERLAP SYNDROME: Status: ACTIVE | Noted: 2019-06-30

## 2019-06-30 PROBLEM — J45.909 ASTHMA: Status: ACTIVE | Noted: 2019-06-30

## 2019-06-30 PROBLEM — J44.89 ASTHMA-CHRONIC OBSTRUCTIVE PULMONARY DISEASE OVERLAP SYNDROME: Status: ACTIVE | Noted: 2019-06-30

## 2019-06-30 PROBLEM — E03.9 HYPOTHYROIDISM: Status: ACTIVE | Noted: 2019-06-30

## 2019-06-30 PROBLEM — E78.00 PURE HYPERCHOLESTEROLEMIA: Status: ACTIVE | Noted: 2019-06-30

## 2019-06-30 PROBLEM — I50.9 CHF EXACERBATION: Status: ACTIVE | Noted: 2019-06-30

## 2019-06-30 PROBLEM — Z86.73 HISTORY OF CVA (CEREBROVASCULAR ACCIDENT): Status: ACTIVE | Noted: 2019-06-30

## 2019-06-30 PROBLEM — E66.01 MORBID OBESITY (HCC): Status: ACTIVE | Noted: 2019-06-30

## 2019-06-30 PROBLEM — G51.0 FACIAL PARESIS: Status: ACTIVE | Noted: 2019-06-30

## 2019-06-30 PROBLEM — G50.0 TRIGEMINAL NEURALGIA: Status: ACTIVE | Noted: 2019-06-30

## 2019-06-30 PROBLEM — R65.10 SIRS (SYSTEMIC INFLAMMATORY RESPONSE SYNDROME): Status: ACTIVE | Noted: 2018-09-27

## 2019-06-30 LAB
ABO GROUP BLD: NORMAL
ALBUMIN SERPL-MCNC: 3.6 G/DL (ref 3.5–5.2)
ALBUMIN/GLOB SERPL: 1 G/DL
ALP SERPL-CCNC: 96 U/L (ref 39–117)
ALT SERPL W P-5'-P-CCNC: 10 U/L (ref 1–33)
ANION GAP SERPL CALCULATED.3IONS-SCNC: 14 MMOL/L (ref 5–15)
AST SERPL-CCNC: 25 U/L (ref 1–32)
BASOPHILS # BLD AUTO: 0.04 10*3/MM3 (ref 0–0.2)
BASOPHILS NFR BLD AUTO: 0.2 % (ref 0–1.5)
BILIRUB SERPL-MCNC: 0.4 MG/DL (ref 0.2–1.2)
BILIRUB UR QL STRIP: NEGATIVE
BLD GP AB SCN SERPL QL: NEGATIVE
BUN BLD-MCNC: 11 MG/DL (ref 8–23)
BUN/CREAT SERPL: 12.6 (ref 7–25)
CALCIUM SPEC-SCNC: 8.5 MG/DL (ref 8.6–10.5)
CHLORIDE SERPL-SCNC: 109 MMOL/L (ref 98–107)
CLARITY UR: CLEAR
CO2 SERPL-SCNC: 19 MMOL/L (ref 22–29)
COLOR UR: YELLOW
CREAT BLD-MCNC: 0.87 MG/DL (ref 0.57–1)
D-LACTATE SERPL-SCNC: 2.1 MMOL/L (ref 0.5–2)
D-LACTATE SERPL-SCNC: 2.5 MMOL/L (ref 0.5–2)
DEPRECATED RDW RBC AUTO: 53 FL (ref 37–54)
EOSINOPHIL # BLD AUTO: 0.04 10*3/MM3 (ref 0–0.4)
EOSINOPHIL NFR BLD AUTO: 0.2 % (ref 0.3–6.2)
ERYTHROCYTE [DISTWIDTH] IN BLOOD BY AUTOMATED COUNT: 18.5 % (ref 12.3–15.4)
FERRITIN SERPL-MCNC: 7.13 NG/ML (ref 13–150)
FOLATE SERPL-MCNC: 5.74 NG/ML (ref 4.78–24.2)
GFR SERPL CREATININE-BSD FRML MDRD: 65 ML/MIN/1.73
GLOBULIN UR ELPH-MCNC: 3.5 GM/DL
GLUCOSE BLD-MCNC: 146 MG/DL (ref 65–99)
GLUCOSE UR STRIP-MCNC: NEGATIVE MG/DL
HBA1C MFR BLD: 5.6 % (ref 4.8–5.6)
HCT VFR BLD AUTO: 27.6 % (ref 34–46.6)
HCT VFR BLD AUTO: 40.3 % (ref 34–46.6)
HGB BLD-MCNC: 11.5 G/DL (ref 12–15.9)
HGB BLD-MCNC: 7.5 G/DL (ref 12–15.9)
HGB UR QL STRIP.AUTO: NEGATIVE
HOLD SPECIMEN: NORMAL
HYPOCHROMIA BLD QL: NORMAL
IMM GRANULOCYTES # BLD AUTO: 0.1 10*3/MM3 (ref 0–0.05)
IMM GRANULOCYTES NFR BLD AUTO: 0.6 % (ref 0–0.5)
INR PPP: 1.49 (ref 0.85–1.16)
IRON 24H UR-MRATE: 9 MCG/DL (ref 37–145)
IRON SATN MFR SERPL: 2 % (ref 20–50)
KETONES UR QL STRIP: NEGATIVE
LEUKOCYTE ESTERASE UR QL STRIP.AUTO: NEGATIVE
LYMPHOCYTES # BLD AUTO: 0.89 10*3/MM3 (ref 0.7–3.1)
LYMPHOCYTES NFR BLD AUTO: 5.1 % (ref 19.6–45.3)
MCH RBC QN AUTO: 21.5 PG (ref 26.6–33)
MCHC RBC AUTO-ENTMCNC: 27.2 G/DL (ref 31.5–35.7)
MCV RBC AUTO: 79.1 FL (ref 79–97)
MICROCYTES BLD QL: NORMAL
MONOCYTES # BLD AUTO: 0.61 10*3/MM3 (ref 0.1–0.9)
MONOCYTES NFR BLD AUTO: 3.5 % (ref 5–12)
NEUTROPHILS # BLD AUTO: 15.85 10*3/MM3 (ref 1.7–7)
NEUTROPHILS NFR BLD AUTO: 90.4 % (ref 42.7–76)
NITRITE UR QL STRIP: NEGATIVE
NRBC BLD AUTO-RTO: 0.1 /100 WBC (ref 0–0.2)
NT-PROBNP SERPL-MCNC: 1025 PG/ML (ref 5–900)
PH UR STRIP.AUTO: 7 [PH] (ref 5–8)
PLAT MORPH BLD: NORMAL
PLATELET # BLD AUTO: 261 10*3/MM3 (ref 140–450)
PMV BLD AUTO: 12.6 FL (ref 6–12)
POTASSIUM BLD-SCNC: 4.4 MMOL/L (ref 3.5–5.2)
PROCALCITONIN SERPL-MCNC: 0.08 NG/ML (ref 0.1–0.25)
PROT SERPL-MCNC: 7.1 G/DL (ref 6–8.5)
PROT UR QL STRIP: NEGATIVE
PROTHROMBIN TIME: 17.4 SECONDS (ref 11.2–14.3)
RBC # BLD AUTO: 3.49 10*6/MM3 (ref 3.77–5.28)
RETICS # AUTO: 0.08 10*6/MM3 (ref 0.02–0.13)
RETICS/RBC NFR AUTO: 2.35 % (ref 0.7–1.9)
RH BLD: NEGATIVE
SODIUM BLD-SCNC: 142 MMOL/L (ref 136–145)
SP GR UR STRIP: <=1.005 (ref 1–1.03)
T&S EXPIRATION DATE: NORMAL
TIBC SERPL-MCNC: 448 MCG/DL (ref 298–536)
TRANSFERRIN SERPL-MCNC: 301 MG/DL (ref 200–360)
TROPONIN T SERPL-MCNC: <0.01 NG/ML (ref 0–0.03)
TSH SERPL DL<=0.05 MIU/L-ACNC: 1.05 MIU/ML (ref 0.27–4.2)
UROBILINOGEN UR QL STRIP: NORMAL
VIT B12 BLD-MCNC: 268 PG/ML (ref 211–946)
WBC MORPH BLD: NORMAL
WBC NRBC COR # BLD: 17.53 10*3/MM3 (ref 3.4–10.8)
WHOLE BLOOD HOLD SPECIMEN: NORMAL
WHOLE BLOOD HOLD SPECIMEN: NORMAL

## 2019-06-30 PROCEDURE — 36430 TRANSFUSION BLD/BLD COMPNT: CPT

## 2019-06-30 PROCEDURE — 25010000002 NA FERRIC GLUC CPLX PER 12.5 MG: Performed by: INTERNAL MEDICINE

## 2019-06-30 PROCEDURE — 84443 ASSAY THYROID STIM HORMONE: CPT | Performed by: FAMILY MEDICINE

## 2019-06-30 PROCEDURE — 84484 ASSAY OF TROPONIN QUANT: CPT | Performed by: EMERGENCY MEDICINE

## 2019-06-30 PROCEDURE — 82607 VITAMIN B-12: CPT | Performed by: PHYSICIAN ASSISTANT

## 2019-06-30 PROCEDURE — 83605 ASSAY OF LACTIC ACID: CPT | Performed by: EMERGENCY MEDICINE

## 2019-06-30 PROCEDURE — 86923 COMPATIBILITY TEST ELECTRIC: CPT

## 2019-06-30 PROCEDURE — 93010 ELECTROCARDIOGRAM REPORT: CPT | Performed by: INTERNAL MEDICINE

## 2019-06-30 PROCEDURE — 84466 ASSAY OF TRANSFERRIN: CPT | Performed by: PHYSICIAN ASSISTANT

## 2019-06-30 PROCEDURE — 85025 COMPLETE CBC W/AUTO DIFF WBC: CPT | Performed by: EMERGENCY MEDICINE

## 2019-06-30 PROCEDURE — 87040 BLOOD CULTURE FOR BACTERIA: CPT | Performed by: EMERGENCY MEDICINE

## 2019-06-30 PROCEDURE — 99223 1ST HOSP IP/OBS HIGH 75: CPT | Performed by: FAMILY MEDICINE

## 2019-06-30 PROCEDURE — 25010000002 FUROSEMIDE PER 20 MG: Performed by: PHYSICIAN ASSISTANT

## 2019-06-30 PROCEDURE — 94799 UNLISTED PULMONARY SVC/PX: CPT

## 2019-06-30 PROCEDURE — 71275 CT ANGIOGRAPHY CHEST: CPT

## 2019-06-30 PROCEDURE — 71045 X-RAY EXAM CHEST 1 VIEW: CPT

## 2019-06-30 PROCEDURE — 85610 PROTHROMBIN TIME: CPT | Performed by: EMERGENCY MEDICINE

## 2019-06-30 PROCEDURE — 93005 ELECTROCARDIOGRAM TRACING: CPT | Performed by: PHYSICIAN ASSISTANT

## 2019-06-30 PROCEDURE — 94640 AIRWAY INHALATION TREATMENT: CPT

## 2019-06-30 PROCEDURE — 83540 ASSAY OF IRON: CPT | Performed by: PHYSICIAN ASSISTANT

## 2019-06-30 PROCEDURE — 82746 ASSAY OF FOLIC ACID SERUM: CPT | Performed by: PHYSICIAN ASSISTANT

## 2019-06-30 PROCEDURE — 83880 ASSAY OF NATRIURETIC PEPTIDE: CPT | Performed by: EMERGENCY MEDICINE

## 2019-06-30 PROCEDURE — 85014 HEMATOCRIT: CPT | Performed by: INTERNAL MEDICINE

## 2019-06-30 PROCEDURE — 0 IOPAMIDOL PER 1 ML: Performed by: FAMILY MEDICINE

## 2019-06-30 PROCEDURE — 80053 COMPREHEN METABOLIC PANEL: CPT | Performed by: EMERGENCY MEDICINE

## 2019-06-30 PROCEDURE — P9016 RBC LEUKOCYTES REDUCED: HCPCS

## 2019-06-30 PROCEDURE — 25010000002 METHYLPREDNISOLONE PER 125 MG: Performed by: EMERGENCY MEDICINE

## 2019-06-30 PROCEDURE — 86900 BLOOD TYPING SEROLOGIC ABO: CPT

## 2019-06-30 PROCEDURE — 85045 AUTOMATED RETICULOCYTE COUNT: CPT | Performed by: INTERNAL MEDICINE

## 2019-06-30 PROCEDURE — 87147 CULTURE TYPE IMMUNOLOGIC: CPT | Performed by: EMERGENCY MEDICINE

## 2019-06-30 PROCEDURE — 99285 EMERGENCY DEPT VISIT HI MDM: CPT

## 2019-06-30 PROCEDURE — 85018 HEMOGLOBIN: CPT | Performed by: INTERNAL MEDICINE

## 2019-06-30 PROCEDURE — 87150 DNA/RNA AMPLIFIED PROBE: CPT | Performed by: EMERGENCY MEDICINE

## 2019-06-30 PROCEDURE — 85007 BL SMEAR W/DIFF WBC COUNT: CPT | Performed by: EMERGENCY MEDICINE

## 2019-06-30 PROCEDURE — 84145 PROCALCITONIN (PCT): CPT | Performed by: EMERGENCY MEDICINE

## 2019-06-30 PROCEDURE — 83036 HEMOGLOBIN GLYCOSYLATED A1C: CPT | Performed by: FAMILY MEDICINE

## 2019-06-30 PROCEDURE — 86901 BLOOD TYPING SEROLOGIC RH(D): CPT | Performed by: INTERNAL MEDICINE

## 2019-06-30 PROCEDURE — 81003 URINALYSIS AUTO W/O SCOPE: CPT | Performed by: EMERGENCY MEDICINE

## 2019-06-30 PROCEDURE — 93005 ELECTROCARDIOGRAM TRACING: CPT | Performed by: EMERGENCY MEDICINE

## 2019-06-30 PROCEDURE — 86900 BLOOD TYPING SEROLOGIC ABO: CPT | Performed by: INTERNAL MEDICINE

## 2019-06-30 PROCEDURE — 99222 1ST HOSP IP/OBS MODERATE 55: CPT | Performed by: INTERNAL MEDICINE

## 2019-06-30 PROCEDURE — 86850 RBC ANTIBODY SCREEN: CPT | Performed by: INTERNAL MEDICINE

## 2019-06-30 PROCEDURE — 82728 ASSAY OF FERRITIN: CPT | Performed by: PHYSICIAN ASSISTANT

## 2019-06-30 PROCEDURE — 25010000002 LEVOFLOXACIN PER 250 MG: Performed by: EMERGENCY MEDICINE

## 2019-06-30 RX ORDER — LEVOFLOXACIN 5 MG/ML
750 INJECTION, SOLUTION INTRAVENOUS EVERY 24 HOURS
Status: DISCONTINUED | OUTPATIENT
Start: 2019-07-01 | End: 2019-07-01

## 2019-06-30 RX ORDER — MONTELUKAST SODIUM 10 MG/1
10 TABLET ORAL NIGHTLY
Status: DISCONTINUED | OUTPATIENT
Start: 2019-06-30 | End: 2019-07-03 | Stop reason: HOSPADM

## 2019-06-30 RX ORDER — LEVOFLOXACIN 5 MG/ML
750 INJECTION, SOLUTION INTRAVENOUS ONCE
Status: COMPLETED | OUTPATIENT
Start: 2019-06-30 | End: 2019-06-30

## 2019-06-30 RX ORDER — LANSOPRAZOLE
30 KIT EVERY MORNING
Status: DISCONTINUED | OUTPATIENT
Start: 2019-06-30 | End: 2019-07-02

## 2019-06-30 RX ORDER — ASPIRIN 81 MG/1
81 TABLET ORAL DAILY
Status: DISCONTINUED | OUTPATIENT
Start: 2019-06-30 | End: 2019-07-03 | Stop reason: HOSPADM

## 2019-06-30 RX ORDER — TOPIRAMATE 25 MG/1
100 TABLET ORAL EVERY 12 HOURS SCHEDULED
Status: DISCONTINUED | OUTPATIENT
Start: 2019-06-30 | End: 2019-07-03 | Stop reason: HOSPADM

## 2019-06-30 RX ORDER — LEVOTHYROXINE SODIUM 0.15 MG/1
150 TABLET ORAL DAILY
Status: DISCONTINUED | OUTPATIENT
Start: 2019-06-30 | End: 2019-07-01

## 2019-06-30 RX ORDER — IPRATROPIUM BROMIDE AND ALBUTEROL SULFATE 2.5; .5 MG/3ML; MG/3ML
3 SOLUTION RESPIRATORY (INHALATION) EVERY 6 HOURS PRN
Status: DISCONTINUED | OUTPATIENT
Start: 2019-06-30 | End: 2019-07-03 | Stop reason: HOSPADM

## 2019-06-30 RX ORDER — FLUTICASONE PROPIONATE 50 MCG
2 SPRAY, SUSPENSION (ML) NASAL DAILY
Status: DISCONTINUED | OUTPATIENT
Start: 2019-06-30 | End: 2019-07-03 | Stop reason: HOSPADM

## 2019-06-30 RX ORDER — BUDESONIDE AND FORMOTEROL FUMARATE DIHYDRATE 80; 4.5 UG/1; UG/1
2 AEROSOL RESPIRATORY (INHALATION)
Status: DISCONTINUED | OUTPATIENT
Start: 2019-06-30 | End: 2019-07-03 | Stop reason: HOSPADM

## 2019-06-30 RX ORDER — SODIUM CHLORIDE 0.9 % (FLUSH) 0.9 %
3-10 SYRINGE (ML) INJECTION AS NEEDED
Status: DISCONTINUED | OUTPATIENT
Start: 2019-06-30 | End: 2019-07-03 | Stop reason: HOSPADM

## 2019-06-30 RX ORDER — DOCUSATE SODIUM 100 MG/1
100 CAPSULE, LIQUID FILLED ORAL 2 TIMES DAILY PRN
Status: DISCONTINUED | OUTPATIENT
Start: 2019-06-30 | End: 2019-07-03 | Stop reason: HOSPADM

## 2019-06-30 RX ORDER — SODIUM CHLORIDE 0.9 % (FLUSH) 0.9 %
3 SYRINGE (ML) INJECTION EVERY 12 HOURS SCHEDULED
Status: DISCONTINUED | OUTPATIENT
Start: 2019-06-30 | End: 2019-07-03 | Stop reason: HOSPADM

## 2019-06-30 RX ORDER — ACETAMINOPHEN 500 MG
1000 TABLET ORAL ONCE
Status: COMPLETED | OUTPATIENT
Start: 2019-06-30 | End: 2019-06-30

## 2019-06-30 RX ORDER — METHYLPREDNISOLONE SODIUM SUCCINATE 125 MG/2ML
125 INJECTION, POWDER, LYOPHILIZED, FOR SOLUTION INTRAMUSCULAR; INTRAVENOUS ONCE
Status: COMPLETED | OUTPATIENT
Start: 2019-06-30 | End: 2019-06-30

## 2019-06-30 RX ORDER — IPRATROPIUM BROMIDE AND ALBUTEROL SULFATE 2.5; .5 MG/3ML; MG/3ML
3 SOLUTION RESPIRATORY (INHALATION) ONCE
Status: COMPLETED | OUTPATIENT
Start: 2019-06-30 | End: 2019-06-30

## 2019-06-30 RX ORDER — ATORVASTATIN CALCIUM 10 MG/1
10 TABLET, FILM COATED ORAL NIGHTLY
Status: DISCONTINUED | OUTPATIENT
Start: 2019-06-30 | End: 2019-07-03 | Stop reason: HOSPADM

## 2019-06-30 RX ORDER — FUROSEMIDE 10 MG/ML
20 INJECTION INTRAMUSCULAR; INTRAVENOUS ONCE
Status: COMPLETED | OUTPATIENT
Start: 2019-06-30 | End: 2019-06-30

## 2019-06-30 RX ORDER — ACETAMINOPHEN 325 MG/1
650 TABLET ORAL EVERY 4 HOURS PRN
Status: DISCONTINUED | OUTPATIENT
Start: 2019-06-30 | End: 2019-07-03 | Stop reason: HOSPADM

## 2019-06-30 RX ORDER — SODIUM CHLORIDE 0.9 % (FLUSH) 0.9 %
10 SYRINGE (ML) INJECTION AS NEEDED
Status: DISCONTINUED | OUTPATIENT
Start: 2019-06-30 | End: 2019-07-03 | Stop reason: HOSPADM

## 2019-06-30 RX ORDER — DILTIAZEM HYDROCHLORIDE 180 MG/1
360 CAPSULE, COATED, EXTENDED RELEASE ORAL
Status: DISCONTINUED | OUTPATIENT
Start: 2019-06-30 | End: 2019-07-03 | Stop reason: HOSPADM

## 2019-06-30 RX ORDER — IPRATROPIUM BROMIDE AND ALBUTEROL SULFATE 2.5; .5 MG/3ML; MG/3ML
3 SOLUTION RESPIRATORY (INHALATION)
Status: DISCONTINUED | OUTPATIENT
Start: 2019-06-30 | End: 2019-07-02

## 2019-06-30 RX ORDER — ACETAMINOPHEN 650 MG/1
650 SUPPOSITORY RECTAL ONCE
Status: DISCONTINUED | OUTPATIENT
Start: 2019-06-30 | End: 2019-06-30

## 2019-06-30 RX ADMIN — IPRATROPIUM BROMIDE AND ALBUTEROL SULFATE 3 ML: 2.5; .5 SOLUTION RESPIRATORY (INHALATION) at 03:33

## 2019-06-30 RX ADMIN — IPRATROPIUM BROMIDE AND ALBUTEROL SULFATE 3 ML: 2.5; .5 SOLUTION RESPIRATORY (INHALATION) at 19:23

## 2019-06-30 RX ADMIN — SODIUM CHLORIDE, PRESERVATIVE FREE 3 ML: 5 INJECTION INTRAVENOUS at 20:30

## 2019-06-30 RX ADMIN — IPRATROPIUM BROMIDE AND ALBUTEROL SULFATE 3 ML: 2.5; .5 SOLUTION RESPIRATORY (INHALATION) at 15:46

## 2019-06-30 RX ADMIN — TOPIRAMATE 100 MG: 25 TABLET, FILM COATED ORAL at 21:43

## 2019-06-30 RX ADMIN — ACETAMINOPHEN 1000 MG: 500 TABLET, FILM COATED ORAL at 04:52

## 2019-06-30 RX ADMIN — ASPIRIN 81 MG: 81 TABLET, COATED ORAL at 08:56

## 2019-06-30 RX ADMIN — SODIUM CHLORIDE, PRESERVATIVE FREE 3 ML: 5 INJECTION INTRAVENOUS at 08:57

## 2019-06-30 RX ADMIN — LEVOFLOXACIN 750 MG: 5 INJECTION, SOLUTION INTRAVENOUS at 04:52

## 2019-06-30 RX ADMIN — BUDESONIDE AND FORMOTEROL FUMARATE DIHYDRATE 2 PUFF: 80; 4.5 AEROSOL RESPIRATORY (INHALATION) at 19:23

## 2019-06-30 RX ADMIN — LANSOPRAZOLE 30 MG: KIT at 08:56

## 2019-06-30 RX ADMIN — ATORVASTATIN CALCIUM 10 MG: 10 TABLET, FILM COATED ORAL at 21:43

## 2019-06-30 RX ADMIN — DILTIAZEM HYDROCHLORIDE 360 MG: 180 CAPSULE, COATED, EXTENDED RELEASE ORAL at 08:56

## 2019-06-30 RX ADMIN — METHYLPREDNISOLONE SODIUM SUCCINATE 125 MG: 125 INJECTION, POWDER, FOR SOLUTION INTRAMUSCULAR; INTRAVENOUS at 03:39

## 2019-06-30 RX ADMIN — IPRATROPIUM BROMIDE AND ALBUTEROL SULFATE 3 ML: 2.5; .5 SOLUTION RESPIRATORY (INHALATION) at 23:28

## 2019-06-30 RX ADMIN — FUROSEMIDE 20 MG: 10 INJECTION, SOLUTION INTRAMUSCULAR; INTRAVENOUS at 14:47

## 2019-06-30 RX ADMIN — SODIUM CHLORIDE 250 MG: 9 INJECTION, SOLUTION INTRAVENOUS at 14:47

## 2019-06-30 RX ADMIN — LEVOTHYROXINE SODIUM 150 MCG: 150 TABLET ORAL at 08:55

## 2019-06-30 RX ADMIN — IOPAMIDOL 66 ML: 755 INJECTION, SOLUTION INTRAVENOUS at 05:26

## 2019-06-30 RX ADMIN — MONTELUKAST SODIUM 10 MG: 10 TABLET, COATED ORAL at 21:43

## 2019-06-30 RX ADMIN — FLUTICASONE PROPIONATE 2 SPRAY: 50 SPRAY, METERED NASAL at 08:56

## 2019-06-30 RX ADMIN — IPRATROPIUM BROMIDE AND ALBUTEROL SULFATE 3 ML: 2.5; .5 SOLUTION RESPIRATORY (INHALATION) at 07:43

## 2019-06-30 RX ADMIN — BUDESONIDE AND FORMOTEROL FUMARATE DIHYDRATE 2 PUFF: 80; 4.5 AEROSOL RESPIRATORY (INHALATION) at 07:43

## 2019-06-30 RX ADMIN — TOPIRAMATE 100 MG: 25 TABLET, FILM COATED ORAL at 08:55

## 2019-06-30 RX ADMIN — IPRATROPIUM BROMIDE AND ALBUTEROL SULFATE 3 ML: 2.5; .5 SOLUTION RESPIRATORY (INHALATION) at 12:09

## 2019-06-30 RX ADMIN — APIXABAN 5 MG: 5 TABLET, FILM COATED ORAL at 08:56

## 2019-06-30 RX ADMIN — METOPROLOL TARTRATE 12.5 MG: 25 TABLET, FILM COATED ORAL at 08:56

## 2019-07-01 ENCOUNTER — APPOINTMENT (OUTPATIENT)
Dept: GENERAL RADIOLOGY | Facility: HOSPITAL | Age: 69
End: 2019-07-01

## 2019-07-01 ENCOUNTER — APPOINTMENT (OUTPATIENT)
Dept: CARDIOLOGY | Facility: HOSPITAL | Age: 69
End: 2019-07-01

## 2019-07-01 PROBLEM — G81.94 LEFT HEMIPARESIS: Status: ACTIVE | Noted: 2019-07-01

## 2019-07-01 PROBLEM — I50.33 ACUTE ON CHRONIC DIASTOLIC CHF (CONGESTIVE HEART FAILURE): Status: ACTIVE | Noted: 2019-07-01

## 2019-07-01 PROBLEM — I69.359 HISTORY OF HEMORRHAGIC STROKE WITH RESIDUAL HEMIPARESIS: Status: ACTIVE | Noted: 2019-07-01

## 2019-07-01 LAB
ABO + RH BLD: NORMAL
ANION GAP SERPL CALCULATED.3IONS-SCNC: 15 MMOL/L (ref 5–15)
BASOPHILS # BLD AUTO: 0.02 10*3/MM3 (ref 0–0.2)
BASOPHILS NFR BLD AUTO: 0.1 % (ref 0–1.5)
BH BB BLOOD EXPIRATION DATE: NORMAL
BH BB BLOOD TYPE BARCODE: 600
BH BB DISPENSE STATUS: NORMAL
BH BB PRODUCT CODE: NORMAL
BH BB UNIT NUMBER: NORMAL
BUN BLD-MCNC: 18 MG/DL (ref 8–23)
BUN/CREAT SERPL: 16.1 (ref 7–25)
CALCIUM SPEC-SCNC: 8.1 MG/DL (ref 8.6–10.5)
CHLORIDE SERPL-SCNC: 109 MMOL/L (ref 98–107)
CHOLEST SERPL-MCNC: 101 MG/DL (ref 0–200)
CO2 SERPL-SCNC: 18 MMOL/L (ref 22–29)
CREAT BLD-MCNC: 1.12 MG/DL (ref 0.57–1)
D-LACTATE SERPL-SCNC: 2.6 MMOL/L (ref 0.5–2)
DEPRECATED RDW RBC AUTO: 53.7 FL (ref 37–54)
EOSINOPHIL # BLD AUTO: 0 10*3/MM3 (ref 0–0.4)
EOSINOPHIL NFR BLD AUTO: 0 % (ref 0.3–6.2)
ERYTHROCYTE [DISTWIDTH] IN BLOOD BY AUTOMATED COUNT: 18.8 % (ref 12.3–15.4)
GFR SERPL CREATININE-BSD FRML MDRD: 49 ML/MIN/1.73
GLUCOSE BLD-MCNC: 157 MG/DL (ref 65–99)
HCT VFR BLD AUTO: 35.3 % (ref 34–46.6)
HDLC SERPL-MCNC: 49 MG/DL (ref 40–60)
HGB BLD-MCNC: 10.1 G/DL (ref 12–15.9)
IMM GRANULOCYTES # BLD AUTO: 0.14 10*3/MM3 (ref 0–0.05)
IMM GRANULOCYTES NFR BLD AUTO: 0.7 % (ref 0–0.5)
LDLC SERPL CALC-MCNC: 35 MG/DL (ref 0–100)
LDLC/HDLC SERPL: 0.71 {RATIO}
LYMPHOCYTES # BLD AUTO: 1.5 10*3/MM3 (ref 0.7–3.1)
LYMPHOCYTES NFR BLD AUTO: 7.3 % (ref 19.6–45.3)
MCH RBC QN AUTO: 22.6 PG (ref 26.6–33)
MCHC RBC AUTO-ENTMCNC: 28.6 G/DL (ref 31.5–35.7)
MCV RBC AUTO: 79 FL (ref 79–97)
MONOCYTES # BLD AUTO: 0.92 10*3/MM3 (ref 0.1–0.9)
MONOCYTES NFR BLD AUTO: 4.5 % (ref 5–12)
NEUTROPHILS # BLD AUTO: 17.97 10*3/MM3 (ref 1.7–7)
NEUTROPHILS NFR BLD AUTO: 87.4 % (ref 42.7–76)
NRBC BLD AUTO-RTO: 0.4 /100 WBC (ref 0–0.2)
PLAT MORPH BLD: NORMAL
PLATELET # BLD AUTO: 243 10*3/MM3 (ref 140–450)
PMV BLD AUTO: 13 FL (ref 6–12)
POTASSIUM BLD-SCNC: 3.8 MMOL/L (ref 3.5–5.2)
RBC # BLD AUTO: 4.47 10*6/MM3 (ref 3.77–5.28)
RBC MORPH BLD: NORMAL
SODIUM BLD-SCNC: 142 MMOL/L (ref 136–145)
TRIGL SERPL-MCNC: 85 MG/DL (ref 0–150)
TSH SERPL DL<=0.05 MIU/L-ACNC: 1.24 MIU/ML (ref 0.27–4.2)
UNIT  ABO: NORMAL
UNIT  RH: NORMAL
VLDLC SERPL-MCNC: 17 MG/DL
WBC MORPH BLD: NORMAL
WBC NRBC COR # BLD: 20.55 10*3/MM3 (ref 3.4–10.8)

## 2019-07-01 PROCEDURE — 99232 SBSQ HOSP IP/OBS MODERATE 35: CPT | Performed by: PHYSICIAN ASSISTANT

## 2019-07-01 PROCEDURE — 85025 COMPLETE CBC W/AUTO DIFF WBC: CPT | Performed by: INTERNAL MEDICINE

## 2019-07-01 PROCEDURE — 83605 ASSAY OF LACTIC ACID: CPT | Performed by: INTERNAL MEDICINE

## 2019-07-01 PROCEDURE — 25010000002 LEVOFLOXACIN PER 250 MG: Performed by: PHYSICIAN ASSISTANT

## 2019-07-01 PROCEDURE — 84443 ASSAY THYROID STIM HORMONE: CPT | Performed by: PHYSICIAN ASSISTANT

## 2019-07-01 PROCEDURE — 85007 BL SMEAR W/DIFF WBC COUNT: CPT | Performed by: INTERNAL MEDICINE

## 2019-07-01 PROCEDURE — 80048 BASIC METABOLIC PNL TOTAL CA: CPT | Performed by: PHYSICIAN ASSISTANT

## 2019-07-01 PROCEDURE — 80061 LIPID PANEL: CPT | Performed by: PHYSICIAN ASSISTANT

## 2019-07-01 PROCEDURE — 97163 PT EVAL HIGH COMPLEX 45 MIN: CPT

## 2019-07-01 PROCEDURE — 25010000002 NA FERRIC GLUC CPLX PER 12.5 MG: Performed by: INTERNAL MEDICINE

## 2019-07-01 PROCEDURE — 94799 UNLISTED PULMONARY SVC/PX: CPT

## 2019-07-01 PROCEDURE — 97165 OT EVAL LOW COMPLEX 30 MIN: CPT

## 2019-07-01 PROCEDURE — 93306 TTE W/DOPPLER COMPLETE: CPT

## 2019-07-01 PROCEDURE — 99233 SBSQ HOSP IP/OBS HIGH 50: CPT | Performed by: INTERNAL MEDICINE

## 2019-07-01 PROCEDURE — 71045 X-RAY EXAM CHEST 1 VIEW: CPT

## 2019-07-01 PROCEDURE — 25010000002 ENOXAPARIN PER 10 MG: Performed by: INTERNAL MEDICINE

## 2019-07-01 RX ORDER — CHOLECALCIFEROL (VITAMIN D3) 125 MCG
5 CAPSULE ORAL NIGHTLY PRN
Status: DISCONTINUED | OUTPATIENT
Start: 2019-07-01 | End: 2019-07-03 | Stop reason: HOSPADM

## 2019-07-01 RX ORDER — METOPROLOL TARTRATE 50 MG/1
50 TABLET, FILM COATED ORAL EVERY 12 HOURS SCHEDULED
Status: DISCONTINUED | OUTPATIENT
Start: 2019-07-01 | End: 2019-07-03 | Stop reason: HOSPADM

## 2019-07-01 RX ORDER — LEVOTHYROXINE SODIUM 137 UG/1
137 TABLET ORAL DAILY
Status: DISCONTINUED | OUTPATIENT
Start: 2019-07-02 | End: 2019-07-03 | Stop reason: HOSPADM

## 2019-07-01 RX ORDER — FUROSEMIDE 10 MG/ML
20 INJECTION INTRAMUSCULAR; INTRAVENOUS ONCE
Status: DISCONTINUED | OUTPATIENT
Start: 2019-07-01 | End: 2019-07-01

## 2019-07-01 RX ADMIN — MELATONIN TAB 5 MG 5 MG: 5 TAB at 23:32

## 2019-07-01 RX ADMIN — IPRATROPIUM BROMIDE AND ALBUTEROL SULFATE 3 ML: 2.5; .5 SOLUTION RESPIRATORY (INHALATION) at 16:05

## 2019-07-01 RX ADMIN — BUDESONIDE AND FORMOTEROL FUMARATE DIHYDRATE 2 PUFF: 80; 4.5 AEROSOL RESPIRATORY (INHALATION) at 19:17

## 2019-07-01 RX ADMIN — POLYETHYLENE GLYCOL 3350 17 G: 17 POWDER, FOR SOLUTION ORAL at 15:34

## 2019-07-01 RX ADMIN — ASPIRIN 81 MG: 81 TABLET, COATED ORAL at 08:15

## 2019-07-01 RX ADMIN — ENOXAPARIN SODIUM 40 MG: 40 INJECTION SUBCUTANEOUS at 08:16

## 2019-07-01 RX ADMIN — MONTELUKAST SODIUM 10 MG: 10 TABLET, COATED ORAL at 21:50

## 2019-07-01 RX ADMIN — FLUTICASONE PROPIONATE 2 SPRAY: 50 SPRAY, METERED NASAL at 08:24

## 2019-07-01 RX ADMIN — METOPROLOL TARTRATE 12.5 MG: 25 TABLET, FILM COATED ORAL at 09:44

## 2019-07-01 RX ADMIN — TOPIRAMATE 100 MG: 25 TABLET, FILM COATED ORAL at 21:49

## 2019-07-01 RX ADMIN — METOPROLOL TARTRATE 12.5 MG: 25 TABLET, FILM COATED ORAL at 14:32

## 2019-07-01 RX ADMIN — LANSOPRAZOLE 30 MG: KIT at 08:17

## 2019-07-01 RX ADMIN — DILTIAZEM HYDROCHLORIDE 360 MG: 180 CAPSULE, COATED, EXTENDED RELEASE ORAL at 08:15

## 2019-07-01 RX ADMIN — BUDESONIDE AND FORMOTEROL FUMARATE DIHYDRATE 2 PUFF: 80; 4.5 AEROSOL RESPIRATORY (INHALATION) at 07:42

## 2019-07-01 RX ADMIN — TOPIRAMATE 100 MG: 25 TABLET, FILM COATED ORAL at 08:21

## 2019-07-01 RX ADMIN — LEVOTHYROXINE SODIUM 150 MCG: 150 TABLET ORAL at 06:23

## 2019-07-01 RX ADMIN — IPRATROPIUM BROMIDE AND ALBUTEROL SULFATE 3 ML: 2.5; .5 SOLUTION RESPIRATORY (INHALATION) at 07:42

## 2019-07-01 RX ADMIN — SODIUM CHLORIDE, PRESERVATIVE FREE 3 ML: 5 INJECTION INTRAVENOUS at 21:50

## 2019-07-01 RX ADMIN — IPRATROPIUM BROMIDE AND ALBUTEROL SULFATE 3 ML: 2.5; .5 SOLUTION RESPIRATORY (INHALATION) at 11:55

## 2019-07-01 RX ADMIN — ATORVASTATIN CALCIUM 10 MG: 10 TABLET, FILM COATED ORAL at 21:49

## 2019-07-01 RX ADMIN — IPRATROPIUM BROMIDE AND ALBUTEROL SULFATE 3 ML: 2.5; .5 SOLUTION RESPIRATORY (INHALATION) at 04:05

## 2019-07-01 RX ADMIN — SODIUM CHLORIDE 250 MG: 9 INJECTION, SOLUTION INTRAVENOUS at 09:41

## 2019-07-01 RX ADMIN — SODIUM CHLORIDE, PRESERVATIVE FREE 3 ML: 5 INJECTION INTRAVENOUS at 08:22

## 2019-07-01 RX ADMIN — LEVOFLOXACIN 750 MG: 5 INJECTION, SOLUTION INTRAVENOUS at 06:22

## 2019-07-01 RX ADMIN — IPRATROPIUM BROMIDE AND ALBUTEROL SULFATE 3 ML: 2.5; .5 SOLUTION RESPIRATORY (INHALATION) at 19:17

## 2019-07-01 NOTE — PROGRESS NOTES
[facility] Medicine Services  PROGRESS NOTE    Patient Name: Vickie Joshi  : 1950  MRN: [id number]    Date of Admission: 2019  Length of Stay: 1  Primary Care Physician: Dav Bryan MD    Subjective   Subjective     CC:  Follow-up shortness of breath    HPI:  Currently feels that her shortness of breath has greatly improved.  Breathing comfortably but feeling quite weak.   at the bedside providing support and care.  She notes tachycardia at home is chronic and her heart rate is usually between 100-130 at home.  She refused to work with physical therapy today as she did not want to use Kevin lift.  No other new complaints.  No cough or sputum    Review of Systems  No current fevers or chills  Improving shortness of breath, no cough  No current nausea, vomiting, or diarrhea  No current chest pain or palpitations      Objective   Objective     Vital Signs:   Temp:  [92 °F (33.3 °C)-98.6 °F (37 °C)] 97.6 °F (36.4 °C)  Heart Rate:  [] 111  Resp:  [16-18] 18  BP: ()/(59-90) 123/88        Physical Exam:  Constitutional:Awake, alert  HENT: NCAT, mucous membranes moist, neck supple  Respiratory: Faint rales bilaterally, at apex mostly clear to auscultation bilaterally, respiratory effort normal, on nasal cannula oxygen  Cardiovascular: Irregular, tachycardic, normal radial pulses bilaterally  Gastrointestinal: Positive bowel sounds, soft, nontender, nondistended  Musculoskeletal: Significant obesity, debilitated, BMI 35, pitting lower extremity edema bilaterally most significant on the left   Psychiatric: Appropriate affect, cooperative, conversational  Neurologic: No slurred speech or facial droop, follows commands, not confused   Skin: No rashes or jaundice, warm      Results Reviewed:  I have personally reviewed current lab, radiology, and data and agree.    Results from last 7 days   Lab Units 19  0337 19  0328   WBC  10*3/mm3 20.55*  --  17.53*   HEMOGLOBIN g/dL 10.1* 11.5* 7.5*   HEMATOCRIT % 35.3 40.3 27.6*   PLATELETS 10*3/mm3 243  --  261   INR   --   --  1.49*   PROCALCITONIN ng/mL  --   --  0.08*     Results from last 7 days   Lab Units 07/01/19  0337 06/30/19  0328   SODIUM mmol/L 142 142   POTASSIUM mmol/L 3.8 4.4   CHLORIDE mmol/L 109* 109*   CO2 mmol/L 18.0* 19.0*   BUN mg/dL 18 11   CREATININE mg/dL 1.12* 0.87   GLUCOSE mg/dL 157* 146*   CALCIUM mg/dL 8.1* 8.5*   ALT (SGPT) U/L  --  10   AST (SGOT) U/L  --  25   TROPONIN T ng/mL  --  <0.010   PROBNP pg/mL  --  1,025.0*     Estimated Creatinine Clearance: 56.2 mL/min (A) (by C-G formula based on SCr of 1.12 mg/dL (H)).    Microbiology Results Abnormal     Procedure Component Value - Date/Time    Blood Culture - Blood, Arm, Left [[id number]] Collected:  06/30/19 0344    Lab Status:  Preliminary result Specimen:  Blood from Arm, Left Updated:  07/01/19 0500     Blood Culture No growth at 24 hours    Blood Culture - Blood, Wrist, Right [[id number]] Collected:  06/30/19 0352    Lab Status:  Preliminary result Specimen:  Blood from Wrist, Right Updated:  07/01/19 0500     Blood Culture No growth at 24 hours          Imaging Results (last 24 hours)     ** No results found for the last 24 hours. **          Results for orders placed during the hospital encounter of 09/27/18   Adult Transthoracic Echo Complete W/ Cont if Necessary Per Protocol    Narrative · Moderate tricuspid valve regurgitation is present.  · Mild pulmonic valve regurgitation is present.  · There is calcification of the aortic valve.  · Mild aortic valve regurgitation is present.  · Left atrial cavity size is borderline dilated.  · Moderate mitral valve regurgitation is present          I have reviewed the medications:  Scheduled Meds:    GI cocktail  Oral Once   aspirin 81 mg Oral Daily   atorvastatin 10 mg Oral Nightly   budesonide-formoterol 2 puff Inhalation BID - RT   diltiaZEM  mg Oral Q24H    enoxaparin 40 mg Subcutaneous Q24H   ferric gluconate 250 mg Intravenous Daily   fluticasone 2 spray Each Nare Daily   ipratropium-albuterol 3 mL Nebulization Q4H - RT   lansoprazole 30 mg Nasogastric QAM   [START ON 7/2/2019] levothyroxine 137 mcg Oral Daily   metoprolol tartrate 12.5 mg Oral Once   metoprolol tartrate 25 mg Oral Q12H   montelukast 10 mg Oral Nightly   sodium chloride 3 mL Intravenous Q12H   topiramate 100 mg Oral Q12H     Continuous Infusions:   PRN Meds:.•  acetaminophen  •  docusate sodium  •  ipratropium-albuterol  •  sodium chloride  •  sodium chloride      Assessment/Plan   Assessment / Plan     Active Hospital Problems    Diagnosis  POA   • **Acute on chronic diastolic CHF (congestive heart failure) (CMS/HCC) [I50.33]  Yes   • Normocytic anemia [D64.9]  Yes   • CHF exacerbation (CMS/HCC) [I50.9]  Yes   • Asthma [J45.909]  Yes   • Hypothyroidism [E03.9]  Yes   • Hyperlipidemia [E78.5]  Yes   • Community acquired pneumonia [J18.9]  Yes   • Atrial fibrillation with RVR (CMS/HCC) [I48.91]  Yes   • SIRS (systemic inflammatory response syndrome) (CMS/HCC) [R65.10]  Yes   • Hypertension [I10]  Yes   • Chronic anticoagulation on Eliquis [Z79.01]  Not Applicable   • Hx of Stroke with residual left hemiparesis [I63.9]  Yes   • Fibromyalgia [M79.7]  Yes      Resolved Hospital Problems   No resolved problems to display.        Brief Hospital Course to date:  Vickie Joshi is a 69 y.o. female with past medical history of left hemiparesis and previous hemorrhagic stroke, chronic anticoagulation on Eliquis, recent GI bleed in the setting of meloxicam use, chronic shortness of breath, obesity, debility, reported asthma presents the hospital with complaint of worsening shortness of breath and was admitted due to concern for acute diastolic congestive heart failure in the setting of RVR.    Acute on chronic diastolic congestive heart failure:  Clinically improved with diuresis.  Significant infiltrates  noted on chest x-ray and CT scan.  Repeat echocardiogram.  Consult cardiology for assistance.  Previous echocardiogram shows EF 61% and elevated RVSP.    Acute blood loss anemia in the setting of anticoagulation:  GI following, hold off on intervention currently due to significant multiple problems.  Plan for PPI, iron infusion, transfusion as needed.  Required transfusion at admission.    Atrial fibrillation with RVR:  Continue diltiazem.  Increase metoprolol.  Cardiology to assist.  Continue anticoagulation.  Patient notes home pulse usually runs 100-130.  Could consider digoxin.    Asthma:  Received IV methylprednisolone at admission.  Continue inhalers and respiratory treatments.  Improving.    Essential hypertension, hyperlipidemia:  Adjust medications as needed.    History of hemorrhagic stroke with left hemiparesis: Supportive care.  PT OT.  Patient and family wish to go home at discharge.    Fibromyalgia: Continue home medications.  Stable.    Repeat chest x-ray ordered.  Plan to follow-up.  Echocardiogram ordered.  Plan to follow-up on cardiology evaluation.  Plan to discuss with gastroenterology.  Repeat laboratory studies tomorrow.  Chart reviewed.  Previous echocardiogram reviewed.  Complex case.    DVT Prophylaxis:  AC    Disposition: I expect the patient to be discharged when clinically improved    CODE STATUS:   Code Status and Medical Interventions:   Ordered at: 06/30/19 0627     Level Of Support Discussed With:    Patient     Code Status:    CPR     Medical Interventions (Level of Support Prior to Arrest):    Full         Electronically signed by Silverio Evans MD, 07/01/19, 1:45 PM.

## 2019-07-01 NOTE — PROGRESS NOTES
"GI Daily Progress Note  Subjective:    Chief Complaint:  Follow up anemia and rectal bleeding     Complains of weakness and fatigue.  Discussed her intermittent small volume rectal bleeding for 1-2 years that she describes as \"dripping\".   States she has seen Dr. Jung for this and determined to be hemorrhoids.   She voices she is not interested to undergo colonoscopy nor EGD on this admission.       Objective:    /83   Pulse 111   Temp 97.6 °F (36.4 °C) (Oral)   Resp 18   Ht 165.1 cm (65\")   Wt 97.1 kg (214 lb)   SpO2 95%   BMI 35.61 kg/m²     Physical Exam   Constitutional: She is oriented to person, place, and time.   Cardiovascular: An irregularly irregular rhythm present. Tachycardia present.   Pulmonary/Chest: Effort normal.   Faint rales    Abdominal: Soft. Bowel sounds are normal. She exhibits no distension. There is no tenderness.   Obese    Musculoskeletal: She exhibits edema.   Neurological: She is alert and oriented to person, place, and time.   Skin: Skin is warm and dry.   Psychiatric: Her behavior is normal.       Lab  Lab Results   Component Value Date    WBC 20.55 (H) 07/01/2019    HGB 10.1 (L) 07/01/2019    HGB 11.5 (L) 06/30/2019    HGB 7.5 (L) 06/30/2019    MCV 79.0 07/01/2019     07/01/2019    INR 1.49 (H) 06/30/2019    INR 1.07 02/27/2017    INR 1.05 12/02/2015    INR 1.0 12/02/2015       Lab Results   Component Value Date    GLUCOSE 157 (H) 07/01/2019    BUN 18 07/01/2019    CREATININE 1.12 (H) 07/01/2019    EGFRIFNONA 49 (L) 07/01/2019    BCR 16.1 07/01/2019    CO2 18.0 (L) 07/01/2019    CALCIUM 8.1 (L) 07/01/2019    ALBUMIN 3.60 06/30/2019    ALKPHOS 96 06/30/2019    BILITOT 0.4 06/30/2019    BILIDIR 0.4 (H) 12/02/2015    ALT 10 06/30/2019    AST 25 06/30/2019       Assessment:    Iron deficiency anemia   Atrial fibrillation with RVR  History of CVA   Rectal bleeding  NSAID use     Plan:    >>> Patient declines inpatient colonoscopy nor EGD due to her debility status.   " Recommend she discontinue her NSAID use (Mobic) if possible and continue empiric PPI.     >>> Will add Miralax daily  >>> Can resume Eliquis from a GI standpoint  >>> IV Iron     Patient can follow up outpatient with Dr. Jung.    Will sign off.  Please call for questions or concerns.     ARIC Phipps  07/01/19  2:00 PM

## 2019-07-01 NOTE — PLAN OF CARE
Problem: Patient Care Overview  Goal: Plan of Care Review  Outcome: Ongoing (interventions implemented as appropriate)   07/01/19 2493   Coping/Psychosocial   Plan of Care Reviewed With patient;spouse   Plan of Care Review   Progress improving   OTHER   Outcome Summary VSS. A-fib rate is more controlled after metoprolol. Cardiology consulted. Up to chair per . No complaints of SOA or pain. Will continue to monitor.

## 2019-07-01 NOTE — PLAN OF CARE
Problem: Patient Care Overview  Goal: Plan of Care Review  Outcome: Ongoing (interventions implemented as appropriate)   07/01/19 1014   Coping/Psychosocial   Plan of Care Reviewed With patient   OTHER   Outcome Summary PT dillon completed, though limited due to pt having episode of anxiety and requested session to stop. Pt apprehensive about not being able to get out of bed exactly how she does it at home. Therapist provided active listening and explained limitations of replicating home environment in hospital setting. [facility] pt refused once lift sling was placed. RN made aware.

## 2019-07-01 NOTE — CONSULTS
[facility] History & Physical    Referring Provider: Hospitalist    Patient Care Team:  Dav Bryan MD as PCP - General (Family Medicine)    Chief complaint   Chief Complaint   Patient presents with   • Shortness of Breath       Subjective .     History of present illness: Elderly white female who is a patient of Dr. Rae is in [facility] [employer] with a history of atrial fibrillation treated with rate control and anticoagulation and has had recurrent GI bleeding.  She is been admitted with shortness of breath made worse by exertion diminished with rest of moderate intensity, but tacky palpitations.  She has been on Eliquis and has had a previous stroke with left hemiparesis.    Review of Systems   All systems were reviewed and negative except for:  Constitution:  positive for fatigue    History  Past Medical History:   Diagnosis Date   • Arthritis    • Asthma    • Atrial fibrillation (CMS/HCC)    • Cardiac disorder    • Disease of thyroid gland    • Fibromyalgia    • GERD (gastroesophageal reflux disease)    • Hyperlipidemia    • Hypertension    • Migraine    • Neuropathy    • Stroke (CMS/HCC)    , Past Surgical History:   Procedure Laterality Date   • CHOLECYSTECTOMY     • HIP PERCUTANEOUS PINNING Left 2/28/2017    Procedure: LEFT HIP PERCUTANEOUS PINNING FEMORAL NECK;  Surgeon: Ezra Barlow MD;  Location:  [facility];  Service:    • HYSTERECTOMY     • [place] CLOSED RX TRAUMATIC HIP DISLOCATN Left 2/28/2017    Procedure: LEFT HIP CLOSED REDUCTION;  Surgeon: Ezra Barlow MD;  Location:  [facility];  Service: Orthopedics   • SHOULDER SURGERY     , Family History   Problem Relation Age of Onset   • Alcohol abuse Mother    • Heart disease Mother         Cardiac Disorder   • Stroke Mother    • Hypertension Mother    • Parkinsonism Mother    • Colon cancer Maternal Grandmother    • Diabetes Maternal Aunt    , Social History     Tobacco Use   • Smoking status: Never Smoker   • Smokeless tobacco: Never  Used   Substance Use Topics   • Alcohol use: No   • Drug use: No   , Medications Prior to Admission   Medication Sig Dispense Refill Last Dose   • acetaminophen (TYLENOL) 325 MG tablet Take 2 tablets by mouth Every 4 (Four) Hours As Needed for Mild Pain .      • apixaban (ELIQUIS) 5 MG tablet tablet Take 5 mg by mouth 2 (Two) Times a Day.   9/26/2018 at Unknown time   • aspirin 81 MG EC tablet Take 81 mg by mouth daily.   Past Week at Unknown time   • atorvastatin (LIPITOR) 10 MG tablet Take 10 mg by mouth Every Night.   9/26/2018 at Unknown time   • budesonide-formoterol (SYMBICORT) 80-4.5 MCG/ACT inhaler Inhale 2 puffs 2 (Two) Times a Day.   9/26/2018 at Unknown time   • diltiaZEM (TIAZAC) 360 MG 24 hr capsule Take 360 mg by mouth Daily.   9/26/2018 at Unknown time   • docusate sodium (COLACE) 100 MG capsule Take 100 mg by mouth 2 (Two) Times a Day.   Unknown at Unknown time   • esomeprazole (nexIUM) 40 MG capsule Take 40 mg by mouth Every Morning Before Breakfast.   9/26/2018 at Unknown time   • fluticasone (FLONASE) 50 MCG/ACT nasal spray 2 sprays by Each Nare route Daily. 1 bottle 11    • guaiFENesin (MUCINEX) 600 MG 12 hr tablet Take 2 tablets by mouth 2 (Two) Times a Day As Needed for Cough or Congestion.      • hydrocortisone (PREPARATION H HYDROCORTISONE) 1 % cream Apply 1 application topically 4 (Four) Times a Day As Needed.   9/26/2018 at Unknown time   • levothyroxine (SYNTHROID, LEVOTHROID) 150 MCG tablet Take 137 mcg by mouth Daily. Pt reports taking 137mcg not 150mcg   9/26/2018 at Unknown time   • loperamide (IMODIUM) 2 MG capsule Take 2 mg by mouth Daily As Needed for diarrhea.   Past Week at Unknown time   • loratadine (CLARITIN) 10 MG tablet Take 10 mg by mouth Daily.   9/26/2018 at Unknown time   • metoprolol tartrate (LOPRESSOR) 25 MG tablet Take 0.5 tablets by mouth Every 12 (Twelve) Hours. Additional refills per PCP 30 tablet 1    • montelukast (SINGULAIR) 10 MG tablet Take 1 tablet by mouth  "Every Night. 30 tablet 1    • predniSONE (DELTASONE) 10 MG tablet 3 tabs by mouth daily x 3 days, 2 tabs by mouth daily x 3 days, 1 tab by mouth daily x 3 days, then stop 18 tablet 0    • pregabalin (LYRICA) 300 MG capsule Take 300 mg by mouth 2 (Two) Times a Day.   9/26/2018 at Unknown time   • raNITIdine (ZANTAC) 300 MG tablet Take 300 mg by mouth Every Night.   9/26/2018 at Unknown time   • topiramate (TOPAMAX) 100 MG tablet Take 100 mg by mouth 2 (Two) Times a Day.   9/26/2018 at Unknown time   , Scheduled Meds:    GI cocktail  Oral Once   aspirin 81 mg Oral Daily   atorvastatin 10 mg Oral Nightly   budesonide-formoterol 2 puff Inhalation BID - RT   diltiaZEM  mg Oral Q24H   enoxaparin 40 mg Subcutaneous Q24H   ferric gluconate 250 mg Intravenous Daily   fluticasone 2 spray Each Nare Daily   ipratropium-albuterol 3 mL Nebulization Q4H - RT   lansoprazole 30 mg Nasogastric QAM   [START ON 7/2/2019] levothyroxine 137 mcg Oral Daily   metoprolol tartrate 25 mg Oral Q12H   montelukast 10 mg Oral Nightly   polyethylene glycol 17 g Oral Daily   sodium chloride 3 mL Intravenous Q12H   topiramate 100 mg Oral Q12H   , Continuous Infusions:   , PRN Meds:  •  acetaminophen  •  docusate sodium  •  ipratropium-albuterol  •  sodium chloride  •  sodium chloride and Allergies:  Cephalexin; Penicillins; and Sulfa antibiotics    Objective     Vital Sign Min/Max for last 24 hours  Temp  Min: 97.6 °F (36.4 °C)  Max: 98.6 °F (37 °C)   BP  Min: 90/65  Max: 132/83   Pulse  Min: 75  Max: 131   Resp  Min: 18  Max: 18   SpO2  Min: 93 %  Max: 99 %   No Data Recorded   No Data Recorded     Flowsheet Rows      First Filed Value   Admission Height  165.1 cm (65\") Documented at 06/30/2019 0312   Admission Weight  97.1 kg (214 lb) Documented at 06/30/2019 0312             Ejection Fraction  No results found for: EF    Echo EF Estimated  No results found for: ECHOEFEST    Nuclear Stress Ejection Fraction  No components found for: " NUCEF    Cath Ejection Fraction Quantitative  No results found for: CATHEF    Physical Exam:     General Appearance:    Alert, cooperative, in no acute distress   Head:    Normocephalic, without obvious abnormality, atraumatic   Eyes:            Lids and lashes normal, conjunctivae and sclerae normal, no   icterus, no pallor, corneas clear, PERRLA   Ears:    Ears appear intact with no abnormalities noted   Throat:   No oral lesions, no thrush, oral mucosa moist   Neck:   No adenopathy, supple, trachea midline, no thyromegaly, no     carotid bruit, no JVD   Back:     No kyphosis present, no scoliosis present, no skin lesions,       erythema or scars, no tenderness to percussion or                   palpation,   range of motion normal   Lungs:     Clear to auscultation,respirations regular, even and                   unlabored    Heart:    iregular rhythm and normal rate, normal S1 and S2, no            murmur, no gallop, no rub, no click   Breast Exam:    Deferred   Abdomen:     Normal bowel sounds, no masses, no organomegaly, soft        non-tender, non-distended, no guarding, no rebound                 tenderness   Genitalia:    Deferred   Extremities:   Moves all extremities well, 2+ edema on the left 1+ on the right edema, no cyanosis, no              redness   Pulses:  Pulses 1+ in the feet   Skin:   No bleeding, bruising or rash   Lymph nodes:   No palpable adenopathy   Neurologic:  Left-sided hemiparesis       Results Review:   I reviewed the patient's new clinical results.  Results from last 7 days   Lab Units 07/01/19 0337 06/30/19 1954 06/30/19  0328   WBC 10*3/mm3 20.55*  --  17.53*   HEMOGLOBIN g/dL 10.1* 11.5* 7.5*   HEMATOCRIT % 35.3 40.3 27.6*   PLATELETS 10*3/mm3 243  --  261     Results from last 7 days   Lab Units 07/01/19 0337 06/30/19  0328   SODIUM mmol/L 142 142   POTASSIUM mmol/L 3.8 4.4   CHLORIDE mmol/L 109* 109*   CO2 mmol/L 18.0* 19.0*   BUN mg/dL 18 11   CREATININE mg/dL 1.12* 0.87    GLUCOSE mg/dL 157* 146*   CALCIUM mg/dL 8.1* 8.5*     Lab Results   Lab Value Date/Time    TROPONINT <0.010 06/30/2019 0328     Results from last 7 days   Lab Units 07/01/19  0337   CHOLESTEROL mg/dL 101   TRIGLYCERIDES mg/dL 85   HDL CHOL mg/dL 49   LDL CHOL mg/dL 35         Results from last 7 days   Lab Units 06/30/19  0328   INR  1.49*           Assessment/Plan       Acute on chronic diastolic CHF (congestive heart failure) (CMS/HCC)    Hypertension    Fibromyalgia    Chronic anticoagulation on Eliquis    SIRS (systemic inflammatory response syndrome) (CMS/HCC)    Atrial fibrillation with RVR (CMS/HCC)    Asthma    Normocytic anemia    CHF exacerbation (CMS/HCC)    Hypothyroidism    Hyperlipidemia    Community acquired pneumonia    Left hemiparesis (CMS/HCC)    History of hemorrhagic stroke with residual hemiparesis (CMS/HCC)      Increase metoprolol 50 mg twice a day for better rate control  Consider evaluation for the watchman left atrial appendage exclusion device    I discussed the patients findings and my recommendations with patient    Erik Yanes MD  07/01/19  5:21 PM

## 2019-07-01 NOTE — PROGRESS NOTES
Continued Stay Note   [facility]     Patient Name: Vickie Joshi  MRN: [id number]  Today's Date: 7/1/2019    Admit Date: 6/30/2019    Discharge Plan     Row Name 07/01/19 1418       Plan    Plan  Home with spouse and outpatient at [facility]    Patient/Family in Agreement with Plan  yes    Plan Comments  Spoke to pt and . Pt has been going to [facility] outpatient PT 2x per week, transported by her . She doesn't feel she needs HH and would like to continue with her outpatient PT. She denies any d/c needs. CM will follow.         Discharge Codes    No documentation.       Expected Discharge Date and Time     Expected Discharge Date Expected Discharge Time    Jul 3, 2019             Meghan Cabrera

## 2019-07-01 NOTE — PLAN OF CARE
Problem: Patient Care Overview  Goal: Plan of Care Review  Outcome: Ongoing (interventions implemented as appropriate)   07/01/19 1101   Coping/Psychosocial   Plan of Care Reviewed With patient;spouse   Plan of Care Review   Progress improving   OTHER   Outcome Summary OT compelted a brief chart review. Pt Min Ax2 for bed mobility and bed/chair t/f. Pt/spouse appropriately demonstrated t/fs w/ good safety awareness. Pt limited d/t c/o anxiety. Recommend cont skilled IPOT POC. Recommend pt DC home w/ assist and resume OP OT/PT rehab.

## 2019-07-01 NOTE — THERAPY EVALUATION
Acute Care - Physical Therapy Initial Evaluation  [facility]     Patient Name: Vickie Joshi  : 1950  MRN: [id number]  Today's Date: 2019   Onset of Illness/Injury or Date of Surgery: 19  Date of Referral to PT: 19  Referring Physician: ASH Guillory      Admit Date: 2019    Visit Dx:     ICD-10-CM ICD-9-CM   1. Community acquired pneumonia, unspecified laterality J18.9 486   2. Sepsis, due to unspecified organism (CMS/HCC) A41.9 038.9     995.91   3. Hypoxia R09.02 799.02   4. Leukocytosis, unspecified type D72.829 288.60   5. Anemia, unspecified type D64.9 285.9   6. Shortness of breath R06.02 786.05   7. Impaired functional mobility, balance, gait, and endurance Z74.09 V49.89     Patient Active Problem List   Diagnosis   • Fall   • Closed fracture of neck of left femur (CMS/HCC)   • Hypertension   • Hx of Migraine   • GERD (gastroesophageal reflux disease)   • Fibromyalgia   • Neuropathy   • Rapid atrial fibrillation (CMS/HCC)   • Chronic anticoagulation on Eliquis   • Hx of Stroke with residual left hemiparesis   • SIRS (systemic inflammatory response syndrome) (CMS/HCC)   • Atrial fibrillation (CMS/HCC)   • Pneumonia   • Left upper lobe pneumonia (CMS/HCC)   • Asthma   • Allergic rhinitis   • Facial paresis   • Left hemiplegia (CMS/HCC)   • Morbid obesity (CMS/HCC)   • Myositis   • Trigeminal neuralgia   • Snoring   • Pure hypercholesterolemia   • Normocytic anemia   • CHF exacerbation (CMS/HCC)   • Hypothyroidism   • Hyperlipidemia   • Community acquired pneumonia   • Sepsis, unspecified organism (CMS/HCC)     Past Medical History:   Diagnosis Date   • Arthritis    • Asthma    • Atrial fibrillation (CMS/HCC)    • Cardiac disorder    • Disease of thyroid gland    • Fibromyalgia    • GERD (gastroesophageal reflux disease)    • Hyperlipidemia    • Hypertension    • Migraine    • Neuropathy    • Stroke (CMS/HCC)      Past Surgical History:   Procedure Laterality Date   •  CHOLECYSTECTOMY     • HIP PERCUTANEOUS PINNING Left 2/28/2017    Procedure: LEFT HIP PERCUTANEOUS PINNING FEMORAL NECK;  Surgeon: Ezra Barlow MD;  Location:  [facility];  Service:    • HYSTERECTOMY     • VT CLOSED RX TRAUMATIC HIP DISLOCATN Left 2/28/2017    Procedure: LEFT HIP CLOSED REDUCTION;  Surgeon: Ezra Barlow MD;  Location:  [facility];  Service: Orthopedics   • SHOULDER SURGERY          PT ASSESSMENT (last 12 hours)      Physical Therapy Evaluation     Row Name 07/01/19 0937 07/01/19 0837       PT Evaluation Time/Intention    Subjective Information  complains of;fatigue;pain  -  --    Document Type  evaluation chart review performed 0826-0836  -  --    Mode of Treatment  individual therapy  -  --    Evaluation Not Performed  --  patient/family declined evaluation  -    Comment: Evaluation Not Performed  --  Pt on phone upon entering. PT introduced herself and explained purpose, however pt kept talking on the phone. Will check back later.  -    Patient Effort  poor  -  --    Symptoms Noted During/After Treatment  other (see comments) anxiety  -  --    Comment  Pt states she has not slept in 2 nights. Pt became anxious during eval and requested PT to stop.  -  --    Row Name 07/01/19 0937          General Information    Patient Profile Reviewed?  yes  -     Onset of Illness/Injury or Date of Surgery  06/30/19  -     Referring Physician  ASH Guillory  -     Patient Observations  alert  -     Patient/Family Observations   present initially, but stepped out at beginning of session  -     General Observations of Patient  Pt supine in bed. Has external catheter, tele  -     Prior Level of Function  max assist:;transfer;bed mobility;dependent:;ADL's  -     Equipment Currently Used at Home  grab bar;power chair, (recliner lift);ramp;sling;shower chair;walker, daisy;walker, rolling;wheelchair, motorized;wheelchair  -     Pertinent History of Current Functional Problem  69  karenaF presented to ED with SOA, fever --> anemia, CHF exac Hx of CVA 2015 with resid L-sided hp  -SJ     Existing Precautions/Restrictions  fall  -SJ     Risks Reviewed  patient:;increased discomfort;lines disloged  -SJ     Benefits Reviewed  patient:;improve function;increase independence  -SJ     Barriers to Rehab  medically complex;previous functional deficit;ineffective coping  -SJ     Row Name 07/01/19 0937          Relationship/Environment    Primary Source of Support/Comfort  spouse  -SJ     Lives With  spouse  -SJ     Concerns About Impact on Relationships  spouse supportive and provides care at home with DME  -SJ     Row Name 07/01/19 0937          Resource/Environmental Concerns    Current Living Arrangements  home/apartment/condo  -SJ     Row Name 07/01/19 0937          Cognitive Assessment/Intervention- PT/OT    Orientation Status (Cognition)  oriented x 4  -SJ     Follows Commands (Cognition)  follows one step commands;75-90% accuracy  -SJ     Safety Deficit (Cognitive)  mild deficit  -SJ     Row Name 07/01/19 0937          Safety Issues, Functional Mobility    Safety Issues Affecting Function (Mobility)  safety precaution awareness;sequencing abilities  -     Impairments Affecting Function (Mobility)  pain;strength;muscle tone abnormal  -SJ     Row Name 07/01/19 0937          Bed Mobility Assessment/Treatment    Bed Mobility Assessment/Treatment  rolling left;rolling right;scooting/bridging  -SJ     Rolling Left [place] (Bed Mobility)  maximum assist (25% patient effort);1 person assist  -     Rolling Right [place] (Bed Mobility)  dependent (less than 25% patient effort)  -SJ     Scooting/Bridging [place] (Bed Mobility)  dependent (less than 25% patient effort)  -     Bed Mobility, Safety Issues  decreased use of arms for pushing/pulling;decreased use of legs for bridging/pushing  -SJ     Assistive Device (Bed Mobility)  bed rails;draw sheet  -     Comment (Bed Mobility)  Rolled  side to side to place lift sling. However, while sling was being placed, pt had episode of anxiety and requested PT session to stop.   -     Row Name 07/01/19 0937          Transfer Assessment/Treatment    Comment (Transfers)  N/A. Attempted mech t/f due to pt's weakness, however pt adamantly refused mech lift t/f  -     Row Name 07/01/19 0937          General ROM    GENERAL ROM COMMENTS  RLE WFL, LLE limited  -     Row Name 07/01/19 0937          Coping    Observed Emotional State  anxious;tearful/crying  -     Verbalized Emotional State  anxiety  -     Row Name 07/01/19 0937          Plan of Care Review    Plan of Care Reviewed With  patient  -     Row Name 07/01/19 0937          Physical Therapy Clinical Impression    Date of Referral to PT  06/30/19  -     PT Diagnosis (PT Clinical Impression)  impaired mobility  -SJ     Patient/Family Goals Statement (PT Clinical Impression)  return to home at [facility]  -     Criteria for Skilled Interventions Met (PT Clinical Impression)  yes;treatment indicated  -SJ     Pathology/Pathophysiology Noted (Describe Specifically for Each System)  neuromuscular  -SJ     Impairments Found (describe specific impairments)  gait, locomotion, and balance  -SJ     Functional Limitations in Following Categories (Describe Specific Limitations)  self-care  -SJ     Rehab Potential (PT Clinical Summary)  fair, will monitor progress closely  -SJ     Predicted Duration of Therapy (PT)  2wks  -     Care Plan Review (PT)  evaluation/treatment results reviewed;care plan/treatment goals reviewed;risks/benefits reviewed;current/potential barriers reviewed;patient/other agree to care plan  -     Row Name 07/01/19 0937          Vital Signs    Pre Systolic BP Rehab  110  -SJ     Pre Treatment Diastolic BP  68  -SJ     Pretreatment Heart Rate (beats/min)  144  -SJ     Pre SpO2 (%)  96  -SJ     O2 Delivery Pre Treatment  room air  -     Row Name 07/01/19 0937          Physical Therapy  Goals    Bed Mobility Goal Selection (PT)  bed mobility, PT goal 1  -SJ     Transfer Goal Selection (PT)  transfer, PT goal 1  -SJ     Gait Training Goal Selection (PT)  gait training, PT goal 1  -SJ     Row Name 07/01/19 0937          Bed Mobility Goal 1 (PT)    Activity/Assistive Device (Bed Mobility Goal 1, PT)  rolling to left;rolling to right;sit to supine/supine to sit  -SJ     [place] Level/Cues Needed (Bed Mobility Goal 1, PT)  minimum assist (75% or more patient effort)  -SJ     Time Frame (Bed Mobility Goal 1, PT)  2 weeks;long term goal (LTG)  -SJ     Row Name 07/01/19 0937          Transfer Goal 1 (PT)    Activity/Assistive Device (Transfer Goal 1, PT)  sit-to-stand/stand-to-sit;bed-to-chair/chair-to-bed  -SJ     [place] Level/Cues Needed (Transfer Goal 1, PT)  minimum assist (75% or more patient effort)  -SJ     Time Frame (Transfer Goal 1, PT)  2 weeks;long term goal (LTG)  -     Row Name 07/01/19 0937          Gait Training Goal 1 (PT)    Activity/Assistive Device (Gait Training Goal 1, PT)  cane, quad  -SJ     [place] Level (Gait Training Goal 1, PT)  minimum assist (75% or more patient effort)  -SJ     Distance (Gait Goal 1, PT)  10  -SJ     Time Frame (Gait Training Goal 1, PT)  2 weeks;long term goal (LTG)  -     Row Name 07/01/19 0937          Positioning and Restraints    Pre-Treatment Position  in bed  -SJ     Post Treatment Position  bed  -SJ     In Bed  notified nsg;fowlers;call light within reach;encouraged to call for assist;exit alarm on;LUE elevated;heels elevated  -SJ     Row Name 07/01/19 0937          Living Environment    Home Accessibility  wheelchair accessible  -SJ       User Key  (r) = Recorded By, (t) = Taken By, (c) = Cosigned By    Initials Name Provider Type    Jacki De Souza PT Physical Therapist        Physical Therapy Education     Title: PT OT SLP Therapies (In Progress)     Topic: Physical Therapy (In Progress)     Point: Mobility training (In  Progress)     Learning Progress Summary           Patient Refuses, E, NR by  at 7/1/2019 10:18 AM                   Point: Body mechanics (In Progress)     Learning Progress Summary           Patient Refuses, E, NR by  at 7/1/2019 10:18 AM                   Point: Precautions (In Progress)     Learning Progress Summary           Patient Refuses, E, NR by  at 7/1/2019 10:18 AM                               User Key     Initials Effective Dates Name Provider Type Discipline     06/19/15 -  Jacki Royal, PT Physical Therapist PT              PT Recommendation and Plan  Anticipated Discharge Disposition (PT): home with home health  Planned Therapy Interventions (PT Eval): balance training, bed mobility training, gait training, home exercise program, patient/family education, strengthening, transfer training  Therapy Frequency (PT Clinical Impression): daily  Outcome Summary/Treatment Plan (PT)  Anticipated Discharge Disposition (PT): home with home health  Plan of Care Reviewed With: patient  Outcome Summary: PT eval completed, though limited due to pt having episode of anxiety and requested session to stop. Pt apprehensive about not being able to get out of bed exactly how she does it at home. Therapist explained limitations of replicating home environment in hospital setting. PT attempted [facility] lift to recliner, however pt refused once lift sling was placed. RN made aware.   Outcome Measures     Row Name 07/01/19 0937             How much help from another person do you currently need...    Turning from your back to your side while in flat bed without using bedrails?  2  -SJ      Moving from lying on back to sitting on the side of a flat bed without bedrails?  2  -SJ      Moving to and from a bed to a chair (including a wheelchair)?  1  -SJ      Standing up from a chair using your arms (e.g., wheelchair, bedside chair)?  2  -SJ      Climbing 3-5 steps with a railing?  1  -SJ      To walk in hospital room?   1  -      AM-PAC 6 Clicks Score  9  -         Functional Assessment    Outcome Measure Options  AM-PAC 6 Clicks Basic Mobility (PT)  -        User Key  (r) = Recorded By, (t) = Taken By, (c) = Cosigned By    Initials Name Provider Type    Jacki De Souza, PT Physical Therapist         Time Calculation:   PT Charges     Row Name 07/01/19 1019             Time Calculation    Start Time  0937  -      PT Received On  07/01/19  -      PT Goal Re-Cert Due Date  07/11/19  -        User Key  (r) = Recorded By, (t) = Taken By, (c) = Cosigned By    Initials Name Provider Type    Jacki De Souza, PT Physical Therapist        Therapy Charges for Today     Code Description Service Date Service Provider Modifiers Qty    22694818652 HC PT EVAL HIGH COMPLEXITY 4 7/1/2019 Jacki Royal, PT GP 1          PT G-Codes  Outcome Measure Options: AM-PAC 6 Clicks Basic Mobility (PT)  AM-PAC 6 Clicks Score: 9      Jacki Royal PT  7/1/2019

## 2019-07-01 NOTE — THERAPY EVALUATION
Acute Care - Occupational Therapy Initial Evaluation  [facility]     Patient Name: Vickie Joshi  : 1950  MRN: [id number]  Today's Date: 2019  Onset of Illness/Injury or Date of Surgery: 19  Date of Referral to OT: 19  Referring Physician: ARIC Harvey    Admit Date: 2019       ICD-10-CM ICD-9-CM   1. Community acquired pneumonia, unspecified laterality J18.9 486   2. Sepsis, due to unspecified organism (CMS/[facility]) A41.9 038.9     995.91   3. Hypoxia R09.02 799.02   4. Leukocytosis, unspecified type D72.829 288.60   5. Anemia, unspecified type D64.9 285.9   6. Shortness of breath R06.02 786.05   7. Impaired functional mobility, balance, gait, and endurance Z74.09 V49.89   8. Impaired mobility and ADLs Z74.09 799.89     Patient Active Problem List   Diagnosis   • Fall   • Closed fracture of neck of left femur (CMS/[facility])   • Hypertension   • Hx of Migraine   • GERD (gastroesophageal reflux disease)   • Fibromyalgia   • Neuropathy   • Rapid atrial fibrillation (CMS/[facility])   • Chronic anticoagulation on Eliquis   • SIRS (systemic inflammatory response syndrome) (CMS/[facility])   • Atrial fibrillation with RVR (CMS/[facility])   • Pneumonia   • Left upper lobe pneumonia (CMS/[facility])   • Asthma   • Allergic rhinitis   • Facial paresis   • Left hemiplegia (CMS/[facility])   • Morbid obesity (CMS/[facility])   • Myositis   • Trigeminal neuralgia   • Snoring   • Pure hypercholesterolemia   • Normocytic anemia   • CHF exacerbation (CMS/[facility])   • Hypothyroidism   • Hyperlipidemia   • Community acquired pneumonia   • Acute on chronic diastolic CHF (congestive heart failure) (CMS/[facility])   • Left hemiparesis (CMS/[facility])   • History of hemorrhagic stroke with residual hemiparesis (CMS/[facility])     Past Medical History:   Diagnosis Date   • Arthritis    • Asthma    • Atrial fibrillation (CMS/[facility])    • Cardiac disorder    • Disease of thyroid gland    • Fibromyalgia    • GERD (gastroesophageal reflux disease)    • Hyperlipidemia    •  Hypertension    • Migraine    • Neuropathy    • Stroke (CMS/HCC)      Past Surgical History:   Procedure Laterality Date   • CHOLECYSTECTOMY     • HIP PERCUTANEOUS PINNING Left 2/28/2017    Procedure: LEFT HIP PERCUTANEOUS PINNING FEMORAL NECK;  Surgeon: Ezra Barlow MD;  Location:  [facility];  Service:    • HYSTERECTOMY     • HI CLOSED RX TRAUMATIC HIP DISLOCATN Left 2/28/2017    Procedure: LEFT HIP CLOSED REDUCTION;  Surgeon: Ezra Barlow MD;  Location:  [facility];  Service: Orthopedics   • SHOULDER SURGERY            OT ASSESSMENT FLOWSHEET (last 12 hours)      Occupational Therapy Evaluation     Row Name 07/01/19 1101                   OT Evaluation Time/Intention    Subjective Information  complains of;fatigue  -CL        Document Type  evaluation  -CL        Mode of Treatment  occupational therapy  -CL        Patient Effort  excellent  -CL        Symptoms Noted During/After Treatment  other (see comments)  -CL        Comment  Pt w/ noted anxiety. Pt/spouse verbalized process for t/fs and bed mobility at home and appropriately and safely demonstrated techniques.   -CL           General Information    Patient Profile Reviewed?  yes  -CL        Onset of Illness/Injury or Date of Surgery  06/30/19  -CL        Referring Physician  ARIC Harvey  -CL        Prior Level of Function  min assist:;mod assist:;all household mobility;transfer;ADL's;dressing;bathing  -CL        Equipment Currently Used at Home  commode, bedside;ramp;walker, rolling;wheelchair, motorized;cane, quad;walker, daisy utilized q cane to ambulate short distances w/ assist, AFO  -CL        Pertinent History of Current Functional Problem  Pt presented to the ED 2/2 to SOA. Pt dx w/ acute CHF exacerbation. PMH: CVA w/ residual L sided hemiparesis, OP OT/PT/SLP 3x/wk at [facility]  -CL        Existing Precautions/Restrictions  fall;other (see comments)  (Significant)  remote CVA w/ residual L HP, spouse to bring LLE AFO  -CL        Risks Reviewed  patient  and family:;LOB;nausea/vomiting;dizziness;change in vital signs;increased discomfort;lines disloged  -CL        Benefits Reviewed  patient and family:;improve function;increase strength;increase independence;increase balance;decrease pain;increase knowledge  -CL        Barriers to Rehab  none identified  -CL           Relationship/Environment    Lives With  spouse  -CL           Resource/Environmental Concerns    Current Living Arrangements  home/apartment/condo  -CL           Cognitive Assessment/Interventions    Additional Documentation  Cognitive Assessment/Intervention (Group)  -CL           Cognitive Assessment/Intervention- PT/OT    Affect/Mental Status (Cognitive)  anxious  -CL        Orientation Status (Cognition)  oriented x 4  -CL        Follows Commands (Cognition)  follows one step commands;over 90% accuracy  -CL        Cognitive Function (Cognitive)  safety deficit  -CL        Safety Deficit (Cognitive)  mild deficit;awareness of need for assistance;safety precautions awareness  -CL        Personal Safety Interventions  fall prevention program maintained;gait belt;nonskid shoes/slippers when out of bed  -CL           Bed Mobility Assessment/Treatment    Bed Mobility Assessment/Treatment  supine-sit  -CL        Supine-Sit [place] (Bed Mobility)  minimum assist (75% patient effort);2 person assist;verbal cues  -CL        Assistive Device (Bed Mobility)  bed rails;draw sheet;head of bed elevated  -CL        Comment (Bed Mobility)  Pt prefers to get OOB on towards the R. Pt's spouse appropriately verbalized techniques and appropriate safety precautions.   -CL           Functional Mobility    Functional Mobility- Comment  Pt declined to attempt this date.   -CL           Transfer Assessment/Treatment    Transfer Assessment/Treatment  bed-chair transfer;sit-stand transfer;stand-sit transfer  -CL        Comment (Transfers)  Pt's spouse appropriately placed chair to R side and verbalized process and  appropriate safety techniques of how pt transfers at home. Pt/spouse appropriately demonstrated t/f techniques w/ gait belt and UE support.   -CL           Bed-Chair Transfer    Bed-Chair [place] (Transfers)  minimum assist (75% patient effort);2 person assist;verbal cues  -CL           Sit-Stand Transfer    Sit-Stand [place] (Transfers)  minimum assist (75% patient effort);verbal cues  -CL           Stand-Sit Transfer    Stand-Sit [place] (Transfers)  minimum assist (75% patient effort);verbal cues  -CL           ADL Assessment/Intervention    BADL Assessment/Intervention  lower body dressing  -CL           Lower Body Dressing Assessment/Training    Lower Body Dressing [place] Level  don;shoes/slippers;dependent (less than 25% patient effort)  -CL        Lower Body Dressing Position  supported sitting  -CL           General ROM    GENERAL ROM COMMENTS  BUE grossly WFL, noted LUE tone.   -CL           MMT (Manual Muscle Testing)    General MMT Comments  RUE grossly 4-/5. LUE  0/5, bicep/tricep 2-/5, shldr F 3-/5.   -CL           Motor Assessment/Interventions    Additional Documentation  Balance (Group)  -CL           Balance    Balance  static sitting balance;static standing balance  -CL           Static Sitting Balance    Level of [place] (Unsupported Sitting, Static Balance)  supervision  -CL        Sitting Position (Unsupported Sitting, Static Balance)  sitting on edge of bed  -CL           Static Standing Balance    Level of [place] (Supported Standing, Static Balance)  minimal assist, 75% patient effort  -CL        Assistive Device Utilized (Supported Standing, Static Balance)  -- UE support  -CL           Sensory Assessment/Intervention    Sensory General Assessment  light touch sensation deficits identified  -CL           Light Touch Sensation Assessment    Left Upper Extremity: Light Touch Sensation Assessment  mild impairment, 75% or more correct responses  -CL         Right Upper Extremity: Light Touch Sensation Assessment  intact  -CL           Positioning and Restraints    Pre-Treatment Position  in bed  -CL        Post Treatment Position  chair  -CL        In Chair  notified nsg;reclined;encouraged to call for assist;call light within reach;with family/caregiver;RUE elevated;LUE elevated;legs elevated  -CL           Pain Assessment    Additional Documentation  Pain Scale 2: FACES Pre/Post-Treatment (Group)  -CL           Pain Scale 2: FACES Pre/Post-Treatment    Pain 2: FACES Scale, Pretreatment  0-->no hurt  -CL        Pain 2: FACES Scale, Post-Treatment  0-->no hurt  -CL           Clinical Impression (OT)    Date of Referral to OT  06/30/19  -CL        OT Diagnosis  Decreased independence in ADLs.   -CL        Patient/Family Goals Statement (OT Eval)  Return to PLOF.   -CL        Criteria for Skilled Therapeutic Interventions Met (OT Eval)  yes;treatment indicated  -CL        Rehab Potential (OT Eval)  good, to achieve stated therapy goals  -CL        Therapy Frequency (OT Eval)  daily  -CL        Anticipated Equipment Needs at Discharge (OT)  -- TBA further  -CL        Anticipated Discharge Disposition (OT)  home with assist;home with OP services  -CL           Vital Signs    Pre Systolic BP Rehab  -- VSS, RN cleared for tx.   -CL           OT Goals    Transfer Goal Selection (OT)  transfer, OT goal 1  -CL        Dressing Goal Selection (OT)  dressing, OT goal 1  -CL        Toileting Goal Selection (OT)  toileting, OT goal 1  -CL        Strength Goal Selection (OT)  strength, OT goal 1  -CL        Additional Documentation  Strength Goal Selection (OT) (Row)  -CL           Transfer Goal 1 (OT)    Activity/Assistive Device (Transfer Goal 1, OT)  sit-to-stand/stand-to-sit;toilet  -CL        [place] Level/Cues Needed (Transfer Goal 1, OT)  contact guard assist;verbal cues required  -CL        Time Frame (Transfer Goal 1, OT)  by discharge;long term goal (LTG)  -CL         Progress/Outcome (Transfer Goal 1, OT)  goal ongoing  -CL           Dressing Goal 1 (OT)    Activity/Assistive Device (Dressing Goal 1, OT)  upper body dressing  -CL        [place]/Cues Needed (Dressing Goal 1, OT)  minimum assist (75% or more patient effort);verbal cues required don hosp gown w/ daisy-technique  -CL        Time Frame (Dressing Goal 1, OT)  by discharge;long term goal (LTG)  -CL        Progress/Outcome (Dressing Goal 1, OT)  goal ongoing  -CL           Toileting Goal 1 (OT)    Activity/Device (Toileting Goal 1, OT)  adjust/manage clothing;perform perineal hygiene  -CL        [place] Level/Cues Needed (Toileting Goal 1, OT)  minimum assist (75% or more patient effort);verbal cues required  -CL        Time Frame (Toileting Goal 1, OT)  by discharge;long term goal (LTG)  -CL        Progress/Outcome (Toileting Goal 1, OT)  goal ongoing  -CL           Strength Goal 1 (OT)    Strength Goal 1 (OT)  Pt will tolerate RUE AROM/LUE AAROM HEP x15 reps to promtoe ADL performance.   -CL        Time Frame (Strength Goal 1, OT)  by discharge;long term goal (LTG)  -CL        Progress/Outcome (Strength Goal 1, OT)  goal ongoing  -CL           Living Environment    Home Accessibility  -- roll-in shower  -CL          User Key  (r) = Recorded By, (t) = Taken By, (c) = Cosigned By    Initials Name Effective Dates    CL Arcelia Casper, RAMAKRISHNA 04/03/18 -          Occupational Therapy Education     Title: PT OT SLP Therapies (In Progress)     Topic: Occupational Therapy (In Progress)     Point: ADL training (In Progress)     Description: Instruct learner(s) on proper safety adaptation and remediation techniques during self care or transfers.   Instruct in proper use of assistive devices.    Learning Progress Summary           Patient Acceptance, E, NR by CL at 7/1/2019 11:01 AM    Comment:  Pt educated on appropriate safety precautions, t/f techniques, role of OT, and benefits of therapy.   Family Acceptance, E, NR by CL  at 7/1/2019 11:01 AM    Comment:  Pt educated on appropriate safety precautions, t/f techniques, role of OT, and benefits of therapy.                   Point: Precautions (In Progress)     Description: Instruct learner(s) on prescribed precautions during self-care and functional transfers.    Learning Progress Summary           Patient Acceptance, E, NR by CL at 7/1/2019 11:01 AM    Comment:  Pt educated on appropriate safety precautions, t/f techniques, role of OT, and benefits of therapy.   Family Acceptance, E, NR by CL at 7/1/2019 11:01 AM    Comment:  Pt educated on appropriate safety precautions, t/f techniques, role of OT, and benefits of therapy.                   Point: Body mechanics (In Progress)     Description: Instruct learner(s) on proper positioning and spine alignment during self-care, functional mobility activities and/or exercises.    Learning Progress Summary           Patient Acceptance, E, NR by CL at 7/1/2019 11:01 AM    Comment:  Pt educated on appropriate safety precautions, t/f techniques, role of OT, and benefits of therapy.   Family Acceptance, E, NR by CL at 7/1/2019 11:01 AM    Comment:  Pt educated on appropriate safety precautions, t/f techniques, role of OT, and benefits of therapy.                               User Key     Initials Effective Dates Name Provider Type Discipline     04/03/18 -  Arcelia Casper OT Occupational Therapist OT                  OT Recommendation and Plan  Outcome Summary/Treatment Plan (OT)  Anticipated Equipment Needs at Discharge (OT): (TBA further)  Anticipated Discharge Disposition (OT): home with assist, home with OP services  Therapy Frequency (OT Eval): daily  Plan of Care Review  Plan of Care Reviewed With: patient, spouse  Plan of Care Reviewed With: patient, spouse  Outcome Summary: OT compelted a brief chart review. Pt Min Ax2 for bed mobility and bed/chair t/f. Pt/spouse appropriately demonstrated t/fs w/ good safety awareness. Pt limited d/t  c/o anxiety. Recommend cont skilled IPOT POC. Recommend pt DC home w/ assist and resume OP OT/PT rehab.     Outcome Measures     Row Name 07/01/19 1101 07/01/19 0937          How much help from another person do you currently need...    Turning from your back to your side while in flat bed without using bedrails?  --  2  -SJ     Moving from lying on back to sitting on the side of a flat bed without bedrails?  --  2  -SJ     Moving to and from a bed to a chair (including a wheelchair)?  --  1  -SJ     Standing up from a chair using your arms (e.g., wheelchair, bedside chair)?  --  2  -SJ     Climbing 3-5 steps with a railing?  --  1  -SJ     To walk in hospital room?  --  1  -SJ     AM-PAC 6 Clicks Score  --  9  -SJ        How much help from another is currently needed...    Putting on and taking off regular lower body clothing?  1  -CL  --     Bathing (including washing, rinsing, and drying)  2  -CL  --     Toileting (which includes using toilet bed pan or urinal)  2  -CL  --     Putting on and taking off regular upper body clothing  2  -CL  --     Taking care of personal grooming (such as brushing teeth)  2  -CL  --     Eating meals  3  -CL  --     Score  12  -CL  --        Functional Assessment    Outcome Measure Options  AM-PAC 6 Clicks Daily Activity (OT)  -CL  AM-PAC 6 Clicks Basic Mobility (PT)  -       User Key  (r) = Recorded By, (t) = Taken By, (c) = Cosigned By    Initials Name Provider Type    Jacki De Souza, PT Physical Therapist    Arcelia Noel OT Occupational Therapist          Time Calculation:   Time Calculation- OT     Row Name 07/01/19 1101             Time Calculation- OT    OT Start Time  1101  -CL      OT Received On  07/01/19  -CL      OT Goal Re-Cert Due Date  07/11/19  -CL        User Key  (r) = Recorded By, (t) = Taken By, (c) = Cosigned By    Initials Name Provider Type    Arcelia Noel OT Occupational Therapist        Therapy Charges for Today     Code Description Service  Date Service Provider Modifiers Qty    20014467864 HC OT EVAL LOW COMPLEXITY 4 7/1/2019 Arcelia Casper, OT GO 1    26150513990 HC OT THER SUPP EA 15 MIN 7/1/2019 Arcelia Casper OT GO 2               Arcelia Casper OT  7/1/2019

## 2019-07-01 NOTE — PLAN OF CARE
Problem: Patient Care Overview  Goal: Plan of Care Review  Outcome: Ongoing (interventions implemented as appropriate)   07/01/19 0509   Coping/Psychosocial   Plan of Care Reviewed With patient;spouse   Plan of Care Review   Progress no change   OTHER   Outcome Summary Pt's vitals stable, heart rate stayed between 100 to 140 through out the night. Pt's Metoprolol held due to BP 90's/60's. Pt unable to sleep , doesn't sleep much at home either - pt declined order for Melatonin. Pt had no events through out the night. Will continue to moonitor.

## 2019-07-02 PROBLEM — I27.20 PULMONARY HYPERTENSION: Status: ACTIVE | Noted: 2019-07-02

## 2019-07-02 LAB
ANION GAP SERPL CALCULATED.3IONS-SCNC: 13 MMOL/L (ref 5–15)
BACTERIA BLD CULT: ABNORMAL
BH CV ECHO MEAS - AO MAX PG (FULL): 6.7 MMHG
BH CV ECHO MEAS - AO MAX PG: 10.8 MMHG
BH CV ECHO MEAS - AO MEAN PG (FULL): 3.7 MMHG
BH CV ECHO MEAS - AO MEAN PG: 6 MMHG
BH CV ECHO MEAS - AO ROOT AREA (BSA CORRECTED): 1.2
BH CV ECHO MEAS - AO ROOT AREA: 5 CM^2
BH CV ECHO MEAS - AO ROOT DIAM: 2.5 CM
BH CV ECHO MEAS - AO V2 MAX: 164.4 CM/SEC
BH CV ECHO MEAS - AO V2 MEAN: 115.8 CM/SEC
BH CV ECHO MEAS - AO V2 VTI: 31 CM
BH CV ECHO MEAS - AVA(I,A): 2 CM^2
BH CV ECHO MEAS - AVA(I,D): 2 CM^2
BH CV ECHO MEAS - AVA(V,A): 1.9 CM^2
BH CV ECHO MEAS - AVA(V,D): 1.9 CM^2
BH CV ECHO MEAS - BSA(HAYCOCK): 2.2 M^2
BH CV ECHO MEAS - BSA: 2 M^2
BH CV ECHO MEAS - BZI_BMI: 35.6 KILOGRAMS/M^2
BH CV ECHO MEAS - BZI_METRIC_HEIGHT: 165.1 CM
BH CV ECHO MEAS - BZI_METRIC_WEIGHT: 97.1 KG
BH CV ECHO MEAS - EDV(CUBED): 99.3 ML
BH CV ECHO MEAS - EDV(MOD-SP2): 30 ML
BH CV ECHO MEAS - EDV(MOD-SP4): 51 ML
BH CV ECHO MEAS - EDV(TEICH): 98.8 ML
BH CV ECHO MEAS - EF(CUBED): 75 %
BH CV ECHO MEAS - EF(MOD-BP): 65 %
BH CV ECHO MEAS - EF(MOD-SP2): 53.3 %
BH CV ECHO MEAS - EF(MOD-SP4): 74.5 %
BH CV ECHO MEAS - EF(TEICH): 66.9 %
BH CV ECHO MEAS - ESV(CUBED): 24.9 ML
BH CV ECHO MEAS - ESV(MOD-SP2): 14 ML
BH CV ECHO MEAS - ESV(MOD-SP4): 13 ML
BH CV ECHO MEAS - ESV(TEICH): 32.7 ML
BH CV ECHO MEAS - FS: 37 %
BH CV ECHO MEAS - IVS/LVPW: 1.1
BH CV ECHO MEAS - IVSD: 1.1 CM
BH CV ECHO MEAS - LA DIMENSION: 5.5 CM
BH CV ECHO MEAS - LA/AO: 2.2
BH CV ECHO MEAS - LAD MAJOR: 6.5 CM
BH CV ECHO MEAS - LV DIASTOLIC VOL/BSA (35-75): 25.1 ML/M^2
BH CV ECHO MEAS - LV MASS(C)D: 179.6 GRAMS
BH CV ECHO MEAS - LV MASS(C)DI: 88.2 GRAMS/M^2
BH CV ECHO MEAS - LV MAX PG: 4.1 MMHG
BH CV ECHO MEAS - LV MEAN PG: 2.3 MMHG
BH CV ECHO MEAS - LV SYSTOLIC VOL/BSA (12-30): 6.4 ML/M^2
BH CV ECHO MEAS - LV V1 MAX: 101.7 CM/SEC
BH CV ECHO MEAS - LV V1 MEAN: 71.8 CM/SEC
BH CV ECHO MEAS - LV V1 VTI: 20.2 CM
BH CV ECHO MEAS - LVIDD: 4.6 CM
BH CV ECHO MEAS - LVIDS: 2.9 CM
BH CV ECHO MEAS - LVLD AP2: 6.6 CM
BH CV ECHO MEAS - LVLD AP4: 6.4 CM
BH CV ECHO MEAS - LVLS AP2: 5.3 CM
BH CV ECHO MEAS - LVLS AP4: 5.2 CM
BH CV ECHO MEAS - LVOT AREA (M): 3.1 CM^2
BH CV ECHO MEAS - LVOT AREA: 3.1 CM^2
BH CV ECHO MEAS - LVOT DIAM: 2 CM
BH CV ECHO MEAS - LVPWD: 1.1 CM
BH CV ECHO MEAS - MV DEC SLOPE: 365 CM/SEC^2
BH CV ECHO MEAS - MV MAX PG: 19.9 MMHG
BH CV ECHO MEAS - MV MEAN PG: 9.5 MMHG
BH CV ECHO MEAS - MV P1/2T MAX VEL: 193.1 CM/SEC
BH CV ECHO MEAS - MV P1/2T: 154.9 MSEC
BH CV ECHO MEAS - MV V2 MAX: 223.2 CM/SEC
BH CV ECHO MEAS - MV V2 MEAN: 145.1 CM/SEC
BH CV ECHO MEAS - MV V2 VTI: 46 CM
BH CV ECHO MEAS - MVA P1/2T LCG: 1.1 CM^2
BH CV ECHO MEAS - MVA(P1/2T): 1.4 CM^2
BH CV ECHO MEAS - MVA(VTI): 1.4 CM^2
BH CV ECHO MEAS - PA ACC SLOPE: 807.1 CM/SEC^2
BH CV ECHO MEAS - PA ACC TIME: 0.12 SEC
BH CV ECHO MEAS - PA PR(ACCEL): 26.7 MMHG
BH CV ECHO MEAS - RAP SYSTOLE: 3 MMHG
BH CV ECHO MEAS - RVSP: 39 MMHG
BH CV ECHO MEAS - SI(AO): 76.5 ML/M^2
BH CV ECHO MEAS - SI(CUBED): 36.5 ML/M^2
BH CV ECHO MEAS - SI(LVOT): 30.8 ML/M^2
BH CV ECHO MEAS - SI(MOD-SP2): 7.9 ML/M^2
BH CV ECHO MEAS - SI(MOD-SP4): 18.7 ML/M^2
BH CV ECHO MEAS - SI(TEICH): 32.5 ML/M^2
BH CV ECHO MEAS - SV(AO): 155.8 ML
BH CV ECHO MEAS - SV(CUBED): 74.4 ML
BH CV ECHO MEAS - SV(LVOT): 62.7 ML
BH CV ECHO MEAS - SV(MOD-SP2): 16 ML
BH CV ECHO MEAS - SV(MOD-SP4): 38 ML
BH CV ECHO MEAS - SV(TEICH): 66.1 ML
BH CV ECHO MEAS - TAPSE (>1.6): 1.9 CM2
BH CV ECHO MEAS - TR MAX PG: 36 MMHG
BH CV ECHO MEAS - TR MAX VEL: 299.4 CM/SEC
BH CV VAS BP RIGHT ARM: NORMAL MMHG
BH CV XLRA - TDI S': 11 CM/SEC
BUN BLD-MCNC: 14 MG/DL (ref 8–23)
BUN/CREAT SERPL: 19.2 (ref 7–25)
CALCIUM SPEC-SCNC: 8.1 MG/DL (ref 8.6–10.5)
CHLORIDE SERPL-SCNC: 111 MMOL/L (ref 98–107)
CO2 SERPL-SCNC: 18 MMOL/L (ref 22–29)
CREAT BLD-MCNC: 0.73 MG/DL (ref 0.57–1)
DEPRECATED RDW RBC AUTO: 56.7 FL (ref 37–54)
DEPRECATED RDW RBC AUTO: 58.1 FL (ref 37–54)
ERYTHROCYTE [DISTWIDTH] IN BLOOD BY AUTOMATED COUNT: 20.1 % (ref 12.3–15.4)
ERYTHROCYTE [DISTWIDTH] IN BLOOD BY AUTOMATED COUNT: 20.7 % (ref 12.3–15.4)
GFR SERPL CREATININE-BSD FRML MDRD: 80 ML/MIN/1.73
GLUCOSE BLD-MCNC: 109 MG/DL (ref 65–99)
HCT VFR BLD AUTO: 28.6 % (ref 34–46.6)
HCT VFR BLD AUTO: 30.7 % (ref 34–46.6)
HEMOCCULT STL QL: NEGATIVE
HGB BLD-MCNC: 8.3 G/DL (ref 12–15.9)
HGB BLD-MCNC: 9 G/DL (ref 12–15.9)
LV EF 2D ECHO EST: 65 %
MAGNESIUM SERPL-MCNC: 2.1 MG/DL (ref 1.6–2.4)
MAXIMAL PREDICTED HEART RATE: 153 BPM
MCH RBC QN AUTO: 23 PG (ref 26.6–33)
MCH RBC QN AUTO: 23.4 PG (ref 26.6–33)
MCHC RBC AUTO-ENTMCNC: 29 G/DL (ref 31.5–35.7)
MCHC RBC AUTO-ENTMCNC: 29.3 G/DL (ref 31.5–35.7)
MCV RBC AUTO: 79.2 FL (ref 79–97)
MCV RBC AUTO: 79.9 FL (ref 79–97)
PLATELET # BLD AUTO: 220 10*3/MM3 (ref 140–450)
PLATELET # BLD AUTO: 223 10*3/MM3 (ref 140–450)
PMV BLD AUTO: 12.4 FL (ref 6–12)
PMV BLD AUTO: 13.1 FL (ref 6–12)
POTASSIUM BLD-SCNC: 3.7 MMOL/L (ref 3.5–5.2)
RBC # BLD AUTO: 3.61 10*6/MM3 (ref 3.77–5.28)
RBC # BLD AUTO: 3.84 10*6/MM3 (ref 3.77–5.28)
SODIUM BLD-SCNC: 142 MMOL/L (ref 136–145)
STRESS TARGET HR: 130 BPM
WBC NRBC COR # BLD: 18.53 10*3/MM3 (ref 3.4–10.8)
WBC NRBC COR # BLD: 19.14 10*3/MM3 (ref 3.4–10.8)

## 2019-07-02 PROCEDURE — 97530 THERAPEUTIC ACTIVITIES: CPT

## 2019-07-02 PROCEDURE — 82272 OCCULT BLD FECES 1-3 TESTS: CPT | Performed by: PHYSICIAN ASSISTANT

## 2019-07-02 PROCEDURE — 99233 SBSQ HOSP IP/OBS HIGH 50: CPT | Performed by: INTERNAL MEDICINE

## 2019-07-02 PROCEDURE — 85027 COMPLETE CBC AUTOMATED: CPT | Performed by: INTERNAL MEDICINE

## 2019-07-02 PROCEDURE — 25010000002 ENOXAPARIN PER 10 MG: Performed by: INTERNAL MEDICINE

## 2019-07-02 PROCEDURE — 83735 ASSAY OF MAGNESIUM: CPT | Performed by: INTERNAL MEDICINE

## 2019-07-02 PROCEDURE — 80048 BASIC METABOLIC PNL TOTAL CA: CPT | Performed by: INTERNAL MEDICINE

## 2019-07-02 PROCEDURE — 25010000002 NA FERRIC GLUC CPLX PER 12.5 MG: Performed by: INTERNAL MEDICINE

## 2019-07-02 PROCEDURE — 94799 UNLISTED PULMONARY SVC/PX: CPT

## 2019-07-02 RX ORDER — IPRATROPIUM BROMIDE AND ALBUTEROL SULFATE 2.5; .5 MG/3ML; MG/3ML
3 SOLUTION RESPIRATORY (INHALATION) EVERY 4 HOURS PRN
Status: DISCONTINUED | OUTPATIENT
Start: 2019-07-02 | End: 2019-07-03 | Stop reason: HOSPADM

## 2019-07-02 RX ORDER — LANSOPRAZOLE
30 KIT
Status: DISCONTINUED | OUTPATIENT
Start: 2019-07-02 | End: 2019-07-03 | Stop reason: HOSPADM

## 2019-07-02 RX ORDER — FUROSEMIDE 20 MG/1
20 TABLET ORAL DAILY
Status: DISCONTINUED | OUTPATIENT
Start: 2019-07-02 | End: 2019-07-03 | Stop reason: HOSPADM

## 2019-07-02 RX ADMIN — FLUTICASONE PROPIONATE 2 SPRAY: 50 SPRAY, METERED NASAL at 08:57

## 2019-07-02 RX ADMIN — METOPROLOL TARTRATE 50 MG: 50 TABLET ORAL at 08:54

## 2019-07-02 RX ADMIN — METOPROLOL TARTRATE 50 MG: 50 TABLET ORAL at 20:40

## 2019-07-02 RX ADMIN — DILTIAZEM HYDROCHLORIDE 360 MG: 180 CAPSULE, COATED, EXTENDED RELEASE ORAL at 08:55

## 2019-07-02 RX ADMIN — LEVOTHYROXINE SODIUM 137 MCG: 137 TABLET ORAL at 05:35

## 2019-07-02 RX ADMIN — TOPIRAMATE 100 MG: 25 TABLET, FILM COATED ORAL at 08:55

## 2019-07-02 RX ADMIN — SODIUM CHLORIDE, PRESERVATIVE FREE 3 ML: 5 INJECTION INTRAVENOUS at 21:45

## 2019-07-02 RX ADMIN — TOPIRAMATE 100 MG: 25 TABLET, FILM COATED ORAL at 20:40

## 2019-07-02 RX ADMIN — FUROSEMIDE 20 MG: 20 TABLET ORAL at 08:55

## 2019-07-02 RX ADMIN — ATORVASTATIN CALCIUM 10 MG: 10 TABLET, FILM COATED ORAL at 20:39

## 2019-07-02 RX ADMIN — MELATONIN TAB 5 MG 5 MG: 5 TAB at 20:44

## 2019-07-02 RX ADMIN — BUDESONIDE AND FORMOTEROL FUMARATE DIHYDRATE 2 PUFF: 80; 4.5 AEROSOL RESPIRATORY (INHALATION) at 08:08

## 2019-07-02 RX ADMIN — Medication 400 MG: at 08:55

## 2019-07-02 RX ADMIN — MONTELUKAST SODIUM 10 MG: 10 TABLET, COATED ORAL at 20:40

## 2019-07-02 RX ADMIN — SODIUM CHLORIDE, PRESERVATIVE FREE 3 ML: 5 INJECTION INTRAVENOUS at 08:56

## 2019-07-02 RX ADMIN — ACETAMINOPHEN 650 MG: 325 TABLET, FILM COATED ORAL at 20:39

## 2019-07-02 RX ADMIN — ASPIRIN 81 MG: 81 TABLET, COATED ORAL at 08:54

## 2019-07-02 RX ADMIN — BUDESONIDE AND FORMOTEROL FUMARATE DIHYDRATE 2 PUFF: 80; 4.5 AEROSOL RESPIRATORY (INHALATION) at 19:21

## 2019-07-02 RX ADMIN — ACETAMINOPHEN 650 MG: 325 TABLET, FILM COATED ORAL at 07:47

## 2019-07-02 RX ADMIN — SODIUM CHLORIDE 250 MG: 9 INJECTION, SOLUTION INTRAVENOUS at 08:55

## 2019-07-02 RX ADMIN — ENOXAPARIN SODIUM 40 MG: 40 INJECTION SUBCUTANEOUS at 08:54

## 2019-07-02 NOTE — PROGRESS NOTES
Continued Stay Note   [facility]     Patient Name: Vickie Joshi  MRN: [id number]  Today's Date: 7/2/2019    Admit Date: 6/30/2019    Discharge Plan     Row Name 07/02/19 1133       Plan    Plan  Home with spouse and outpatient PT at [facility].     Patient/Family in Agreement with Plan  yes    Plan Comments  Plan remains to go home with her  and continue outpatient [facility] PT. Pt will transport in their  accessible van. Denies d/c needs. CM will follow.         Discharge Codes    No documentation.       Expected Discharge Date and Time     Expected Discharge Date Expected Discharge Time    Jul 3, 2019             Meghan Cabrera

## 2019-07-02 NOTE — THERAPY TREATMENT NOTE
Acute Care - Physical Therapy Treatment Note  [facility]     Patient Name: Vickie Joshi  : 1950  MRN: [id number]  Today's Date: 2019  Onset of Illness/Injury or Date of Surgery: 19  Date of Referral to PT: 19  Referring Physician: ARIC Harvey    Admit Date: 2019    Visit Dx:    ICD-10-CM ICD-9-CM   1. Community acquired pneumonia, unspecified laterality J18.9 486   2. Sepsis, due to unspecified organism (CMS/HCC) A41.9 038.9     995.91   3. Hypoxia R09.02 799.02   4. Leukocytosis, unspecified type D72.829 288.60   5. Anemia, unspecified type D64.9 285.9   6. Shortness of breath R06.02 786.05   7. Impaired functional mobility, balance, gait, and endurance Z74.09 V49.89   8. Impaired mobility and ADLs Z74.09 799.89     Patient Active Problem List   Diagnosis   • Fall   • Closed fracture of neck of left femur (CMS/HCC)   • Hypertension   • Hx of Migraine   • GERD (gastroesophageal reflux disease)   • Fibromyalgia   • Neuropathy   • Rapid atrial fibrillation (CMS/HCC)   • Chronic anticoagulation on Eliquis   • SIRS (systemic inflammatory response syndrome) (CMS/HCC)   • Atrial fibrillation with RVR (CMS/HCC)   • Pneumonia   • Left upper lobe pneumonia (CMS/HCC)   • Asthma   • Allergic rhinitis   • Facial paresis   • Left hemiplegia (CMS/HCC)   • Morbid obesity (CMS/HCC)   • Myositis   • Trigeminal neuralgia   • Snoring   • Pure hypercholesterolemia   • Normocytic anemia   • CHF exacerbation (CMS/HCC)   • Hypothyroidism   • Hyperlipidemia   • Community acquired pneumonia   • Acute on chronic diastolic CHF (congestive heart failure) (CMS/HCC)   • Left hemiparesis (CMS/HCC)   • History of hemorrhagic stroke with residual hemiparesis (CMS/HCC)   • Possible pulmonary hypertension (CMS/HCC)       Therapy Treatment    Rehabilitation Treatment Summary     Row Name 19 1430             Treatment Time/Intention    Discipline  physical therapist  -      Document Type  therapy note (daily  note)  -KR      Subjective Information  complains of;weakness;fatigue  -KR      Mode of Treatment  physical therapy  -KR      Care Plan Review  care plan/treatment goals reviewed;risks/benefits reviewed;patient/other agree to care plan  -KR      Care Plan Review, Other Participant(s)  spouse  -KR      Therapy Frequency (PT Clinical Impression)  daily  -KR      Patient Effort  good  -KR      Existing Precautions/Restrictions  fall;other (see comments) remote CVA with residual L sided weakness  -KR      Recorded by [KR] Lu Ruiz, PT 07/02/19 1604      Row Name 07/02/19 1430             Vital Signs    Pre Systolic BP Rehab  112  -KR      Pre Treatment Diastolic BP  89  -KR      Pretreatment Heart Rate (beats/min)  84  -KR      Posttreatment Heart Rate (beats/min)  87  -KR      Pre SpO2 (%)  93  -KR      O2 Delivery Pre Treatment  room air  -KR      Post SpO2 (%)  95  -KR      O2 Delivery Post Treatment  room air  -KR      Pre Patient Position  Supine  -KR      Intra Patient Position  Standing  -KR      Post Patient Position  Sitting  -KR      Recorded by [KR] Lu Ruiz, PT 07/02/19 1604      Row Name 07/02/19 1430             Cognitive Assessment/Intervention    Additional Documentation  Cognitive Assessment/Intervention (Group)  -KR      Recorded by [KR] Lu Ruiz, PT 07/02/19 1604      Row Name 07/02/19 1430             Cognitive Assessment/Intervention- PT/OT    Affect/Mental Status (Cognitive)  anxious  -KR      Behavioral Issues (Cognitive)  overwhelmed easily  -KR      Orientation Status (Cognition)  oriented x 4  -KR      Follows Commands (Cognition)  follows one step commands;over 90% accuracy;verbal cues/prompting required  -KR      Cognitive Function (Cognitive)  safety deficit  -KR      Safety Deficit (Cognitive)  mild deficit;insight into deficits/self awareness;safety precautions awareness;safety precautions follow-through/compliance  -KR      Personal Safety Interventions  fall  prevention program maintained;gait belt;nonskid shoes/slippers when out of bed  -KR      Recorded by [KR] Lu Ruiz, PT 07/02/19 1604      Row Name 07/02/19 1430             Safety Issues, Functional Mobility    Safety Issues Affecting Function (Mobility)  insight into deficits/self awareness;safety precaution awareness;safety precautions follow-through/compliance  -KR      Impairments Affecting Function (Mobility)  balance;coordination;endurance/activity tolerance;strength  -KR      Recorded by [KR] Lu Ruiz, PT 07/02/19 1604      Row Name 07/02/19 1430             Bed Mobility Assessment/Treatment    Bed Mobility Assessment/Treatment  supine-sit  -KR      Supine-Sit [place] (Bed Mobility)  minimum assist (75% patient effort);2 person assist;verbal cues  -KR      Bed Mobility, Safety Issues  decreased use of arms for pushing/pulling;decreased use of legs for bridging/pushing  -KR      Assistive Device (Bed Mobility)  draw sheet;head of bed elevated  -KR      Comment (Bed Mobility)  VC's for sequencing. Pt required assistance at trunk and BLEs.   -KR      Recorded by [KR] Lu Ruiz, PT 07/02/19 1604      Row Name 07/02/19 1430             Transfer Assessment/Treatment    Transfer Assessment/Treatment  sit-stand transfer;stand-sit transfer;toilet transfer;stand pivot/stand step transfer  -KR      Comment (Transfers)  STS x3 from EOB with PT/spouse on either side. Pt demonstrated appropriate technique and was able to achieve upright posture on all three attempts. Pt performed SPT from bed to BSC and from BSC to chair. Pt encouraged to take steps, but pt with increased anxiety. Unable to take any steps this date; pt demonstrated increased difficulty weight shifting to L to advance R LE.   -KR      Recorded by [KR] Lu Ruiz, PT 07/02/19 1604      Row Name 07/02/19 1430             Sit-Stand Transfer    Sit-Stand [place] (Transfers)  minimum assist (75% patient effort);verbal cues  -KR       Assistive Device (Sit-Stand Transfers)  other (see comments) B UE support  -KR      Recorded by [KR] Lu Ruiz, PT 07/02/19 1604      Row Name 07/02/19 1430             Stand-Sit Transfer    Stand-Sit [place] (Transfers)  minimum assist (75% patient effort);verbal cues  -KR      Assistive Device (Stand-Sit Transfers)  other (see comments) B UE support  -KR      Recorded by [KR] Lu Ruiz, PT 07/02/19 1604      Row Name 07/02/19 1430             Stand Pivot/Stand Step Transfer    Stand Pivot/Stand Step [place]  minimum assist (75% patient effort);2 person assist;verbal cues  -KR      Assistive Device (Stand Pivot Stand Step Transfer)  cane, quad;other (see comments) UE support  -KR      Recorded by [KR] Lu Ruiz, PT 07/02/19 1604      Row Name 07/02/19 1430             Toilet Transfer    Type (Toilet Transfer)  stand pivot/stand step  -KR      [place] Level (Toilet Transfer)  minimum assist (75% patient effort);2 person assist;verbal cues  -KR      Assistive Device (Toilet Transfer)  commode, bedside without drop arms  -KR      Recorded by [KR] Lu Ruiz, PT 07/02/19 1604      Row Name 07/02/19 1430             Gait/Stairs Assessment/Training    [place] Level (Gait)  unable to assess  -KR      Comment (Gait/Stairs)  Ambulation deferred. Not appropriate to assess.   -KR      Recorded by [KR] Lu Ruiz, PT 07/02/19 1604      Row Name 07/02/19 1430             Motor Skills Assessment/Interventions    Additional Documentation  Balance (Group);Therapeutic Exercise (Group)  -KR      Recorded by [KR] Lu Ruiz, PT 07/02/19 1604      Row Name 07/02/19 1430             Therapeutic Exercise    65657 - PT Therapeutic Activity Minutes  39  -KR      Recorded by [KR] Lu Ruiz, PT 07/02/19 1604      Row Name 07/02/19 1430             Balance    Balance  static sitting balance;static standing balance  -KR      Recorded by [KR] Lu Ruiz, PT 07/02/19 1604       Row Name 07/02/19 1430             Static Sitting Balance    Level of [place] (Unsupported Sitting, Static Balance)  contact guard assist  -KR      Sitting Position (Unsupported Sitting, Static Balance)  sitting on edge of bed  -KR      Recorded by [KR] Lu Ruiz, PT 07/02/19 1604      Row Name 07/02/19 1430             Static Standing Balance    Level of [place] (Supported Standing, Static Balance)  minimal assist, 75% patient effort  -KR      Assistive Device Utilized (Supported Standing, Static Balance)  cane, quad  -KR      Recorded by [KR] Lu Ruiz, PT 07/02/19 1604      Row Name 07/02/19 1430             Positioning and Restraints    Pre-Treatment Position  in bed  -KR      Post Treatment Position  chair  -KR      In Chair  notified nsg;reclined;call light within reach;encouraged to call for assist;with family/caregiver;LUE elevated;legs elevated  -KR      Recorded by [KR] Lu Ruiz, PT 07/02/19 1604      Row Name 07/02/19 1430             Pain Assessment    Additional Documentation  Pain Scale: Numbers Pre/Post-Treatment (Group)  -KR      Recorded by [KR] Lu Ruiz, PT 07/02/19 1604      Row Name 07/02/19 1430             Pain Scale: Numbers Pre/Post-Treatment    Pain Scale: Numbers, Pretreatment  0/10 - no pain  -KR      Pain Scale: Numbers, Post-Treatment  0/10 - no pain  -KR      Recorded by [KR] Lu Ruiz, PT 07/02/19 1604      Row Name                Wound 07/01/19 2130 Bilateral midline gluteal pressure injury    Wound - Properties Group Date first assessed: 07/01/19 [NM] Time first assessed: 2130 [NM] Side: Bilateral [NM] Orientation: midline [NM] Location: gluteal [NM] Type: pressure injury [NM] Stage, Pressure Injury: Stage 1 [NM] Recorded by:  [NM] Patric Chacon RN 07/01/19 3451    Row Name 07/02/19 1430             Outcome Summary/Treatment Plan (PT)    Daily Summary of Progress (PT)  progress toward functional goals is gradual  -KR      Recorded by [KR]  Lu Ruiz, PT 07/02/19 1607        User Key  (r) = Recorded By, (t) = Taken By, (c) = Cosigned By    Initials Name Effective Dates Discipline    Patric Barrios, RN 05/22/17 -  Nurse    Lu Peterson, PT 04/03/18 -  PT          Wound 07/01/19 1582 Bilateral midline gluteal pressure injury (Active)   Dressing Appearance open to air 7/2/2019  8:50 AM   Base blanchable 7/2/2019  8:50 AM   Periwound pink;intact 7/1/2019  9:26 PM   Care, Wound other (see comments) 7/1/2019  9:26 PM   Periwound Care, Wound moisturizer applied 7/1/2019  9:26 PM           Physical Therapy Education     Title: PT OT SLP Therapies (In Progress)     Topic: Physical Therapy (In Progress)     Point: Mobility training (In Progress)     Learning Progress Summary           Patient Acceptance, E, NR by SUE at 7/2/2019  2:30 PM    Refuses, E, NR by MARIO at 7/1/2019 10:18 AM   Significant Other Acceptance, E, NR by SUE at 7/2/2019  2:30 PM                   Point: Home exercise program (In Progress)     Learning Progress Summary           Patient Acceptance, E, NR by SUE at 7/2/2019  2:30 PM   Significant Other Acceptance, E, NR by KR at 7/2/2019  2:30 PM                   Point: Body mechanics (In Progress)     Learning Progress Summary           Patient Acceptance, E, NR by SUE at 7/2/2019  2:30 PM    Refuses, E, NR by MARIO at 7/1/2019 10:18 AM   Significant Other Acceptance, E, NR by SUE at 7/2/2019  2:30 PM                   Point: Precautions (In Progress)     Learning Progress Summary           Patient Acceptance, E, NR by SEU at 7/2/2019  2:30 PM    Refuses, E, NR by MARIO at 7/1/2019 10:18 AM   Significant Other Acceptance, E, NR by SUE at 7/2/2019  2:30 PM                               User Key     Initials Effective Dates Name Provider Type Discipline     06/19/15 -  Jacki Royal, PT Physical Therapist PT    SUE 04/03/18 -  Lu Ruiz, PT Physical Therapist PT                PT Recommendation and Plan  Therapy Frequency (PT  Clinical Impression): daily  Outcome Summary/Treatment Plan (PT)  Daily Summary of Progress (PT): progress toward functional goals is gradual  Plan of Care Reviewed With: patient  Progress: improving  Outcome Summary: Pt performed STS x3 from EOB with Brad and UE support. Pt performed SPT from BSC and to recliner with Brad x2 and UE support and quad cane. Pt encouraged to take steps this date, but demonstrated increased anxiety. Pt with increased difficulty weight shifting to advance R LE. Continue to progress as appropriate.   Outcome Measures     Row Name 07/02/19 1430 07/01/19 1101 07/01/19 0937       How much help from another person do you currently need...    Turning from your back to your side while in flat bed without using bedrails?  3  -KR  --  2  -SJ    Moving from lying on back to sitting on the side of a flat bed without bedrails?  3  -KR  --  2  -SJ    Moving to and from a bed to a chair (including a wheelchair)?  3  -KR  --  1  -SJ    Standing up from a chair using your arms (e.g., wheelchair, bedside chair)?  3  -KR  --  2  -SJ    Climbing 3-5 steps with a railing?  1  -KR  --  1  -SJ    To walk in hospital room?  1  -KR  --  1  -SJ    AM-PAC 6 Clicks Score (PT)  14  -KR  --  9  -SJ       How much help from another is currently needed...    Putting on and taking off regular lower body clothing?  --  1  -CL  --    Bathing (including washing, rinsing, and drying)  --  2  -CL  --    Toileting (which includes using toilet bed pan or urinal)  --  2  -CL  --    Putting on and taking off regular upper body clothing  --  2  -CL  --    Taking care of personal grooming (such as brushing teeth)  --  2  -CL  --    Eating meals  --  3  -CL  --    AM-PAC 6 Clicks Score (OT)  --  12  -CL  --       Functional Assessment    Outcome Measure Options  AM-PAC 6 Clicks Basic Mobility (PT)  -KR  AM-PAC 6 Clicks Daily Activity (OT)  -CL  AM-PAC 6 Clicks Basic Mobility (PT)  -SJ      User Key  (r) = Recorded By, (t) = Taken  By, (c) = Cosigned By    Initials Name Provider Type    SJ Jacki Royal, PT Physical Therapist    CL Arcelia Casper, OT Occupational Therapist    Lu Peterson, PT Physical Therapist         Time Calculation:   PT Charges     Row Name 07/02/19 1430             Time Calculation    Start Time  1430  -KR      PT Received On  07/02/19  -KR      PT Goal Re-Cert Due Date  07/11/19  -KR         Time Calculation- PT    Total Timed Code Minutes- PT  39 minute(s)  -KR         Timed Charges    14387 - PT Therapeutic Activity Minutes  39  -KR        User Key  (r) = Recorded By, (t) = Taken By, (c) = Cosigned By    Initials Name Provider Type    Lu Peterson, PT Physical Therapist        Therapy Charges for Today     Code Description Service Date Service Provider Modifiers Qty    93975614149 HC PT THERAPEUTIC ACT EA 15 MIN 7/2/2019 Lu Ruiz, PT GP 3    37894392915 HC PT THER SUPP EA 15 MIN 7/2/2019 Lu Ruiz, PT GP 3          PT G-Codes  Outcome Measure Options: AM-PAC 6 Clicks Basic Mobility (PT)  AM-PAC 6 Clicks Score (PT): 14  AM-PAC 6 Clicks Score (OT): 12    Rosaura Ruiz PT  7/2/2019

## 2019-07-02 NOTE — PLAN OF CARE
Problem: Patient Care Overview  Goal: Plan of Care Review  Outcome: Ongoing (interventions implemented as appropriate)   07/02/19 1430   Coping/Psychosocial   Plan of Care Reviewed With patient   Plan of Care Review   Progress improving   OTHER   Outcome Summary Pt performed STS x3 from EOB with Brad and UE support. Pt performed SPT from BSC and to recliner with Brad x2 and UE support and quad cane. Pt encouraged to take steps this date, but demonstrated increased anxiety. Pt with increased difficulty weight shifting to advance R LE. Continue to progress as appropriate.

## 2019-07-02 NOTE — PROGRESS NOTES
[facility] Medicine Services  PROGRESS NOTE    Patient Name: Vickie Joshi  : 1950  MRN: [id number]    Date of Admission: 2019  Length of Stay: 2  Primary Care Physician: Dav Bryan MD    Subjective   Subjective     CC:  Follow-up shortness of breath    HPI:  Currently feels very pleased with shortness of breath change.  Denies any lightheadedness with borderline low blood pressures yesterday evening.  Denies any further signs of bleeding.   at the bedside providing support and care.  Denies any palpitations with some mild tachycardia in the 120s.  Feels she is stable enough to return home soon.  No other new complaints.      Review of Systems  No current fevers or chills  Improving shortness of breath, no cough  No current nausea, vomiting, or diarrhea  No current chest pain or palpitations      Objective   Objective     Vital Signs:   Temp:  [97.8 °F (36.6 °C)-98.3 °F (36.8 °C)] 98.3 °F (36.8 °C)  Heart Rate:  [] 121  Resp:  [18] 18  BP: ()/() 109/87        Physical Exam:  Constitutional:Awake, alert  HENT: NCAT, mucous membranes moist, neck supple  Respiratory: Faint rales bilaterally, at apex mostly clear to auscultation bilaterally, respiratory effort normal, on nasal cannula oxygen  Cardiovascular: Irregular, tachycardic, normal radial pulses bilaterally  Gastrointestinal: Positive bowel sounds, soft, nontender, nondistended  Musculoskeletal: Significant obesity, debilitated, BMI 35, pitting lower extremity edema bilaterally most significant on the left without tenderness Psychiatric: Appropriate affect, cooperative, conversational  Neurologic: No slurred speech or facial droop, follows commands, not confused   Skin: No rashes or jaundice, warm      Results Reviewed:  I have personally reviewed current lab, radiology, and data and agree.    Results from last 7 days   Lab Units 19  0727 19  0337 19  06/30/19  0328   WBC 10*3/mm3 19.14* 20.55*  --  17.53*   HEMOGLOBIN g/dL 8.3* 10.1* 11.5* 7.5*   HEMATOCRIT % 28.6* 35.3 40.3 27.6*   PLATELETS 10*3/mm3 223 243  --  261   INR   --   --   --  1.49*   PROCALCITONIN ng/mL  --   --   --  0.08*     Results from last 7 days   Lab Units 07/02/19  0727 07/01/19  0337 06/30/19  0328   SODIUM mmol/L 142 142 142   POTASSIUM mmol/L 3.7 3.8 4.4   CHLORIDE mmol/L 111* 109* 109*   CO2 mmol/L 18.0* 18.0* 19.0*   BUN mg/dL 14 18 11   CREATININE mg/dL 0.73 1.12* 0.87   GLUCOSE mg/dL 109* 157* 146*   CALCIUM mg/dL 8.1* 8.1* 8.5*   ALT (SGPT) U/L  --   --  10   AST (SGOT) U/L  --   --  25   TROPONIN T ng/mL  --   --  <0.010   PROBNP pg/mL  --   --  1,025.0*     Estimated Creatinine Clearance: 78.6 mL/min (by C-G formula based on SCr of 0.73 mg/dL).    Microbiology Results Abnormal     Procedure Component Value - Date/Time    Blood Culture - Blood, Arm, Left [[id number]] Collected:  06/30/19 0344    Lab Status:  Preliminary result Specimen:  Blood from Arm, Left Updated:  07/02/19 0500     Blood Culture No growth at 2 days    Blood Culture - Blood, Wrist, Right [[id number]] Collected:  06/30/19 0352    Lab Status:  Preliminary result Specimen:  Blood from Wrist, Right Updated:  07/02/19 0500     Blood Culture No growth at 2 days          Imaging Results (last 24 hours)     Procedure Component Value Units Date/Time    XR Chest 1 View [[id number]] Collected:  07/01/19 1427     Updated:  07/01/19 1539    Narrative:       EXAMINATION: XR CHEST 1 VW- 07/01/2019      INDICATION: SOA, CHF; J18.9-Pneumonia, unspecified organism;  A41.9-Sepsis, unspecified organism; R09.02-Hypoxemia; D72.829-Elevated  white blood cell count, unspecified; D64.9-Anemia, unspecified;  R06.02-Shortness of breath; Z74.09-Other reduced mobility      COMPARISON: 06/30/2019     FINDINGS: Significant interval reduction in bilateral pulmonary  opacifications consistent with improved airspace disease. Minimal  residual  opacifications at the right lung base may represent minimal  residual airspace disease versus atelectasis. No new parenchymal  findings. No pneumothorax or significant effusion. Cardiac silhouette  remains enlarged without overt edema. Degenerative changes of the spine.            Impression:       Significant interval reduction and near total resolution of  bilateral airspace disease from prior comparison consistent with  resolving pneumonia with only minimal residual opacities at the right  lung base.     D:  07/01/2019  E:  07/01/2019             Results for orders placed during the hospital encounter of 06/30/19   Adult Transthoracic Echo Complete W/ Cont if Necessary Per Protocol    Narrative · Mild pulmonic valve regurgitation is present.  · Mild mitral valve regurgitation is present  · Left atrial cavity size is moderately dilated.  · Mild aortic valve regurgitation is present.  · Mild tricuspid valve regurgitation is present.  · Calculated right ventricular systolic pressure from tricuspid   regurgitation is 39 mmHg.  · Estimated EF = 65%.  · Left ventricular systolic function is normal.          I have reviewed the medications:  Scheduled Meds:    GI cocktail  Oral Once   aspirin 81 mg Oral Daily   atorvastatin 10 mg Oral Nightly   budesonide-formoterol 2 puff Inhalation BID - RT   diltiaZEM  mg Oral Q24H   enoxaparin 40 mg Subcutaneous Q24H   ferric gluconate 250 mg Intravenous Daily   fluticasone 2 spray Each Nare Daily   furosemide 20 mg Oral Daily   lansoprazole 30 mg Nasogastric QAM   levothyroxine 137 mcg Oral Daily   magnesium oxide 400 mg Oral Daily   metoprolol tartrate 50 mg Oral Q12H   montelukast 10 mg Oral Nightly   polyethylene glycol 17 g Oral Daily   sodium chloride 3 mL Intravenous Q12H   topiramate 100 mg Oral Q12H     Continuous Infusions:   PRN Meds:.•  acetaminophen  •  docusate sodium  •  ipratropium-albuterol  •  ipratropium-albuterol  •  melatonin  •  sodium chloride  •  sodium  chloride      Assessment/Plan   Assessment / Plan     Active Hospital Problems    Diagnosis  POA   • **Acute on chronic diastolic CHF (congestive heart failure) (CMS/HCC) [I50.33]  Yes   • Possible pulmonary hypertension (CMS/HCC) [I27.20]  Yes   • Left hemiparesis (CMS/HCC) [G81.94]  Yes   • History of hemorrhagic stroke with residual hemiparesis (CMS/HCC) [I69.359]  Not Applicable   • Normocytic anemia [D64.9]  Yes   • CHF exacerbation (CMS/HCC) [I50.9]  Yes   • Asthma [J45.909]  Yes   • Hypothyroidism [E03.9]  Yes   • Hyperlipidemia [E78.5]  Yes   • Community acquired pneumonia [J18.9]  Yes   • Atrial fibrillation with RVR (CMS/[facility]) [I48.91]  Yes   • SIRS (systemic inflammatory response syndrome) (CMS/[facility]) [R65.10]  Yes   • Hypertension [I10]  Yes   • Chronic anticoagulation on Eliquis [Z79.01]  Not Applicable   • Fibromyalgia [M79.7]  Yes      Resolved Hospital Problems   No resolved problems to display.        Brief Hospital Course to date:  Vickie Joshi is a 69 y.o. female with past medical history of left hemiparesis and previous hemorrhagic stroke, chronic anticoagulation on Eliquis, recent GI bleed in the setting of meloxicam use, chronic shortness of breath, obesity, debility, reported asthma presents the hospital with complaint of worsening shortness of breath and was admitted due to concern for acute diastolic congestive heart failure in the setting of RVR.    Acute on chronic diastolic congestive heart failure:  Clinically improved with diuresis.  Significant infiltrates noted on chest x-ray and CT scan.  Repeat echocardiogram.  Consult cardiology for assistance.  Previous echocardiogram shows EF 61% and elevated RVSP.  Add low-dose Lasix.    Acute blood loss anemia in the setting of anticoagulation:  GI following, hold off on intervention currently due to significant multiple problems.  Plan for PPI, iron infusion, transfusion as needed.  Required transfusion at admission.    Atrial fibrillation  with RVR:  Continue diltiazem.  Increase metoprolol.  Cardiology to assist.  Continue anticoagulation.  Patient notes home pulse usually runs 100-130.  Could consider digoxin.    Asthma:  Received IV methylprednisolone at admission in the setting of shortness of breath.  Continue inhalers and respiratory treatments.  Improving.    Essential hypertension, hyperlipidemia:  Adjust medications as needed.    History of hemorrhagic stroke with left hemiparesis: Supportive care.  PT OT.  Patient and family wish to go home at discharge.    Fibromyalgia: Continue home medications.  Stable.    Repeat chest x-ray images reviewed.  Infiltrate significantly resolved.  Echocardiogram reviewed EF normal with elevated RVSP possible pulmonary hypertension.  I am less suspicious for pneumonia currently in the setting of rapidly resolved infiltrates which would be more consistent with CHF.  Hemoglobin drop noted.  Could be partially delusional however plan to continue to trend.  Cardiology assistance appreciated monitor carefully with new dose of metoprolol and hold parameters.  Patient considering watchman referral however wants to discuss with her primary care first.  Case discussed at length with patient and  at the bedside.  40 minutes spent on coordination of care, chart review, and counseling.  20 minutes spent at the bedside regarding counseling of acute issues.    DVT Prophylaxis:  AC    Disposition: I expect the patient to be discharged when clinically improved    CODE STATUS:   Code Status and Medical Interventions:   Ordered at: 06/30/19 0627     Level Of Support Discussed With:    Patient     Code Status:    CPR     Medical Interventions (Level of Support Prior to Arrest):    Full         Electronically signed by Silverio Evans MD, 07/02/19, 8:33 AM.

## 2019-07-02 NOTE — PLAN OF CARE
Problem: Patient Care Overview  Goal: Plan of Care Review  Outcome: Ongoing (interventions implemented as appropriate)   07/02/19 8769   Coping/Psychosocial   Plan of Care Reviewed With patient;spouse   Plan of Care Review   Progress improving   OTHER   Outcome Summary VSS, controlled Afib on monitor. Did c/o lower back pain this AM resolved with Tylenol. Sat in chair this afternoon. Occult stool sent, negative. H/H dropped to 8.3 today but came up to 9 after IV iron. Anxious to go home. Will continue to monitor.

## 2019-07-02 NOTE — NURSING NOTE
WOC nurse consult for non blanchable redness to gluteal area.  Per RN Jacki DYE, areas are red and blanchable.  Skin interventions in place.   No WOC nurse need at this time.  Please reconsult if needed. Thank you

## 2019-07-02 NOTE — PLAN OF CARE
Problem: Patient Care Overview  Goal: Plan of Care Review  Outcome: Ongoing (interventions implemented as appropriate)   07/02/19 4286   Coping/Psychosocial   Plan of Care Reviewed With patient;spouse   Plan of Care Review   Progress improving   OTHER   Outcome Summary Pt's vitals stable, Afib rate between 100 to 140 through out the night. Increased Metoprolol dose held due to bp 89/65, bp normotensive this am. Pt didn't sleep again this shift - Melatonin given - not effective. Pt had moderate, formed bowel movement - occult stool speciment sent to lab. Pt had no complaints through out the night. Pt was disoriented this am - thought she was back at home, easily reoriented. Will continue to monitor.

## 2019-07-03 VITALS
HEIGHT: 65 IN | BODY MASS INDEX: 34.49 KG/M2 | DIASTOLIC BLOOD PRESSURE: 79 MMHG | TEMPERATURE: 98.2 F | OXYGEN SATURATION: 92 % | HEART RATE: 87 BPM | RESPIRATION RATE: 20 BRPM | WEIGHT: 207 LBS | SYSTOLIC BLOOD PRESSURE: 126 MMHG

## 2019-07-03 LAB
ANION GAP SERPL CALCULATED.3IONS-SCNC: 14 MMOL/L (ref 5–15)
BACTERIA SPEC AEROBE CULT: ABNORMAL
BUN BLD-MCNC: 9 MG/DL (ref 8–23)
BUN/CREAT SERPL: 13.4 (ref 7–25)
CALCIUM SPEC-SCNC: 8.3 MG/DL (ref 8.6–10.5)
CHLORIDE SERPL-SCNC: 112 MMOL/L (ref 98–107)
CO2 SERPL-SCNC: 16 MMOL/L (ref 22–29)
CREAT BLD-MCNC: 0.67 MG/DL (ref 0.57–1)
DEPRECATED RDW RBC AUTO: 60.4 FL (ref 37–54)
ERYTHROCYTE [DISTWIDTH] IN BLOOD BY AUTOMATED COUNT: 21.2 % (ref 12.3–15.4)
GFR SERPL CREATININE-BSD FRML MDRD: 88 ML/MIN/1.73
GLUCOSE BLD-MCNC: 111 MG/DL (ref 65–99)
GRAM STN SPEC: ABNORMAL
HCT VFR BLD AUTO: 31.1 % (ref 34–46.6)
HGB BLD-MCNC: 8.9 G/DL (ref 12–15.9)
ISOLATED FROM: ABNORMAL
MCH RBC QN AUTO: 23.5 PG (ref 26.6–33)
MCHC RBC AUTO-ENTMCNC: 28.6 G/DL (ref 31.5–35.7)
MCV RBC AUTO: 82.3 FL (ref 79–97)
PLATELET # BLD AUTO: 233 10*3/MM3 (ref 140–450)
PMV BLD AUTO: 12.8 FL (ref 6–12)
POTASSIUM BLD-SCNC: 3.3 MMOL/L (ref 3.5–5.2)
RBC # BLD AUTO: 3.78 10*6/MM3 (ref 3.77–5.28)
SODIUM BLD-SCNC: 142 MMOL/L (ref 136–145)
WBC NRBC COR # BLD: 12.55 10*3/MM3 (ref 3.4–10.8)

## 2019-07-03 PROCEDURE — 94799 UNLISTED PULMONARY SVC/PX: CPT

## 2019-07-03 PROCEDURE — 85027 COMPLETE CBC AUTOMATED: CPT | Performed by: INTERNAL MEDICINE

## 2019-07-03 PROCEDURE — 99239 HOSP IP/OBS DSCHRG MGMT >30: CPT | Performed by: INTERNAL MEDICINE

## 2019-07-03 PROCEDURE — 80048 BASIC METABOLIC PNL TOTAL CA: CPT | Performed by: INTERNAL MEDICINE

## 2019-07-03 RX ORDER — ALBUTEROL SULFATE 1.25 MG/3ML
1 SOLUTION RESPIRATORY (INHALATION) EVERY 6 HOURS PRN
Qty: 90 VIAL | Refills: 12 | Status: SHIPPED | OUTPATIENT
Start: 2019-07-03

## 2019-07-03 RX ORDER — PANTOPRAZOLE SODIUM 40 MG/1
40 TABLET, DELAYED RELEASE ORAL 2 TIMES DAILY
Qty: 60 TABLET | Refills: 0 | Status: SHIPPED | OUTPATIENT
Start: 2019-07-03 | End: 2021-07-15 | Stop reason: HOSPADM

## 2019-07-03 RX ORDER — FUROSEMIDE 20 MG/1
20 TABLET ORAL DAILY
Qty: 30 TABLET | Refills: 0 | Status: SHIPPED | OUTPATIENT
Start: 2019-07-04 | End: 2021-06-15 | Stop reason: SDUPTHER

## 2019-07-03 RX ORDER — POTASSIUM CHLORIDE 7.45 MG/ML
10 INJECTION INTRAVENOUS
Status: DISCONTINUED | OUTPATIENT
Start: 2019-07-03 | End: 2019-07-03 | Stop reason: HOSPADM

## 2019-07-03 RX ORDER — POTASSIUM CHLORIDE 1500 MG/1
20 TABLET, FILM COATED, EXTENDED RELEASE ORAL DAILY
Qty: 30 TABLET | Refills: 0 | Status: SHIPPED | OUTPATIENT
Start: 2019-07-03 | End: 2022-06-02 | Stop reason: SDUPTHER

## 2019-07-03 RX ORDER — METOPROLOL TARTRATE 50 MG/1
50 TABLET, FILM COATED ORAL EVERY 12 HOURS SCHEDULED
Qty: 60 TABLET | Refills: 0 | Status: SHIPPED | OUTPATIENT
Start: 2019-07-03 | End: 2021-06-15 | Stop reason: SDUPTHER

## 2019-07-03 RX ORDER — POTASSIUM CHLORIDE 750 MG/1
40 CAPSULE, EXTENDED RELEASE ORAL AS NEEDED
Status: DISCONTINUED | OUTPATIENT
Start: 2019-07-03 | End: 2019-07-03 | Stop reason: HOSPADM

## 2019-07-03 RX ORDER — POTASSIUM CHLORIDE 1.5 G/1.77G
40 POWDER, FOR SOLUTION ORAL AS NEEDED
Status: DISCONTINUED | OUTPATIENT
Start: 2019-07-03 | End: 2019-07-03 | Stop reason: HOSPADM

## 2019-07-03 RX ADMIN — SODIUM CHLORIDE, PRESERVATIVE FREE 3 ML: 5 INJECTION INTRAVENOUS at 08:39

## 2019-07-03 RX ADMIN — LANSOPRAZOLE 30 MG: KIT at 06:36

## 2019-07-03 RX ADMIN — METOPROLOL TARTRATE 50 MG: 50 TABLET ORAL at 08:35

## 2019-07-03 RX ADMIN — BUDESONIDE AND FORMOTEROL FUMARATE DIHYDRATE 2 PUFF: 80; 4.5 AEROSOL RESPIRATORY (INHALATION) at 08:54

## 2019-07-03 RX ADMIN — POTASSIUM CHLORIDE 40 MEQ: 750 CAPSULE, EXTENDED RELEASE ORAL at 12:46

## 2019-07-03 RX ADMIN — ASPIRIN 81 MG: 81 TABLET, COATED ORAL at 08:34

## 2019-07-03 RX ADMIN — TOPIRAMATE 100 MG: 25 TABLET, FILM COATED ORAL at 08:35

## 2019-07-03 RX ADMIN — LEVOTHYROXINE SODIUM 137 MCG: 137 TABLET ORAL at 06:36

## 2019-07-03 RX ADMIN — Medication 400 MG: at 08:34

## 2019-07-03 RX ADMIN — FUROSEMIDE 20 MG: 20 TABLET ORAL at 08:34

## 2019-07-03 RX ADMIN — POTASSIUM CHLORIDE 40 MEQ: 750 CAPSULE, EXTENDED RELEASE ORAL at 08:35

## 2019-07-03 RX ADMIN — FLUTICASONE PROPIONATE 2 SPRAY: 50 SPRAY, METERED NASAL at 08:40

## 2019-07-03 RX ADMIN — DILTIAZEM HYDROCHLORIDE 360 MG: 180 CAPSULE, COATED, EXTENDED RELEASE ORAL at 08:33

## 2019-07-03 NOTE — PLAN OF CARE
"Problem: Patient Care Overview  Goal: Plan of Care Review  Outcome: Ongoing (interventions implemented as appropriate)   07/03/19 1052   Coping/Psychosocial   Plan of Care Reviewed With patient;spouse   Plan of Care Review   Progress improving   OTHER   Outcome Summary Pt's vitals stable - controlled Afib, pt's blood pressure tolerated Metoprolol. Pt slept very little , \"nodded off a couple times\", but is anxious to go home soon. Pt had no events through out the night, will continue to monitor.          "

## 2019-07-03 NOTE — DISCHARGE SUMMARY
[facility] Medicine Services  DISCHARGE SUMMARY    Patient Name: Vickie Joshi  : 1950  MRN: [id number]    Date of Admission: 2019  Date of Discharge:  7/3/2019  Primary Care Physician: Dav Bryan MD    Consults     Date and Time Order Name Status Description    2019 1333 Inpatient Cardiology Consult      2019 0644 Inpatient Gastroenterology Consult Completed           Hospital Course     Presenting Problem:   Community acquired pneumonia, unspecified laterality [J18.9]    Active Hospital Problems    Diagnosis  POA   • **Acute on chronic diastolic CHF (congestive heart failure) (CMS/HCC) [I50.33]  Yes   • Possible pulmonary hypertension (CMS/HCC) [I27.20]  Yes   • Left hemiparesis (CMS/[facility]) [G81.94]  Yes   • History of hemorrhagic stroke with residual hemiparesis (CMS/HCC) [I69.359]  Not Applicable   • Normocytic anemia [D64.9]  Yes   • CHF exacerbation (CMS/[facility]) [I50.9]  Yes   • Asthma [J45.909]  Yes   • Hypothyroidism [E03.9]  Yes   • Hyperlipidemia [E78.5]  Yes   • Atrial fibrillation with RVR (CMS/HCC) [I48.91]  Yes   • SIRS (systemic inflammatory response syndrome) (CMS/[facility]) [R65.10]  Yes   • Hypertension [I10]  Yes   • Chronic anticoagulation on Eliquis [Z79.01]  Not Applicable   • Fibromyalgia [M79.7]  Yes      Resolved Hospital Problems   No resolved problems to display.          Hospital Course:  Vickie Joshi is a 69 y.o. female with past medical history of left hemiparesis and previous hemorrhagic stroke, chronic anticoagulation on Eliquis, recent GI bleed in the setting of meloxicam use, chronic shortness of breath, obesity, debility, reported asthma presents the hospital with complaint of worsening shortness of breath and was admitted due to concern for acute diastolic congestive heart failure in the setting of RVR and additional with probable acute asthma exacerbation.  Patient had significant infiltrates on CT scan.  She received IV  diuresis and repeat imaging showed that these had rapidly resolved which seems to be consistent with diagnosis of pulmonary edema from congestive heart failure.  There was initially some concern for infection and she received a couple doses of antibiotics but this is for now felt to be less likely.  Leukocytosis is most likely felt to be related to recent steroid use.  Cardiologist recommended to continue low-dose Lasix at discharge with potassium.     Acute on chronic diastolic congestive heart failure and possible pulmonary hypertension:  Clinically improved with diuresis.  Significant infiltrates noted on chest x-ray and CT scan.  Previous echocardiogram shows EF 61% and elevated RVSP.  Repeat echocardiogram shows EF of 65% and RVSP of 39 during this admission.  Add low-dose Lasix with potassium at discharge.  Beta-blocker also increased per cardiology.     Acute blood loss anemia in the setting of anticoagulation and concern for GI bleed:  GI following, hold off on intervention currently due to significant multiple problems.  Plan for PPI twice daily, iron infusion, transfusion as needed.  Required transfusion at admission.  Hemoglobin has now stabilized and has trended up with in the last 24 hours.  Recommend primary care follow-up with repeat CBC at follow-up to reassess blood counts.  Patient has received significant IV iron this hospital stay.  No further bleeding noted.     Atrial fibrillation with RVR:  Continue diltiazem.    Cardiology increase metoprolol as per above for better rate control.  Initially patient was significantly tachycardic in the 130s to 150s however notes home pulse usually runs 100-130.    She is currently ranging between  and agrees to follow-up with outpatient cardiologist in [place] after discharge.    Asthma exacerbation:  Received IV methylprednisolone this hospital stay.  She needs to continue her chronic inhalers at home.  She also is felt to need albuterol nebulizer for  discharge to assist with further episodes of dyspnea.  Nebulizer ordered and albuterol solution prescribed.  Breathing significantly improved.       Essential hypertension, hyperlipidemia:  Adjust medications as needed.  Recently with some intermittent hypotension felt to be related to RVR.  Blood pressure improved.     History of hemorrhagic stroke with left hemiparesis: Supportive care.  PT OT.  Patient and family wish to go home at discharge.  They have adequate supplies at home.     Fibromyalgia: Continue home medications.  Stable.    Also of note 1 of 2 of the blood cultures was positive for coag negative staph and felt to be contaminant.     Patient has been very eager and adamant to discharge home today.  They agree to follow-up with primary care and cardiology.  Blood pressure much improved.  Respiratory status much improved.  Peripheral edema much improved.    At the time of discharge patient was told to take all medications as prescribed, keep all follow-up appointments, and call their doctor or return to the hospital with any worsening or concerning symptoms.    Please note that dragon voice recognition software was used to create this note and that transcription errors are possible.        Day of Discharge     HPI:   Patient feels her breathing is back to baseline.  She feels her strength is at baseline.  She feels very good and is eager to go home today.  Her  is also looking forward to taking her home today.  They note that her leg swelling is greatly improved with the Lasix.  They agreed to follow-up with primary care as previously scheduled in approximately 10 days.  They also agree to follow-up with her cardiologist within approximately 1 month.    Review of Systems  No current fevers or chills  Mostly resolved shortness of breath, no cough  No current nausea, vomiting, or diarrhea  No current chest pain or palpitations    Vital Signs:   Temp:  [98 °F (36.7 °C)-98.2 °F (36.8 °C)] 98.2 °F  (36.8 °C)  Heart Rate:  [] 87  Resp:  [18-20] 20  BP: ()/() 126/79     Physical Exam:  Constitutional:Awake, alert  HENT: NCAT, mucous membranes moist, neck supple  Respiratory: Faint rales bilaterally, at apex mostly clear to auscultation bilaterally, respiratory effort normal, on nasal cannula oxygen  Cardiovascular: Irregular, tachycardic, normal radial pulses bilaterally  Gastrointestinal: Positive bowel sounds, soft, nontender, nondistended  Musculoskeletal: Significant obesity, debilitated, BMI 35, significantly decreased pitting lower extremity edema bilaterally most significant on the left without tenderness Psychiatric: Appropriate affect, cooperative, conversational  Neurologic: No slurred speech or facial droop, follows commands, not confused   Skin: No rashes or jaundice, warm    Pertinent  and/or Most Recent Results     Results from last 7 days   Lab Units 07/03/19  0636 07/02/19  1551 07/02/19  0727 07/01/19  0337 06/30/19  1954 06/30/19  0328   WBC 10*3/mm3 12.55* 18.53* 19.14* 20.55*  --  17.53*   HEMOGLOBIN g/dL 8.9* 9.0* 8.3* 10.1* 11.5* 7.5*   HEMATOCRIT % 31.1* 30.7* 28.6* 35.3 40.3 27.6*   PLATELETS 10*3/mm3 233 220 223 243  --  261   SODIUM mmol/L 142  --  142 142  --  142   POTASSIUM mmol/L 3.3*  --  3.7 3.8  --  4.4   CHLORIDE mmol/L 112*  --  111* 109*  --  109*   CO2 mmol/L 16.0*  --  18.0* 18.0*  --  19.0*   BUN mg/dL 9  --  14 18  --  11   CREATININE mg/dL 0.67  --  0.73 1.12*  --  0.87   GLUCOSE mg/dL 111*  --  109* 157*  --  146*   CALCIUM mg/dL 8.3*  --  8.1* 8.1*  --  8.5*     Results from last 7 days   Lab Units 06/30/19  0328   BILIRUBIN mg/dL 0.4   ALK PHOS U/L 96   ALT (SGPT) U/L 10   AST (SGOT) U/L 25   PROTIME Seconds 17.4*   INR  1.49*     Results from last 7 days   Lab Units 07/01/19  0337   CHOLESTEROL mg/dL 101   TRIGLYCERIDES mg/dL 85   HDL CHOL mg/dL 49     Results from last 7 days   Lab Units 07/01/19  0337 06/30/19  0814 06/30/19  0708 06/30/19  0328    TSH mIU/mL 1.240  --  1.050  --    HEMOGLOBIN A1C %  --   --   --  5.60   PROBNP pg/mL  --   --   --  1,025.0*   TROPONIN T ng/mL  --   --   --  <0.010   PROCALCITONIN ng/mL  --   --   --  0.08*   LACTATE mmol/L 2.6* 2.5*  --  2.1*       Brief Urine Lab Results  (Last result in the past 365 days)      Color   Clarity   Blood   Leuk Est   Nitrite   Protein   CREAT   Urine HCG        06/30/19 0553 Yellow Clear Negative Negative Negative Negative               Microbiology Results Abnormal     Procedure Component Value - Date/Time    Blood Culture - Blood, Arm, Left [[id number]]  (Abnormal) Collected:  06/30/19 0344    Lab Status:  Final result Specimen:  Blood from Arm, Left Updated:  07/03/19 0622     Blood Culture Staphylococcus, coagulase negative     Comment: Probable contaminant requires clinical correlation, susceptibility not performed unless requested by physician.          Isolated from Aerobic Bottle     Gram Stain Aerobic Bottle Gram positive cocci in pairs and clusters    Blood Culture - Blood, Wrist, Right [[id number]] Collected:  06/30/19 0352    Lab Status:  Preliminary result Specimen:  Blood from Wrist, Right Updated:  07/03/19 0500     Blood Culture No growth at 3 days    Blood Culture ID, PCR - Blood, Arm, Left [[id number]]  (Abnormal) Collected:  06/30/19 0344    Lab Status:  Final result Specimen:  Blood from Arm, Left Updated:  07/02/19 1931     BCID, PCR Staphylococcus spp, not aureus. Identification by BCID PCR.          Imaging Results (all)     Procedure Component Value Units Date/Time    XR Chest 1 View [[id number]] Collected:  07/01/19 1427     Updated:  07/02/19 1709    Narrative:       EXAMINATION: XR CHEST 1 VW- 07/01/2019      INDICATION: SOA, CHF; J18.9-Pneumonia, unspecified organism;  A41.9-Sepsis, unspecified organism; R09.02-Hypoxemia; D72.829-Elevated  white blood cell count, unspecified; D64.9-Anemia, unspecified;  R06.02-Shortness of breath; Z74.09-Other reduced mobility       COMPARISON: 06/30/2019     FINDINGS: Significant interval reduction in bilateral pulmonary  opacifications consistent with improved airspace disease. Minimal  residual opacifications at the right lung base may represent minimal  residual airspace disease versus atelectasis. No new parenchymal  findings. No pneumothorax or significant effusion. Cardiac silhouette  remains enlarged without overt edema. Degenerative changes of the spine.            Impression:       Significant interval reduction and near total resolution of  bilateral airspace disease from prior comparison consistent with  resolving pneumonia with only minimal residual opacities at the right  lung base.     D:  07/01/2019  E:  07/01/2019     This report was finalized on 7/2/2019 5:05 PM by Dr. Mehrdad Casey.       CT Angiogram Chest With Contrast [[id number]] Collected:  06/30/19 0539     Updated:  06/30/19 0542    Narrative:       CTA Chest    INDICATION:   Shortness of air and chest pain    TECHNIQUE:   CT angiogram of the chest with contrast. 3-D postprocessing was performed and reviewed.   Radiation dose reduction techniques included automated exposure control or exposure modulation based on body size. Count of known CT and cardiac nuc med studies  performed in previous 12 months: 0.     COMPARISON:   CTA chest 9/27/2018    FINDINGS:   Extensive bilateral perihilar alveolar infiltrates most compatible with pulmonary edema. This may be cardiogenic or noncardiogenic in origin. There is cardiomegaly. No effusions.     No evidence of pulmonary embolus. Mild aortic atherosclerotic changes. Visualized upper abdomen unremarkable. Osseous structures and soft tissues appear normal. Thoracic inlet unremarkable. Calcified mediastinal and hilar nodes compatible prior  granulomatous disease.      Impression:       Extensive bilateral pulmonary alveolar infiltrates in a perihilar distribution compatible with acute pulmonary edema. This could be cardiogenic or  noncardiogenic in origin. She does demonstrate cardiomegaly.    No evidence of pulmonary embolus.    Signer Name: NATHALY Crowley MD   Signed: 6/30/2019 5:39 AM   Workstation Name: Jongla       XR Chest 1 View [[id number]] Collected:  06/30/19 0421     Updated:  06/30/19 0423    Narrative:       CR Chest 1 Vw    INDICATION:   Fever with wheezing and difficulty breathing x1 day.     COMPARISON:    9/27/2018    FINDINGS:  Single portable AP view of the chest.  Interstitial and alveolar edema with patchy left upper lobe alveolar infiltrates. This may represent CHF with pulmonary edema or possibly infectious in etiology. No sizable effusions. No pneumothorax. Mediastinum  unremarkable.      Impression:       Diffuse interstitial and alveolar infiltrates most prominent left upper lobe. This could be secondary to CHF or possibly infectious in etiology.    Signer Name: NATHALY Crowley MD   Signed: 6/30/2019 4:21 AM   Workstation Name: [email or web address]                       Results for orders placed during the hospital encounter of 06/30/19   Adult Transthoracic Echo Complete W/ Cont if Necessary Per Protocol    Narrative · Mild pulmonic valve regurgitation is present.  · Mild mitral valve regurgitation is present  · Left atrial cavity size is moderately dilated.  · Mild aortic valve regurgitation is present.  · Mild tricuspid valve regurgitation is present.  · Calculated right ventricular systolic pressure from tricuspid   regurgitation is 39 mmHg.  · Estimated EF = 65%.  · Left ventricular systolic function is normal.           Order Current Status    Blood Culture - Blood, Wrist, Right Preliminary result        Discharge Details        Discharge Medications      New Medications      Instructions Start Date   albuterol 1.25 MG/3ML nebulizer solution  Commonly known as:  ACCUNEB   1 ampule, Nebulization, Every 6 Hours PRN      furosemide 20 MG tablet  Commonly known as:  LASIX   20 mg, Oral, Daily   Start Date:   7/4/2019     pantoprazole 40 MG EC tablet  Commonly known as:  PROTONIX   40 mg, Oral, 2 Times Daily      potassium chloride ER 20 MEQ tablet controlled-release ER tablet  Commonly known as:  K-TAB   20 mEq, Oral, Daily         Changes to Medications      Instructions Start Date   metoprolol tartrate 50 MG tablet  Commonly known as:  LOPRESSOR  What changed:    · medication strength  · how much to take  · additional instructions   50 mg, Oral, Every 12 Hours Scheduled         Continue These Medications      Instructions Start Date   acetaminophen 325 MG tablet  Commonly known as:  TYLENOL   650 mg, Oral, Every 4 Hours PRN      apixaban 5 MG tablet tablet  Commonly known as:  ELIQUIS   5 mg, Oral, 2 Times Daily      aspirin 81 MG EC tablet   81 mg, Oral, Daily      atorvastatin 10 MG tablet  Commonly known as:  LIPITOR   10 mg, Oral, Nightly      budesonide-formoterol 80-4.5 MCG/ACT inhaler  Commonly known as:  SYMBICORT   2 puffs, Inhalation, 2 Times Daily - RT      diltiaZEM 360 MG 24 hr capsule  Commonly known as:  TIAZAC   360 mg, Oral, Daily      docusate sodium 100 MG capsule  Commonly known as:  COLACE   100 mg, Oral, 2 Times Daily      fluticasone 50 MCG/ACT nasal spray  Commonly known as:  FLONASE   2 sprays, Each Nare, Daily      guaiFENesin 600 MG 12 hr tablet  Commonly known as:  MUCINEX   1,200 mg, Oral, 2 Times Daily PRN      levothyroxine 150 MCG tablet  Commonly known as:  SYNTHROID, LEVOTHROID   137 mcg, Oral, Daily, Pt reports taking 137mcg not 150mcg      loperamide 2 MG capsule  Commonly known as:  IMODIUM   2 mg, Oral, Daily PRN      loratadine 10 MG tablet  Commonly known as:  CLARITIN   10 mg, Oral, Daily      montelukast 10 MG tablet  Commonly known as:  SINGULAIR   10 mg, Oral, Nightly      pregabalin 300 MG capsule  Commonly known as:  LYRICA   300 mg, Oral, 2 Times Daily      PREPARATION H HYDROCORTISONE 1 % cream  Generic drug:  hydrocortisone   1 application, Topical, 4 Times Daily PRN       topiramate 100 MG tablet  Commonly known as:  TOPAMAX   100 mg, Oral, 2 Times Daily      ZANTAC 300 MG tablet  Generic drug:  raNITIdine   300 mg, Oral, Nightly         Stop These Medications    esomeprazole 40 MG capsule  Commonly known as:  nexIUM     predniSONE 10 MG tablet  Commonly known as:  DELTASONE            Allergies   Allergen Reactions   • Cephalexin Swelling   • Penicillins Hives   • Sulfa Antibiotics Hives         Discharge Disposition:  Home or Self Care    Discharge Diet:  Diet Order   Procedures   • Diet Regular; Cardiac, Low Fiber / Low Residue         Discharge Activity:   Activity Instructions     Up WIth Assist              CODE STATUS:    Code Status and Medical Interventions:   Ordered at: 06/30/19 0627     Level Of Support Discussed With:    Patient     Code Status:    CPR     Medical Interventions (Level of Support Prior to Arrest):    Full       Additional Instructions for the Follow-ups that You Need to Schedule     Ambulatory Referral to Physical Therapy Evaluate and treat   As directed      Specialty needed:  Evaluate and treat         Discharge Follow-up with PCP   As directed       Currently Documented PCP:    Dav Bryan MD    PCP Phone Number:    [phone]     Follow Up Details:  Keep previously scheduled appointment within 1 to 2 weeks.         Discharge Follow-up with Specified Provider: Please see your cardiologist in [place] within approximately 4 weeks for atrial fibrillation follow-up; 1 Month   As directed      To:  Please see your cardiologist in [place] within approximately 4 weeks for atrial fibrillation follow-up    Follow Up:  1 Month               Time Spent on Discharge:  50 minutes    Electronically signed by Silverio Evans MD, 07/03/19, 12:25 PM.

## 2019-07-03 NOTE — PROGRESS NOTES
Case Management Discharge Note    Final Note: Provided pt with an outpatient PT resumption order to take to outpatient PT at [facility]. Pt will call and reschedule her own outpatient PT. Pt would like to use [facility] for Nebulizer. Called and Efaxed order to Cecy at [place]. Pt will need to come by DME office to  after d/c. Pt has no further needs at this time. CM will follow.     Destination      No service has been selected for the patient.      Durable Medical Equipment - Selection Complete      Service Provider Request Status Selected Services Address Phone Number Fax Number    [facility] [employer] [facility]urable Medical [facility] [address] [facility] [address] [phone]      Dialysis/Infusion      No service has been selected for the patient.      Home Medical Care      No service has been selected for the patient.      Therapy - Selection Complete      Service Provider Request Status Selected Services Address Phone Number Fax Number    [facility] Selected Outpatient Rehabilitation [address] [facility] [address] [phone]      Community Resources      No service has been selected for the patient.             Final Discharge Disposition Code: 01 - home or self-care

## 2019-07-03 NOTE — DISCHARGE PLACEMENT REQUEST
"Vickie Hawkins (69 y.o. Female)     Date of Birth Social Security Number Address Home Phone MRN    1950  [address] [facility] [phone] [id number]    Yazdanism Marital Status          Religion        Admission Date Admission Type Admitting Provider Attending Provider Department, Room/Bed    19 Emergency Silverio Evans MD Furlow, Stephen Matthew, MD [facility] 6A, N611/1    Discharge Date Discharge Disposition Discharge Destination         Home or Self Care              Attending Provider:  Silverio Evans MD    Allergies:  Cephalexin, Penicillins, Sulfa Antibiotics    Isolation:  None   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)   Code Status:  CPR    Ht:  165.1 cm (65\")   Wt:  93.9 kg (207 lb)    Admission Cmt:  None   Principal Problem:  Acute on chronic diastolic CHF (congestive heart failure) (CMS/[facility]) [I50.33]                 Active Insurance as of 2019     Primary Coverage     Payor Plan Insurance Group Employer/Plan Group    HUMANA MEDICARE REPLACEMENT HUMANA MEDICARE REPL [id number]     Payor Plan Address Payor Plan Phone Number Payor Plan Fax Number Effective Dates    [address] [address] [phone]  2015 - None Entered    [facility] [phone]       Subscriber Name Subscriber Birth Date Member ID       VICKIE HAWKINS 1950 [id number]                 Emergency Contacts      (Rel.) Home Phone Work Phone Mobile Phone    Barak Hawkins (Spouse) [phone] -- --               History & Physical      Maria R Walker DO at 2019  5:17 AM              [facility] Medicine Services  HISTORY AND PHYSICAL    Patient Name: Vickie Hawkins  : 1950  MRN: [id number]  Primary Care Physician: Dav Bryan MD  Date of admission: 2019      Subjective   Subjective     Chief Complaint: SOB    HPI:  Vickie Hawkins is a 69 y.o. female with a medical history significant for A. fib on " Eliquis, CVA in 2015, HTN, HLD, asthma, hypothyroidism and fibromyalgia was brought to [facility] ED via EMS for short onset of shortness of breath.  Patient reports waking up around midnight with significant shortness of breath, coughing and wheezing as well as a fever of 100.7.  Before tonight the patient said she felt a little hot yesterday but no other symptoms.  Patient tried using her inhalers with no improvement in symptoms.  She denies nausea, vomiting, abdominal pain, diarrhea, or blood in her stool but endorses constipation.  She denies chest pain, palpitations, dizziness, headache or confusion. She denies dysuria or incontinence but admits to increased frequency. The patient was admitted to [facility] for pneumonia on CXR from 9/27-10/8/18 and positive blood cultures that were thought to be contaminated and treated with vancomycin, azactam, and doxycycline.  She denies use of tobacco, alcohol or drugs. The patient lives at home with her .     While in the ED, the patient had a low grade fever of 99.3, tachycardic, and tachypnic saturating well on RA. The labs revealed proBNP 1025, WBC 17.53, RBC 3.49, Hgb 7.5, HCT 27.6, lactate 2.1.  UA pending.  Blood cultures pending.  CXR shows diffuse interstitial and alveolar infiltrates most prominent left upper lobe.  CTA reveals extensive bilateral pulmonary alveolar infiltrates and possibly perihilar distribution compatible with acute pulmonary edema and no evidence of PE.  Patient was started on Levaquin and given 125 of Solu-Medrol, and neb treatment, and Tylenol in the ED. The patient will be admitted to the hospitalist service for further medical management.    Review of Systems   Constitutional: Positive for chills, diaphoresis, fatigue and fever (100.7).   HENT: Negative for congestion, sore throat and trouble swallowing.    Eyes: Negative for pain and visual disturbance.   Respiratory: Positive for cough (dry), shortness of breath and wheezing.    Cardiovascular:  Positive for leg swelling (left). Negative for chest pain and palpitations.   Gastrointestinal: Positive for constipation and vomiting. Negative for abdominal pain, blood in stool, diarrhea and nausea.   Endocrine: Negative for cold intolerance and heat intolerance.   Genitourinary: Positive for frequency. Negative for difficulty urinating and dysuria.   Musculoskeletal: Positive for arthralgias and myalgias. Negative for joint swelling.   Skin: Negative for rash and wound.   Neurological: Positive for weakness (Generalized). Negative for dizziness, seizures, syncope and headaches.   Hematological: Negative for adenopathy. Does not bruise/bleed easily.   Psychiatric/Behavioral: Negative for agitation and confusion.     Otherwise complete ROS reviewed and is negative except as mentioned in the HPI.    Personal History     Past Medical History:   Diagnosis Date   • Arthritis    • Atrial fibrillation (CMS/HCC)    • Cardiac disorder    • Disease of thyroid gland    • Fibromyalgia    • GERD (gastroesophageal reflux disease)    • Hyperlipidemia    • Hypertension    • Migraine    • Neuropathy    • Stroke (CMS/HCC)        Past Surgical History:   Procedure Laterality Date   • CHOLECYSTECTOMY     • HIP PERCUTANEOUS PINNING Left 2/28/2017    Procedure: LEFT HIP PERCUTANEOUS PINNING FEMORAL NECK;  Surgeon: Ezra Barlow MD;  Location:  [facility];  Service:    • HYSTERECTOMY     • IN CLOSED RX TRAUMATIC HIP DISLOCATN Left 2/28/2017    Procedure: LEFT HIP CLOSED REDUCTION;  Surgeon: Ezra Barlow MD;  Location:  [facility];  Service: Orthopedics   • SHOULDER SURGERY         Family History: family history includes Alcohol abuse in her mother; Cancer in her mother; Diabetes in her mother; Heart disease in her mother; Hypertension in her mother; Parkinsonism in her mother; Stroke in her mother. Otherwise pertinent FHx was reviewed and unremarkable.     Social History:  reports that she has never smoked. She has never used  smokeless tobacco. She reports that she does not drink alcohol or use drugs.  Social History     Social History Narrative   • Not on file       Medications:    Available home medication information reviewed.    (Not in a hospital admission)    Allergies   Allergen Reactions   • Cephalexin Swelling   • Penicillins Hives   • Sulfa Antibiotics Hives       Objective   Objective     Vital Signs:   Temp:  [99.3 °F (37.4 °C)] 99.3 °F (37.4 °C)  Heart Rate:  [115-120] 120  Resp:  [26-30] 26  BP: (139)/(90) 139/90        Physical Exam   Constitutional: Awake, alert, lying in bed, somewhat uncomfortable   Eyes: PERRLA, sclerae anicteric, no conjunctival injection  HENT: NCAT, mucous membranes moist  Neck: Supple, no thyromegaly, no lymphadenopathy, trachea midline  Respiratory: Course sounds on the left with scattered wheezes, slight increased work of breathing, saturating 99% on RA  Cardiovascular: Irregularly irregular, no murmurs appreciated, palpable pedal pulses bilaterally  Gastrointestinal: Positive bowel sounds, soft, nontender, no distention or guarding  Musculoskeletal: Left ankle 1+ pitting edema, no clubbing or cyanosis to extremities  Psychiatric: Appropriate affect, cooperative  Neurologic: Oriented x 3, left-sided weakness, Cranial Nerves grossly intact to confrontation, speech clear   Skin: No rashes    Results Reviewed:  I have personally reviewed current lab, radiology, and data and agree.    Results from last 7 days   Lab Units 06/30/19  0328   WBC 10*3/mm3 17.53*   HEMOGLOBIN g/dL 7.5*   HEMATOCRIT % 27.6*   PLATELETS 10*3/mm3 261   INR  1.49*     Results from last 7 days   Lab Units 06/30/19  0328   SODIUM mmol/L 142   POTASSIUM mmol/L 4.4   CHLORIDE mmol/L 109*   CO2 mmol/L 19.0*   BUN mg/dL 11   CREATININE mg/dL 0.87   GLUCOSE mg/dL 146*   CALCIUM mg/dL 8.5*   ALT (SGPT) U/L 10   AST (SGOT) U/L 25   TROPONIN T ng/mL <0.010   PROBNP pg/mL 1,025.0*   LACTATE mmol/L 2.1*   PROCALCITONIN ng/mL 0.08*      Estimated Creatinine Clearance: 72.3 mL/min (by C-G formula based on SCr of 0.87 mg/dL).  Brief Urine Lab Results     None        Imaging Results (last 24 hours)     Procedure Component Value Units Date/Time    XR Chest 1 View [[id number]] Collected:  06/30/19 0421     Updated:  06/30/19 0423    Narrative:       CR Chest 1 Vw    INDICATION:   Fever with wheezing and difficulty breathing x1 day.     COMPARISON:    9/27/2018    FINDINGS:  Single portable AP view of the chest.  Interstitial and alveolar edema with patchy left upper lobe alveolar infiltrates. This may represent CHF with pulmonary edema or possibly infectious in etiology. No sizable effusions. No pneumothorax. Mediastinum  unremarkable.      Impression:       Diffuse interstitial and alveolar infiltrates most prominent left upper lobe. This could be secondary to CHF or possibly infectious in etiology.    Signer Name: NATHALY Crowley MD   Signed: 6/30/2019 4:21 AM   Workstation Name: [email or web address]           Results for orders placed during the hospital encounter of 09/27/18   Adult Transthoracic Echo Complete W/ Cont if Necessary Per Protocol    Narrative · Moderate tricuspid valve regurgitation is present.  · Mild pulmonic valve regurgitation is present.  · There is calcification of the aortic valve.  · Mild aortic valve regurgitation is present.  · Left atrial cavity size is borderline dilated.  · Moderate mitral valve regurgitation is present          Assessment/Plan   Assessment / Plan     Active Hospital Problems    Diagnosis POA   • Normocytic anemia [D64.9] Yes   • CHF exacerbation (CMS/HCC) [I50.9] Yes   • Asthma [J45.909] Yes   • Hypothyroidism [E03.9] Yes   • Hyperlipidemia [E78.5] Yes   • Atrial fibrillation (CMS/HCC) [I48.91] Yes   • SIRS (systemic inflammatory response syndrome) (CMS/HCC) [R65.10] Yes   • Hypertension [I10] Yes   • Chronic anticoagulation on Eliquis [Z79.01] Not Applicable   • Hx of Stroke with residual left hemiparesis  [I63.9] Yes   • Fibromyalgia [M79.7] Yes     Normocytic anemia  - hgb 7.5, hct 27.6  - obtain hemoccult   - transfuse 1 units of blood; followed by IV lasix 20 mg   - consult GI   - iron studies  - h&h q6h    Acute CHF exacerbation  - proBNP 1,025  - CTA reports extensive bilateral pulmonary alveolar infiltrates in a perihilar distribution compatible with acute pulmonary edema without a PE  - echo 9/28/18 with an EF 61% with moderate TR, OH, MR and mild AR  - give IV lasix 20 mg with blood transfusion   - defer further diuresis to daytime team  - daily weights, strict I&Os    SIRS criteria met   - RR 30, WBC 17.53, lactate 2.1  - CXR demonstrates diffuse interstitial and alveolar infiltrates most prominent in left upper lobe  - CTA reports extensive bilateral pulmonary alveolar infiltrates in a perihilar distribution compatible with acute pulmonary edema without a PE  - urinalysis pending   - blood cultures pending   - levaquin     - cbc, bmp, tsh in the morning  - likely attributed to etiologies above      PAF  - CHADSVASc 5  - Eliquis   - repeat ekg  - continue metoprolol, diltiazem     Asthma  - singulair, symbicort   - duo nebs scheduled and PRN  - incentive spirometry      HTN  - blood pressure stable  - continue metoprolol, diltiazem     HLD  - continue atorvastatin   - flp in the morning     Hypothyroidism  - tsh in the morning  - continue synthroid     Hx of CVA  - left-sided residual weakness  - CM/PT/OT    Fibromyalgia  - continue home medications     DVT prophylaxis:  Eliquis     CODE STATUS:    Code Status and Medical Interventions:   Ordered at: 06/30/19 7141     Level Of Support Discussed With:    Patient     Code Status:    CPR     Medical Interventions (Level of Support Prior to Arrest):    Full     Admission Status:  I believe this patient meets INPATIENT status due to the need for care which can only be reasonably provided in an hospital setting including work up of anemia and blood transfuses and  treatment with IV antibioitcs.  In such, I feel patient’s risk for adverse outcomes and need for care warrant INPATIENT evaluation and predict the patient’s care encounter to likely last beyond 2 midnights.    Electronically signed by Marie Harvey PA-C, 06/30/19, 5:17 AM.      Brief Attending Admission Attestation     I have seen and examined the patient, performing an independent face-to-face diagnostic evaluation with plan of care reviewed and developed with the advanced practice clinician (APC).      Brief Summary Statement:   Vickie Joshi is a 69 y.o. female with PMHx of A fib on Eliquis, CHF, hypothyroidism, HTN, asthma, GERD, and trigeminal neuralgia. Pt presented to [facility] ED with c/o sudden onset of shortness of breath. Pt states she had recently developed and nonproductive cough. And chills and fatigue. Sleeping more. She states she has had rectal bleeding. Bright red blood in her stool and urine. Upon arrival to the ED pt was noted to have sats of 88%, resp rate of 30. CT of chest noted-extensive bilateral pulmonary alveolar infiltrates in a perihilar distribution compatible with acute pulmonary edema without a PE. Pt was also noted to have a hemoglobin of 7.5. Pt will be admitted and treated for Sepsis secondary to community acquired pneumonia with Levaquin (multiple drug allergies), 1 unit of PRBS and give IV lasix.       Remainder of detailed HPI is as noted above and has been reviewed and/or edited by me for completeness.      Attending Physical Exam:  Constitutional: No acute distress, awake, alert  HENT: NCAT, mucous membranes moist  Respiratory: Clear to auscultation bilaterally, respiratory effort normal   Cardiovascular: irregularly irregular, palpable pedal pulses bilaterally  Gastrointestinal: Positive bowel sounds, soft, nontender, nondistended  Musculoskeletal: 1 plus edema  Psychiatric: Appropriate affect, cooperative  Neurologic: Oriented x 3, strength symmetric in all extremities,  Cranial Nerves grossly intact to confrontation, speech clear  Skin: No rashes        Brief Assessment/Plan :  See above for further detailed assessment and plan developed with APC which I have reviewed and/or edited for completeness.      Electronically signed by Maria R Walker DO, 06/30/19, 7:12 AM.             Electronically signed by Maria R Walker DO at 6/30/2019  8:06 AM

## 2019-07-04 ENCOUNTER — READMISSION MANAGEMENT (OUTPATIENT)
Dept: CALL CENTER | Facility: HOSPITAL | Age: 69
End: 2019-07-04

## 2019-07-05 LAB — BACTERIA SPEC AEROBE CULT: NORMAL

## 2019-07-07 ENCOUNTER — READMISSION MANAGEMENT (OUTPATIENT)
Dept: CALL CENTER | Facility: HOSPITAL | Age: 69
End: 2019-07-07

## 2019-07-07 NOTE — OUTREACH NOTE
CHF Week 1 Survey      Responses   Facility patient discharged from?  [place]   Does the patient have one of the following disease processes/diagnoses(primary or secondary)?  CHF   Is there a successful TCM telephone encounter documented?  No   CHF Week 1 attempt successful?  No   Unsuccessful attempts  Attempt 1          Fariha Gagnon RN

## 2019-07-09 ENCOUNTER — READMISSION MANAGEMENT (OUTPATIENT)
Dept: CALL CENTER | Facility: HOSPITAL | Age: 69
End: 2019-07-09

## 2019-07-09 NOTE — OUTREACH NOTE
CHF Week 1 Survey      Responses   Facility patient discharged from?  [place]   Does the patient have one of the following disease processes/diagnoses(primary or secondary)?  CHF   Is there a successful TCM telephone encounter documented?  No   CHF Week 1 attempt successful?  No   Unsuccessful attempts  Attempt 2          Romana Demarco RN

## 2019-07-11 ENCOUNTER — READMISSION MANAGEMENT (OUTPATIENT)
Dept: CALL CENTER | Facility: HOSPITAL | Age: 69
End: 2019-07-11

## 2019-07-11 NOTE — OUTREACH NOTE
CHF Week 2 Survey      Responses   Facility patient discharged from?  [place]   Does the patient have one of the following disease processes/diagnoses(primary or secondary)?  CHF   Week 2 attempt successful?  Yes   Call start time  1624   Call end time  1629   General alerts for this patient  .   Discharge diagnosis  CHF exacerbation  Atrial fibrillation with RVR  Acute blood loss anemia    Is patient permission given to speak with other caregiver?  Yes   List who call center can speak with     Meds reviewed with patient/caregiver?  Yes   Is the patient having any side effects they believe may be caused by any medication additions or changes?  No   Does the patient have all medications ordered at discharge?  Yes   Is the patient taking all medications as directed (includes completed medication regime)?  Yes   Does the patient have a primary care provider?   Yes   Does the patient have an appointment with their PCP within 7 days of discharge?  Yes   Has the patient kept scheduled appointments due by today?  N/A   What DME was ordered?  [facility] for nebulizer   Has all DME been delivered?  Yes   Psychosocial issues?  No   Comments  Doing outpt PT currently. Using nebs and feels helpful.    Did the patient receive a copy of their discharge instructions?  Yes   Nursing interventions  Reviewed instructions with patient   What is the patient's perception of their health status since discharge?  Improving   Nursing interventions  Nurse provided patient education   Is the patient weighing daily?  No [not daily]   Does the patient have scales?  Yes   Daily weight interventions  Education provided on importance of daily weight   Is the patient able to teach back Heart Failure diet management?  Yes   Is the patient able to teach back Heart Failure Zones?  Yes   Is the patient able to teach back signs and symptoms of worsening condition? (i.e. weight gain, shortness of air, etc.)  Yes   Is the  patient/caregiver able to teach back the hierarchy of who to call/visit for symptoms/problems? PCP, Specialist, Home health nurse, Urgent Care, ED, 911  Yes   CHF Week 2 call completed?  Yes          Merced Multani RN

## 2019-07-18 ENCOUNTER — READMISSION MANAGEMENT (OUTPATIENT)
Dept: CALL CENTER | Facility: HOSPITAL | Age: 69
End: 2019-07-18

## 2019-07-18 NOTE — OUTREACH NOTE
CHF Week 3 Survey      Responses   Facility patient discharged from?  [place]   Does the patient have one of the following disease processes/diagnoses(primary or secondary)?  CHF   Week 3 attempt successful?  No   Unsuccessful attempts  Attempt 1          Sandra Galvez RN

## 2019-07-19 ENCOUNTER — READMISSION MANAGEMENT (OUTPATIENT)
Dept: CALL CENTER | Facility: HOSPITAL | Age: 69
End: 2019-07-19

## 2019-07-19 NOTE — OUTREACH NOTE
CHF Week 3 Survey      Responses   Facility patient discharged from?  [place]   Does the patient have one of the following disease processes/diagnoses(primary or secondary)?  CHF   Week 3 attempt successful?  Yes   Call start time  0950   Call end time  0954   Discharge diagnosis  CHF exacerbation  Atrial fibrillation with RVR  Acute blood loss anemia    Is patient permission given to speak with other caregiver?  Yes   List who call center can speak with  , Barak Wells reviewed with patient/caregiver?  Yes   Is the patient having any side effects they believe may be caused by any medication additions or changes?  No   Does the patient have all medications ordered at discharge?  Yes   Is the patient taking all medications as directed (includes completed medication regime)?  Yes   Does the patient have a primary care provider?   Yes   Comments regarding PCP  Dav Bryan MD   Has the patient kept scheduled appointments due by today?  Yes   What is the Home health agency?   Patient goes to outpt PT.    Has home health visited the patient within 72 hours of discharge?  N/A   Psychosocial issues?  No   Did the patient receive a copy of their discharge instructions?  Yes   Nursing interventions  Reviewed instructions with patient   What is the patient's perception of their health status since discharge?  Improving   Nursing interventions  Nurse provided patient education   Is the patient weighing daily?  No   Does the patient have scales?  Yes   Daily weight interventions  Education provided on importance of daily weight   Is the patient able to teach back Heart Failure diet management?  Yes   Is the patient able to teach back Heart Failure Zones?  Yes   Is the patient able to teach back signs and symptoms of worsening condition? (i.e. weight gain, shortness of air, etc.)  Yes   Is the patient/caregiver able to teach back the hierarchy of who to call/visit for symptoms/problems? PCP, Specialist, Home  health nurse, Urgent Care, ED, 911  Yes   CHF Week 3 call completed?  Yes          Amy Newby RN

## 2019-07-27 ENCOUNTER — READMISSION MANAGEMENT (OUTPATIENT)
Dept: CALL CENTER | Facility: HOSPITAL | Age: 69
End: 2019-07-27

## 2019-07-27 NOTE — OUTREACH NOTE
CHF Week 4 Survey      Responses   Facility patient discharged from?  [place]   Does the patient have one of the following disease processes/diagnoses(primary or secondary)?  CHF   Week 4 attempt successful?  No          Enmanuel Shah RN

## 2019-12-16 ENCOUNTER — LAB REQUISITION (OUTPATIENT)
Dept: LAB | Facility: HOSPITAL | Age: 69
End: 2019-12-16

## 2019-12-16 DIAGNOSIS — Z00.00 ROUTINE GENERAL MEDICAL EXAMINATION AT A HEALTH CARE FACILITY: ICD-10-CM

## 2019-12-16 PROCEDURE — 36415 COLL VENOUS BLD VENIPUNCTURE: CPT | Performed by: INTERNAL MEDICINE

## 2020-05-17 NOTE — THERAPY TREATMENT NOTE
"He was discharged on 05/13/2020- acute variceal bleeding. He will be out of the medication Rifaximin on Monday. ( This medication was ordered per nasogastric tube, the patient does not have a NG tube. The medication was not ordered. He received samples. Advised to call PCP in the am.     Reason for Disposition  • Caller has NON-URGENT medication question about med that PCP prescribed and triager unable to answer question    Additional Information  • Negative: Drug overdose and nurse unable to answer question  • Negative: Caller requesting information not related to medicine  • Negative: Caller requesting a prescription for Strep throat and has a positive culture result  • Negative: Rash while taking a medication or within 3 days of stopping it  • Negative: Immunization reaction suspected  • Negative: [1] Asthma and [2] having symptoms of asthma (cough, wheezing, etc)  • Negative: MORE THAN A DOUBLE DOSE of a prescription or over-the-counter (OTC) drug  • Negative: [1] DOUBLE DOSE (an extra dose or lesser amount) of over-the-counter (OTC) drug AND [2] any symptoms (e.g., dizziness, nausea, pain, sleepiness)  • Negative: [1] DOUBLE DOSE (an extra dose or lesser amount) of prescription drug AND [2] any symptoms (e.g., dizziness, nausea, pain, sleepiness)  • Negative: Took another person's prescription drug  • Negative: [1] DOUBLE DOSE (an extra dose or lesser amount) of prescription drug AND [2] NO symptoms (Exception: a double dose of antibiotics)  • Negative: Diabetes drug error or overdose (e.g., insulin or extra dose)  • Negative: [1] Request for URGENT new prescription or refill of \"essential\" medication (i.e., likelihood of harm to patient if not taken) AND [2] triager unable to fill per unit policy  • Negative: [1] Prescription not at pharmacy AND [2] was prescribed today by PCP  • Negative: Pharmacy calling with prescription questions and triager unable to answer question  • Negative: Caller has URGENT " Acute Care - Physical Therapy Treatment Note  [facility]     Patient Name: Vickie Joshi  : 1950  MRN: [id number]  Today's Date: 10/8/2018  Onset of Illness/Injury or Date of Surgery: 18  Date of Referral to PT: 18  Referring Physician: FABIANA Chen    Admit Date: 2018    Visit Dx:    ICD-10-CM ICD-9-CM   1. Dysphagia, unspecified type R13.10 787.20   2. Impaired mobility and ADLs Z74.09 799.89   3. Impaired functional mobility, balance, gait, and endurance Z74.09 V49.89   4. Cerebrovascular accident (CVA), unspecified mechanism (CMS/[facility]) I63.9 434.91   5. Fall, subsequent encounter W19.XXXD V58.89     E888.9   6. Pneumonia of right lung due to infectious organism, unspecified part of lung J18.9 483.8   7. Sepsis, due to unspecified organism (CMS/[facility]) A41.9 038.9     995.91   8. Atrial fibrillation, chronic (CMS/HCC) I48.2 427.31     Patient Active Problem List   Diagnosis   • Fall   • Closed fracture of neck of left femur (CMS/HCC)   • Hyperlipidemia   • Hypertension   • Hx of Migraine   • GERD (gastroesophageal reflux disease)   • Fibromyalgia   • Neuropathy   • Rapid atrial fibrillation (CMS/HCC)   • Chronic anticoagulation on Eliquis   • Hx of Stroke with residual left hemiparesis   • Sepsis (CMS/[facility])   • Atrial fibrillation (CMS/HCC)   • Pneumonia       Therapy Treatment          Rehabilitation Treatment Summary     Row Name 10/08/18 1016             Treatment Time/Intention    Discipline physical therapist  -      Document Type therapy note (daily note)  -      Subjective Information no complaints  -      Mode of Treatment individual therapy  -      Patient/Family Observations Pt sitting up in bed, awake. States she may be going home today.  -2      Care Plan Review care plan/treatment goals reviewed;risks/benefits reviewed;current/potential barriers reviewed;patient/other agree to care plan  -2      Care Plan Review, Other Participant(s) spouse  -2      Therapy  "medication question about med that PCP prescribed and triager unable to answer question    Answer Assessment - Initial Assessment Questions  1. SYMPTOMS: \"Do you have any symptoms?\"      See note   2. SEVERITY: If symptoms are present, ask \"Are they mild, moderate or severe?\"      See note    Protocols used: MEDICATION QUESTION CALL-ADULT-      " Frequency (PT Clinical Impression) daily  -SJ2      Patient Effort good  -SJ2      Existing Precautions/Restrictions fall;other (see comments)   L hemiparesis  -SJ2      Patient Response to Treatment farhan well  -SJ2      Recorded by [SJ] Jacki Royal, PT 10/08/18 1053  [SJ2] Jacki Royal, PT 10/08/18 1101      Row Name 10/08/18 1016             Cognitive Assessment/Intervention- PT/OT    Affect/Mental Status (Cognitive) WFL  -SJ      Orientation Status (Cognition) oriented x 4  -SJ      Follows Commands (Cognition) WFL  -SJ      Cognitive Function (Cognitive) WFL  -SJ      Safety Deficit (Cognitive) insight into deficits/self awareness  -SJ      Personal Safety Interventions fall prevention program maintained;gait belt;muscle strengthening facilitated;nonskid shoes/slippers when out of bed  -SJ      Recorded by [SJ] Jacki Royal, PT 10/08/18 1101      Row Name 10/08/18 1016             Safety Issues, Functional Mobility    Safety Issues Affecting Function (Mobility) insight into deficits/self awareness;awareness of need for assistance  -SJ      Impairments Affecting Function (Mobility) coordination;endurance/activity tolerance;grasp;muscle tone abnormal;strength  -SJ      Recorded by [SJ] Jacki Royal, PT 10/08/18 1101      Row Name 10/08/18 1016             Bed Mobility Assessment/Treatment    Supine-Sit [place] (Bed Mobility) minimum assist (75% patient effort);verbal cues  -SJ      Bed Mobility, Safety Issues decreased use of legs for bridging/pushing;decreased use of arms for pushing/pulling;impaired trunk control for bed mobility  -SJ      Assistive Device (Bed Mobility) bed rails;draw sheet;head of bed elevated  -SJ      Comment (Bed Mobility) assist to raise trunk  -SJ      Recorded by [SJ] Jacki Royal, PT 10/08/18 1101      Row Name 10/08/18 1016             Transfer Assessment/Treatment    Comment (Transfers) STS x 3 reps  -SJ      Recorded by [SJ] Jacki Royal, PT 10/08/18 1101       Row Name 10/08/18 1016             Sit-Stand Transfer    Sit-Stand [place] (Transfers) contact guard;2 person assist;verbal cues  -SJ      Assistive Device (Sit-Stand Transfers) cane, quad  -SJ      Recorded by [SJ] Jacki Royal, PT 10/08/18 1101      Row Name 10/08/18 1016             Stand-Sit Transfer    Stand-Sit [place] (Transfers) contact guard;verbal cues  -SJ      Recorded by [SJ] Jacki Royal, PT 10/08/18 1101      Row Name 10/08/18 1016             Gait/Stairs Assessment/Training    28600 - Gait Training Minutes  23  -SJ      Gait/Stairs Assessment/Training gait/ambulation assistive device  -SJ      [place] Level (Gait) minimum assist (75% patient effort);2 person assist;verbal cues  -SJ      Assistive Device (Gait) cane, quad  -SJ      Distance in Feet (Gait) 2x 5ft  -SJ      Pattern (Gait) step-to  -SJ      Deviations/Abnormal Patterns (Gait) left sided deviations;ataxic;base of support, narrow;steppage;festinating/shuffling  -SJ      Bilateral Gait Deviations weight shift ability decreased  -SJ      Left Sided Gait Deviations foot drop/toe drag;hip circumduction;hip hiking;weight shift ability decreased;knee buckling, left side  -SJ      Comment (Gait/Stairs) 2 x 5ft with chair in tow. Pt req assist for weightshifting to facilitate L swing phase  -SJ      Recorded by [SJ] Jacki Royal, PT 10/08/18 1101      Row Name 10/08/18 1016             Static Sitting Balance    Level of [place] (Unsupported Sitting, Static Balance) standby assist  -SJ      Sitting Position (Unsupported Sitting, Static Balance) sitting on edge of bed  -SJ      Recorded by [SJ] Jacki Royal, PT 10/08/18 1101      Row Name 10/08/18 1016             Static Standing Balance    Level of [place] (Supported Standing, Static Balance) minimal assist, 75% patient effort  -SJ      Assistive Device Utilized (Supported Standing, Static Balance) quad cane  -SJ      Recorded by [SJ] Jacki Royal,  PT 10/08/18 1101      Row Name 10/08/18 1016             Positioning and Restraints    Pre-Treatment Position in bed  -SJ      Post Treatment Position chair  -SJ      In Chair reclined;call light within reach;encouraged to call for assist;waffle cushion;heels elevated  -SJ      Recorded by [] Jacki Royal, PT 10/08/18 1101      Row Name 10/08/18 1016             Pain Scale: Numbers Pre/Post-Treatment    Pain Scale: Numbers, Pretreatment 0/10 - no pain  -SJ      Pain Scale: Numbers, Post-Treatment 0/10 - no pain  -SJ      Recorded by [] Jacki Royal, PT 10/08/18 1101      Row Name 10/08/18 1016             Coping    Observed Emotional State calm;cooperative  -SJ      Verbalized Emotional State acceptance  -SJ      Recorded by [] Jacki Royal, PT 10/08/18 1101      Row Name 10/08/18 1016             Plan of Care Review    Plan of Care Reviewed With patient;spouse  -SJ      Recorded by [] Jacki Royal, PT 10/08/18 1101      Row Name 10/08/18 1016             Outcome Summary/Treatment Plan (PT)    Daily Summary of Progress (PT) progress toward functional goals is good  -      Plan for Continued Treatment (PT) continue OPPT at d/c  -      Anticipated Discharge Disposition (PT) home with OP services  -SJ      Recorded by [] Jacki Royal, PT 10/08/18 1101        User Key  (r) = Recorded By, (t) = Taken By, (c) = Cosigned By    Initials Name Effective Dates Discipline     Jacki Royal, PT 06/19/15 -  PT                     Physical Therapy Education     Title: PT OT SLP Therapies (Done)     Topic: Physical Therapy (Done)     Point: Mobility training (Done)    Learning Progress Summary     Learner Status Readiness Method Response Comment Documented by    Patient Done Acceptance E,D FRANCI BURKS   10/08/18 1102     Active Acceptance ED GURJIT   10/05/18 1512     Done Eben BURKS   10/02/18 0148     Done Eagadwoa BURKS   10/02/18 0145     Done FRANCI Machado,GURJIT reviewed bbefits of activity SC  09/28/18 0938          Point: Home exercise program (Done)    Learning Progress Summary     Learner Status Readiness Method Response Comment Documented by    Patient Done Acceptance E,D VITALIY,FRANCI   10/08/18 1102     Active Acceptance E,D NR   10/05/18 1512     Done Eager E VU  KM 10/02/18 0148     Done Eager E VU  KM 10/02/18 0145     Done Eager E FRANCI BURKS,NR reviewed bbefits of activity SC 09/28/18 0938          Point: Body mechanics (Done)    Learning Progress Summary     Learner Status Readiness Method Response Comment Documented by    Patient Done Acceptance E,D VU,DU   10/08/18 1102     Active Acceptance E,D NR   10/05/18 1512     Done Eager E VU  KM 10/02/18 0148     Done Eager E VU  KM 10/02/18 0145     Done Eager E FRANCI BURKS NR reviewed bbefits of activity SC 09/28/18 0938          Point: Precautions (Done)    Learning Progress Summary     Learner Status Readiness Method Response Comment Documented by    Patient Done Acceptance E,D VUFRANCI   10/08/18 1102     Active Acceptance E,D NR   10/05/18 1512     Done Eager E VU   10/02/18 0148     Done Eager E VU  KM 10/02/18 0145     Done Eager E FRANCI BURKS NR reviewed bbefits of activity SC 09/28/18 0938                      User Key     Initials Effective Dates Name Provider Type Discipline    SC 06/19/15 -  Charlie Monson, PT Physical Therapist PT     06/19/15 -  Jacki Royal PT Physical Therapist PT     06/16/16 -  Xin Quiles, RN Registered Nurse Nurse                    PT Recommendation and Plan  Anticipated Discharge Disposition (PT): home with OP services  Therapy Frequency (PT Clinical Impression): daily  Outcome Summary/Treatment Plan (PT)  Daily Summary of Progress (PT): progress toward functional goals is good  Plan for Continued Treatment (PT): continue OPPT at d/c  Anticipated Discharge Disposition (PT): home with OP services  Plan of Care Reviewed With: patient  Progress: improving  Outcome Summary: Pt able to ambulate 2x5ft with Brad x2  with quad cane, with 1 seated rest break. Pt to d/c home today with outpatient PT services.           Outcome Measures     Row Name 10/08/18 1016 10/05/18 1420 10/05/18 1117       How much help from another person do you currently need...    Turning from your back to your side while in flat bed without using bedrails? 3  -SJ 2  -SJ  --    Moving from lying on back to sitting on the side of a flat bed without bedrails? 3  -SJ 2  -SJ  --    Moving to and from a bed to a chair (including a wheelchair)? 3  -SJ 2  -SJ  --    Standing up from a chair using your arms (e.g., wheelchair, bedside chair)? 3  -SJ 2  -SJ  --    Climbing 3-5 steps with a railing? 2  -SJ 1  -SJ  --    To walk in hospital room? 2  -SJ 2  -SJ  --    AM-PAC 6 Clicks Score 16  -SJ 11  -SJ  --       How much help from another is currently needed...    Putting on and taking off regular lower body clothing?  --  -- 2  -KF    Bathing (including washing, rinsing, and drying)  --  -- 2  -KF    Toileting (which includes using toilet bed pan or urinal)  --  -- 1  -KF    Putting on and taking off regular upper body clothing  --  -- 2  -KF    Taking care of personal grooming (such as brushing teeth)  --  -- 3  -KF    Eating meals  --  -- 3  -KF    Score  --  -- 13  -KF       Functional Assessment    Outcome Measure Options AM-PAC 6 Clicks Basic Mobility (PT)  - AM-PAC 6 Clicks Basic Mobility (PT)  - AM-PAC 6 Clicks Daily Activity (OT)  -KF      User Key  (r) = Recorded By, (t) = Taken By, (c) = Cosigned By    Initials Name Provider Type    Jacki De Souza, PT Physical Therapist    KF Ramona Dee, OT Occupational Therapist           Time Calculation:         PT Charges     Row Name 10/08/18 1102 10/08/18 1016          Time Calculation    Start Time 1016  -  --     PT Received On 10/08/18  -  --     PT Goal Re-Cert Due Date 11/08/18  -  --        Time Calculation- PT    Total Timed Code Minutes- PT 23 minute(s)  -  --        Timed Charges     04556 - Gait Training Minutes   -- 23  -SJ       User Key  (r) = Recorded By, (t) = Taken By, (c) = Cosigned By    Initials Name Provider Type    SJ Jacki Royal, PT Physical Therapist        Therapy Suggested Charges     Code   Minutes Charges    53103 (CPT®) Hc Pt Neuromusc Re Education Ea 15 Min      35213 (CPT®) Hc Pt Ther Proc Ea 15 Min      17431 (CPT®) Hc Gait Training Ea 15 Min 23 2    21326 (CPT®) Hc Pt Therapeutic Act Ea 15 Min      50279 (CPT®) Hc Pt Manual Therapy Ea 15 Min      43796 (CPT®) Hc Pt Iontophoresis Ea 15 Min      84724 (CPT®) Hc Pt Elec Stim Ea-Per 15 Min      08980 (CPT®) Hc Pt Ultrasound Ea 15 Min      10556 (CPT®) Hc Pt Self Care/Mgmt/Train Ea 15 Min      67314 (CPT®) Hc Pt Prosthetic (S) Train Initial Encounter, Each 15 Min      82342 (CPT®) Hc Pt Orthotic(S)/Prosthetic(S) Encounter, Each 15 Min      71412 (CPT®) Hc Orthotic(S) Mgmt/Train Initial Encounter, Each 15min      Total  23 2        Therapy Charges for Today     Code Description Service Date Service Provider Modifiers Qty    00101227090 HC GAIT TRAINING EA 15 MIN 10/8/2018 Jacki Royal, PT GP 2    93028371306 HC PT THER SUPP EA 15 MIN 10/8/2018 Jacki Royal, PT GP 2          PT G-Codes  Outcome Measure Options: AM-PAC 6 Clicks Basic Mobility (PT)  AM-PAC 6 Clicks Score: 16  Score: 13    Jacki Royal PT  10/8/2018

## 2020-05-24 ENCOUNTER — HOSPITAL ENCOUNTER (INPATIENT)
Facility: HOSPITAL | Age: 70
LOS: 3 days | Discharge: HOME | End: 2020-05-27
Attending: EMERGENCY MEDICINE | Admitting: INTERNAL MEDICINE

## 2020-05-24 ENCOUNTER — APPOINTMENT (OUTPATIENT)
Dept: CT IMAGING | Facility: HOSPITAL | Age: 70
End: 2020-05-24

## 2020-05-24 ENCOUNTER — APPOINTMENT (OUTPATIENT)
Dept: GENERAL RADIOLOGY | Facility: HOSPITAL | Age: 70
End: 2020-05-24

## 2020-05-24 DIAGNOSIS — R53.1 GENERALIZED WEAKNESS: ICD-10-CM

## 2020-05-24 DIAGNOSIS — I48.20 CHRONIC ATRIAL FIBRILLATION WITH RAPID VENTRICULAR RESPONSE (HCC): ICD-10-CM

## 2020-05-24 DIAGNOSIS — J18.9 PNEUMONIA OF RIGHT LOWER LOBE DUE TO INFECTIOUS ORGANISM: ICD-10-CM

## 2020-05-24 DIAGNOSIS — J96.01 ACUTE RESPIRATORY FAILURE WITH HYPOXIA (HCC): Primary | ICD-10-CM

## 2020-05-24 DIAGNOSIS — J18.9 PNEUMONIA OF LEFT UPPER LOBE DUE TO INFECTIOUS ORGANISM: ICD-10-CM

## 2020-05-24 DIAGNOSIS — A41.9 SEPSIS WITHOUT ACUTE ORGAN DYSFUNCTION, DUE TO UNSPECIFIED ORGANISM (HCC): ICD-10-CM

## 2020-05-24 LAB
ABO GROUP BLD: NORMAL
ALBUMIN SERPL-MCNC: 3.7 G/DL (ref 3.5–5.2)
ALBUMIN/GLOB SERPL: 1.5 G/DL
ALP SERPL-CCNC: 69 U/L (ref 39–117)
ALT SERPL W P-5'-P-CCNC: 7 U/L (ref 1–33)
ANION GAP SERPL CALCULATED.3IONS-SCNC: 15 MMOL/L (ref 5–15)
AST SERPL-CCNC: 15 U/L (ref 1–32)
BACTERIA UR QL AUTO: NORMAL /HPF
BASOPHILS # BLD AUTO: 0.04 10*3/MM3 (ref 0–0.2)
BASOPHILS NFR BLD AUTO: 0.3 % (ref 0–1.5)
BILIRUB SERPL-MCNC: 0.5 MG/DL (ref 0.2–1.2)
BILIRUB UR QL STRIP: NEGATIVE
BLD GP AB SCN SERPL QL: NEGATIVE
BUN BLD-MCNC: 11 MG/DL (ref 8–23)
BUN/CREAT SERPL: 9.1 (ref 7–25)
CALCIUM SPEC-SCNC: 8.6 MG/DL (ref 8.6–10.5)
CHLORIDE SERPL-SCNC: 113 MMOL/L (ref 98–107)
CLARITY UR: ABNORMAL
CO2 SERPL-SCNC: 17 MMOL/L (ref 22–29)
COLOR UR: YELLOW
CREAT BLD-MCNC: 1.21 MG/DL (ref 0.57–1)
D-LACTATE SERPL-SCNC: 4 MMOL/L (ref 0.5–2)
DEPRECATED RDW RBC AUTO: 51.8 FL (ref 37–54)
EOSINOPHIL # BLD AUTO: 0.03 10*3/MM3 (ref 0–0.4)
EOSINOPHIL NFR BLD AUTO: 0.2 % (ref 0.3–6.2)
ERYTHROCYTE [DISTWIDTH] IN BLOOD BY AUTOMATED COUNT: 15.4 % (ref 12.3–15.4)
GFR SERPL CREATININE-BSD FRML MDRD: 44 ML/MIN/1.73
GLOBULIN UR ELPH-MCNC: 2.5 GM/DL
GLUCOSE BLD-MCNC: 136 MG/DL (ref 65–99)
GLUCOSE UR STRIP-MCNC: NEGATIVE MG/DL
HCT VFR BLD AUTO: 29.1 % (ref 34–46.6)
HGB BLD-MCNC: 8.5 G/DL (ref 12–15.9)
HGB UR QL STRIP.AUTO: NEGATIVE
HOLD SPECIMEN: NORMAL
HOLD SPECIMEN: NORMAL
HYALINE CASTS UR QL AUTO: NORMAL /LPF
IMM GRANULOCYTES # BLD AUTO: 0.1 10*3/MM3 (ref 0–0.05)
IMM GRANULOCYTES NFR BLD AUTO: 0.6 % (ref 0–0.5)
KETONES UR QL STRIP: ABNORMAL
LACTATE HOLD SPECIMEN: NORMAL
LEUKOCYTE ESTERASE UR QL STRIP.AUTO: NEGATIVE
LYMPHOCYTES # BLD AUTO: 1.73 10*3/MM3 (ref 0.7–3.1)
LYMPHOCYTES NFR BLD AUTO: 11 % (ref 19.6–45.3)
MAGNESIUM SERPL-MCNC: 1.9 MG/DL (ref 1.6–2.4)
MCH RBC QN AUTO: 27.6 PG (ref 26.6–33)
MCHC RBC AUTO-ENTMCNC: 29.2 G/DL (ref 31.5–35.7)
MCV RBC AUTO: 94.5 FL (ref 79–97)
MONOCYTES # BLD AUTO: 1.17 10*3/MM3 (ref 0.1–0.9)
MONOCYTES NFR BLD AUTO: 7.4 % (ref 5–12)
NEUTROPHILS # BLD AUTO: 12.64 10*3/MM3 (ref 1.7–7)
NEUTROPHILS NFR BLD AUTO: 80.5 % (ref 42.7–76)
NITRITE UR QL STRIP: NEGATIVE
NRBC BLD AUTO-RTO: 0 /100 WBC (ref 0–0.2)
PH UR STRIP.AUTO: <=5 [PH] (ref 5–8)
PLATELET # BLD AUTO: 275 10*3/MM3 (ref 140–450)
PMV BLD AUTO: 12.5 FL (ref 6–12)
POTASSIUM BLD-SCNC: 3.8 MMOL/L (ref 3.5–5.2)
PROCALCITONIN SERPL-MCNC: 0.62 NG/ML (ref 0.1–0.25)
PROT SERPL-MCNC: 6.2 G/DL (ref 6–8.5)
PROT UR QL STRIP: ABNORMAL
RBC # BLD AUTO: 3.08 10*6/MM3 (ref 3.77–5.28)
RBC # UR: NORMAL /HPF
REF LAB TEST METHOD: NORMAL
RH BLD: NEGATIVE
SARS-COV-2 RNA RESP QL NAA+PROBE: NOT DETECTED
SODIUM BLD-SCNC: 145 MMOL/L (ref 136–145)
SP GR UR STRIP: 1.02 (ref 1–1.03)
SQUAMOUS #/AREA URNS HPF: NORMAL /HPF
T&S EXPIRATION DATE: NORMAL
TROPONIN T SERPL-MCNC: <0.01 NG/ML (ref 0–0.03)
TROPONIN T SERPL-MCNC: <0.01 NG/ML (ref 0–0.03)
UROBILINOGEN UR QL STRIP: ABNORMAL
WBC NRBC COR # BLD: 15.71 10*3/MM3 (ref 3.4–10.8)
WBC UR QL AUTO: NORMAL /HPF
WHOLE BLOOD HOLD SPECIMEN: NORMAL
WHOLE BLOOD HOLD SPECIMEN: NORMAL

## 2020-05-24 PROCEDURE — 71045 X-RAY EXAM CHEST 1 VIEW: CPT

## 2020-05-24 PROCEDURE — 86900 BLOOD TYPING SEROLOGIC ABO: CPT | Performed by: EMERGENCY MEDICINE

## 2020-05-24 PROCEDURE — 99285 EMERGENCY DEPT VISIT HI MDM: CPT

## 2020-05-24 PROCEDURE — 25010000002 ONDANSETRON PER 1 MG: Performed by: NURSE PRACTITIONER

## 2020-05-24 PROCEDURE — P9612 CATHETERIZE FOR URINE SPEC: HCPCS

## 2020-05-24 PROCEDURE — 94799 UNLISTED PULMONARY SVC/PX: CPT

## 2020-05-24 PROCEDURE — 25010000002 VANCOMYCIN 10 G RECONSTITUTED SOLUTION: Performed by: EMERGENCY MEDICINE

## 2020-05-24 PROCEDURE — 93005 ELECTROCARDIOGRAM TRACING: CPT | Performed by: EMERGENCY MEDICINE

## 2020-05-24 PROCEDURE — 25010000002 DIGOXIN PER 500 MCG: Performed by: EMERGENCY MEDICINE

## 2020-05-24 PROCEDURE — 87635 SARS-COV-2 COVID-19 AMP PRB: CPT | Performed by: INTERNAL MEDICINE

## 2020-05-24 PROCEDURE — 87040 BLOOD CULTURE FOR BACTERIA: CPT | Performed by: EMERGENCY MEDICINE

## 2020-05-24 PROCEDURE — 83605 ASSAY OF LACTIC ACID: CPT | Performed by: EMERGENCY MEDICINE

## 2020-05-24 PROCEDURE — 81001 URINALYSIS AUTO W/SCOPE: CPT | Performed by: EMERGENCY MEDICINE

## 2020-05-24 PROCEDURE — 99223 1ST HOSP IP/OBS HIGH 75: CPT | Performed by: INTERNAL MEDICINE

## 2020-05-24 PROCEDURE — 63710000001 ONDANSETRON PER 8 MG: Performed by: INTERNAL MEDICINE

## 2020-05-24 PROCEDURE — 80053 COMPREHEN METABOLIC PANEL: CPT | Performed by: EMERGENCY MEDICINE

## 2020-05-24 PROCEDURE — 83735 ASSAY OF MAGNESIUM: CPT | Performed by: EMERGENCY MEDICINE

## 2020-05-24 PROCEDURE — 86850 RBC ANTIBODY SCREEN: CPT | Performed by: EMERGENCY MEDICINE

## 2020-05-24 PROCEDURE — 86901 BLOOD TYPING SEROLOGIC RH(D): CPT | Performed by: EMERGENCY MEDICINE

## 2020-05-24 PROCEDURE — 86920 COMPATIBILITY TEST SPIN: CPT

## 2020-05-24 PROCEDURE — 25010000002 MEROPENEM PER 100 MG: Performed by: EMERGENCY MEDICINE

## 2020-05-24 PROCEDURE — 71250 CT THORAX DX C-: CPT

## 2020-05-24 PROCEDURE — 84484 ASSAY OF TROPONIN QUANT: CPT | Performed by: EMERGENCY MEDICINE

## 2020-05-24 PROCEDURE — 85025 COMPLETE CBC W/AUTO DIFF WBC: CPT | Performed by: EMERGENCY MEDICINE

## 2020-05-24 PROCEDURE — 25010000002 LEVOFLOXACIN PER 250 MG: Performed by: INTERNAL MEDICINE

## 2020-05-24 PROCEDURE — 84145 PROCALCITONIN (PCT): CPT | Performed by: EMERGENCY MEDICINE

## 2020-05-24 RX ORDER — PREGABALIN 75 MG/1
150 CAPSULE ORAL EVERY 12 HOURS SCHEDULED
Status: DISCONTINUED | OUTPATIENT
Start: 2020-05-24 | End: 2020-05-27 | Stop reason: HOSPADM

## 2020-05-24 RX ORDER — METOPROLOL TARTRATE 5 MG/5ML
5 INJECTION INTRAVENOUS ONCE
Status: DISCONTINUED | OUTPATIENT
Start: 2020-05-24 | End: 2020-05-24

## 2020-05-24 RX ORDER — DIGOXIN 0.25 MG/ML
250 INJECTION INTRAMUSCULAR; INTRAVENOUS ONCE
Status: COMPLETED | OUTPATIENT
Start: 2020-05-24 | End: 2020-05-24

## 2020-05-24 RX ORDER — SODIUM CHLORIDE 0.9 % (FLUSH) 0.9 %
10 SYRINGE (ML) INJECTION AS NEEDED
Status: DISCONTINUED | OUTPATIENT
Start: 2020-05-24 | End: 2020-05-27 | Stop reason: HOSPADM

## 2020-05-24 RX ORDER — DILTIAZEM HYDROCHLORIDE 240 MG/1
240 CAPSULE, COATED, EXTENDED RELEASE ORAL ONCE
Status: COMPLETED | OUTPATIENT
Start: 2020-05-24 | End: 2020-05-24

## 2020-05-24 RX ORDER — SODIUM CHLORIDE 9 MG/ML
75 INJECTION, SOLUTION INTRAVENOUS CONTINUOUS
Status: DISCONTINUED | OUTPATIENT
Start: 2020-05-24 | End: 2020-05-25

## 2020-05-24 RX ORDER — ATORVASTATIN CALCIUM 10 MG/1
10 TABLET, FILM COATED ORAL NIGHTLY
Status: DISCONTINUED | OUTPATIENT
Start: 2020-05-24 | End: 2020-05-27 | Stop reason: HOSPADM

## 2020-05-24 RX ORDER — PANTOPRAZOLE SODIUM 40 MG/1
40 TABLET, DELAYED RELEASE ORAL
Status: DISCONTINUED | OUTPATIENT
Start: 2020-05-24 | End: 2020-05-27 | Stop reason: HOSPADM

## 2020-05-24 RX ORDER — ONDANSETRON 2 MG/ML
4 INJECTION INTRAMUSCULAR; INTRAVENOUS EVERY 6 HOURS PRN
Status: DISCONTINUED | OUTPATIENT
Start: 2020-05-24 | End: 2020-05-27 | Stop reason: HOSPADM

## 2020-05-24 RX ORDER — ACETAMINOPHEN 325 MG/1
650 TABLET ORAL EVERY 4 HOURS PRN
Status: DISCONTINUED | OUTPATIENT
Start: 2020-05-24 | End: 2020-05-27 | Stop reason: HOSPADM

## 2020-05-24 RX ORDER — ONDANSETRON 4 MG/1
4 TABLET, FILM COATED ORAL EVERY 6 HOURS PRN
Status: DISCONTINUED | OUTPATIENT
Start: 2020-05-24 | End: 2020-05-27 | Stop reason: HOSPADM

## 2020-05-24 RX ORDER — LEVOTHYROXINE SODIUM 137 UG/1
137 TABLET ORAL DAILY
Status: DISCONTINUED | OUTPATIENT
Start: 2020-05-24 | End: 2020-05-27 | Stop reason: HOSPADM

## 2020-05-24 RX ORDER — ASPIRIN 81 MG/1
81 TABLET ORAL DAILY
Status: DISCONTINUED | OUTPATIENT
Start: 2020-05-24 | End: 2020-05-25

## 2020-05-24 RX ORDER — ONDANSETRON 2 MG/ML
4 INJECTION INTRAMUSCULAR; INTRAVENOUS ONCE
Status: COMPLETED | OUTPATIENT
Start: 2020-05-24 | End: 2020-05-24

## 2020-05-24 RX ORDER — FAMOTIDINE 20 MG/1
20 TABLET, FILM COATED ORAL NIGHTLY
Status: DISCONTINUED | OUTPATIENT
Start: 2020-05-24 | End: 2020-05-27 | Stop reason: HOSPADM

## 2020-05-24 RX ORDER — DOCUSATE SODIUM 100 MG/1
100 CAPSULE, LIQUID FILLED ORAL 2 TIMES DAILY PRN
Status: DISCONTINUED | OUTPATIENT
Start: 2020-05-24 | End: 2020-05-27 | Stop reason: HOSPADM

## 2020-05-24 RX ORDER — TOPIRAMATE 100 MG/1
100 TABLET, FILM COATED ORAL EVERY 12 HOURS SCHEDULED
Status: DISCONTINUED | OUTPATIENT
Start: 2020-05-24 | End: 2020-05-27 | Stop reason: HOSPADM

## 2020-05-24 RX ORDER — LEVOFLOXACIN 5 MG/ML
500 INJECTION, SOLUTION INTRAVENOUS
Status: DISCONTINUED | OUTPATIENT
Start: 2020-05-24 | End: 2020-05-27 | Stop reason: HOSPADM

## 2020-05-24 RX ORDER — DILTIAZEM HYDROCHLORIDE 180 MG/1
360 CAPSULE, COATED, EXTENDED RELEASE ORAL
Status: DISCONTINUED | OUTPATIENT
Start: 2020-05-25 | End: 2020-05-27 | Stop reason: HOSPADM

## 2020-05-24 RX ORDER — BUDESONIDE AND FORMOTEROL FUMARATE DIHYDRATE 80; 4.5 UG/1; UG/1
2 AEROSOL RESPIRATORY (INHALATION)
Status: DISCONTINUED | OUTPATIENT
Start: 2020-05-24 | End: 2020-05-27 | Stop reason: HOSPADM

## 2020-05-24 RX ORDER — SODIUM CHLORIDE 0.9 % (FLUSH) 0.9 %
10 SYRINGE (ML) INJECTION EVERY 12 HOURS SCHEDULED
Status: DISCONTINUED | OUTPATIENT
Start: 2020-05-24 | End: 2020-05-27 | Stop reason: HOSPADM

## 2020-05-24 RX ORDER — METOPROLOL TARTRATE 50 MG/1
50 TABLET, FILM COATED ORAL EVERY 12 HOURS SCHEDULED
Status: DISCONTINUED | OUTPATIENT
Start: 2020-05-24 | End: 2020-05-27 | Stop reason: HOSPADM

## 2020-05-24 RX ORDER — METOPROLOL TARTRATE 5 MG/5ML
2.5 INJECTION INTRAVENOUS ONCE
Status: COMPLETED | OUTPATIENT
Start: 2020-05-24 | End: 2020-05-24

## 2020-05-24 RX ORDER — NYSTATIN 100000 [USP'U]/G
POWDER TOPICAL EVERY 12 HOURS SCHEDULED
Status: DISCONTINUED | OUTPATIENT
Start: 2020-05-24 | End: 2020-05-27 | Stop reason: HOSPADM

## 2020-05-24 RX ADMIN — VANCOMYCIN HYDROCHLORIDE 1750 MG: 10 INJECTION, POWDER, LYOPHILIZED, FOR SOLUTION INTRAVENOUS at 09:02

## 2020-05-24 RX ADMIN — TOPIRAMATE 100 MG: 100 TABLET, FILM COATED ORAL at 17:51

## 2020-05-24 RX ADMIN — PREGABALIN 150 MG: 75 CAPSULE ORAL at 20:06

## 2020-05-24 RX ADMIN — MEROPENEM 1 G: 1 INJECTION, POWDER, FOR SOLUTION INTRAVENOUS at 09:02

## 2020-05-24 RX ADMIN — DIGOXIN 250 MCG: 0.25 INJECTION INTRAMUSCULAR; INTRAVENOUS at 10:49

## 2020-05-24 RX ADMIN — FAMOTIDINE 20 MG: 20 TABLET, FILM COATED ORAL at 20:07

## 2020-05-24 RX ADMIN — SODIUM CHLORIDE, PRESERVATIVE FREE 10 ML: 5 INJECTION INTRAVENOUS at 15:31

## 2020-05-24 RX ADMIN — ATORVASTATIN CALCIUM 10 MG: 10 TABLET, FILM COATED ORAL at 20:06

## 2020-05-24 RX ADMIN — METOPROLOL TARTRATE 2.5 MG: 1 INJECTION, SOLUTION INTRAVENOUS at 13:01

## 2020-05-24 RX ADMIN — PANTOPRAZOLE SODIUM 40 MG: 40 TABLET, DELAYED RELEASE ORAL at 17:51

## 2020-05-24 RX ADMIN — ONDANSETRON HYDROCHLORIDE 4 MG: 4 TABLET, FILM COATED ORAL at 20:08

## 2020-05-24 RX ADMIN — NYSTATIN 1 APPLICATION: 100000 POWDER TOPICAL at 20:06

## 2020-05-24 RX ADMIN — LEVOTHYROXINE SODIUM 137 MCG: 137 TABLET ORAL at 15:31

## 2020-05-24 RX ADMIN — SODIUM CHLORIDE 1710 ML: 9 INJECTION, SOLUTION INTRAVENOUS at 09:02

## 2020-05-24 RX ADMIN — APIXABAN 5 MG: 5 TABLET, FILM COATED ORAL at 15:31

## 2020-05-24 RX ADMIN — LEVOFLOXACIN 500 MG: 5 INJECTION, SOLUTION INTRAVENOUS at 17:51

## 2020-05-24 RX ADMIN — ASPIRIN 81 MG: 81 TABLET, COATED ORAL at 15:31

## 2020-05-24 RX ADMIN — SODIUM CHLORIDE 75 ML/HR: 9 INJECTION, SOLUTION INTRAVENOUS at 15:33

## 2020-05-24 RX ADMIN — DILTIAZEM HYDROCHLORIDE 240 MG: 240 CAPSULE, COATED, EXTENDED RELEASE ORAL at 17:51

## 2020-05-24 RX ADMIN — ONDANSETRON 4 MG: 2 INJECTION INTRAMUSCULAR; INTRAVENOUS at 22:25

## 2020-05-24 RX ADMIN — BUDESONIDE AND FORMOTEROL FUMARATE DIHYDRATE 2 PUFF: 80; 4.5 AEROSOL RESPIRATORY (INHALATION) at 23:20

## 2020-05-24 RX ADMIN — METOPROLOL TARTRATE 50 MG: 50 TABLET, FILM COATED ORAL at 20:07

## 2020-05-25 PROBLEM — I50.32 CHRONIC DIASTOLIC CHF (CONGESTIVE HEART FAILURE): Status: ACTIVE | Noted: 2019-07-01

## 2020-05-25 PROBLEM — D62 ACUTE ON CHRONIC BLOOD LOSS ANEMIA: Status: ACTIVE | Noted: 2019-06-30

## 2020-05-25 LAB
ANION GAP SERPL CALCULATED.3IONS-SCNC: 12 MMOL/L (ref 5–15)
BUN BLD-MCNC: 5 MG/DL (ref 8–23)
BUN/CREAT SERPL: 8.8 (ref 7–25)
CALCIUM SPEC-SCNC: 7.8 MG/DL (ref 8.6–10.5)
CHLORIDE SERPL-SCNC: 115 MMOL/L (ref 98–107)
CO2 SERPL-SCNC: 19 MMOL/L (ref 22–29)
CREAT BLD-MCNC: 0.57 MG/DL (ref 0.57–1)
DEPRECATED RDW RBC AUTO: 54.6 FL (ref 37–54)
ERYTHROCYTE [DISTWIDTH] IN BLOOD BY AUTOMATED COUNT: 15.4 % (ref 12.3–15.4)
GFR SERPL CREATININE-BSD FRML MDRD: 106 ML/MIN/1.73
GLUCOSE BLD-MCNC: 97 MG/DL (ref 65–99)
HCT VFR BLD AUTO: 21.7 % (ref 34–46.6)
HCT VFR BLD AUTO: 31.5 % (ref 34–46.6)
HGB BLD-MCNC: 6 G/DL (ref 12–15.9)
HGB BLD-MCNC: 9.6 G/DL (ref 12–15.9)
IRON 24H UR-MRATE: 11 MCG/DL (ref 37–145)
IRON SATN MFR SERPL: 4 % (ref 20–50)
MCH RBC QN AUTO: 27 PG (ref 26.6–33)
MCHC RBC AUTO-ENTMCNC: 27.6 G/DL (ref 31.5–35.7)
MCV RBC AUTO: 97.7 FL (ref 79–97)
PLATELET # BLD AUTO: 193 10*3/MM3 (ref 140–450)
PMV BLD AUTO: 12.8 FL (ref 6–12)
POTASSIUM BLD-SCNC: 3.9 MMOL/L (ref 3.5–5.2)
RBC # BLD AUTO: 2.22 10*6/MM3 (ref 3.77–5.28)
SODIUM BLD-SCNC: 146 MMOL/L (ref 136–145)
TIBC SERPL-MCNC: 277 MCG/DL (ref 298–536)
TRANSFERRIN SERPL-MCNC: 186 MG/DL (ref 200–360)
VIT B12 BLD-MCNC: 413 PG/ML (ref 211–946)
WBC NRBC COR # BLD: 7.57 10*3/MM3 (ref 3.4–10.8)

## 2020-05-25 PROCEDURE — 85018 HEMOGLOBIN: CPT | Performed by: INTERNAL MEDICINE

## 2020-05-25 PROCEDURE — 80048 BASIC METABOLIC PNL TOTAL CA: CPT | Performed by: INTERNAL MEDICINE

## 2020-05-25 PROCEDURE — 99233 SBSQ HOSP IP/OBS HIGH 50: CPT | Performed by: INTERNAL MEDICINE

## 2020-05-25 PROCEDURE — 83540 ASSAY OF IRON: CPT | Performed by: INTERNAL MEDICINE

## 2020-05-25 PROCEDURE — 82607 VITAMIN B-12: CPT | Performed by: INTERNAL MEDICINE

## 2020-05-25 PROCEDURE — 94799 UNLISTED PULMONARY SVC/PX: CPT

## 2020-05-25 PROCEDURE — 36430 TRANSFUSION BLD/BLD COMPNT: CPT

## 2020-05-25 PROCEDURE — 86900 BLOOD TYPING SEROLOGIC ABO: CPT

## 2020-05-25 PROCEDURE — 85027 COMPLETE CBC AUTOMATED: CPT | Performed by: INTERNAL MEDICINE

## 2020-05-25 PROCEDURE — 85014 HEMATOCRIT: CPT | Performed by: INTERNAL MEDICINE

## 2020-05-25 PROCEDURE — 84466 ASSAY OF TRANSFERRIN: CPT | Performed by: INTERNAL MEDICINE

## 2020-05-25 PROCEDURE — P9016 RBC LEUKOCYTES REDUCED: HCPCS

## 2020-05-25 RX ADMIN — LEVOTHYROXINE SODIUM 137 MCG: 137 TABLET ORAL at 06:39

## 2020-05-25 RX ADMIN — TOPIRAMATE 100 MG: 100 TABLET, FILM COATED ORAL at 06:39

## 2020-05-25 RX ADMIN — APIXABAN 5 MG: 5 TABLET, FILM COATED ORAL at 20:59

## 2020-05-25 RX ADMIN — DILTIAZEM HYDROCHLORIDE 360 MG: 180 CAPSULE, COATED, EXTENDED RELEASE ORAL at 08:28

## 2020-05-25 RX ADMIN — TOPIRAMATE 100 MG: 100 TABLET, FILM COATED ORAL at 16:53

## 2020-05-25 RX ADMIN — SODIUM CHLORIDE 75 ML/HR: 9 INJECTION, SOLUTION INTRAVENOUS at 05:57

## 2020-05-25 RX ADMIN — FAMOTIDINE 20 MG: 20 TABLET, FILM COATED ORAL at 20:58

## 2020-05-25 RX ADMIN — SODIUM CHLORIDE, PRESERVATIVE FREE 10 ML: 5 INJECTION INTRAVENOUS at 08:30

## 2020-05-25 RX ADMIN — ATORVASTATIN CALCIUM 10 MG: 10 TABLET, FILM COATED ORAL at 20:59

## 2020-05-25 RX ADMIN — METOPROLOL TARTRATE 50 MG: 50 TABLET, FILM COATED ORAL at 08:28

## 2020-05-25 RX ADMIN — PANTOPRAZOLE SODIUM 40 MG: 40 TABLET, DELAYED RELEASE ORAL at 16:53

## 2020-05-25 RX ADMIN — BUDESONIDE AND FORMOTEROL FUMARATE DIHYDRATE 2 PUFF: 80; 4.5 AEROSOL RESPIRATORY (INHALATION) at 19:33

## 2020-05-25 RX ADMIN — NYSTATIN: 100000 POWDER TOPICAL at 21:03

## 2020-05-25 RX ADMIN — BUDESONIDE AND FORMOTEROL FUMARATE DIHYDRATE 2 PUFF: 80; 4.5 AEROSOL RESPIRATORY (INHALATION) at 07:34

## 2020-05-25 RX ADMIN — ASPIRIN 81 MG: 81 TABLET, COATED ORAL at 08:28

## 2020-05-25 RX ADMIN — PREGABALIN 150 MG: 75 CAPSULE ORAL at 20:58

## 2020-05-25 RX ADMIN — PREGABALIN 150 MG: 75 CAPSULE ORAL at 08:28

## 2020-05-25 RX ADMIN — PANTOPRAZOLE SODIUM 40 MG: 40 TABLET, DELAYED RELEASE ORAL at 06:39

## 2020-05-25 RX ADMIN — APIXABAN 5 MG: 5 TABLET, FILM COATED ORAL at 06:39

## 2020-05-25 RX ADMIN — NYSTATIN: 100000 POWDER TOPICAL at 08:28

## 2020-05-25 RX ADMIN — SODIUM CHLORIDE, PRESERVATIVE FREE 10 ML: 5 INJECTION INTRAVENOUS at 21:00

## 2020-05-26 LAB
ANION GAP SERPL CALCULATED.3IONS-SCNC: 11 MMOL/L (ref 5–15)
BH BB BLOOD EXPIRATION DATE: NORMAL
BH BB BLOOD EXPIRATION DATE: NORMAL
BH BB BLOOD TYPE BARCODE: 600
BH BB BLOOD TYPE BARCODE: 600
BH BB DISPENSE STATUS: NORMAL
BH BB DISPENSE STATUS: NORMAL
BH BB PRODUCT CODE: NORMAL
BH BB PRODUCT CODE: NORMAL
BH BB UNIT NUMBER: NORMAL
BH BB UNIT NUMBER: NORMAL
BUN BLD-MCNC: 5 MG/DL (ref 8–23)
BUN/CREAT SERPL: 8.1 (ref 7–25)
CALCIUM SPEC-SCNC: 7.3 MG/DL (ref 8.6–10.5)
CHLORIDE SERPL-SCNC: 114 MMOL/L (ref 98–107)
CO2 SERPL-SCNC: 19 MMOL/L (ref 22–29)
CREAT BLD-MCNC: 0.62 MG/DL (ref 0.57–1)
CROSSMATCH INTERPRETATION: NORMAL
CROSSMATCH INTERPRETATION: NORMAL
DEPRECATED RDW RBC AUTO: 56.2 FL (ref 37–54)
ERYTHROCYTE [DISTWIDTH] IN BLOOD BY AUTOMATED COUNT: 16.2 % (ref 12.3–15.4)
GFR SERPL CREATININE-BSD FRML MDRD: 96 ML/MIN/1.73
GLUCOSE BLD-MCNC: 99 MG/DL (ref 65–99)
HCT VFR BLD AUTO: 27.3 % (ref 34–46.6)
HGB BLD-MCNC: 8.2 G/DL (ref 12–15.9)
MAGNESIUM SERPL-MCNC: 1.7 MG/DL (ref 1.6–2.4)
MCH RBC QN AUTO: 28.3 PG (ref 26.6–33)
MCHC RBC AUTO-ENTMCNC: 30 G/DL (ref 31.5–35.7)
MCV RBC AUTO: 94.1 FL (ref 79–97)
PLATELET # BLD AUTO: 176 10*3/MM3 (ref 140–450)
PMV BLD AUTO: 12.6 FL (ref 6–12)
POTASSIUM BLD-SCNC: 3.1 MMOL/L (ref 3.5–5.2)
POTASSIUM BLD-SCNC: 4.4 MMOL/L (ref 3.5–5.2)
RBC # BLD AUTO: 2.9 10*6/MM3 (ref 3.77–5.28)
SODIUM BLD-SCNC: 144 MMOL/L (ref 136–145)
UNIT  ABO: NORMAL
UNIT  ABO: NORMAL
UNIT  RH: NORMAL
UNIT  RH: NORMAL
WBC NRBC COR # BLD: 7.79 10*3/MM3 (ref 3.4–10.8)

## 2020-05-26 PROCEDURE — 80048 BASIC METABOLIC PNL TOTAL CA: CPT

## 2020-05-26 PROCEDURE — 99233 SBSQ HOSP IP/OBS HIGH 50: CPT | Performed by: INTERNAL MEDICINE

## 2020-05-26 PROCEDURE — 94799 UNLISTED PULMONARY SVC/PX: CPT

## 2020-05-26 PROCEDURE — 97166 OT EVAL MOD COMPLEX 45 MIN: CPT

## 2020-05-26 PROCEDURE — 25010000003 MAGNESIUM SULFATE 4 GM/100ML SOLUTION: Performed by: PHYSICIAN ASSISTANT

## 2020-05-26 PROCEDURE — 25010000002 LEVOFLOXACIN PER 250 MG: Performed by: INTERNAL MEDICINE

## 2020-05-26 PROCEDURE — 84132 ASSAY OF SERUM POTASSIUM: CPT | Performed by: INTERNAL MEDICINE

## 2020-05-26 PROCEDURE — 85027 COMPLETE CBC AUTOMATED: CPT | Performed by: INTERNAL MEDICINE

## 2020-05-26 PROCEDURE — 97535 SELF CARE MNGMENT TRAINING: CPT

## 2020-05-26 PROCEDURE — 97530 THERAPEUTIC ACTIVITIES: CPT | Performed by: PHYSICAL THERAPIST

## 2020-05-26 PROCEDURE — 83735 ASSAY OF MAGNESIUM: CPT

## 2020-05-26 PROCEDURE — 97162 PT EVAL MOD COMPLEX 30 MIN: CPT | Performed by: PHYSICAL THERAPIST

## 2020-05-26 RX ORDER — MAGNESIUM SULFATE HEPTAHYDRATE 40 MG/ML
4 INJECTION, SOLUTION INTRAVENOUS AS NEEDED
Status: DISCONTINUED | OUTPATIENT
Start: 2020-05-26 | End: 2020-05-27 | Stop reason: HOSPADM

## 2020-05-26 RX ORDER — MAGNESIUM SULFATE HEPTAHYDRATE 40 MG/ML
2 INJECTION, SOLUTION INTRAVENOUS AS NEEDED
Status: DISCONTINUED | OUTPATIENT
Start: 2020-05-26 | End: 2020-05-27 | Stop reason: HOSPADM

## 2020-05-26 RX ORDER — POTASSIUM CHLORIDE 1.5 G/1.77G
40 POWDER, FOR SOLUTION ORAL AS NEEDED
Status: DISCONTINUED | OUTPATIENT
Start: 2020-05-26 | End: 2020-05-27 | Stop reason: HOSPADM

## 2020-05-26 RX ORDER — POTASSIUM CHLORIDE 750 MG/1
40 CAPSULE, EXTENDED RELEASE ORAL AS NEEDED
Status: DISCONTINUED | OUTPATIENT
Start: 2020-05-26 | End: 2020-05-27 | Stop reason: HOSPADM

## 2020-05-26 RX ORDER — POTASSIUM CHLORIDE 7.45 MG/ML
10 INJECTION INTRAVENOUS
Status: DISCONTINUED | OUTPATIENT
Start: 2020-05-26 | End: 2020-05-27 | Stop reason: HOSPADM

## 2020-05-26 RX ADMIN — SODIUM CHLORIDE, PRESERVATIVE FREE 10 ML: 5 INJECTION INTRAVENOUS at 20:41

## 2020-05-26 RX ADMIN — DILTIAZEM HYDROCHLORIDE 360 MG: 180 CAPSULE, COATED, EXTENDED RELEASE ORAL at 08:57

## 2020-05-26 RX ADMIN — PANTOPRAZOLE SODIUM 40 MG: 40 TABLET, DELAYED RELEASE ORAL at 08:57

## 2020-05-26 RX ADMIN — POTASSIUM CHLORIDE 40 MEQ: 10 CAPSULE, COATED, EXTENDED RELEASE ORAL at 08:56

## 2020-05-26 RX ADMIN — NYSTATIN: 100000 POWDER TOPICAL at 20:40

## 2020-05-26 RX ADMIN — NYSTATIN: 100000 POWDER TOPICAL at 08:57

## 2020-05-26 RX ADMIN — APIXABAN 5 MG: 5 TABLET, FILM COATED ORAL at 20:37

## 2020-05-26 RX ADMIN — BUDESONIDE AND FORMOTEROL FUMARATE DIHYDRATE 2 PUFF: 80; 4.5 AEROSOL RESPIRATORY (INHALATION) at 09:22

## 2020-05-26 RX ADMIN — TOPIRAMATE 100 MG: 100 TABLET, FILM COATED ORAL at 17:15

## 2020-05-26 RX ADMIN — BUDESONIDE AND FORMOTEROL FUMARATE DIHYDRATE 2 PUFF: 80; 4.5 AEROSOL RESPIRATORY (INHALATION) at 19:32

## 2020-05-26 RX ADMIN — TOPIRAMATE 100 MG: 100 TABLET, FILM COATED ORAL at 05:13

## 2020-05-26 RX ADMIN — PANTOPRAZOLE SODIUM 40 MG: 40 TABLET, DELAYED RELEASE ORAL at 17:15

## 2020-05-26 RX ADMIN — LEVOTHYROXINE SODIUM 137 MCG: 137 TABLET ORAL at 05:13

## 2020-05-26 RX ADMIN — PREGABALIN 150 MG: 75 CAPSULE ORAL at 08:57

## 2020-05-26 RX ADMIN — METOPROLOL TARTRATE 50 MG: 50 TABLET, FILM COATED ORAL at 20:37

## 2020-05-26 RX ADMIN — MAGNESIUM SULFATE HEPTAHYDRATE 4 G: 40 INJECTION, SOLUTION INTRAVENOUS at 06:32

## 2020-05-26 RX ADMIN — ATORVASTATIN CALCIUM 10 MG: 10 TABLET, FILM COATED ORAL at 20:37

## 2020-05-26 RX ADMIN — POTASSIUM CHLORIDE 40 MEQ: 10 CAPSULE, COATED, EXTENDED RELEASE ORAL at 17:15

## 2020-05-26 RX ADMIN — FAMOTIDINE 20 MG: 20 TABLET, FILM COATED ORAL at 20:36

## 2020-05-26 RX ADMIN — POTASSIUM CHLORIDE 40 MEQ: 10 CAPSULE, COATED, EXTENDED RELEASE ORAL at 06:31

## 2020-05-26 RX ADMIN — PREGABALIN 150 MG: 75 CAPSULE ORAL at 20:36

## 2020-05-26 RX ADMIN — LEVOFLOXACIN 500 MG: 5 INJECTION, SOLUTION INTRAVENOUS at 17:16

## 2020-05-26 RX ADMIN — APIXABAN 5 MG: 5 TABLET, FILM COATED ORAL at 08:57

## 2020-05-27 VITALS
TEMPERATURE: 98.9 F | BODY MASS INDEX: 33.69 KG/M2 | WEIGHT: 202.2 LBS | HEART RATE: 65 BPM | DIASTOLIC BLOOD PRESSURE: 90 MMHG | SYSTOLIC BLOOD PRESSURE: 125 MMHG | RESPIRATION RATE: 22 BRPM | OXYGEN SATURATION: 97 % | HEIGHT: 65 IN

## 2020-05-27 LAB
ANION GAP SERPL CALCULATED.3IONS-SCNC: 8 MMOL/L (ref 5–15)
BUN BLD-MCNC: 4 MG/DL (ref 8–23)
BUN/CREAT SERPL: 5.5 (ref 7–25)
CALCIUM SPEC-SCNC: 7.7 MG/DL (ref 8.6–10.5)
CHLORIDE SERPL-SCNC: 115 MMOL/L (ref 98–107)
CO2 SERPL-SCNC: 21 MMOL/L (ref 22–29)
CREAT BLD-MCNC: 0.73 MG/DL (ref 0.57–1)
GFR SERPL CREATININE-BSD FRML MDRD: 80 ML/MIN/1.73
GLUCOSE BLD-MCNC: 102 MG/DL (ref 65–99)
HCT VFR BLD AUTO: 28.8 % (ref 34–46.6)
HGB BLD-MCNC: 8.6 G/DL (ref 12–15.9)
MAGNESIUM SERPL-MCNC: 2.4 MG/DL (ref 1.6–2.4)
POTASSIUM BLD-SCNC: 4.8 MMOL/L (ref 3.5–5.2)
SODIUM BLD-SCNC: 144 MMOL/L (ref 136–145)

## 2020-05-27 PROCEDURE — 85018 HEMOGLOBIN: CPT | Performed by: INTERNAL MEDICINE

## 2020-05-27 PROCEDURE — 83735 ASSAY OF MAGNESIUM: CPT | Performed by: INTERNAL MEDICINE

## 2020-05-27 PROCEDURE — 99238 HOSP IP/OBS DSCHRG MGMT 30/<: CPT | Performed by: INTERNAL MEDICINE

## 2020-05-27 PROCEDURE — 85014 HEMATOCRIT: CPT | Performed by: INTERNAL MEDICINE

## 2020-05-27 PROCEDURE — 94799 UNLISTED PULMONARY SVC/PX: CPT

## 2020-05-27 PROCEDURE — 80048 BASIC METABOLIC PNL TOTAL CA: CPT | Performed by: INTERNAL MEDICINE

## 2020-05-27 RX ORDER — LEVOFLOXACIN 500 MG/1
500 TABLET, FILM COATED ORAL DAILY
Qty: 5 TABLET | Refills: 0 | Status: SHIPPED | OUTPATIENT
Start: 2020-05-27 | End: 2020-06-01

## 2020-05-27 RX ORDER — LEVOTHYROXINE SODIUM 137 UG/1
137 TABLET ORAL DAILY
Qty: 30 TABLET | Refills: 0 | OUTPATIENT
Start: 2020-05-28 | End: 2021-07-15 | Stop reason: HOSPADM

## 2020-05-27 RX ADMIN — LEVOTHYROXINE SODIUM 137 MCG: 137 TABLET ORAL at 05:19

## 2020-05-27 RX ADMIN — BUDESONIDE AND FORMOTEROL FUMARATE DIHYDRATE 2 PUFF: 80; 4.5 AEROSOL RESPIRATORY (INHALATION) at 09:12

## 2020-05-27 RX ADMIN — PREGABALIN 150 MG: 75 CAPSULE ORAL at 09:01

## 2020-05-27 RX ADMIN — NYSTATIN: 100000 POWDER TOPICAL at 09:01

## 2020-05-27 RX ADMIN — APIXABAN 5 MG: 5 TABLET, FILM COATED ORAL at 09:01

## 2020-05-27 RX ADMIN — TOPIRAMATE 100 MG: 100 TABLET, FILM COATED ORAL at 05:19

## 2020-05-27 RX ADMIN — PANTOPRAZOLE SODIUM 40 MG: 40 TABLET, DELAYED RELEASE ORAL at 09:00

## 2020-05-27 RX ADMIN — METOPROLOL TARTRATE 50 MG: 50 TABLET, FILM COATED ORAL at 09:00

## 2020-05-27 RX ADMIN — DILTIAZEM HYDROCHLORIDE 360 MG: 180 CAPSULE, COATED, EXTENDED RELEASE ORAL at 09:00

## 2020-05-28 ENCOUNTER — READMISSION MANAGEMENT (OUTPATIENT)
Dept: CALL CENTER | Facility: HOSPITAL | Age: 70
End: 2020-05-28

## 2020-05-28 NOTE — OUTREACH NOTE
Prep Survey      Responses   [facility] discharged from?  [place]   Is LACE score < 7 ?  No   Eligibility  Readm Mgmt   Discharge diagnosis  Sepsis   Does the patient have one of the following disease processes/diagnoses(primary or secondary)?  Sepsis   Does the patient have Home health ordered?  No   Is there a DME ordered?  No   Prep survey completed?  Yes          Lolly Way RN

## 2020-05-29 LAB
BACTERIA SPEC AEROBE CULT: NORMAL
BACTERIA SPEC AEROBE CULT: NORMAL

## 2020-06-01 ENCOUNTER — READMISSION MANAGEMENT (OUTPATIENT)
Dept: CALL CENTER | Facility: HOSPITAL | Age: 70
End: 2020-06-01

## 2020-06-01 NOTE — OUTREACH NOTE
Sepsis Week 1 Survey      Responses   [facility] patient discharged from?  [place]   COVID-19 Test Status  Not tested   Does the patient have one of the following disease processes/diagnoses(primary or secondary)?  Sepsis   Is there a successful TCM telephone encounter documented?  No   Week 1 attempt successful?  No   Unsuccessful attempts  Attempt 1          Tang Dia RN

## 2020-06-03 ENCOUNTER — READMISSION MANAGEMENT (OUTPATIENT)
Dept: CALL CENTER | Facility: HOSPITAL | Age: 70
End: 2020-06-03

## 2020-06-03 NOTE — OUTREACH NOTE
Sepsis Week 1 Survey      Responses   [facility] patient discharged from?  [place]   COVID-19 Test Status  Not tested   Does the patient have one of the following disease processes/diagnoses(primary or secondary)?  Sepsis   Is there a successful TCM telephone encounter documented?  No   Week 1 attempt successful?  Yes   Call start time  1142   Call end time  1151   Discharge diagnosis  Sepsis   Meds reviewed with patient/caregiver?  Yes   Is the patient having any side effects they believe may be caused by any medication additions or changes?  No   Does the patient have all medications related to this admission filled (includes all antibiotics, inhalers, nebulizers,steroids,etc.)  Yes   Is the patient taking all medications as directed (includes completed medication regime)?  Yes   Does the patient have a primary care provider?   Yes   Does the patient have an appointment with their PCP within 7 days of discharge?  Yes   Has the patient kept scheduled appointments due by today?  No   What is preventing the patient from keeping their appointments?  Doesn't understand importance   Nursing Interventions  Educated on importance of keeping appointment   Has home health visited the patient within 72 hours of discharge?  N/A   Pulse Ox monitoring  None   Psychosocial issues?  No   Did the patient receive a copy of their discharge instructions?  Yes   Nursing interventions  Reviewed instructions with patient   What is the patient's perception of their health status since discharge?  Improving   Nursing interventions  Nurse provided patient education   Is the patient/caregiver able to teach back Sepsis?  S - Short of breath, S - Shivering,fever or very cold, E - Extreme pain or generalized discomfort (worst ever,especially abdomen)   Nursing interventions  Nurse provided reassurance to patient   Is patient/caregiver able to teach back steps to recovery at home?  Set small, achievable goals for return to baseline  health, Rest and regain strength, Make a list of questions for PCP appoinment, Eat a balanced diet   Is the patient/caregiver able to teach back signs and symptoms of worsening condition:  Fever, Edema, Shortness of breath/rapid respiratory rate   Is the patient/caregiver able to teach back the hierarchy of who to call/visit for symptoms/problems? PCP, Specialist, Home health nurse, Urgent Care, ED, 911  Yes   Additional teach back comments  Encouraged temps and daily weights,  she is interested in rehab stay and will discuss this and HH w/ PCP.   Week 1 call completed?  Yes   Wrap up additional comments  She states she can't walk and feels therapy will help her.          Nancy Mcintosh RN

## 2020-06-09 ENCOUNTER — READMISSION MANAGEMENT (OUTPATIENT)
Dept: CALL CENTER | Facility: HOSPITAL | Age: 70
End: 2020-06-09

## 2020-06-09 NOTE — OUTREACH NOTE
Sepsis Week 2 Survey      Responses   [facility] patient discharged from?  [place]   Does the patient have one of the following disease processes/diagnoses(primary or secondary)?  Sepsis   Week 2 attempt successful?  Yes   Call start time  1034   Call end time  1038   Is patient permission given to speak with other caregiver?  No   Medication alerts for this patient  was able to get medication   Meds reviewed with patient/caregiver?  Yes   Is the patient taking all medications as directed (includes completed medication regime)?  Yes   Comments regarding appointments  appointment by telehealth   Has the patient kept scheduled appointments due by today?  Yes   Has home health visited the patient within 72 hours of discharge?  N/A   What DME was ordered?  already has a walker and cane   Pulse Ox monitoring  None   Psychosocial issues?  No   Did the patient receive a copy of their discharge instructions?  Yes   What is the patient's perception of their health status since discharge?  Improving   Is the patient/caregiver able to teach back Sepsis?  -- [reviewed s/s with the patient]   Is patient/caregiver able to teach back steps to recovery at home?  Set small, achievable goals for return to baseline health, Eat a balanced diet [reviewed with the patient]   Is the patient/caregiver able to teach back signs and symptoms of worsening condition:  Fever, Hyperthermia, Rapid heart rate (>90), Edema [reviewed with the patient]   Is the patient/caregiver able to teach back the hierarchy of who to call/visit for symptoms/problems? PCP, Specialist, Home health nurse, Urgent Care, ED, 911  Yes   Week 2 call completed?  Yes   Wrap up additional comments  feels she is improving          Romana Demarco RN

## 2020-06-16 ENCOUNTER — READMISSION MANAGEMENT (OUTPATIENT)
Dept: CALL CENTER | Facility: HOSPITAL | Age: 70
End: 2020-06-16

## 2020-06-16 NOTE — OUTREACH NOTE
Sepsis Week 3 Survey      Responses   [facility] patient discharged from?  [place]   COVID-19 Test Status  Not tested   Does the patient have one of the following disease processes/diagnoses(primary or secondary)?  Sepsis   Week 3 attempt successful?  No   Unsuccessful attempts  Attempt 1          Fariha Gagnon RN

## 2020-06-18 ENCOUNTER — READMISSION MANAGEMENT (OUTPATIENT)
Dept: CALL CENTER | Facility: HOSPITAL | Age: 70
End: 2020-06-18

## 2020-06-18 NOTE — OUTREACH NOTE
Sepsis Week 3 Survey      Responses   [facility] patient discharged from?  [place]   COVID-19 Test Status  Not tested   Does the patient have one of the following disease processes/diagnoses(primary or secondary)?  Sepsis   Week 3 attempt successful?  No   Unsuccessful attempts  Attempt 2          Fariha Gagnon RN

## 2021-06-15 ENCOUNTER — OFFICE VISIT (OUTPATIENT)
Dept: CARDIOLOGY | Facility: CLINIC | Age: 71
End: 2021-06-15

## 2021-06-15 VITALS
TEMPERATURE: 97 F | HEART RATE: 72 BPM | SYSTOLIC BLOOD PRESSURE: 132 MMHG | HEIGHT: 65 IN | DIASTOLIC BLOOD PRESSURE: 76 MMHG | OXYGEN SATURATION: 96 % | WEIGHT: 180 LBS | RESPIRATION RATE: 11 BRPM | BODY MASS INDEX: 29.99 KG/M2

## 2021-06-15 DIAGNOSIS — I48.20 ATRIAL FIBRILLATION, CHRONIC (HCC): Primary | ICD-10-CM

## 2021-06-15 DIAGNOSIS — E78.5 HYPERLIPIDEMIA, UNSPECIFIED HYPERLIPIDEMIA TYPE: ICD-10-CM

## 2021-06-15 PROCEDURE — 93000 ELECTROCARDIOGRAM COMPLETE: CPT | Performed by: INTERNAL MEDICINE

## 2021-06-15 PROCEDURE — 99214 OFFICE O/P EST MOD 30 MIN: CPT | Performed by: INTERNAL MEDICINE

## 2021-06-15 RX ORDER — FUROSEMIDE 20 MG/1
20 TABLET ORAL DAILY
Qty: 90 TABLET | Refills: 3 | Status: ON HOLD | OUTPATIENT
Start: 2021-06-15 | End: 2021-07-15 | Stop reason: SDUPTHER

## 2021-06-15 RX ORDER — LEVOTHYROXINE SODIUM 0.12 MG/1
137 TABLET ORAL
Status: ON HOLD | COMMUNITY
Start: 2021-05-11 | End: 2021-07-15 | Stop reason: SDUPTHER

## 2021-06-15 RX ORDER — DILTIAZEM HYDROCHLORIDE 360 MG/1
360 CAPSULE, EXTENDED RELEASE ORAL DAILY
Qty: 90 CAPSULE | Refills: 3 | Status: ON HOLD | OUTPATIENT
Start: 2021-06-15 | End: 2021-07-15 | Stop reason: SDUPTHER

## 2021-06-15 RX ORDER — ATORVASTATIN CALCIUM 10 MG/1
10 TABLET, FILM COATED ORAL NIGHTLY
Qty: 90 TABLET | Refills: 3 | Status: SHIPPED | OUTPATIENT
Start: 2021-06-15 | End: 2022-04-26 | Stop reason: SDUPTHER

## 2021-06-15 RX ORDER — OMEPRAZOLE 20 MG/1
CAPSULE, DELAYED RELEASE ORAL
COMMUNITY
Start: 2021-05-01 | End: 2022-04-26

## 2021-06-15 NOTE — PROGRESS NOTES
[facility] [place]  [facility]OLOGY  [address] [place] [address]  Dept: [phone]  Dept Fax: [phone]    Vickie Joshi  1950    Follow Up Office Visit Note    History of Present Illness:  Vickie Joshi is a 71 y.o. female who presents to the clinic for Follow-up. Chronic atrial fibrillation- She is asymptomatic on Cardizem 360 mg, Metoprolol 50 mg bid, and Eliquis but Hr is slo 46, will lower Metoprolol 25 bid, she is asymptomatic     The following portions of the patient's history were reviewed and updated as appropriate: allergies, current medications, past family history, past medical history, past social history, past surgical history and problem list.    Medications:  albuterol  apixaban tablet  atorvastatin  budesonide-formoterol  dilTIAZem  docusate sodium  furosemide  hydrocortisone  levothyroxine  loperamide  loratadine  metoprolol tartrate  omeprazole  pantoprazole  potassium chloride ER tablet controlled-release  pregabalin  topiramate    Subjective  Allergies   Allergen Reactions   • Cephalexin Swelling   • Penicillins Hives   • Sulfa Antibiotics Hives        Past Medical History:   Diagnosis Date   • Arthritis    • Asthma    • Atrial fibrillation (CMS/HCC)    • Cardiac disorder    • Disease of thyroid gland    • Fibromyalgia    • GERD (gastroesophageal reflux disease)    • Hyperlipidemia    • Hypertension    • Migraine    • Neuropathy    • Stroke (CMS/HCC)        Past Surgical History:   Procedure Laterality Date   • CHOLECYSTECTOMY     • HIP PERCUTANEOUS PINNING Left 2/28/2017    Procedure: LEFT HIP PERCUTANEOUS PINNING FEMORAL NECK;  Surgeon: Ezra Barlow MD;  Location:  [facility] OR;  Service:    • HYSTERECTOMY     • [place] CLOSED RX TRAUMATIC HIP DISLOCATN Left 2/28/2017    Procedure: LEFT HIP CLOSED REDUCTION;  Surgeon: Ezra Barlow MD;  Location:  [facility] OR;  Service: Orthopedics   • SHOULDER SURGERY         Family History   Problem Relation  "Age of Onset   • Alcohol abuse Mother    • Heart disease Mother         Cardiac Disorder   • Stroke Mother    • Hypertension Mother    • Parkinsonism Mother    • Colon cancer Maternal Grandmother    • Diabetes Maternal Aunt         Social History     Socioeconomic History   • Marital status:      Spouse name: Not on file   • Number of children: Not on file   • Years of education: Not on file   • Highest education level: Not on file   Tobacco Use   • Smoking status: Never Smoker   • Smokeless tobacco: Never Used   Substance and Sexual Activity   • Alcohol use: No   • Drug use: No   • Sexual activity: Defer       Review of Systems   All other systems reviewed and are negative.      Cardiovascular Procedures    ECHO/MUGA:   STRESS TESTS:   CARDIAC CATH:   DEVICES:   HOLTER:   CT/MRI:   VASCULAR:   CARDIOTHORACIC:     Objective  Vitals:    06/15/21 1514   BP: 132/76   BP Location: Left arm   Patient Position: Sitting   Cuff Size: Adult   Pulse: 72   Resp: 11   Temp: 97 °F (36.1 °C)   TempSrc: Infrared   SpO2: 96%   Weight: 81.6 kg (180 lb)   Height: 165.1 cm (65\")   PainSc: 0-No pain     Body mass index is 29.95 kg/m².     Physical Exam  Constitutional:       Appearance: Healthy appearance. Not in distress.   Neck:      Vascular: No JVR. JVD normal.   Pulmonary:      Effort: Pulmonary effort is normal.      Breath sounds: Normal breath sounds. No wheezing. No rhonchi. No rales.   Chest:      Chest wall: Not tender to palpatation.   Cardiovascular:      PMI at left midclavicular line. Normal rate. Irregularly irregular rhythm. Normal S1. Normal S2.      Murmurs: There is no murmur.      No gallop. No click. No rub.   Pulses:     Intact distal pulses.   Edema:     Peripheral edema absent.   Abdominal:      General: Bowel sounds are normal.      Palpations: Abdomen is soft.      Tenderness: There is no abdominal tenderness.   Musculoskeletal: Normal range of motion.         General: No tenderness. Skin:     General: " Skin is warm and dry.   Neurological:      General: No focal deficit present.      Mental Status: Alert and oriented to person, place and time.          Diagnostic Data    ECG 12 Lead    Date/Time: 6/15/2021 3:44 PM  Performed by: Riccardo Rae MD  Authorized by: Riccardo Rae MD   Comparison: compared with previous ECG   Rhythm: atrial fibrillation  Rate: normal  BPM: 46  QRS axis: normal    Clinical impression: abnormal EKG            Assessment and Plan  Diagnoses and all orders for this visit:    Atrial fibrillation, chronic (CMS/HCC)-Patient asymptomatic,rate slow, will lower Metoprolol 25 bid, keep Cardizem 360 mg- Eliquis 5mg bid     Hyperlipidemia, unspecified hyperlipidemia type- on Lipitor     Other orders  -     omeprazole (priLOSEC) 20 MG capsule  -     levothyroxine (SYNTHROID, LEVOTHROID) 125 MCG tablet  -     metoprolol tartrate (LOPRESSOR) 25 MG tablet; Take 1 tablet by mouth Every 12 (Twelve) Hours.         Return in about 1 year (around 6/15/2022) for Recheck.    Riccardo Rae MD  06/15/2021

## 2021-07-12 ENCOUNTER — APPOINTMENT (OUTPATIENT)
Dept: CT IMAGING | Facility: HOSPITAL | Age: 71
End: 2021-07-12

## 2021-07-12 ENCOUNTER — HOSPITAL ENCOUNTER (INPATIENT)
Facility: HOSPITAL | Age: 71
LOS: 2 days | Discharge: HOME | End: 2021-07-15
Attending: EMERGENCY MEDICINE | Admitting: INTERNAL MEDICINE

## 2021-07-12 DIAGNOSIS — K92.2 ACUTE LOWER GI BLEEDING: Primary | ICD-10-CM

## 2021-07-12 LAB
ABO GROUP BLD: NORMAL
ALBUMIN SERPL-MCNC: 3.4 G/DL (ref 3.5–5.2)
ALBUMIN/GLOB SERPL: 1.4 G/DL
ALP SERPL-CCNC: 83 U/L (ref 39–117)
ALT SERPL W P-5'-P-CCNC: 8 U/L (ref 1–33)
ANION GAP SERPL CALCULATED.3IONS-SCNC: 12 MMOL/L (ref 5–15)
AST SERPL-CCNC: 15 U/L (ref 1–32)
BASOPHILS # BLD AUTO: 0.07 10*3/MM3 (ref 0–0.2)
BASOPHILS NFR BLD AUTO: 0.3 % (ref 0–1.5)
BILIRUB SERPL-MCNC: 0.2 MG/DL (ref 0–1.2)
BLD GP AB SCN SERPL QL: NEGATIVE
BUN SERPL-MCNC: 12 MG/DL (ref 8–23)
BUN/CREAT SERPL: 11.8 (ref 7–25)
CALCIUM SPEC-SCNC: 7.8 MG/DL (ref 8.6–10.5)
CHLORIDE SERPL-SCNC: 115 MMOL/L (ref 98–107)
CO2 SERPL-SCNC: 18 MMOL/L (ref 22–29)
CREAT SERPL-MCNC: 1.02 MG/DL (ref 0.57–1)
D-LACTATE SERPL-SCNC: 2.1 MMOL/L (ref 0.5–2)
DEPRECATED RDW RBC AUTO: 57 FL (ref 37–54)
EOSINOPHIL # BLD AUTO: 0.04 10*3/MM3 (ref 0–0.4)
EOSINOPHIL NFR BLD AUTO: 0.1 % (ref 0.3–6.2)
ERYTHROCYTE [DISTWIDTH] IN BLOOD BY AUTOMATED COUNT: 17.9 % (ref 12.3–15.4)
FLUAV SUBTYP SPEC NAA+PROBE: NOT DETECTED
FLUBV RNA ISLT QL NAA+PROBE: NOT DETECTED
GFR SERPL CREATININE-BSD FRML MDRD: 54 ML/MIN/1.73
GLOBULIN UR ELPH-MCNC: 2.5 GM/DL
GLUCOSE SERPL-MCNC: 109 MG/DL (ref 65–99)
HCT VFR BLD AUTO: 30.5 % (ref 34–46.6)
HGB BLD-MCNC: 9.1 G/DL (ref 12–15.9)
HOLD SPECIMEN: NORMAL
HOLD SPECIMEN: NORMAL
IMM GRANULOCYTES # BLD AUTO: 0.23 10*3/MM3 (ref 0–0.05)
IMM GRANULOCYTES NFR BLD AUTO: 0.8 % (ref 0–0.5)
LYMPHOCYTES # BLD AUTO: 2.47 10*3/MM3 (ref 0.7–3.1)
LYMPHOCYTES NFR BLD AUTO: 9 % (ref 19.6–45.3)
MCH RBC QN AUTO: 27.7 PG (ref 26.6–33)
MCHC RBC AUTO-ENTMCNC: 29.8 G/DL (ref 31.5–35.7)
MCV RBC AUTO: 92.7 FL (ref 79–97)
MONOCYTES # BLD AUTO: 2.01 10*3/MM3 (ref 0.1–0.9)
MONOCYTES NFR BLD AUTO: 7.3 % (ref 5–12)
NEUTROPHILS NFR BLD AUTO: 22.59 10*3/MM3 (ref 1.7–7)
NEUTROPHILS NFR BLD AUTO: 82.5 % (ref 42.7–76)
NRBC BLD AUTO-RTO: 0 /100 WBC (ref 0–0.2)
PLATELET # BLD AUTO: 220 10*3/MM3 (ref 140–450)
PMV BLD AUTO: 12.1 FL (ref 6–12)
POTASSIUM SERPL-SCNC: 3.9 MMOL/L (ref 3.5–5.2)
PROT SERPL-MCNC: 5.9 G/DL (ref 6–8.5)
RBC # BLD AUTO: 3.29 10*6/MM3 (ref 3.77–5.28)
RH BLD: NEGATIVE
SARS-COV-2 RNA PNL SPEC NAA+PROBE: NOT DETECTED
SODIUM SERPL-SCNC: 145 MMOL/L (ref 136–145)
T&S EXPIRATION DATE: NORMAL
TROPONIN T SERPL-MCNC: <0.01 NG/ML (ref 0–0.03)
WBC # BLD AUTO: 27.41 10*3/MM3 (ref 3.4–10.8)
WHOLE BLOOD HOLD SPECIMEN: NORMAL

## 2021-07-12 PROCEDURE — 84484 ASSAY OF TROPONIN QUANT: CPT | Performed by: EMERGENCY MEDICINE

## 2021-07-12 PROCEDURE — 80053 COMPREHEN METABOLIC PANEL: CPT | Performed by: EMERGENCY MEDICINE

## 2021-07-12 PROCEDURE — 86850 RBC ANTIBODY SCREEN: CPT | Performed by: EMERGENCY MEDICINE

## 2021-07-12 PROCEDURE — 83735 ASSAY OF MAGNESIUM: CPT | Performed by: NURSE PRACTITIONER

## 2021-07-12 PROCEDURE — 25010000002 ONDANSETRON PER 1 MG: Performed by: EMERGENCY MEDICINE

## 2021-07-12 PROCEDURE — 85025 COMPLETE CBC W/AUTO DIFF WBC: CPT | Performed by: EMERGENCY MEDICINE

## 2021-07-12 PROCEDURE — 74177 CT ABD & PELVIS W/CONTRAST: CPT

## 2021-07-12 PROCEDURE — 84145 PROCALCITONIN (PCT): CPT | Performed by: NURSE PRACTITIONER

## 2021-07-12 PROCEDURE — 99285 EMERGENCY DEPT VISIT HI MDM: CPT

## 2021-07-12 PROCEDURE — 93005 ELECTROCARDIOGRAM TRACING: CPT | Performed by: EMERGENCY MEDICINE

## 2021-07-12 PROCEDURE — 86900 BLOOD TYPING SEROLOGIC ABO: CPT

## 2021-07-12 PROCEDURE — 86923 COMPATIBILITY TEST ELECTRIC: CPT

## 2021-07-12 PROCEDURE — 86900 BLOOD TYPING SEROLOGIC ABO: CPT | Performed by: EMERGENCY MEDICINE

## 2021-07-12 PROCEDURE — 83605 ASSAY OF LACTIC ACID: CPT | Performed by: EMERGENCY MEDICINE

## 2021-07-12 PROCEDURE — 87636 SARSCOV2 & INF A&B AMP PRB: CPT | Performed by: EMERGENCY MEDICINE

## 2021-07-12 PROCEDURE — 36430 TRANSFUSION BLD/BLD COMPNT: CPT

## 2021-07-12 PROCEDURE — P9016 RBC LEUKOCYTES REDUCED: HCPCS

## 2021-07-12 PROCEDURE — 86140 C-REACTIVE PROTEIN: CPT | Performed by: NURSE PRACTITIONER

## 2021-07-12 PROCEDURE — 86901 BLOOD TYPING SEROLOGIC RH(D): CPT | Performed by: EMERGENCY MEDICINE

## 2021-07-12 PROCEDURE — 25010000002 IOPAMIDOL 61 % SOLUTION: Performed by: EMERGENCY MEDICINE

## 2021-07-12 RX ORDER — SODIUM CHLORIDE 9 MG/ML
125 INJECTION, SOLUTION INTRAVENOUS CONTINUOUS
Status: DISCONTINUED | OUTPATIENT
Start: 2021-07-12 | End: 2021-07-13

## 2021-07-12 RX ORDER — ONDANSETRON 2 MG/ML
4 INJECTION INTRAMUSCULAR; INTRAVENOUS ONCE
Status: COMPLETED | OUTPATIENT
Start: 2021-07-12 | End: 2021-07-12

## 2021-07-12 RX ORDER — SODIUM CHLORIDE 0.9 % (FLUSH) 0.9 %
10 SYRINGE (ML) INJECTION AS NEEDED
Status: DISCONTINUED | OUTPATIENT
Start: 2021-07-12 | End: 2021-07-15 | Stop reason: HOSPADM

## 2021-07-12 RX ADMIN — SODIUM CHLORIDE 1000 ML: 9 INJECTION, SOLUTION INTRAVENOUS at 21:12

## 2021-07-12 RX ADMIN — IOPAMIDOL 100 ML: 612 INJECTION, SOLUTION INTRAVENOUS at 22:14

## 2021-07-12 RX ADMIN — ONDANSETRON 4 MG: 2 INJECTION INTRAMUSCULAR; INTRAVENOUS at 21:20

## 2021-07-13 ENCOUNTER — APPOINTMENT (OUTPATIENT)
Dept: GENERAL RADIOLOGY | Facility: HOSPITAL | Age: 71
End: 2021-07-13

## 2021-07-13 PROBLEM — I95.9 SYMPTOMATIC HYPOTENSION: Status: ACTIVE | Noted: 2021-07-13

## 2021-07-13 PROBLEM — N17.9 AKI (ACUTE KIDNEY INJURY): Status: ACTIVE | Noted: 2021-07-13

## 2021-07-13 PROBLEM — E83.51 HYPOCALCEMIA: Status: ACTIVE | Noted: 2021-07-13

## 2021-07-13 PROBLEM — E87.20 LACTIC ACIDOSIS: Status: ACTIVE | Noted: 2021-07-13

## 2021-07-13 PROBLEM — K92.2 ACUTE LOWER GI BLEEDING: Status: ACTIVE | Noted: 2021-07-13

## 2021-07-13 LAB
ANION GAP SERPL CALCULATED.3IONS-SCNC: 7 MMOL/L (ref 5–15)
BASOPHILS # BLD AUTO: 0.03 10*3/MM3 (ref 0–0.2)
BASOPHILS NFR BLD AUTO: 0.2 % (ref 0–1.5)
BH BB BLOOD EXPIRATION DATE: NORMAL
BH BB BLOOD TYPE BARCODE: 600
BH BB DISPENSE STATUS: NORMAL
BH BB PRODUCT CODE: NORMAL
BH BB UNIT NUMBER: NORMAL
BILIRUB UR QL STRIP: NEGATIVE
BUN SERPL-MCNC: 9 MG/DL (ref 8–23)
BUN/CREAT SERPL: 12.7 (ref 7–25)
CA-I SERPL ISE-MCNC: 1.18 MMOL/L (ref 1.12–1.32)
CALCIUM SPEC-SCNC: 7.7 MG/DL (ref 8.6–10.5)
CHLORIDE SERPL-SCNC: 114 MMOL/L (ref 98–107)
CLARITY UR: CLEAR
CO2 SERPL-SCNC: 19 MMOL/L (ref 22–29)
COLOR UR: YELLOW
CREAT SERPL-MCNC: 0.71 MG/DL (ref 0.57–1)
CROSSMATCH INTERPRETATION: NORMAL
CRP SERPL-MCNC: <0.3 MG/DL (ref 0–0.5)
D-LACTATE SERPL-SCNC: 1.5 MMOL/L (ref 0.5–2)
DEPRECATED RDW RBC AUTO: 54.4 FL (ref 37–54)
EOSINOPHIL # BLD AUTO: 0.04 10*3/MM3 (ref 0–0.4)
EOSINOPHIL NFR BLD AUTO: 0.3 % (ref 0.3–6.2)
ERYTHROCYTE [DISTWIDTH] IN BLOOD BY AUTOMATED COUNT: 17.1 % (ref 12.3–15.4)
ERYTHROCYTE [SEDIMENTATION RATE] IN BLOOD: 7 MM/HR (ref 0–30)
GFR SERPL CREATININE-BSD FRML MDRD: 82 ML/MIN/1.73
GLUCOSE SERPL-MCNC: 103 MG/DL (ref 65–99)
GLUCOSE UR STRIP-MCNC: NEGATIVE MG/DL
HCT VFR BLD AUTO: 26 % (ref 34–46.6)
HCT VFR BLD AUTO: 26.5 % (ref 34–46.6)
HCT VFR BLD AUTO: 26.5 % (ref 34–46.6)
HCT VFR BLD AUTO: 29.8 % (ref 34–46.6)
HCT VFR BLD AUTO: 31.1 % (ref 34–46.6)
HGB BLD-MCNC: 8 G/DL (ref 12–15.9)
HGB BLD-MCNC: 8.2 G/DL (ref 12–15.9)
HGB BLD-MCNC: 8.2 G/DL (ref 12–15.9)
HGB BLD-MCNC: 9.1 G/DL (ref 12–15.9)
HGB BLD-MCNC: 9.5 G/DL (ref 12–15.9)
HGB UR QL STRIP.AUTO: NEGATIVE
HOLD SPECIMEN: NORMAL
IMM GRANULOCYTES # BLD AUTO: 0.07 10*3/MM3 (ref 0–0.05)
IMM GRANULOCYTES NFR BLD AUTO: 0.5 % (ref 0–0.5)
KETONES UR QL STRIP: NEGATIVE
LEUKOCYTE ESTERASE UR QL STRIP.AUTO: NEGATIVE
LYMPHOCYTES # BLD AUTO: 2.91 10*3/MM3 (ref 0.7–3.1)
LYMPHOCYTES NFR BLD AUTO: 22.5 % (ref 19.6–45.3)
MAGNESIUM SERPL-MCNC: 1.5 MG/DL (ref 1.6–2.4)
MAGNESIUM SERPL-MCNC: 1.7 MG/DL (ref 1.6–2.4)
MCH RBC QN AUTO: 28.7 PG (ref 26.6–33)
MCHC RBC AUTO-ENTMCNC: 30.9 G/DL (ref 31.5–35.7)
MCV RBC AUTO: 92.7 FL (ref 79–97)
MONOCYTES # BLD AUTO: 0.91 10*3/MM3 (ref 0.1–0.9)
MONOCYTES NFR BLD AUTO: 7 % (ref 5–12)
NEUTROPHILS NFR BLD AUTO: 69.5 % (ref 42.7–76)
NEUTROPHILS NFR BLD AUTO: 8.96 10*3/MM3 (ref 1.7–7)
NITRITE UR QL STRIP: NEGATIVE
NRBC BLD AUTO-RTO: 0 /100 WBC (ref 0–0.2)
PH UR STRIP.AUTO: <=5 [PH] (ref 5–8)
PLATELET # BLD AUTO: 159 10*3/MM3 (ref 140–450)
PMV BLD AUTO: 12.3 FL (ref 6–12)
POTASSIUM SERPL-SCNC: 4 MMOL/L (ref 3.5–5.2)
PROCALCITONIN SERPL-MCNC: 0.07 NG/ML (ref 0–0.25)
PROT UR QL STRIP: NEGATIVE
QT INTERVAL: 378 MS
QTC INTERVAL: 487 MS
RBC # BLD AUTO: 2.86 10*6/MM3 (ref 3.77–5.28)
SODIUM SERPL-SCNC: 140 MMOL/L (ref 136–145)
SP GR UR STRIP: 1.07 (ref 1–1.03)
TROPONIN T SERPL-MCNC: <0.01 NG/ML (ref 0–0.03)
UNIT  ABO: NORMAL
UNIT  RH: NORMAL
UROBILINOGEN UR QL STRIP: ABNORMAL
WBC # BLD AUTO: 12.92 10*3/MM3 (ref 3.4–10.8)

## 2021-07-13 PROCEDURE — 94640 AIRWAY INHALATION TREATMENT: CPT

## 2021-07-13 PROCEDURE — 93010 ELECTROCARDIOGRAM REPORT: CPT | Performed by: INTERNAL MEDICINE

## 2021-07-13 PROCEDURE — 94799 UNLISTED PULMONARY SVC/PX: CPT

## 2021-07-13 PROCEDURE — 80048 BASIC METABOLIC PNL TOTAL CA: CPT | Performed by: NURSE PRACTITIONER

## 2021-07-13 PROCEDURE — 87040 BLOOD CULTURE FOR BACTERIA: CPT | Performed by: NURSE PRACTITIONER

## 2021-07-13 PROCEDURE — 97530 THERAPEUTIC ACTIVITIES: CPT | Performed by: PHYSICAL THERAPIST

## 2021-07-13 PROCEDURE — 85025 COMPLETE CBC W/AUTO DIFF WBC: CPT | Performed by: NURSE PRACTITIONER

## 2021-07-13 PROCEDURE — 71045 X-RAY EXAM CHEST 1 VIEW: CPT

## 2021-07-13 PROCEDURE — 82330 ASSAY OF CALCIUM: CPT | Performed by: FAMILY MEDICINE

## 2021-07-13 PROCEDURE — 83605 ASSAY OF LACTIC ACID: CPT | Performed by: EMERGENCY MEDICINE

## 2021-07-13 PROCEDURE — 85014 HEMATOCRIT: CPT | Performed by: NURSE PRACTITIONER

## 2021-07-13 PROCEDURE — 93005 ELECTROCARDIOGRAM TRACING: CPT | Performed by: FAMILY MEDICINE

## 2021-07-13 PROCEDURE — 84484 ASSAY OF TROPONIN QUANT: CPT | Performed by: FAMILY MEDICINE

## 2021-07-13 PROCEDURE — 85018 HEMOGLOBIN: CPT | Performed by: NURSE PRACTITIONER

## 2021-07-13 PROCEDURE — 83735 ASSAY OF MAGNESIUM: CPT | Performed by: FAMILY MEDICINE

## 2021-07-13 PROCEDURE — 97166 OT EVAL MOD COMPLEX 45 MIN: CPT

## 2021-07-13 PROCEDURE — 81003 URINALYSIS AUTO W/O SCOPE: CPT | Performed by: NURSE PRACTITIONER

## 2021-07-13 PROCEDURE — 97162 PT EVAL MOD COMPLEX 30 MIN: CPT | Performed by: PHYSICAL THERAPIST

## 2021-07-13 PROCEDURE — 85652 RBC SED RATE AUTOMATED: CPT | Performed by: NURSE PRACTITIONER

## 2021-07-13 PROCEDURE — 99223 1ST HOSP IP/OBS HIGH 75: CPT | Performed by: FAMILY MEDICINE

## 2021-07-13 RX ORDER — MAGNESIUM SULFATE HEPTAHYDRATE 40 MG/ML
4 INJECTION, SOLUTION INTRAVENOUS AS NEEDED
Status: DISCONTINUED | OUTPATIENT
Start: 2021-07-13 | End: 2021-07-15 | Stop reason: HOSPADM

## 2021-07-13 RX ORDER — MAGNESIUM SULFATE HEPTAHYDRATE 40 MG/ML
2 INJECTION, SOLUTION INTRAVENOUS AS NEEDED
Status: DISCONTINUED | OUTPATIENT
Start: 2021-07-13 | End: 2021-07-15 | Stop reason: HOSPADM

## 2021-07-13 RX ORDER — SODIUM CHLORIDE 9 MG/ML
75 INJECTION, SOLUTION INTRAVENOUS CONTINUOUS
Status: ACTIVE | OUTPATIENT
Start: 2021-07-13 | End: 2021-07-13

## 2021-07-13 RX ORDER — BUDESONIDE AND FORMOTEROL FUMARATE DIHYDRATE 80; 4.5 UG/1; UG/1
2 AEROSOL RESPIRATORY (INHALATION)
Status: DISCONTINUED | OUTPATIENT
Start: 2021-07-13 | End: 2021-07-15 | Stop reason: HOSPADM

## 2021-07-13 RX ORDER — FERROUS SULFATE 325(65) MG
325 TABLET ORAL
Status: DISCONTINUED | OUTPATIENT
Start: 2021-07-13 | End: 2021-07-15

## 2021-07-13 RX ORDER — ACETAMINOPHEN 325 MG/1
650 TABLET ORAL EVERY 4 HOURS PRN
Status: DISCONTINUED | OUTPATIENT
Start: 2021-07-13 | End: 2021-07-15 | Stop reason: HOSPADM

## 2021-07-13 RX ORDER — SODIUM CHLORIDE 0.9 % (FLUSH) 0.9 %
10 SYRINGE (ML) INJECTION AS NEEDED
Status: DISCONTINUED | OUTPATIENT
Start: 2021-07-13 | End: 2021-07-15 | Stop reason: HOSPADM

## 2021-07-13 RX ORDER — DOCUSATE SODIUM 100 MG/1
100 CAPSULE, LIQUID FILLED ORAL 2 TIMES DAILY
Status: DISCONTINUED | OUTPATIENT
Start: 2021-07-13 | End: 2021-07-14

## 2021-07-13 RX ORDER — LEVOTHYROXINE SODIUM 137 UG/1
137 TABLET ORAL
Status: DISCONTINUED | OUTPATIENT
Start: 2021-07-13 | End: 2021-07-15 | Stop reason: HOSPADM

## 2021-07-13 RX ORDER — DILTIAZEM HYDROCHLORIDE 180 MG/1
360 CAPSULE, COATED, EXTENDED RELEASE ORAL
Refills: 3 | Status: DISCONTINUED | OUTPATIENT
Start: 2021-07-13 | End: 2021-07-14

## 2021-07-13 RX ORDER — PREGABALIN 100 MG/1
300 CAPSULE ORAL EVERY 12 HOURS SCHEDULED
Status: DISCONTINUED | OUTPATIENT
Start: 2021-07-13 | End: 2021-07-14

## 2021-07-13 RX ORDER — SODIUM CHLORIDE 9 MG/ML
75 INJECTION, SOLUTION INTRAVENOUS CONTINUOUS
Status: DISCONTINUED | OUTPATIENT
Start: 2021-07-13 | End: 2021-07-15 | Stop reason: HOSPADM

## 2021-07-13 RX ORDER — TOPIRAMATE 100 MG/1
100 TABLET, FILM COATED ORAL EVERY 12 HOURS SCHEDULED
Status: DISCONTINUED | OUTPATIENT
Start: 2021-07-13 | End: 2021-07-15 | Stop reason: HOSPADM

## 2021-07-13 RX ORDER — ATORVASTATIN CALCIUM 10 MG/1
10 TABLET, FILM COATED ORAL NIGHTLY
Status: DISCONTINUED | OUTPATIENT
Start: 2021-07-13 | End: 2021-07-15 | Stop reason: HOSPADM

## 2021-07-13 RX ORDER — ACETAMINOPHEN 650 MG/1
650 SUPPOSITORY RECTAL EVERY 4 HOURS PRN
Status: DISCONTINUED | OUTPATIENT
Start: 2021-07-13 | End: 2021-07-15 | Stop reason: HOSPADM

## 2021-07-13 RX ORDER — PANTOPRAZOLE SODIUM 40 MG/1
40 TABLET, DELAYED RELEASE ORAL DAILY
Status: DISCONTINUED | OUTPATIENT
Start: 2021-07-13 | End: 2021-07-15 | Stop reason: HOSPADM

## 2021-07-13 RX ORDER — ACETAMINOPHEN 160 MG/5ML
650 SOLUTION ORAL EVERY 4 HOURS PRN
Status: DISCONTINUED | OUTPATIENT
Start: 2021-07-13 | End: 2021-07-15 | Stop reason: HOSPADM

## 2021-07-13 RX ORDER — SODIUM CHLORIDE 0.9 % (FLUSH) 0.9 %
10 SYRINGE (ML) INJECTION EVERY 12 HOURS SCHEDULED
Status: DISCONTINUED | OUTPATIENT
Start: 2021-07-13 | End: 2021-07-15 | Stop reason: HOSPADM

## 2021-07-13 RX ADMIN — SODIUM CHLORIDE 125 ML/HR: 9 INJECTION, SOLUTION INTRAVENOUS at 02:29

## 2021-07-13 RX ADMIN — BUDESONIDE AND FORMOTEROL FUMARATE DIHYDRATE 2 PUFF: 80; 4.5 AEROSOL RESPIRATORY (INHALATION) at 09:45

## 2021-07-13 RX ADMIN — SODIUM CHLORIDE, PRESERVATIVE FREE 10 ML: 5 INJECTION INTRAVENOUS at 21:21

## 2021-07-13 RX ADMIN — METOPROLOL TARTRATE 25 MG: 25 TABLET, FILM COATED ORAL at 08:34

## 2021-07-13 RX ADMIN — FERROUS SULFATE TAB 325 MG (65 MG ELEMENTAL FE) 325 MG: 325 (65 FE) TAB at 08:35

## 2021-07-13 RX ADMIN — TOPIRAMATE 100 MG: 100 TABLET, FILM COATED ORAL at 21:13

## 2021-07-13 RX ADMIN — BUDESONIDE AND FORMOTEROL FUMARATE DIHYDRATE 2 PUFF: 80; 4.5 AEROSOL RESPIRATORY (INHALATION) at 18:52

## 2021-07-13 RX ADMIN — DOCUSATE SODIUM 100 MG: 100 CAPSULE, LIQUID FILLED ORAL at 21:12

## 2021-07-13 RX ADMIN — TOPIRAMATE 100 MG: 100 TABLET, FILM COATED ORAL at 08:35

## 2021-07-13 RX ADMIN — ATORVASTATIN CALCIUM 10 MG: 10 TABLET, FILM COATED ORAL at 21:13

## 2021-07-13 RX ADMIN — DILTIAZEM HYDROCHLORIDE 360 MG: 180 CAPSULE, COATED, EXTENDED RELEASE ORAL at 08:34

## 2021-07-13 RX ADMIN — DOCUSATE SODIUM 100 MG: 100 CAPSULE, LIQUID FILLED ORAL at 08:35

## 2021-07-13 RX ADMIN — PREGABALIN 300 MG: 100 CAPSULE ORAL at 21:13

## 2021-07-13 RX ADMIN — SODIUM CHLORIDE 500 ML: 9 INJECTION, SOLUTION INTRAVENOUS at 10:32

## 2021-07-13 RX ADMIN — PANTOPRAZOLE SODIUM 40 MG: 40 TABLET, DELAYED RELEASE ORAL at 08:34

## 2021-07-13 RX ADMIN — PREGABALIN 300 MG: 100 CAPSULE ORAL at 08:35

## 2021-07-13 NOTE — PLAN OF CARE
Goal Outcome Evaluation:  Plan of Care Reviewed With: patient           Outcome Summary: PT evaluation completed.  Pt transferred supine<-->sit with MaxAx2, stood with ModAx1, and completed 2 stand pivot transfers from bed-->recliner-->bed with ModAx2.  Skilled PT services warranted to improve mobility and safety.  Recommend inpt rehab at d/c.

## 2021-07-13 NOTE — CASE MANAGEMENT/SOCIAL WORK
Discharge Planning Assessment  [facility]     Patient Name: Vickie Joshi  MRN: [id number]  Today's Date: 7/13/2021    Admit Date: 7/12/2021    Discharge Needs Assessment     Row Name 07/13/21 1621       Living Environment    Lives With  spouse    Name(s) of Who Lives With Patient  Barak Joshi-      Current Living Arrangements  home/apartment/condo    Primary Care Provided by  spouse/significant other    Provides Primary Care For  no one, unable/limited ability to care for self    Family Caregiver if Needed  spouse    Family Caregiver Names  Barak Joshi- Spouse    Quality of Family Relationships  helpful;involved;supportive    Able to Return to Prior Arrangements  yes       Transition Planning    Patient/Family Anticipates Transition to  home with help/services    Patient/Family Anticipated Services at Transition      Transportation Anticipated  family or friend will provide       Discharge Needs Assessment    Readmission Within the Last 30 Days  no previous admission in last 30 days    Equipment Currently Used at Home  commode;grab bar;power chair,(recliner lift);shower chair;wheelchair    Concerns to be Addressed  discharge planning    Outpatient/Agency/Support Group Needs  homecare agency    Discharge Facility/Level of Care Needs  home with home health    Current Discharge Risk  chronically ill;dependent with mobility/activities of daily living        Discharge Plan     Row Name 07/13/21 1626       Plan    Plan  Home with Family    Patient/Family in Agreement with Plan  yes    Plan Comments  I have met with Mrs. Joshi at her bedside today to initiate a discharge plan.  She states that she lives in [facility] with her , Barak.  She reports that she is dependent with activities of daily living with the exception of bathing.  She uses a wheelchair, power lift chair, shower chair, hand rails and bedside commode to assist with mobility.  Mrs. Joshi states that her   Bill assists her at home.  She plans to return home at discharge and states Bill will provide transportation.   I have discussed PT/OT recommendations for inpatient rehab with her, however she refuses to go to rehab and states that she has plans to receive outpatient PT services with [facility].  She denies currently receiving home health services.  Case Management will cont to follow her plan of care and assist with discharge planning as recommendations become available.    Final Discharge Disposition Code  01 - home or self-care        Continued Care and Services - Admitted Since 7/12/2021    Coordination has not been started for this encounter.         Demographic Summary     Row Name 07/13/21 1622       General Information    General Information Comments  I confirmed with Mrs. Joshi her PCP is Dav Bryan MD and her insurance is [employer].        Functional Status     Row Name 07/13/21 1624       Functional Status, IADL    Medications  assistive person    Meal Preparation  completely dependent    Housekeeping  completely dependent    Laundry  completely dependent    Shopping  completely dependent        Psychosocial    No documentation.       Abuse/Neglect    No documentation.       Legal    No documentation.       Substance Abuse    No documentation.       Patient Forms    No documentation.           Barb West RN

## 2021-07-13 NOTE — H&P
"    [facility] Medicine Services  HISTORY AND PHYSICAL    Patient Name: Vickie Joshi  : 1950  MRN: [id number]  Primary Care Physician: Dav Bryan MD  Date of admission: 2021    Subjective   Subjective     Chief Complaint:  Rectal bleeding, altered mental status    HPI:  Vickie Joshi is a 71 y.o. female with PMH significant for atrial fib (on eliquis), hypothyroid, HTN, HLD, GERD, CVA (residual Left hemiparesis), hemorrhoids with rectal bleeding, diastolic CHF,  who presents to the ED with complaint of rectal bleeding and altered mental status.   She notes that she has had intermittent PRBPR for the past two years secondary to internal hemorrhoids. She follows outpt with Dr Jung but has not had follow up for some time. Her last colonoscopy was about one year ago per pt report.   She notes that she has episodes of constipation and then when she begins to have bowel movements again, she has rectal bleeding. She states for the past 3 days she began to have bowel movements with bright red blood. She notes that prior to that she had several days of constipation. She notes that initially, she had pain with her BMs but for the past two days she has not had pain with them. She has continued to have bleeding with every bowel movement.   Today, she reports that she went to the bathroom and had a large amount of bleeding. She began to feel lightheaded and that things were \"going dark\" so she called for her  to come help her. She is unable to ambulate and requires assistance to stand but was unable to get up due to being so weak. Her  called a neighbor and she notes it took 3 of them to get her up and back to bed. Her  then called EMS. EMS reports that they were unable to obtain a blood pressure but gave her fluids and she became more alert. The pt reports remembering everything about the ambulance ride and does not think she had LOC.   Upon arrival " to the ED, she is hypotensive. She is noted to have leukocytosis with WBC of 27.41. She also had lactic acidosis of 2.1. Her H&H was stable at 9.1/30.5. She was given additional IVF bolus in the ED but continued with hypotension so was given 1 unit PRBC. She reports feeling better at this time. She notes that she has not had further bleeding or bowel movement since arrival.   She will be admitted to Hospital Medicine for further evaluation.       COVID Details:        Symptoms: [x] NONE [] Fever []  Cough [] Shortness of breath [] Change in taste or smell  The patient has a COVID HM Topic on their chart, and they are fully vaccinated.    Review of Systems   Constitutional: Positive for activity change. Negative for appetite change, chills, diaphoresis, fatigue and fever.   HENT: Negative.    Eyes: Negative for visual disturbance.   Respiratory: Negative for cough, chest tightness, shortness of breath and wheezing.    Cardiovascular: Negative for chest pain, palpitations and leg swelling.   Gastrointestinal: Positive for abdominal pain, anal bleeding, blood in stool, constipation, nausea and vomiting. Negative for abdominal distention.   Endocrine: Negative.    Genitourinary: Negative for difficulty urinating, dysuria, frequency and urgency.   Musculoskeletal: Positive for gait problem. Negative for back pain and myalgias.   Skin: Negative for color change, pallor and rash.   Allergic/Immunologic: Negative.    Neurological: Positive for dizziness, syncope (near syncope ), weakness and light-headedness. Negative for tremors, facial asymmetry and speech difficulty.   Hematological: Negative.    Psychiatric/Behavioral: Negative for confusion. The patient is not nervous/anxious.         All other systems reviewed and are negative.     Personal History     Past Medical History:   Diagnosis Date   • Arthritis    • Asthma    • Atrial fibrillation (CMS/HCC)    • Cardiac disorder    • Disease of thyroid gland    •  Fibromyalgia    • GERD (gastroesophageal reflux disease)    • Hyperlipidemia    • Hypertension    • Migraine    • Neuropathy    • Stroke (CMS/HCC)        Past Surgical History:   Procedure Laterality Date   • CHOLECYSTECTOMY     • HIP PERCUTANEOUS PINNING Left 2/28/2017    Procedure: LEFT HIP PERCUTANEOUS PINNING FEMORAL NECK;  Surgeon: Ezra Barlow MD;  Location: [facility] OR;  Service:    • HYSTERECTOMY     • [place] CLOSED RX TRAUMATIC HIP DISLOCATN Left 2/28/2017    Procedure: LEFT HIP CLOSED REDUCTION;  Surgeon: Ezra Barlow MD;  Location: [facility] OR;  Service: Orthopedics   • SHOULDER SURGERY         Family History:family history includes Alcohol abuse in her mother; Colon cancer in her maternal grandmother; Diabetes in her maternal aunt; Heart disease in her mother; Hypertension in her mother; Parkinsonism in her mother; Stroke in her mother. Otherwise pertinent FHx was reviewed and unremarkable.     Social History:  reports that she has never smoked. She has never used smokeless tobacco. She reports that she does not drink alcohol and does not use drugs.  Social History     Social History Narrative   • Not on file       Medications:  Ferrous Sulfate, albuterol, apixaban, atorvastatin, budesonide-formoterol, dilTIAZem, docusate sodium, furosemide, hydrocortisone, levothyroxine, loperamide, loratadine, metoprolol tartrate, omeprazole, pantoprazole, potassium chloride ER, pregabalin, and topiramate    Allergies   Allergen Reactions   • Cephalexin Swelling   • Penicillins Hives   • Sulfa Antibiotics Hives       Objective   Objective     Vital Signs:   Temp:  [97.7 °F (36.5 °C)-98.4 °F (36.9 °C)] 98.1 °F (36.7 °C)  Heart Rate:  [] 125  Resp:  [16-20] 18  BP: ()/(46-92) 109/83    Physical Exam   Constitutional: Awake, alert  Eyes: PERRLA, sclerae anicteric, no conjunctival injection  HENT: NCAT, mucous membranes moist  Neck: Supple, no thyromegaly, no lymphadenopathy, trachea midline  Respiratory: Clear  to auscultation bilaterally, nonlabored respirations   Cardiovascular: irregular, tachycardic, no murmurs, rubs, or gallops, palpable pedal pulses bilaterally  Gastrointestinal: Positive bowel sounds, soft, nontender, nondistended  Musculoskeletal: No bilateral ankle edema, no clubbing or cyanosis to extremities  Psychiatric: Appropriate affect, cooperative  Neurologic: Oriented x 3, left arm and left leg flaccid, Cranial Nerves grossly intact to confrontation, speech clear  Skin: No rashes      Result Review:  I have personally reviewed the results from the time of this admission to 07/13/21 2:40 AM EDT and agree with these findings:  [x]  Laboratory  []  Microbiology  [x]  Radiology  [x]  EKG/Telemetry   []  Cardiology/Vascular   []  Pathology  [x]  Old records  []  Other:  Most notable findings include:      LAB RESULTS:      Lab 07/12/21 2035   WBC 27.41*   HEMOGLOBIN 9.1*   HEMATOCRIT 30.5*   PLATELETS 220   NEUTROS ABS 22.59*   IMMATURE GRANS (ABS) 0.23*   LYMPHS ABS 2.47   MONOS ABS 2.01*   EOS ABS 0.04   MCV 92.7   LACTATE 2.1*         Lab 07/12/21 2035   SODIUM 145   POTASSIUM 3.9   CHLORIDE 115*   CO2 18.0*   ANION GAP 12.0   BUN 12   CREATININE 1.02*   GLUCOSE 109*   CALCIUM 7.8*   MAGNESIUM 1.7         Lab 07/12/21 2035   TOTAL PROTEIN 5.9*   ALBUMIN 3.40*   GLOBULIN 2.5   ALT (SGPT) 8   AST (SGOT) 15   BILIRUBIN 0.2   ALK PHOS 83         Lab 07/12/21 2035   TROPONIN T <0.010             Lab 07/12/21 2035   ABO TYPING A   RH TYPING Negative   ANTIBODY SCREEN Negative         UA    Urinalysis 7/13/21   Specific Heppner, UA 1.071 (A)   Ketones, UA Negative   Blood, UA Negative   Leukocytes, UA Negative   Nitrite, UA Negative   (A) Abnormal value            Microbiology Results (last 10 days)     Procedure Component Value - Date/Time    COVID-19 and FLU A/B PCR - Swab, Nasopharynx [[id number]]  (Normal) Collected: 07/12/21 2119    Lab Status: Final result Specimen: Swab from Nasopharynx Updated:  07/12/21 2157     COVID19 Not Detected     Influenza A PCR Not Detected     Influenza B PCR Not Detected    Narrative:      Fact sheet for providers: https://www.fda.gov/media/437215/download    Fact sheet for patients: https://www.fda.gov/media/985757/download    Test performed by PCR.          CT Abdomen Pelvis With Contrast    Result Date: 7/12/2021  CT Abdomen Pelvis W INDICATION: Abdominal pain and rectal bleeding. Acidosis. TECHNIQUE: CT of the abdomen and pelvis with IV contrast. Delayed images were obtained. Coronal and sagittal reconstructions were obtained.  Radiation dose reduction techniques included automated exposure control or exposure modulation based on body size. Count of known CT and cardiac nuc med studies performed in previous 12 months: 0. COMPARISON: CT pelvis 2/27/2017 FINDINGS: There is mild dependent atelectasis or scarring in the right lower lobe. There is atherosclerotic disease with no aortic aneurysm. Gallbladder is surgically absent. No biliary obstruction is identified. There is a tiny right renal cyst. There is mild fatty atrophy of the pancreas. Solid abdominal organs are otherwise normal. Urinary bladder is normal. Uterus is surgically absent. There is colonic diverticulosis without acute diverticulitis. Overall, the colon is relatively decompressed. There is some mild thickening of the wall of the rectosigmoid colon. This could be due to incomplete distention or mild colitis. The appendix is not clearly identified, but there is no evidence of acute appendicitis. Small bowel is normal with no obstruction identified. Stomach is unremarkable. There is no adenopathy or free fluid. Patient is status post ORIF of the left proximal femur.     Impression: 1. Mild thickening of the rectosigmoid colon wall which may be due to incomplete distention or mild colitis. 2. Colonic diverticulosis without focal diverticulitis. The appendix is not seen, but there is no evidence of acute  appendicitis. 3. No small bowel obstruction. 4. Cholecystectomy and hysterectomy. Signer Name: Luis Carlos Chen MD  Signed: 7/12/2021 10:31 PM  Workstation Name: [facility]  Radiology [facility]    XR Chest 1 View    Result Date: 7/13/2021  CR Chest 1 Vw INDICATION: Leukocytosis and hypotension. COMPARISON:  5/24/2020 FINDINGS: Single portable AP view(s) of the chest. The heart and mediastinal contours are normal. The lungs are clear. No pneumothorax or pleural effusion. There is atherosclerotic disease in the aorta.     Impression: No acute cardiopulmonary findings. Signer Name: Luis Carlos Chen MD  Signed: 7/13/2021 2:31 AM  Workstation Name: [facility]  Radiology [facility]      Results for orders placed during the hospital encounter of 06/30/19    Adult Transthoracic Echo Complete W/ Cont if Necessary Per Protocol    Interpretation Summary  · Mild pulmonic valve regurgitation is present.  · Mild mitral valve regurgitation is present  · Left atrial cavity size is moderately dilated.  · Mild aortic valve regurgitation is present.  · Mild tricuspid valve regurgitation is present.  · Calculated right ventricular systolic pressure from tricuspid regurgitation is 39 mmHg.  · Estimated EF = 65%.  · Left ventricular systolic function is normal.      Assessment/Plan   Assessment & Plan       Acute lower GI bleeding    GERD (gastroesophageal reflux disease)    SIRS (systemic inflammatory response syndrome) (CMS/HCC)    Atrial fibrillation with RVR (CMS/HCC)    Hypothyroidism    Hyperlipidemia    Chronic diastolic CHF (congestive heart failure) (CMS/HCC)    History of hemorrhagic stroke with residual hemiparesis (CMS/HCC)    Symptomatic hypotension    Hypocalcemia    Lactic acidosis    BARBARA (acute kidney injury) (CMS/HCC)    71 y.o. female with PMH significant for atrial fib (on eliquis), hypothyroid, HTN, HLD, GERD, CVA (residual Left hemiparesis), hemorrhoids with rectal bleeding, diastolic  CHF,  who presents to the ED with complaint of 3 day history of rectal bleeding and one day history of  altered mental status who is found to have concern for SIRS and symptomatic anemia due to lower GI bleed.     Lower GI Bleed  Symptomatic Anemia  -IVF bolus given in ED  -hypotension improving  -continue gentle hydration   -s/p 1 unit PRBC  -trend H&H  -Follows outpt with Dr Jung, consult in am   -Hold Eliquis     SIRS  Leukocytosis   Lactic Acidosis   -BC x 2 pending   -UA negative    -CXR negative for acute findings   -CT abdomen notes question of mild colitis   -repeat CBC   -CRP, SED rate pending     BARBARA  -likely volume depletion   -IVF bolus, gentle IVF  -BMP in am     Atrial Fib with RVR  -rate now improved   -continue metoprolol and cardizem with hold parameters given hypotension   -hold Eliquis for now, last dose was AM of 07/12/21    Hypocalcemia   -ionized calcium pending     GERD   -continue PPI     Hypothyroid   -continue synthroid     Hyperlipidemia   -continue statin     CHF  -daily weight   -on lasix every other day, hold for now, resume when BP improved       DVT prophylaxis:  Mechanical     CODE STATUS:  Discussed with pt   Level Of Support Discussed With: Patient  Code Status: CPR  Medical Interventions (Level of Support Prior to Arrest): Full      This note has been completed as part of a split-shared workflow.   Signature:   Electronically signed by FABIANA Salvador, 07/13/21, 3:20 AM EDT.          Attending   Admission Attestation       I have seen and examined the patient, performing an independent face-to-face diagnostic evaluation with plan of care reviewed and developed with the advanced practice clinician (APC).      Brief Summary Statement:   Vickie Joshi is a 71 y.o. female with PMHx of atrial fib on eliquis, hypothyroidism, HTN, HLD, GERD, CVA (left hemiparesis), hemorrhoids with hx of rectal bleeding and diastolic heart failure. Pt [facility] ED with increased  rectal bleeding and dizziness. She has been followed by Dr Anderson in the past. We will admitted to [facility] hospitalist group and we will consult Monica in the am.     Remainder of detailed HPI is as noted by APC and has been reviewed and/or edited by me for completeness.    Attending Physical Exam:  Constitutional: Awake, alert, obese   Eyes: PERRLA, sclerae anicteric, no conjunctival injection  HENT: NCAT, mucous membranes moist  Neck: Supple, no thyromegaly, no lymphadenopathy, trachea midline  Respiratory: Clear to auscultation bilaterally, nonlabored respirations   Cardiovascular: Irregularly irregular and tachycardic  Gastrointestinal: Positive bowel sounds, soft, mild diffuse tenderness, nondistended  Musculoskeletal: No bilateral ankle edema, no clubbing or cyanosis to extremities  Psychiatric: Appropriate affect, cooperative  Neurologic: Oriented x 3, strength symmetric in all extremities, Cranial Nerves grossly intact to confrontation, speech clear  Skin: No rashes      Brief Assessment/Plan :  See detailed assessment and plan developed with APC which I have reviewed and/or edited for completeness.      Admission Status: I believe that this patient meets INPATIENT status due to rectal bleeding.  I feel patient’s risk for adverse outcomes and need for care warrant INPATIENT evaluation and I predict the patient’s care encounter to likely last beyond 2 midnights.        Maria R Walker, DO  07/13/21

## 2021-07-13 NOTE — ED PROVIDER NOTES
Subjective   Pt presents with GI bleeding.  Ongoing for 2-3 days this time, but it's been a recurrent intermittent problem for years.  She was apparently minimally responsive at home and  called EMS.  EMS reports they were unable to get a blood pressure but with some fluids pt became more alert and arrives to the ED fully alert.  She says she had some vomiting this morning. No fever, no abdominal pain.  She has had some lightheadedness over the last few days.    She has seen [facility] for this, last scope about a year ago.  She was told she has severe internal hemorrhoids.  Pt and  say that [facility] recommended colostomy but they refused.  They were apparently not offered less substantial treatments, the reason for which is not clear.    She is on Eliquis for AF.  Previous CVA with left hemiparesis.      History provided by:  Patient and spouse      Review of Systems   Constitutional: Negative for fever.   Cardiovascular: Negative for chest pain.   Gastrointestinal: Positive for anal bleeding, blood in stool and vomiting. Negative for abdominal pain.   Neurological: Positive for weakness and light-headedness.   All other systems reviewed and are negative.      Past Medical History:   Diagnosis Date   • Arthritis    • Asthma    • Atrial fibrillation (CMS/HCC)    • Cardiac disorder    • Disease of thyroid gland    • Fibromyalgia    • GERD (gastroesophageal reflux disease)    • Hyperlipidemia    • Hypertension    • Migraine    • Neuropathy    • Stroke (CMS/HCC)        Allergies   Allergen Reactions   • Cephalexin Swelling   • Penicillins Hives   • Sulfa Antibiotics Hives       Past Surgical History:   Procedure Laterality Date   • CHOLECYSTECTOMY     • HIP PERCUTANEOUS PINNING Left 2/28/2017    Procedure: LEFT HIP PERCUTANEOUS PINNING FEMORAL NECK;  Surgeon: Ezra Barlow MD;  Location: [facility];  Service:    • HYSTERECTOMY     • PA CLOSED RX TRAUMATIC HIP DISLOCATN Left 2/28/2017    Procedure: LEFT HIP  CLOSED REDUCTION;  Surgeon: Ezra Barlow MD;  Location: [facility], 1928–1983;  Service: Orthopedics   • SHOULDER SURGERY         Family History   Problem Relation Age of Onset   • Alcohol abuse Mother    • Heart disease Mother         Cardiac Disorder   • Stroke Mother    • Hypertension Mother    • Parkinsonism Mother    • Colon cancer Maternal Grandmother    • Diabetes Maternal Aunt        Social History     Socioeconomic History   • Marital status:      Spouse name: Not on file   • Number of children: Not on file   • Years of education: Not on file   • Highest education level: Not on file   Tobacco Use   • Smoking status: Never Smoker   • Smokeless tobacco: Never Used   Substance and Sexual Activity   • Alcohol use: No   • Drug use: No   • Sexual activity: Defer           Objective   Physical Exam  Vitals and nursing note reviewed.   Constitutional:       General: She is not in acute distress.     Appearance: Normal appearance. She is not ill-appearing.   HENT:      Head: Normocephalic and atraumatic.      Mouth/Throat:      Mouth: Mucous membranes are moist.   Eyes:      General: No scleral icterus.        Right eye: No discharge.         Left eye: No discharge.      Conjunctiva/sclera: Conjunctivae normal.   Cardiovascular:      Rate and Rhythm: Normal rate and regular rhythm.      Heart sounds: No murmur heard.     Pulmonary:      Effort: Pulmonary effort is normal. No respiratory distress.      Breath sounds: Normal breath sounds. No wheezing.   Abdominal:      General: Bowel sounds are normal. There is no distension.      Palpations: Abdomen is soft.      Tenderness: There is no abdominal tenderness. There is no guarding or rebound.   Musculoskeletal:         General: No swelling. Normal range of motion.      Cervical back: Normal range of motion and neck supple.   Skin:     General: Skin is warm and dry.      Findings: No rash.   Neurological:      Mental Status: She is alert and oriented to person, place,  and time. Mental status is at baseline.      Comments: Left side weakness.   Psychiatric:         Mood and Affect: Mood normal.         Behavior: Behavior normal.         Thought Content: Thought content normal.         Critical Care  Performed by: Talon Darling MD  Authorized by: Talon Darling MD     Critical care provider statement:     Critical care time (minutes):  45    Critical care time was exclusive of:  Separately billable procedures and treating other patients    Critical care was necessary to treat or prevent imminent or life-threatening deterioration of the following conditions:  Shock (GI bleeding)    Critical care was time spent personally by me on the following activities:  Discussions with consultants, evaluation of patient's response to treatment, examination of patient, obtaining history from patient or surrogate, ordering and review of laboratory studies, ordering and review of radiographic studies, ordering and performing treatments and interventions and re-evaluation of patient's condition    I assumed direction of critical care for this patient from another provider in my specialty: no                 ED Course         Her hemoglobin is 9. Review of charts indicates this is close to recent baseline.  Labs notable for severe leukocytpsis and metabolic acidosis.  CT ordered, benign findings found.    Hypotension improved with fluids but did recur.  Blood transfusion ordered along with further fluid support.  Numerous rechecks at the baseline.    Pt admitted for further care.                                  MDM  Number of Diagnoses or Management Options     Amount and/or Complexity of Data Reviewed  Clinical lab tests: ordered and reviewed  Tests in the radiology section of CPT®: ordered and reviewed  Decide to obtain previous medical records or to obtain history from someone other than the patient: yes  Review and summarize past medical records: yes  Discuss the patient with  other providers: yes  Independent visualization of images, tracings, or specimens: yes    Critical Care  Total time providing critical care: 30-74 minutes      Final diagnoses:   Acute lower GI bleeding       ED Disposition  ED Disposition     ED Disposition Condition Comment    Decision to Admit  Level of Care: Telemetry [5]   Diagnosis: Acute lower GI bleeding [196438]   Admitting Physician: ANJELICA PARNELL [[id number]]   Attending Physician: ANJELICA PARNELL [[id number]]   Isolate for COVID?: No [0]   Bed Request Comments: tele   Certification: I Certify That Inpatient Hospital Services Are Medically Necessary For Greater Than 2 Midnights            No follow-up provider specified.       Medication List      No changes were made to your prescriptions during this visit.          Talon Darling MD  07/13/21 0101

## 2021-07-13 NOTE — ED NOTES
Multiple attempts at blood draw and IV, unsuccessful; another RN to attempt U/S guided     Kusum Yepez, CRISTINO  07/12/21 2027

## 2021-07-13 NOTE — ED NOTES
Pt cleansed of small bowel movement, appeared to have small amount of gross blood on diaper.     Kusum Yepez, RN  07/12/21 2067

## 2021-07-13 NOTE — PROGRESS NOTES
[facility] Medicine Services  ADMISSION FOLLOW-UP NOTE          Patient admitted after midnight, H&P by my partner performed earlier on today's date reviewed.  Interim findings, labs, and charting also reviewed.        [facility] Hospital Problem List has been managed and updated to include any new diagnoses:  Active Hospital Problems    Diagnosis  POA   • **Acute lower GI bleeding [K92.2]  Yes   • Symptomatic hypotension [I95.9]  Yes   • Hypocalcemia [E83.51]  Yes   • Lactic acidosis [E87.2]  Yes   • BARBARA (acute kidney injury) (CMS/HCC) [N17.9]  Yes   • Chronic diastolic CHF (congestive heart failure) (CMS/HCC) [I50.32]  Yes   • History of hemorrhagic stroke with residual hemiparesis (CMS/[facility]) [I69.359]  Not Applicable   • Hyperlipidemia [E78.5]  Yes   • Hypothyroidism [E03.9]  Yes   • Atrial fibrillation with RVR (CMS/[facility]) [I48.91]  Yes   • SIRS (systemic inflammatory response syndrome) (CMS/[facility]) [R65.10]  Yes   • GERD (gastroesophageal reflux disease) [K21.9]  Yes      Resolved Hospital Problems   No resolved problems to display.         ADDITIONAL PLAN:  - detailed assessment and plan from admission reviewed  - 71 y.o. female with PMH significant for atrial fib (on eliquis), hypothyroid, HTN, HLD, GERD, CVA (residual Left hemiparesis), hemorrhoids with rectal bleeding, diastolic CHF,  who presents to the ED with complaint of 3 day history of rectal bleeding and one day history of  altered mental status who is found to have concern for SIRS and symptomatic anemia due to lower GI bleed.     Today per nurse she is having dark stool with some flecks of red.  Patient hasn't seen any more [facility].  She is concerned about holding her eliquis..  Reports h/o hemorrhoids, saw Dr. Jung in the past.      Lower GI Bleed  Symptomatic Anemia  -IVF bolus given in ED, given another 500NS bolus  -continue gentle maintenance hydration   -s/p 1 unit PRBC  -trend H&H  -Follows outpt with Dr Jung, asked  to see today  -Holding Eliquis      SIRS  Leukocytosis   Lactic Acidosis   -BC x 2 pending   -UA negative    -CXR negative for acute findings   -CT abdomen notes question of mild colitis   -repeat CBC   -CRP, SED rate pending      BARBARA  -likely volume depletion   -IVF bolus, gentle IVF  -BMP in am      Atrial Fib with RVR  -rate now improved   -continue metoprolol and cardizem with hold parameters given hypotension   -hold Eliquis for now, last dose was AM of 07/12/21     Hypocalcemia   Hypomagnesmia  -ionized calcium wnl  -replace Mg per protocol     GERD   -continue PPI      Hypothyroid   -continue synthroid      Hyperlipidemia   -continue statin      CHF  -daily weight   -on lasix every other day, hold for now, resume when BP improved         DVT prophylaxis:  Mechanical      CODE STATUS:  Discussed with pt   Level Of Support Discussed With: Patient  Code Status: CPR  Medical Interventions (Level of Support Prior to Arrest): Full    Malick Bobby MD  07/13/21

## 2021-07-13 NOTE — PLAN OF CARE
Problem: Adult Inpatient Plan of Care  Goal: Plan of Care Review  Recent Flowsheet Documentation  Taken 7/13/2021 0915 by Brandi Osman OT  Plan of Care Reviewed With: patient  Outcome Summary: OT dillon complete.  Pt with residual L sided weakness. Pt presents with weakness and decreased activity tolerance limiting independence with self care.  Pt dependent to don socks, max A x 2 with bed mobiltiy,  mod A STS,  mod A x 2 stand pivot to right bed to chair and chair to bed.  OT will follow to advance pt toward increased independence with self care. Recommend IP rehab upon d/c.

## 2021-07-13 NOTE — THERAPY EVALUATION
Patient Name: Vickie Joshi  : 1950    MRN: [id number]                              Today's Date: 2021       Admit Date: 2021    Visit Dx:     ICD-10-CM ICD-9-CM   1. Acute lower GI bleeding  K92.2 578.9     Patient Active Problem List   Diagnosis   • Fall   • Closed fracture of neck of left femur (CMS/HCC)   • Hypertension   • Hx of Migraine   • GERD (gastroesophageal reflux disease)   • Fibromyalgia   • Neuropathy   • Rapid atrial fibrillation (CMS/HCC)   • Chronic anticoagulation on Eliquis   • SIRS (systemic inflammatory response syndrome) (CMS/HCC)   • Atrial fibrillation with RVR (CMS/HCC)   • Pneumonia   • Left upper lobe pneumonia   • Asthma   • Allergic rhinitis   • Facial paresis   • Left hemiplegia (CMS/HCC)   • Morbid obesity (CMS/HCC)   • Myositis   • Trigeminal neuralgia   • Snoring   • Pure hypercholesterolemia   • Acute on chronic blood loss anemia   • CHF (congestive heart failure) (CMS/HCC)   • Hypothyroidism   • Hyperlipidemia   • Chronic diastolic CHF (congestive heart failure) (CMS/HCC)   • Left hemiparesis (CMS/HCC)   • History of hemorrhagic stroke with residual hemiparesis (CMS/HCC)   • Possible pulmonary hypertension (CMS/HCC)   • Sepsis (CMS/HCC)   • Atrial fibrillation, chronic (CMS/HCC)   • Acute lower GI bleeding   • Symptomatic hypotension   • Hypocalcemia   • Lactic acidosis   • BARBARA (acute kidney injury) (CMS/HCC)     Past Medical History:   Diagnosis Date   • Arthritis    • Asthma    • Atrial fibrillation (CMS/HCC)    • Cardiac disorder    • Disease of thyroid gland    • Fibromyalgia    • GERD (gastroesophageal reflux disease)    • Hyperlipidemia    • Hypertension    • Migraine    • Neuropathy    • Stroke (CMS/HCC)      Past Surgical History:   Procedure Laterality Date   • CHOLECYSTECTOMY     • HIP PERCUTANEOUS PINNING Left 2017    Procedure: LEFT HIP PERCUTANEOUS PINNING FEMORAL NECK;  Surgeon: Ezra Barlow MD;  [facility];  Service:    •  HYSTERECTOMY     • [facility] CLOSED [facility] TRAUMATIC HIP DISLOCATN Left 2/28/2017    Procedure: LEFT HIP CLOSED REDUCTION;  Surgeon: Ezra Barlow MD;  Location: [facility];  Service: Orthopedics   • SHOULDER SURGERY       General Information     Row Name 07/13/21 0920          OT Time and Intention    Document Type  evaluation  -     Mode of Treatment  occupational therapy  -     Row Name 07/13/21 0920          General Information    Patient Profile Reviewed  yes  -AC     Prior Level of Function  independent:;feeding;min assist:;grooming;transfer;mod assist:;dressing;bathing  -     Existing Precautions/Restrictions  fall;other (see comments) h/o CVA with L sided [place]  -     Barriers to Rehab  previous functional deficit  -     Row Name 07/13/21 0920          Living Environment    Lives With  spouse  -     Row Name 07/13/21 0920          Home Main Entrance    Number of Stairs, Main Entrance  -- entry ramp  -     Row Name 07/13/21 0920          Stairs Within Home, Primary    Stairs, Within Home, Primary  has 2nd story, but stays on main level  -     Row Name 07/13/21 0920          Cognition    Orientation Status (Cognition)  oriented x 3  -     Row Name 07/13/21 0920          Safety Issues, Functional Mobility    Safety Issues Affecting Function (Mobility)  insight into deficits/self-awareness;safety precaution awareness;safety precautions follow-through/compliance;sequencing abilities  -     Impairments Affecting Function (Mobility)  balance;coordination;endurance/activity tolerance;grasp;motor control;muscle tone abnormal;range of motion (ROM);strength  -       User Key  (r) = Recorded By, (t) = Taken By, (c) = Cosigned By    Initials Name Provider Type    AC Brandi Osman OT Occupational Therapist          Mobility/ADL's     Row Name 07/13/21 0920          Bed Mobility    Bed Mobility  rolling left;supine-sit;sit-supine  -AC     Rolling Left [place] (Bed Mobility)  standby assist;1 person  assist  -AC     Supine-Sit [place] (Bed Mobility)  maximum assist (25% patient effort);2 person assist;verbal cues  -AC     Sit-Supine [place] (Bed Mobility)  maximum assist (25% patient effort);2 person assist;verbal cues  -AC     Bed Mobility, Safety Issues  decreased use of arms for pushing/pulling;decreased use of legs for bridging/pushing  -     Assistive Device (Bed Mobility)  bed rails;draw sheet;head of bed elevated  -     Row Name 07/13/21 0920          Transfers    Transfers  bed-chair transfer;sit-stand transfer  -     Bed-Chair [place] (Transfers)  moderate assist (50% patient effort);2 person assist;verbal cues stand pivot toward R  -AC     Assistive Device (Bed-Chair Transfers)  other (see comments) RUE support  -     Sit-Stand [place] (Transfers)  moderate assist (50% patient effort);1 person assist;verbal cues  -     Row Name 07/13/21 0920          Sit-Stand Transfer    Assistive Device (Sit-Stand Transfers)  -- pushed up from arm of chair next to bed  -     Row Name 07/13/21 0920          Activities of Daily Living    BADL Assessment/Intervention  lower body dressing  -     Row Name 07/13/21 0920          Lower Body Dressing Assessment/Training    [place] Level (Lower Body Dressing)  don;socks;dependent (less than 25% patient effort)  -     Position (Lower Body Dressing)  supine  -       User Key  (r) = Recorded By, (t) = Taken By, (c) = Cosigned By    Initials Name Provider Type     Brandi Osman, OT Occupational Therapist        Obj/Interventions     Row Name 07/13/21 0920          Sensory Assessment (Somatosensory)    Sensory Subjective Reports  numbness L UE  -     Row Name 07/13/21 0920          Vision Assessment/Intervention    Visual Impairment/Limitations  corrective lenses full-time  -     Row Name 07/13/21 0920          Range of Motion Comprehensive    Comment, General Range of Motion  LUE limited in all planes d/t prior CVA,  R UE WFL  -      Row Name 07/13/21 0920          Strength Comprehensive (MMT)    Comment, General Manual Muscle Testing (MMT) Assessment  LUE 2-/5, RUE grossly 4-/5  -AC       User Key  (r) = Recorded By, (t) = Taken By, (c) = Cosigned By    Initials Name Provider Type    Brandi Miner, OT Occupational Therapist        Goals/Plan     Row Name 07/13/21 0920          Bed Mobility Goal 1 (OT)    Activity/Assistive Device (Bed Mobility Goal 1, OT)  sit to supine;supine to sit  -AC     [place] Level/Cues Needed (Bed Mobility Goal 1, OT)  verbal cues required;moderate assist (50-74% patient effort)  -AC     Time Frame (Bed Mobility Goal 1, OT)  by discharge  -AC     Progress/Outcomes (Bed Mobility Goal 1, OT)  goal ongoing  -[facility] Name 07/13/21 0920          Transfer Goal 1 (OT)    Activity/Assistive Device (Transfer Goal 1, OT)  bed-to-chair/chair-to-bed;toilet;commode, bedside without drop arms  -AC     [place] Level/Cues Needed (Transfer Goal 1, OT)  minimum assist (75% or more patient effort)  -AC     Time Frame (Transfer Goal 1, OT)  by discharge  -AC     Progress/Outcome (Transfer Goal 1, OT)  goal ongoing  -AC     Row Name 07/13/21 0920          Grooming Goal 1 (OT)    Activity/Device (Grooming Goal 1, OT)  hair care;oral care  -AC     [place] (Grooming Goal 1, OT)  set-up required;supervision required  -AC     Time Frame (Grooming Goal 1, OT)  by discharge  -AC     Strategies/Barriers (Grooming Goal 1, OT)  seated EOB  -AC       User Key  (r) = Recorded By, (t) = Taken By, (c) = Cosigned By    Initials Name Provider Type    Brandi Miner, OT Occupational Therapist        Clinical Impression     [facility] Name 07/13/21 0920          Pain Assessment    Additional Documentation  Pain Scale: Numbers Pre/Post-Treatment (Group)  -AC     Row Name 07/13/21 0920          Pain Scale: Numbers Pre/Post-Treatment    Posttreatment Pain Rating  0/10 - no pain  -AC     Row Name 07/13/21 0920          Plan of Care Review     Plan of Care Reviewed With  patient  -AC     Outcome Summary  OT eval complete.  Pt with residual L sided weakness. Pt presents with weakness and decreased activity tolerance limiting independence with self care.  Pt dependent to don socks, max A x 2 with bed mobiltiy,  mod A STS,  mod A x 2 stand pivot to right bed to chair and chair to bed.  OT will follow to advance pt toward increased independence with self care. Recommend IP rehab upon d/c.  -     Row Name 07/13/21 0920          Therapy Assessment/Plan (OT)    Rehab Potential (OT)  good, to achieve stated therapy goals  -     Criteria for Skilled Therapeutic Interventions Met (OT)  yes;skilled treatment is necessary  -     Therapy Frequency (OT)  daily  -     Row Name 07/13/21 0920          Therapy Plan Review/Discharge Plan (OT)    Anticipated Discharge Disposition (OT)  inpatient rehabilitation facility;correctional facility  -     Row Name 07/13/21 0920          Vital Signs    Pre Systolic BP Rehab  104  -AC     Pre Treatment Diastolic BP  54  -AC     Pretreatment Heart Rate (beats/min)  120  -AC     Posttreatment Heart Rate (beats/min)  123  -AC     Pre SpO2 (%)  98  -AC     O2 Delivery Pre Treatment  room air  -AC     O2 Delivery Intra Treatment  room air  -AC     Post SpO2 (%)  99  -AC     O2 Delivery Post Treatment  room air  -AC     Pre Patient Position  Supine  -AC     Intra Patient Position  Sitting  -AC     Post Patient Position  Supine  -AC     Row Name 07/13/21 0920          Positioning and Restraints    Pre-Treatment Position  in bed  -AC     Post Treatment Position  bed  -AC     In Bed  supine;call light within reach;encouraged to call for assist;exit alarm on;notified [facility]  -       User Key  (r) = Recorded By, (t) = Taken By, (c) = Cosigned By    Initials Name Provider Type    Brandi Miner, OT Occupational Therapist        Outcome Measures     Row Name 07/13/21 0920          How much help from another is currently needed...     Putting on and taking off regular lower body clothing?  1  -AC     Bathing (including washing, rinsing, and drying)  2  -AC     Toileting (which includes using toilet bed pan or urinal)  1  -AC     Putting on and taking off regular upper body clothing  2  -AC     Taking care of personal grooming (such as brushing teeth)  3  -AC     Eating meals  3  -AC     AM-PAC 6 Clicks Score (OT)  12  -AC     Row Name 07/13/21 0856          How much help from another person do you currently need...    Turning from your back to your side while in flat bed without using bedrails?  3  -LM     Moving from lying on back to sitting on the side of a flat bed without bedrails?  2  -LM     Moving to and from a bed to a chair (including a wheelchair)?  2  -LM     Standing up from a chair using your arms (e.g., wheelchair, bedside chair)?  2  -LM     Climbing 3-5 steps with a railing?  1  -LM     To walk in hospital room?  1  -LM     AM-PAC 6 Clicks Score (PT)  11  -LM     Row Name 07/13/21 0920 07/13/21 0856       Functional Assessment    Outcome Measure Options  AM-PAC 6 Clicks Daily Activity (OT)  -AC  AM-PAC 6 Clicks Basic Mobility (PT)  -LM      User Key  (r) = Recorded By, (t) = Taken By, (c) = Cosigned By    Initials Name Provider Type    Brandi Miner, OT Occupational Therapist    LM Nelly Kathleen, PT Physical Therapist        Occupational Therapy Education                 Title: PT OT SLP Therapies (In Progress)     Topic: Occupational Therapy (In Progress)     Point: ADL training (Done)     Description:   Instruct learner(s) on proper safety adaptation and remediation techniques during self care or transfers.   Instruct in proper use of assistive devices.              Learning Progress Summary           Patient Acceptance, E, VU by  at 7/13/2021 0920    Comment: benefits of activity                   Point: Home exercise program (Not Started)     Description:   Instruct learner(s) on appropriate technique for monitoring,  assisting and/or progressing therapeutic exercises/activities.              Learner Progress:  Not documented in this visit.          Point: Precautions (Not Started)     Description:   Instruct learner(s) on prescribed precautions during self-care and functional transfers.              Learner Progress:  Not documented in this visit.          Point: Body mechanics (Not Started)     Description:   Instruct learner(s) on proper positioning and spine alignment during self-care, functional mobility activities and/or exercises.              Learner Progress:  Not documented in this visit.                      User Key     Initials Effective Dates Name Provider Type Discipline     06/16/21 -  Brandi Osman, OT Occupational Therapist OT              OT Recommendation and Plan  Therapy Frequency (OT): daily  Plan of Care Review  Plan of Care Reviewed With: patient  Outcome Summary: OT eval complete.  Pt with residual L sided weakness. Pt presents with weakness and decreased activity tolerance limiting independence with self care.  Pt dependent to don socks, max A x 2 with bed mobiltiy,  mod A STS,  mod A x 2 stand pivot to right bed to chair and chair to bed.  OT will follow to advance pt toward increased independence with self care. Recommend IP rehab upon d/c.     Time Calculation:   Time Calculation- OT     Row Name 07/13/21 0920             Time Calculation- OT    OT Start Time  0920  -AC      OT Received On  07/13/21  -      OT Goal Re-Cert Due Date  07/23/21  -         Untimed Charges    OT Eval/Re-eval Minutes  60  -AC         Total Minutes    Untimed Charges Total Minutes  60  -AC       Total Minutes  60  -AC        User Key  (r) = Recorded By, (t) = Taken By, (c) = Cosigned By    Initials Name Provider Type     Brandi Osman, OT Occupational Therapist        Therapy Charges for Today     Code Description Service Date Service Provider Modifiers Qty    [id number] HC OT EVAL MOD COMPLEXITY 4 7/13/2021  Brandi Osman, OT GO 1               Brandi Osman, OT  7/13/2021

## 2021-07-13 NOTE — THERAPY EVALUATION
Patient Name: Vickie Joshi  : 1950    MRN: [id number]                              Today's Date: 2021       Admit Date: 2021    Visit Dx:     ICD-10-CM ICD-9-CM   1. Acute lower GI bleeding  K92.2 578.9     Patient Active Problem List   Diagnosis   • Fall   • Closed fracture of neck of left femur (CMS/HCC)   • Hypertension   • Hx of Migraine   • GERD (gastroesophageal reflux disease)   • Fibromyalgia   • Neuropathy   • Rapid atrial fibrillation (CMS/HCC)   • Chronic anticoagulation on Eliquis   • SIRS (systemic inflammatory response syndrome) (CMS/HCC)   • Atrial fibrillation with RVR (CMS/HCC)   • Pneumonia   • Left upper lobe pneumonia   • Asthma   • Allergic rhinitis   • Facial paresis   • Left hemiplegia (CMS/HCC)   • Morbid obesity (CMS/HCC)   • Myositis   • Trigeminal neuralgia   • Snoring   • Pure hypercholesterolemia   • Acute on chronic blood loss anemia   • CHF (congestive heart failure) (CMS/HCC)   • Hypothyroidism   • Hyperlipidemia   • Chronic diastolic CHF (congestive heart failure) (CMS/HCC)   • Left hemiparesis (CMS/HCC)   • History of hemorrhagic stroke with residual hemiparesis (CMS/HCC)   • Possible pulmonary hypertension (CMS/HCC)   • Sepsis (CMS/HCC)   • Atrial fibrillation, chronic (CMS/HCC)   • Acute lower GI bleeding   • Symptomatic hypotension   • Hypocalcemia   • Lactic acidosis   • BARBARA (acute kidney injury) (CMS/HCC)     Past Medical History:   Diagnosis Date   • Arthritis    • Asthma    • Atrial fibrillation (CMS/HCC)    • Cardiac disorder    • Disease of thyroid gland    • Fibromyalgia    • GERD (gastroesophageal reflux disease)    • Hyperlipidemia    • Hypertension    • Migraine    • Neuropathy    • Stroke (CMS/HCC)      Past Surgical History:   Procedure Laterality Date   • CHOLECYSTECTOMY     • HIP PERCUTANEOUS PINNING Left 2017    Procedure: LEFT HIP PERCUTANEOUS PINNING FEMORAL NECK;  Surgeon: Ezra Barlow MD;  Location: [facility];  Service:    •  HYSTERECTOMY     • [place] CLOSED RX TRAUMATIC HIP DISLOCATN Left 2/28/2017    Procedure: LEFT HIP CLOSED REDUCTION;  Surgeon: Ezra Barlow MD;  Location: [facility];  Service: Orthopedics   • SHOULDER SURGERY       General Information     Row Name 07/13/21 0856          Physical Therapy Time and Intention    Document Type  evaluation  -LM     Mode of Treatment  physical therapy  -LM     Row Name 07/13/21 0856          General Information    Patient Profile Reviewed  yes  -LM     Prior Level of Function  min assist:;transfer;bathing;mod assist:;dressing;independent:;feeding;grooming Stand pivots to w/c - mainly uses w/c for mobility;  Does have a handrail in a long hallway at home that she will walk down with her husbands assist  -LM     Existing Precautions/Restrictions  fall;other (see comments) Hx of CVA with L sided weakness  -LM     Barriers to Rehab  previous functional deficit  -LM     Row Name 07/13/21 0856          Living Environment    Lives With  spouse  -LM     Row Name 07/13/21 0856          Home Main Entrance    Number of Stairs, Main Entrance  -- Entry Ramp  -LM     Row Name 07/13/21 0856          Stairs Within Home, Primary    Stairs, Within Home, Primary  Has a second level, but pt stays on the first level.  -LM     Row Name 07/13/21 0856          Cognition    Orientation Status (Cognition)  oriented x 3  -LM     Row Name 07/13/21 0856          Safety Issues, Functional Mobility    Safety Issues Affecting Function (Mobility)  insight into deficits/self-awareness;safety precaution awareness;safety precautions follow-through/compliance;sequencing abilities  -LM     Impairments Affecting Function (Mobility)  balance;coordination;endurance/activity tolerance;grasp;motor control;muscle tone abnormal;range of motion (ROM);strength  -LM       User Key  (r) = Recorded By, (t) = Taken By, (c) = Cosigned By    Initials Name Provider Type    LM Nelly Kathleen PT Physical Therapist        Mobility     Row Name  07/13/21 0856          Bed Mobility    Bed Mobility  supine-sit;sit-supine;rolling left  -LM     Rolling Left [place] (Bed Mobility)  standby assist;1 person assist  -LM     Supine-Sit [place] (Bed Mobility)  maximum assist (25% patient effort);2 person assist;verbal cues  -LM     Sit-Supine [place] (Bed Mobility)  maximum assist (25% patient effort);2 person assist;verbal cues  -LM     Assistive Device (Bed Mobility)  bed rails;draw sheet;head of bed elevated  -LM     Comment (Bed Mobility)  Vc's for sequencing.  -LM     Row Name 07/13/21 0856          Transfers    Comment (Transfers)  2 transfers completed from bed-->recliner-->bed via stand pivot.  Each transfer completed towards R side.  -LM     Row Name 07/13/21 0856          Bed-Chair Transfer    Bed-Chair [place] (Transfers)  moderate assist (50% patient effort);2 person assist;verbal cues  -LM     Assistive Device (Bed-Chair Transfers)  other (see comments) RUE Support - pt uses LUE to reach for surface transferring towards  -LM     Row Name 07/13/21 0856          Sit-Stand Transfer    Sit-Stand [place] (Transfers)  moderate assist (50% patient effort);1 person assist;verbal cues  -LM     Assistive Device (Sit-Stand Transfers)  other (see comments)  -LM       User Key  (r) = Recorded By, (t) = Taken By, (c) = Cosigned By    Initials Name Provider Type    Nelly Olivas PT Physical Therapist        Obj/Interventions     Row Name 07/13/21 0856          Range of Motion Comprehensive    General Range of Motion  lower extremity range of motion deficits identified  -LM     Comment, General Range of Motion  RLE AROM WFL; LLE - Decreased AROM through all planes - only trace movement noted  -LM     Row Name 07/13/21 0856          Strength Comprehensive (MMT)    General Manual Muscle Testing (MMT) Assessment  lower extremity strength deficits identified  -LM     Comment, General Manual Muscle Testing (MMT) Assessment  RLE - Hip flex - 4-/5;  Knee flex/ext - 4/5; Ankle DF - 4/5; LLE - grossly 1+/5 throughout  -LM     Row Name 07/13/21 0856          Balance    Balance Assessment  sitting static balance;standing static balance;standing dynamic balance  -LM     Static Sitting Balance  WFL;sitting, edge of bed;supported  -LM     Static Standing Balance  moderate impairment;supported  -LM     Dynamic Standing Balance  moderate impairment;supported  -LM     Row Name 07/13/21 0856          Sensory Assessment (Somatosensory)    Sensory Assessment (Somatosensory)  LE sensation intact  -LM       User Key  (r) = Recorded By, (t) = Taken By, (c) = Cosigned By    Initials Name Provider Type    LM Nelly Kathleen, PT Physical Therapist        Goals/Plan     Row Name 07/13/21 0856          Bed Mobility Goal 1 (PT)    Activity/Assistive Device (Bed Mobility Goal 1, PT)  sit to supine/supine to sit  -LM     [place] Level/Cues Needed (Bed Mobility Goal 1, PT)  minimum assist (75% or more patient effort);1 person assist  -LM     Time Frame (Bed Mobility Goal 1, PT)  long term goal (LTG);2 weeks  -LM     Row Name 07/13/21 0856          Transfer Goal 1 (PT)    Activity/Assistive Device (Transfer Goal 1, PT)  bed-to-chair/chair-to-bed  -LM     [place] Level/Cues Needed (Transfer Goal 1, PT)  minimum assist (75% or more patient effort)  -LM     Time Frame (Transfer Goal 1, PT)  long term goal (LTG);2 weeks  -LM     Row Name 07/13/21 0856          Gait Training Goal 1 (PT)    Activity/Assistive Device (Gait Training Goal 1, PT)  gait (walking locomotion);assistive device use  -LM     [place] Level (Gait Training Goal 1, PT)  moderate assist (50-74% patient effort);1 person assist  -LM     Distance (Gait Training Goal 1, PT)  10 feet  -LM     Time Frame (Gait Training Goal 1, PT)  long term goal (LTG);2 weeks  -LM       User Key  (r) = Recorded By, (t) = Taken By, (c) = Cosigned By    Initials Name Provider Type    LM Nelly Kathleen, PT Physical Therapist         Clinical Impression     [facility] Name 07/13/21 0856          Pain    Additional Documentation  Pain Scale: Numbers Pre/Post-Treatment (Group)  -LM     Row Name 07/13/21 0856          Pain Scale: Numbers Pre/Post-Treatment    Pretreatment Pain Rating  0/10 - no pain  -LM     Posttreatment Pain Rating  0/10 - no pain  -LM     Row Name 07/13/21 0856          Plan of Care Review    Plan of Care Reviewed With  patient  -LM     Outcome Summary  PT evaluation completed.  Pt transferred supine<-->sit with MaxAx2, stood with ModAx1, and completed 2 stand pivot transfers from bed-->recliner-->bed with ModAx2.  Skilled PT services warranted to improve mobility and safety.  Recommend inpt rehab at d/c.  [facility] Name 07/13/21 0856          Therapy Assessment/Plan (PT)    Rehab Potential (PT)  good, to achieve stated therapy goals  -LM     Criteria for Skilled Interventions Met (PT)  yes;meets criteria;skilled treatment is necessary  -LM     [facility] Name 07/13/21 0856          Vital Signs    Pre Systolic BP Rehab  104  -LM     Pre Treatment Diastolic BP  54  -LM     Pretreatment Heart Rate (beats/min)  112  -LM     Posttreatment Heart Rate (beats/min)  111  -LM     Pre SpO2 (%)  100  -LM     O2 Delivery Pre Treatment  room air  -LM     Post SpO2 (%)  100  -LM     O2 Delivery Post Treatment  room air  -LM     Pre Patient Position  Supine  -LM     Post Patient Position  Supine  -LM     Row Name 07/13/21 0856          Positioning and Restraints    Pre-Treatment Position  in bed  -LM     Post Treatment Position  bed  -LM     In Bed  supine;call light within reach;encouraged to call for assist;exit alarm on;notified nsg  -LM       User Key  (r) = Recorded By, (t) = Taken By, (c) = Cosigned By    Initials Name Provider Type    LM Nelly Kathleen, PT Physical Therapist        Outcome Measures     Row Name 07/13/21 0856          How much help from another person do you currently need...    Turning from your back to your side while in flat  bed without using bedrails?  3  -LM     Moving from lying on back to sitting on the side of a flat bed without bedrails?  2  -LM     Moving to and from a bed to a chair (including a wheelchair)?  2  -LM     Standing up from a chair using your arms (e.g., wheelchair, bedside chair)?  2  -LM     Climbing 3-5 steps with a railing?  1  -LM     To walk in hospital room?  1  -LM     AM-PAC 6 Clicks Score (PT)  11  -LM     Row Name 07/13/21 0856          Functional Assessment    Outcome Measure Options  AM-PAC 6 Clicks Basic Mobility (PT)  -LM       User Key  (r) = Recorded By, (t) = Taken By, (c) = Cosigned By    Initials Name Provider Type    LM Nelly Kathleen PT Physical Therapist        Physical Therapy Education                 Title: PT OT SLP Therapies (In Progress)     Topic: Physical Therapy (In Progress)     Point: Mobility training (Done)     Learning Progress Summary           Patient Acceptance, E, VU,NR by LM at 7/13/2021 1007                   Point: Home exercise program (Not Started)     Learner Progress:  Not documented in this visit.          Point: Precautions (Done)     Learning Progress Summary           Patient Acceptance, E, VU,NR by LM at 7/13/2021 1007                               User Key     Initials Effective Dates Name Provider Type Discipline     06/16/21 -  Nelly Kathleen PT Physical Therapist PT              PT Recommendation and Plan  Planned Therapy Interventions (PT): balance training, bed mobility training, gait training, home exercise program, motor coordination training, neuromuscular re-education, patient/family education, postural re-education, ROM (range of motion), strengthening, stretching, transfer training, wheelchair management/propulsion training  Plan of Care Reviewed With: patient  Outcome Summary: PT evaluation completed.  Pt transferred supine<-->sit with MaxAx2, stood with ModAx1, and completed 2 stand pivot transfers from bed-->recliner-->bed with ModAx2.  Skilled PT  services warranted to improve mobility and safety.  Recommend inpt rehab at d/c.     Time Calculation:   PT Charges     Row Name 07/13/21 0856             Time Calculation    Start Time  0856  -LM      PT Received On  07/13/21  -LM      PT Goal Re-Cert Due Date  07/23/21  -LM         Timed Charges    49744 - PT Therapeutic Activity Minutes  10  -LM         Untimed Charges    PT Eval/Re-eval Minutes  50  -LM         Total Minutes    Timed Charges Total Minutes  10  -LM      Untimed Charges Total Minutes  50  -LM       Total Minutes  60  -LM        User Key  (r) = Recorded By, (t) = Taken By, (c) = Cosigned By    Initials Name Provider Type    LM Nelly Kathleen, PT Physical Therapist        Therapy Charges for Today     Code Description Service Date Service Provider Modifiers Qty    95507131789 HC PT THERAPEUTIC ACT EA 15 MIN 7/13/2021 Nelly Kathleen, PT GP 1    63735021640 HC PT EVAL MOD COMPLEXITY 4 7/13/2021 Nelly Kathleen, PT GP 1          PT G-Codes  Outcome Measure Options: AM-PAC 6 Clicks Basic Mobility (PT)  AM-PAC 6 Clicks Score (PT): 11    Nelly Kathleen PT  7/13/2021

## 2021-07-13 NOTE — PLAN OF CARE
Goal Outcome Evaluation:  Plan of Care Reviewed With: patient        Progress: no change     Pts admitted to this room for lower GI bleed.  Pt states she has hemorrhoids and they have been bleeding.  No bleeding noted upon admission.  Pt was given 1 unit of PRBCs. Afib on monitor.  On room air. No acute distress is noted.  Pt does have a Colorectal consult for today.  No complaints of pain or discomfort.  Will monitor....

## 2021-07-13 NOTE — CONSULTS
Colon and Rectal [[facility]]    Patient Care Team:  Dav Bryan MD as PCP - General (Family Medicine)    Chief complaint: Acute on chronic hemorrhoidal bleeding    Subjective     HPI: 71-year-old obese paraplegic on Eliquis with irregular bowel habits.  She has very large hemorrhoids attend the bleed when she gets her diarrhea episodes as was this case.  She often sits on the toilet for prolonged periods of time which allow her hemorrhoids to engorge even further allowing bleeding aggravated by the Eliquis.  This happened this time.  She cannot get up from the toilet by herself and her  could not get her up this time either.  They were supposed to be putting a lubricant in the anal canal to avoid trauma with stools to her hemorrhoids but they were doing it externally only.  Currently no bleeding.  No nausea vomiting or fever.    Review of Systems: She can transfer her weight most of the time on her right leg.  She can help pull herself up with help.  Ongoing anemia.     History  Past Medical History:   Diagnosis Date   • Arthritis    • Asthma    • Atrial fibrillation (CMS/HCC)    • Cardiac disorder    • Disease of thyroid gland    • Fibromyalgia    • GERD (gastroesophageal reflux disease)    • Hyperlipidemia    • Hypertension    • Migraine    • Neuropathy    • Stroke (CMS/HCC)      Past Surgical History:   Procedure Laterality Date   • CHOLECYSTECTOMY     • HIP PERCUTANEOUS PINNING Left 2/28/2017    Procedure: LEFT HIP PERCUTANEOUS PINNING FEMORAL NECK;  Surgeon: Ezra Barlow MD;  Location:  [facility];  Service:    • HYSTERECTOMY     • [place] CLOSED RX TRAUMATIC HIP DISLOCATN Left 2/28/2017    Procedure: LEFT HIP CLOSED REDUCTION;  Surgeon: Ezra Barlow MD;  Location:  [facility];  Service: Orthopedics   • SHOULDER SURGERY       Family History   Problem Relation Age of Onset   • Alcohol abuse Mother    • Heart disease Mother         Cardiac Disorder   • Stroke Mother    • Hypertension Mother    •  Parkinsonism Mother    • Colon cancer Maternal Grandmother    • Diabetes Maternal Aunt      Social History     Tobacco Use   • Smoking status: Never Smoker   • Smokeless tobacco: Never Used   Substance Use Topics   • Alcohol use: No   • Drug use: No     Medications Prior to Admission   Medication Sig Dispense Refill Last Dose   • albuterol (ACCUNEB) 1.25 MG/3ML nebulizer solution Take 3 mL by nebulization Every 6 (Six) Hours As Needed for Wheezing or Shortness of Air. 90 vial 12    • apixaban (ELIQUIS) 5 MG tablet tablet Take 1 tablet by mouth Every 12 (Twelve) Hours. 180 tablet 3    • atorvastatin (LIPITOR) 10 MG tablet Take 1 tablet by mouth Every Night. 90 tablet 3    • budesonide-formoterol (SYMBICORT) 80-4.5 MCG/ACT inhaler Inhale 2 puffs 2 (Two) Times a Day.      • dilTIAZem (TIAZAC) 360 MG 24 hr capsule Take 1 capsule by mouth Daily. 90 capsule 3    • docusate sodium (COLACE) 100 MG capsule Take 100 mg by mouth 2 (Two) Times a Day.      • Ferrous Sulfate 28 MG tablet Take 1 tablet by mouth Daily.      • furosemide (LASIX) 20 MG tablet Take 1 tablet by mouth Daily. (Patient taking differently: Take 20 mg by mouth Every Other Day.) 90 tablet 3    • hydrocortisone (PREPARATION H HYDROCORTISONE) 1 % cream Apply 1 application topically 4 (Four) Times a Day As Needed.      • levothyroxine (SYNTHROID, LEVOTHROID) 125 MCG tablet 137 mcg.      • loratadine (CLARITIN) 10 MG tablet Take 10 mg by mouth Daily.      • metoprolol tartrate (LOPRESSOR) 25 MG tablet Take 1 tablet by mouth Every 12 (Twelve) Hours. (Patient taking differently: Take 50 mg by mouth Every 12 (Twelve) Hours.) 180 tablet 3    • omeprazole (priLOSEC) 20 MG capsule       • potassium chloride (K-TAB) 20 MEQ tablet controlled-release ER tablet Take 1 tablet by mouth Daily. 30 tablet 0    • pregabalin (LYRICA) 300 MG capsule Take 300 mg by mouth 2 (Two) Times a Day.      • topiramate (TOPAMAX) 100 MG tablet Take 100 mg by mouth 2 (Two) Times a Day.      •  "loperamide (IMODIUM) 2 MG capsule Take 2 mg by mouth Daily As Needed for diarrhea.      • pantoprazole (PROTONIX) 40 MG EC tablet Take 1 tablet by mouth 2 (Two) Times a Day. 60 tablet 0      Allergies:  Cephalexin, Penicillins, and Sulfa antibiotics    Objective     Vital Signs  Blood pressure 99/45, pulse 87, temperature 98.8 °F (37.1 °C), temperature source Oral, resp. rate 18, height 165.1 cm (65\"), weight 86.2 kg (190 lb), SpO2 99 %.    Physical Exam: Alert and oriented  HEENT obvious head tremor without obvious facial weakness  Neck supple  Lungs clear  Heart regular  Abdomen obese soft and benign  Extremities with a flaccid left side  External anal shows a large ring of chronic hemorrhoids.  Digital exam shows mild decreased tone and probably a small fissure anteriorly where she probably bled.  Insignificant internal hemorrhoids and no stool or blood.  No masses.  Musculoskeletal neurologic also shows a tremor in her right upper extremity and slightly in her foot.  Obviously nonusable left side.       Results Review:  Lab Results (last 24 hours)     Procedure Component Value Units Date/Time    Troponin [[id number]]  (Normal) Collected: 07/13/21 1106    Specimen: Blood Updated: 07/13/21 1152     Troponin T <0.010 ng/mL     Narrative:      Troponin T Reference Range:  <= 0.03 ng/mL-   Negative for AMI  >0.03 ng/mL-     Abnormal for myocardial necrosis.  Clinicians would have to utilize clinical acumen, EKG, Troponin and serial changes to determine if it is an Acute Myocardial Infarction or myocardial injury due to an underlying chronic condition.       Results may be falsely decreased if patient taking Biotin.      Hemoglobin & Hematocrit, Blood [[id number]]  (Abnormal) Collected: 07/13/21 1106    Specimen: Blood Updated: 07/13/21 1133     Hemoglobin 9.1 g/dL      Hematocrit 29.8 %     Troponin [[id number]]  (Normal) Collected: 07/13/21 0918    Specimen: Blood Updated: 07/13/21 1007     Troponin T <0.010 ng/mL     " Narrative:      Troponin T Reference Range:  <= 0.03 ng/mL-   Negative for AMI  >0.03 ng/mL-     Abnormal for myocardial necrosis.  Clinicians would have to utilize clinical acumen, EKG, Troponin and serial changes to determine if it is an Acute Myocardial Infarction or myocardial injury due to an underlying chronic condition.       Results may be falsely decreased if patient taking Biotin.      Hemoglobin & Hematocrit, Blood [[id number]]  (Abnormal) Collected: 07/13/21 0918    Specimen: Blood Updated: 07/13/21 0950     Hemoglobin 8.0 g/dL      Hematocrit 26.0 %     Basic Metabolic Panel [[id number]]  (Abnormal) Collected: 07/13/21 0706    Specimen: Blood Updated: 07/13/21 0756     Glucose 103 mg/dL      BUN 9 mg/dL      Creatinine 0.71 mg/dL      Sodium 140 mmol/L      Potassium 4.0 mmol/L      Chloride 114 mmol/L      CO2 19.0 mmol/L      Calcium 7.7 mg/dL      eGFR Non African Amer 82 mL/min/1.73      BUN/Creatinine Ratio 12.7     Anion Gap 7.0 mmol/L     Narrative:      GFR Normal >60  Chronic Kidney Disease <60  Kidney Failure <15      Troponin [[id number]]  (Normal) Collected: 07/13/21 0557    Specimen: Blood Updated: 07/13/21 0756     Troponin T <0.010 ng/mL     Narrative:      Troponin T Reference Range:  <= 0.03 ng/mL-   Negative for AMI  >0.03 ng/mL-     Abnormal for myocardial necrosis.  Clinicians would have to utilize clinical acumen, EKG, Troponin and serial changes to determine if it is an Acute Myocardial Infarction or myocardial injury due to an underlying chronic condition.       Results may be falsely decreased if patient taking Biotin.      Magnesium [[id number]]  (Abnormal) Collected: 07/13/21 0557    Specimen: Blood Updated: 07/13/21 0756     Magnesium 1.5 mg/dL     Calcium, Ionized [[id number]]  (Normal) Collected: 07/13/21 0706    Specimen: Blood Updated: 07/13/21 0746     Ionized Calcium 1.18 mmol/L     CBC Auto Differential [[id number]]  (Abnormal) Collected: 07/13/21 0708    Specimen: Blood  "Updated: 07/13/21 0718     WBC 12.92 10*3/mm3      RBC 2.86 10*6/mm3      Hemoglobin 8.2 g/dL      Hematocrit 26.5 %      MCV 92.7 fL      MCH 28.7 pg      MCHC 30.9 g/dL      RDW 17.1 %      RDW-SD 54.4 fl      MPV 12.3 fL      Platelets 159 10*3/mm3      Neutrophil % 69.5 %      Lymphocyte % 22.5 %      Monocyte % 7.0 %      Eosinophil % 0.3 %      Basophil % 0.2 %      Immature Grans % 0.5 %      Neutrophils, Absolute 8.96 10*3/mm3      Lymphocytes, Absolute 2.91 10*3/mm3      Monocytes, Absolute 0.91 10*3/mm3      Eosinophils, Absolute 0.04 10*3/mm3      Basophils, Absolute 0.03 10*3/mm3      Immature Grans, Absolute 0.07 10*3/mm3      nRBC 0.0 /100 WBC     Hemoglobin & Hematocrit, Blood [[id number]]  (Abnormal) Collected: 07/13/21 0708    Specimen: Blood Updated: 07/13/21 0718     Hemoglobin 8.2 g/dL      Hematocrit 26.5 %     Procalcitonin [[id number]]  (Normal) Collected: 07/12/21 2035    Specimen: Blood Updated: 07/13/21 0502     Procalcitonin 0.07 ng/mL     Narrative:      As a Marker for Sepsis (Non-Neonates):     1. <0.5 ng/mL represents a low risk of severe sepsis and/or septic shock.  2. >2 ng/mL represents a high risk of severe sepsis and/or septic shock.    As a Marker for Lower Respiratory Tract Infections that require antibiotic therapy:  PCT on Admission     Antibiotic Therapy             6-12 Hrs later  >0.5                          Strongly Recommended            >0.25 - <0.5             Recommended  0.1 - 0.25                  Discouraged                       Remeasure/reassess PCT  <0.1                         Strongly Discouraged         Remeasure/reassess PCT      As 28 day mortality risk marker: \"Change in Procalcitonin Result\" (>80% or <=80%) if Day 0 (or Day 1) and Day 4 values are available. Refer to http://www.[employer]-pct-calculator.com/    Change in PCT <=80 %   A decrease of PCT levels below or equal to 80% defines a positive change in PCT test result representing a higher risk for " 28-day all-cause mortality of patients diagnosed with severe sepsis or septic shock.    Change in PCT >80 %   A decrease of PCT levels of more than 80% defines a negative change in PCT result representing a lower risk for 28-day all-cause mortality of patients diagnosed with severe sepsis or septic shock.              Results may be falsely decreased if patient taking Biotin.     Blood Culture - Blood, Arm, Left [[id number]] Collected: 07/13/21 0330    Specimen: Blood from Arm, Left Updated: 07/13/21 0456    Blood Culture - Blood, Arm, Right [[id number]] Collected: 07/13/21 0320    Specimen: Blood from Arm, Right Updated: 07/13/21 0455    STAT Lactic Acid, Reflex [[id number]]  (Normal) Collected: 07/13/21 0317    Specimen: Blood Updated: 07/13/21 0354     Lactate 1.5 mmol/L      Comment: Falsely depressed results may occur on samples drawn from patients receiving N-Acetylcysteine (NAC) or Metamizole.       Sedimentation Rate [[id number]]  (Normal) Collected: 07/13/21 0317    Specimen: Blood Updated: 07/13/21 0350     Sed Rate 7 mm/hr     Hemoglobin & Hematocrit, Blood [[id number]]  (Abnormal) Collected: 07/13/21 0317    Specimen: Blood Updated: 07/13/21 0339     Hemoglobin 9.5 g/dL      Hematocrit 31.1 %     C-reactive Protein [[id number]]  (Normal) Collected: 07/12/21 2035    Specimen: Blood Updated: 07/13/21 0338     C-Reactive Protein <0.30 mg/dL     Urinalysis With Culture If Indicated - Urine, Clean Catch [[id number]]  (Abnormal) Collected: 07/13/21 0100    Specimen: Urine, Clean Catch Updated: 07/13/21 0315     Color, UA Yellow     Appearance, UA Clear     pH, UA <=5.0     Specific Gravity, UA 1.071     Glucose, UA Negative     Ketones, UA Negative     Bilirubin, UA Negative     Blood, UA Negative     Protein, UA Negative     Leuk Esterase, UA Negative     Nitrite, UA Negative     Urobilinogen, UA 0.2 E.U./dL    Narrative:      Urine microscopic not indicated.    Magnesium [[id number]]  (Normal) Collected: 07/12/21  2035    Specimen: Blood Updated: 07/13/21 0218     Magnesium 1.7 mg/dL     [place] Draw [[id number]] Collected: 07/12/21 2035    Specimen: Blood Updated: 07/13/21 0045    Narrative:      The following orders were created for panel order [place] Draw.  Procedure                               Abnormality         Status                     ---------                               -----------         ------                     Green Top (Gel)[[id number]]                                  Final result               Lavender Top[[id number]]                                     Final result               Gold Top - SST[[id number]]                                   Final result               Matos Top[[id number]]                                         Final result                 Please view results for these tests on the individual orders.    Gray Top [[id number]] Collected: 07/12/21 2035    Specimen: Blood Updated: 07/13/21 0045     Extra Tube Hold for add-ons.     Comment: Auto resulted.            Imaging Results (Last 24 Hours)     Procedure Component Value Units Date/Time    XR Chest 1 View [[id number]] Collected: 07/13/21 0231     Updated: 07/13/21 0233    Narrative:      CR Chest 1 Vw    INDICATION:   Leukocytosis and hypotension.     COMPARISON:    5/24/2020    FINDINGS:  Single portable AP view(s) of the chest.    The heart and mediastinal contours are normal. The lungs are clear. No pneumothorax or pleural effusion. There is atherosclerotic disease in the aorta.      Impression:      No acute cardiopulmonary findings.    Signer Name: Luis Carlos Chen MD   Signed: 7/13/2021 2:31 AM   Workstation Name: [facility]    [facility]           Assessment/Plan       Acute lower GI bleeding    GERD (gastroesophageal reflux disease)    SIRS (systemic inflammatory response syndrome) (CMS/HCC)    Atrial fibrillation with RVR (CMS/HCC)    Hypothyroidism    Hyperlipidemia    Chronic diastolic CHF (congestive heart  failure) (CMS/HCC)    History of hemorrhagic stroke with residual hemiparesis (CMS/HCC)    Symptomatic hypotension    Hypocalcemia    Lactic acidosis    BARBARA (acute kidney injury) (CMS/HCC)  Increasing resting tremor of head and right side  Acute on chronic hemorrhoidal bleeding-no blood currently.  Acute on chronic intermittent diarrhea    Recommendation: Large hemorrhoids that bother her most when her mild constipation turns into a couple days of diarrhea.  This particular time she was on the toilet which spread to her buttocks apart and the hemorrhoids engorged.  Because of her size and weakness, her  could not move her so I called the neighbor lady and they could not move her so they called the neighbor  and they could not move her without considerable duress.  They finally got her in bed and called the ambulance.  She had been bleeding for 3 days and did not notify anybody.  She also sits all day long which aggravates her hemorrhoids.  I do not remember her colonoscopy results but it was not too long ago and I will find out tomorrow morning.  She has not had any polyps I know that.    Naturally her Eliquis aggravates her hemorrhoids when they bleed.  They were supposed to putting lanolin inside her anal canal so that the stool would not irritate the lining and cause the bleeding.  She was given a steroid cream which they have been using as well and this weakens the lining of the anus and may actually irritated bleeding.    2 surgical options would be a hemorrhoidectomy which she would probably have to be hospitalized for several days.  The best option would be a permanent colostomy and they are not interested in that.  That would take care of regulating her bowels and avoid any further bleeding.  Would also avoid her sitting on the toilet for prolonged period of time.  It would make her 's care easier since he has to get her in the wheelchair to get her to the bathroom.  Unfortunately, if  she does become worse with either her tremor or her weakness she would become a higher surgical risk for either one.    Reasonable to restart her diet and her Eliquis.  I will continue to follow while she is hospitalized.      I discussed the patients findings and my recommendations with patient and family.     Tang Jung MD  07/13/21  19:03 EDT

## 2021-07-14 LAB
ANION GAP SERPL CALCULATED.3IONS-SCNC: 8 MMOL/L (ref 5–15)
BUN SERPL-MCNC: 5 MG/DL (ref 8–23)
BUN/CREAT SERPL: 7.6 (ref 7–25)
CALCIUM SPEC-SCNC: 8.1 MG/DL (ref 8.6–10.5)
CHLORIDE SERPL-SCNC: 116 MMOL/L (ref 98–107)
CO2 SERPL-SCNC: 21 MMOL/L (ref 22–29)
CREAT SERPL-MCNC: 0.66 MG/DL (ref 0.57–1)
DEPRECATED RDW RBC AUTO: 56.3 FL (ref 37–54)
ERYTHROCYTE [DISTWIDTH] IN BLOOD BY AUTOMATED COUNT: 17.4 % (ref 12.3–15.4)
GFR SERPL CREATININE-BSD FRML MDRD: 89 ML/MIN/1.73
GLUCOSE SERPL-MCNC: 98 MG/DL (ref 65–99)
HCT VFR BLD AUTO: 24.2 % (ref 34–46.6)
HCT VFR BLD AUTO: 25.3 % (ref 34–46.6)
HCT VFR BLD AUTO: 26.5 % (ref 34–46.6)
HCT VFR BLD AUTO: 27.4 % (ref 34–46.6)
HGB BLD-MCNC: 7.4 G/DL (ref 12–15.9)
HGB BLD-MCNC: 7.7 G/DL (ref 12–15.9)
HGB BLD-MCNC: 7.9 G/DL (ref 12–15.9)
HGB BLD-MCNC: 8.2 G/DL (ref 12–15.9)
MAGNESIUM SERPL-MCNC: 1.6 MG/DL (ref 1.6–2.4)
MCH RBC QN AUTO: 28.5 PG (ref 26.6–33)
MCHC RBC AUTO-ENTMCNC: 30.4 G/DL (ref 31.5–35.7)
MCV RBC AUTO: 93.7 FL (ref 79–97)
PLATELET # BLD AUTO: 167 10*3/MM3 (ref 140–450)
PMV BLD AUTO: 12.1 FL (ref 6–12)
POTASSIUM SERPL-SCNC: 3.7 MMOL/L (ref 3.5–5.2)
QT INTERVAL: 308 MS
QTC INTERVAL: 456 MS
RBC # BLD AUTO: 2.7 10*6/MM3 (ref 3.77–5.28)
SODIUM SERPL-SCNC: 145 MMOL/L (ref 136–145)
WBC # BLD AUTO: 7.33 10*3/MM3 (ref 3.4–10.8)

## 2021-07-14 PROCEDURE — 85014 HEMATOCRIT: CPT | Performed by: NURSE PRACTITIONER

## 2021-07-14 PROCEDURE — 85018 HEMOGLOBIN: CPT | Performed by: NURSE PRACTITIONER

## 2021-07-14 PROCEDURE — 85027 COMPLETE CBC AUTOMATED: CPT | Performed by: INTERNAL MEDICINE

## 2021-07-14 PROCEDURE — 99232 SBSQ HOSP IP/OBS MODERATE 35: CPT | Performed by: INTERNAL MEDICINE

## 2021-07-14 PROCEDURE — 94799 UNLISTED PULMONARY SVC/PX: CPT

## 2021-07-14 PROCEDURE — 80048 BASIC METABOLIC PNL TOTAL CA: CPT | Performed by: INTERNAL MEDICINE

## 2021-07-14 PROCEDURE — 83735 ASSAY OF MAGNESIUM: CPT | Performed by: INTERNAL MEDICINE

## 2021-07-14 PROCEDURE — 85014 HEMATOCRIT: CPT | Performed by: INTERNAL MEDICINE

## 2021-07-14 PROCEDURE — 85018 HEMOGLOBIN: CPT | Performed by: INTERNAL MEDICINE

## 2021-07-14 RX ADMIN — PREGABALIN 300 MG: 100 CAPSULE ORAL at 09:18

## 2021-07-14 RX ADMIN — DOCUSATE SODIUM 100 MG: 100 CAPSULE, LIQUID FILLED ORAL at 09:17

## 2021-07-14 RX ADMIN — ATORVASTATIN CALCIUM 10 MG: 10 TABLET, FILM COATED ORAL at 21:29

## 2021-07-14 RX ADMIN — APIXABAN 5 MG: 5 TABLET, FILM COATED ORAL at 21:30

## 2021-07-14 RX ADMIN — BUDESONIDE AND FORMOTEROL FUMARATE DIHYDRATE 2 PUFF: 80; 4.5 AEROSOL RESPIRATORY (INHALATION) at 08:08

## 2021-07-14 RX ADMIN — METOPROLOL TARTRATE 25 MG: 25 TABLET, FILM COATED ORAL at 09:18

## 2021-07-14 RX ADMIN — MAGNESIUM HYDROXIDE 10 ML: 2400 SUSPENSION ORAL at 18:32

## 2021-07-14 RX ADMIN — DILTIAZEM HYDROCHLORIDE 360 MG: 180 CAPSULE, COATED, EXTENDED RELEASE ORAL at 09:17

## 2021-07-14 RX ADMIN — LEVOTHYROXINE SODIUM 137 MCG: 137 TABLET ORAL at 06:09

## 2021-07-14 RX ADMIN — TOPIRAMATE 100 MG: 100 TABLET, FILM COATED ORAL at 09:18

## 2021-07-14 RX ADMIN — PANTOPRAZOLE SODIUM 40 MG: 40 TABLET, DELAYED RELEASE ORAL at 09:18

## 2021-07-14 RX ADMIN — SODIUM CHLORIDE 500 ML: 9 INJECTION, SOLUTION INTRAVENOUS at 16:32

## 2021-07-14 RX ADMIN — TOPIRAMATE 100 MG: 100 TABLET, FILM COATED ORAL at 21:29

## 2021-07-14 RX ADMIN — BUDESONIDE AND FORMOTEROL FUMARATE DIHYDRATE 2 PUFF: 80; 4.5 AEROSOL RESPIRATORY (INHALATION) at 18:43

## 2021-07-14 RX ADMIN — FERROUS SULFATE TAB 325 MG (65 MG ELEMENTAL FE) 325 MG: 325 (65 FE) TAB at 09:17

## 2021-07-14 RX ADMIN — SODIUM CHLORIDE, PRESERVATIVE FREE 10 ML: 5 INJECTION INTRAVENOUS at 21:35

## 2021-07-14 NOTE — PLAN OF CARE
V/S stable, RA, Afib, UOP adequate. No complaints of pain or n/v. Patient slept well throughout the night. Will continue to monitor.     Problem: Fall Injury Risk  Goal: Absence of Fall and Fall-Related Injury  Outcome: Ongoing, Progressing  Intervention: Identify and Manage Contributors to Fall Injury Risk  Recent Flowsheet Documentation  Taken 7/14/2021 0000 by Keila Fisher, RN  Medication Review/Management: medications reviewed  Taken 7/13/2021 2200 by Keila Fisher, CRISTINO  Medication Review/Management: medications reviewed  Taken 7/13/2021 2113 by Keila Fisher RN  Medication Review/Management: medications reviewed  Intervention: Promote Injury-Free Environment  Recent Flowsheet Documentation  Taken 7/14/2021 0000 by Keila Fisher RN  Safety Promotion/Fall Prevention:   toileting scheduled   safety round/check completed   room organization consistent   nonskid shoes/slippers when out of bed   clutter free environment maintained   assistive device/personal items within reach   activity supervised  Taken 7/13/2021 2200 by Keila Fisher, CRISTINO  Safety Promotion/Fall Prevention:   safety round/check completed   room organization consistent   nonskid shoes/slippers when out of bed   clutter free environment maintained   assistive device/personal items within reach   activity supervised   fall prevention program maintained     Problem: Adult Inpatient Plan of Care  Goal: Plan of Care Review  Outcome: Ongoing, Progressing  Goal: Patient-Specific Goal (Individualized)  Outcome: Ongoing, Progressing  Goal: Absence of Hospital-Acquired Illness or Injury  Outcome: Ongoing, Progressing  Intervention: Identify and Manage Fall Risk  Recent Flowsheet Documentation  Taken 7/14/2021 0000 by Keila Fisher, CRISTINO  Safety Promotion/Fall Prevention:   toileting scheduled   safety round/check completed   room organization consistent   nonskid shoes/slippers when out of bed   clutter free environment maintained   assistive device/personal items  within reach   activity supervised  Taken 7/13/2021 2200 by Keila Fisher RN  Safety Promotion/Fall Prevention:   safety round/check completed   room organization consistent   nonskid shoes/slippers when out of bed   clutter free environment maintained   assistive device/personal items within reach   activity supervised   fall prevention program maintained  Intervention: Prevent Skin Injury  Recent Flowsheet Documentation  Taken 7/14/2021 0000 by Keila Fisher RN  Body Position: supine  Skin Protection: skin-to-skin areas padded  Taken 7/13/2021 2200 by Keila Fisher RN  Body Position: supine  Skin Protection:   skin-to-device areas padded   skin-to-skin areas padded   transparent dressing maintained   tubing/devices free from skin contact   incontinence pads utilized  Taken 7/13/2021 2113 by Keila Fisher RN  Body Position: sitting up in bed  Skin Protection: adhesive use limited  Intervention: Prevent and Manage VTE (venous thromboembolism) Risk  Recent Flowsheet Documentation  Taken 7/13/2021 2113 by Keila Fisher RN  VTE Prevention/Management:   bleeding risk factor(s) identified   sequential compression devices off  Goal: Optimal Comfort and Wellbeing  Outcome: Ongoing, Progressing  Intervention: Provide Person-Centered Care  Recent Flowsheet Documentation  Taken 7/13/2021 2113 by Keila Fisher RN  Trust Relationship/Rapport:   care explained   choices provided   emotional support provided   empathic listening provided   questions answered   questions encouraged   reassurance provided   thoughts/feelings acknowledged  Goal: Readiness for Transition of Care  Outcome: Ongoing, Progressing     Problem: Skin Injury Risk Increased  Goal: Skin Health and Integrity  Outcome: Ongoing, Progressing  Intervention: Optimize Skin Protection  Recent Flowsheet Documentation  Taken 7/14/2021 0000 by Keila Fisher RN  Pressure Reduction Techniques: frequent weight shift encouraged  Head of Bed (HOB): HOB lowered  Pressure  Reduction Devices: positioning supports utilized  Skin Protection: skin-to-skin areas padded  Taken 7/13/2021 2200 by Keila Fisher RN  Pressure Reduction Techniques: frequent weight shift encouraged  Head of Bed (hospitals): hospitals elevated  Pressure Reduction Devices: pressure-redistributing mattress utilized  Skin Protection:   skin-to-device areas padded   skin-to-skin areas padded   transparent dressing maintained   tubing/devices free from skin contact   incontinence pads utilized  Taken 7/13/2021 2113 by Keila Fisher RN  Pressure Reduction Techniques: frequent weight shift encouraged  Head of Bed (HOB): hospitals elevated  Pressure Reduction Devices: pressure-redistributing mattress utilized  Skin Protection: adhesive use limited   Goal Outcome Evaluation:

## 2021-07-14 NOTE — PROGRESS NOTES
[facility] Medicine Services  PROGRESS NOTE    Patient Name: Vickie Joshi  : 1950  MRN: [id number]    Date of Admission: 2021  Primary Care Physician: Dav Bryan MD    Subjective   Subjective     CC:  Rectal bleeding    HPI:  No further bleeding today but hasn't had a BM today yet.  Feels ok.     ROS:  Gen- No fevers, chills  CV- No chest pain, palpitations  Resp- No cough, dyspnea  GI- No N/V/D, abd pain        Objective   Objective     Vital Signs:   Temp:  [97.7 °F (36.5 °C)-98.8 °F (37.1 °C)] 97.7 °F (36.5 °C)  Heart Rate:  [] 80  Resp:  [16-18] 18  BP: ()/() 98/48     Physical Exam:  Constitutional: No acute distress, awake, alert  HENT: NCAT, mucous membranes moist  Respiratory: Clear to auscultation bilaterally, respiratory effort normal   Cardiovascular: RRR, no murmurs, rubs, or gallops  Gastrointestinal: Positive bowel sounds, soft, nontender, nondistended  Musculoskeletal: No bilateral ankle edema  Psychiatric: Appropriate affect, cooperative  Neurologic: Oriented x 3, strength symmetric in all extremities, Cranial Nerves grossly intact to confrontation, speech clear  Skin: No rashes      Results Reviewed:  LAB RESULTS:      Lab 21  0530 21  0011 21  1106 21  0918 21  0708 21  0317 21  2035   WBC 7.33  --   --   --  12.92*  --  27.41*   HEMOGLOBIN 7.7* 7.4* 9.1* 8.0* 8.2*  8.2* 9.5* 9.1*   HEMATOCRIT 25.3* 24.2* 29.8* 26.0* 26.5*  26.5* 31.1* 30.5*   PLATELETS 167  --   --   --  159  --  220   NEUTROS ABS  --   --   --   --  8.96*  --  22.59*   IMMATURE GRANS (ABS)  --   --   --   --  0.07*  --  0.23*   LYMPHS ABS  --   --   --   --  2.91  --  2.47   MONOS ABS  --   --   --   --  0.91*  --  2.01*   EOS ABS  --   --   --   --  0.04  --  0.04   MCV 93.7  --   --   --  92.7  --  92.7   SED RATE  --   --   --   --   --  7  --    CRP  --   --   --   --   --   --  <0.30   PROCALCITONIN  --   --    --   --   --   --  0.07   LACTATE  --   --   --   --   --  1.5 2.1*         Lab 07/14/21  0530 07/13/21  0706 07/13/21  0557 07/12/21 2035   SODIUM 145 140  --  145   POTASSIUM 3.7 4.0  --  3.9   CHLORIDE 116* 114*  --  115*   CO2 21.0* 19.0*  --  18.0*   ANION GAP 8.0 7.0  --  12.0   BUN 5* 9  --  12   CREATININE 0.66 0.71  --  1.02*   GLUCOSE 98 103*  --  109*   CALCIUM 8.1* 7.7*  --  7.8*   IONIZED CALCIUM  --  1.18  --   --    MAGNESIUM 1.6  --  1.5* 1.7         Lab 07/12/21 2035   TOTAL PROTEIN 5.9*   ALBUMIN 3.40*   GLOBULIN 2.5   ALT (SGPT) 8   AST (SGOT) 15   BILIRUBIN 0.2   ALK PHOS 83         Lab 07/13/21  1106 07/13/21  0918 07/13/21  0557 07/12/21 2035   TROPONIN T <0.010 <0.010 <0.010 <0.010             Lab 07/12/21 2035   ABO TYPING A   RH TYPING Negative   ANTIBODY SCREEN Negative         Brief Urine Lab Results  (Last result in the past 365 days)      Color   Clarity   Blood   Leuk Est   Nitrite   Protein   CREAT   Urine HCG        07/13/21 0100 Yellow Clear Negative Negative Negative Negative               Microbiology Results Abnormal     Procedure Component Value - Date/Time    Blood Culture - Blood, Arm, Right [[id number]] Collected: 07/13/21 0320    Lab Status: Preliminary result Specimen: Blood from Arm, Right Updated: 07/14/21 0502     Blood Culture No growth at 24 hours    Blood Culture - Blood, Arm, Left [[id number]] Collected: 07/13/21 0330    Lab Status: Preliminary result Specimen: Blood from Arm, Left Updated: 07/14/21 0502     Blood Culture No growth at 24 hours    Narrative:      Aerobic bottle only    COVID-19 and FLU A/B PCR - Swab, Nasopharynx [[id number]]  (Normal) Collected: 07/12/21 2119    Lab Status: Final result Specimen: Swab from Nasopharynx Updated: 07/12/21 2157     COVID19 Not Detected     Influenza A PCR Not Detected     Influenza B PCR Not Detected    Narrative:      Fact sheet for providers: https://www.fda.gov/media/502793/download    Fact sheet for patients:  https://www.fda.gov/media/669092/download    Test performed by [facility].          CT Abdomen Pelvis With Contrast    Result Date: 7/12/2021  CT Abdomen Pelvis W INDICATION: Abdominal pain and rectal bleeding. Acidosis. TECHNIQUE: CT of the abdomen and pelvis with IV contrast. Delayed images were obtained. Coronal and sagittal reconstructions were obtained.  Radiation dose reduction techniques included automated exposure control or exposure modulation based on body size. Count of known CT and cardiac nuc med studies performed in previous 12 months: 0. COMPARISON: CT pelvis 2/27/2017 FINDINGS: There is mild dependent atelectasis or scarring in the right lower lobe. There is atherosclerotic disease with no aortic aneurysm. Gallbladder is surgically absent. No biliary obstruction is identified. There is a tiny right renal cyst. There is mild fatty atrophy of the pancreas. Solid abdominal organs are otherwise normal. Urinary bladder is normal. Uterus is surgically absent. There is colonic diverticulosis without acute diverticulitis. Overall, the colon is relatively decompressed. There is some mild thickening of the wall of the rectosigmoid colon. This could be due to incomplete distention or mild colitis. The appendix is not clearly identified, but there is no evidence of acute appendicitis. Small bowel is normal with no obstruction identified. Stomach is unremarkable. There is no adenopathy or free fluid. Patient is status post ORIF of the left proximal femur.     Impression: 1. Mild thickening of the rectosigmoid colon wall which may be due to incomplete distention or mild colitis. 2. Colonic diverticulosis without focal diverticulitis. The appendix is not seen, but there is no evidence of acute appendicitis. 3. No small bowel obstruction. 4. Cholecystectomy and hysterectomy. Signer Name: Luis Carlos Chen MD  Signed: 7/12/2021 10:31 PM  Workstation Name: [facility]    XR Chest 1  View    Result Date: 7/13/2021  CR Chest 1 Vw INDICATION: Leukocytosis and hypotension. COMPARISON:  5/24/2020 FINDINGS: Single portable AP view(s) of the chest. The heart and mediastinal contours are normal. The lungs are clear. No pneumothorax or pleural effusion. There is atherosclerotic disease in the aorta.     Impression: No acute cardiopulmonary findings. Signer Name: Luis Carlos Chen MD  Signed: 7/13/2021 2:31 AM  Workstation Name: [facility]  [facility]      Results for orders placed during the hospital encounter of 06/30/19    Adult Transthoracic Echo Complete W/ Cont if Necessary Per Protocol    Interpretation Summary  · Mild pulmonic valve regurgitation is present.  · Mild mitral valve regurgitation is present  · Left atrial cavity size is moderately dilated.  · Mild aortic valve regurgitation is present.  · Mild tricuspid valve regurgitation is present.  · Calculated right ventricular systolic pressure from tricuspid regurgitation is 39 mmHg.  · Estimated EF = 65%.  · Left ventricular systolic function is normal.      I have reviewed the medications:  Scheduled Meds:atorvastatin, 10 mg, Oral, Nightly  budesonide-formoterol, 2 puff, Inhalation, BID - RT  dilTIAZem CD, 360 mg, Oral, Q24H  docusate sodium, 100 mg, Oral, BID  ferrous sulfate, 325 mg, Oral, Daily With Breakfast  levothyroxine, 137 mcg, Oral, Q AM  metoprolol tartrate, 25 mg, Oral, Q12H  pantoprazole, 40 mg, Oral, Daily  pregabalin, 300 mg, Oral, Q12H  sodium chloride, 10 mL, Intravenous, Q12H  topiramate, 100 mg, Oral, Q12H      Continuous Infusions:sodium chloride, 75 mL/hr, Last Rate: 75 mL/hr (07/13/21 1032)      PRN Meds:.•  acetaminophen **OR** acetaminophen **OR** acetaminophen  •  magnesium sulfate **OR** magnesium sulfate **OR** magnesium sulfate  •  sodium chloride  •  sodium chloride    Assessment/Plan   Assessment & Plan     Active Hospital Problems    Diagnosis  POA   • **Acute lower GI bleeding [K92.2]   Yes   • Symptomatic hypotension [I95.9]  Yes   • Hypocalcemia [E83.51]  Yes   • Lactic acidosis [E87.2]  Yes   • BARBARA (acute kidney injury) (CMS/[facility]) [N17.9]  Yes   • Chronic diastolic CHF (congestive heart failure) (CMS/[facility]) [I50.32]  Yes   • History of hemorrhagic stroke with residual hemiparesis (CMS/[facility]) [I69.359]  Not Applicable   • Hyperlipidemia [E78.5]  Yes   • Hypothyroidism [E03.9]  Yes   • Atrial fibrillation with RVR (CMS/[facility]) [I48.91]  Yes   • SIRS (systemic inflammatory response syndrome) (CMS/[facility]) [R65.10]  Yes   • GERD (gastroesophageal reflux disease) [K21.9]  Yes      Resolved Hospital Problems   No resolved problems to display.        Brief Hospital Course to date:  Vickie Joshi is a 71 y.o. female with PMH significant for atrial fib (on eliquis), hypothyroid, HTN, HLD, GERD, CVA (residual Left hemiparesis), hemorrhoids with rectal bleeding, diastolic CHF,  who presents to the ED with complaint of 3 day history of rectal bleeding and one day history of  altered mental status who is found to have concern for SIRS and symptomatic anemia due to lower GI bleed.      Today per nurse she is having dark stool with some flecks of red.  Patient hasn't seen any more BRBPR.  She is concerned about holding her eliquis..  Reports h/o hemorrhoids, saw Dr. Jung in the past.      Lower GI Bleed  Symptomatic Anemia  -s/p 1 unit PRBC  -trend H&H change to Q12.  -Follows outpt with Dr Jung who is following.  D/t hemorrhoids that bother her most when mild constipation turns into diarrhea.  Should be using lanolin inside her anal canal to prevent stool from irritating lining.  Surgical options would be hemorroidectomy/patient and family considering or permanent colostomy which patient doesn't want.  Ok to restart diet and eliquis per Dr. Jung.  Will watch Hb while on eliquis and see if rebleeds       SIRS  Leukocytosis   Lactic Acidosis   -BC x 2 no growth at 24 hours  -UA negative    -CXR negative for  acute findings   -CT abdomen notes question of mild colitis    -CRP, SED rate pending     Hypotension  --holding lasix.  Will give another 500NS fluid bolus.  Stop dilt and lyrica.  Continue metoprolol for now for afib as shouldn't affect BP as much     BARBARA  -likely volume depletion   -resolved with IVF     Atrial Fib with RVR  -rate now improved   -continue metoprolol and cardizem with hold parameters given hypotension   -restart elquis     Hypocalcemia   Hypomagnesmia  -ionized calcium wnl  -replace Mg per protocol     GERD   -continue PPI      Hypothyroid   -continue synthroid      Hyperlipidemia   -continue statin      CHF  -daily weight   -on lasix every other day, hold for now    DVT prophylaxis:  Mechanical DVT prophylaxis orders are present. mechanical, restart eliquis      Disposition: I expect the patient to be discharged TBD    CODE STATUS: FULL  Code Status and Medical Interventions:   Ordered at: 07/13/21 0318     Level Of Support Discussed With:    Patient     Code Status:    CPR     Medical Interventions (Level of Support Prior to Arrest):    Full       Malick Bobby MD  07/14/21

## 2021-07-14 NOTE — PROGRESS NOTES
"Colon and Rectal [[facility]]    Follow-up hemorrhoidal bleed on Eliquis  Colonoscopy from earlier this year showed mild sigmoid diverticulosis. The remainder of the colon and small bowel are normal. Large hemorrhoids were noted at that time.  No current bleeding but no bowel movement.  I would like to try medical management of these but if that fails an inpatient hemorrhoidectomy and probably ulcer ectomy would be in order.    BP 92/52 (BP Location: Right arm, Patient Position: Lying)   Pulse 61   Temp 97.8 °F (36.6 °C) (Oral)   Resp 18   Ht 165.1 cm (65\")   Wt 85.5 kg (188 lb 9.6 oz)   SpO2 100%   BMI 31.38 kg/m²     Lab Results (last 24 hours)     Procedure Component Value Units Date/Time    Hemoglobin & Hematocrit, Blood [[id number]]  (Abnormal) Collected: 07/14/21 1204    Specimen: Blood Updated: 07/14/21 1252     Hemoglobin 7.9 g/dL      Hematocrit 26.5 %     Basic Metabolic Panel [[id number]]  (Abnormal) Collected: 07/14/21 0530    Specimen: Blood Updated: 07/14/21 0630     Glucose 98 mg/dL      BUN 5 mg/dL      Creatinine 0.66 mg/dL      Sodium 145 mmol/L      Potassium 3.7 mmol/L      Chloride 116 mmol/L      CO2 21.0 mmol/L      Calcium 8.1 mg/dL      eGFR Non African Amer 89 mL/min/1.73      BUN/Creatinine Ratio 7.6     Anion Gap 8.0 mmol/L     Narrative:      GFR Normal >60  Chronic Kidney Disease <60  Kidney Failure <15      Magnesium [[id number]]  (Normal) Collected: 07/14/21 0530    Specimen: Blood Updated: 07/14/21 0630     Magnesium 1.6 mg/dL     CBC (No Diff) [[id number]]  (Abnormal) Collected: 07/14/21 0530    Specimen: Blood Updated: 07/14/21 0606     WBC 7.33 10*3/mm3      RBC 2.70 10*6/mm3      Hemoglobin 7.7 g/dL      Hematocrit 25.3 %      MCV 93.7 fL      MCH 28.5 pg      MCHC 30.4 g/dL      RDW 17.4 %      RDW-SD 56.3 fl      MPV 12.1 fL      Platelets 167 10*3/mm3     Blood Culture - Blood, Arm, Right [[id number]] Collected: 07/13/21 0320    Specimen: Blood from Arm, Right Updated: 07/14/21 " 0502     Blood Culture No growth at 24 hours    Blood Culture - Blood, Arm, Left [[id number]] Collected: 07/13/21 0330    Specimen: Blood from Arm, Left Updated: 07/14/21 0502     Blood Culture No growth at 24 hours    Narrative:      Aerobic bottle only    Hemoglobin & Hematocrit, Blood [[id number]]  (Abnormal) Collected: 07/14/21 0011    Specimen: Blood Updated: 07/14/21 0036     Hemoglobin 7.4 g/dL      Hematocrit 24.2 %           No intake/output data recorded.    Alert and oriented.  No nausea or vomiting.  Good pain control  Good UO. Labs improving  Hopefully a bowel movement before tomorrow.  Home anytime  Vaseline equivalent in the anal canal especially before bowel movements    Tang Jung MD  07/14/21  17:55 EDT

## 2021-07-15 VITALS
OXYGEN SATURATION: 97 % | HEART RATE: 77 BPM | DIASTOLIC BLOOD PRESSURE: 104 MMHG | RESPIRATION RATE: 14 BRPM | HEIGHT: 65 IN | TEMPERATURE: 97.9 F | WEIGHT: 192 LBS | SYSTOLIC BLOOD PRESSURE: 129 MMHG | BODY MASS INDEX: 31.99 KG/M2

## 2021-07-15 LAB
ANION GAP SERPL CALCULATED.3IONS-SCNC: 9 MMOL/L (ref 5–15)
BUN SERPL-MCNC: 4 MG/DL (ref 8–23)
BUN/CREAT SERPL: 6.1 (ref 7–25)
CA-I SERPL ISE-MCNC: 1.2 MMOL/L (ref 1.12–1.32)
CALCIUM SPEC-SCNC: 8.1 MG/DL (ref 8.6–10.5)
CHLORIDE SERPL-SCNC: 112 MMOL/L (ref 98–107)
CO2 SERPL-SCNC: 21 MMOL/L (ref 22–29)
CREAT SERPL-MCNC: 0.66 MG/DL (ref 0.57–1)
GFR SERPL CREATININE-BSD FRML MDRD: 89 ML/MIN/1.73
GLUCOSE SERPL-MCNC: 106 MG/DL (ref 65–99)
HCT VFR BLD AUTO: 27.3 % (ref 34–46.6)
HGB BLD-MCNC: 8.2 G/DL (ref 12–15.9)
IRON 24H UR-MRATE: 11 MCG/DL (ref 37–145)
IRON SATN MFR SERPL: 4 % (ref 20–50)
MAGNESIUM SERPL-MCNC: 1.9 MG/DL (ref 1.6–2.4)
PHOSPHATE SERPL-MCNC: 2.6 MG/DL (ref 2.5–4.5)
POTASSIUM SERPL-SCNC: 3.2 MMOL/L (ref 3.5–5.2)
SODIUM SERPL-SCNC: 142 MMOL/L (ref 136–145)
TIBC SERPL-MCNC: 298 MCG/DL (ref 298–536)
TRANSFERRIN SERPL-MCNC: 200 MG/DL (ref 200–360)

## 2021-07-15 PROCEDURE — 25010000002 NA FERRIC GLUC CPLX PER 12.5 MG: Performed by: INTERNAL MEDICINE

## 2021-07-15 PROCEDURE — 80048 BASIC METABOLIC PNL TOTAL CA: CPT | Performed by: INTERNAL MEDICINE

## 2021-07-15 PROCEDURE — 97110 THERAPEUTIC EXERCISES: CPT

## 2021-07-15 PROCEDURE — 83735 ASSAY OF MAGNESIUM: CPT | Performed by: INTERNAL MEDICINE

## 2021-07-15 PROCEDURE — 83540 ASSAY OF IRON: CPT | Performed by: INTERNAL MEDICINE

## 2021-07-15 PROCEDURE — 97530 THERAPEUTIC ACTIVITIES: CPT

## 2021-07-15 PROCEDURE — 82330 ASSAY OF CALCIUM: CPT | Performed by: INTERNAL MEDICINE

## 2021-07-15 PROCEDURE — 94799 UNLISTED PULMONARY SVC/PX: CPT

## 2021-07-15 PROCEDURE — 84100 ASSAY OF PHOSPHORUS: CPT | Performed by: INTERNAL MEDICINE

## 2021-07-15 PROCEDURE — 85018 HEMOGLOBIN: CPT | Performed by: INTERNAL MEDICINE

## 2021-07-15 PROCEDURE — 99238 HOSP IP/OBS DSCHRG MGMT 30/<: CPT | Performed by: INTERNAL MEDICINE

## 2021-07-15 PROCEDURE — 84466 ASSAY OF TRANSFERRIN: CPT | Performed by: INTERNAL MEDICINE

## 2021-07-15 PROCEDURE — 85014 HEMATOCRIT: CPT | Performed by: INTERNAL MEDICINE

## 2021-07-15 RX ORDER — LEVOTHYROXINE SODIUM 137 UG/1
137 TABLET ORAL DAILY
Qty: 30 TABLET | Refills: 5 | Status: SHIPPED | OUTPATIENT
Start: 2021-07-15 | End: 2021-07-28 | Stop reason: HOSPADM

## 2021-07-15 RX ORDER — FUROSEMIDE 20 MG/1
20 TABLET ORAL EVERY OTHER DAY
Start: 2021-07-15 | End: 2022-06-02 | Stop reason: SDUPTHER

## 2021-07-15 RX ORDER — DILTIAZEM HYDROCHLORIDE 120 MG/1
360 CAPSULE, EXTENDED RELEASE ORAL DAILY
Qty: 30 CAPSULE | Refills: 5 | Status: SHIPPED | OUTPATIENT
Start: 2021-07-15 | End: 2022-06-02 | Stop reason: SDUPTHER

## 2021-07-15 RX ADMIN — PANTOPRAZOLE SODIUM 40 MG: 40 TABLET, DELAYED RELEASE ORAL at 09:01

## 2021-07-15 RX ADMIN — LEVOTHYROXINE SODIUM 137 MCG: 137 TABLET ORAL at 05:49

## 2021-07-15 RX ADMIN — APIXABAN 5 MG: 5 TABLET, FILM COATED ORAL at 09:06

## 2021-07-15 RX ADMIN — SODIUM CHLORIDE 125 MG: 9 INJECTION, SOLUTION INTRAVENOUS at 12:53

## 2021-07-15 RX ADMIN — METOPROLOL TARTRATE 25 MG: 25 TABLET, FILM COATED ORAL at 09:01

## 2021-07-15 RX ADMIN — BUDESONIDE AND FORMOTEROL FUMARATE DIHYDRATE 2 PUFF: 80; 4.5 AEROSOL RESPIRATORY (INHALATION) at 08:17

## 2021-07-15 RX ADMIN — TOPIRAMATE 100 MG: 100 TABLET, FILM COATED ORAL at 09:01

## 2021-07-15 RX ADMIN — SODIUM CHLORIDE 75 ML/HR: 9 INJECTION, SOLUTION INTRAVENOUS at 07:06

## 2021-07-15 NOTE — PLAN OF CARE
Goal Outcome Evaluation:  Plan of Care Reviewed With: patient        Progress: no change  Outcome Summary: Patient continues to require max assist x2 for bed mobility and transfers, patient with residual L sided weakness, transfer bed>recliner to right side so paitent could assist with reaching for arm rest with RUE, patient having difficulty pivoting L foot during transfer requiring increased assist to safely complete transfer. Progress limited by weakness and impaired balance.

## 2021-07-15 NOTE — THERAPY TREATMENT NOTE
Patient Name: Vickie Joshi  : 1950    MRN: [id number]                              Today's Date: 7/15/2021       Admit Date: 2021    Visit Dx:     ICD-10-CM ICD-9-CM   1. Acute lower GI bleeding  K92.2 578.9     Patient Active Problem List   Diagnosis   • Fall   • Closed fracture of neck of left femur (CMS/HCC)   • Hypertension   • Hx of Migraine   • GERD (gastroesophageal reflux disease)   • Fibromyalgia   • Neuropathy   • Rapid atrial fibrillation (CMS/HCC)   • Chronic anticoagulation on Eliquis   • SIRS (systemic inflammatory response syndrome) (CMS/HCC)   • Atrial fibrillation with RVR (CMS/HCC)   • Pneumonia   • Left upper lobe pneumonia   • Asthma   • Allergic rhinitis   • Facial paresis   • Left hemiplegia (CMS/HCC)   • Morbid obesity (CMS/HCC)   • Myositis   • Trigeminal neuralgia   • Snoring   • Pure hypercholesterolemia   • Acute on chronic blood loss anemia   • CHF (congestive heart failure) (CMS/HCC)   • Hypothyroidism   • Hyperlipidemia   • Chronic diastolic CHF (congestive heart failure) (CMS/HCC)   • Left hemiparesis (CMS/HCC)   • History of hemorrhagic stroke with residual hemiparesis (CMS/HCC)   • Possible pulmonary hypertension (CMS/HCC)   • Sepsis (CMS/HCC)   • Atrial fibrillation, chronic (CMS/HCC)   • Acute lower GI bleeding   • Symptomatic hypotension   • Hypocalcemia   • Lactic acidosis   • BARBARA (acute kidney injury) (CMS/HCC)     Past Medical History:   Diagnosis Date   • Arthritis    • Asthma    • Atrial fibrillation (CMS/HCC)    • Cardiac disorder    • Disease of thyroid gland    • Fibromyalgia    • GERD (gastroesophageal reflux disease)    • Hyperlipidemia    • Hypertension    • Migraine    • Neuropathy    • Stroke (CMS/HCC)      Past Surgical History:   Procedure Laterality Date   • CHOLECYSTECTOMY     • HIP PERCUTANEOUS PINNING Left 2017    Procedure: LEFT HIP PERCUTANEOUS PINNING FEMORAL NECK;  Surgeon: Ezra Barlow MD;  Location: [facility];  Service:    •  HYSTERECTOMY     • NE CLOSED RX TRAUMATIC HIP DISLOCATN Left 2/28/2017    Procedure: LEFT HIP CLOSED REDUCTION;  Surgeon: Ezra Barlow MD;  Location: [facility];  Service: Orthopedics   • SHOULDER SURGERY       General Information     Row Name 07/15/21 0911          Physical Therapy Time and Intention    Document Type  therapy note (daily note)  -AS     Mode of Treatment  physical therapy  -AS     Row Name 07/15/21 0911          General Information    Patient Profile Reviewed  yes  -AS     Existing Precautions/Restrictions  fall;other (see comments) h/o CVA with L sided weakness  -AS     Barriers to Rehab  previous functional deficit  -AS     Row Name 07/15/21 0911          Cognition    Orientation Status (Cognition)  oriented x 3  -AS     Row Name 07/15/21 0911          Safety Issues, Functional Mobility    Safety Issues Affecting Function (Mobility)  insight into deficits/self-awareness;safety precaution awareness;safety precautions follow-through/compliance;sequencing abilities  -AS     Impairments Affecting Function (Mobility)  balance;coordination;endurance/activity tolerance;grasp;motor control;muscle tone abnormal;range of motion (ROM);strength  -AS     Comment, Safety Issues/Impairments (Mobility)  patient with h/o L femur fx(3yrs) which patient reports continued pain and weakness  -AS       User Key  (r) = Recorded By, (t) = Taken By, (c) = Cosigned By    Initials Name Provider Type    AS Jazmin Amin PTA Physical Therapy Assistant        Mobility     Row Name 07/15/21 0913          Bed Mobility    Supine-Sit [place] (Bed Mobility)  verbal cues;maximum assist (25% patient effort);2 person assist  -AS     Sit-Supine [place] (Bed Mobility)  not tested  -AS     Assistive Device (Bed Mobility)  bed rails;draw sheet;head of bed elevated  -AS     Comment (Bed Mobility)  verbal cues for sequencing  -AS     Row Name 07/15/21 0913          Transfers    Comment (Transfers)  SPS transfer  bed>recliner with max assist x2, patient reaches for arm rest with R UE, patient having difficulty pivoting on L LE. Transfer to the R.  -AS     Row Name 07/15/21 0913          Bed-Chair Transfer    Bed-Chair [place] (Transfers)  verbal cues;maximum assist (25% patient effort);2 person assist  -AS     Assistive Device (Bed-Chair Transfers)  other (see comments) L UE support, patient reaching for arm rest with R UE  -AS     Row Name 07/15/21 0913          Sit-Stand Transfer    Sit-Stand [place] (Transfers)  verbal cues;moderate assist (50% patient effort);2 person assist  -AS     Row Name 07/15/21 0913          Gait/Stairs (Locomotion)    [place] Level (Gait)  unable to assess  -AS       User Key  (r) = Recorded By, (t) = Taken By, (c) = Cosigned By    Initials Name Provider Type    AS Jazmin Amin PTA Physical Therapy Assistant        Obj/Interventions     Row Name 07/15/21 0915          Motor Skills    Therapeutic Exercise  knee;ankle  -AS     Row Name 07/15/21 0915          Knee (Therapeutic Exercise)    Knee (Therapeutic Exercise)  AROM (active range of motion)  -AS     Knee AROM (Therapeutic Exercise)  right;heel slides;supine;10 repetitions  -AS     Row Name 07/15/21 0915          Ankle (Therapeutic Exercise)    Ankle (Therapeutic Exercise)  AROM (active range of motion)  -AS     Ankle AROM (Therapeutic Exercise)  right;dorsiflexion;plantarflexion;supine;10 repetitions  -AS       User Key  (r) = Recorded By, (t) = Taken By, (c) = Cosigned By    Initials Name Provider Type    AS Jazmin Amin PTA Physical Therapy Assistant        Goals/Plan    No documentation.       Clinical Impression     Row Name 07/15/21 0916          Pain    Additional Documentation  Pain Scale: Numbers Pre/Post-Treatment (Group)  -AS     Row Name 07/15/21 0916          Pain Scale: Numbers Pre/Post-Treatment    Pretreatment Pain Rating  0/10 - no pain  -AS     Posttreatment Pain Rating  0/10 - no pain  -AS      Row Name 07/15/21 0916          Plan of Care Review    Plan of Care Reviewed With  patient  -AS     Progress  no change  -AS     Outcome Summary  Patient continues to require max assist x2 for bed mobility and transfers, patient with residual L sided weakness, transfer bed>recliner to right side so paitent could assist with reaching for arm rest with RUE, patient having difficulty pivoting L foot during transfer requiring increased assist to safely complete transfer. Progress limited by weakness and impaired balance.  -AS     Row Name 07/15/21 0916          Positioning and Restraints    Pre-Treatment Position  in bed  -AS     Post Treatment Position  chair  -AS     In Chair  reclined;call light within reach;encouraged to call for assist;waffle cushion;legs elevated;exit alarm on  -AS       User Key  (r) = Recorded By, (t) = Taken By, (c) = Cosigned By    Initials Name Provider Type    AS Jazmin Amin PTA Physical Therapy Assistant        Outcome Measures     Row Name 07/15/21 0919          How much help from another person do you currently need...    Turning from your back to your side while in flat bed without using bedrails?  2  -AS     Moving from lying on back to sitting on the side of a flat bed without bedrails?  2  -AS     Moving to and from a bed to a chair (including a wheelchair)?  2  -AS     Standing up from a chair using your arms (e.g., wheelchair, bedside chair)?  2  -AS     Climbing 3-5 steps with a railing?  1  -AS     To walk in hospital room?  2  -AS     AM-PAC 6 Clicks Score (PT)  11  -AS     Row Name 07/15/21 0919          Functional Assessment    Outcome Measure Options  AM-PAC 6 Clicks Basic Mobility (PT)  -AS       User Key  (r) = Recorded By, (t) = Taken By, (c) = Cosigned By    Initials Name Provider Type    AS Jazmin Amin PTA Physical Therapy Assistant        Physical Therapy Education                 Title: PT OT SLP Therapies (In Progress)     Topic: Physical Therapy (In  Progress)     Point: Mobility training (In Progress)     Learning Progress Summary           Patient Acceptance, E, NR by AS at 7/15/2021 0919    Acceptance, E, VU,NR by BR at 7/14/2021 1100    Acceptance, E, VU,NR by LM at 7/13/2021 1007                   Point: Home exercise program (In Progress)     Learning Progress Summary           Patient Acceptance, E, NR by AS at 7/15/2021 0919    Acceptance, E, VU,NR by BR at 7/14/2021 1100                   Point: Precautions (In Progress)     Learning Progress Summary           Patient Acceptance, E, NR by AS at 7/15/2021 0919    Acceptance, E, VU,NR by BR at 7/14/2021 1100    Acceptance, E, VU,NR by LM at 7/13/2021 1007                               User Key     Initials Effective Dates Name Provider Type Discipline    AS 06/16/21 -  Jazmin Amin, PTA Physical Therapy Assistant PT    LM 06/16/21 -  Nelly Kathleen, PT Physical Therapist PT    BR 06/16/21 -  Cici Cole, RN Registered Nurse Nurse              PT Recommendation and Plan     Plan of Care Reviewed With: patient  Progress: no change  Outcome Summary: Patient continues to require max assist x2 for bed mobility and transfers, patient with residual L sided weakness, transfer bed>recliner to right side so paitent could assist with reaching for arm rest with RUE, patient having difficulty pivoting L foot during transfer requiring increased assist to safely complete transfer. Progress limited by weakness and impaired balance.     Time Calculation:   PT Charges     Row Name 07/15/21 0920             Time Calculation    Start Time  0830  -AS      PT Received On  07/15/21  -AS      PT Goal Re-Cert Due Date  07/23/21  -AS         Timed Charges    19306 - PT Therapeutic Exercise Minutes  15  -AS      11937 - PT Therapeutic Activity Minutes  10  -AS         Total Minutes    Timed Charges Total Minutes  25  -AS       Total Minutes  25  -AS        User Key  (r) = Recorded By, (t) = Taken By, (c) = Cosigned By     Initials Name Provider Type    AS Jazmin Amin PTA Physical Therapy Assistant        Therapy Charges for Today     Code Description Service Date Service Provider Modifiers Qty    12094025839 HC PT THER PROC EA 15 MIN 7/15/2021 Jazmin Amin, ARIANA GP 1    98909637772 HC PT THER SUPP EA 15 MIN 7/15/2021 Jazmin Amin PTA GP 2    84458835589 HC PT THERAPEUTIC ACT EA 15 MIN 7/15/2021 Jazmin Amin, ARIANA GP 1          PT G-Codes  Outcome Measure Options: AM-PAC 6 Clicks Basic Mobility (PT)  AM-PAC 6 Clicks Score (PT): 11  AM-PAC 6 Clicks Score (OT): 12    Jazmin Amin PTA  7/15/2021

## 2021-07-15 NOTE — DISCHARGE SUMMARY
[facility] Medicine Services  DISCHARGE SUMMARY    Patient Name: Vickie Joshi  : 1950  MRN: [id number]    Date of Admission: 2021  7:36 PM  Date of Discharge:  7/15/2021  Primary Care Physician: Dav Bryan MD    Consults     Date and Time Order Name Status Description    2021  3:18 AM Inpatient Colorectal Surgery Consult Completed           Hospital Course     Presenting Problem:   Acute lower GI bleeding [K92.2]    Active Hospital Problems    Diagnosis  POA   • **Acute lower GI bleeding [K92.2]  Yes   • Symptomatic hypotension [I95.9]  Yes   • Hypocalcemia [E83.51]  Yes   • Lactic acidosis [E87.2]  Yes   • BARBARA (acute kidney injury) (CMS/[facility]) [N17.9]  Yes   • Chronic diastolic CHF (congestive heart failure) (CMS/[facility]) [I50.32]  Yes   • History of hemorrhagic stroke with residual hemiparesis (CMS/[facility]) [I69.359]  Not Applicable   • Hyperlipidemia [E78.5]  Yes   • Hypothyroidism [E03.9]  Yes   • Atrial fibrillation with RVR (CMS/[facility]) [I48.91]  Yes   • SIRS (systemic inflammatory response syndrome) (CMS/[facility]) [R65.10]  Yes   • GERD (gastroesophageal reflux disease) [K21.9]  Yes      Resolved Hospital Problems   No resolved problems to display.          Hospital Course:  Vickie Joshi is a 71 y.o. female with PMH significant for atrial fib (on eliquis), hypothyroid, HTN, HLD, GERD, CVA (residual Left hemiparesis), hemorrhoids with rectal bleeding, diastolic CHF,  who presents to the ED with complaint of rectal bleeding and presyncope.  She has had intermittent PRBPR for the past two years secondary to internal hemorrhoids. She follows outpt with Dr Jung but has not had follow up for some time. She came in after passing a lot of blood and feeling lightheaded. WBC on arrival was 27K and she was hypotensive    Rectal bleeding from hemorrhoids  Anemia  - She got a unit of PRBC for hypotension.  Hemoglobin has remained stable in the 7s since then.  She will get a  dose of Ferrlecit prior to DC.  Dr. Jung saw her and recommends Vaseline or equivalent to the anal canal prn. He will see her in followup.  If she fails medical treatment, inpatient hemorrhoidectomy will be considered.     Hypotension  - This has resolved with fluids and blood.  Her Diltiazem was held while inpt but is restarted at lower dose given her atrial fibrillation history.      Debility:  She required assistance to get out of bed.  She was not interested in inpatient rehab, however.  Her  is accustomed to taking care of her at home.     Discharge Follow Up Recommendations for outpatient labs/diagnostics:   *FU with Dr. Jung per his recommendations.      *Synthroid dose is unclear - I am sending her home on what I think is her home dose.  TSH was not checked during this admission.     Day of Discharge     HPI:   Asking to go home  Understands that she may need hemorrhoid surgery in the future.  Is not interested in inpt rehab.     Review of Systems  No fever  Eating well  No chest pain or dyspnea  Had some diarrhea recently     Vital Signs:   Temp:  [97.8 °F (36.6 °C)-98.3 °F (36.8 °C)] 97.9 °F (36.6 °C)  Heart Rate:  [] 77  Resp:  [14-18] 14  BP: ()/() 129/104     Physical Exam:  Gen: NAD in bed.  Appears debilitated   Neuro: alert and oriented, clear speech  HEENT:  NC/AT PERRL, OP benign  Neck:  Supple, no LAD  Heart RRR no murmur, rub, or gallop  Abd:  Soft, nontender, no rebound or guarding, pos BS  Extrem:  No c/c/e      Pertinent  and/or Most Recent Results     LAB RESULTS:      Lab 07/15/21  0649 07/14/21  1942 07/14/21  1204 07/14/21  0530 07/14/21  0011 07/13/21  0708 07/13/21  0317 07/12/21 2035   WBC  --   --   --  7.33  --  12.92*  --  27.41*   HEMOGLOBIN 8.2* 8.2* 7.9* 7.7* 7.4* 8.2*  8.2* 9.5* 9.1*   HEMATOCRIT 27.3* 27.4* 26.5* 25.3* 24.2* 26.5*  26.5* 31.1* 30.5*   PLATELETS  --   --   --  167  --  159  --  220   NEUTROS ABS  --   --   --   --   --  8.96*  --   22.59*   IMMATURE GRANS (ABS)  --   --   --   --   --  0.07*  --  0.23*   LYMPHS ABS  --   --   --   --   --  2.91  --  2.47   MONOS ABS  --   --   --   --   --  0.91*  --  2.01*   EOS ABS  --   --   --   --   --  0.04  --  0.04   MCV  --   --   --  93.7  --  92.7  --  92.7   SED RATE  --   --   --   --   --   --  7  --    CRP  --   --   --   --   --   --   --  <0.30   PROCALCITONIN  --   --   --   --   --   --   --  0.07   LACTATE  --   --   --   --   --   --  1.5 2.1*         Lab 07/15/21  0649 07/14/21  0530 07/13/21  0706 07/13/21  0557 07/12/21 2035   SODIUM 142 145 140  --  145   POTASSIUM 3.2* 3.7 4.0  --  3.9   CHLORIDE 112* 116* 114*  --  115*   CO2 21.0* 21.0* 19.0*  --  18.0*   ANION GAP 9.0 8.0 7.0  --  12.0   BUN 4* 5* 9  --  12   CREATININE 0.66 0.66 0.71  --  1.02*   GLUCOSE 106* 98 103*  --  109*   CALCIUM 8.1* 8.1* 7.7*  --  7.8*   IONIZED CALCIUM 1.20  --  1.18  --   --    MAGNESIUM 1.9 1.6  --  1.5* 1.7   PHOSPHORUS 2.6  --   --   --   --          Lab 07/12/21 2035   TOTAL PROTEIN 5.9*   ALBUMIN 3.40*   GLOBULIN 2.5   ALT (SGPT) 8   AST (SGOT) 15   BILIRUBIN 0.2   ALK PHOS 83         Lab 07/13/21  1106 07/13/21  0918 07/13/21  0557 07/12/21 2035   TROPONIN T <0.010 <0.010 <0.010 <0.010             Lab 07/15/21  0649 07/12/21 2035   IRON 11*  --    IRON SATURATION 4*  --    TIBC 298  --    TRANSFERRIN 200  --    ABO TYPING  --  A   RH TYPING  --  Negative   ANTIBODY SCREEN  --  Negative         Brief Urine Lab Results  (Last result in the past 365 days)      Color   Clarity   Blood   Leuk Est   Nitrite   Protein   CREAT   Urine HCG        07/13/21 0100 Yellow Clear Negative Negative Negative Negative             Microbiology Results (last 10 days)     Procedure Component Value - Date/Time    Blood Culture - Blood, Arm, Left [[id number]] Collected: 07/13/21 0330    Lab Status: Preliminary result Specimen: Blood from Arm, Left Updated: 07/15/21 0501     Blood Culture No growth at 2 days     Narrative:      Aerobic bottle only    Blood Culture - Blood, Arm, Right [[id number]] Collected: 07/13/21 0320    Lab Status: Preliminary result Specimen: Blood from Arm, Right Updated: 07/15/21 0501     Blood Culture No growth at 2 days    COVID-19 and FLU A/B PCR - Swab, Nasopharynx [[id number]]  (Normal) Collected: 07/12/21 2119    Lab Status: Final result Specimen: Swab from Nasopharynx Updated: 07/12/21 2157     COVID19 Not Detected     Influenza A PCR Not Detected     Influenza B PCR Not Detected    Narrative:      Fact sheet for providers: https://www.fda.gov/media/965433/download    Fact sheet for patients: https://www.fda.gov/media/523199/download    Test performed by PCR.          CT Abdomen Pelvis With Contrast    Result Date: 7/12/2021  CT Abdomen Pelvis W INDICATION: Abdominal pain and rectal bleeding. Acidosis. TECHNIQUE: CT of the abdomen and pelvis with IV contrast. Delayed images were obtained. Coronal and sagittal reconstructions were obtained.  Radiation dose reduction techniques included automated exposure control or exposure modulation based on body size. Count of known CT and cardiac nuc med studies performed in previous 12 months: 0. COMPARISON: CT pelvis 2/27/2017 FINDINGS: There is mild dependent atelectasis or scarring in the right lower lobe. There is atherosclerotic disease with no aortic aneurysm. Gallbladder is surgically absent. No biliary obstruction is identified. There is a tiny right renal cyst. There is mild fatty atrophy of the pancreas. Solid abdominal organs are otherwise normal. Urinary bladder is normal. Uterus is surgically absent. There is colonic diverticulosis without acute diverticulitis. Overall, the colon is relatively decompressed. There is some mild thickening of the wall of the rectosigmoid colon. This could be due to incomplete distention or mild colitis. The appendix is not clearly identified, but there is no evidence of acute appendicitis. Small bowel is normal  with no obstruction identified. Stomach is unremarkable. There is no adenopathy or free fluid. Patient is status post ORIF of the left proximal femur.     1. Mild thickening of the rectosigmoid colon wall which may be due to incomplete distention or mild colitis. 2. Colonic diverticulosis without focal diverticulitis. The appendix is not seen, but there is no evidence of acute appendicitis. 3. No small bowel obstruction. 4. Cholecystectomy and hysterectomy. Signer Name: Luis Carlos Chen MD  Signed: 7/12/2021 10:31 PM  Workstation Name: [facility]  Radiology [facility]    XR Chest 1 View    Result Date: 7/13/2021  CR Chest 1 Vw INDICATION: Leukocytosis and hypotension. COMPARISON:  5/24/2020 FINDINGS: Single portable AP view(s) of the chest. The heart and mediastinal contours are normal. The lungs are clear. No pneumothorax or pleural effusion. There is atherosclerotic disease in the aorta.     No acute cardiopulmonary findings. Signer Name: Luis Carlos Chen MD  Signed: 7/13/2021 2:31 AM  Workstation Name: [facility]  Radiology [facility]              Results for orders placed during the hospital encounter of 06/30/19    Adult Transthoracic Echo Complete W/ Cont if Necessary Per Protocol    Interpretation Summary  · Mild pulmonic valve regurgitation is present.  · Mild mitral valve regurgitation is present  · Left atrial cavity size is moderately dilated.  · Mild aortic valve regurgitation is present.  · Mild tricuspid valve regurgitation is present.  · Calculated right ventricular systolic pressure from tricuspid regurgitation is 39 mmHg.  · Estimated EF = 65%.  · Left ventricular systolic function is normal.      Plan for Follow-up of Pending Labs/Results:  I will follow up   Pending Labs     Order Current Status    Blood Culture - Blood, Arm, Left Preliminary result    Blood Culture - Blood, Arm, Right Preliminary result        Discharge Details        Discharge Medications      Changes  to Medications      Instructions Start Date   dilTIAZem 120 MG 24 hr capsule  Commonly known as: TIAZAC  What changed: medication strength   360 mg, Oral, Daily      levothyroxine 137 MCG tablet  Commonly known as: SYNTHROID, LEVOTHROID  What changed:   · medication strength  · how to take this  · when to take this   137 mcg, Oral, Daily      metoprolol tartrate 25 MG tablet  Commonly known as: LOPRESSOR  What changed: how much to take   25 mg, Oral, Every 12 Hours Scheduled         Continue These Medications      Instructions Start Date   albuterol 1.25 MG/3ML nebulizer solution  Commonly known as: ACCUNEB   1.25 mg, Nebulization, Every 6 Hours PRN      apixaban 5 MG tablet tablet  Commonly known as: ELIQUIS   5 mg, Oral, Every 12 Hours Scheduled      atorvastatin 10 MG tablet  Commonly known as: LIPITOR   10 mg, Oral, Nightly      budesonide-formoterol 80-4.5 MCG/ACT inhaler  Commonly known as: SYMBICORT   2 puffs, Inhalation, 2 Times Daily - RT      docusate sodium 100 MG capsule  Commonly known as: COLACE   100 mg, Oral, 2 Times Daily      furosemide 20 MG tablet  Commonly known as: LASIX   20 mg, Oral, Every Other Day      loperamide 2 MG capsule  Commonly known as: IMODIUM   2 mg, Oral, Daily PRN      loratadine 10 MG tablet  Commonly known as: CLARITIN   10 mg, Oral, Daily      omeprazole 20 MG capsule  Commonly known as: priLOSEC   No dose, route, or frequency recorded.      potassium chloride ER 20 MEQ tablet controlled-release ER tablet  Commonly known as: K-TAB   20 mEq, Oral, Daily      pregabalin 300 MG capsule  Commonly known as: LYRICA   300 mg, Oral, 2 Times Daily      Preparation H 1 % cream  Generic drug: hydrocortisone   1 application, Topical, 4 Times Daily PRN      topiramate 100 MG tablet  Commonly known as: TOPAMAX   100 mg, Oral, 2 Times Daily         Stop These Medications    Ferrous Sulfate 28 MG tablet     pantoprazole 40 MG EC tablet  Commonly known as: Protonix            Allergies    Allergen Reactions   • Cephalexin Swelling   • Penicillins Hives   • Sulfa Antibiotics Hives         Discharge Disposition:  Home or Self Care    Diet:  Hospital:  Diet Order   Procedures   • Diet Regular       Activity:  As tolerated         CODE STATUS:    Code Status and Medical Interventions:   Ordered at: 07/13/21 0318     Level Of Support Discussed With:    Patient     Code Status:    CPR     Medical Interventions (Level of Support Prior to Arrest):    Full       Future Appointments   Date Time Provider Department Center   6/17/2022  2:00 PM Riccardo Rae MD [facility]       FU with Dr. Jung in 3 weeks, consider CBC at that time           Gladis Bey MD  07/15/21      Time Spent on Discharge:  I spent  30  minutes on this discharge activity which included: face-to-face encounter with the patient, reviewing the data in the system, coordination of the care with the nursing staff as well as consultants, documentation, and entering orders.

## 2021-07-15 NOTE — PLAN OF CARE
V/S stable, RA, Afib, UOP adequate. Patient had two liquid stools, no blood. No complaints of pain or n/v. Patient slept well throughout the night. Will continue to monitor    Problem: Fall Injury Risk  Goal: Absence of Fall and Fall-Related Injury  Intervention: Identify and Manage Contributors to Fall Injury Risk  Recent Flowsheet Documentation  Taken 7/15/2021 0200 by Keila Fisher, CRISTINO  Medication Review/Management: medications reviewed  Taken 7/15/2021 0000 by Keila Fisher RN  Medication Review/Management: medications reviewed  Taken 7/14/2021 2200 by Keila Fisher RN  Medication Review/Management: medications reviewed  Intervention: Promote Injury-Free Environment  Recent Flowsheet Documentation  Taken 7/15/2021 0200 by Keila Fihser, CRISTINO  Safety Promotion/Fall Prevention:   safety round/check completed   room organization consistent   nonskid shoes/slippers when out of bed   activity supervised   assistive device/personal items within reach   clutter free environment maintained   fall prevention program maintained  Taken 7/15/2021 0000 by Keila Fisher RN  Safety Promotion/Fall Prevention:   safety round/check completed   room organization consistent   nonskid shoes/slippers when out of bed   activity supervised   assistive device/personal items within reach   clutter free environment maintained   fall prevention program maintained  Taken 7/14/2021 2200 by Keila Fisher, CRISTINO  Safety Promotion/Fall Prevention:   safety round/check completed   room organization consistent   activity supervised   assistive device/personal items within reach   clutter free environment maintained   fall prevention program maintained   nonskid shoes/slippers when out of bed  Taken 7/14/2021 2000 by Keila Fisher, RN  Safety Promotion/Fall Prevention:   safety round/check completed   room organization consistent   nonskid shoes/slippers when out of bed   activity supervised   assistive device/personal items within reach   clutter free  environment maintained   fall prevention program maintained     Problem: Adult Inpatient Plan of Care  Goal: Absence of Hospital-Acquired Illness or Injury  Intervention: Identify and Manage Fall Risk  Recent Flowsheet Documentation  Taken 7/15/2021 0200 by Keila Fisher RN  Safety Promotion/Fall Prevention:   safety round/check completed   room organization consistent   nonskid shoes/slippers when out of bed   activity supervised   assistive device/personal items within reach   clutter free environment maintained   fall prevention program maintained  Taken 7/15/2021 0000 by Keila Fisher RN  Safety Promotion/Fall Prevention:   safety round/check completed   room organization consistent   nonskid shoes/slippers when out of bed   activity supervised   assistive device/personal items within reach   clutter free environment maintained   fall prevention program maintained  Taken 7/14/2021 2200 by Keila Fisher RN  Safety Promotion/Fall Prevention:   safety round/check completed   room organization consistent   activity supervised   assistive device/personal items within reach   clutter free environment maintained   fall prevention program maintained   nonskid shoes/slippers when out of bed  Taken 7/14/2021 2000 by Keila Fisher RN  Safety Promotion/Fall Prevention:   safety round/check completed   room organization consistent   nonskid shoes/slippers when out of bed   activity supervised   assistive device/personal items within reach   clutter free environment maintained   fall prevention program maintained  Intervention: Prevent Skin Injury  Recent Flowsheet Documentation  Taken 7/15/2021 0200 by Keila Fisher RN  Body Position: supine  Skin Protection:   tubing/devices free from skin contact   transparent dressing maintained   skin-to-skin areas padded   skin-to-device areas padded   incontinence pads utilized  Taken 7/15/2021 0000 by Keila Fisher RN  Body Position: supine  Skin Protection:   tubing/devices free from  skin contact   transparent dressing maintained   skin-to-skin areas padded   skin-to-device areas padded   incontinence pads utilized  Taken 7/14/2021 2200 by Keila Fisher RN  Body Position: supine  Skin Protection:   tubing/devices free from skin contact   transparent dressing maintained   skin-to-skin areas padded   skin-to-device areas padded   incontinence pads utilized  Taken 7/14/2021 2000 by Keila Fisher RN  Body Position: supine  Skin Protection:   tubing/devices free from skin contact   transparent dressing maintained   skin-to-skin areas padded   skin-to-device areas padded   incontinence pads utilized  Intervention: Prevent and Manage VTE (venous thromboembolism) Risk  Recent Flowsheet Documentation  Taken 7/14/2021 2000 by Keila Fisher RN  VTE Prevention/Management: bleeding risk factor(s) identified  Goal: Optimal Comfort and Wellbeing  Intervention: Provide Person-Centered Care  Recent Flowsheet Documentation  Taken 7/14/2021 2000 by Keila Fisher RN  Trust Relationship/Rapport:   care explained   choices provided   emotional support provided   empathic listening provided   questions answered   questions encouraged     Problem: Skin Injury Risk Increased  Goal: Skin Health and Integrity  Intervention: Optimize Skin Protection  Recent Flowsheet Documentation  Taken 7/15/2021 0200 by Keila Fisher RN  Pressure Reduction Techniques:   frequent weight shift encouraged   pressure points protected   weight shift assistance provided  Head of Bed (HOB): [facility] elevated  Pressure Reduction Devices: pressure-redistributing mattress utilized  Skin Protection:   tubing/devices free from skin contact   transparent dressing maintained   skin-to-skin areas padded   skin-to-device areas padded   incontinence pads utilized  Taken 7/15/2021 0000 by Keila Fisher RN  Pressure Reduction Techniques:   weight shift assistance provided   pressure points protected   frequent weight shift encouraged  Head of Bed (HOB): HOB  elevated  Pressure Reduction Devices: pressure-redistributing mattress utilized  Skin Protection:   tubing/devices free from skin contact   transparent dressing maintained   skin-to-skin areas padded   skin-to-device areas padded   incontinence pads utilized  Taken 7/14/2021 2200 by Keila Fisher RN  Pressure Reduction Techniques:   weight shift assistance provided   pressure points protected   frequent weight shift encouraged  Head of Bed ([facility]): [facility] elevated  Pressure Reduction Devices: pressure-redistributing mattress utilized  Skin Protection:   tubing/devices free from skin contact   transparent dressing maintained   skin-to-skin areas padded   skin-to-device areas padded   incontinence pads utilized  Taken 7/14/2021 2000 by Keila Fisher RN  Pressure Reduction Techniques:   frequent weight shift encouraged   weight shift assistance provided   pressure points protected  Head of Bed ([facility]): [facility] elevated  Pressure Reduction Devices: pressure-redistributing mattress utilized  Skin Protection:   tubing/devices free from skin contact   transparent dressing maintained   skin-to-skin areas padded   skin-to-device areas padded   incontinence pads utilized   Goal Outcome Evaluation:

## 2021-07-15 NOTE — CASE MANAGEMENT/SOCIAL WORK
Continued Stay Note  [facility]     Patient Name: Vickie Joshi  MRN: [id number]  Today's Date: 7/15/2021    Admit Date: 7/12/2021    Discharge Plan     Row Name 07/15/21 2155       Plan    Plan  Home with Family    Patient/Family in Agreement with Plan  yes    Plan Comments  I have met with Mrs. Joshi and her , Barak at the bedside and discussed PT/OT recommendations at length including her progress with therapy most recently requiring max assist X 2 to sit up in bed.  Barak verbalizes understanding and states that he has been caring for his dependent wife for five years and he is prepared for the task.  She uses multiple DME and states they have a wheelchair accessible van with a ramp for transport home today.  Mrs. Joshi states she has already arranged outpatient PT with [facility] and will contact them post discharge.  Mrs. Joshi and her  deny having any other discharge needs at this time.    Final Discharge Disposition Code  01 - home or self-care        Discharge Codes    No documentation.       Expected Discharge Date and Time     Expected Discharge Date Expected Discharge Time    Jul 15, 2021             Barb West RN

## 2021-07-16 ENCOUNTER — READMISSION MANAGEMENT (OUTPATIENT)
Dept: CALL CENTER | Facility: HOSPITAL | Age: 71
End: 2021-07-16

## 2021-07-16 NOTE — OUTREACH NOTE
Prep Survey      Responses   [facility] facility patient discharged from?  [facility]   Is LACE score < 7 ?  No   Emergency Room discharge w/ pulse ox?  No   Eligibility  Readm Mgmt   Discharge diagnosis  acute lower GI bleeding   Does the patient have one of the following disease processes/diagnoses(primary or secondary)?  Other   Does the patient have Home health ordered?  No   Is there a DME ordered?  No   Comments regarding appointments  listed   Prep survey completed?  Yes          Angela Crowley RN

## 2021-07-18 LAB
BACTERIA SPEC AEROBE CULT: NORMAL
BACTERIA SPEC AEROBE CULT: NORMAL

## 2021-07-19 ENCOUNTER — HOSPITAL ENCOUNTER (INPATIENT)
Facility: HOSPITAL | Age: 71
LOS: 9 days | Discharge: HOME HEALTH | End: 2021-07-28
Attending: EMERGENCY MEDICINE | Admitting: HOSPITALIST

## 2021-07-19 ENCOUNTER — APPOINTMENT (OUTPATIENT)
Dept: GENERAL RADIOLOGY | Facility: HOSPITAL | Age: 71
End: 2021-07-19

## 2021-07-19 DIAGNOSIS — K92.2 GASTROINTESTINAL BLEEDING, LOWER: Primary | ICD-10-CM

## 2021-07-19 DIAGNOSIS — D64.9 ANEMIA, UNSPECIFIED TYPE: ICD-10-CM

## 2021-07-19 LAB
ALBUMIN SERPL-MCNC: 3.5 G/DL (ref 3.5–5.2)
ALBUMIN/GLOB SERPL: 1.5 G/DL
ALP SERPL-CCNC: 71 U/L (ref 39–117)
ALT SERPL W P-5'-P-CCNC: 11 U/L (ref 1–33)
ANION GAP SERPL CALCULATED.3IONS-SCNC: 8 MMOL/L (ref 5–15)
AST SERPL-CCNC: 15 U/L (ref 1–32)
BASOPHILS # BLD AUTO: 0.03 10*3/MM3 (ref 0–0.2)
BASOPHILS NFR BLD AUTO: 0.4 % (ref 0–1.5)
BILIRUB SERPL-MCNC: 0.3 MG/DL (ref 0–1.2)
BUN SERPL-MCNC: 7 MG/DL (ref 8–23)
BUN/CREAT SERPL: 9.2 (ref 7–25)
CALCIUM SPEC-SCNC: 8.4 MG/DL (ref 8.6–10.5)
CHLORIDE SERPL-SCNC: 114 MMOL/L (ref 98–107)
CO2 SERPL-SCNC: 22 MMOL/L (ref 22–29)
CREAT SERPL-MCNC: 0.76 MG/DL (ref 0.57–1)
D-LACTATE SERPL-SCNC: 2.4 MMOL/L (ref 0.5–2)
DEPRECATED RDW RBC AUTO: 69 FL (ref 37–54)
EOSINOPHIL # BLD AUTO: 0.15 10*3/MM3 (ref 0–0.4)
EOSINOPHIL NFR BLD AUTO: 2 % (ref 0.3–6.2)
ERYTHROCYTE [DISTWIDTH] IN BLOOD BY AUTOMATED COUNT: 19.8 % (ref 12.3–15.4)
GFR SERPL CREATININE-BSD FRML MDRD: 75 ML/MIN/1.73
GLOBULIN UR ELPH-MCNC: 2.4 GM/DL
GLUCOSE SERPL-MCNC: 113 MG/DL (ref 65–99)
HCT VFR BLD AUTO: 25.1 % (ref 34–46.6)
HGB BLD-MCNC: 7.2 G/DL (ref 12–15.9)
IMM GRANULOCYTES # BLD AUTO: 0.03 10*3/MM3 (ref 0–0.05)
IMM GRANULOCYTES NFR BLD AUTO: 0.4 % (ref 0–0.5)
INR PPP: 1.7 (ref 0.85–1.16)
LYMPHOCYTES # BLD AUTO: 2.11 10*3/MM3 (ref 0.7–3.1)
LYMPHOCYTES NFR BLD AUTO: 27.8 % (ref 19.6–45.3)
MAGNESIUM SERPL-MCNC: 2 MG/DL (ref 1.6–2.4)
MCH RBC QN AUTO: 28.7 PG (ref 26.6–33)
MCHC RBC AUTO-ENTMCNC: 28.7 G/DL (ref 31.5–35.7)
MCV RBC AUTO: 100 FL (ref 79–97)
MONOCYTES # BLD AUTO: 0.68 10*3/MM3 (ref 0.1–0.9)
MONOCYTES NFR BLD AUTO: 8.9 % (ref 5–12)
NEUTROPHILS NFR BLD AUTO: 4.6 10*3/MM3 (ref 1.7–7)
NEUTROPHILS NFR BLD AUTO: 60.5 % (ref 42.7–76)
NRBC BLD AUTO-RTO: 0.4 /100 WBC (ref 0–0.2)
PLATELET # BLD AUTO: 256 10*3/MM3 (ref 140–450)
PMV BLD AUTO: 12 FL (ref 6–12)
POTASSIUM SERPL-SCNC: 3.8 MMOL/L (ref 3.5–5.2)
PROT SERPL-MCNC: 5.9 G/DL (ref 6–8.5)
PROTHROMBIN TIME: 19.3 SECONDS (ref 11.4–14.4)
RBC # BLD AUTO: 2.51 10*6/MM3 (ref 3.77–5.28)
SODIUM SERPL-SCNC: 144 MMOL/L (ref 136–145)
T4 FREE SERPL-MCNC: 1.6 NG/DL (ref 0.93–1.7)
TSH SERPL DL<=0.05 MIU/L-ACNC: 0.09 UIU/ML (ref 0.27–4.2)
WBC # BLD AUTO: 7.6 10*3/MM3 (ref 3.4–10.8)

## 2021-07-19 PROCEDURE — 83735 ASSAY OF MAGNESIUM: CPT | Performed by: EMERGENCY MEDICINE

## 2021-07-19 PROCEDURE — 06BY0ZC EXCISION OF HEMORRHOIDAL PLEXUS, OPEN APPROACH: ICD-10-PCS | Performed by: COLON & RECTAL SURGERY

## 2021-07-19 PROCEDURE — 80053 COMPREHEN METABOLIC PANEL: CPT | Performed by: EMERGENCY MEDICINE

## 2021-07-19 PROCEDURE — 71045 X-RAY EXAM CHEST 1 VIEW: CPT

## 2021-07-19 PROCEDURE — 93005 ELECTROCARDIOGRAM TRACING: CPT | Performed by: EMERGENCY MEDICINE

## 2021-07-19 PROCEDURE — 83605 ASSAY OF LACTIC ACID: CPT | Performed by: EMERGENCY MEDICINE

## 2021-07-19 PROCEDURE — 84439 ASSAY OF FREE THYROXINE: CPT | Performed by: EMERGENCY MEDICINE

## 2021-07-19 PROCEDURE — 84443 ASSAY THYROID STIM HORMONE: CPT | Performed by: EMERGENCY MEDICINE

## 2021-07-19 PROCEDURE — 99285 EMERGENCY DEPT VISIT HI MDM: CPT

## 2021-07-19 PROCEDURE — 85610 PROTHROMBIN TIME: CPT | Performed by: EMERGENCY MEDICINE

## 2021-07-19 PROCEDURE — 85025 COMPLETE CBC W/AUTO DIFF WBC: CPT | Performed by: EMERGENCY MEDICINE

## 2021-07-19 RX ORDER — SODIUM CHLORIDE 0.9 % (FLUSH) 0.9 %
10 SYRINGE (ML) INJECTION AS NEEDED
Status: DISCONTINUED | OUTPATIENT
Start: 2021-07-19 | End: 2021-07-24

## 2021-07-20 ENCOUNTER — READMISSION MANAGEMENT (OUTPATIENT)
Dept: CALL CENTER | Facility: HOSPITAL | Age: 71
End: 2021-07-20

## 2021-07-20 LAB
ABO GROUP BLD: NORMAL
ANION GAP SERPL CALCULATED.3IONS-SCNC: 10 MMOL/L (ref 5–15)
ANISOCYTOSIS BLD QL: NORMAL
APTT PPP: 27 SECONDS (ref 50–95)
BASOPHILS # BLD AUTO: 0.02 10*3/MM3 (ref 0–0.2)
BASOPHILS NFR BLD AUTO: 0.3 % (ref 0–1.5)
BLD GP AB SCN SERPL QL: NEGATIVE
BUN SERPL-MCNC: 5 MG/DL (ref 8–23)
BUN/CREAT SERPL: 7.5 (ref 7–25)
CALCIUM SPEC-SCNC: 8.3 MG/DL (ref 8.6–10.5)
CHLORIDE SERPL-SCNC: 116 MMOL/L (ref 98–107)
CO2 SERPL-SCNC: 18 MMOL/L (ref 22–29)
CREAT SERPL-MCNC: 0.67 MG/DL (ref 0.57–1)
D-LACTATE SERPL-SCNC: 2.1 MMOL/L (ref 0.5–2)
D-LACTATE SERPL-SCNC: 2.4 MMOL/L (ref 0.5–2)
DEPRECATED RDW RBC AUTO: 82.9 FL (ref 37–54)
EOSINOPHIL # BLD AUTO: 0.15 10*3/MM3 (ref 0–0.4)
EOSINOPHIL NFR BLD AUTO: 2.6 % (ref 0.3–6.2)
ERYTHROCYTE [DISTWIDTH] IN BLOOD BY AUTOMATED COUNT: 21.2 % (ref 12.3–15.4)
GFR SERPL CREATININE-BSD FRML MDRD: 87 ML/MIN/1.73
GLUCOSE SERPL-MCNC: 100 MG/DL (ref 65–99)
HCT VFR BLD AUTO: 28.5 % (ref 34–46.6)
HCT VFR BLD AUTO: 30 % (ref 34–46.6)
HCT VFR BLD AUTO: 30 % (ref 34–46.6)
HGB BLD-MCNC: 7.9 G/DL (ref 12–15.9)
HGB BLD-MCNC: 7.9 G/DL (ref 12–15.9)
HGB BLD-MCNC: 8.2 G/DL (ref 12–15.9)
HYPOCHROMIA BLD QL: NORMAL
IMM GRANULOCYTES # BLD AUTO: 0.02 10*3/MM3 (ref 0–0.05)
IMM GRANULOCYTES NFR BLD AUTO: 0.3 % (ref 0–0.5)
INR PPP: 1.36 (ref 0.85–1.16)
LYMPHOCYTES # BLD AUTO: 2.18 10*3/MM3 (ref 0.7–3.1)
LYMPHOCYTES NFR BLD AUTO: 37.1 % (ref 19.6–45.3)
MACROCYTES BLD QL SMEAR: NORMAL
MCH RBC QN AUTO: 28.4 PG (ref 26.6–33)
MCHC RBC AUTO-ENTMCNC: 26.3 G/DL (ref 31.5–35.7)
MCV RBC AUTO: 107.9 FL (ref 79–97)
MONOCYTES # BLD AUTO: 0.48 10*3/MM3 (ref 0.1–0.9)
MONOCYTES NFR BLD AUTO: 8.2 % (ref 5–12)
NEUTROPHILS NFR BLD AUTO: 3.02 10*3/MM3 (ref 1.7–7)
NEUTROPHILS NFR BLD AUTO: 51.5 % (ref 42.7–76)
NRBC BLD AUTO-RTO: 0 /100 WBC (ref 0–0.2)
PLAT MORPH BLD: NORMAL
PLATELET # BLD AUTO: 186 10*3/MM3 (ref 140–450)
PMV BLD AUTO: 12 FL (ref 6–12)
POTASSIUM SERPL-SCNC: 4 MMOL/L (ref 3.5–5.2)
PROTHROMBIN TIME: 16.3 SECONDS (ref 11.4–14.4)
RBC # BLD AUTO: 2.78 10*6/MM3 (ref 3.77–5.28)
RH BLD: NEGATIVE
SARS-COV-2 RNA RESP QL NAA+PROBE: NOT DETECTED
SODIUM SERPL-SCNC: 144 MMOL/L (ref 136–145)
T&S EXPIRATION DATE: NORMAL
UFH PPP CHRO-ACNC: >1.1 IU/ML (ref 0.3–0.7)
WBC # BLD AUTO: 5.87 10*3/MM3 (ref 3.4–10.8)
WBC MORPH BLD: NORMAL

## 2021-07-20 PROCEDURE — 86900 BLOOD TYPING SEROLOGIC ABO: CPT | Performed by: EMERGENCY MEDICINE

## 2021-07-20 PROCEDURE — 85025 COMPLETE CBC W/AUTO DIFF WBC: CPT

## 2021-07-20 PROCEDURE — 85520 HEPARIN ASSAY: CPT

## 2021-07-20 PROCEDURE — 94640 AIRWAY INHALATION TREATMENT: CPT

## 2021-07-20 PROCEDURE — 86850 RBC ANTIBODY SCREEN: CPT | Performed by: EMERGENCY MEDICINE

## 2021-07-20 PROCEDURE — U0003 INFECTIOUS AGENT DETECTION BY NUCLEIC ACID (DNA OR RNA); SEVERE ACUTE RESPIRATORY SYNDROME CORONAVIRUS 2 (SARS-COV-2) (CORONAVIRUS DISEASE [COVID-19]), AMPLIFIED PROBE TECHNIQUE, MAKING USE OF HIGH THROUGHPUT TECHNOLOGIES AS DESCRIBED BY CMS-2020-01-R: HCPCS | Performed by: INTERNAL MEDICINE

## 2021-07-20 PROCEDURE — 83605 ASSAY OF LACTIC ACID: CPT | Performed by: EMERGENCY MEDICINE

## 2021-07-20 PROCEDURE — 99223 1ST HOSP IP/OBS HIGH 75: CPT | Performed by: INTERNAL MEDICINE

## 2021-07-20 PROCEDURE — 86900 BLOOD TYPING SEROLOGIC ABO: CPT

## 2021-07-20 PROCEDURE — 83605 ASSAY OF LACTIC ACID: CPT | Performed by: INTERNAL MEDICINE

## 2021-07-20 PROCEDURE — 36430 TRANSFUSION BLD/BLD COMPNT: CPT

## 2021-07-20 PROCEDURE — 85018 HEMOGLOBIN: CPT | Performed by: INTERNAL MEDICINE

## 2021-07-20 PROCEDURE — 99222 1ST HOSP IP/OBS MODERATE 55: CPT | Performed by: INTERNAL MEDICINE

## 2021-07-20 PROCEDURE — 85610 PROTHROMBIN TIME: CPT

## 2021-07-20 PROCEDURE — P9016 RBC LEUKOCYTES REDUCED: HCPCS

## 2021-07-20 PROCEDURE — 80048 BASIC METABOLIC PNL TOTAL CA: CPT | Performed by: INTERNAL MEDICINE

## 2021-07-20 PROCEDURE — 86923 COMPATIBILITY TEST ELECTRIC: CPT

## 2021-07-20 PROCEDURE — 86901 BLOOD TYPING SEROLOGIC RH(D): CPT | Performed by: EMERGENCY MEDICINE

## 2021-07-20 PROCEDURE — 94799 UNLISTED PULMONARY SVC/PX: CPT

## 2021-07-20 PROCEDURE — 85730 THROMBOPLASTIN TIME PARTIAL: CPT

## 2021-07-20 PROCEDURE — 85014 HEMATOCRIT: CPT | Performed by: INTERNAL MEDICINE

## 2021-07-20 PROCEDURE — 85007 BL SMEAR W/DIFF WBC COUNT: CPT

## 2021-07-20 RX ORDER — PREGABALIN 100 MG/1
300 CAPSULE ORAL EVERY 12 HOURS SCHEDULED
Status: DISCONTINUED | OUTPATIENT
Start: 2021-07-20 | End: 2021-07-28 | Stop reason: HOSPADM

## 2021-07-20 RX ORDER — HEPARIN SODIUM 10000 [USP'U]/100ML
12 INJECTION, SOLUTION INTRAVENOUS
Status: DISCONTINUED | OUTPATIENT
Start: 2021-07-20 | End: 2021-07-20

## 2021-07-20 RX ORDER — SODIUM CHLORIDE 0.9 % (FLUSH) 0.9 %
10 SYRINGE (ML) INJECTION EVERY 12 HOURS SCHEDULED
Status: DISCONTINUED | OUTPATIENT
Start: 2021-07-20 | End: 2021-07-24

## 2021-07-20 RX ORDER — ONDANSETRON 4 MG/1
4 TABLET, FILM COATED ORAL EVERY 6 HOURS PRN
Status: DISCONTINUED | OUTPATIENT
Start: 2021-07-20 | End: 2021-07-26

## 2021-07-20 RX ORDER — CETIRIZINE HYDROCHLORIDE 10 MG/1
5 TABLET ORAL DAILY
Status: DISCONTINUED | OUTPATIENT
Start: 2021-07-20 | End: 2021-07-28 | Stop reason: HOSPADM

## 2021-07-20 RX ORDER — TOPIRAMATE 100 MG/1
100 TABLET, FILM COATED ORAL EVERY 12 HOURS SCHEDULED
Status: DISCONTINUED | OUTPATIENT
Start: 2021-07-20 | End: 2021-07-28 | Stop reason: HOSPADM

## 2021-07-20 RX ORDER — HEPARIN SODIUM 1000 [USP'U]/ML
60 INJECTION, SOLUTION INTRAVENOUS; SUBCUTANEOUS AS NEEDED
Status: DISCONTINUED | OUTPATIENT
Start: 2021-07-20 | End: 2021-07-20 | Stop reason: SDUPTHER

## 2021-07-20 RX ORDER — SODIUM CHLORIDE 0.9 % (FLUSH) 0.9 %
10 SYRINGE (ML) INJECTION AS NEEDED
Status: DISCONTINUED | OUTPATIENT
Start: 2021-07-20 | End: 2021-07-23

## 2021-07-20 RX ORDER — ACETAMINOPHEN 325 MG/1
650 TABLET ORAL EVERY 4 HOURS PRN
Status: DISCONTINUED | OUTPATIENT
Start: 2021-07-20 | End: 2021-07-28 | Stop reason: HOSPADM

## 2021-07-20 RX ORDER — PETROLATUM,WHITE
OINTMENT IN PACKET (GRAM) TOPICAL DAILY
Status: DISCONTINUED | OUTPATIENT
Start: 2021-07-20 | End: 2021-07-23

## 2021-07-20 RX ORDER — ATORVASTATIN CALCIUM 10 MG/1
10 TABLET, FILM COATED ORAL NIGHTLY
Status: DISCONTINUED | OUTPATIENT
Start: 2021-07-20 | End: 2021-07-28 | Stop reason: HOSPADM

## 2021-07-20 RX ORDER — ALBUTEROL SULFATE 1.25 MG/3ML
1 SOLUTION RESPIRATORY (INHALATION) EVERY 6 HOURS PRN
Status: DISCONTINUED | OUTPATIENT
Start: 2021-07-20 | End: 2021-07-26

## 2021-07-20 RX ORDER — DOCUSATE SODIUM 100 MG/1
100 CAPSULE, LIQUID FILLED ORAL 2 TIMES DAILY
Status: DISCONTINUED | OUTPATIENT
Start: 2021-07-20 | End: 2021-07-28 | Stop reason: HOSPADM

## 2021-07-20 RX ORDER — ONDANSETRON 2 MG/ML
4 INJECTION INTRAMUSCULAR; INTRAVENOUS EVERY 6 HOURS PRN
Status: DISCONTINUED | OUTPATIENT
Start: 2021-07-20 | End: 2021-07-28 | Stop reason: HOSPADM

## 2021-07-20 RX ORDER — LEVOTHYROXINE SODIUM 137 UG/1
137 TABLET ORAL DAILY
Status: DISCONTINUED | OUTPATIENT
Start: 2021-07-20 | End: 2021-07-20

## 2021-07-20 RX ORDER — BUDESONIDE AND FORMOTEROL FUMARATE DIHYDRATE 80; 4.5 UG/1; UG/1
2 AEROSOL RESPIRATORY (INHALATION)
Status: DISCONTINUED | OUTPATIENT
Start: 2021-07-20 | End: 2021-07-28 | Stop reason: HOSPADM

## 2021-07-20 RX ORDER — HEPARIN SODIUM 1000 [USP'U]/ML
30 INJECTION, SOLUTION INTRAVENOUS; SUBCUTANEOUS AS NEEDED
Status: DISCONTINUED | OUTPATIENT
Start: 2021-07-20 | End: 2021-07-20

## 2021-07-20 RX ORDER — HEPARIN SODIUM 1000 [USP'U]/ML
60 INJECTION, SOLUTION INTRAVENOUS; SUBCUTANEOUS ONCE
Status: DISCONTINUED | OUTPATIENT
Start: 2021-07-20 | End: 2021-07-20

## 2021-07-20 RX ORDER — PANTOPRAZOLE SODIUM 40 MG/1
40 TABLET, DELAYED RELEASE ORAL EVERY MORNING
Status: DISCONTINUED | OUTPATIENT
Start: 2021-07-20 | End: 2021-07-28 | Stop reason: HOSPADM

## 2021-07-20 RX ORDER — LEVOTHYROXINE SODIUM 0.12 MG/1
125 TABLET ORAL DAILY
Status: DISCONTINUED | OUTPATIENT
Start: 2021-07-20 | End: 2021-07-28 | Stop reason: HOSPADM

## 2021-07-20 RX ADMIN — SODIUM CHLORIDE 500 ML: 9 INJECTION, SOLUTION INTRAVENOUS at 05:32

## 2021-07-20 RX ADMIN — WHITE PETROLATUM: 1 OINTMENT TOPICAL at 09:22

## 2021-07-20 RX ADMIN — DOCUSATE SODIUM 100 MG: 100 CAPSULE, LIQUID FILLED ORAL at 20:55

## 2021-07-20 RX ADMIN — SODIUM CHLORIDE, POTASSIUM CHLORIDE, SODIUM LACTATE AND CALCIUM CHLORIDE 1000 ML: 600; 310; 30; 20 INJECTION, SOLUTION INTRAVENOUS at 01:20

## 2021-07-20 RX ADMIN — BUDESONIDE AND FORMOTEROL FUMARATE DIHYDRATE 2 PUFF: 80; 4.5 AEROSOL RESPIRATORY (INHALATION) at 20:18

## 2021-07-20 RX ADMIN — BUDESONIDE AND FORMOTEROL FUMARATE DIHYDRATE 2 PUFF: 80; 4.5 AEROSOL RESPIRATORY (INHALATION) at 07:36

## 2021-07-20 RX ADMIN — PREGABALIN 300 MG: 100 CAPSULE ORAL at 20:55

## 2021-07-20 RX ADMIN — DOCUSATE SODIUM 100 MG: 100 CAPSULE, LIQUID FILLED ORAL at 08:14

## 2021-07-20 RX ADMIN — TOPIRAMATE 100 MG: 100 TABLET, FILM COATED ORAL at 08:13

## 2021-07-20 RX ADMIN — ATORVASTATIN CALCIUM 10 MG: 10 TABLET, FILM COATED ORAL at 20:55

## 2021-07-20 RX ADMIN — PREGABALIN 300 MG: 100 CAPSULE ORAL at 08:13

## 2021-07-20 RX ADMIN — CETIRIZINE HYDROCHLORIDE 5 MG: 10 TABLET, FILM COATED ORAL at 08:14

## 2021-07-20 RX ADMIN — TOPIRAMATE 100 MG: 100 TABLET, FILM COATED ORAL at 20:54

## 2021-07-20 RX ADMIN — PANTOPRAZOLE SODIUM 40 MG: 40 TABLET, DELAYED RELEASE ORAL at 08:16

## 2021-07-20 NOTE — OUTREACH NOTE
Medical Week 1 Survey      Responses   [facility] patient discharged from?  [facility]   Does the patient have one of the following disease processes/diagnoses(primary or secondary)?  Other   Week 1 attempt successful?  No   Revoke  Readmitted          Verónica Villar RN

## 2021-07-21 ENCOUNTER — APPOINTMENT (OUTPATIENT)
Dept: CARDIOLOGY | Facility: HOSPITAL | Age: 71
End: 2021-07-21

## 2021-07-21 LAB
BACTERIA UR QL AUTO: ABNORMAL /HPF
BH CV REST NUCLEAR ISOTOPE DOSE: 29.4 MCI
BH CV STRESS BP STAGE 2: NORMAL
BH CV STRESS BP STAGE 4: NORMAL
BH CV STRESS COMMENTS STAGE 1: NORMAL
BH CV STRESS DOSE REGADENOSON STAGE 1: 0.4
BH CV STRESS DURATION MIN STAGE 1: 1
BH CV STRESS DURATION MIN STAGE 2: 1
BH CV STRESS DURATION MIN STAGE 3: 1
BH CV STRESS DURATION MIN STAGE 4: 1
BH CV STRESS DURATION SEC STAGE 2: 0
BH CV STRESS HR STAGE 1: 137
BH CV STRESS HR STAGE 2: 141
BH CV STRESS HR STAGE 3: 137
BH CV STRESS HR STAGE 4: 130
BH CV STRESS NUCLEAR ISOTOPE DOSE: 29.9 MCI
BH CV STRESS O2 STAGE 1: 98
BH CV STRESS O2 STAGE 2: 100
BH CV STRESS O2 STAGE 3: 100
BH CV STRESS O2 STAGE 4: 100
BH CV STRESS PROTOCOL 1: NORMAL
BH CV STRESS RECOVERY BP: NORMAL MMHG
BH CV STRESS RECOVERY HR: 131 BPM
BH CV STRESS RECOVERY O2: 100 %
BH CV STRESS STAGE 1: 1
BH CV STRESS STAGE 2: 2
BH CV STRESS STAGE 3: 3
BH CV STRESS STAGE 4: 4
BILIRUB UR QL STRIP: NEGATIVE
CLARITY UR: CLEAR
COLOR UR: YELLOW
GLUCOSE UR STRIP-MCNC: NEGATIVE MG/DL
HCT VFR BLD AUTO: 25.4 % (ref 34–46.6)
HCT VFR BLD AUTO: 29 % (ref 34–46.6)
HCT VFR BLD AUTO: 29.7 % (ref 34–46.6)
HGB BLD-MCNC: 7.4 G/DL (ref 12–15.9)
HGB BLD-MCNC: 8 G/DL (ref 12–15.9)
HGB BLD-MCNC: 8.9 G/DL (ref 12–15.9)
HGB UR QL STRIP.AUTO: NEGATIVE
HYALINE CASTS UR QL AUTO: ABNORMAL /LPF
KETONES UR QL STRIP: NEGATIVE
LEUKOCYTE ESTERASE UR QL STRIP.AUTO: ABNORMAL
MAXIMAL PREDICTED HEART RATE: 151 BPM
NITRITE UR QL STRIP: NEGATIVE
PERCENT MAX PREDICTED HR: 102.65 %
PH UR STRIP.AUTO: 8 [PH] (ref 5–8)
PROT UR QL STRIP: NEGATIVE
QT INTERVAL: 396 MS
QTC INTERVAL: 454 MS
RBC # UR: ABNORMAL /HPF
REF LAB TEST METHOD: ABNORMAL
SP GR UR STRIP: 1.01 (ref 1–1.03)
SQUAMOUS #/AREA URNS HPF: ABNORMAL /HPF
STRESS BASELINE BP: NORMAL MMHG
STRESS BASELINE HR: 110 BPM
STRESS O2 SAT REST: 94 %
STRESS PERCENT HR: 121 %
STRESS POST ESTIMATED WORKLOAD: 1 METS
STRESS POST EXERCISE DUR MIN: 4 MIN
STRESS POST EXERCISE DUR SEC: 0 SEC
STRESS POST O2 SAT PEAK: 100 %
STRESS POST PEAK BP: NORMAL MMHG
STRESS POST PEAK HR: 155 BPM
STRESS TARGET HR: 128 BPM
UFH PPP CHRO-ACNC: 0.32 IU/ML (ref 0.3–0.7)
UFH PPP CHRO-ACNC: 0.51 IU/ML (ref 0.3–0.7)
UFH PPP CHRO-ACNC: >1.1 IU/ML (ref 0.3–0.7)
UROBILINOGEN UR QL STRIP: ABNORMAL
WBC UR QL AUTO: ABNORMAL /HPF

## 2021-07-21 PROCEDURE — 25010000002 HEPARIN (PORCINE) 25000-0.45 UT/250ML-% SOLUTION: Performed by: INTERNAL MEDICINE

## 2021-07-21 PROCEDURE — 97162 PT EVAL MOD COMPLEX 30 MIN: CPT | Performed by: PHYSICAL THERAPIST

## 2021-07-21 PROCEDURE — 25010000002 REGADENOSON 0.4 MG/5ML SOLUTION: Performed by: INTERNAL MEDICINE

## 2021-07-21 PROCEDURE — 0 RUBIDIUM CHLORIDE: Performed by: INTERNAL MEDICINE

## 2021-07-21 PROCEDURE — 85014 HEMATOCRIT: CPT | Performed by: INTERNAL MEDICINE

## 2021-07-21 PROCEDURE — 93017 CV STRESS TEST TRACING ONLY: CPT

## 2021-07-21 PROCEDURE — 99233 SBSQ HOSP IP/OBS HIGH 50: CPT | Performed by: NURSE PRACTITIONER

## 2021-07-21 PROCEDURE — A9555 RB82 RUBIDIUM: HCPCS | Performed by: INTERNAL MEDICINE

## 2021-07-21 PROCEDURE — 78492 MYOCRD IMG PET MLT RST&STRS: CPT | Performed by: INTERNAL MEDICINE

## 2021-07-21 PROCEDURE — 85520 HEPARIN ASSAY: CPT | Performed by: INTERNAL MEDICINE

## 2021-07-21 PROCEDURE — 94799 UNLISTED PULMONARY SVC/PX: CPT

## 2021-07-21 PROCEDURE — 85018 HEMOGLOBIN: CPT | Performed by: INTERNAL MEDICINE

## 2021-07-21 PROCEDURE — P9016 RBC LEUKOCYTES REDUCED: HCPCS

## 2021-07-21 PROCEDURE — 85520 HEPARIN ASSAY: CPT

## 2021-07-21 PROCEDURE — 78492 MYOCRD IMG PET MLT RST&STRS: CPT

## 2021-07-21 PROCEDURE — 86900 BLOOD TYPING SEROLOGIC ABO: CPT

## 2021-07-21 PROCEDURE — 81001 URINALYSIS AUTO W/SCOPE: CPT | Performed by: EMERGENCY MEDICINE

## 2021-07-21 PROCEDURE — 36430 TRANSFUSION BLD/BLD COMPNT: CPT

## 2021-07-21 RX ORDER — NYSTATIN 100000 [USP'U]/G
POWDER TOPICAL EVERY 12 HOURS SCHEDULED
Status: DISCONTINUED | OUTPATIENT
Start: 2021-07-21 | End: 2021-07-28 | Stop reason: HOSPADM

## 2021-07-21 RX ORDER — HEPARIN SODIUM 10000 [USP'U]/100ML
15 INJECTION, SOLUTION INTRAVENOUS
Status: DISPENSED | OUTPATIENT
Start: 2021-07-21 | End: 2021-07-23

## 2021-07-21 RX ADMIN — RUBIDIUM CHLORIDE RB-82 1 DOSE: 150 INJECTION, SOLUTION INTRAVENOUS at 10:46

## 2021-07-21 RX ADMIN — LEVOTHYROXINE SODIUM 125 MCG: 125 TABLET ORAL at 05:30

## 2021-07-21 RX ADMIN — PREGABALIN 300 MG: 100 CAPSULE ORAL at 09:43

## 2021-07-21 RX ADMIN — METOPROLOL TARTRATE 25 MG: 25 TABLET, FILM COATED ORAL at 11:54

## 2021-07-21 RX ADMIN — HEPARIN SODIUM 10 UNITS/KG/HR: 10000 INJECTION, SOLUTION INTRAVENOUS at 19:04

## 2021-07-21 RX ADMIN — HEPARIN SODIUM 10 UNITS/KG/HR: 10000 INJECTION, SOLUTION INTRAVENOUS at 19:59

## 2021-07-21 RX ADMIN — TOPIRAMATE 100 MG: 100 TABLET, FILM COATED ORAL at 13:54

## 2021-07-21 RX ADMIN — DOCUSATE SODIUM 100 MG: 100 CAPSULE, LIQUID FILLED ORAL at 09:43

## 2021-07-21 RX ADMIN — PANTOPRAZOLE SODIUM 40 MG: 40 TABLET, DELAYED RELEASE ORAL at 09:43

## 2021-07-21 RX ADMIN — REGADENOSON 0.4 MG: 0.08 INJECTION, SOLUTION INTRAVENOUS at 10:56

## 2021-07-21 RX ADMIN — TOPIRAMATE 100 MG: 100 TABLET, FILM COATED ORAL at 20:53

## 2021-07-21 RX ADMIN — PREGABALIN 300 MG: 100 CAPSULE ORAL at 20:53

## 2021-07-21 RX ADMIN — BUDESONIDE AND FORMOTEROL FUMARATE DIHYDRATE 2 PUFF: 80; 4.5 AEROSOL RESPIRATORY (INHALATION) at 20:28

## 2021-07-21 RX ADMIN — WHITE PETROLATUM: 1 OINTMENT TOPICAL at 09:45

## 2021-07-21 RX ADMIN — CETIRIZINE HYDROCHLORIDE 5 MG: 10 TABLET, FILM COATED ORAL at 09:43

## 2021-07-21 RX ADMIN — DOCUSATE SODIUM 100 MG: 100 CAPSULE, LIQUID FILLED ORAL at 20:53

## 2021-07-21 RX ADMIN — BUDESONIDE AND FORMOTEROL FUMARATE DIHYDRATE 2 PUFF: 80; 4.5 AEROSOL RESPIRATORY (INHALATION) at 07:31

## 2021-07-21 RX ADMIN — RUBIDIUM CHLORIDE RB-82 1 DOSE: 150 INJECTION, SOLUTION INTRAVENOUS at 10:59

## 2021-07-21 RX ADMIN — ATORVASTATIN CALCIUM 10 MG: 10 TABLET, FILM COATED ORAL at 20:53

## 2021-07-22 ENCOUNTER — ANESTHESIA EVENT (OUTPATIENT)
Dept: PERIOP | Facility: HOSPITAL | Age: 71
End: 2021-07-22

## 2021-07-22 LAB
ABO GROUP BLD: NORMAL
BASOPHILS # BLD AUTO: 0.03 10*3/MM3 (ref 0–0.2)
BASOPHILS NFR BLD AUTO: 0.4 % (ref 0–1.5)
BH BB BLOOD EXPIRATION DATE: NORMAL
BH BB BLOOD TYPE BARCODE: 600
BH BB DISPENSE STATUS: NORMAL
BH BB PRODUCT CODE: NORMAL
BH BB UNIT NUMBER: NORMAL
BLD GP AB SCN SERPL QL: NEGATIVE
CROSSMATCH INTERPRETATION: NORMAL
DEPRECATED RDW RBC AUTO: 69.1 FL (ref 37–54)
EOSINOPHIL # BLD AUTO: 0.22 10*3/MM3 (ref 0–0.4)
EOSINOPHIL NFR BLD AUTO: 3.1 % (ref 0.3–6.2)
ERYTHROCYTE [DISTWIDTH] IN BLOOD BY AUTOMATED COUNT: 19.9 % (ref 12.3–15.4)
GLUCOSE BLDC GLUCOMTR-MCNC: 105 MG/DL (ref 70–130)
HCT VFR BLD AUTO: 29.5 % (ref 34–46.6)
HCT VFR BLD AUTO: 29.7 % (ref 34–46.6)
HCT VFR BLD AUTO: 30.4 % (ref 34–46.6)
HCT VFR BLD AUTO: 31.9 % (ref 34–46.6)
HGB BLD-MCNC: 8.7 G/DL (ref 12–15.9)
HGB BLD-MCNC: 8.9 G/DL (ref 12–15.9)
HGB BLD-MCNC: 9 G/DL (ref 12–15.9)
HGB BLD-MCNC: 9.3 G/DL (ref 12–15.9)
IMM GRANULOCYTES # BLD AUTO: 0.02 10*3/MM3 (ref 0–0.05)
IMM GRANULOCYTES NFR BLD AUTO: 0.3 % (ref 0–0.5)
LYMPHOCYTES # BLD AUTO: 2.36 10*3/MM3 (ref 0.7–3.1)
LYMPHOCYTES NFR BLD AUTO: 33.3 % (ref 19.6–45.3)
MCH RBC QN AUTO: 28.5 PG (ref 26.6–33)
MCHC RBC AUTO-ENTMCNC: 29.5 G/DL (ref 31.5–35.7)
MCV RBC AUTO: 96.7 FL (ref 79–97)
MONOCYTES # BLD AUTO: 0.5 10*3/MM3 (ref 0.1–0.9)
MONOCYTES NFR BLD AUTO: 7.1 % (ref 5–12)
NEUTROPHILS NFR BLD AUTO: 3.95 10*3/MM3 (ref 1.7–7)
NEUTROPHILS NFR BLD AUTO: 55.8 % (ref 42.7–76)
NRBC BLD AUTO-RTO: 0 /100 WBC (ref 0–0.2)
PLATELET # BLD AUTO: 183 10*3/MM3 (ref 140–450)
PMV BLD AUTO: 11.6 FL (ref 6–12)
RBC # BLD AUTO: 3.05 10*6/MM3 (ref 3.77–5.28)
RH BLD: NEGATIVE
T&S EXPIRATION DATE: NORMAL
UFH PPP CHRO-ACNC: 0.15 IU/ML (ref 0.3–0.7)
UFH PPP CHRO-ACNC: 0.19 IU/ML (ref 0.3–0.7)
UNIT  ABO: NORMAL
UNIT  RH: NORMAL
WBC # BLD AUTO: 7.08 10*3/MM3 (ref 3.4–10.8)

## 2021-07-22 PROCEDURE — 25010000002 HEPARIN (PORCINE) 25000-0.45 UT/250ML-% SOLUTION

## 2021-07-22 PROCEDURE — 85025 COMPLETE CBC W/AUTO DIFF WBC: CPT | Performed by: COLON & RECTAL SURGERY

## 2021-07-22 PROCEDURE — 82962 GLUCOSE BLOOD TEST: CPT

## 2021-07-22 PROCEDURE — 85014 HEMATOCRIT: CPT | Performed by: INTERNAL MEDICINE

## 2021-07-22 PROCEDURE — 85520 HEPARIN ASSAY: CPT

## 2021-07-22 PROCEDURE — 85018 HEMOGLOBIN: CPT | Performed by: INTERNAL MEDICINE

## 2021-07-22 PROCEDURE — 86901 BLOOD TYPING SEROLOGIC RH(D): CPT | Performed by: COLON & RECTAL SURGERY

## 2021-07-22 PROCEDURE — 86850 RBC ANTIBODY SCREEN: CPT | Performed by: COLON & RECTAL SURGERY

## 2021-07-22 PROCEDURE — 94799 UNLISTED PULMONARY SVC/PX: CPT

## 2021-07-22 PROCEDURE — 86923 COMPATIBILITY TEST ELECTRIC: CPT

## 2021-07-22 PROCEDURE — 99233 SBSQ HOSP IP/OBS HIGH 50: CPT | Performed by: INTERNAL MEDICINE

## 2021-07-22 PROCEDURE — 86900 BLOOD TYPING SEROLOGIC ABO: CPT | Performed by: COLON & RECTAL SURGERY

## 2021-07-22 RX ORDER — LIDOCAINE HYDROCHLORIDE 10 MG/ML
0.5 INJECTION, SOLUTION EPIDURAL; INFILTRATION; INTRACAUDAL; PERINEURAL ONCE AS NEEDED
Status: CANCELLED | OUTPATIENT
Start: 2021-07-22

## 2021-07-22 RX ORDER — DIGOXIN 0.25 MG/ML
125 INJECTION INTRAMUSCULAR; INTRAVENOUS ONCE
Status: DISCONTINUED | OUTPATIENT
Start: 2021-07-22 | End: 2021-07-22

## 2021-07-22 RX ORDER — SODIUM PHOSPHATE,MONO-DIBASIC 19G-7G/118
2 ENEMA (ML) RECTAL ONCE
Status: COMPLETED | OUTPATIENT
Start: 2021-07-23 | End: 2021-07-23

## 2021-07-22 RX ADMIN — NYSTATIN: 100000 POWDER TOPICAL at 20:18

## 2021-07-22 RX ADMIN — TOPIRAMATE 100 MG: 100 TABLET, FILM COATED ORAL at 20:17

## 2021-07-22 RX ADMIN — BUDESONIDE AND FORMOTEROL FUMARATE DIHYDRATE 2 PUFF: 80; 4.5 AEROSOL RESPIRATORY (INHALATION) at 08:14

## 2021-07-22 RX ADMIN — DOCUSATE SODIUM 100 MG: 100 CAPSULE, LIQUID FILLED ORAL at 10:05

## 2021-07-22 RX ADMIN — PREGABALIN 300 MG: 100 CAPSULE ORAL at 10:05

## 2021-07-22 RX ADMIN — CETIRIZINE HYDROCHLORIDE 5 MG: 10 TABLET, FILM COATED ORAL at 10:05

## 2021-07-22 RX ADMIN — METOPROLOL TARTRATE 25 MG: 25 TABLET, FILM COATED ORAL at 10:05

## 2021-07-22 RX ADMIN — HEPARIN SODIUM 15 UNITS/KG/HR: 10000 INJECTION, SOLUTION INTRAVENOUS at 20:15

## 2021-07-22 RX ADMIN — BUDESONIDE AND FORMOTEROL FUMARATE DIHYDRATE 2 PUFF: 80; 4.5 AEROSOL RESPIRATORY (INHALATION) at 20:09

## 2021-07-22 RX ADMIN — WHITE PETROLATUM: 1 OINTMENT TOPICAL at 10:06

## 2021-07-22 RX ADMIN — METOPROLOL TARTRATE 12.5 MG: 25 TABLET, FILM COATED ORAL at 11:42

## 2021-07-22 RX ADMIN — DOCUSATE SODIUM 100 MG: 100 CAPSULE, LIQUID FILLED ORAL at 20:18

## 2021-07-22 RX ADMIN — NYSTATIN: 100000 POWDER TOPICAL at 10:05

## 2021-07-22 RX ADMIN — ATORVASTATIN CALCIUM 10 MG: 10 TABLET, FILM COATED ORAL at 20:17

## 2021-07-22 RX ADMIN — TOPIRAMATE 100 MG: 100 TABLET, FILM COATED ORAL at 10:05

## 2021-07-22 RX ADMIN — LEVOTHYROXINE SODIUM 125 MCG: 125 TABLET ORAL at 05:15

## 2021-07-22 RX ADMIN — HEPARIN SODIUM 15 UNITS/KG/HR: 10000 INJECTION, SOLUTION INTRAVENOUS at 23:48

## 2021-07-22 RX ADMIN — PANTOPRAZOLE SODIUM 40 MG: 40 TABLET, DELAYED RELEASE ORAL at 10:05

## 2021-07-22 RX ADMIN — PREGABALIN 300 MG: 100 CAPSULE ORAL at 20:17

## 2021-07-23 ENCOUNTER — ANESTHESIA (OUTPATIENT)
Dept: PERIOP | Facility: HOSPITAL | Age: 71
End: 2021-07-23

## 2021-07-23 LAB
ANION GAP SERPL CALCULATED.3IONS-SCNC: 10 MMOL/L (ref 5–15)
ANISOCYTOSIS BLD QL: NORMAL
BASOPHILS # BLD AUTO: 0.04 10*3/MM3 (ref 0–0.2)
BASOPHILS NFR BLD AUTO: 0.5 % (ref 0–1.5)
BUN SERPL-MCNC: 7 MG/DL (ref 8–23)
BUN/CREAT SERPL: 10.8 (ref 7–25)
BURR CELLS BLD QL SMEAR: NORMAL
CALCIUM SPEC-SCNC: 8 MG/DL (ref 8.6–10.5)
CHLORIDE SERPL-SCNC: 113 MMOL/L (ref 98–107)
CO2 SERPL-SCNC: 20 MMOL/L (ref 22–29)
CREAT SERPL-MCNC: 0.65 MG/DL (ref 0.57–1)
DEPRECATED RDW RBC AUTO: 66.7 FL (ref 37–54)
EOSINOPHIL # BLD AUTO: 0.28 10*3/MM3 (ref 0–0.4)
EOSINOPHIL NFR BLD AUTO: 3.2 % (ref 0.3–6.2)
ERYTHROCYTE [DISTWIDTH] IN BLOOD BY AUTOMATED COUNT: 19.3 % (ref 12.3–15.4)
GFR SERPL CREATININE-BSD FRML MDRD: 90 ML/MIN/1.73
GLUCOSE SERPL-MCNC: 110 MG/DL (ref 65–99)
HCT VFR BLD AUTO: 30.8 % (ref 34–46.6)
HCT VFR BLD AUTO: 31.4 % (ref 34–46.6)
HCT VFR BLD AUTO: 32.2 % (ref 34–46.6)
HCT VFR BLD AUTO: 34.4 % (ref 34–46.6)
HCT VFR BLD AUTO: 34.7 % (ref 34–46.6)
HGB BLD-MCNC: 10.2 G/DL (ref 12–15.9)
HGB BLD-MCNC: 9.2 G/DL (ref 12–15.9)
HGB BLD-MCNC: 9.3 G/DL (ref 12–15.9)
HGB BLD-MCNC: 9.6 G/DL (ref 12–15.9)
HGB BLD-MCNC: 9.7 G/DL (ref 12–15.9)
IMM GRANULOCYTES # BLD AUTO: 0.04 10*3/MM3 (ref 0–0.05)
IMM GRANULOCYTES NFR BLD AUTO: 0.5 % (ref 0–0.5)
LYMPHOCYTES # BLD AUTO: 2.6 10*3/MM3 (ref 0.7–3.1)
LYMPHOCYTES NFR BLD AUTO: 29.8 % (ref 19.6–45.3)
MCH RBC QN AUTO: 28.9 PG (ref 26.6–33)
MCHC RBC AUTO-ENTMCNC: 29.9 G/DL (ref 31.5–35.7)
MCV RBC AUTO: 96.9 FL (ref 79–97)
MONOCYTES # BLD AUTO: 0.8 10*3/MM3 (ref 0.1–0.9)
MONOCYTES NFR BLD AUTO: 9.2 % (ref 5–12)
NEUTROPHILS NFR BLD AUTO: 4.97 10*3/MM3 (ref 1.7–7)
NEUTROPHILS NFR BLD AUTO: 56.8 % (ref 42.7–76)
NRBC BLD AUTO-RTO: 0 /100 WBC (ref 0–0.2)
PLAT MORPH BLD: NORMAL
PLATELET # BLD AUTO: 201 10*3/MM3 (ref 140–450)
PMV BLD AUTO: 12.1 FL (ref 6–12)
POTASSIUM SERPL-SCNC: 3.7 MMOL/L (ref 3.5–5.2)
RBC # BLD AUTO: 3.18 10*6/MM3 (ref 3.77–5.28)
SODIUM SERPL-SCNC: 143 MMOL/L (ref 136–145)
UFH PPP CHRO-ACNC: >1.1 IU/ML (ref 0.3–0.7)
WBC # BLD AUTO: 8.73 10*3/MM3 (ref 3.4–10.8)
WBC MORPH BLD: NORMAL

## 2021-07-23 PROCEDURE — 94799 UNLISTED PULMONARY SVC/PX: CPT

## 2021-07-23 PROCEDURE — 85025 COMPLETE CBC W/AUTO DIFF WBC: CPT | Performed by: INTERNAL MEDICINE

## 2021-07-23 PROCEDURE — 99232 SBSQ HOSP IP/OBS MODERATE 35: CPT | Performed by: INTERNAL MEDICINE

## 2021-07-23 PROCEDURE — 25010000002 PROPOFOL 10 MG/ML EMULSION: Performed by: ANESTHESIOLOGY

## 2021-07-23 PROCEDURE — 25010000002 FENTANYL CITRATE (PF) 50 MCG/ML SOLUTION: Performed by: ANESTHESIOLOGY

## 2021-07-23 PROCEDURE — 25010000002 DEXAMETHASONE SODIUM PHOSPHATE 10 MG/ML SOLUTION: Performed by: ANESTHESIOLOGY

## 2021-07-23 PROCEDURE — 85014 HEMATOCRIT: CPT | Performed by: COLON & RECTAL SURGERY

## 2021-07-23 PROCEDURE — 80048 BASIC METABOLIC PNL TOTAL CA: CPT | Performed by: INTERNAL MEDICINE

## 2021-07-23 PROCEDURE — 85520 HEPARIN ASSAY: CPT | Performed by: INTERNAL MEDICINE

## 2021-07-23 PROCEDURE — 85018 HEMOGLOBIN: CPT | Performed by: INTERNAL MEDICINE

## 2021-07-23 PROCEDURE — 85018 HEMOGLOBIN: CPT | Performed by: COLON & RECTAL SURGERY

## 2021-07-23 PROCEDURE — 85014 HEMATOCRIT: CPT | Performed by: INTERNAL MEDICINE

## 2021-07-23 PROCEDURE — 25010000002 ONDANSETRON PER 1 MG: Performed by: ANESTHESIOLOGY

## 2021-07-23 PROCEDURE — 85007 BL SMEAR W/DIFF WBC COUNT: CPT | Performed by: INTERNAL MEDICINE

## 2021-07-23 RX ORDER — MAGNESIUM HYDROXIDE 1200 MG/15ML
LIQUID ORAL AS NEEDED
Status: DISCONTINUED | OUTPATIENT
Start: 2021-07-23 | End: 2021-07-23 | Stop reason: HOSPADM

## 2021-07-23 RX ORDER — DEXAMETHASONE SODIUM PHOSPHATE 10 MG/ML
INJECTION, SOLUTION INTRAMUSCULAR; INTRAVENOUS AS NEEDED
Status: DISCONTINUED | OUTPATIENT
Start: 2021-07-23 | End: 2021-07-23 | Stop reason: SURG

## 2021-07-23 RX ORDER — MIDAZOLAM HYDROCHLORIDE 1 MG/ML
0.5 INJECTION INTRAMUSCULAR; INTRAVENOUS
Status: DISCONTINUED | OUTPATIENT
Start: 2021-07-23 | End: 2021-07-23 | Stop reason: HOSPADM

## 2021-07-23 RX ORDER — ROCURONIUM BROMIDE 10 MG/ML
INJECTION, SOLUTION INTRAVENOUS AS NEEDED
Status: DISCONTINUED | OUTPATIENT
Start: 2021-07-23 | End: 2021-07-23 | Stop reason: SURG

## 2021-07-23 RX ORDER — SODIUM CHLORIDE 0.9 % (FLUSH) 0.9 %
10 SYRINGE (ML) INJECTION EVERY 12 HOURS SCHEDULED
Status: DISCONTINUED | OUTPATIENT
Start: 2021-07-23 | End: 2021-07-23 | Stop reason: HOSPADM

## 2021-07-23 RX ORDER — HYDROCODONE BITARTRATE AND ACETAMINOPHEN 5; 325 MG/1; MG/1
1 TABLET ORAL EVERY 6 HOURS PRN
Status: DISCONTINUED | OUTPATIENT
Start: 2021-07-23 | End: 2021-07-28 | Stop reason: HOSPADM

## 2021-07-23 RX ORDER — ONDANSETRON 2 MG/ML
INJECTION INTRAMUSCULAR; INTRAVENOUS AS NEEDED
Status: DISCONTINUED | OUTPATIENT
Start: 2021-07-23 | End: 2021-07-23 | Stop reason: SURG

## 2021-07-23 RX ORDER — FENTANYL CITRATE 50 UG/ML
INJECTION, SOLUTION INTRAMUSCULAR; INTRAVENOUS AS NEEDED
Status: DISCONTINUED | OUTPATIENT
Start: 2021-07-23 | End: 2021-07-23 | Stop reason: SDUPTHER

## 2021-07-23 RX ORDER — LIDOCAINE HYDROCHLORIDE 10 MG/ML
INJECTION, SOLUTION EPIDURAL; INFILTRATION; INTRACAUDAL; PERINEURAL AS NEEDED
Status: DISCONTINUED | OUTPATIENT
Start: 2021-07-23 | End: 2021-07-23 | Stop reason: SURG

## 2021-07-23 RX ORDER — BUPIVACAINE HYDROCHLORIDE AND EPINEPHRINE 2.5; 5 MG/ML; UG/ML
INJECTION, SOLUTION EPIDURAL; INFILTRATION; INTRACAUDAL; PERINEURAL AS NEEDED
Status: DISCONTINUED | OUTPATIENT
Start: 2021-07-23 | End: 2021-07-23 | Stop reason: HOSPADM

## 2021-07-23 RX ORDER — SODIUM CHLORIDE, SODIUM LACTATE, POTASSIUM CHLORIDE, CALCIUM CHLORIDE 600; 310; 30; 20 MG/100ML; MG/100ML; MG/100ML; MG/100ML
9 INJECTION, SOLUTION INTRAVENOUS CONTINUOUS
Status: DISCONTINUED | OUTPATIENT
Start: 2021-07-23 | End: 2021-07-24

## 2021-07-23 RX ORDER — FENTANYL CITRATE 50 UG/ML
50 INJECTION, SOLUTION INTRAMUSCULAR; INTRAVENOUS
Status: DISCONTINUED | OUTPATIENT
Start: 2021-07-23 | End: 2021-07-23 | Stop reason: HOSPADM

## 2021-07-23 RX ORDER — HYDROMORPHONE HYDROCHLORIDE 1 MG/ML
0.5 INJECTION, SOLUTION INTRAMUSCULAR; INTRAVENOUS; SUBCUTANEOUS
Status: DISCONTINUED | OUTPATIENT
Start: 2021-07-23 | End: 2021-07-23 | Stop reason: HOSPADM

## 2021-07-23 RX ORDER — BUPIVACAINE HCL/0.9 % NACL/PF 0.125 %
PLASTIC BAG, INJECTION (ML) EPIDURAL AS NEEDED
Status: DISCONTINUED | OUTPATIENT
Start: 2021-07-23 | End: 2021-07-23 | Stop reason: SURG

## 2021-07-23 RX ORDER — SODIUM CHLORIDE 0.9 % (FLUSH) 0.9 %
10 SYRINGE (ML) INJECTION AS NEEDED
Status: DISCONTINUED | OUTPATIENT
Start: 2021-07-23 | End: 2021-07-23 | Stop reason: HOSPADM

## 2021-07-23 RX ORDER — ONDANSETRON 2 MG/ML
4 INJECTION INTRAMUSCULAR; INTRAVENOUS ONCE AS NEEDED
Status: DISCONTINUED | OUTPATIENT
Start: 2021-07-23 | End: 2021-07-23 | Stop reason: HOSPADM

## 2021-07-23 RX ORDER — DILTIAZEM HCL IN NACL,ISO-OSM 125 MG/125
5-15 PLASTIC BAG, INJECTION (ML) INTRAVENOUS
Status: DISCONTINUED | OUTPATIENT
Start: 2021-07-23 | End: 2021-07-24

## 2021-07-23 RX ORDER — PROPOFOL 10 MG/ML
VIAL (ML) INTRAVENOUS AS NEEDED
Status: DISCONTINUED | OUTPATIENT
Start: 2021-07-23 | End: 2021-07-23 | Stop reason: SURG

## 2021-07-23 RX ADMIN — LIDOCAINE HYDROCHLORIDE 40 MG: 10 INJECTION, SOLUTION EPIDURAL; INFILTRATION; INTRACAUDAL; PERINEURAL at 17:37

## 2021-07-23 RX ADMIN — Medication 160 MCG: at 17:50

## 2021-07-23 RX ADMIN — Medication 5 MG/HR: at 22:17

## 2021-07-23 RX ADMIN — SODIUM CHLORIDE 500 ML: 9 INJECTION, SOLUTION INTRAVENOUS at 21:07

## 2021-07-23 RX ADMIN — Medication 160 MCG: at 17:58

## 2021-07-23 RX ADMIN — Medication 80 MCG: at 18:06

## 2021-07-23 RX ADMIN — SODIUM CHLORIDE, PRESERVATIVE FREE 10 ML: 5 INJECTION INTRAVENOUS at 21:07

## 2021-07-23 RX ADMIN — LEVOTHYROXINE SODIUM 125 MCG: 125 TABLET ORAL at 05:20

## 2021-07-23 RX ADMIN — TOPIRAMATE 100 MG: 100 TABLET, FILM COATED ORAL at 21:18

## 2021-07-23 RX ADMIN — ATORVASTATIN CALCIUM 10 MG: 10 TABLET, FILM COATED ORAL at 21:19

## 2021-07-23 RX ADMIN — PREGABALIN 300 MG: 100 CAPSULE ORAL at 21:18

## 2021-07-23 RX ADMIN — PROPOFOL 150 MG: 10 INJECTION, EMULSION INTRAVENOUS at 17:37

## 2021-07-23 RX ADMIN — DEXAMETHASONE SODIUM PHOSPHATE 4 MG: 10 INJECTION, SOLUTION INTRAMUSCULAR; INTRAVENOUS at 17:50

## 2021-07-23 RX ADMIN — BUDESONIDE AND FORMOTEROL FUMARATE DIHYDRATE 2 PUFF: 80; 4.5 AEROSOL RESPIRATORY (INHALATION) at 20:11

## 2021-07-23 RX ADMIN — FENTANYL CITRATE 50 MCG: 50 INJECTION, SOLUTION INTRAMUSCULAR; INTRAVENOUS at 17:37

## 2021-07-23 RX ADMIN — SODIUM CHLORIDE, POTASSIUM CHLORIDE, SODIUM LACTATE AND CALCIUM CHLORIDE 9 ML/HR: 600; 310; 30; 20 INJECTION, SOLUTION INTRAVENOUS at 16:40

## 2021-07-23 RX ADMIN — NYSTATIN: 100000 POWDER TOPICAL at 22:07

## 2021-07-23 RX ADMIN — BUDESONIDE AND FORMOTEROL FUMARATE DIHYDRATE 2 PUFF: 80; 4.5 AEROSOL RESPIRATORY (INHALATION) at 09:14

## 2021-07-23 RX ADMIN — Medication 80 MCG: at 17:37

## 2021-07-23 RX ADMIN — ROCURONIUM BROMIDE 50 MG: 10 INJECTION INTRAVENOUS at 17:37

## 2021-07-23 RX ADMIN — DOCUSATE SODIUM 100 MG: 100 CAPSULE, LIQUID FILLED ORAL at 21:19

## 2021-07-23 RX ADMIN — SODIUM CHLORIDE, PRESERVATIVE FREE 10 ML: 5 INJECTION INTRAVENOUS at 16:40

## 2021-07-23 RX ADMIN — HYDROCODONE BITARTRATE AND ACETAMINOPHEN 1 TABLET: 5; 325 TABLET ORAL at 21:19

## 2021-07-23 RX ADMIN — NYSTATIN: 100000 POWDER TOPICAL at 08:51

## 2021-07-23 RX ADMIN — WHITE PETROLATUM: 1 OINTMENT TOPICAL at 08:52

## 2021-07-23 RX ADMIN — SODIUM PHOSPHATE 2 ENEMA: 7; 19 ENEMA RECTAL at 12:02

## 2021-07-23 RX ADMIN — SUGAMMADEX 500 MG: 100 INJECTION, SOLUTION INTRAVENOUS at 18:09

## 2021-07-23 RX ADMIN — PANTOPRAZOLE SODIUM 40 MG: 40 TABLET, DELAYED RELEASE ORAL at 08:50

## 2021-07-23 RX ADMIN — ONDANSETRON 4 MG: 2 INJECTION INTRAMUSCULAR; INTRAVENOUS at 17:53

## 2021-07-23 RX ADMIN — FENTANYL CITRATE 50 MCG: 50 INJECTION, SOLUTION INTRAMUSCULAR; INTRAVENOUS at 17:41

## 2021-07-23 RX ADMIN — Medication 160 MCG: at 17:47

## 2021-07-23 NOTE — ANESTHESIA PREPROCEDURE EVALUATION
Anesthesia Evaluation     Patient summary reviewed and Nursing notes reviewed   no history of anesthetic complications:  NPO Solid Status: > 8 hours  NPO Liquid Status: > 8 hours           Airway   Mallampati: III  TM distance: >3 FB  Neck ROM: full  Possible difficult intubation  Dental - normal exam     Pulmonary - normal exam   (+) asthma,  Cardiovascular     ECG reviewed  Rhythm: irregular  Rate: abnormal    (+) hypertension, dysrhythmias Atrial Fib, CHF , hyperlipidemia,     ROS comment: 7/1/19 echo  Interpretation Summary    · Mild pulmonic valve regurgitation is present.  · Mild mitral valve regurgitation is present  · Left atrial cavity size is moderately dilated.  · Mild aortic valve regurgitation is present.  · Mild tricuspid valve regurgitation is present.  · Calculated right ventricular systolic pressure from tricuspid regurgitation is 39 mmHg.  · Estimated EF = 65%.  · Left ventricular systolic function is normal.           Neuro/Psych  (+) CVA residual symptoms, headaches, numbness (trigeminal neuraglia),     (-) seizures  GI/Hepatic/Renal/Endo    (+) obesity,  GERD poorly controlled, GI bleeding , renal disease, thyroid problem hypothyroidism    Musculoskeletal     Abdominal    Substance History      OB/GYN          Other   arthritis,                    Anesthesia Plan    ASA 3     general   Rapid sequence  intravenous induction     Anesthetic plan, all risks, benefits, and alternatives have been provided, discussed and informed consent has been obtained with: patient.    Plan discussed with CRNA.

## 2021-07-23 NOTE — ANESTHESIA POSTPROCEDURE EVALUATION
Patient: Vickie Joshi    Procedure Summary     Date: 07/23/21 [facility] / [facility]:  [facility]    Anesthesia Start: 1729 Anesthesia Stop: 1826    Procedure: HEMORRHOIDECTOMY (N/A Anus) Diagnosis:     Surgeons: Adan Meadows MD Provider: Ina Cazares MD    Anesthesia Type: general ASA Status: 3          Anesthesia Type: general    Vitals  Vitals Value Taken Time   BP     Temp     Pulse     Resp     SpO2 100 % 07/23/21 1825   Vitals shown include unvalidated device data.        Post Anesthesia Care and Evaluation    Patient location during evaluation: PACU  Patient participation: complete - patient participated  Level of consciousness: awake and alert  Pain management: adequate  Airway patency: patent  Anesthetic complications: No anesthetic complications  PONV Status: none  Cardiovascular status: hemodynamically stable and acceptable  Respiratory status: nonlabored ventilation, acceptable and nasal cannula  Hydration status: acceptable

## 2021-07-23 NOTE — ANESTHESIA PROCEDURE NOTES
Airway  Date/Time: 7/23/2021 5:38 PM  Airway not difficult    General Information and Staff    Patient location during procedure: OR  Anesthesiologist: Ina Cazares MD    Indications and Patient Condition  Indications for airway management: airway protection    Preoxygenated: yes  Mask difficulty assessment: 0 - not attempted    Final Airway Details  Final airway type: endotracheal airway      Successful airway: ETT  Cuffed: yes   Successful intubation technique: direct laryngoscopy and RSI  Facilitating devices/methods: intubating stylet and cricoid pressure  Endotracheal tube insertion site: oral  Blade: Shantel  Blade size: 3  ETT size (mm): 7.0  Cormack-Lehane Classification: grade I - full view of glottis  Placement verified by: chest auscultation and capnometry   Cuff volume (mL): 8  Measured from: teeth  ETT/EBT  to teeth (cm): 22  Number of attempts at approach: 1  Assessment: lips, teeth, and gum same as pre-op and atraumatic intubation

## 2021-07-24 ENCOUNTER — APPOINTMENT (OUTPATIENT)
Dept: GENERAL RADIOLOGY | Facility: HOSPITAL | Age: 71
End: 2021-07-24

## 2021-07-24 LAB
ALBUMIN SERPL-MCNC: 3.3 G/DL (ref 3.5–5.2)
ALBUMIN/GLOB SERPL: 1.4 G/DL
ALP SERPL-CCNC: 63 U/L (ref 39–117)
ALT SERPL W P-5'-P-CCNC: 10 U/L (ref 1–33)
ANION GAP SERPL CALCULATED.3IONS-SCNC: 10 MMOL/L (ref 5–15)
AST SERPL-CCNC: 15 U/L (ref 1–32)
BASOPHILS # BLD AUTO: 0.01 10*3/MM3 (ref 0–0.2)
BASOPHILS NFR BLD AUTO: 0.1 % (ref 0–1.5)
BH BB BLOOD EXPIRATION DATE: NORMAL
BH BB BLOOD EXPIRATION DATE: NORMAL
BH BB BLOOD TYPE BARCODE: 600
BH BB BLOOD TYPE BARCODE: 600
BH BB DISPENSE STATUS: NORMAL
BH BB DISPENSE STATUS: NORMAL
BH BB PRODUCT CODE: NORMAL
BH BB PRODUCT CODE: NORMAL
BH BB UNIT NUMBER: NORMAL
BH BB UNIT NUMBER: NORMAL
BILIRUB SERPL-MCNC: 0.9 MG/DL (ref 0–1.2)
BUN SERPL-MCNC: 6 MG/DL (ref 8–23)
BUN/CREAT SERPL: 8.8 (ref 7–25)
CALCIUM SPEC-SCNC: 8 MG/DL (ref 8.6–10.5)
CHLORIDE SERPL-SCNC: 116 MMOL/L (ref 98–107)
CO2 SERPL-SCNC: 18 MMOL/L (ref 22–29)
CREAT SERPL-MCNC: 0.68 MG/DL (ref 0.57–1)
CROSSMATCH INTERPRETATION: NORMAL
CROSSMATCH INTERPRETATION: NORMAL
D-LACTATE SERPL-SCNC: 1.7 MMOL/L (ref 0.5–2)
DEPRECATED RDW RBC AUTO: 64.9 FL (ref 37–54)
EOSINOPHIL # BLD AUTO: 0 10*3/MM3 (ref 0–0.4)
EOSINOPHIL NFR BLD AUTO: 0 % (ref 0.3–6.2)
ERYTHROCYTE [DISTWIDTH] IN BLOOD BY AUTOMATED COUNT: 18 % (ref 12.3–15.4)
GFR SERPL CREATININE-BSD FRML MDRD: 86 ML/MIN/1.73
GLOBULIN UR ELPH-MCNC: 2.3 GM/DL
GLUCOSE SERPL-MCNC: 129 MG/DL (ref 65–99)
HCT VFR BLD AUTO: 27.7 % (ref 34–46.6)
HCT VFR BLD AUTO: 29.7 % (ref 34–46.6)
HCT VFR BLD AUTO: 31.8 % (ref 34–46.6)
HGB BLD-MCNC: 8.2 G/DL (ref 12–15.9)
HGB BLD-MCNC: 8.4 G/DL (ref 12–15.9)
HGB BLD-MCNC: 9.2 G/DL (ref 12–15.9)
IMM GRANULOCYTES # BLD AUTO: 0.05 10*3/MM3 (ref 0–0.05)
IMM GRANULOCYTES NFR BLD AUTO: 0.4 % (ref 0–0.5)
LYMPHOCYTES # BLD AUTO: 0.71 10*3/MM3 (ref 0.7–3.1)
LYMPHOCYTES NFR BLD AUTO: 6.3 % (ref 19.6–45.3)
MAGNESIUM SERPL-MCNC: 1.7 MG/DL (ref 1.6–2.4)
MCH RBC QN AUTO: 28.9 PG (ref 26.6–33)
MCHC RBC AUTO-ENTMCNC: 28.9 G/DL (ref 31.5–35.7)
MCV RBC AUTO: 100 FL (ref 79–97)
MONOCYTES # BLD AUTO: 0.34 10*3/MM3 (ref 0.1–0.9)
MONOCYTES NFR BLD AUTO: 3 % (ref 5–12)
NEUTROPHILS NFR BLD AUTO: 10.13 10*3/MM3 (ref 1.7–7)
NEUTROPHILS NFR BLD AUTO: 90.2 % (ref 42.7–76)
NRBC BLD AUTO-RTO: 0 /100 WBC (ref 0–0.2)
PHOSPHATE SERPL-MCNC: 3.8 MG/DL (ref 2.5–4.5)
PLATELET # BLD AUTO: 176 10*3/MM3 (ref 140–450)
PMV BLD AUTO: 12.3 FL (ref 6–12)
POTASSIUM SERPL-SCNC: 4.8 MMOL/L (ref 3.5–5.2)
PROT SERPL-MCNC: 5.6 G/DL (ref 6–8.5)
RBC # BLD AUTO: 3.18 10*6/MM3 (ref 3.77–5.28)
SODIUM SERPL-SCNC: 144 MMOL/L (ref 136–145)
UNIT  ABO: NORMAL
UNIT  ABO: NORMAL
UNIT  RH: NORMAL
UNIT  RH: NORMAL
WBC # BLD AUTO: 11.24 10*3/MM3 (ref 3.4–10.8)

## 2021-07-24 PROCEDURE — 25010000002 AMIODARONE IN DEXTROSE 5% 360-4.14 MG/200ML-% SOLUTION: Performed by: FAMILY MEDICINE

## 2021-07-24 PROCEDURE — 85018 HEMOGLOBIN: CPT | Performed by: COLON & RECTAL SURGERY

## 2021-07-24 PROCEDURE — 02HV33Z INSERTION OF INFUSION DEVICE INTO SUPERIOR VENA CAVA, PERCUTANEOUS APPROACH: ICD-10-PCS | Performed by: INTERNAL MEDICINE

## 2021-07-24 PROCEDURE — 63710000001 ONDANSETRON PER 8 MG: Performed by: COLON & RECTAL SURGERY

## 2021-07-24 PROCEDURE — C1894 INTRO/SHEATH, NON-LASER: HCPCS

## 2021-07-24 PROCEDURE — 85025 COMPLETE CBC W/AUTO DIFF WBC: CPT | Performed by: NURSE PRACTITIONER

## 2021-07-24 PROCEDURE — P9047 ALBUMIN (HUMAN), 25%, 50ML: HCPCS | Performed by: NURSE PRACTITIONER

## 2021-07-24 PROCEDURE — 25010000002 AMIODARONE IN DEXTROSE 5% 150-4.21 MG/100ML-% SOLUTION: Performed by: FAMILY MEDICINE

## 2021-07-24 PROCEDURE — C1751 CATH, INF, PER/CENT/MIDLINE: HCPCS

## 2021-07-24 PROCEDURE — 99232 SBSQ HOSP IP/OBS MODERATE 35: CPT | Performed by: INTERNAL MEDICINE

## 2021-07-24 PROCEDURE — 83735 ASSAY OF MAGNESIUM: CPT | Performed by: NURSE PRACTITIONER

## 2021-07-24 PROCEDURE — 83605 ASSAY OF LACTIC ACID: CPT | Performed by: NURSE PRACTITIONER

## 2021-07-24 PROCEDURE — 71045 X-RAY EXAM CHEST 1 VIEW: CPT

## 2021-07-24 PROCEDURE — 85014 HEMATOCRIT: CPT | Performed by: COLON & RECTAL SURGERY

## 2021-07-24 PROCEDURE — 25010000002 ALBUMIN HUMAN 25% PER 50 ML: Performed by: NURSE PRACTITIONER

## 2021-07-24 PROCEDURE — 84100 ASSAY OF PHOSPHORUS: CPT | Performed by: NURSE PRACTITIONER

## 2021-07-24 PROCEDURE — 80053 COMPREHEN METABOLIC PANEL: CPT | Performed by: NURSE PRACTITIONER

## 2021-07-24 PROCEDURE — 94799 UNLISTED PULMONARY SVC/PX: CPT

## 2021-07-24 RX ORDER — MAGNESIUM SULFATE HEPTAHYDRATE 40 MG/ML
2 INJECTION, SOLUTION INTRAVENOUS AS NEEDED
Status: DISCONTINUED | OUTPATIENT
Start: 2021-07-24 | End: 2021-07-26

## 2021-07-24 RX ORDER — DIGOXIN 125 MCG
125 TABLET ORAL
Status: DISCONTINUED | OUTPATIENT
Start: 2021-07-24 | End: 2021-07-28 | Stop reason: HOSPADM

## 2021-07-24 RX ORDER — MAGNESIUM SULFATE HEPTAHYDRATE 40 MG/ML
4 INJECTION, SOLUTION INTRAVENOUS AS NEEDED
Status: DISCONTINUED | OUTPATIENT
Start: 2021-07-24 | End: 2021-07-26

## 2021-07-24 RX ORDER — SODIUM CHLORIDE 0.9 % (FLUSH) 0.9 %
10 SYRINGE (ML) INJECTION EVERY 12 HOURS SCHEDULED
Status: DISCONTINUED | OUTPATIENT
Start: 2021-07-24 | End: 2021-07-28 | Stop reason: HOSPADM

## 2021-07-24 RX ORDER — SODIUM CHLORIDE 0.9 % (FLUSH) 0.9 %
10 SYRINGE (ML) INJECTION AS NEEDED
Status: DISCONTINUED | OUTPATIENT
Start: 2021-07-24 | End: 2021-07-28 | Stop reason: HOSPADM

## 2021-07-24 RX ORDER — METOPROLOL TARTRATE 5 MG/5ML
2.5 INJECTION INTRAVENOUS ONCE
Status: COMPLETED | OUTPATIENT
Start: 2021-07-24 | End: 2021-07-24

## 2021-07-24 RX ORDER — ALBUMIN (HUMAN) 12.5 G/50ML
12.5 SOLUTION INTRAVENOUS ONCE
Status: COMPLETED | OUTPATIENT
Start: 2021-07-24 | End: 2021-07-24

## 2021-07-24 RX ORDER — SODIUM CHLORIDE, SODIUM LACTATE, POTASSIUM CHLORIDE, CALCIUM CHLORIDE 600; 310; 30; 20 MG/100ML; MG/100ML; MG/100ML; MG/100ML
100 INJECTION, SOLUTION INTRAVENOUS CONTINUOUS
Status: ACTIVE | OUTPATIENT
Start: 2021-07-24 | End: 2021-07-24

## 2021-07-24 RX ADMIN — SODIUM CHLORIDE 1000 ML: 9 INJECTION, SOLUTION INTRAVENOUS at 02:06

## 2021-07-24 RX ADMIN — TOPIRAMATE 100 MG: 100 TABLET, FILM COATED ORAL at 08:07

## 2021-07-24 RX ADMIN — ONDANSETRON HYDROCHLORIDE 4 MG: 4 TABLET, FILM COATED ORAL at 11:19

## 2021-07-24 RX ADMIN — SODIUM CHLORIDE, POTASSIUM CHLORIDE, SODIUM LACTATE AND CALCIUM CHLORIDE 1000 ML: 600; 310; 30; 20 INJECTION, SOLUTION INTRAVENOUS at 07:30

## 2021-07-24 RX ADMIN — ATORVASTATIN CALCIUM 10 MG: 10 TABLET, FILM COATED ORAL at 20:17

## 2021-07-24 RX ADMIN — AMIODARONE HYDROCHLORIDE 0.5 MG/MIN: 1.8 INJECTION, SOLUTION INTRAVENOUS at 22:00

## 2021-07-24 RX ADMIN — SODIUM CHLORIDE, POTASSIUM CHLORIDE, SODIUM LACTATE AND CALCIUM CHLORIDE 1000 ML: 600; 310; 30; 20 INJECTION, SOLUTION INTRAVENOUS at 04:16

## 2021-07-24 RX ADMIN — BUDESONIDE AND FORMOTEROL FUMARATE DIHYDRATE 2 PUFF: 80; 4.5 AEROSOL RESPIRATORY (INHALATION) at 20:20

## 2021-07-24 RX ADMIN — PREGABALIN 300 MG: 100 CAPSULE ORAL at 08:07

## 2021-07-24 RX ADMIN — LEVOTHYROXINE SODIUM 125 MCG: 125 TABLET ORAL at 05:07

## 2021-07-24 RX ADMIN — METOPROLOL TARTRATE 37.5 MG: 25 TABLET, FILM COATED ORAL at 20:17

## 2021-07-24 RX ADMIN — METOPROLOL TARTRATE 37.5 MG: 25 TABLET, FILM COATED ORAL at 08:06

## 2021-07-24 RX ADMIN — SODIUM CHLORIDE, PRESERVATIVE FREE 10 ML: 5 INJECTION INTRAVENOUS at 20:18

## 2021-07-24 RX ADMIN — DOCUSATE SODIUM 100 MG: 100 CAPSULE, LIQUID FILLED ORAL at 08:07

## 2021-07-24 RX ADMIN — AMIODARONE HYDROCHLORIDE 0.5 MG/MIN: 1.8 INJECTION, SOLUTION INTRAVENOUS at 10:27

## 2021-07-24 RX ADMIN — DOCUSATE SODIUM 100 MG: 100 CAPSULE, LIQUID FILLED ORAL at 20:17

## 2021-07-24 RX ADMIN — NYSTATIN: 100000 POWDER TOPICAL at 20:18

## 2021-07-24 RX ADMIN — BUDESONIDE AND FORMOTEROL FUMARATE DIHYDRATE 2 PUFF: 80; 4.5 AEROSOL RESPIRATORY (INHALATION) at 12:52

## 2021-07-24 RX ADMIN — ALBUMIN HUMAN 12.5 G: 0.25 SOLUTION INTRAVENOUS at 01:02

## 2021-07-24 RX ADMIN — HYDROCODONE BITARTRATE AND ACETAMINOPHEN 1 TABLET: 5; 325 TABLET ORAL at 11:19

## 2021-07-24 RX ADMIN — SODIUM CHLORIDE, PRESERVATIVE FREE 10 ML: 5 INJECTION INTRAVENOUS at 14:24

## 2021-07-24 RX ADMIN — HYDROCODONE BITARTRATE AND ACETAMINOPHEN 1 TABLET: 5; 325 TABLET ORAL at 05:26

## 2021-07-24 RX ADMIN — AMIODARONE HYDROCHLORIDE 150 MG: 1.5 INJECTION, SOLUTION INTRAVENOUS at 04:15

## 2021-07-24 RX ADMIN — CETIRIZINE HYDROCHLORIDE 5 MG: 10 TABLET, FILM COATED ORAL at 08:07

## 2021-07-24 RX ADMIN — AMIODARONE HYDROCHLORIDE 1 MG/MIN: 1.8 INJECTION, SOLUTION INTRAVENOUS at 04:25

## 2021-07-24 RX ADMIN — ONDANSETRON HYDROCHLORIDE 4 MG: 4 TABLET, FILM COATED ORAL at 18:38

## 2021-07-24 RX ADMIN — PANTOPRAZOLE SODIUM 40 MG: 40 TABLET, DELAYED RELEASE ORAL at 08:06

## 2021-07-24 RX ADMIN — SODIUM CHLORIDE 500 ML: 9 INJECTION, SOLUTION INTRAVENOUS at 00:15

## 2021-07-24 RX ADMIN — HYDROCODONE BITARTRATE AND ACETAMINOPHEN 1 TABLET: 5; 325 TABLET ORAL at 18:34

## 2021-07-24 RX ADMIN — DIGOXIN 125 MCG: 125 TABLET ORAL at 11:54

## 2021-07-24 RX ADMIN — SODIUM CHLORIDE, PRESERVATIVE FREE 10 ML: 5 INJECTION INTRAVENOUS at 08:09

## 2021-07-24 RX ADMIN — NYSTATIN: 100000 POWDER TOPICAL at 08:07

## 2021-07-24 RX ADMIN — TOPIRAMATE 100 MG: 100 TABLET, FILM COATED ORAL at 20:17

## 2021-07-24 RX ADMIN — METOPROLOL TARTRATE 2.5 MG: 5 INJECTION INTRAVENOUS at 01:03

## 2021-07-24 RX ADMIN — PREGABALIN 300 MG: 100 CAPSULE ORAL at 20:17

## 2021-07-24 RX ADMIN — SODIUM CHLORIDE, POTASSIUM CHLORIDE, SODIUM LACTATE AND CALCIUM CHLORIDE 100 ML/HR: 600; 310; 30; 20 INJECTION, SOLUTION INTRAVENOUS at 05:07

## 2021-07-25 LAB
ANION GAP SERPL CALCULATED.3IONS-SCNC: 12 MMOL/L (ref 5–15)
BASOPHILS # BLD AUTO: 0.01 10*3/MM3 (ref 0–0.2)
BASOPHILS NFR BLD AUTO: 0.2 % (ref 0–1.5)
BUN SERPL-MCNC: 5 MG/DL (ref 8–23)
BUN/CREAT SERPL: 8.8 (ref 7–25)
CALCIUM SPEC-SCNC: 7.7 MG/DL (ref 8.6–10.5)
CHLORIDE SERPL-SCNC: 114 MMOL/L (ref 98–107)
CO2 SERPL-SCNC: 18 MMOL/L (ref 22–29)
CREAT SERPL-MCNC: 0.57 MG/DL (ref 0.57–1)
DEPRECATED RDW RBC AUTO: 66.3 FL (ref 37–54)
EOSINOPHIL # BLD AUTO: 0.1 10*3/MM3 (ref 0–0.4)
EOSINOPHIL NFR BLD AUTO: 1.5 % (ref 0.3–6.2)
ERYTHROCYTE [DISTWIDTH] IN BLOOD BY AUTOMATED COUNT: 18.2 % (ref 12.3–15.4)
GFR SERPL CREATININE-BSD FRML MDRD: 105 ML/MIN/1.73
GLUCOSE SERPL-MCNC: 131 MG/DL (ref 65–99)
HCT VFR BLD AUTO: 24.5 % (ref 34–46.6)
HCT VFR BLD AUTO: 35.3 % (ref 34–46.6)
HCT VFR BLD AUTO: 37.3 % (ref 34–46.6)
HGB BLD-MCNC: 10.8 G/DL (ref 12–15.9)
HGB BLD-MCNC: 11.4 G/DL (ref 12–15.9)
HGB BLD-MCNC: 6.8 G/DL (ref 12–15.9)
IMM GRANULOCYTES # BLD AUTO: 0.02 10*3/MM3 (ref 0–0.05)
IMM GRANULOCYTES NFR BLD AUTO: 0.3 % (ref 0–0.5)
LYMPHOCYTES # BLD AUTO: 1.82 10*3/MM3 (ref 0.7–3.1)
LYMPHOCYTES NFR BLD AUTO: 27.8 % (ref 19.6–45.3)
MAGNESIUM SERPL-MCNC: 1.3 MG/DL (ref 1.6–2.4)
MCH RBC QN AUTO: 28.1 PG (ref 26.6–33)
MCHC RBC AUTO-ENTMCNC: 27.8 G/DL (ref 31.5–35.7)
MCV RBC AUTO: 101.2 FL (ref 79–97)
MONOCYTES # BLD AUTO: 0.57 10*3/MM3 (ref 0.1–0.9)
MONOCYTES NFR BLD AUTO: 8.7 % (ref 5–12)
NEUTROPHILS NFR BLD AUTO: 4.02 10*3/MM3 (ref 1.7–7)
NEUTROPHILS NFR BLD AUTO: 61.5 % (ref 42.7–76)
NRBC BLD AUTO-RTO: 0 /100 WBC (ref 0–0.2)
PHOSPHATE SERPL-MCNC: 2.7 MG/DL (ref 2.5–4.5)
PLATELET # BLD AUTO: 141 10*3/MM3 (ref 140–450)
PMV BLD AUTO: 12.4 FL (ref 6–12)
POTASSIUM SERPL-SCNC: 3.8 MMOL/L (ref 3.5–5.2)
RBC # BLD AUTO: 2.42 10*6/MM3 (ref 3.77–5.28)
SODIUM SERPL-SCNC: 144 MMOL/L (ref 136–145)
WBC # BLD AUTO: 6.54 10*3/MM3 (ref 3.4–10.8)

## 2021-07-25 PROCEDURE — 83735 ASSAY OF MAGNESIUM: CPT | Performed by: INTERNAL MEDICINE

## 2021-07-25 PROCEDURE — 85018 HEMOGLOBIN: CPT | Performed by: COLON & RECTAL SURGERY

## 2021-07-25 PROCEDURE — 63710000001 ONDANSETRON PER 8 MG: Performed by: COLON & RECTAL SURGERY

## 2021-07-25 PROCEDURE — 84100 ASSAY OF PHOSPHORUS: CPT | Performed by: INTERNAL MEDICINE

## 2021-07-25 PROCEDURE — 85018 HEMOGLOBIN: CPT | Performed by: INTERNAL MEDICINE

## 2021-07-25 PROCEDURE — 99232 SBSQ HOSP IP/OBS MODERATE 35: CPT | Performed by: INTERNAL MEDICINE

## 2021-07-25 PROCEDURE — 94640 AIRWAY INHALATION TREATMENT: CPT

## 2021-07-25 PROCEDURE — 86900 BLOOD TYPING SEROLOGIC ABO: CPT

## 2021-07-25 PROCEDURE — P9016 RBC LEUKOCYTES REDUCED: HCPCS

## 2021-07-25 PROCEDURE — 85014 HEMATOCRIT: CPT | Performed by: COLON & RECTAL SURGERY

## 2021-07-25 PROCEDURE — 80048 BASIC METABOLIC PNL TOTAL CA: CPT | Performed by: INTERNAL MEDICINE

## 2021-07-25 PROCEDURE — 85025 COMPLETE CBC W/AUTO DIFF WBC: CPT | Performed by: INTERNAL MEDICINE

## 2021-07-25 PROCEDURE — 36430 TRANSFUSION BLD/BLD COMPNT: CPT

## 2021-07-25 PROCEDURE — 85014 HEMATOCRIT: CPT | Performed by: INTERNAL MEDICINE

## 2021-07-25 PROCEDURE — 25010000002 MAGNESIUM SULFATE 2 GM/50ML SOLUTION: Performed by: INTERNAL MEDICINE

## 2021-07-25 PROCEDURE — 94799 UNLISTED PULMONARY SVC/PX: CPT

## 2021-07-25 RX ADMIN — LEVOTHYROXINE SODIUM 125 MCG: 125 TABLET ORAL at 06:30

## 2021-07-25 RX ADMIN — SODIUM CHLORIDE, PRESERVATIVE FREE 10 ML: 5 INJECTION INTRAVENOUS at 20:25

## 2021-07-25 RX ADMIN — PREGABALIN 300 MG: 100 CAPSULE ORAL at 20:25

## 2021-07-25 RX ADMIN — DOCUSATE SODIUM 100 MG: 100 CAPSULE, LIQUID FILLED ORAL at 20:25

## 2021-07-25 RX ADMIN — ONDANSETRON HYDROCHLORIDE 4 MG: 4 TABLET, FILM COATED ORAL at 21:54

## 2021-07-25 RX ADMIN — PREGABALIN 300 MG: 100 CAPSULE ORAL at 08:25

## 2021-07-25 RX ADMIN — NYSTATIN: 100000 POWDER TOPICAL at 08:25

## 2021-07-25 RX ADMIN — HYDROCODONE BITARTRATE AND ACETAMINOPHEN 1 TABLET: 5; 325 TABLET ORAL at 21:54

## 2021-07-25 RX ADMIN — HYDROCODONE BITARTRATE AND ACETAMINOPHEN 1 TABLET: 5; 325 TABLET ORAL at 06:30

## 2021-07-25 RX ADMIN — SODIUM CHLORIDE, PRESERVATIVE FREE 10 ML: 5 INJECTION INTRAVENOUS at 08:25

## 2021-07-25 RX ADMIN — TOPIRAMATE 100 MG: 100 TABLET, FILM COATED ORAL at 08:25

## 2021-07-25 RX ADMIN — DOCUSATE SODIUM 100 MG: 100 CAPSULE, LIQUID FILLED ORAL at 08:25

## 2021-07-25 RX ADMIN — METOPROLOL TARTRATE 37.5 MG: 25 TABLET, FILM COATED ORAL at 08:24

## 2021-07-25 RX ADMIN — DIGOXIN 125 MCG: 125 TABLET ORAL at 12:35

## 2021-07-25 RX ADMIN — NYSTATIN 1 APPLICATION: 100000 POWDER TOPICAL at 20:26

## 2021-07-25 RX ADMIN — ONDANSETRON HYDROCHLORIDE 4 MG: 4 TABLET, FILM COATED ORAL at 06:30

## 2021-07-25 RX ADMIN — ALBUTEROL SULFATE 1.25 MG: 1.25 SOLUTION RESPIRATORY (INHALATION) at 05:45

## 2021-07-25 RX ADMIN — BUDESONIDE AND FORMOTEROL FUMARATE DIHYDRATE 2 PUFF: 80; 4.5 AEROSOL RESPIRATORY (INHALATION) at 20:03

## 2021-07-25 RX ADMIN — HYDROCODONE BITARTRATE AND ACETAMINOPHEN 1 TABLET: 5; 325 TABLET ORAL at 15:06

## 2021-07-25 RX ADMIN — MAGNESIUM SULFATE HEPTAHYDRATE 2 G: 40 INJECTION, SOLUTION INTRAVENOUS at 23:45

## 2021-07-25 RX ADMIN — METOPROLOL TARTRATE 37.5 MG: 25 TABLET, FILM COATED ORAL at 20:25

## 2021-07-25 RX ADMIN — TOPIRAMATE 100 MG: 100 TABLET, FILM COATED ORAL at 20:25

## 2021-07-25 RX ADMIN — ONDANSETRON HYDROCHLORIDE 4 MG: 4 TABLET, FILM COATED ORAL at 15:06

## 2021-07-25 RX ADMIN — PANTOPRAZOLE SODIUM 40 MG: 40 TABLET, DELAYED RELEASE ORAL at 06:30

## 2021-07-25 RX ADMIN — BUDESONIDE AND FORMOTEROL FUMARATE DIHYDRATE 2 PUFF: 80; 4.5 AEROSOL RESPIRATORY (INHALATION) at 05:45

## 2021-07-25 RX ADMIN — ATORVASTATIN CALCIUM 10 MG: 10 TABLET, FILM COATED ORAL at 20:25

## 2021-07-25 RX ADMIN — CETIRIZINE HYDROCHLORIDE 5 MG: 10 TABLET, FILM COATED ORAL at 08:25

## 2021-07-26 PROBLEM — I27.20 PULMONARY HYPERTENSION (HCC): Status: RESOLVED | Noted: 2019-07-02 | Resolved: 2021-07-26

## 2021-07-26 PROBLEM — R65.10 SIRS (SYSTEMIC INFLAMMATORY RESPONSE SYNDROME): Status: RESOLVED | Noted: 2018-09-27 | Resolved: 2021-07-26

## 2021-07-26 PROBLEM — R06.83 SNORING: Status: RESOLVED | Noted: 2019-06-30 | Resolved: 2021-07-26

## 2021-07-26 PROBLEM — N17.9 AKI (ACUTE KIDNEY INJURY): Status: RESOLVED | Noted: 2021-07-13 | Resolved: 2021-07-26
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
## 2021-07-26 PROBLEM — A41.9 SEPSIS: Status: RESOLVED | Noted: 2020-05-24 | Resolved: 2021-07-26

## 2021-07-26 PROBLEM — G51.0 FACIAL PARESIS: Status: RESOLVED | Noted: 2019-06-30 | Resolved: 2021-07-26

## 2021-07-26 PROBLEM — G81.94 LEFT HEMIPARESIS (HCC): Status: RESOLVED | Noted: 2019-07-01 | Resolved: 2021-07-26

## 2021-07-26 PROBLEM — J18.9 PNEUMONIA: Status: RESOLVED | Noted: 2018-09-27 | Resolved: 2021-07-26

## 2021-07-26 PROBLEM — G81.94 LEFT HEMIPLEGIA: Status: RESOLVED | Noted: 2019-06-30 | Resolved: 2021-07-26

## 2021-07-26 PROBLEM — I48.91 RAPID ATRIAL FIBRILLATION: Status: RESOLVED | Noted: 2017-02-27 | Resolved: 2021-07-26

## 2021-07-26 PROBLEM — K92.2 ACUTE LOWER GI BLEEDING: Status: RESOLVED | Noted: 2021-07-13 | Resolved: 2021-07-26

## 2021-07-26 PROBLEM — G50.0 TRIGEMINAL NEURALGIA: Status: RESOLVED | Noted: 2019-06-30 | Resolved: 2021-07-26

## 2021-07-26 PROBLEM — E78.00 PURE HYPERCHOLESTEROLEMIA: Status: RESOLVED | Noted: 2019-06-30 | Resolved: 2021-07-26

## 2021-07-26 PROBLEM — E87.20 LACTIC ACIDOSIS: Status: RESOLVED | Noted: 2021-07-13 | Resolved: 2021-07-26

## 2021-07-26 PROBLEM — J18.9 LEFT UPPER LOBE PNEUMONIA: Status: RESOLVED | Noted: 2019-06-30 | Resolved: 2021-07-26

## 2021-07-26 PROBLEM — S72.002A CLOSED FRACTURE OF NECK OF LEFT FEMUR: Status: RESOLVED | Noted: 2017-02-27 | Resolved: 2021-07-26

## 2021-07-26 PROBLEM — I50.9 CHF (CONGESTIVE HEART FAILURE): Status: RESOLVED | Noted: 2019-06-30 | Resolved: 2021-07-26

## 2021-07-26 LAB
ANION GAP SERPL CALCULATED.3IONS-SCNC: 6 MMOL/L (ref 5–15)
BASOPHILS # BLD AUTO: 0.04 10*3/MM3 (ref 0–0.2)
BASOPHILS NFR BLD AUTO: 0.4 % (ref 0–1.5)
BH BB BLOOD EXPIRATION DATE: NORMAL
BH BB BLOOD EXPIRATION DATE: NORMAL
BH BB BLOOD TYPE BARCODE: 600
BH BB BLOOD TYPE BARCODE: 600
BH BB DISPENSE STATUS: NORMAL
BH BB DISPENSE STATUS: NORMAL
BH BB PRODUCT CODE: NORMAL
BH BB PRODUCT CODE: NORMAL
BH BB UNIT NUMBER: NORMAL
BH BB UNIT NUMBER: NORMAL
BUN SERPL-MCNC: 8 MG/DL (ref 8–23)
BUN/CREAT SERPL: 8.9 (ref 7–25)
CALCIUM SPEC-SCNC: 8.1 MG/DL (ref 8.6–10.5)
CHLORIDE SERPL-SCNC: 114 MMOL/L (ref 98–107)
CO2 SERPL-SCNC: 22 MMOL/L (ref 22–29)
CREAT SERPL-MCNC: 0.9 MG/DL (ref 0.57–1)
CROSSMATCH INTERPRETATION: NORMAL
CROSSMATCH INTERPRETATION: NORMAL
DEPRECATED RDW RBC AUTO: 65 FL (ref 37–54)
EOSINOPHIL # BLD AUTO: 0.23 10*3/MM3 (ref 0–0.4)
EOSINOPHIL NFR BLD AUTO: 2.6 % (ref 0.3–6.2)
ERYTHROCYTE [DISTWIDTH] IN BLOOD BY AUTOMATED COUNT: 19 % (ref 12.3–15.4)
GFR SERPL CREATININE-BSD FRML MDRD: 62 ML/MIN/1.73
GLUCOSE SERPL-MCNC: 92 MG/DL (ref 65–99)
HCT VFR BLD AUTO: 36.4 % (ref 34–46.6)
HGB BLD-MCNC: 10.8 G/DL (ref 12–15.9)
IMM GRANULOCYTES # BLD AUTO: 0.02 10*3/MM3 (ref 0–0.05)
IMM GRANULOCYTES NFR BLD AUTO: 0.2 % (ref 0–0.5)
LYMPHOCYTES # BLD AUTO: 1.91 10*3/MM3 (ref 0.7–3.1)
LYMPHOCYTES NFR BLD AUTO: 21.3 % (ref 19.6–45.3)
MAGNESIUM SERPL-MCNC: 2.8 MG/DL (ref 1.6–2.4)
MCH RBC QN AUTO: 28.2 PG (ref 26.6–33)
MCHC RBC AUTO-ENTMCNC: 29.7 G/DL (ref 31.5–35.7)
MCV RBC AUTO: 95 FL (ref 79–97)
MONOCYTES # BLD AUTO: 0.76 10*3/MM3 (ref 0.1–0.9)
MONOCYTES NFR BLD AUTO: 8.5 % (ref 5–12)
NEUTROPHILS NFR BLD AUTO: 5.99 10*3/MM3 (ref 1.7–7)
NEUTROPHILS NFR BLD AUTO: 67 % (ref 42.7–76)
NRBC BLD AUTO-RTO: 0.4 /100 WBC (ref 0–0.2)
PHOSPHATE SERPL-MCNC: 3.2 MG/DL (ref 2.5–4.5)
PLATELET # BLD AUTO: 142 10*3/MM3 (ref 140–450)
PMV BLD AUTO: 12.9 FL (ref 6–12)
POTASSIUM SERPL-SCNC: 3.9 MMOL/L (ref 3.5–5.2)
RBC # BLD AUTO: 3.83 10*6/MM3 (ref 3.77–5.28)
SODIUM SERPL-SCNC: 142 MMOL/L (ref 136–145)
UNIT  ABO: NORMAL
UNIT  ABO: NORMAL
UNIT  RH: NORMAL
UNIT  RH: NORMAL
WBC # BLD AUTO: 8.95 10*3/MM3 (ref 3.4–10.8)

## 2021-07-26 PROCEDURE — 84100 ASSAY OF PHOSPHORUS: CPT | Performed by: INTERNAL MEDICINE

## 2021-07-26 PROCEDURE — 97164 PT RE-EVAL EST PLAN CARE: CPT

## 2021-07-26 PROCEDURE — 80048 BASIC METABOLIC PNL TOTAL CA: CPT | Performed by: INTERNAL MEDICINE

## 2021-07-26 PROCEDURE — 97166 OT EVAL MOD COMPLEX 45 MIN: CPT

## 2021-07-26 PROCEDURE — 99232 SBSQ HOSP IP/OBS MODERATE 35: CPT | Performed by: NURSE PRACTITIONER

## 2021-07-26 PROCEDURE — 83735 ASSAY OF MAGNESIUM: CPT | Performed by: INTERNAL MEDICINE

## 2021-07-26 PROCEDURE — 25010000002 MAGNESIUM SULFATE 2 GM/50ML SOLUTION: Performed by: INTERNAL MEDICINE

## 2021-07-26 PROCEDURE — 94799 UNLISTED PULMONARY SVC/PX: CPT

## 2021-07-26 PROCEDURE — 85025 COMPLETE CBC W/AUTO DIFF WBC: CPT | Performed by: COLON & RECTAL SURGERY

## 2021-07-26 PROCEDURE — 97530 THERAPEUTIC ACTIVITIES: CPT

## 2021-07-26 PROCEDURE — 99232 SBSQ HOSP IP/OBS MODERATE 35: CPT | Performed by: INTERNAL MEDICINE

## 2021-07-26 PROCEDURE — 63710000001 ONDANSETRON PER 8 MG: Performed by: COLON & RECTAL SURGERY

## 2021-07-26 RX ORDER — ALBUTEROL SULFATE 2.5 MG/3ML
2.5 SOLUTION RESPIRATORY (INHALATION) EVERY 6 HOURS PRN
Status: DISCONTINUED | OUTPATIENT
Start: 2021-07-26 | End: 2021-07-28 | Stop reason: HOSPADM

## 2021-07-26 RX ADMIN — ALBUTEROL SULFATE 1.25 MG: 1.25 SOLUTION RESPIRATORY (INHALATION) at 05:52

## 2021-07-26 RX ADMIN — LEVOTHYROXINE SODIUM 125 MCG: 125 TABLET ORAL at 05:24

## 2021-07-26 RX ADMIN — HYDROCODONE BITARTRATE AND ACETAMINOPHEN 1 TABLET: 5; 325 TABLET ORAL at 12:17

## 2021-07-26 RX ADMIN — BUDESONIDE AND FORMOTEROL FUMARATE DIHYDRATE 2 PUFF: 80; 4.5 AEROSOL RESPIRATORY (INHALATION) at 05:52

## 2021-07-26 RX ADMIN — PREGABALIN 300 MG: 100 CAPSULE ORAL at 08:06

## 2021-07-26 RX ADMIN — PREGABALIN 300 MG: 100 CAPSULE ORAL at 23:18

## 2021-07-26 RX ADMIN — SODIUM CHLORIDE, PRESERVATIVE FREE 10 ML: 5 INJECTION INTRAVENOUS at 23:19

## 2021-07-26 RX ADMIN — HYDROCODONE BITARTRATE AND ACETAMINOPHEN 1 TABLET: 5; 325 TABLET ORAL at 05:25

## 2021-07-26 RX ADMIN — APIXABAN 5 MG: 5 TABLET, FILM COATED ORAL at 23:17

## 2021-07-26 RX ADMIN — METOPROLOL TARTRATE 37.5 MG: 25 TABLET, FILM COATED ORAL at 08:06

## 2021-07-26 RX ADMIN — DIGOXIN 125 MCG: 125 TABLET ORAL at 12:17

## 2021-07-26 RX ADMIN — DOCUSATE SODIUM 100 MG: 100 CAPSULE, LIQUID FILLED ORAL at 08:06

## 2021-07-26 RX ADMIN — NYSTATIN 1 APPLICATION: 100000 POWDER TOPICAL at 08:06

## 2021-07-26 RX ADMIN — TOPIRAMATE 100 MG: 100 TABLET, FILM COATED ORAL at 08:07

## 2021-07-26 RX ADMIN — ONDANSETRON HYDROCHLORIDE 4 MG: 4 TABLET, FILM COATED ORAL at 05:24

## 2021-07-26 RX ADMIN — HYDROCODONE BITARTRATE AND ACETAMINOPHEN 1 TABLET: 5; 325 TABLET ORAL at 23:48

## 2021-07-26 RX ADMIN — MAGNESIUM SULFATE HEPTAHYDRATE 2 G: 40 INJECTION, SOLUTION INTRAVENOUS at 04:43

## 2021-07-26 RX ADMIN — CETIRIZINE HYDROCHLORIDE 5 MG: 10 TABLET, FILM COATED ORAL at 08:06

## 2021-07-26 RX ADMIN — DOCUSATE SODIUM 100 MG: 100 CAPSULE, LIQUID FILLED ORAL at 23:18

## 2021-07-26 RX ADMIN — MAGNESIUM SULFATE HEPTAHYDRATE 2 G: 40 INJECTION, SOLUTION INTRAVENOUS at 01:50

## 2021-07-26 RX ADMIN — PANTOPRAZOLE SODIUM 40 MG: 40 TABLET, DELAYED RELEASE ORAL at 05:24

## 2021-07-26 RX ADMIN — BUDESONIDE AND FORMOTEROL FUMARATE DIHYDRATE 2 PUFF: 80; 4.5 AEROSOL RESPIRATORY (INHALATION) at 19:10

## 2021-07-26 RX ADMIN — ATORVASTATIN CALCIUM 10 MG: 10 TABLET, FILM COATED ORAL at 23:17

## 2021-07-26 RX ADMIN — NYSTATIN: 100000 POWDER TOPICAL at 23:18

## 2021-07-26 RX ADMIN — TOPIRAMATE 100 MG: 100 TABLET, FILM COATED ORAL at 23:21

## 2021-07-27 PROCEDURE — 94799 UNLISTED PULMONARY SVC/PX: CPT

## 2021-07-27 PROCEDURE — 99232 SBSQ HOSP IP/OBS MODERATE 35: CPT | Performed by: HOSPITALIST

## 2021-07-27 RX ADMIN — BUDESONIDE AND FORMOTEROL FUMARATE DIHYDRATE 2 PUFF: 80; 4.5 AEROSOL RESPIRATORY (INHALATION) at 18:45

## 2021-07-27 RX ADMIN — DOCUSATE SODIUM 100 MG: 100 CAPSULE, LIQUID FILLED ORAL at 20:01

## 2021-07-27 RX ADMIN — NYSTATIN: 100000 POWDER TOPICAL at 08:50

## 2021-07-27 RX ADMIN — APIXABAN 5 MG: 5 TABLET, FILM COATED ORAL at 20:01

## 2021-07-27 RX ADMIN — METOPROLOL TARTRATE 37.5 MG: 25 TABLET, FILM COATED ORAL at 08:49

## 2021-07-27 RX ADMIN — TOPIRAMATE 100 MG: 100 TABLET, FILM COATED ORAL at 08:50

## 2021-07-27 RX ADMIN — SODIUM CHLORIDE, PRESERVATIVE FREE 10 ML: 5 INJECTION INTRAVENOUS at 20:03

## 2021-07-27 RX ADMIN — PREGABALIN 300 MG: 100 CAPSULE ORAL at 08:49

## 2021-07-27 RX ADMIN — NYSTATIN: 100000 POWDER TOPICAL at 20:03

## 2021-07-27 RX ADMIN — DIGOXIN 125 MCG: 125 TABLET ORAL at 12:02

## 2021-07-27 RX ADMIN — SODIUM CHLORIDE, PRESERVATIVE FREE 10 ML: 5 INJECTION INTRAVENOUS at 08:58

## 2021-07-27 RX ADMIN — TOPIRAMATE 100 MG: 100 TABLET, FILM COATED ORAL at 20:05

## 2021-07-27 RX ADMIN — PANTOPRAZOLE SODIUM 40 MG: 40 TABLET, DELAYED RELEASE ORAL at 08:50

## 2021-07-27 RX ADMIN — BUDESONIDE AND FORMOTEROL FUMARATE DIHYDRATE 2 PUFF: 80; 4.5 AEROSOL RESPIRATORY (INHALATION) at 07:07

## 2021-07-27 RX ADMIN — LEVOTHYROXINE SODIUM 125 MCG: 125 TABLET ORAL at 08:50

## 2021-07-27 RX ADMIN — APIXABAN 5 MG: 5 TABLET, FILM COATED ORAL at 08:50

## 2021-07-27 RX ADMIN — DOCUSATE SODIUM 100 MG: 100 CAPSULE, LIQUID FILLED ORAL at 08:49

## 2021-07-27 RX ADMIN — CETIRIZINE HYDROCHLORIDE 5 MG: 10 TABLET, FILM COATED ORAL at 08:49

## 2021-07-27 RX ADMIN — PREGABALIN 300 MG: 100 CAPSULE ORAL at 20:01

## 2021-07-27 RX ADMIN — ATORVASTATIN CALCIUM 10 MG: 10 TABLET, FILM COATED ORAL at 20:01

## 2021-07-27 RX ADMIN — METOPROLOL TARTRATE 37.5 MG: 25 TABLET, FILM COATED ORAL at 20:01

## 2021-07-28 ENCOUNTER — HOME HEALTH ADMISSION (OUTPATIENT)
Dept: HOME HEALTH SERVICES | Facility: HOME HEALTHCARE | Age: 71
End: 2021-07-28

## 2021-07-28 VITALS
TEMPERATURE: 97.7 F | HEIGHT: 65 IN | OXYGEN SATURATION: 98 % | SYSTOLIC BLOOD PRESSURE: 127 MMHG | WEIGHT: 189 LBS | BODY MASS INDEX: 31.49 KG/M2 | HEART RATE: 73 BPM | DIASTOLIC BLOOD PRESSURE: 70 MMHG | RESPIRATION RATE: 20 BRPM

## 2021-07-28 PROCEDURE — 94799 UNLISTED PULMONARY SVC/PX: CPT

## 2021-07-28 PROCEDURE — 99239 HOSP IP/OBS DSCHRG MGMT >30: CPT | Performed by: HOSPITALIST

## 2021-07-28 RX ORDER — CALCIUM CARBONATE 200(500)MG
2 TABLET,CHEWABLE ORAL ONCE
Status: COMPLETED | OUTPATIENT
Start: 2021-07-28 | End: 2021-07-28

## 2021-07-28 RX ORDER — LEVOTHYROXINE SODIUM 0.12 MG/1
125 TABLET ORAL DAILY
Qty: 30 TABLET | Refills: 0 | Status: SHIPPED | OUTPATIENT
Start: 2021-07-29 | End: 2022-06-02

## 2021-07-28 RX ADMIN — APIXABAN 5 MG: 5 TABLET, FILM COATED ORAL at 08:59

## 2021-07-28 RX ADMIN — TOPIRAMATE 100 MG: 100 TABLET, FILM COATED ORAL at 09:13

## 2021-07-28 RX ADMIN — DOCUSATE SODIUM 100 MG: 100 CAPSULE, LIQUID FILLED ORAL at 08:58

## 2021-07-28 RX ADMIN — NYSTATIN: 100000 POWDER TOPICAL at 08:59

## 2021-07-28 RX ADMIN — LEVOTHYROXINE SODIUM 125 MCG: 125 TABLET ORAL at 05:59

## 2021-07-28 RX ADMIN — DIGOXIN 125 MCG: 125 TABLET ORAL at 12:14

## 2021-07-28 RX ADMIN — METOPROLOL TARTRATE 37.5 MG: 25 TABLET, FILM COATED ORAL at 08:58

## 2021-07-28 RX ADMIN — DILTIAZEM HYDROCHLORIDE 30 MG: 30 TABLET, FILM COATED ORAL at 14:03

## 2021-07-28 RX ADMIN — PANTOPRAZOLE SODIUM 40 MG: 40 TABLET, DELAYED RELEASE ORAL at 05:59

## 2021-07-28 RX ADMIN — SODIUM CHLORIDE, PRESERVATIVE FREE 10 ML: 5 INJECTION INTRAVENOUS at 08:59

## 2021-07-28 RX ADMIN — ANTACID TABLETS 2 TABLET: 500 TABLET, CHEWABLE ORAL at 17:23

## 2021-07-28 RX ADMIN — PREGABALIN 300 MG: 100 CAPSULE ORAL at 08:59

## 2021-07-28 RX ADMIN — BUDESONIDE AND FORMOTEROL FUMARATE DIHYDRATE 2 PUFF: 80; 4.5 AEROSOL RESPIRATORY (INHALATION) at 09:23

## 2021-07-28 RX ADMIN — CETIRIZINE HYDROCHLORIDE 5 MG: 10 TABLET, FILM COATED ORAL at 08:58

## 2021-07-29 ENCOUNTER — READMISSION MANAGEMENT (OUTPATIENT)
Dept: CALL CENTER | Facility: HOSPITAL | Age: 71
End: 2021-07-29

## 2021-07-29 NOTE — OUTREACH NOTE
Prep Survey      Responses   [facility] facility patient discharged from?  [facility]   Is LACE score < 7 ?  No   Emergency Room discharge w/ pulse ox?  No   Eligibility  Readm Mgmt   Discharge diagnosis  HEMORRHOIDECTOMY   Does the patient have one of the following disease processes/diagnoses(primary or secondary)?  General Surgery   Does the patient have Home health ordered?  Yes   What is the Home health agency?   [facility]    Is there a DME ordered?  No   Prep survey completed?  Yes          Shana Wilkins RN

## 2021-07-31 ENCOUNTER — HOME CARE VISIT (OUTPATIENT)
Dept: HOME HEALTH SERVICES | Facility: HOME HEALTHCARE | Age: 71
End: 2021-07-31

## 2021-07-31 VITALS
SYSTOLIC BLOOD PRESSURE: 120 MMHG | DIASTOLIC BLOOD PRESSURE: 60 MMHG | RESPIRATION RATE: 16 BRPM | HEART RATE: 51 BPM | TEMPERATURE: 97 F

## 2021-07-31 PROCEDURE — G0495 RN CARE TRAIN/EDU IN HH: HCPCS

## 2021-08-02 ENCOUNTER — HOME CARE VISIT (OUTPATIENT)
Dept: HOME HEALTH SERVICES | Facility: HOME HEALTHCARE | Age: 71
End: 2021-08-02

## 2021-08-02 VITALS
HEART RATE: 78 BPM | DIASTOLIC BLOOD PRESSURE: 64 MMHG | TEMPERATURE: 97 F | SYSTOLIC BLOOD PRESSURE: 142 MMHG | RESPIRATION RATE: 16 BRPM

## 2021-08-02 PROCEDURE — G0495 RN CARE TRAIN/EDU IN HH: HCPCS

## 2021-08-04 ENCOUNTER — READMISSION MANAGEMENT (OUTPATIENT)
Dept: CALL CENTER | Facility: HOSPITAL | Age: 71
End: 2021-08-04

## 2021-08-04 ENCOUNTER — HOME CARE VISIT (OUTPATIENT)
Dept: HOME HEALTH SERVICES | Facility: HOME HEALTHCARE | Age: 71
End: 2021-08-04

## 2021-08-04 VITALS — DIASTOLIC BLOOD PRESSURE: 69 MMHG | OXYGEN SATURATION: 98 % | HEART RATE: 61 BPM | SYSTOLIC BLOOD PRESSURE: 122 MMHG

## 2021-08-04 PROCEDURE — G0152 HHCP-SERV OF OT,EA 15 MIN: HCPCS

## 2021-08-04 NOTE — OUTREACH NOTE
General Surgery Week 1 Survey      Responses   [facility] patient discharged from?  [facility]   Does the patient have one of the following disease processes/diagnoses(primary or secondary)?  General Surgery   Week 1 attempt successful?  Yes   Call start time  1040   Call end time  1052   Discharge diagnosis  HEMORRHOIDECTOMY   Person spoke with today (if not patient) and relationship  Bill,    Meds reviewed with patient/caregiver?  Yes   Is the patient having any side effects they believe may be caused by any medication additions or changes?  No   Does the patient have all medications related to this admission filled (includes all antibiotics, pain medications, etc.)  Yes   Is the patient taking all medications as directed (includes completed medication regime)?  Yes   Does the patient have a follow up appointment scheduled with their surgeon?  Yes   Has the patient kept scheduled appointments due by today?  Yes   What is the Home health agency?   [facility]    Has home health visited the patient within 72 hours of discharge?  Yes   Psychosocial issues?  No   Comments  pt immobile on left side from previous stroke, uses wheelchair, is able to ambulate   Did the patient receive a copy of their discharge instructions?  Yes   Nursing interventions  Reviewed instructions with patient   What is the patient's perception of their health status since discharge?  Improving   Nursing interventions  Nurse provided patient education   Is the patient /caregiver able to teach back basic post-op care?  Take showers only when approved by MD-sponge bathe until then, No tub bath, swimming, or hot tub until instructed by MD, Keep incision areas clean,dry and protected, Do not remove steri-strips, Lifting as instructed by MD in discharge instructions   Is the patient/caregiver able to teach back signs and symptoms of incisional infection?  Increased redness, swelling or pain at the incisonal site, Increased  drainage or bleeding, Incisional warmth, Pus or odor from incision, Fever   Is the patient/caregiver able to teach back steps to recovery at home?  Set small, achievable goals for return to baseline health, Rest and rebuild strength, gradually increase activity, Eat a well-balance diet   If the patient is a current smoker, are they able to teach back resources for cessation?  Not a smoker   Is the patient/caregiver able to teach back the hierarchy of who to call/visit for symptoms/problems? PCP, Specialist, Home health nurse, Urgent Care, ED, 911  Yes   Additional teach back comments  free of infection, uses sitz bath   Week 1 call completed?  Yes          Sheila Samaniego RN

## 2021-08-05 ENCOUNTER — HOME CARE VISIT (OUTPATIENT)
Dept: HOME HEALTH SERVICES | Facility: HOME HEALTHCARE | Age: 71
End: 2021-08-05

## 2021-08-05 VITALS
OXYGEN SATURATION: 95 % | SYSTOLIC BLOOD PRESSURE: 95 MMHG | DIASTOLIC BLOOD PRESSURE: 64 MMHG | TEMPERATURE: 96.6 F | HEART RATE: 54 BPM

## 2021-08-05 PROCEDURE — G0151 HHCP-SERV OF PT,EA 15 MIN: HCPCS

## 2021-08-05 NOTE — HOME HEALTH
Mrs. Joshi is a 71 year old female status post hemorrhoidectomy for lower GI bleeding with history of CVA with residual L sided deficits. Pt states following her CGA she went to rehab and was able to ambulate short distances with hemiwalker; however, her physical function began to decline grossly 2 years ago. Her , Barak, is her primary caregiver. Pt currently requires max a for STS, chair-chair transfers and bed mobility.     STS x2 with mod to max a; 30 sec each

## 2021-08-11 ENCOUNTER — HOME CARE VISIT (OUTPATIENT)
Dept: HOME HEALTH SERVICES | Facility: HOME HEALTHCARE | Age: 71
End: 2021-08-11

## 2021-08-11 VITALS — DIASTOLIC BLOOD PRESSURE: 62 MMHG | SYSTOLIC BLOOD PRESSURE: 112 MMHG | HEART RATE: 78 BPM | RESPIRATION RATE: 16 BRPM

## 2021-08-11 PROCEDURE — G0495 RN CARE TRAIN/EDU IN HH: HCPCS

## 2021-08-12 ENCOUNTER — HOME CARE VISIT (OUTPATIENT)
Dept: HOME HEALTH SERVICES | Facility: HOME HEALTHCARE | Age: 71
End: 2021-08-12

## 2021-08-12 VITALS
DIASTOLIC BLOOD PRESSURE: 60 MMHG | HEART RATE: 43 BPM | SYSTOLIC BLOOD PRESSURE: 110 MMHG | OXYGEN SATURATION: 98 % | TEMPERATURE: 97.5 F | RESPIRATION RATE: 17 BRPM

## 2021-08-12 PROCEDURE — G0152 HHCP-SERV OF OT,EA 15 MIN: HCPCS

## 2021-08-12 PROCEDURE — G0157 HHC PT ASSISTANT EA 15: HCPCS

## 2021-08-16 VITALS
SYSTOLIC BLOOD PRESSURE: 110 MMHG | RESPIRATION RATE: 16 BRPM | HEART RATE: 51 BPM | OXYGEN SATURATION: 98 % | TEMPERATURE: 97.5 F | DIASTOLIC BLOOD PRESSURE: 60 MMHG

## 2021-08-18 ENCOUNTER — HOME CARE VISIT (OUTPATIENT)
Dept: HOME HEALTH SERVICES | Facility: HOME HEALTHCARE | Age: 71
End: 2021-08-18

## 2021-08-18 VITALS
HEART RATE: 78 BPM | DIASTOLIC BLOOD PRESSURE: 62 MMHG | TEMPERATURE: 97 F | RESPIRATION RATE: 16 BRPM | SYSTOLIC BLOOD PRESSURE: 122 MMHG

## 2021-08-18 VITALS
HEART RATE: 52 BPM | TEMPERATURE: 94.6 F | OXYGEN SATURATION: 97 % | DIASTOLIC BLOOD PRESSURE: 80 MMHG | SYSTOLIC BLOOD PRESSURE: 110 MMHG

## 2021-08-18 PROCEDURE — G0495 RN CARE TRAIN/EDU IN HH: HCPCS

## 2021-08-18 PROCEDURE — G0152 HHCP-SERV OF OT,EA 15 MIN: HCPCS

## 2021-08-20 ENCOUNTER — HOME CARE VISIT (OUTPATIENT)
Dept: HOME HEALTH SERVICES | Facility: HOME HEALTHCARE | Age: 71
End: 2021-08-20

## 2021-08-20 VITALS
TEMPERATURE: 97.3 F | DIASTOLIC BLOOD PRESSURE: 68 MMHG | SYSTOLIC BLOOD PRESSURE: 118 MMHG | OXYGEN SATURATION: 97 % | RESPIRATION RATE: 17 BRPM | HEART RATE: 48 BPM

## 2021-08-20 PROCEDURE — G0157 HHC PT ASSISTANT EA 15: HCPCS

## 2021-08-26 ENCOUNTER — HOME CARE VISIT (OUTPATIENT)
Dept: HOME HEALTH SERVICES | Facility: HOME HEALTHCARE | Age: 71
End: 2021-08-26

## 2021-08-26 VITALS — DIASTOLIC BLOOD PRESSURE: 80 MMHG | HEART RATE: 67 BPM | SYSTOLIC BLOOD PRESSURE: 122 MMHG | OXYGEN SATURATION: 98 %

## 2021-08-26 PROCEDURE — G0152 HHCP-SERV OF OT,EA 15 MIN: HCPCS

## 2021-08-27 ENCOUNTER — HOME CARE VISIT (OUTPATIENT)
Dept: HOME HEALTH SERVICES | Facility: HOME HEALTHCARE | Age: 71
End: 2021-08-27

## 2021-08-27 VITALS
TEMPERATURE: 97.4 F | HEART RATE: 68 BPM | OXYGEN SATURATION: 98 % | RESPIRATION RATE: 17 BRPM | DIASTOLIC BLOOD PRESSURE: 64 MMHG | SYSTOLIC BLOOD PRESSURE: 118 MMHG

## 2021-08-27 PROCEDURE — G0157 HHC PT ASSISTANT EA 15: HCPCS

## 2021-09-01 ENCOUNTER — HOME CARE VISIT (OUTPATIENT)
Dept: HOME HEALTH SERVICES | Facility: HOME HEALTHCARE | Age: 71
End: 2021-09-01

## 2021-09-01 VITALS
HEART RATE: 68 BPM | TEMPERATURE: 96.4 F | DIASTOLIC BLOOD PRESSURE: 70 MMHG | RESPIRATION RATE: 16 BRPM | OXYGEN SATURATION: 98 % | SYSTOLIC BLOOD PRESSURE: 120 MMHG

## 2021-09-01 PROCEDURE — G0157 HHC PT ASSISTANT EA 15: HCPCS

## 2021-09-07 ENCOUNTER — HOME CARE VISIT (OUTPATIENT)
Dept: HOME HEALTH SERVICES | Facility: HOME HEALTHCARE | Age: 71
End: 2021-09-07

## 2021-09-07 VITALS
OXYGEN SATURATION: 97 % | TEMPERATURE: 98.1 F | HEART RATE: 72 BPM | RESPIRATION RATE: 18 BRPM | SYSTOLIC BLOOD PRESSURE: 122 MMHG | DIASTOLIC BLOOD PRESSURE: 64 MMHG

## 2021-09-07 PROCEDURE — G0151 HHCP-SERV OF PT,EA 15 MIN: HCPCS

## 2021-09-08 NOTE — HOME HEALTH
Patient and spouse reports she is doing well, no concerns with HEP or mobility at this time; both report they believe patient has returned to baseline functional level

## 2021-12-18 ENCOUNTER — APPOINTMENT (OUTPATIENT)
Dept: GENERAL RADIOLOGY | Facility: HOSPITAL | Age: 71
End: 2021-12-18

## 2021-12-18 ENCOUNTER — HOSPITAL ENCOUNTER (EMERGENCY)
Facility: HOSPITAL | Age: 71
Discharge: ANOTHER HOSPITAL | End: 2021-12-18
Attending: EMERGENCY MEDICINE | Admitting: EMERGENCY MEDICINE

## 2021-12-18 VITALS
WEIGHT: 180 LBS | SYSTOLIC BLOOD PRESSURE: 124 MMHG | DIASTOLIC BLOOD PRESSURE: 95 MMHG | TEMPERATURE: 98 F | RESPIRATION RATE: 16 BRPM | BODY MASS INDEX: 29.99 KG/M2 | HEIGHT: 65 IN | HEART RATE: 129 BPM | OXYGEN SATURATION: 92 %

## 2021-12-18 DIAGNOSIS — Z86.73 HISTORY OF STROKE: ICD-10-CM

## 2021-12-18 DIAGNOSIS — I48.20 CHRONIC ATRIAL FIBRILLATION WITH RAPID VENTRICULAR RESPONSE (HCC): ICD-10-CM

## 2021-12-18 DIAGNOSIS — S72.472A: Primary | ICD-10-CM

## 2021-12-18 DIAGNOSIS — Z79.01 CHRONIC ANTICOAGULATION: ICD-10-CM

## 2021-12-18 LAB
ABO GROUP BLD: NORMAL
ALBUMIN SERPL-MCNC: 3.6 G/DL (ref 3.5–5.2)
ALBUMIN/GLOB SERPL: 1.2 G/DL
ALP SERPL-CCNC: 64 U/L (ref 39–117)
ALT SERPL W P-5'-P-CCNC: 8 U/L (ref 1–33)
ANION GAP SERPL CALCULATED.3IONS-SCNC: 11 MMOL/L (ref 5–15)
APTT PPP: 36.6 SECONDS (ref 22–39)
AST SERPL-CCNC: 15 U/L (ref 1–32)
BASOPHILS # BLD AUTO: 0.02 10*3/MM3 (ref 0–0.2)
BASOPHILS NFR BLD AUTO: 0.2 % (ref 0–1.5)
BILIRUB SERPL-MCNC: 1.1 MG/DL (ref 0–1.2)
BLD GP AB SCN SERPL QL: NEGATIVE
BUN SERPL-MCNC: 8 MG/DL (ref 8–23)
BUN/CREAT SERPL: 11.3 (ref 7–25)
CALCIUM SPEC-SCNC: 8.4 MG/DL (ref 8.6–10.5)
CHLORIDE SERPL-SCNC: 113 MMOL/L (ref 98–107)
CO2 SERPL-SCNC: 20 MMOL/L (ref 22–29)
CREAT SERPL-MCNC: 0.71 MG/DL (ref 0.57–1)
DEPRECATED RDW RBC AUTO: 48.5 FL (ref 37–54)
EOSINOPHIL # BLD AUTO: 0.06 10*3/MM3 (ref 0–0.4)
EOSINOPHIL NFR BLD AUTO: 0.7 % (ref 0.3–6.2)
ERYTHROCYTE [DISTWIDTH] IN BLOOD BY AUTOMATED COUNT: 14.1 % (ref 12.3–15.4)
GFR SERPL CREATININE-BSD FRML MDRD: 82 ML/MIN/1.73
GLOBULIN UR ELPH-MCNC: 2.9 GM/DL
GLUCOSE SERPL-MCNC: 147 MG/DL (ref 65–99)
HCT VFR BLD AUTO: 39.4 % (ref 34–46.6)
HGB BLD-MCNC: 12.1 G/DL (ref 12–15.9)
IMM GRANULOCYTES # BLD AUTO: 0.04 10*3/MM3 (ref 0–0.05)
IMM GRANULOCYTES NFR BLD AUTO: 0.4 % (ref 0–0.5)
INR PPP: 2.33 (ref 0.85–1.16)
LIPASE SERPL-CCNC: 13 U/L (ref 13–60)
LYMPHOCYTES # BLD AUTO: 1.09 10*3/MM3 (ref 0.7–3.1)
LYMPHOCYTES NFR BLD AUTO: 12.2 % (ref 19.6–45.3)
MCH RBC QN AUTO: 29.1 PG (ref 26.6–33)
MCHC RBC AUTO-ENTMCNC: 30.7 G/DL (ref 31.5–35.7)
MCV RBC AUTO: 94.7 FL (ref 79–97)
MONOCYTES # BLD AUTO: 0.87 10*3/MM3 (ref 0.1–0.9)
MONOCYTES NFR BLD AUTO: 9.8 % (ref 5–12)
NEUTROPHILS NFR BLD AUTO: 6.82 10*3/MM3 (ref 1.7–7)
NEUTROPHILS NFR BLD AUTO: 76.7 % (ref 42.7–76)
NRBC BLD AUTO-RTO: 0 /100 WBC (ref 0–0.2)
PLATELET # BLD AUTO: 191 10*3/MM3 (ref 140–450)
PMV BLD AUTO: 12.9 FL (ref 6–12)
POTASSIUM SERPL-SCNC: 4 MMOL/L (ref 3.5–5.2)
PROT SERPL-MCNC: 6.5 G/DL (ref 6–8.5)
PROTHROMBIN TIME: 24.6 SECONDS (ref 11.4–14.4)
QT INTERVAL: 332 MS
QTC INTERVAL: 484 MS
RBC # BLD AUTO: 4.16 10*6/MM3 (ref 3.77–5.28)
RH BLD: NEGATIVE
SARS-COV-2 RDRP RESP QL NAA+PROBE: NORMAL
SODIUM SERPL-SCNC: 144 MMOL/L (ref 136–145)
T&S EXPIRATION DATE: NORMAL
WBC NRBC COR # BLD: 8.9 10*3/MM3 (ref 3.4–10.8)

## 2021-12-18 PROCEDURE — 85730 THROMBOPLASTIN TIME PARTIAL: CPT | Performed by: EMERGENCY MEDICINE

## 2021-12-18 PROCEDURE — 86901 BLOOD TYPING SEROLOGIC RH(D): CPT | Performed by: EMERGENCY MEDICINE

## 2021-12-18 PROCEDURE — 86850 RBC ANTIBODY SCREEN: CPT | Performed by: EMERGENCY MEDICINE

## 2021-12-18 PROCEDURE — 85610 PROTHROMBIN TIME: CPT | Performed by: EMERGENCY MEDICINE

## 2021-12-18 PROCEDURE — 83690 ASSAY OF LIPASE: CPT | Performed by: EMERGENCY MEDICINE

## 2021-12-18 PROCEDURE — 73560 X-RAY EXAM OF KNEE 1 OR 2: CPT

## 2021-12-18 PROCEDURE — 85025 COMPLETE CBC W/AUTO DIFF WBC: CPT | Performed by: EMERGENCY MEDICINE

## 2021-12-18 PROCEDURE — 93005 ELECTROCARDIOGRAM TRACING: CPT | Performed by: EMERGENCY MEDICINE

## 2021-12-18 PROCEDURE — 87635 SARS-COV-2 COVID-19 AMP PRB: CPT | Performed by: EMERGENCY MEDICINE

## 2021-12-18 PROCEDURE — 25010000002 MORPHINE PER 10 MG: Performed by: EMERGENCY MEDICINE

## 2021-12-18 PROCEDURE — 80053 COMPREHEN METABOLIC PANEL: CPT | Performed by: EMERGENCY MEDICINE

## 2021-12-18 PROCEDURE — 73552 X-RAY EXAM OF FEMUR 2/>: CPT

## 2021-12-18 PROCEDURE — 86900 BLOOD TYPING SEROLOGIC ABO: CPT | Performed by: EMERGENCY MEDICINE

## 2021-12-18 PROCEDURE — 96374 THER/PROPH/DIAG INJ IV PUSH: CPT

## 2021-12-18 PROCEDURE — 99283 EMERGENCY DEPT VISIT LOW MDM: CPT

## 2021-12-18 PROCEDURE — 72170 X-RAY EXAM OF PELVIS: CPT

## 2021-12-18 RX ORDER — SODIUM CHLORIDE 0.9 % (FLUSH) 0.9 %
10 SYRINGE (ML) INJECTION AS NEEDED
Status: DISCONTINUED | OUTPATIENT
Start: 2021-12-18 | End: 2021-12-18 | Stop reason: HOSPADM

## 2021-12-18 RX ORDER — MORPHINE SULFATE 4 MG/ML
4 INJECTION, SOLUTION INTRAMUSCULAR; INTRAVENOUS ONCE
Status: COMPLETED | OUTPATIENT
Start: 2021-12-18 | End: 2021-12-18

## 2021-12-18 RX ORDER — ACETAMINOPHEN 500 MG
1000 TABLET ORAL ONCE
Status: DISCONTINUED | OUTPATIENT
Start: 2021-12-18 | End: 2021-12-18 | Stop reason: HOSPADM

## 2021-12-18 RX ADMIN — MORPHINE SULFATE 4 MG: 4 INJECTION, SOLUTION INTRAMUSCULAR; INTRAVENOUS at 10:33

## 2021-12-18 NOTE — ED PROVIDER NOTES
Subjective   Vickie Joshi, a 71 y.o. female, presents to the ED with c/o left knee pain. She reports the pain is from her knee being internally rotated when attempting to get in bed. She reports the knee is now stuck in that position and in severe pain. She complains that the pain radiates down the knee and up into the left hip area. She complains of swelling and denies any increase in her normal amount of shortness of breath. She also denies any chest pain.       History provided by:  Patient  Knee Pain  Pain details:     Radiates to: Left hip.    Severity:  Severe  Associated symptoms: swelling        Review of Systems   Musculoskeletal:        Positive for left knee pain  Positive for swelling in left foot and knee   All other systems reviewed and are negative.      Past Medical History:   Diagnosis Date   • Arthritis    • Asthma    • Atrial fibrillation (HCC)    • Cardiac disorder    • Closed fracture of neck of left femur (HCC) 2/27/2017   • Disease of thyroid gland    • Fibromyalgia    • GERD (gastroesophageal reflux disease)    • Hyperlipidemia    • Hypertension    • Migraine    • Neuropathy    • Stroke (HCC)        Allergies   Allergen Reactions   • Cephalexin Swelling   • Penicillins Hives   • Sulfa Antibiotics Hives       Past Surgical History:   Procedure Laterality Date   • CHOLECYSTECTOMY     • HEMORRHOIDECTOMY N/A 7/23/2021    Procedure: HEMORRHOIDECTOMY;  Surgeon: Adan Meadows MD;  Location:  [facility];  Service: General;  Laterality: N/A;   • HIP PERCUTANEOUS PINNING Left 2/28/2017    Procedure: LEFT HIP PERCUTANEOUS PINNING FEMORAL NECK;  Surgeon: Ezra Barlow MD;  Location:  [facility];  Service:    • HYSTERECTOMY     • CT CLOSED RX TRAUMATIC HIP DISLOCATN Left 2/28/2017    Procedure: LEFT HIP CLOSED REDUCTION;  Surgeon: Ezra Barlow MD;  Location:  [facility];  Service: Orthopedics   • SHOULDER SURGERY         Family History   Problem Relation Age of Onset   • Alcohol abuse Mother    •  Heart disease Mother         Cardiac Disorder   • Stroke Mother    • Hypertension Mother    • Parkinsonism Mother    • Colon cancer Maternal Grandmother    • Diabetes Maternal Aunt        Social History     Socioeconomic History   • Marital status:    Tobacco Use   • Smoking status: Never Smoker   • Smokeless tobacco: Never Used   Substance and Sexual Activity   • Alcohol use: No   • Drug use: No   • Sexual activity: Defer         Objective   Physical Exam  Vitals and nursing note reviewed.   Constitutional:       General: She is not in acute distress.     Appearance: She is well-developed.   HENT:      Head: Normocephalic and atraumatic.      Nose: Nose normal.   Cardiovascular:      Rate and Rhythm: Normal rate and regular rhythm.      Pulses:           Dorsalis pedis pulses are detected w/ Doppler on the left side.        Posterior tibial pulses are detected w/ Doppler on the left side.      Heart sounds: Normal heart sounds. No murmur heard.       Comments: Dusky bluish left foot.  No palpable pulses on arrival.  On straining, there is Doppler flow.  Color improves with traction.  Pulmonary:      Effort: Pulmonary effort is normal. No respiratory distress.      Breath sounds: Normal breath sounds.   Abdominal:      Palpations: Abdomen is soft.      Tenderness: There is no abdominal tenderness.   Musculoskeletal:      Cervical back: Normal range of motion and neck supple.      Comments: Physical Exam on knee limited due to extreme pain    Swelling in left foot    No tenderness in left calf    Redness on left foot   Skin:     General: Skin is warm and dry.   Neurological:      Mental Status: She is alert and oriented to person, place, and time.   Psychiatric:         Behavior: Behavior normal.         Critical Care  Performed by: Eb Jacobs MD  Authorized by: Eb Jacobs MD     Critical care provider statement:     Critical care time (minutes):  60    Critical care was necessary to  treat or prevent imminent or life-threatening deterioration of the following conditions:  Trauma and circulatory failure    Critical care was time spent personally by me on the following activities:  Development of treatment plan with patient or surrogate, discussions with consultants, evaluation of patient's response to treatment, examination of patient, obtaining history from patient or surrogate, ordering and performing treatments and interventions, ordering and review of laboratory studies, ordering and review of radiographic studies, pulse oximetry and re-evaluation of patient's condition             ED Course  ED Course as of 12/18/21 1512   Sat Dec 18, 2021   1007 Heart Rate(!): 125 [RS]   1054 INR(!): 2.33 [RS]   1054 XR Knee 1 or 2 View Left  Personally reviewed the images of the left hip and knee demonstrating a distal spiral fracture with displacement of the femur.  Specific imaging ordered. [RS]   1054 Ortho paged. [RS]   1057 Discussed the case with Dr. Baker and he recommends transfer to the [facility] secondary to the positioning of the fracture as well as the proximal hardware. [RS]   1107 Traction has been administered.  Doppler flow on ultrasound in the DP and PT. [RS]   1110 Ortho paged through [facility]. [RS]   1200 Awaiting consultation with Ortho with [facility].  We have been advised that they are having phone issues. [RS]   1223 Case discussed with Dr. Randolph with [facility] who accepted the patient in transfer. [RS]   1224 Traction readjusted secondary to some duskiness developing of the foot associated with the ankle strap for the traction.  We placed the patient in Ortho boot to take the circumferential pressure off.  Doppler flow visualized. [RS]      ED Course User Index  [RS] Eb Jacobs MD     Recent Results (from the past 24 hour(s))   Comprehensive Metabolic Panel    Collection Time: 12/18/21 10:13 AM    Specimen: Blood   Result Value Ref Range    Glucose  147 (H) 65 - 99 mg/dL    BUN 8 8 - 23 mg/dL    Creatinine 0.71 0.57 - 1.00 mg/dL    Sodium 144 136 - 145 mmol/L    Potassium 4.0 3.5 - 5.2 mmol/L    Chloride 113 (H) 98 - 107 mmol/L    CO2 20.0 (L) 22.0 - 29.0 mmol/L    Calcium 8.4 (L) 8.6 - 10.5 mg/dL    Total Protein 6.5 6.0 - 8.5 g/dL    Albumin 3.60 3.50 - 5.20 g/dL    ALT (SGPT) 8 1 - 33 U/L    AST (SGOT) 15 1 - 32 U/L    Alkaline Phosphatase 64 39 - 117 U/L    Total Bilirubin 1.1 0.0 - 1.2 mg/dL    eGFR Non African Amer 82 >60 mL/min/1.73    Globulin 2.9 gm/dL    A/G Ratio 1.2 g/dL    BUN/Creatinine Ratio 11.3 7.0 - 25.0    Anion Gap 11.0 5.0 - 15.0 mmol/L   Protime-INR    Collection Time: 12/18/21 10:13 AM    Specimen: Blood   Result Value Ref Range    Protime 24.6 (H) 11.4 - 14.4 Seconds    INR 2.33 (H) 0.85 - 1.16   aPTT    Collection Time: 12/18/21 10:13 AM    Specimen: Blood   Result Value Ref Range    PTT 36.6 22.0 - 39.0 seconds   CBC Auto Differential    Collection Time: 12/18/21 10:13 AM    Specimen: Blood   Result Value Ref Range    WBC 8.90 3.40 - 10.80 10*3/mm3    RBC 4.16 3.77 - 5.28 10*6/mm3    Hemoglobin 12.1 12.0 - 15.9 g/dL    Hematocrit 39.4 34.0 - 46.6 %    MCV 94.7 79.0 - 97.0 fL    MCH 29.1 26.6 - 33.0 pg    MCHC 30.7 (L) 31.5 - 35.7 g/dL    RDW 14.1 12.3 - 15.4 %    RDW-SD 48.5 37.0 - 54.0 fl    MPV 12.9 (H) 6.0 - 12.0 fL    Platelets 191 140 - 450 10*3/mm3    Neutrophil % 76.7 (H) 42.7 - 76.0 %    Lymphocyte % 12.2 (L) 19.6 - 45.3 %    Monocyte % 9.8 5.0 - 12.0 %    Eosinophil % 0.7 0.3 - 6.2 %    Basophil % 0.2 0.0 - 1.5 %    Immature Grans % 0.4 0.0 - 0.5 %    Neutrophils, Absolute 6.82 1.70 - 7.00 10*3/mm3    Lymphocytes, Absolute 1.09 0.70 - 3.10 10*3/mm3    Monocytes, Absolute 0.87 0.10 - 0.90 10*3/mm3    Eosinophils, Absolute 0.06 0.00 - 0.40 10*3/mm3    Basophils, Absolute 0.02 0.00 - 0.20 10*3/mm3    Immature Grans, Absolute 0.04 0.00 - 0.05 10*3/mm3    nRBC 0.0 0.0 - 0.2 /100 WBC   Lipase    Collection Time: 12/18/21 10:13 AM     Specimen: Blood   Result Value Ref Range    Lipase 13 13 - 60 U/L   COVID-19, [facility] IN-HOUSE,NASAL Swab (NO TRANSPORT MEDIA) 2 HR TAT - Swab, Nasopharynx    Collection Time: 12/18/21 11:03 AM    Specimen: Nasopharynx; Swab   Result Value Ref Range    COVID19 Presumptive Negative Presumptive Negative - Ref. Range   Type & Screen    Collection Time: 12/18/21 11:19 AM    Specimen: Blood   Result Value Ref Range    ABO Type A     RH type Negative     Antibody Screen Negative     T&S Expiration Date 12/21/2021 11:59:59 PM    ECG 12 Lead    Collection Time: 12/18/21 11:44 AM   Result Value Ref Range    QT Interval 332 ms    QTC Interval 484 ms     Note: In addition to lab results from this visit, the labs listed above may include labs taken at another facility or during a different encounter within the last 24 hours. Please correlate lab times with ED admission and discharge times for further clarification of the services performed during this visit.    XR Femur 2 View Left   Preliminary Result   Spiral fracture seen of the distal femoral shaft. There is   minimal displacement and angulation the fracture fragments. The knee and   hip are unremarkable.              XR Knee 1 or 2 View Left   Final Result       The bones are severely demineralized. There is an obliquely oriented   fracture of the distal femoral diaphysis incompletely imaged on this   knee radiograph series, demonstrating medial displacement of the distal   fracture fragment by approximately one half shaft width with apex   posterior angulation on lateral radiograph. No knee joint effusion. No   fracture at the knee.       This report was finalized on 12/18/2021 11:07 AM by Eb Garrett MD.          XR Pelvis 1 or 2 View   Preliminary Result   Spiral fracture seen of the distal femoral shaft. There is   minimal displacement and angulation the fracture fragments. The knee and   hip are unremarkable.                Vitals:    12/18/21 0952 12/18/21 1301  "  BP: 121/89 124/95   BP Location:  Left arm   Patient Position:  Lying   Pulse: (!) 125 (!) 129   Resp: 18 16   Temp: 98 °F (36.7 °C)    TempSrc: Oral    SpO2: 95% 92%   Weight: 81.6 kg (180 lb) 81.6 kg (180 lb)   Height: 165.1 cm (65\")      Medications   sodium chloride 0.9 % flush 10 mL (has no administration in time range)   acetaminophen (TYLENOL) tablet 1,000 mg (1,000 mg Oral Not Given 12/18/21 1212)   Morphine sulfate (PF) injection 4 mg (4 mg Intravenous Given 12/18/21 1033)     ECG/EMG Results (last 24 hours)     ** No results found for the last 24 hours. **        ECG 12 Lead   Final Result   Test Reason : pre-op   Blood Pressure :   */*   mmHG   Vent. Rate : 128 BPM     Atrial Rate : 312 BPM      P-R Int :   * ms          QRS Dur :  78 ms       QT Int : 332 ms       P-R-T Axes :   *  35  13 degrees      QTc Int : 484 ms      Atrial fibrillation with rapid ventricular response   Nonspecific ST and T wave abnormality   Abnormal ECG   When compared with ECG of 19-JUL-2021 22:16,   Vent. rate has increased BY  49 BPM   ST no longer elevated in Lateral leads   Nonspecific T wave abnormality, worse in Inferior leads   T wave inversion more evident in Anterior leads   Confirmed by JAMES RODRÍGUEZ MD ([id number]) on 12/18/2021 2:28:37 PM      Referred By: EDMD           Confirmed By: JAMES RODRÍGUEZ MD                                               MDM  Number of Diagnoses or Management Options     Amount and/or Complexity of Data Reviewed  Clinical lab tests: reviewed  Tests in the radiology section of CPT®: reviewed  Decide to obtain previous medical records or to obtain history from someone other than the patient: yes  Obtain history from someone other than the patient: yes  Discuss the patient with other providers: yes  Independent visualization of images, tracings, or specimens: yes    Critical Care  Total time providing critical care: 30-74 minutes    Transfer to [facility]    Final diagnoses:   Closed torus fracture of " distal end of left femur, initial encounter (HCC)   Chronic atrial fibrillation with rapid ventricular response (HCC)   Chronic anticoagulation   History of stroke       Documentation assistance provided by marleny Berger.  Information recorded by the marleny was done at my direction and has been verified and validated by me.     Padmini Berger  12/18/21 1007       Eb Jacobs MD  12/18/21 9652

## 2022-04-26 DIAGNOSIS — G62.9 NEUROPATHY: Primary | ICD-10-CM

## 2022-04-26 RX ORDER — OMEPRAZOLE 40 MG/1
40 CAPSULE, DELAYED RELEASE ORAL DAILY
COMMUNITY
Start: 2021-10-05 | End: 2022-04-26 | Stop reason: SDUPTHER

## 2022-04-26 RX ORDER — MONTELUKAST SODIUM 10 MG/1
10 TABLET ORAL
COMMUNITY
Start: 2021-10-05 | End: 2022-04-26 | Stop reason: SDUPTHER

## 2022-04-26 RX ORDER — METOPROLOL SUCCINATE 100 MG/1
1 TABLET, EXTENDED RELEASE ORAL DAILY
COMMUNITY
Start: 2022-01-05 | End: 2022-04-26 | Stop reason: SDUPTHER

## 2022-04-26 RX ORDER — LEVOTHYROXINE SODIUM 137 UG/1
137 TABLET ORAL
COMMUNITY
End: 2022-04-26 | Stop reason: SDUPTHER

## 2022-04-26 NOTE — TELEPHONE ENCOUNTER
Patient called needing her eliquis, atrovastatin, levothyrocine, topamate, metoprolol, lyrica, omeprazole, and montelukast refilled at [facility] patient scheduled medication recheck on 6/2/22

## 2022-04-27 RX ORDER — LEVOTHYROXINE SODIUM 137 UG/1
137 TABLET ORAL DAILY
Qty: 30 TABLET | Refills: 1 | Status: SHIPPED | OUTPATIENT
Start: 2022-04-27 | End: 2022-06-02 | Stop reason: SDUPTHER

## 2022-04-27 RX ORDER — OMEPRAZOLE 40 MG/1
40 CAPSULE, DELAYED RELEASE ORAL DAILY
Qty: 30 CAPSULE | Refills: 1 | Status: SHIPPED | OUTPATIENT
Start: 2022-04-27 | End: 2022-06-02 | Stop reason: SDUPTHER

## 2022-04-27 RX ORDER — TOPIRAMATE 100 MG/1
100 TABLET, FILM COATED ORAL 2 TIMES DAILY
Qty: 60 TABLET | Refills: 1 | Status: SHIPPED | OUTPATIENT
Start: 2022-04-27 | End: 2022-06-02 | Stop reason: SDUPTHER

## 2022-04-27 RX ORDER — MONTELUKAST SODIUM 10 MG/1
10 TABLET ORAL DAILY
Qty: 30 TABLET | Refills: 1 | Status: SHIPPED | OUTPATIENT
Start: 2022-04-27 | End: 2022-06-02 | Stop reason: SDUPTHER

## 2022-04-27 RX ORDER — PREGABALIN 300 MG/1
300 CAPSULE ORAL 2 TIMES DAILY
Qty: 60 CAPSULE | Refills: 1 | Status: SHIPPED | OUTPATIENT
Start: 2022-04-27 | End: 2022-06-02 | Stop reason: SDUPTHER

## 2022-04-27 RX ORDER — ATORVASTATIN CALCIUM 10 MG/1
10 TABLET, FILM COATED ORAL NIGHTLY
Qty: 30 TABLET | Refills: 1 | Status: SHIPPED | OUTPATIENT
Start: 2022-04-27 | End: 2022-06-02 | Stop reason: SDUPTHER

## 2022-04-27 RX ORDER — METOPROLOL SUCCINATE 100 MG/1
100 TABLET, EXTENDED RELEASE ORAL DAILY
Qty: 30 TABLET | Refills: 1 | Status: SHIPPED | OUTPATIENT
Start: 2022-04-27 | End: 2022-06-02 | Stop reason: SDUPTHER

## 2022-05-18 ENCOUNTER — TELEPHONE (OUTPATIENT)
Dept: FAMILY MEDICINE CLINIC | Facility: CLINIC | Age: 72
End: 2022-05-18

## 2022-05-19 NOTE — TELEPHONE ENCOUNTER
said they are wanting her admitted to [facility] for physical therapy to get her strength up, I told him I didn't know how we would get this set up, any suggestions?

## 2022-05-20 NOTE — TELEPHONE ENCOUNTER
I have never admitted someone to [facility] ever.   Not sure how that works or even if it is possible.  Typically would not be able to happen as far as I knew without hospital admission first.   Would suspect they would have to be seen first here anyways.  They can check to see if there is a process to make that happen with them.   You can also call there admissions and see if they can give suggestion.

## 2022-05-20 NOTE — TELEPHONE ENCOUNTER
Patient's  returned Any's call.  He states that a referral for admission should be faxed to [phone] along with notes of patient's last visit with Dr. Bryan.

## 2022-05-24 NOTE — TELEPHONE ENCOUNTER
You can go ahead and send the referral and office notes from our last visit but is not going mention anything about her needing rehab.

## 2022-05-25 ENCOUNTER — TELEPHONE (OUTPATIENT)
Dept: FAMILY MEDICINE CLINIC | Facility: CLINIC | Age: 72
End: 2022-05-25

## 2022-05-25 DIAGNOSIS — I69.352 HEMIPLGA FOLLOWING CEREBRAL INFRC AFF LEFT DOMINANT SIDE: Primary | ICD-10-CM

## 2022-05-25 DIAGNOSIS — S72.90XD: ICD-10-CM

## 2022-06-01 RX ORDER — DILTIAZEM HYDROCHLORIDE 60 MG/1
TABLET, FILM COATED ORAL
Qty: 10.2 G | Refills: 5 | Status: SHIPPED | OUTPATIENT
Start: 2022-06-01 | End: 2022-06-02 | Stop reason: SDUPTHER

## 2022-06-02 ENCOUNTER — OFFICE VISIT (OUTPATIENT)
Dept: FAMILY MEDICINE CLINIC | Facility: CLINIC | Age: 72
End: 2022-06-02

## 2022-06-02 VITALS — HEART RATE: 43 BPM | DIASTOLIC BLOOD PRESSURE: 80 MMHG | OXYGEN SATURATION: 98 % | SYSTOLIC BLOOD PRESSURE: 128 MMHG

## 2022-06-02 DIAGNOSIS — I10 PRIMARY HYPERTENSION: Primary | ICD-10-CM

## 2022-06-02 DIAGNOSIS — E03.9 ACQUIRED HYPOTHYROIDISM: ICD-10-CM

## 2022-06-02 DIAGNOSIS — G62.9 NEUROPATHY: ICD-10-CM

## 2022-06-02 DIAGNOSIS — S72.002S CLOSED LEFT HIP FRACTURE, SEQUELA: ICD-10-CM

## 2022-06-02 DIAGNOSIS — I48.20 ATRIAL FIBRILLATION, CHRONIC: ICD-10-CM

## 2022-06-02 PROCEDURE — 99214 OFFICE O/P EST MOD 30 MIN: CPT | Performed by: FAMILY MEDICINE

## 2022-06-05 RX ORDER — DILTIAZEM HYDROCHLORIDE 120 MG/1
360 CAPSULE, EXTENDED RELEASE ORAL DAILY
Qty: 90 CAPSULE | Refills: 1 | Status: SHIPPED | OUTPATIENT
Start: 2022-06-05 | End: 2022-06-17 | Stop reason: ALTCHOICE

## 2022-06-05 RX ORDER — MONTELUKAST SODIUM 10 MG/1
10 TABLET ORAL DAILY
Qty: 90 TABLET | Refills: 1 | Status: SHIPPED | OUTPATIENT
Start: 2022-06-05

## 2022-06-05 RX ORDER — POTASSIUM CHLORIDE 1500 MG/1
20 TABLET, FILM COATED, EXTENDED RELEASE ORAL DAILY
Qty: 90 TABLET | Refills: 1 | Status: SHIPPED | OUTPATIENT
Start: 2022-06-05 | End: 2023-01-24

## 2022-06-05 RX ORDER — OMEPRAZOLE 40 MG/1
40 CAPSULE, DELAYED RELEASE ORAL DAILY
Qty: 90 CAPSULE | Refills: 1 | Status: SHIPPED | OUTPATIENT
Start: 2022-06-05 | End: 2023-02-20

## 2022-06-05 RX ORDER — PREGABALIN 300 MG/1
300 CAPSULE ORAL 2 TIMES DAILY
Qty: 180 CAPSULE | Refills: 1 | Status: SHIPPED | OUTPATIENT
Start: 2022-06-05 | End: 2023-01-24

## 2022-06-05 RX ORDER — BUDESONIDE AND FORMOTEROL FUMARATE DIHYDRATE 80; 4.5 UG/1; UG/1
2 AEROSOL RESPIRATORY (INHALATION)
Qty: 30.6 G | Refills: 1 | Status: SHIPPED | OUTPATIENT
Start: 2022-06-05

## 2022-06-05 RX ORDER — LEVOTHYROXINE SODIUM 137 UG/1
137 TABLET ORAL DAILY
Qty: 90 TABLET | Refills: 1 | Status: SHIPPED | OUTPATIENT
Start: 2022-06-05

## 2022-06-05 RX ORDER — FUROSEMIDE 20 MG/1
20 TABLET ORAL EVERY OTHER DAY
Start: 2022-06-05

## 2022-06-05 RX ORDER — TOPIRAMATE 100 MG/1
100 TABLET, FILM COATED ORAL DAILY
Qty: 90 TABLET | Refills: 1 | Status: SHIPPED | OUTPATIENT
Start: 2022-06-05 | End: 2023-03-03

## 2022-06-05 RX ORDER — METOPROLOL SUCCINATE 100 MG/1
100 TABLET, EXTENDED RELEASE ORAL DAILY
Qty: 90 TABLET | Refills: 1 | Status: SHIPPED | OUTPATIENT
Start: 2022-06-05

## 2022-06-05 RX ORDER — ATORVASTATIN CALCIUM 10 MG/1
10 TABLET, FILM COATED ORAL NIGHTLY
Qty: 90 TABLET | Refills: 1 | Status: SHIPPED | OUTPATIENT
Start: 2022-06-05 | End: 2023-03-27

## 2022-06-05 NOTE — PROGRESS NOTES
Follow Up Office Visit      Date of Visit:  2022   Patient Name: Vickie Joshi  : 1950   MRN: [id number]     Chief Complaint:  No chief complaint on file.      History of Present Illness: Vickie Joshi is a 71 y.o. female who is here today for follow up.  Patient suffered hip fracture around 6 months ago.  Was not able to undergo physical therapy afterwards due to issues related to COVID.  She has remained to be weaker than before the hip fracture.  Her gait and balance are overall greatly diminished.  She has had a prior CVA which makes standing and walking difficult at baseline.  Condition has overall worsened since the hip fracture.  She and her  are requesting inpatient referral for rehabilitation.  They would like to go back to [facility].  Is not very easy for her  to be on to get her up to do physical therapy at an outpatient physical therapy location.  Her physical therapy would not be intensive enough at her home with home health.  They would like at least to get back to baseline if possible.        Subjective      Review of Systems:   Review of Systems   Constitutional: Positive for fatigue.   Musculoskeletal: Positive for gait problem.   Neurological: Positive for weakness.       Past Medical History:   Past Medical History:   Diagnosis Date   • Arthritis    • Asthma    • Atrial fibrillation (HCC)    • Cardiac disorder    • Closed fracture of neck of left femur (HCC) 2017   • Disease of thyroid gland    • Fibromyalgia    • GERD (gastroesophageal reflux disease)    • Hyperlipidemia    • Hypertension    • Migraine    • Neuropathy    • Stroke (HCC)        Past Surgical History:   Past Surgical History:   Procedure Laterality Date   • CHOLECYSTECTOMY     • HEMORRHOIDECTOMY N/A 2021    Procedure: HEMORRHOIDECTOMY;  Surgeon: Adan Meadows MD;  [facility];  Service: General;  Laterality: N/A;   • HIP PERCUTANEOUS PINNING Left 2017     Procedure: LEFT HIP PERCUTANEOUS PINNING FEMORAL NECK;  Surgeon: Ezra Barlow MD;  Location:  [facility];  Service:    • HYSTERECTOMY     • MT CLOSED RX TRAUMATIC HIP DISLOCATN Left 2/28/2017    Procedure: LEFT HIP CLOSED REDUCTION;  Surgeon: Ezra Barlow MD;  Location:  [facility];  Service: Orthopedics   • SHOULDER SURGERY         Family History:   Family History   Problem Relation Age of Onset   • Alcohol abuse Mother    • Heart disease Mother         Cardiac Disorder   • Stroke Mother    • Hypertension Mother    • Parkinsonism Mother    • Colon cancer Maternal Grandmother    • Diabetes Maternal Aunt        Social History:   Social History     Socioeconomic History   • Marital status:    Tobacco Use   • Smoking status: Never Smoker   • Smokeless tobacco: Never Used   Substance and Sexual Activity   • Alcohol use: No   • Drug use: No   • Sexual activity: Defer       Medications:     Current Outpatient Medications:   •  albuterol (ACCUNEB) 1.25 MG/3ML nebulizer solution, Take 3 mL by nebulization Every 6 (Six) Hours As Needed for Wheezing or Shortness of Air., Disp: 90 vial, Rfl: 12  •  apixaban (ELIQUIS) 5 MG tablet tablet, Take 1 tablet by mouth Every 12 (Twelve) Hours., Disp: 90 tablet, Rfl: 1  •  atorvastatin (LIPITOR) 10 MG tablet, Take 1 tablet by mouth Every Night., Disp: 90 tablet, Rfl: 1  •  budesonide-formoterol (Symbicort) 80-4.5 MCG/ACT inhaler, Inhale 2 puffs 2 (Two) Times a Day., Disp: 30.6 g, Rfl: 1  •  dilTIAZem (TIAZAC) 120 MG 24 hr capsule, Take 3 capsules by mouth Daily., Disp: 90 capsule, Rfl: 1  •  docusate sodium (COLACE) 100 MG capsule, Take 100 mg by mouth 2 (Two) Times a Day., Disp: , Rfl:   •  furosemide (LASIX) 20 MG tablet, Take 1 tablet by mouth Every Other Day., Disp: , Rfl:   •  levothyroxine (SYNTHROID, LEVOTHROID) 137 MCG tablet, Take 1 tablet by mouth Daily., Disp: 90 tablet, Rfl: 1  •  loperamide (IMODIUM) 2 MG capsule, Take 2 mg by mouth Daily As Needed for diarrhea.,  Disp: , Rfl:   •  loratadine (CLARITIN) 10 MG tablet, Take 10 mg by mouth Daily., Disp: , Rfl:   •  metoprolol succinate XL (TOPROL-XL) 100 MG 24 hr tablet, Take 1 tablet by mouth Daily., Disp: 90 tablet, Rfl: 1  •  montelukast (SINGULAIR) 10 MG tablet, Take 1 tablet by mouth Daily., Disp: 90 tablet, Rfl: 1  •  omeprazole (priLOSEC) 40 MG capsule, Take 1 capsule by mouth Daily., Disp: 90 capsule, Rfl: 1  •  potassium chloride ER (K-TAB) 20 MEQ tablet controlled-release ER tablet, Take 1 tablet by mouth Daily., Disp: 90 tablet, Rfl: 1  •  pregabalin (LYRICA) 300 MG capsule, Take 1 capsule by mouth 2 (Two) Times a Day., Disp: 180 capsule, Rfl: 1  •  topiramate (TOPAMAX) 100 MG tablet, Take 1 tablet by mouth Daily., Disp: 90 tablet, Rfl: 1  •  hydrocortisone (PREPARATION H HYDROCORTISONE) 1 % cream, Apply 1 application topically 4 (Four) Times a Day As Needed., Disp: , Rfl:     Allergies:   Allergies   Allergen Reactions   • Cephalexin Swelling   • Penicillins Hives   • Sulfa Antibiotics Hives       Objective     Physical Exam:  Vital Signs:   Vitals:    06/02/22 1133   BP: 128/80   Pulse: (!) 43   SpO2: 98%     There is no height or weight on file to calculate BMI.     Physical Exam  Vitals and nursing note reviewed.   Constitutional:       General: She is not in acute distress.     Appearance: Normal appearance. She is not ill-appearing.   HENT:      Head: Normocephalic and atraumatic.      Right Ear: Tympanic membrane and ear canal normal.      Left Ear: Tympanic membrane and ear canal normal.      Nose: Nose normal.   Cardiovascular:      Rate and Rhythm: Normal rate and regular rhythm.      Heart sounds: Normal heart sounds.   Pulmonary:      Effort: Pulmonary effort is normal.      Breath sounds: Normal breath sounds.   Neurological:      Mental Status: She is alert and oriented to person, place, and time. Mental status is at baseline.      Comments: Currently confined to wheelchair.   Psychiatric:         Mood  and Affect: Mood normal.         Procedures    BMI has not been calculated during today's encounter.        Assessment / Plan      Assessment/Plan:   Diagnoses and all orders for this visit:    1. Primary hypertension (Primary)    2. Neuropathy  -     pregabalin (LYRICA) 300 MG capsule; Take 1 capsule by mouth 2 (Two) Times a Day.  Dispense: 180 capsule; Refill: 1    3. Acquired hypothyroidism    4. Closed left hip fracture, sequela    5. Atrial fibrillation, chronic (HCC)    Other orders  -     topiramate (TOPAMAX) 100 MG tablet; Take 1 tablet by mouth Daily.  Dispense: 90 tablet; Refill: 1  -     budesonide-formoterol (Symbicort) 80-4.5 MCG/ACT inhaler; Inhale 2 puffs 2 (Two) Times a Day.  Dispense: 30.6 g; Refill: 1  -     potassium chloride ER (K-TAB) 20 MEQ tablet controlled-release ER tablet; Take 1 tablet by mouth Daily.  Dispense: 90 tablet; Refill: 1  -     omeprazole (priLOSEC) 40 MG capsule; Take 1 capsule by mouth Daily.  Dispense: 90 capsule; Refill: 1  -     montelukast (SINGULAIR) 10 MG tablet; Take 1 tablet by mouth Daily.  Dispense: 90 tablet; Refill: 1  -     metoprolol succinate XL (TOPROL-XL) 100 MG 24 hr tablet; Take 1 tablet by mouth Daily.  Dispense: 90 tablet; Refill: 1  -     levothyroxine (SYNTHROID, LEVOTHROID) 137 MCG tablet; Take 1 tablet by mouth Daily.  Dispense: 90 tablet; Refill: 1  -     furosemide (LASIX) 20 MG tablet; Take 1 tablet by mouth Every Other Day.  -     dilTIAZem (TIAZAC) 120 MG 24 hr capsule; Take 3 capsules by mouth Daily.  Dispense: 90 capsule; Refill: 1  -     atorvastatin (LIPITOR) 10 MG tablet; Take 1 tablet by mouth Every Night.  Dispense: 90 tablet; Refill: 1  -     apixaban (ELIQUIS) 5 MG tablet tablet; Take 1 tablet by mouth Every 12 (Twelve) Hours.  Dispense: 90 tablet; Refill: 1         Patient did need all of her medications refilled.  We are also going to be doing orders for inpatient rehabilitation at [facility].    Follow Up:   No follow-ups on  file.    Dav Bryan  [facility]

## 2022-06-06 ENCOUNTER — TELEPHONE (OUTPATIENT)
Dept: FAMILY MEDICINE CLINIC | Facility: CLINIC | Age: 72
End: 2022-06-06

## 2022-06-06 NOTE — TELEPHONE ENCOUNTER
Left [facility] for pt. I called [facility] to set up in patient physical therapy. They said she needs eval by one of their physicians first. Wanted to make her aware of this and her apportionment that was already scheduled for June 22 at 3pm with their physician Dr Coleman.

## 2022-06-08 ENCOUNTER — TELEPHONE (OUTPATIENT)
Dept: FAMILY MEDICINE CLINIC | Facility: CLINIC | Age: 72
End: 2022-06-08

## 2022-06-17 ENCOUNTER — OFFICE VISIT (OUTPATIENT)
Dept: CARDIOLOGY | Facility: CLINIC | Age: 72
End: 2022-06-17

## 2022-06-17 VITALS
SYSTOLIC BLOOD PRESSURE: 128 MMHG | OXYGEN SATURATION: 98 % | DIASTOLIC BLOOD PRESSURE: 74 MMHG | HEIGHT: 65 IN | WEIGHT: 180 LBS | BODY MASS INDEX: 29.99 KG/M2 | HEART RATE: 68 BPM | TEMPERATURE: 97 F | RESPIRATION RATE: 18 BRPM

## 2022-06-17 DIAGNOSIS — I48.20 ATRIAL FIBRILLATION, CHRONIC: ICD-10-CM

## 2022-06-17 DIAGNOSIS — E78.5 HYPERLIPIDEMIA, UNSPECIFIED HYPERLIPIDEMIA TYPE: ICD-10-CM

## 2022-06-17 DIAGNOSIS — I10 PRIMARY HYPERTENSION: Primary | ICD-10-CM

## 2022-06-17 PROBLEM — I48.91 ATRIAL FIBRILLATION WITH RVR: Status: RESOLVED | Noted: 2018-09-27 | Resolved: 2022-06-17

## 2022-06-17 PROCEDURE — 93000 ELECTROCARDIOGRAM COMPLETE: CPT | Performed by: INTERNAL MEDICINE

## 2022-06-17 PROCEDURE — 99214 OFFICE O/P EST MOD 30 MIN: CPT | Performed by: INTERNAL MEDICINE

## 2022-06-17 RX ORDER — MULTIVITAMIN WITH IRON
TABLET ORAL
COMMUNITY

## 2022-06-17 RX ORDER — LISINOPRIL 5 MG/1
5 TABLET ORAL DAILY
COMMUNITY
End: 2022-08-29

## 2022-06-17 RX ORDER — DILTIAZEM HYDROCHLORIDE 120 MG/1
120 CAPSULE, EXTENDED RELEASE ORAL 2 TIMES DAILY
Qty: 180 CAPSULE | Refills: 3 | Status: SHIPPED | OUTPATIENT
Start: 2022-06-17

## 2022-06-17 NOTE — PROGRESS NOTES
[facility] [place]  [facility] CARDIOLOGY  [address] [place] [address]  Dept: [phone]  Dept Fax: [phone]    Vickie Joshi  1950    Follow Up Office Visit Note    History of Present Illness:  Vickie Joshi is a 72 y.o. female who presents to the clinic for Chronic atrial fibrillation- She seems steady her meds has change she is now on Cardizem Er 120 tid, Metoprol xl 100 mg daily and Eliquis 5 mg bid, Hr is slow 54, will lower Cardizem to 120 bid, keep Toprol xl 100 mg   And Eliquis 5mg bid , advised to call us if the Hr stays low  In 2 weeks under 60    The following portions of the patient's history were reviewed and updated as appropriate: allergies, current medications, past family history, past medical history, past social history, past surgical history and problem list.    Medications:  albuterol  apixaban tablet  atorvastatin  budesonide-formoterol  dilTIAZem SR  docusate sodium  furosemide  hydrocortisone  levothyroxine  lisinopril  loperamide  loratadine  Magnesium tablet  metoprolol succinate XL  montelukast  omeprazole  potassium chloride ER tablet controlled-release  pregabalin  topiramate    Subjective  Allergies   Allergen Reactions   • Cephalexin Swelling   • Penicillins Hives   • Sulfa Antibiotics Hives        Past Medical History:   Diagnosis Date   • Arthritis    • Asthma    • Atrial fibrillation (HCC)    • Cardiac disorder    • Closed fracture of neck of left femur (HCC) 2/27/2017   • Disease of thyroid gland    • Fibromyalgia    • GERD (gastroesophageal reflux disease)    • Hyperlipidemia    • Hypertension    • Migraine    • Neuropathy    • Stroke (HCC)        Past Surgical History:   Procedure Laterality Date   • CHOLECYSTECTOMY     • HEMORRHOIDECTOMY N/A 7/23/2021    Procedure: HEMORRHOIDECTOMY;  Surgeon: Adan Meadows MD;  Location: [facility];  Service: General;  Laterality: N/A;   • HIP PERCUTANEOUS PINNING Left 2/28/2017    Procedure:  "LEFT HIP PERCUTANEOUS PINNING FEMORAL NECK;  Surgeon: Ezra Barlow MD;  Location:  [facility];  Service:    • HYSTERECTOMY     • RI CLOSED RX TRAUMATIC HIP DISLOCATN Left 2/28/2017    Procedure: LEFT HIP CLOSED REDUCTION;  Surgeon: Ezra Barlow MD;  Location:  [facility];  Service: Orthopedics   • SHOULDER SURGERY         Family History   Problem Relation Age of Onset   • Alcohol abuse Mother    • Heart disease Mother         Cardiac Disorder   • Stroke Mother    • Hypertension Mother    • Parkinsonism Mother    • Colon cancer Maternal Grandmother    • Diabetes Maternal Aunt         Social History     Socioeconomic History   • Marital status:    Tobacco Use   • Smoking status: Never Smoker   • Smokeless tobacco: Never Used   Substance and Sexual Activity   • Alcohol use: No   • Drug use: No   • Sexual activity: Defer       Review of Systems   Constitutional: Negative.    HENT: Negative.    Respiratory: Negative.    Cardiovascular: Negative.    Endocrine: Negative.    Genitourinary: Negative.    Musculoskeletal: Negative.    Skin: Negative.    Allergic/Immunologic: Negative.    Neurological: Negative.    Hematological: Negative.    Psychiatric/Behavioral: Negative.        Cardiovascular Procedures    ECHO/MUGA:   STRESS TESTS:   CARDIAC CATH:   DEVICES:   HOLTER:   CT/MRI:   VASCULAR:   CARDIOTHORACIC:     Objective  Vitals:    06/17/22 1400   BP: 128/74   BP Location: Left arm   Patient Position: Sitting   Cuff Size: Adult   Pulse: 68   Resp: 18   Temp: 97 °F (36.1 °C)   TempSrc: Infrared   SpO2: 98%   Weight: 81.6 kg (180 lb)   Height: 165.1 cm (65\")   PainSc: 0-No pain     Body mass index is 29.95 kg/m².     Physical Exam  Constitutional:       Appearance: Healthy appearance. Not in distress.   Neck:      Vascular: No JVR. JVD normal.   Pulmonary:      Effort: Pulmonary effort is normal.      Breath sounds: Normal breath sounds. No wheezing. No rhonchi. No rales.   Chest:      Chest wall: Not tender to " palpatation.   Cardiovascular:      PMI at left midclavicular line. Normal rate. Irregularly irregular rhythm. Normal S1. Normal S2.      Murmurs: There is no murmur.      No gallop. No click. No rub.   Pulses:     Intact distal pulses.   Edema:     Peripheral edema absent.   Abdominal:      General: Bowel sounds are normal.      Palpations: Abdomen is soft.      Tenderness: There is no abdominal tenderness.   Musculoskeletal: Normal range of motion.         General: No tenderness. Skin:     General: Skin is warm and dry.   Neurological:      General: No focal deficit present.      Mental Status: Alert and oriented to person, place and time.          Diagnostic Data    ECG 12 Lead    Date/Time: 6/17/2022 3:01 PM  Performed by: Riccardo Rae MD  Authorized by: Riccardo Rae MD   Comparison: compared with previous ECG from 12/18/2021  Similar to previous ECG  Rhythm: atrial fibrillation  Rate: bradycardic  BPM: 56  QRS axis: normal  Other findings: non-specific ST-T wave changes    Clinical impression: abnormal EKG            Assessment and Plan  Diagnoses and all orders for this visit:    Primary hypertension-The BP is fine even a little low, will lower the Cardizem to 120 bid, keep Toprol xl 100 mg and Lisinopril 5mg     Atrial fibrillation, chronic (HCC)- rate control this is slow 56    Hyperlipidemia, unspecified hyperlipidemia type-On Lipitor 10 mg, will keep same meds, we do not have a Lipid     Other orders  -     Magnesium 250 MG tablet; Take  by mouth.  -     dilTIAZem SR (CARDIZEM SR) 120 MG 12 hr capsule; Take 1 capsule by mouth 2 (Two) Times a Day.  -     lisinopril (PRINIVIL,ZESTRIL) 5 MG tablet; Take 5 mg by mouth Daily.         No follow-ups on file.    Riccardo Rae MD  06/17/2022

## 2022-08-05 ENCOUNTER — TELEPHONE (OUTPATIENT)
Dept: FAMILY MEDICINE CLINIC | Facility: CLINIC | Age: 72
End: 2022-08-05

## 2022-08-05 NOTE — TELEPHONE ENCOUNTER
Caller: Barak Joshi    Relationship: Emergency Contact    Best call back number: [phone]    What is the medical concern/diagnosis: PHYSICAL THERAPY    [facility] [place]    Any additional details: PATIENT'S  STATES THAT HE WOULD LIKE TO TALK WITH DR ALONZO TODAY, IF POSSIBLE.

## 2022-08-10 NOTE — TELEPHONE ENCOUNTER
We need to try to get her in for an appointment in the next 2 weeks or so.  Talked with the doctor from [facility].  He feels that she needs long-term physical therapy but more appropriate for rehab through something like a nursing home.  We will never be able to set that up without her being seen here and documenting from him in her office notes.   (4) walks frequently

## 2022-08-15 ENCOUNTER — TELEPHONE (OUTPATIENT)
Dept: FAMILY MEDICINE CLINIC | Facility: CLINIC | Age: 72
End: 2022-08-15

## 2022-08-15 NOTE — TELEPHONE ENCOUNTER
Caller: Barak Joshi    Relationship: Emergency Contact    Best call back number: [phone]    What is the best time to reach you: ANYTIME     Who are you requesting to speak with (clinical staff, provider,  specific staff member): CLINICAL STAFF     What was the call regarding: PATIENT'S  IS CALLING TO SPEAK WITH THE PROVIDER ABOUT ANY UPDATES THAT HE MIGHT HAVE ON FINDING THE PATIENT A FACILITY TO GO TO.     Do you require a callback: YES

## 2022-08-16 NOTE — TELEPHONE ENCOUNTER
We have to get them worked in to see them here.  We have to put documentation in note for insurance to consider.

## 2022-08-18 ENCOUNTER — OFFICE VISIT (OUTPATIENT)
Dept: FAMILY MEDICINE CLINIC | Facility: CLINIC | Age: 72
End: 2022-08-18

## 2022-08-18 VITALS — HEART RATE: 67 BPM | DIASTOLIC BLOOD PRESSURE: 56 MMHG | OXYGEN SATURATION: 95 % | SYSTOLIC BLOOD PRESSURE: 100 MMHG

## 2022-08-18 DIAGNOSIS — I69.352 HEMIPLGA FOLLOWING CEREBRAL INFRC AFF LEFT DOMINANT SIDE: Primary | ICD-10-CM

## 2022-08-18 DIAGNOSIS — I48.20 ATRIAL FIBRILLATION, CHRONIC: ICD-10-CM

## 2022-08-18 DIAGNOSIS — G62.9 NEUROPATHY: ICD-10-CM

## 2022-08-18 DIAGNOSIS — S72.002S CLOSED LEFT HIP FRACTURE, SEQUELA: ICD-10-CM

## 2022-08-18 PROCEDURE — 99214 OFFICE O/P EST MOD 30 MIN: CPT | Performed by: FAMILY MEDICINE

## 2022-08-29 RX ORDER — LISINOPRIL 5 MG/1
TABLET ORAL
Qty: 90 TABLET | Refills: 0 | Status: SHIPPED | OUTPATIENT
Start: 2022-08-29 | End: 2022-11-21

## 2022-09-26 NOTE — PROGRESS NOTES
Follow Up Office Visit      Date of Visit:  2022   Patient Name: Vickie Joshi  : 1950   MRN: [id number]     Chief Complaint:    Chief Complaint   Patient presents with   • discuss PT       History of Present Illness: Vickie Joshi is a 72 y.o. female who is here today for follow up.  Pt with recent visit to physical and rehab medicine at [facility].  Pt wanting more aggressive PT than she can get through home health.  Difficult to do as outpt because of her prior stroke.   Not able to stand now on her own.   They thought she would require a more extended time and would be more appropriate for a nursing home with scheduled PT and nursing for more of an extended time.         Subjective      Review of Systems:   Review of Systems   Constitutional: Positive for fatigue. Negative for fever.   HENT: Negative for congestion and ear pain.    Respiratory: Negative for apnea, cough, chest tightness and shortness of breath.    Cardiovascular: Negative for chest pain.   Gastrointestinal: Negative for abdominal pain, constipation, diarrhea and nausea.   Musculoskeletal: Positive for arthralgias and gait problem.   Neurological: Positive for numbness.   Psychiatric/Behavioral: Negative for depressed mood and stress.       Past Medical History:   Past Medical History:   Diagnosis Date   • Arthritis    • Asthma    • Atrial fibrillation (HCC)    • Cardiac disorder    • Closed fracture of neck of left femur (HCC) 2017   • Disease of thyroid gland    • Fibromyalgia    • GERD (gastroesophageal reflux disease)    • Hyperlipidemia    • Hypertension    • Migraine    • Neuropathy    • Stroke (HCC)        Past Surgical History:   Past Surgical History:   Procedure Laterality Date   • CHOLECYSTECTOMY     • HEMORRHOIDECTOMY N/A 2021    Procedure: HEMORRHOIDECTOMY;  Surgeon: Adan Meadows MD;  Location: [facility];  Service: General;  Laterality: N/A;   • HIP PERCUTANEOUS PINNING Left 2017     Procedure: LEFT HIP PERCUTANEOUS PINNING FEMORAL NECK;  Surgeon: Ezra Barlow MD;  Location:  [facility];  Service:    • HYSTERECTOMY     • [place] CLOSED RX TRAUMATIC HIP DISLOCATN Left 2/28/2017    Procedure: LEFT HIP CLOSED REDUCTION;  Surgeon: Ezra Barlow MD;  Location:  [facility];  Service: Orthopedics   • SHOULDER SURGERY         Family History:   Family History   Problem Relation Age of Onset   • Alcohol abuse Mother    • Heart disease Mother         Cardiac Disorder   • Stroke Mother    • Hypertension Mother    • Parkinsonism Mother    • Colon cancer Maternal Grandmother    • Diabetes Maternal Aunt        Social History:   Social History     Socioeconomic History   • Marital status:    Tobacco Use   • Smoking status: Never Smoker   • Smokeless tobacco: Never Used   Substance and Sexual Activity   • Alcohol use: No   • Drug use: No   • Sexual activity: Defer       Medications:     Current Outpatient Medications:   •  apixaban (ELIQUIS) 5 MG tablet tablet, Take 1 tablet by mouth Every 12 (Twelve) Hours., Disp: 90 tablet, Rfl: 1  •  atorvastatin (LIPITOR) 10 MG tablet, Take 1 tablet by mouth Every Night., Disp: 90 tablet, Rfl: 1  •  budesonide-formoterol (Symbicort) 80-4.5 MCG/ACT inhaler, Inhale 2 puffs 2 (Two) Times a Day., Disp: 30.6 g, Rfl: 1  •  dilTIAZem SR (CARDIZEM SR) 120 MG 12 hr capsule, Take 1 capsule by mouth 2 (Two) Times a Day., Disp: 180 capsule, Rfl: 3  •  levothyroxine (SYNTHROID, LEVOTHROID) 137 MCG tablet, Take 1 tablet by mouth Daily., Disp: 90 tablet, Rfl: 1  •  loratadine (CLARITIN) 10 MG tablet, Take 10 mg by mouth Daily., Disp: , Rfl:   •  Magnesium 250 MG tablet, Take  by mouth., Disp: , Rfl:   •  metoprolol succinate XL (TOPROL-XL) 100 MG 24 hr tablet, Take 1 tablet by mouth Daily., Disp: 90 tablet, Rfl: 1  •  montelukast (SINGULAIR) 10 MG tablet, Take 1 tablet by mouth Daily., Disp: 90 tablet, Rfl: 1  •  omeprazole (priLOSEC) 40 MG capsule, Take 1 capsule by mouth Daily., Disp:  90 capsule, Rfl: 1  •  potassium chloride ER (K-TAB) 20 MEQ tablet controlled-release ER tablet, Take 1 tablet by mouth Daily., Disp: 90 tablet, Rfl: 1  •  pregabalin (LYRICA) 300 MG capsule, Take 1 capsule by mouth 2 (Two) Times a Day., Disp: 180 capsule, Rfl: 1  •  topiramate (TOPAMAX) 100 MG tablet, Take 1 tablet by mouth Daily., Disp: 90 tablet, Rfl: 1  •  albuterol (ACCUNEB) 1.25 MG/3ML nebulizer solution, Take 3 mL by nebulization Every 6 (Six) Hours As Needed for Wheezing or Shortness of Air., Disp: 90 vial, Rfl: 12  •  docusate sodium (COLACE) 100 MG capsule, Take 100 mg by mouth 2 (Two) Times a Day., Disp: , Rfl:   •  furosemide (LASIX) 20 MG tablet, Take 1 tablet by mouth Every Other Day., Disp: , Rfl:   •  hydrocortisone 1 % cream, Apply 1 application topically 4 (Four) Times a Day As Needed., Disp: , Rfl:   •  lisinopril (PRINIVIL,ZESTRIL) 5 MG tablet, TAKE ONE TABLET BY MOUTH DAILY, Disp: 90 tablet, Rfl: 0  •  loperamide (IMODIUM) 2 MG capsule, Take 2 mg by mouth Daily As Needed for diarrhea., Disp: , Rfl:     Allergies:   Allergies   Allergen Reactions   • Cephalexin Swelling   • Penicillins Hives   • Sulfa Antibiotics Hives       Objective     Physical Exam:  Vital Signs:   Vitals:    08/18/22 1543   BP: 100/56   Pulse: 67   SpO2: 95%     There is no height or weight on file to calculate BMI.     Physical Exam    Procedures      Assessment / Plan      Assessment/Plan:   Diagnoses and all orders for this visit:    1. 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 Atrial fibrillation, chronic (HCC)    3. Closed left hip fracture, sequela    4. Neuropathy         PT's  will call a place or two that they may have some interest in and will be calling us back to further discuss.  Did talk with the Doctor at [facility] prior to talking to pt as well for this visit.    Follow Up:   No follow-ups on file.    Dav Bryan  [facility] [place]

## 2022-10-11 RX ORDER — ALBUTEROL SULFATE 90 UG/1
AEROSOL, METERED RESPIRATORY (INHALATION)
Qty: 8.5 G | Refills: 0 | Status: SHIPPED | OUTPATIENT
Start: 2022-10-11 | End: 2023-01-10

## 2022-10-13 ENCOUNTER — TELEPHONE (OUTPATIENT)
Dept: FAMILY MEDICINE CLINIC | Facility: CLINIC | Age: 72
End: 2022-10-13

## 2022-10-13 NOTE — TELEPHONE ENCOUNTER
PT'S SPOUSE CAME IN TODAY AND NEEDS ANOTHER ORDER WROTE FOR PHYSICAL THERAPY SENT TO [facility] IN [place].( ATTN AGAPITO) PT IS DR ALONZO'S PT. PLEASE LET PT KNOW WHEN ORDER IS SENT.

## 2022-10-20 DIAGNOSIS — I69.352 HEMIPLGA FOLLOWING CEREBRAL INFRC AFF LEFT DOMINANT SIDE: Primary | ICD-10-CM

## 2022-10-20 DIAGNOSIS — I48.20 ATRIAL FIBRILLATION, CHRONIC: ICD-10-CM

## 2022-10-20 DIAGNOSIS — S72.002S CLOSED LEFT HIP FRACTURE, SEQUELA: ICD-10-CM

## 2022-11-01 RX ORDER — APIXABAN 5 MG/1
TABLET, FILM COATED ORAL
Qty: 30 TABLET | Refills: 1 | Status: SHIPPED | OUTPATIENT
Start: 2022-11-01 | End: 2022-11-23

## 2022-11-09 RX ORDER — NYSTATIN 100000 U/G
1 CREAM TOPICAL 2 TIMES DAILY
Qty: 30 G | Refills: 1 | Status: SHIPPED | OUTPATIENT
Start: 2022-11-09

## 2022-11-21 RX ORDER — LISINOPRIL 5 MG/1
TABLET ORAL
Qty: 90 TABLET | Refills: 0 | Status: SHIPPED | OUTPATIENT
Start: 2022-11-21 | End: 2023-02-17

## 2022-11-23 RX ORDER — APIXABAN 5 MG/1
TABLET, FILM COATED ORAL
Qty: 30 TABLET | Refills: 2 | Status: SHIPPED | OUTPATIENT
Start: 2022-11-23 | End: 2023-02-06

## 2023-01-05 ENCOUNTER — OFFICE VISIT (OUTPATIENT)
Dept: CARDIOLOGY | Facility: CLINIC | Age: 73
End: 2023-01-05
Payer: MEDICARE

## 2023-01-05 VITALS
BODY MASS INDEX: 29.95 KG/M2 | RESPIRATION RATE: 18 BRPM | HEIGHT: 65 IN | HEART RATE: 57 BPM | TEMPERATURE: 97.2 F | OXYGEN SATURATION: 98 % | SYSTOLIC BLOOD PRESSURE: 114 MMHG | DIASTOLIC BLOOD PRESSURE: 62 MMHG

## 2023-01-05 DIAGNOSIS — I48.20 ATRIAL FIBRILLATION, CHRONIC: Primary | ICD-10-CM

## 2023-01-05 DIAGNOSIS — R73.9 HYPERGLYCEMIA: ICD-10-CM

## 2023-01-05 DIAGNOSIS — I50.32 CHRONIC DIASTOLIC CHF (CONGESTIVE HEART FAILURE): ICD-10-CM

## 2023-01-05 DIAGNOSIS — I10 PRIMARY HYPERTENSION: ICD-10-CM

## 2023-01-05 DIAGNOSIS — E78.5 HYPERLIPIDEMIA, UNSPECIFIED HYPERLIPIDEMIA TYPE: ICD-10-CM

## 2023-01-05 DIAGNOSIS — Z79.01 CHRONIC ANTICOAGULATION: ICD-10-CM

## 2023-01-05 PROCEDURE — 99213 OFFICE O/P EST LOW 20 MIN: CPT

## 2023-01-05 NOTE — PROGRESS NOTES
[facility]  [facility] CARDIOLOGY  [address] [place] [address]  Dept: [phone]  Dept Fax: [phone]    Vickie Joshi  1950    Follow Up Office Visit Note    History of Present Illness:  Vickie Joshi is a 72 y.o. female who presents to the clinic for Follow-up. She is in wheelchair, accompanied by her . PAF she is stable and denies major complaints today. She did state she had felt weak a couple months ago but that has resolved at this time. Denies CP, SOB, LE edema, palpitations at today's visit. Chronic atrial fibrillation, rate controlled on Cardizem 120 bid decreased last visit due to low HR, Toprol 100 and Eliquis 5 bid. She denies bleeding. Hr better today 67 during exam. PE today seems normal, /65 on recheck. No recent labs, will collect today and continue current management. Advised to inform our office if her complaints of weakness reoccur.     The following portions of the patient's history were reviewed and updated as appropriate: allergies, current medications, past family history, past medical history, past social history, past surgical history, and problem list.    Medications:  albuterol  albuterol sulfate HFA  atorvastatin  budesonide-formoterol  dilTIAZem SR  docusate sodium  Eliquis tablet  furosemide  hydrocortisone  levothyroxine  lisinopril  loperamide  loratadine  Magnesium tablet  metoprolol succinate XL  montelukast  nystatin cream  omeprazole  potassium chloride ER tablet controlled-release  pregabalin  topiramate    Subjective  Allergies   Allergen Reactions   • Cephalexin Swelling   • Penicillins Hives   • Sulfa Antibiotics Hives        Past Medical History:   Diagnosis Date   • Arthritis    • Asthma    • Atrial fibrillation (HCC)    • Cardiac disorder    • Closed fracture of neck of left femur (HCC) 2/27/2017   • Disease of thyroid gland    • Fibromyalgia    • GERD (gastroesophageal reflux disease)    •  Hyperlipidemia    • Hypertension    • Migraine    • Neuropathy    • Stroke (HCC)        Past Surgical History:   Procedure Laterality Date   • CHOLECYSTECTOMY     • HEMORRHOIDECTOMY N/A 7/23/2021    Procedure: HEMORRHOIDECTOMY;  Surgeon: Adan Meadows MD;  Location:  [facility];  Service: General;  Laterality: N/A;   • HIP PERCUTANEOUS PINNING Left 2/28/2017    Procedure: LEFT HIP PERCUTANEOUS PINNING FEMORAL NECK;  Surgeon: Ezra Barlow MD;  Location:  [facility];  Service:    • HYSTERECTOMY     • CA CLTX HIP DISLOCATION TRAUMATIC W/O ANESTHESIA Left 2/28/2017    Procedure: LEFT HIP CLOSED REDUCTION;  Surgeon: Ezra Barlow MD;  Location:  [facility];  Service: Orthopedics   • SHOULDER SURGERY         Family History   Problem Relation Age of Onset   • Alcohol abuse Mother    • Heart disease Mother         Cardiac Disorder   • Stroke Mother    • Hypertension Mother    • Parkinsonism Mother    • Colon cancer Maternal Grandmother    • Diabetes Maternal Aunt         Social History     Socioeconomic History   • Marital status:    Tobacco Use   • Smoking status: Never   • Smokeless tobacco: Never   Substance and Sexual Activity   • Alcohol use: No   • Drug use: No   • Sexual activity: Defer       Review of Systems   Constitutional: Positive for fatigue.   All other systems reviewed and are negative.      Cardiovascular Procedures    ECHO/MUGA:   STRESS TESTS:   CARDIAC CATH:   DEVICES:   HOLTER:   CT/MRI:   VASCULAR:   CARDIOTHORACIC:     Objective  Vitals:    01/05/23 1124   BP: 114/62   BP Location: Right arm   Patient Position: Sitting   Cuff Size: Large Adult   Pulse: 57   Resp: 18   Temp: 97.2 °F (36.2 °C)   TempSrc: Infrared   SpO2: 98%   Height: 165.1 cm (65\")   PainSc: 0-No pain     Body mass index is 29.95 kg/m².     Physical Exam  Vitals reviewed.   Constitutional:       Appearance: Not in distress. Frail.   Neck:      Vascular: No JVR. JVD normal.   Pulmonary:      Effort: Pulmonary effort is normal.       Breath sounds: Normal breath sounds. No wheezing. No rhonchi. No rales.   Chest:      Chest wall: Not tender to palpatation.   Cardiovascular:      PMI at left midclavicular line. Normal rate. Irregularly irregular rhythm. Normal S1. Normal S2.      Murmurs: There is no murmur.      No gallop. No click. No rub.   Pulses:     Intact distal pulses.   Edema:     Peripheral edema absent.   Abdominal:      General: Bowel sounds are normal.      Palpations: Abdomen is soft.      Tenderness: There is no abdominal tenderness.   Musculoskeletal:         General: No tenderness.      Comments: Left sided weakness, hx of stroke Skin:     General: Skin is warm and dry.   Neurological:      General: No focal deficit present.      Mental Status: Alert and oriented to person, place and time.          Diagnostic Data  Procedures    Assessment and Plan  Diagnoses and all orders for this visit:    1. Atrial fibrillation, chronic (HCC) (Primary)  Chronic, rate controlled on Toprol 100, Cardizem 120 bid decreased last visit due to bradycardia and Eliquis 5 mg bid, Hr better since then 67 today on exam. ChadsVasc 6,  Continue current therapy.   -     CBC & Differential    2. Chronic anticoagulation on Eliquis  As above. Labs today.   -     CBC & Differential    3. Chronic diastolic CHF (congestive heart failure) (HCC)  On Lasix 20 mg, K+, no major complaints of SOB, edema. Labs today. Continue therapy.   -     proBNP    4. Primary hypertension  On Cardizem 120 bid, Toprol 100 qd, Lasix 20 qd, Lisinopril 5 mg qd. BP today is good 118/65 on recheck. Continue therapy.   -     Comprehensive Metabolic Panel    5. Hyperlipidemia, unspecified hyperlipidemia type  On Lipitor 10 qd, no recent lipid, Will get today.   -     CK  -     Lipid Panel    6. Hyperglycemia  On 2022 lab. Will get A1C.   -     Hemoglobin A1c         Return in about 6 months (around 7/5/2023) for Recheck.    Amelia Brian, APRN  01/05/2023

## 2023-01-06 LAB
ALBUMIN SERPL-MCNC: 4.1 G/DL (ref 3.8–4.8)
ALBUMIN/GLOB SERPL: 1.5 {RATIO} (ref 1.2–2.2)
ALP SERPL-CCNC: 95 IU/L (ref 44–121)
ALT SERPL-CCNC: 11 IU/L (ref 0–32)
AST SERPL-CCNC: 19 IU/L (ref 0–40)
BASOPHILS # BLD AUTO: 0.1 X10E3/UL (ref 0–0.2)
BASOPHILS NFR BLD AUTO: 1 %
BILIRUB SERPL-MCNC: 0.6 MG/DL (ref 0–1.2)
BUN SERPL-MCNC: 15 MG/DL (ref 8–27)
BUN/CREAT SERPL: 18 (ref 12–28)
CALCIUM SERPL-MCNC: 9.2 MG/DL (ref 8.7–10.3)
CHLORIDE SERPL-SCNC: 112 MMOL/L (ref 96–106)
CHOLEST SERPL-MCNC: 137 MG/DL (ref 100–199)
CK SERPL-CCNC: 40 U/L (ref 32–182)
CO2 SERPL-SCNC: 16 MMOL/L (ref 20–29)
CREAT SERPL-MCNC: 0.82 MG/DL (ref 0.57–1)
EGFRCR SERPLBLD CKD-EPI 2021: 77 ML/MIN/1.73
EOSINOPHIL # BLD AUTO: 0.1 X10E3/UL (ref 0–0.4)
EOSINOPHIL NFR BLD AUTO: 2 %
ERYTHROCYTE [DISTWIDTH] IN BLOOD BY AUTOMATED COUNT: 12.8 % (ref 11.7–15.4)
GLOBULIN SER CALC-MCNC: 2.7 G/DL (ref 1.5–4.5)
GLUCOSE SERPL-MCNC: ABNORMAL MG/DL
HBA1C MFR BLD: 5.6 % (ref 4.8–5.6)
HCT VFR BLD AUTO: 45.2 % (ref 34–46.6)
HDLC SERPL-MCNC: 43 MG/DL
HGB BLD-MCNC: 15 G/DL (ref 11.1–15.9)
IMM GRANULOCYTES # BLD AUTO: 0 X10E3/UL (ref 0–0.1)
IMM GRANULOCYTES NFR BLD AUTO: 0 %
LDLC SERPL CALC-MCNC: 71 MG/DL (ref 0–99)
LYMPHOCYTES # BLD AUTO: 1.9 X10E3/UL (ref 0.7–3.1)
LYMPHOCYTES NFR BLD AUTO: 24 %
MCH RBC QN AUTO: 29.8 PG (ref 26.6–33)
MCHC RBC AUTO-ENTMCNC: 33.2 G/DL (ref 31.5–35.7)
MCV RBC AUTO: 90 FL (ref 79–97)
MONOCYTES # BLD AUTO: 0.5 X10E3/UL (ref 0.1–0.9)
MONOCYTES NFR BLD AUTO: 7 %
NEUTROPHILS # BLD AUTO: 5.4 X10E3/UL (ref 1.4–7)
NEUTROPHILS NFR BLD AUTO: 66 %
NT-PROBNP SERPL-MCNC: 1793 PG/ML (ref 0–301)
PLATELET # BLD AUTO: 183 X10E3/UL (ref 150–450)
POTASSIUM SERPL-SCNC: ABNORMAL MMOL/L
PROT SERPL-MCNC: 6.8 G/DL (ref 6–8.5)
RBC # BLD AUTO: 5.04 X10E6/UL (ref 3.77–5.28)
SODIUM SERPL-SCNC: 143 MMOL/L (ref 134–144)
TRIGL SERPL-MCNC: 133 MG/DL (ref 0–149)
VLDLC SERPL CALC-MCNC: 23 MG/DL (ref 5–40)
WBC # BLD AUTO: 8.1 X10E3/UL (ref 3.4–10.8)

## 2023-01-09 ENCOUNTER — TELEPHONE (OUTPATIENT)
Dept: CARDIOLOGY | Facility: CLINIC | Age: 73
End: 2023-01-09
Payer: MEDICARE

## 2023-01-09 NOTE — TELEPHONE ENCOUNTER
----- Message from FABIANA Sharma sent at 1/6/2023  2:03 PM EST -----  Her kidney function looks better. BNP is still elevated but improved from a year ago. We use this to evaluate the extent of her diastolic heart failure. She is currently on Lasix for this.     Her cholesterol looks good.     All other labs look consistent with previous reading, still some mild anemia improved from 1 year ago. Her white blood cell count is elevated but this has been chronic. She would need to follow with PCP regarding this.     All seems to be pretty consistent with previous readings.

## 2023-01-10 RX ORDER — ALBUTEROL SULFATE 90 UG/1
AEROSOL, METERED RESPIRATORY (INHALATION)
Qty: 8.5 G | Refills: 0 | Status: SHIPPED | OUTPATIENT
Start: 2023-01-10 | End: 2023-03-10

## 2023-01-23 DIAGNOSIS — G62.9 NEUROPATHY: ICD-10-CM

## 2023-01-24 RX ORDER — POTASSIUM CHLORIDE 1500 MG/1
TABLET, FILM COATED, EXTENDED RELEASE ORAL
Qty: 90 TABLET | Refills: 1 | Status: SHIPPED | OUTPATIENT
Start: 2023-01-24

## 2023-01-24 RX ORDER — PREGABALIN 300 MG/1
CAPSULE ORAL
Qty: 180 CAPSULE | Refills: 0 | Status: SHIPPED | OUTPATIENT
Start: 2023-01-24

## 2023-02-06 RX ORDER — APIXABAN 5 MG/1
TABLET, FILM COATED ORAL
Qty: 90 TABLET | Refills: 1 | Status: SHIPPED | OUTPATIENT
Start: 2023-02-06

## 2023-02-16 ENCOUNTER — TELEMEDICINE (OUTPATIENT)
Dept: FAMILY MEDICINE CLINIC | Facility: CLINIC | Age: 73
End: 2023-02-16
Payer: MEDICARE

## 2023-02-16 DIAGNOSIS — Z87.81 HISTORY OF HIP FRACTURE: Primary | ICD-10-CM

## 2023-02-16 DIAGNOSIS — Z79.899 MEDICATION MANAGEMENT: ICD-10-CM

## 2023-02-16 PROCEDURE — 99213 OFFICE O/P EST LOW 20 MIN: CPT | Performed by: FAMILY MEDICINE

## 2023-02-16 NOTE — PROGRESS NOTES
Mode of Visit: Video  Location of patient: home   Location of provider:  office  You have chosen to receive care through a telehealth visit.  Does the patient consent to use a video/audio connection for your medical care today? Yes  The visit included audio and video interaction. No technical issues occurred during this visit.     Chief Complaint  No chief complaint on file.      Vickie Joshi presents to [facility]    Patient seen today virtually.  She had questions regards to her medications.  She picked up a new medication at the pharmacy that had our name on it.  It was for an osteoporosis medication.  Looks to have been written actually in November but there was dispense date in January.  I do not have record that we actually gave her that medication.  She did have a fracture this past year and someone could have started it for her but I do not see record that we did.  She has had a prior stroke and is unable to sit upright so she had questions in regards to the medication as it pertains to the instructions on how to take the medication.    Review of Systems   Constitutional: Negative for fatigue and fever.   HENT: Negative for congestion and ear pain.    Respiratory: Negative for apnea, cough, chest tightness and shortness of breath.    Cardiovascular: Negative for chest pain.   Gastrointestinal: Negative for abdominal pain, constipation, diarrhea and nausea.   Musculoskeletal: Negative for arthralgias.   Psychiatric/Behavioral: Negative for depressed mood and stress.       Objective   Vital Signs:   There were no vitals taken for this visit.    Physical Exam   Constitutional: She appears well-developed and well-nourished.   Psychiatric: She has a normal mood and affect.                   Assessment and Plan    Diagnoses and all orders for this visit:    1. History of hip fracture (Primary)    2. Medication management        We will have to get in touch with the pharmacy  to see if there was some type of error in prescription.  It is also possible this was written a long time ago in our old system and I do not have a current updated prescription for it.  With her going from bed to wheelchair and the instructions on the medication, I think it would be difficult for her to take the medication and would have limited benefit.    Follow Up   No follow-ups on file.  Patient was given instructions and counseling regarding her condition or for health maintenance advice. Please see specific information pulled into the AVS if appropriate.

## 2023-02-17 RX ORDER — LISINOPRIL 5 MG/1
TABLET ORAL
Qty: 90 TABLET | Refills: 0 | Status: SHIPPED | OUTPATIENT
Start: 2023-02-17

## 2023-02-20 RX ORDER — OMEPRAZOLE 40 MG/1
CAPSULE, DELAYED RELEASE ORAL
Qty: 30 CAPSULE | Refills: 0 | Status: SHIPPED | OUTPATIENT
Start: 2023-02-20 | End: 2023-03-27

## 2023-03-03 RX ORDER — TOPIRAMATE 100 MG/1
TABLET, FILM COATED ORAL
Qty: 90 TABLET | Refills: 1 | Status: SHIPPED | OUTPATIENT
Start: 2023-03-03

## 2023-03-10 RX ORDER — ALBUTEROL SULFATE 90 UG/1
AEROSOL, METERED RESPIRATORY (INHALATION)
Qty: 8.5 G | Refills: 0 | Status: SHIPPED | OUTPATIENT
Start: 2023-03-10

## 2023-03-27 RX ORDER — OMEPRAZOLE 40 MG/1
CAPSULE, DELAYED RELEASE ORAL
Qty: 30 CAPSULE | Refills: 0 | Status: SHIPPED | OUTPATIENT
Start: 2023-03-27

## 2023-03-27 RX ORDER — ATORVASTATIN CALCIUM 10 MG/1
TABLET, FILM COATED ORAL
Qty: 30 TABLET | Refills: 1 | Status: SHIPPED | OUTPATIENT
Start: 2023-03-27

## 2023-04-21 DIAGNOSIS — G62.9 NEUROPATHY: ICD-10-CM

## 2023-04-22 RX ORDER — PREGABALIN 300 MG/1
CAPSULE ORAL
Qty: 180 CAPSULE | Refills: 1 | Status: SHIPPED | OUTPATIENT
Start: 2023-04-22

## 2023-04-27 RX ORDER — LEVOTHYROXINE SODIUM 137 UG/1
TABLET ORAL
Qty: 30 TABLET | Refills: 2 | Status: SHIPPED | OUTPATIENT
Start: 2023-04-27

## 2023-04-27 RX ORDER — OMEPRAZOLE 40 MG/1
CAPSULE, DELAYED RELEASE ORAL
Qty: 30 CAPSULE | Refills: 2 | Status: SHIPPED | OUTPATIENT
Start: 2023-04-27

## 2023-05-12 RX ORDER — APIXABAN 5 MG/1
TABLET, FILM COATED ORAL
Qty: 90 TABLET | Refills: 1 | Status: SHIPPED | OUTPATIENT
Start: 2023-05-12

## 2023-05-16 RX ORDER — MONTELUKAST SODIUM 10 MG/1
TABLET ORAL
Qty: 30 TABLET | Refills: 5 | Status: SHIPPED | OUTPATIENT
Start: 2023-05-16

## 2023-05-22 RX ORDER — METOPROLOL SUCCINATE 100 MG/1
TABLET, EXTENDED RELEASE ORAL
Qty: 30 TABLET | Refills: 0 | Status: SHIPPED | OUTPATIENT
Start: 2023-05-22

## 2023-05-25 RX ORDER — LISINOPRIL 5 MG/1
TABLET ORAL
Qty: 90 TABLET | Refills: 0 | Status: SHIPPED | OUTPATIENT
Start: 2023-05-25

## 2023-05-30 RX ORDER — ATORVASTATIN CALCIUM 10 MG/1
TABLET, FILM COATED ORAL
Qty: 30 TABLET | Refills: 1 | Status: SHIPPED | OUTPATIENT
Start: 2023-05-30

## 2023-06-08 ENCOUNTER — TELEPHONE (OUTPATIENT)
Dept: FAMILY MEDICINE CLINIC | Facility: CLINIC | Age: 73
End: 2023-06-08
Payer: MEDICARE

## 2023-06-08 DIAGNOSIS — S72.002S CLOSED LEFT HIP FRACTURE, SEQUELA: ICD-10-CM

## 2023-06-08 DIAGNOSIS — I69.359 HISTORY OF HEMORRHAGIC STROKE WITH RESIDUAL HEMIPARESIS: Primary | ICD-10-CM

## 2023-06-08 NOTE — TELEPHONE ENCOUNTER
Caller: Vickie Joshi    Relationship: Self    Best call back number: [phone]     What orders are you requesting (i.e. lab or imaging): PHYSICAL THERAPY (RESTART)    In what timeframe would the patient need to come in: ASAP    Where will you receive your lab/imaging services: AT HOME    Additional notes[facility]

## 2023-06-14 ENCOUNTER — TELEPHONE (OUTPATIENT)
Dept: CARDIOLOGY | Facility: CLINIC | Age: 73
End: 2023-06-14
Payer: MEDICARE

## 2023-06-16 ENCOUNTER — TELEPHONE (OUTPATIENT)
Dept: FAMILY MEDICINE CLINIC | Facility: CLINIC | Age: 73
End: 2023-06-16
Payer: MEDICARE

## 2023-06-16 NOTE — TELEPHONE ENCOUNTER
Caller: Risa Joshi    Relationship: Emergency Contact    Best call back number: [phone]     What was the call regarding:   PATIENT'S () RISA WAS INFORMED OF THE MESSAGE AND STATED THAT PATIENT IS WANTING TO GO TO     [facility]  [address] [facility], [place] [address]   PHONE: [phone]    Jazmin Stokes RegSched Rep Staff Signed     [phone]       HUB TO READ     I need phone number to [facility] that she is requesting. I cannot find any information on the place.

## 2023-06-16 NOTE — TELEPHONE ENCOUNTER
HUB TO READ    I need phone number to [facility] that she is requesting. I cannot find any information on the place.

## 2023-07-10 PROBLEM — Z86.73 HISTORY OF STROKE: Status: RESOLVED | Noted: 2023-07-10 | Resolved: 2023-07-10

## 2023-07-10 PROBLEM — Z86.73 HISTORY OF STROKE: Status: ACTIVE | Noted: 2023-07-10

## 2023-07-31 ENCOUNTER — OFFICE VISIT (OUTPATIENT)
Dept: FAMILY MEDICINE CLINIC | Facility: CLINIC | Age: 73
End: 2023-07-31
Payer: MEDICARE

## 2023-07-31 VITALS — OXYGEN SATURATION: 98 % | DIASTOLIC BLOOD PRESSURE: 56 MMHG | HEART RATE: 52 BPM | SYSTOLIC BLOOD PRESSURE: 110 MMHG

## 2023-07-31 DIAGNOSIS — M79.7 FIBROMYALGIA: ICD-10-CM

## 2023-07-31 DIAGNOSIS — Z87.81 HISTORY OF HIP FRACTURE: ICD-10-CM

## 2023-07-31 DIAGNOSIS — K58.9 IRRITABLE BOWEL SYNDROME, UNSPECIFIED TYPE: ICD-10-CM

## 2023-07-31 DIAGNOSIS — S72.002S CLOSED LEFT HIP FRACTURE, SEQUELA: Primary | ICD-10-CM

## 2023-07-31 DIAGNOSIS — N95.8 OTHER SPECIFIED MENOPAUSAL AND PERIMENOPAUSAL DISORDERS: ICD-10-CM

## 2023-07-31 DIAGNOSIS — E03.9 ACQUIRED HYPOTHYROIDISM: ICD-10-CM

## 2023-07-31 PROCEDURE — 99214 OFFICE O/P EST MOD 30 MIN: CPT | Performed by: FAMILY MEDICINE

## 2023-07-31 PROCEDURE — G0009 ADMIN PNEUMOCOCCAL VACCINE: HCPCS | Performed by: FAMILY MEDICINE

## 2023-07-31 PROCEDURE — 3078F DIAST BP <80 MM HG: CPT | Performed by: FAMILY MEDICINE

## 2023-07-31 PROCEDURE — 3074F SYST BP LT 130 MM HG: CPT | Performed by: FAMILY MEDICINE

## 2023-07-31 PROCEDURE — 90677 PCV20 VACCINE IM: CPT | Performed by: FAMILY MEDICINE

## 2023-07-31 RX ORDER — DENOSUMAB 60 MG/ML
60 INJECTION SUBCUTANEOUS ONCE
Qty: 180 ML | Refills: 1 | Status: SHIPPED | OUTPATIENT
Start: 2023-07-31 | End: 2023-07-31

## 2023-07-31 RX ORDER — LEVOTHYROXINE SODIUM 137 UG/1
137 TABLET ORAL DAILY
Qty: 90 TABLET | Refills: 1 | Status: SHIPPED | OUTPATIENT
Start: 2023-07-31

## 2023-07-31 RX ORDER — TOPIRAMATE 100 MG/1
100 TABLET, FILM COATED ORAL DAILY
Qty: 90 TABLET | Refills: 1 | Status: SHIPPED | OUTPATIENT
Start: 2023-07-31

## 2023-07-31 RX ORDER — DICYCLOMINE HYDROCHLORIDE 10 MG/1
10 CAPSULE ORAL 2 TIMES DAILY PRN
Qty: 180 CAPSULE | Refills: 1 | Status: SHIPPED | OUTPATIENT
Start: 2023-07-31

## 2023-08-01 RX ORDER — OMEPRAZOLE 40 MG/1
CAPSULE, DELAYED RELEASE ORAL
Qty: 30 CAPSULE | Refills: 2 | Status: SHIPPED | OUTPATIENT
Start: 2023-08-01

## 2023-08-01 RX ORDER — ATORVASTATIN CALCIUM 10 MG/1
TABLET, FILM COATED ORAL
Qty: 30 TABLET | Refills: 2 | Status: SHIPPED | OUTPATIENT
Start: 2023-08-01

## 2023-08-10 ENCOUNTER — OFFICE VISIT (OUTPATIENT)
Dept: CARDIOLOGY | Facility: CLINIC | Age: 73
End: 2023-08-10
Payer: MEDICARE

## 2023-08-10 VITALS
HEART RATE: 67 BPM | BODY MASS INDEX: 29.95 KG/M2 | DIASTOLIC BLOOD PRESSURE: 66 MMHG | HEIGHT: 65 IN | RESPIRATION RATE: 18 BRPM | OXYGEN SATURATION: 99 % | SYSTOLIC BLOOD PRESSURE: 114 MMHG

## 2023-08-10 DIAGNOSIS — I10 PRIMARY HYPERTENSION: ICD-10-CM

## 2023-08-10 DIAGNOSIS — I48.20 ATRIAL FIBRILLATION, CHRONIC: Primary | ICD-10-CM

## 2023-08-10 DIAGNOSIS — E66.01 MORBID OBESITY: ICD-10-CM

## 2023-08-10 DIAGNOSIS — E78.00 PURE HYPERCHOLESTEROLEMIA: ICD-10-CM

## 2023-08-10 PROBLEM — I50.32 CHRONIC DIASTOLIC CHF (CONGESTIVE HEART FAILURE): Status: RESOLVED | Noted: 2019-07-01 | Resolved: 2023-08-10

## 2023-08-10 PROBLEM — Z79.01 CHRONIC ANTICOAGULATION: Status: RESOLVED | Noted: 2017-02-27 | Resolved: 2023-08-10

## 2023-08-10 NOTE — PROGRESS NOTES
[facility] [place]  [facility] CARDIOLOGY  [address] [place] [address]  Dept: [phone]  Dept Fax: [phone]    Vickie Joshi  1950    Follow Up Office Visit Note    History of Present Illness:  Vickie Joshi is a 73 y.o. female who presents to the clinic for Follow-up. CAF - She seems doing well , denies any complaints, her HR is good 84, , her Bp is better after we lower the Toprol to 50 mga nd we d,c the Lisinopril, she feels good, on Eliquis 5 mg bid     The following portions of the patient's history were reviewed and updated as appropriate: allergies, current medications, past family history, past medical history, past social history, past surgical history, and problem list.    Medications:  albuterol  albuterol sulfate HFA  atorvastatin  dicyclomine  dilTIAZem SR  docusate sodium  Eliquis tablet  hydrocortisone  levothyroxine  loperamide  loratadine  Magnesium tablet  metoprolol succinate XL  montelukast  nystatin cream  omeprazole  potassium chloride ER tablet controlled-release  pregabalin  Symbicort aerosol  topiramate    Subjective  Allergies   Allergen Reactions    Cephalexin Swelling    Penicillins Hives    Sulfa Antibiotics Hives        Past Medical History:   Diagnosis Date    Arthritis     Asthma     Atrial fibrillation     Cardiac disorder     Closed fracture of neck of left femur 2/27/2017    Disease of thyroid gland     Fibromyalgia     GERD (gastroesophageal reflux disease)     Hyperlipidemia     Hypertension     Migraine     Neuropathy     Stroke        Past Surgical History:   Procedure Laterality Date    CHOLECYSTECTOMY      HEMORRHOIDECTOMY N/A 7/23/2021    Procedure: HEMORRHOIDECTOMY;  Surgeon: Adan Meadows MD;  Location: [facility] OR;  Service: General;  Laterality: N/A;    HIP PERCUTANEOUS PINNING Left 2/28/2017    Procedure: LEFT HIP PERCUTANEOUS PINNING FEMORAL NECK;  Surgeon: Ezra Barlow MD;  Location: [facility] OR;  Service:      "HYSTERECTOMY      UT CLTX HIP DISLOCATION TRAUMATIC W/O ANESTHESIA Left 2/28/2017    Procedure: LEFT HIP CLOSED REDUCTION;  Surgeon: Ezra Barlow MD;  Location: [facility];  Service: Orthopedics    SHOULDER SURGERY         Family History   Problem Relation Age of Onset    Alcohol abuse Mother     Heart disease Mother         Cardiac Disorder    Stroke Mother     Hypertension Mother     Parkinsonism Mother     Colon cancer Maternal Grandmother     Diabetes Maternal Aunt         Social History     Socioeconomic History    Marital status:    Tobacco Use    Smoking status: Never    Smokeless tobacco: Never   Vaping Use    Vaping Use: Never used   Substance and Sexual Activity    Alcohol use: No    Drug use: No    Sexual activity: Defer       Review of Systems   Constitutional: Negative.    HENT: Negative.     Respiratory: Negative.     Cardiovascular: Negative.    Endocrine: Negative.    Genitourinary: Negative.    Musculoskeletal: Negative.    Skin: Negative.    Allergic/Immunologic: Negative.    Neurological: Negative.    Hematological: Negative.    Psychiatric/Behavioral: Negative.       Cardiovascular Procedures    ECHO/MUGA:  STRESS TESTS:   CARDIAC CATH:   DEVICES:   HOLTER:   CT/MRI:   VASCULAR:   CARDIOTHORACIC:     Objective  Vitals:    08/10/23 1453   BP: 114/66   BP Location: Right arm   Patient Position: Sitting   Cuff Size: Adult   Pulse: 67   Resp: 18   SpO2: 99%   Height: 165.1 cm (65\")   PainSc: 0-No pain     Body mass index is 29.95 kg/mý.     Physical Exam  Vitals reviewed.   Constitutional:       Appearance: Healthy appearance. Not in distress.   Neck:      Vascular: No JVR. JVD normal.   Pulmonary:      Effort: Pulmonary effort is normal.      Breath sounds: Normal breath sounds. No wheezing. No rhonchi. No rales.   Chest:      Chest wall: Not tender to palpatation.   Cardiovascular:      PMI at left midclavicular line. Normal rate. Irregularly irregular rhythm. Normal S1. Normal S2.       " Murmurs: There is no murmur.      No gallop.  No click. No rub.   Pulses:     Intact distal pulses.   Edema:     Peripheral edema absent.   Abdominal:      General: Bowel sounds are normal.      Palpations: Abdomen is soft.      Tenderness: There is no abdominal tenderness.   Musculoskeletal: Normal range of motion.         General: No tenderness. Skin:     General: Skin is warm and dry.   Neurological:      General: No focal deficit present.      Mental Status: Alert and oriented to person, place and time.        Diagnostic Data    ECG 12 Lead    Date/Time: 8/10/2023 4:31 PM  Performed by: Riccardo Rae MD  Authorized by: Riccardo Rae MD   Comparison: compared with previous ECG from 7/10/2023  Similar to previous ECG  Rhythm: atrial fibrillation  Rate: normal  BPM: 84  QRS axis: normal    Clinical impression: abnormal EKG        Assessment and Plan  Diagnoses and all orders for this visit:    Atrial fibrillation, chronic-rate control, 84 on Toprol xl 50 mg and also Cardizem 120 mg bid, plus Eliquis 5 mg bid     Primary hypertension- BP is better 110.60 after we d,c Lisinopril and lower Toprol xl to 50 mg also on Cardizem 120 mg bid     Pure hypercholesterolemia- On Lipitor 40 mg , lipids are good, tolerates well     Morbid obesity- She has lost some weight         Return in about 6 months (around 2/10/2024) for Recheck with Dr. Rae.    Riccardo Rae MD  08/10/2023

## 2023-08-28 RX ORDER — LISINOPRIL 5 MG/1
TABLET ORAL
Qty: 90 TABLET | Refills: 1 | Status: SHIPPED | OUTPATIENT
Start: 2023-08-28

## 2023-09-05 ENCOUNTER — APPOINTMENT (OUTPATIENT)
Dept: CT IMAGING | Facility: HOSPITAL | Age: 73
End: 2023-09-05
Payer: MEDICARE

## 2023-09-05 ENCOUNTER — TELEPHONE (OUTPATIENT)
Dept: FAMILY MEDICINE CLINIC | Facility: CLINIC | Age: 73
End: 2023-09-05
Payer: MEDICARE

## 2023-09-05 ENCOUNTER — HOSPITAL ENCOUNTER (OUTPATIENT)
Facility: HOSPITAL | Age: 73
Setting detail: OBSERVATION
Discharge: HOME | End: 2023-09-07
Attending: EMERGENCY MEDICINE | Admitting: INTERNAL MEDICINE
Payer: MEDICARE

## 2023-09-05 DIAGNOSIS — N39.0 URINARY TRACT INFECTION WITHOUT HEMATURIA, SITE UNSPECIFIED: ICD-10-CM

## 2023-09-05 DIAGNOSIS — I48.91 ATRIAL FIBRILLATION WITH RVR: Primary | ICD-10-CM

## 2023-09-05 DIAGNOSIS — R19.7 DIARRHEA, UNSPECIFIED TYPE: ICD-10-CM

## 2023-09-05 PROBLEM — E87.20 LACTIC ACIDOSIS: Status: ACTIVE | Noted: 2023-09-05

## 2023-09-05 LAB
ALBUMIN SERPL-MCNC: 3.8 G/DL (ref 3.5–5.2)
ALBUMIN/GLOB SERPL: 1.2 G/DL
ALP SERPL-CCNC: 81 U/L (ref 39–117)
ALT SERPL W P-5'-P-CCNC: 14 U/L (ref 1–33)
ANION GAP SERPL CALCULATED.3IONS-SCNC: 15 MMOL/L (ref 5–15)
AST SERPL-CCNC: 21 U/L (ref 1–32)
BACTERIA UR QL AUTO: ABNORMAL /HPF
BASOPHILS # BLD AUTO: 0.04 10*3/MM3 (ref 0–0.2)
BASOPHILS NFR BLD AUTO: 0.5 % (ref 0–1.5)
BILIRUB SERPL-MCNC: 1.3 MG/DL (ref 0–1.2)
BILIRUB UR QL STRIP: NEGATIVE
BUN SERPL-MCNC: 12 MG/DL (ref 8–23)
BUN/CREAT SERPL: 12.8 (ref 7–25)
CALCIUM SPEC-SCNC: 8.8 MG/DL (ref 8.6–10.5)
CHLORIDE SERPL-SCNC: 112 MMOL/L (ref 98–107)
CLARITY UR: CLEAR
CO2 SERPL-SCNC: 18 MMOL/L (ref 22–29)
COLOR UR: YELLOW
CREAT SERPL-MCNC: 0.94 MG/DL (ref 0.57–1)
D-LACTATE SERPL-SCNC: 1.5 MMOL/L (ref 0.5–2)
D-LACTATE SERPL-SCNC: 2.1 MMOL/L (ref 0.5–2)
DEPRECATED RDW RBC AUTO: 45 FL (ref 37–54)
EGFRCR SERPLBLD CKD-EPI 2021: 65 ML/MIN/1.73
EOSINOPHIL # BLD AUTO: 0.12 10*3/MM3 (ref 0–0.4)
EOSINOPHIL NFR BLD AUTO: 1.5 % (ref 0.3–6.2)
ERYTHROCYTE [DISTWIDTH] IN BLOOD BY AUTOMATED COUNT: 13.2 % (ref 12.3–15.4)
GLOBULIN UR ELPH-MCNC: 3.3 GM/DL
GLUCOSE SERPL-MCNC: 102 MG/DL (ref 65–99)
GLUCOSE UR STRIP-MCNC: NEGATIVE MG/DL
HCT VFR BLD AUTO: 46.2 % (ref 34–46.6)
HGB BLD-MCNC: 14.7 G/DL (ref 12–15.9)
HGB UR QL STRIP.AUTO: ABNORMAL
HOLD SPECIMEN: NORMAL
HYALINE CASTS UR QL AUTO: ABNORMAL /LPF
IMM GRANULOCYTES # BLD AUTO: 0.02 10*3/MM3 (ref 0–0.05)
IMM GRANULOCYTES NFR BLD AUTO: 0.2 % (ref 0–0.5)
KETONES UR QL STRIP: ABNORMAL
LEUKOCYTE ESTERASE UR QL STRIP.AUTO: ABNORMAL
LIPASE SERPL-CCNC: 24 U/L (ref 13–60)
LYMPHOCYTES # BLD AUTO: 2.04 10*3/MM3 (ref 0.7–3.1)
LYMPHOCYTES NFR BLD AUTO: 25.2 % (ref 19.6–45.3)
MCH RBC QN AUTO: 29.2 PG (ref 26.6–33)
MCHC RBC AUTO-ENTMCNC: 31.8 G/DL (ref 31.5–35.7)
MCV RBC AUTO: 91.7 FL (ref 79–97)
MONOCYTES # BLD AUTO: 0.72 10*3/MM3 (ref 0.1–0.9)
MONOCYTES NFR BLD AUTO: 8.9 % (ref 5–12)
NEUTROPHILS NFR BLD AUTO: 5.15 10*3/MM3 (ref 1.7–7)
NEUTROPHILS NFR BLD AUTO: 63.7 % (ref 42.7–76)
NITRITE UR QL STRIP: POSITIVE
NRBC BLD AUTO-RTO: 0 /100 WBC (ref 0–0.2)
NT-PROBNP SERPL-MCNC: 1762 PG/ML (ref 0–900)
PH UR STRIP.AUTO: <=5 [PH] (ref 5–8)
PLATELET # BLD AUTO: 147 10*3/MM3 (ref 140–450)
PMV BLD AUTO: 12.5 FL (ref 6–12)
POTASSIUM SERPL-SCNC: 3.9 MMOL/L (ref 3.5–5.2)
PROT SERPL-MCNC: 7.1 G/DL (ref 6–8.5)
PROT UR QL STRIP: ABNORMAL
RBC # BLD AUTO: 5.04 10*6/MM3 (ref 3.77–5.28)
RBC # UR STRIP: ABNORMAL /HPF
REF LAB TEST METHOD: ABNORMAL
SODIUM SERPL-SCNC: 145 MMOL/L (ref 136–145)
SP GR UR STRIP: 1.03 (ref 1–1.03)
SQUAMOUS #/AREA URNS HPF: ABNORMAL /HPF
TROPONIN T SERPL HS-MCNC: 14 NG/L
TSH SERPL DL<=0.05 MIU/L-ACNC: 0.62 UIU/ML (ref 0.27–4.2)
UROBILINOGEN UR QL STRIP: ABNORMAL
WBC # UR STRIP: ABNORMAL /HPF
WBC NRBC COR # BLD: 8.09 10*3/MM3 (ref 3.4–10.8)
WHOLE BLOOD HOLD COAG: NORMAL
WHOLE BLOOD HOLD SPECIMEN: NORMAL

## 2023-09-05 PROCEDURE — 99284 EMERGENCY DEPT VISIT MOD MDM: CPT

## 2023-09-05 PROCEDURE — 87077 CULTURE AEROBIC IDENTIFY: CPT | Performed by: EMERGENCY MEDICINE

## 2023-09-05 PROCEDURE — 36415 COLL VENOUS BLD VENIPUNCTURE: CPT

## 2023-09-05 PROCEDURE — 83880 ASSAY OF NATRIURETIC PEPTIDE: CPT | Performed by: EMERGENCY MEDICINE

## 2023-09-05 PROCEDURE — 87086 URINE CULTURE/COLONY COUNT: CPT | Performed by: EMERGENCY MEDICINE

## 2023-09-05 PROCEDURE — G0378 HOSPITAL OBSERVATION PER HR: HCPCS

## 2023-09-05 PROCEDURE — 87186 SC STD MICRODIL/AGAR DIL: CPT | Performed by: EMERGENCY MEDICINE

## 2023-09-05 PROCEDURE — 80053 COMPREHEN METABOLIC PANEL: CPT | Performed by: EMERGENCY MEDICINE

## 2023-09-05 PROCEDURE — 84443 ASSAY THYROID STIM HORMONE: CPT | Performed by: EMERGENCY MEDICINE

## 2023-09-05 PROCEDURE — 81001 URINALYSIS AUTO W/SCOPE: CPT | Performed by: EMERGENCY MEDICINE

## 2023-09-05 PROCEDURE — 84484 ASSAY OF TROPONIN QUANT: CPT | Performed by: EMERGENCY MEDICINE

## 2023-09-05 PROCEDURE — 87040 BLOOD CULTURE FOR BACTERIA: CPT | Performed by: EMERGENCY MEDICINE

## 2023-09-05 PROCEDURE — 74176 CT ABD & PELVIS W/O CONTRAST: CPT

## 2023-09-05 PROCEDURE — 83605 ASSAY OF LACTIC ACID: CPT | Performed by: EMERGENCY MEDICINE

## 2023-09-05 PROCEDURE — 83690 ASSAY OF LIPASE: CPT | Performed by: EMERGENCY MEDICINE

## 2023-09-05 PROCEDURE — 96365 THER/PROPH/DIAG IV INF INIT: CPT

## 2023-09-05 PROCEDURE — 96366 THER/PROPH/DIAG IV INF ADDON: CPT

## 2023-09-05 PROCEDURE — 85025 COMPLETE CBC W/AUTO DIFF WBC: CPT | Performed by: EMERGENCY MEDICINE

## 2023-09-05 RX ORDER — ALBUTEROL SULFATE 1.25 MG/3ML
1 SOLUTION RESPIRATORY (INHALATION) EVERY 6 HOURS PRN
Status: DISCONTINUED | OUTPATIENT
Start: 2023-09-05 | End: 2023-09-07 | Stop reason: HOSPADM

## 2023-09-05 RX ORDER — METOPROLOL SUCCINATE 50 MG/1
50 TABLET, EXTENDED RELEASE ORAL DAILY
Status: DISCONTINUED | OUTPATIENT
Start: 2023-09-06 | End: 2023-09-06

## 2023-09-05 RX ORDER — BUDESONIDE AND FORMOTEROL FUMARATE DIHYDRATE 160; 4.5 UG/1; UG/1
2 AEROSOL RESPIRATORY (INHALATION) 2 TIMES DAILY
Status: DISCONTINUED | OUTPATIENT
Start: 2023-09-06 | End: 2023-09-07 | Stop reason: HOSPADM

## 2023-09-05 RX ORDER — SODIUM CHLORIDE 9 MG/ML
40 INJECTION, SOLUTION INTRAVENOUS AS NEEDED
Status: DISCONTINUED | OUTPATIENT
Start: 2023-09-05 | End: 2023-09-07 | Stop reason: HOSPADM

## 2023-09-05 RX ORDER — PANTOPRAZOLE SODIUM 40 MG/1
40 TABLET, DELAYED RELEASE ORAL DAILY
Status: DISCONTINUED | OUTPATIENT
Start: 2023-09-06 | End: 2023-09-07 | Stop reason: HOSPADM

## 2023-09-05 RX ORDER — SODIUM CHLORIDE 9 MG/ML
125 INJECTION, SOLUTION INTRAVENOUS CONTINUOUS
Status: DISCONTINUED | OUTPATIENT
Start: 2023-09-05 | End: 2023-09-07 | Stop reason: HOSPADM

## 2023-09-05 RX ORDER — LISINOPRIL 5 MG/1
5 TABLET ORAL DAILY
Status: DISCONTINUED | OUTPATIENT
Start: 2023-09-06 | End: 2023-09-06

## 2023-09-05 RX ORDER — CETIRIZINE HYDROCHLORIDE 10 MG/1
10 TABLET ORAL DAILY
Status: DISCONTINUED | OUTPATIENT
Start: 2023-09-06 | End: 2023-09-07 | Stop reason: HOSPADM

## 2023-09-05 RX ORDER — LEVOTHYROXINE SODIUM 137 UG/1
137 TABLET ORAL
Status: DISCONTINUED | OUTPATIENT
Start: 2023-09-06 | End: 2023-09-07 | Stop reason: HOSPADM

## 2023-09-05 RX ORDER — MONTELUKAST SODIUM 10 MG/1
10 TABLET ORAL NIGHTLY
Status: DISCONTINUED | OUTPATIENT
Start: 2023-09-06 | End: 2023-09-07 | Stop reason: HOSPADM

## 2023-09-05 RX ORDER — SODIUM CHLORIDE 0.9 % (FLUSH) 0.9 %
10 SYRINGE (ML) INJECTION EVERY 12 HOURS SCHEDULED
Status: DISCONTINUED | OUTPATIENT
Start: 2023-09-06 | End: 2023-09-07 | Stop reason: HOSPADM

## 2023-09-05 RX ORDER — ATORVASTATIN CALCIUM 10 MG/1
10 TABLET, FILM COATED ORAL NIGHTLY
Status: DISCONTINUED | OUTPATIENT
Start: 2023-09-06 | End: 2023-09-07 | Stop reason: HOSPADM

## 2023-09-05 RX ORDER — SODIUM CHLORIDE 0.9 % (FLUSH) 0.9 %
10 SYRINGE (ML) INJECTION AS NEEDED
Status: DISCONTINUED | OUTPATIENT
Start: 2023-09-05 | End: 2023-09-07 | Stop reason: HOSPADM

## 2023-09-05 RX ORDER — SODIUM CHLORIDE 9 MG/ML
10 INJECTION INTRAVENOUS AS NEEDED
Status: DISCONTINUED | OUTPATIENT
Start: 2023-09-05 | End: 2023-09-07 | Stop reason: HOSPADM

## 2023-09-05 RX ORDER — TOPIRAMATE 100 MG/1
100 TABLET, FILM COATED ORAL DAILY
Status: DISCONTINUED | OUTPATIENT
Start: 2023-09-06 | End: 2023-09-07 | Stop reason: HOSPADM

## 2023-09-05 RX ORDER — DICYCLOMINE HYDROCHLORIDE 10 MG/1
10 CAPSULE ORAL 2 TIMES DAILY PRN
Status: DISCONTINUED | OUTPATIENT
Start: 2023-09-05 | End: 2023-09-07 | Stop reason: HOSPADM

## 2023-09-05 RX ADMIN — DILTIAZEM HYDROCHLORIDE 5 MG/HR: 5 INJECTION INTRAVENOUS at 15:22

## 2023-09-05 RX ADMIN — SODIUM CHLORIDE 125 ML/HR: 9 INJECTION, SOLUTION INTRAVENOUS at 23:16

## 2023-09-05 NOTE — TELEPHONE ENCOUNTER
Caller: Barak Joshi    Relationship: Emergency Contact    Best call back number: [phone]     What was the call regarding: PATIENT NEEDS TO BE SCHEDULED FOR A SICK VISIT. NO VISITS AVAILABLE IN A TIMELY MANNER. UNABLE TO WARM TRANSFER.    Is it okay if the provider responds through MyChart: NO CALL

## 2023-09-05 NOTE — ED PROVIDER NOTES
[place]    EMERGENCY DEPARTMENT ENCOUNTER      Pt Name: Vickie Joshi  MRN: [id number]  YOB: 1950  Date of evaluation: 9/5/2023  Provider: Aleksandr Truong DO    CHIEF COMPLAINT       Chief Complaint   Patient presents with    Diarrhea    Abdominal Pain         HISTORY OF PRESENT ILLNESS  (Location/Symptom, Timing/Onset, Context/Setting, Quality, Duration, Modifying Factors, Severity.)   Vickie Joshi is a 73 y.o. female who presents to the emergency department for evaluation with EMS secondary to a concern for lower abdominal pain, diarrhea, nausea, overall not feeling well for the last 3 to 4 days.  She has a history of atrial fibrillation, follows with a cardiologist out of [place], possibly [facility] and states she has been compliant with her medications including her Cardizem, metoprolol, states she is the atrial fibrillation with a rapid response is nothing new for her.  She has not had any vomiting, no significant abdominal surgeries.  She notes no blood in her stool, no recent antibiotic usage, recent travel or known sick contacts.  Denies any fever or chills.  She does note generalized weakness, no chest pain, does have palpitations which she talks about to her chronic A-fib.  She denies any other acute systemic complaints this time.      Nursing notes were reviewed.      PAST MEDICAL HISTORY     Past Medical History:   Diagnosis Date    Arthritis     Asthma     Atrial fibrillation     Cardiac disorder     Closed fracture of neck of left femur 2/27/2017    Disease of thyroid gland     Fibromyalgia     GERD (gastroesophageal reflux disease)     Hyperlipidemia     Hypertension     Migraine     Neuropathy     Stroke          SURGICAL HISTORY       Past Surgical History:   Procedure Laterality Date    CHOLECYSTECTOMY      HEMORRHOIDECTOMY N/A 7/23/2021    Procedure: HEMORRHOIDECTOMY;  Surgeon: Adan Meadows MD;  Location: [facility];  Service: General;  Laterality: N/A;    HIP  PERCUTANEOUS PINNING Left 2/28/2017    Procedure: LEFT HIP PERCUTANEOUS PINNING FEMORAL NECK;  Surgeon: Ezar Barlow MD;  Location:  [facility] OR;  Service:     HYSTERECTOMY      NM CLTX HIP DISLOCATION TRAUMATIC W/O ANESTHESIA Left 2/28/2017    Procedure: LEFT HIP CLOSED REDUCTION;  Surgeon: Ezra Barlow MD;  Location:  [facility] OR;  Service: Orthopedics    SHOULDER SURGERY           CURRENT MEDICATIONS       Current Facility-Administered Medications:     dilTIAZem (CARDIZEM) 125 mg in sodium chloride 0.9 % 125 mL infusion, 5-15 mg/hr, Intravenous, Continuous, Aleksandr Truong DO, Last Rate: 10 mL/hr at 09/05/23 1732, 10 mg/hr at 09/05/23 1732    Sodium Chloride (PF) 0.9 % 10 mL, 10 mL, Intravenous, PRN, Aleksandr Truong DO    sodium chloride 0.9 % infusion, 125 mL/hr, Intravenous, Continuous, Aleksandr Truong DO    Current Outpatient Medications:     albuterol (ACCUNEB) 1.25 MG/3ML nebulizer solution, Take 3 mL by nebulization Every 6 (Six) Hours As Needed for Wheezing or Shortness of Air., Disp: 90 vial, Rfl: 12    albuterol sulfate  (90 Base) MCG/ACT inhaler, INHALE TWO PUFFS BY MOUTH EVERY 4 TO 6 HOURS AS NEEDED, Disp: 8.5 g, Rfl: 0    atorvastatin (LIPITOR) 10 MG tablet, TAKE ONE TABLET BY MOUTH ONCE NIGHTLY, Disp: 30 tablet, Rfl: 2    budesonide-formoterol (Symbicort) 80-4.5 MCG/ACT inhaler, INHALE TWO PUFFS BY MOUTH TWICE A DAY, Disp: 10.2 g, Rfl: 1    dicyclomine (BENTYL) 10 MG capsule, Take 1 capsule by mouth 2 (Two) Times a Day As Needed for Abdominal Cramping., Disp: 180 capsule, Rfl: 1    dilTIAZem SR (CARDIZEM SR) 120 MG 12 hr capsule, TAKE ONE CAPSULE BY MOUTH TWICE A DAY, Disp: 60 capsule, Rfl: 3    docusate sodium (COLACE) 100 MG capsule, Take 1 capsule by mouth 2 (Two) Times a Day., Disp: , Rfl:     Eliquis 5 MG tablet tablet, TAKE ONE TABLET BY MOUTH EVERY 12 HOURS, Disp: 90 tablet, Rfl: 1    hydrocortisone 1 % cream, Apply 1 application  topically to the appropriate area  as directed 4 (Four) Times a Day As Needed., Disp: , Rfl:     levothyroxine (SYNTHROID, LEVOTHROID) 137 MCG tablet, Take 1 tablet by mouth Daily., Disp: 90 tablet, Rfl: 1    lisinopril (PRINIVIL,ZESTRIL) 5 MG tablet, TAKE 1 TABLET BY MOUTH DAILY, Disp: 90 tablet, Rfl: 1    loperamide (IMODIUM) 2 MG capsule, Take 1 capsule by mouth Daily As Needed for Diarrhea., Disp: , Rfl:     loratadine (CLARITIN) 10 MG tablet, Take 1 tablet by mouth Daily., Disp: , Rfl:     Magnesium 250 MG tablet, Take  by mouth., Disp: , Rfl:     metoprolol succinate XL (TOPROL-XL) 50 MG 24 hr tablet, Take 1 tablet by mouth Daily., Disp: 90 tablet, Rfl: 2    montelukast (SINGULAIR) 10 MG tablet, TAKE ONE TABLET BY MOUTH DAILY, Disp: 30 tablet, Rfl: 5    nystatin (MYCOSTATIN) 098095 UNIT/GM cream, Apply 1 application topically to the appropriate area as directed 2 (Two) Times a Day., Disp: 30 g, Rfl: 1    omeprazole (priLOSEC) 40 MG capsule, TAKE ONE CAPSULE BY MOUTH DAILY, Disp: 30 capsule, Rfl: 2    potassium chloride ER (K-TAB) 20 MEQ tablet controlled-release ER tablet, TAKE ONE TABLET BY MOUTH DAILY, Disp: 90 tablet, Rfl: 1    pregabalin (LYRICA) 300 MG capsule, TAKE ONE CAPSULE BY MOUTH TWICE A DAY, Disp: 180 capsule, Rfl: 1    topiramate (TOPAMAX) 100 MG tablet, Take 1 tablet by mouth Daily., Disp: 90 tablet, Rfl: 1    denosumab (Prolia) 60 MG/ML solution prefilled syringe syringe, Inject 1 mL under the skin into the appropriate area as directed 1 (One) Time for 1 dose., Disp: 180 mL, Rfl: 1    ALLERGIES     Cephalexin, Penicillins, and Sulfa antibiotics    FAMILY HISTORY       Family History   Problem Relation Age of Onset    Alcohol abuse Mother     Heart disease Mother         Cardiac Disorder    Stroke Mother     Hypertension Mother     Parkinsonism Mother     Colon cancer Maternal Grandmother     Diabetes Maternal Aunt           SOCIAL HISTORY       Social History     Socioeconomic History    Marital status:    Tobacco Use     Smoking status: Never    Smokeless tobacco: Never   Vaping Use    Vaping Use: Never used   Substance and Sexual Activity    Alcohol use: No    Drug use: No    Sexual activity: Defer         PHYSICAL EXAM    (up to 7 for level 4, 8 or more for level 5)     Vitals:    09/05/23 1730 09/05/23 1817 09/05/23 1847 09/05/23 1902   BP: 128/78 110/77 132/70 111/95   BP Location:       Patient Position:       Pulse:  102 94 91   Resp:       Temp:       TempSrc:       SpO2:  98% 97% 97%   Weight:       Height:           Physical Exam  General : Patient is awake, alert, oriented, in no acute distress, nontoxic appearing  HEENT: Pupils are equally round, EOMI, conjunctivae clear, there is no injection no icterus.  Oral mucosa is moist, uvula midline  Neck: Neck is supple, full range of motion, trachea midline  Cardiac: Heart regular rate, tachycardic with irregular rhythm, no murmurs, rubs, or gallops  Lungs: Lungs are clear to auscultation, there is no wheezing, rhonchi, or rales. There is no use of accessory muscles  Chest wall: There is no tenderness to palpation over the chest wall or over ribs  Abdomen: Abdomen is soft, nondistended.  There is mild discomfort along the suprapubic and right lower quadrant abdomen, no peritoneal signs examination.  Musculoskeletal: No focal muscle deficits are appreciated  Neuro: Motor intact, sensory intact, level of consciousness is normal  Dermatology: Skin is warm and dry  Psych: Mentation is grossly normal, cognition is grossly normal. Affect is appropriate      DIAGNOSTIC RESULTS     EKG:  All EKGs are interpreted by the Emergency Department Physician who either signs or Co-signs this chart in the absence of a cardiologist.    No orders to display       RADIOLOGY:     [] Radiologist's Report Reviewed:  CT Abdomen Pelvis Without Contrast   Final Result          I ordered and independently reviewed the above noted radiographic studies.      I viewed images of CT abdomen/pelvis which showed  no acute intra-abdominal process per my independent interpretation.    See radiologist's dictation for official interpretation.      ED BEDSIDE ULTRASOUND:   Performed by ED Physician - none    LABS:    I have reviewed and interpreted all of the currently available lab results from this visit (if applicable):  Results for orders placed or performed during the hospital encounter of 09/05/23   Comprehensive Metabolic Panel    Specimen: Blood   Result Value Ref Range    Glucose 102 (H) 65 - 99 mg/dL    BUN 12 8 - 23 mg/dL    Creatinine 0.94 0.57 - 1.00 mg/dL    Sodium 145 136 - 145 mmol/L    Potassium 3.9 3.5 - 5.2 mmol/L    Chloride 112 (H) 98 - 107 mmol/L    CO2 18.0 (L) 22.0 - 29.0 mmol/L    Calcium 8.8 8.6 - 10.5 mg/dL    Total Protein 7.1 6.0 - 8.5 g/dL    Albumin 3.8 3.5 - 5.2 g/dL    ALT (SGPT) 14 1 - 33 U/L    AST (SGOT) 21 1 - 32 U/L    Alkaline Phosphatase 81 39 - 117 U/L    Total Bilirubin 1.3 (H) 0.0 - 1.2 mg/dL    Globulin 3.3 gm/dL    A/G Ratio 1.2 g/dL    BUN/Creatinine Ratio 12.8 7.0 - 25.0    Anion Gap 15.0 5.0 - 15.0 mmol/L    eGFR 65.0 >60.0 mL/min/1.73   Lipase    Specimen: Blood   Result Value Ref Range    Lipase 24 13 - 60 U/L   Urinalysis With Microscopic If Indicated (No Culture) - Urine, Clean Catch    Specimen: Urine, Clean Catch   Result Value Ref Range    Color, UA Yellow Yellow, Straw    Appearance, UA Clear Clear    pH, UA <=5.0 5.0 - 8.0    Specific Gravity, UA 1.026 1.001 - 1.030    Glucose, UA Negative Negative    Ketones, UA 15 mg/dL (1+) (A) Negative    Bilirubin, UA Negative Negative    Blood, UA Small (1+) (A) Negative    Protein, UA 30 mg/dL (1+) (A) Negative    Leuk Esterase, UA Small (1+) (A) Negative    Nitrite, UA Positive (A) Negative    Urobilinogen, UA 0.2 E.U./dL 0.2 - 1.0 E.U./dL   Lactic Acid, Plasma    Specimen: Blood   Result Value Ref Range    Lactate 2.1 (C) 0.5 - 2.0 mmol/L   CBC Auto Differential    Specimen: Blood   Result Value Ref Range    WBC 8.09 3.40 - 10.80  10*3/mm3    RBC 5.04 3.77 - 5.28 10*6/mm3    Hemoglobin 14.7 12.0 - 15.9 g/dL    Hematocrit 46.2 34.0 - 46.6 %    MCV 91.7 79.0 - 97.0 fL    MCH 29.2 26.6 - 33.0 pg    MCHC 31.8 31.5 - 35.7 g/dL    RDW 13.2 12.3 - 15.4 %    RDW-SD 45.0 37.0 - 54.0 fl    MPV 12.5 (H) 6.0 - 12.0 fL    Platelets 147 140 - 450 10*3/mm3    Neutrophil % 63.7 42.7 - 76.0 %    Lymphocyte % 25.2 19.6 - 45.3 %    Monocyte % 8.9 5.0 - 12.0 %    Eosinophil % 1.5 0.3 - 6.2 %    Basophil % 0.5 0.0 - 1.5 %    Immature Grans % 0.2 0.0 - 0.5 %    Neutrophils, Absolute 5.15 1.70 - 7.00 10*3/mm3    Lymphocytes, Absolute 2.04 0.70 - 3.10 10*3/mm3    Monocytes, Absolute 0.72 0.10 - 0.90 10*3/mm3    Eosinophils, Absolute 0.12 0.00 - 0.40 10*3/mm3    Basophils, Absolute 0.04 0.00 - 0.20 10*3/mm3    Immature Grans, Absolute 0.02 0.00 - 0.05 10*3/mm3    nRBC 0.0 0.0 - 0.2 /100 WBC   TSH    Specimen: Blood   Result Value Ref Range    TSH 0.621 0.270 - 4.200 uIU/mL   BNP    Specimen: Blood   Result Value Ref Range    proBNP 1,762.0 (H) 0.0 - 900.0 pg/mL   Single High Sensitivity Troponin T    Specimen: Blood   Result Value Ref Range    HS Troponin T 14 (H) <10 ng/L   STAT Lactic Acid, Reflex    Specimen: Blood   Result Value Ref Range    Lactate 1.5 0.5 - 2.0 mmol/L   Urinalysis, Microscopic Only - Urine, Clean Catch    Specimen: Urine, Clean Catch   Result Value Ref Range    RBC, UA 3-6 (A) None Seen, 0-2 /HPF    WBC, UA Too Numerous to Count (A) None Seen, 0-2 /HPF    Bacteria, UA 3+ (A) None Seen, Trace /HPF    Squamous Epithelial Cells, UA 3-6 (A) None Seen, 0-2 /HPF    Hyaline Casts, UA None Seen 0 - 6 /LPF    Methodology Manual Light Microscopy    Green Top (Gel)   Result Value Ref Range    Extra Tube Hold for add-ons.    Lavender Top   Result Value Ref Range    Extra Tube hold for add-on    Gold Top - SST   Result Value Ref Range    Extra Tube Hold for add-ons.    Gray Top   Result Value Ref Range    Extra Tube Hold for add-ons.    Light Blue Top    Result Value Ref Range    Extra Tube Hold for add-ons.         If labs were ordered, I independently reviewed the results and considered them in treating the patient.      EMERGENCY DEPARTMENT COURSE and DIFFERENTIAL DIAGNOSIS/MDM:   Vitals:  AS OF 19:20 EDT    BP - 111/95  HR - 91  TEMP - 98.9 °F (37.2 °C) (Oral)  O2 SATS - 97%      Orders placed during this visit:  Orders Placed This Encounter   Procedures    Gastrointestinal Panel, PCR - Stool, Per Rectum    Blood Culture - Blood,    Blood Culture - Blood,    Urine Culture - Urine, Urine, Clean Catch    CT Abdomen Pelvis Without Contrast    [place] Draw    Comprehensive Metabolic Panel    Lipase    Urinalysis With Microscopic If Indicated (No Culture) - Urine, Clean Catch    Lactic Acid, Plasma    CBC Auto Differential    TSH    BNP    Single High Sensitivity Troponin T    STAT Lactic Acid, Reflex    Urinalysis, Microscopic Only - Urine, Clean Catch    NPO Diet NPO Type: Strict NPO    Undress & Gown    Insert Peripheral IV    Initiate Observation Status    ED Bed Request    CBC & Differential    Green Top (Gel)    Lavender Top    Gold Top - SST    Gray Top    Light Blue Top       All labs have been independently reviewed by me.  All radiology studies have been reviewed by me and the radiologist dictating the report.  All EKG's have been independently viewed and interpreted by me.      Discussion below represents my analysis of pertinent findings related to patient's condition, differential diagnosis, treatment plan and final disposition.    Differential diagnosis:  The differential diagnosis associated with the patient's presentation includes: Atrial fibrillation RVR, diarrhea, dehydration, electrolyte abnormality, urinary tract infection    Additional sources  Discussed/ obtained information from independent historians:   [] Spouse  [] Parent  [] Family member  [] Friend  [x] EMS   [] Other:    External (non-ED) record review:   [] Inpatient record:   []  Office record:   [] Outpatient record:   [] Prior Outpatient labs:   [] Prior Outpatient radiology:   [] Primary Care record:   [] Outside ED record:   [] Other:     Patient's care impacted by:   [] Diabetes  [] Hypertension  [] CHF  [] Hyperlipidemia  [] Coronary Artery Disease   [] COPD   [] Cancer   [] Tobacco Abuse   [] Substance Abuse    [x] Other: Atrial fibrillation/flutter    Care significantly affected by Social Determinants of Health (housing and economic circumstances, unemployment)    [] Yes     [x] No   If yes, Patient's care significantly limited by Social Determinants of Health including:   [] Inadequate housing   [] Low income   [] Alcoholism and drug addiction in family   [] Problems related to primary support group   [] Unemployment   [] Problems related to employment   [] Other Social Determinants of Health:       MEDICATIONS ADMINISTERED IN ED:  Medications   Sodium Chloride (PF) 0.9 % 10 mL (has no administration in time range)   dilTIAZem (CARDIZEM) 125 mg in sodium chloride 0.9 % 125 mL infusion (10 mg/hr Intravenous Rate/Dose Change 9/5/23 0684)   sodium chloride 0.9 % infusion (has no administration in time range)   dilTIAZem (CARDIZEM) bolus from bag 1 mg/mL 10 mg (10 mg Intravenous Bolus from Bag 9/5/23 1520)              This is a 73-year-old female with abdominal pain, generalized weakness, is overall not feeling well worsening throughout the day today.  Has had a increased urinary frequency, has a significant urinary tract infection.  Per EMS patient was found to have atrial fibrillation/flutter with a heart rate in the 160s.  She follows with cardiologist at an outside facility in [place].  Blood pressure slightly in the lower side she was given a Cardizem bolus, infusion which did help with rate control, appears to have underlying atrial flutter underlying.  She has urinary tract infection which we will treat with antibiotic.  We did attempt to wean the patient off Cardizem  infusion, heart rate did increase back up to 150.  We will maintain the Cardizem infusion, start treatment for underlying urinary tract infection, CT scan of the abdomen/pelvis is without acute intra-abdominal pathology.  We will plan for admission for atrial flutter with rapid ventricular response, underlying urinary tract infection, weakness.  Case discussed with hospitalist, Dr. Butler, for admission.    PROCEDURES:  Procedures    CRITICAL CARE TIME    Total Critical Care time was 30 minutes, excluding separately reportable procedures.  Patient with atrial fibrillation with rapid ventricular response requiring multiple interventions, antiarrhythmic infusions, reassessments, discussion with consultants. There was a high probability of clinically significant/life threatening deterioration in the patient's condition which required my urgent intervention.      FINAL IMPRESSION      1. Atrial fibrillation with RVR    2. Urinary tract infection without hematuria, site unspecified    3. Diarrhea, unspecified type          DISPOSITION/PLAN     ED Disposition       ED Disposition   Decision to Admit    Condition   --    Comment   Level of Care: Telemetry [5]   Diagnosis: Atrial fibrillation with RVR [[id number]]                   Comment: Please note this report has been produced using speech recognition software.      Aleksandr Truong DO  Attending Emergency Physician         Aleksandr Truong DO  09/05/23 1387

## 2023-09-06 ENCOUNTER — APPOINTMENT (OUTPATIENT)
Dept: GENERAL RADIOLOGY | Facility: HOSPITAL | Age: 73
End: 2023-09-06
Payer: MEDICARE

## 2023-09-06 PROBLEM — G25.0 TREMOR, ESSENTIAL: Status: ACTIVE | Noted: 2023-09-06

## 2023-09-06 PROBLEM — G25.0 BENIGN ESSENTIAL TREMOR: Status: ACTIVE | Noted: 2023-09-06

## 2023-09-06 LAB
ADV 40+41 DNA STL QL NAA+NON-PROBE: NOT DETECTED
ANION GAP SERPL CALCULATED.3IONS-SCNC: 15 MMOL/L (ref 5–15)
ASTRO TYP 1-8 RNA STL QL NAA+NON-PROBE: NOT DETECTED
BASOPHILS # BLD AUTO: 0.04 10*3/MM3 (ref 0–0.2)
BASOPHILS NFR BLD AUTO: 0.5 % (ref 0–1.5)
BUN SERPL-MCNC: 10 MG/DL (ref 8–23)
BUN/CREAT SERPL: 12.8 (ref 7–25)
C CAYETANENSIS DNA STL QL NAA+NON-PROBE: NOT DETECTED
C COLI+JEJ+UPSA DNA STL QL NAA+NON-PROBE: NOT DETECTED
CALCIUM SPEC-SCNC: 8.6 MG/DL (ref 8.6–10.5)
CHLORIDE SERPL-SCNC: 111 MMOL/L (ref 98–107)
CO2 SERPL-SCNC: 17 MMOL/L (ref 22–29)
CREAT SERPL-MCNC: 0.78 MG/DL (ref 0.57–1)
CRYPTOSP DNA STL QL NAA+NON-PROBE: NOT DETECTED
DEPRECATED RDW RBC AUTO: 45.9 FL (ref 37–54)
E HISTOLYT DNA STL QL NAA+NON-PROBE: NOT DETECTED
EAEC PAA PLAS AGGR+AATA ST NAA+NON-PRB: NOT DETECTED
EC STX1+STX2 GENES STL QL NAA+NON-PROBE: NOT DETECTED
EGFRCR SERPLBLD CKD-EPI 2021: 81.3 ML/MIN/1.73
EOSINOPHIL # BLD AUTO: 0.09 10*3/MM3 (ref 0–0.4)
EOSINOPHIL NFR BLD AUTO: 1 % (ref 0.3–6.2)
EPEC EAE GENE STL QL NAA+NON-PROBE: NOT DETECTED
ERYTHROCYTE [DISTWIDTH] IN BLOOD BY AUTOMATED COUNT: 13.2 % (ref 12.3–15.4)
ETEC LTA+ST1A+ST1B TOX ST NAA+NON-PROBE: NOT DETECTED
G LAMBLIA DNA STL QL NAA+NON-PROBE: NOT DETECTED
GLUCOSE SERPL-MCNC: 92 MG/DL (ref 65–99)
HBA1C MFR BLD: 5.2 % (ref 4.8–5.6)
HCT VFR BLD AUTO: 45.9 % (ref 34–46.6)
HGB BLD-MCNC: 14.5 G/DL (ref 12–15.9)
IMM GRANULOCYTES # BLD AUTO: 0.03 10*3/MM3 (ref 0–0.05)
IMM GRANULOCYTES NFR BLD AUTO: 0.3 % (ref 0–0.5)
LYMPHOCYTES # BLD AUTO: 1.93 10*3/MM3 (ref 0.7–3.1)
LYMPHOCYTES NFR BLD AUTO: 22.4 % (ref 19.6–45.3)
MAGNESIUM SERPL-MCNC: 1.5 MG/DL (ref 1.6–2.4)
MCH RBC QN AUTO: 29.7 PG (ref 26.6–33)
MCHC RBC AUTO-ENTMCNC: 31.6 G/DL (ref 31.5–35.7)
MCV RBC AUTO: 94.1 FL (ref 79–97)
MONOCYTES # BLD AUTO: 0.85 10*3/MM3 (ref 0.1–0.9)
MONOCYTES NFR BLD AUTO: 9.9 % (ref 5–12)
NEUTROPHILS NFR BLD AUTO: 5.67 10*3/MM3 (ref 1.7–7)
NEUTROPHILS NFR BLD AUTO: 65.9 % (ref 42.7–76)
NOROVIRUS GI+II RNA STL QL NAA+NON-PROBE: NOT DETECTED
NRBC BLD AUTO-RTO: 0 /100 WBC (ref 0–0.2)
P SHIGELLOIDES DNA STL QL NAA+NON-PROBE: NOT DETECTED
PLATELET # BLD AUTO: 128 10*3/MM3 (ref 140–450)
PMV BLD AUTO: 12.9 FL (ref 6–12)
POTASSIUM SERPL-SCNC: 3.4 MMOL/L (ref 3.5–5.2)
PROCALCITONIN SERPL-MCNC: 0.06 NG/ML (ref 0–0.25)
QT INTERVAL: 432 MS
QTC INTERVAL: 514 MS
RBC # BLD AUTO: 4.88 10*6/MM3 (ref 3.77–5.28)
RVA RNA STL QL NAA+NON-PROBE: NOT DETECTED
S ENT+BONG DNA STL QL NAA+NON-PROBE: NOT DETECTED
SAPO I+II+IV+V RNA STL QL NAA+NON-PROBE: NOT DETECTED
SHIGELLA SP+EIEC IPAH ST NAA+NON-PROBE: NOT DETECTED
SODIUM SERPL-SCNC: 143 MMOL/L (ref 136–145)
V CHOL+PARA+VUL DNA STL QL NAA+NON-PROBE: NOT DETECTED
V CHOLERAE DNA STL QL NAA+NON-PROBE: NOT DETECTED
WBC NRBC COR # BLD: 8.61 10*3/MM3 (ref 3.4–10.8)
Y ENTEROCOL DNA STL QL NAA+NON-PROBE: NOT DETECTED

## 2023-09-06 PROCEDURE — 25010000002 MEROPENEM PER 100 MG: Performed by: NURSE PRACTITIONER

## 2023-09-06 PROCEDURE — 25010000002 MAGNESIUM SULFATE 2 GM/50ML SOLUTION: Performed by: INTERNAL MEDICINE

## 2023-09-06 PROCEDURE — 84145 PROCALCITONIN (PCT): CPT | Performed by: NURSE PRACTITIONER

## 2023-09-06 PROCEDURE — 87507 IADNA-DNA/RNA PROBE TQ 12-25: CPT | Performed by: EMERGENCY MEDICINE

## 2023-09-06 PROCEDURE — 94640 AIRWAY INHALATION TREATMENT: CPT

## 2023-09-06 PROCEDURE — 83036 HEMOGLOBIN GLYCOSYLATED A1C: CPT | Performed by: NURSE PRACTITIONER

## 2023-09-06 PROCEDURE — 83735 ASSAY OF MAGNESIUM: CPT | Performed by: NURSE PRACTITIONER

## 2023-09-06 PROCEDURE — 97161 PT EVAL LOW COMPLEX 20 MIN: CPT

## 2023-09-06 PROCEDURE — 96367 TX/PROPH/DG ADDL SEQ IV INF: CPT

## 2023-09-06 PROCEDURE — 93010 ELECTROCARDIOGRAM REPORT: CPT | Performed by: INTERNAL MEDICINE

## 2023-09-06 PROCEDURE — G0378 HOSPITAL OBSERVATION PER HR: HCPCS

## 2023-09-06 PROCEDURE — 97535 SELF CARE MNGMENT TRAINING: CPT

## 2023-09-06 PROCEDURE — 96366 THER/PROPH/DIAG IV INF ADDON: CPT

## 2023-09-06 PROCEDURE — 93005 ELECTROCARDIOGRAM TRACING: CPT | Performed by: INTERNAL MEDICINE

## 2023-09-06 PROCEDURE — 96368 THER/DIAG CONCURRENT INF: CPT

## 2023-09-06 PROCEDURE — 97166 OT EVAL MOD COMPLEX 45 MIN: CPT

## 2023-09-06 PROCEDURE — 99214 OFFICE O/P EST MOD 30 MIN: CPT | Performed by: INTERNAL MEDICINE

## 2023-09-06 PROCEDURE — 85025 COMPLETE CBC W/AUTO DIFF WBC: CPT | Performed by: NURSE PRACTITIONER

## 2023-09-06 PROCEDURE — 80048 BASIC METABOLIC PNL TOTAL CA: CPT | Performed by: NURSE PRACTITIONER

## 2023-09-06 PROCEDURE — 71045 X-RAY EXAM CHEST 1 VIEW: CPT

## 2023-09-06 RX ORDER — PREGABALIN 75 MG/1
150 CAPSULE ORAL 2 TIMES DAILY PRN
Status: DISCONTINUED | OUTPATIENT
Start: 2023-09-06 | End: 2023-09-07 | Stop reason: HOSPADM

## 2023-09-06 RX ORDER — DILTIAZEM HYDROCHLORIDE 60 MG/1
TABLET, FILM COATED ORAL
Qty: 10.2 G | Refills: 1 | OUTPATIENT
Start: 2023-09-06 | End: 2023-09-07 | Stop reason: HOSPADM

## 2023-09-06 RX ORDER — DILTIAZEM HYDROCHLORIDE 240 MG/1
240 CAPSULE, COATED, EXTENDED RELEASE ORAL
Status: DISCONTINUED | OUTPATIENT
Start: 2023-09-06 | End: 2023-09-07 | Stop reason: HOSPADM

## 2023-09-06 RX ORDER — MAGNESIUM SULFATE HEPTAHYDRATE 40 MG/ML
2 INJECTION, SOLUTION INTRAVENOUS
Status: COMPLETED | OUTPATIENT
Start: 2023-09-06 | End: 2023-09-06

## 2023-09-06 RX ORDER — POTASSIUM CHLORIDE 750 MG/1
40 CAPSULE, EXTENDED RELEASE ORAL EVERY 4 HOURS
Status: COMPLETED | OUTPATIENT
Start: 2023-09-06 | End: 2023-09-06

## 2023-09-06 RX ORDER — LOPERAMIDE HYDROCHLORIDE 2 MG/1
2 CAPSULE ORAL ONCE
Status: COMPLETED | OUTPATIENT
Start: 2023-09-06 | End: 2023-09-06

## 2023-09-06 RX ORDER — ONDANSETRON 2 MG/ML
4 INJECTION INTRAMUSCULAR; INTRAVENOUS EVERY 6 HOURS PRN
Status: DISCONTINUED | OUTPATIENT
Start: 2023-09-06 | End: 2023-09-07 | Stop reason: HOSPADM

## 2023-09-06 RX ADMIN — MAGNESIUM SULFATE HEPTAHYDRATE 2 G: 40 INJECTION, SOLUTION INTRAVENOUS at 10:26

## 2023-09-06 RX ADMIN — LOPERAMIDE HYDROCHLORIDE 2 MG: 2 CAPSULE ORAL at 19:48

## 2023-09-06 RX ADMIN — Medication 10 ML: at 00:37

## 2023-09-06 RX ADMIN — CETIRIZINE HYDROCHLORIDE 10 MG: 10 TABLET, FILM COATED ORAL at 08:18

## 2023-09-06 RX ADMIN — APIXABAN 5 MG: 5 TABLET, FILM COATED ORAL at 00:35

## 2023-09-06 RX ADMIN — MEROPENEM 1000 MG: 1 INJECTION, POWDER, FOR SOLUTION INTRAVENOUS at 16:06

## 2023-09-06 RX ADMIN — SODIUM CHLORIDE 125 ML/HR: 9 INJECTION, SOLUTION INTRAVENOUS at 22:25

## 2023-09-06 RX ADMIN — LEVOTHYROXINE SODIUM 137 MCG: 137 TABLET ORAL at 05:30

## 2023-09-06 RX ADMIN — ATORVASTATIN CALCIUM 10 MG: 10 TABLET, FILM COATED ORAL at 19:49

## 2023-09-06 RX ADMIN — TOPIRAMATE 100 MG: 100 TABLET, FILM COATED ORAL at 08:18

## 2023-09-06 RX ADMIN — MAGNESIUM SULFATE HEPTAHYDRATE 2 G: 40 INJECTION, SOLUTION INTRAVENOUS at 05:30

## 2023-09-06 RX ADMIN — POTASSIUM CHLORIDE 40 MEQ: 750 CAPSULE, EXTENDED RELEASE ORAL at 08:18

## 2023-09-06 RX ADMIN — MEROPENEM 1000 MG: 1 INJECTION, POWDER, FOR SOLUTION INTRAVENOUS at 00:36

## 2023-09-06 RX ADMIN — APIXABAN 5 MG: 5 TABLET, FILM COATED ORAL at 08:22

## 2023-09-06 RX ADMIN — APIXABAN 5 MG: 5 TABLET, FILM COATED ORAL at 19:49

## 2023-09-06 RX ADMIN — DILTIAZEM HYDROCHLORIDE 240 MG: 240 CAPSULE, EXTENDED RELEASE ORAL at 08:20

## 2023-09-06 RX ADMIN — MAGNESIUM SULFATE HEPTAHYDRATE 2 G: 40 INJECTION, SOLUTION INTRAVENOUS at 08:17

## 2023-09-06 RX ADMIN — POTASSIUM CHLORIDE 40 MEQ: 750 CAPSULE, EXTENDED RELEASE ORAL at 05:30

## 2023-09-06 RX ADMIN — Medication 12.5 MG: at 11:42

## 2023-09-06 RX ADMIN — PANTOPRAZOLE SODIUM 40 MG: 40 TABLET, DELAYED RELEASE ORAL at 08:18

## 2023-09-06 RX ADMIN — MEROPENEM 1000 MG: 1 INJECTION, POWDER, FOR SOLUTION INTRAVENOUS at 08:17

## 2023-09-06 RX ADMIN — BUDESONIDE AND FORMOTEROL FUMARATE DIHYDRATE 2 PUFF: 160; 4.5 AEROSOL RESPIRATORY (INHALATION) at 19:11

## 2023-09-06 RX ADMIN — MONTELUKAST 10 MG: 10 TABLET, FILM COATED ORAL at 19:48

## 2023-09-06 NOTE — CONSULTS
[facility] at [facility]  Cardiovascular Consultation Note    Reason for consultation: A-fib RVR    History of Present Illness:  73-year-old female with previous hemorrhagic CVA, fibromyalgia, hypertension, hyperlipidemia, asthma, hypothyroidism who has known history of permanent atrial fibrillation.  She sees Dr. Thomas moreno in [place].  Recently she presented for follow-up and her blood pressure was low so her metoprolol was reduced in dosage and her lisinopril was stopped.  Patient comes in with several day history of diarrhea, nausea, weakness.  On presentation she was in A-fib RVR.  The patient did not feel her heart racing.  She admits that she was feeling so poorly she missed some of her medications.  She denies any dyspnea.  No tightness having squeezing pressure chest jaw throat or arms.  She states she feels a little bit better since admission.  No edema.  She is not able to walk.    Cardiac risk factors: #1 age greater than 65 #2 hypertension #3 hyperlipidemia    Past medical and surgical history, social and family history reviewed in EMR.    REVIEW OF SYSTEMS:     Past Medical History:   Diagnosis Date    Arthritis     Asthma     Atrial fibrillation     Cardiac disorder     Closed fracture of neck of left femur 2/27/2017    Disease of thyroid gland     Fibromyalgia     GERD (gastroesophageal reflux disease)     Hyperlipidemia     Hypertension     Migraine     Neuropathy     Stroke      Past Surgical History:   Procedure Laterality Date    CHOLECYSTECTOMY      HEMORRHOIDECTOMY N/A 7/23/2021    Procedure: HEMORRHOIDECTOMY;  Surgeon: Adan Meadows MD;  Location: [facility];  Service: General;  Laterality: N/A;    HIP PERCUTANEOUS PINNING Left 2/28/2017    Procedure: LEFT HIP PERCUTANEOUS PINNING FEMORAL NECK;  Surgeon: Ezra Barlow MD;  Location: [facility];  Service:     HYSTERECTOMY      [place] CLTX HIP DISLOCATION TRAUMATIC W/O ANESTHESIA Left 2/28/2017    Procedure: LEFT HIP  "CLOSED REDUCTION;  Surgeon: Ezra Barlow MD;  Location: [facility];  Service: Orthopedics    SHOULDER SURGERY       Social History     Socioeconomic History    Marital status:    Tobacco Use    Smoking status: Never    Smokeless tobacco: Never   Vaping Use    Vaping Use: Never used   Substance and Sexual Activity    Alcohol use: No    Drug use: No    Sexual activity: Defer     Family History   Problem Relation Age of Onset    Alcohol abuse Mother     Heart disease Mother         Cardiac Disorder    Stroke Mother     Hypertension Mother     Parkinsonism Mother     Colon cancer Maternal Grandmother     Diabetes Maternal Aunt        H&P ROS reviewed and pertinent CV ROS as noted in HPI.    Cardiac: Patient has known permanent atrial fibrillation has been taken care of by cardiology in [place] she is on rate control agents and anticoagulation/no chest pain  Respiratory: Patient has asthma.  Wheezes rarely  Lower Extremities: Complains of weakness in her legs.  Spend most of her time in a wheelchair  GI: Complains of nausea and diarrhea.  Neuro: History of prior hemorrhagic CVA  Hematology: No excessive bruising  Renal: No kidney disease  Musculoskeletal: Previous hip fracture.  Endocrine: Has hypothyroidism  Constitutional: Pain is some chills  Psych: No suicidal ideation      Physical Exam: General pleasant overweight female in bed heart rate 98 with occasional PVCs       HEENT: No JVP or bruits.  No icterus       Respiratory: Equal bilateral symmetrical expansion\" bilaterally       Cardiovascular: Irregular rate and rhythm with no significant murmur no edema to palpation       GI: Soft flat       Lower Extremities: No lesions       Neuro: Facial expressions are symmetrical       Skin: Warm and dry no edema palpation       Psych: Flat affect, oriented x3    Results Review: HST is mildly elevated 1762.  Potassium 3.4 GFR 81.3 mag is low at 1.5.  EKG showed A-fib RVR.           Vital Sign Min/Max for last 24 " hours  Temp  Min: 98.1 °F (36.7 °C)  Max: 99.5 °F (37.5 °C)   BP  Min: 92/64  Max: 163/96   Pulse  Min: 79  Max: 157   Resp  Min: 16  Max: 18   SpO2  Min: 91 %  Max: 100 %   No data recorded    No intake or output data in the 24 hours ending 09/06/23 1040          Current Facility-Administered Medications:     albuterol (PROVENTIL) nebulizer solution 0.042% 1.25 mg/3mL, 1 ampule, Nebulization, Q6H PRN, Dana Jones APRN    apixaban (ELIQUIS) tablet 5 mg, 5 mg, Oral, Q12H, Dana Jones APRN, 5 mg at 09/06/23 0822    atorvastatin (LIPITOR) tablet 10 mg, 10 mg, Oral, Nightly, Dana Jones APRN    budesonide-formoterol (SYMBICORT) 160-4.5 MCG/ACT inhaler 2 puff, 2 puff, Inhalation, BID, Dana Jones APRN    Calcium Replacement - Follow Nurse / BPA Driven Protocol, , Does not apply, PRN, Nehal Ruiz APRN    cetirizine (zyrTEC) tablet 10 mg, 10 mg, Oral, Daily, Dana Jones APRN, 10 mg at 09/06/23 0818    dicyclomine (BENTYL) capsule 10 mg, 10 mg, Oral, BID PRN, Dana Jones APRN    dilTIAZem (CARDIZEM) 125 mg in sodium chloride 0.9 % 125 mL infusion, 5-15 mg/hr, Intravenous, Continuous, Aleksandr Truong, , Last Rate: 10 mL/hr at 09/05/23 1732, 10 mg/hr at 09/05/23 1732    dilTIAZem CD (CARDIZEM CD) 24 hr capsule 240 mg, 240 mg, Oral, Q24H, Dana Jones APRN, 240 mg at 09/06/23 0820    levothyroxine (SYNTHROID, LEVOTHROID) tablet 137 mcg, 137 mcg, Oral, Q AM, Dana Jones APRN, 137 mcg at 09/06/23 0530    Magnesium Standard Dose Replacement - Follow Nurse / BPA Driven Protocol, , Does not apply, PRN, Nehal Ruiz APRN    meropenem (MERREM) 1000 mg/100 mL 0.9% NS (mbp), 1,000 mg, Intravenous, Q8H, Dana Jones APRN, 1,000 mg at 09/06/23 0817    metoprolol succinate XL (TOPROL-XL) 24 hr tablet 50 mg, 50 mg, Oral, Daily, Dana Jones APRN    montelukast (SINGULAIR) tablet 10 mg, 10 mg, Oral, Nightly, Dana Jones APRN    ondansetron (ZOFRAN) injection 4 mg, 4 mg,  Intravenous, Q6H PRN, Gladis Bye MD    pantoprazole (PROTONIX) EC tablet 40 mg, 40 mg, Oral, Daily, Dana Jones APRN, 40 mg at 09/06/23 0818    Phosphorus Replacement - Follow Nurse / BPA Driven Protocol, , Does not apply, PRN, Nehal Ruiz APRN    Potassium Replacement - Follow Nurse / BPA Driven Protocol, , Does not apply, PRN, Nehal Ruiz APRN    pregabalin (LYRICA) capsule 150 mg, 150 mg, Oral, BID PRN, Dimas Butler DO    Sodium Chloride (PF) 0.9 % 10 mL, 10 mL, Intravenous, PRN, Aleksandr Truong DO    sodium chloride 0.9 % flush 10 mL, 10 mL, Intravenous, Q12H, Dana Jones APRN, 10 mL at 09/06/23 0037    sodium chloride 0.9 % flush 10 mL, 10 mL, Intravenous, PRN, Dana Jones APRN    sodium chloride 0.9 % infusion 40 mL, 40 mL, Intravenous, PRN, Dana Jones APRN    sodium chloride 0.9 % infusion, 125 mL/hr, Intravenous, Continuous, Aleksandr Truong DO, Last Rate: 125 mL/hr at 09/06/23 0531, 125 mL/hr at 09/06/23 0531    topiramate (TOPAMAX) tablet 100 mg, 100 mg, Oral, Daily, Dana Jones APRN, 100 mg at 09/06/23 0818    Lab Review:   Results from last 7 days   Lab Units 09/05/23  1441   WBC 10*3/mm3 8.09   HEMOGLOBIN g/dL 14.7   PLATELETS 10*3/mm3 147     Results from last 7 days   Lab Units 09/06/23  0231 09/05/23  1441   SODIUM mmol/L 143 145   POTASSIUM mmol/L 3.4* 3.9   CO2 mmol/L 17.0* 18.0*   BUN mg/dL 10 12   CREATININE mg/dL 0.78 0.94   MAGNESIUM mg/dL 1.5*  --    GLUCOSE mg/dL 92 102*     Estimated Creatinine Clearance: 66.9 mL/min (by C-G formula based on SCr of 0.78 mg/dL).    Results from last 7 days   Lab Units 09/06/23  0231   HEMOGLOBIN A1C % 5.20     .lipd  Lab Results   Component Value Date    CHLPL 137 01/05/2023    TRIG 133 01/05/2023    HDL 43 01/05/2023    AST 21 09/05/2023    ALT 14 09/05/2023       Radiology Reports:  Imaging Results (Last 72 Hours)       Procedure Component Value Units Date/Time    XR Chest 1 View [[id number]]  Collected: 09/06/23 0042     Updated: 09/06/23 0046    Narrative:      XR CHEST 1 VW    Date of Exam: 9/6/2023 12:26 AM EDT    Indication: asthma    Comparison: 7/24/2021.    Findings:  There are no airspace consolidations. Chronic interstitial changes are present bilaterally, similar as compared to the previous study. No pleural fluid. No pneumothorax. The pulmonary vasculature appears within normal limits. The heart appears mildly   enlarged. No acute osseous abnormality identified.      Impression:      Impression:  No acute cardiopulmonary process. Stable chronic interstitial changes.      Electronically Signed: Tessy Love MD    9/6/2023 12:43 AM EDT    Workstation ID: [id number]    CT Abdomen Pelvis Without Contrast [[id number]] Collected: 09/05/23 1548     Updated: 09/05/23 1554    Narrative:      CT ABDOMEN PELVIS WO CONTRAST    Date of Exam: 9/5/2023 3:42 PM EDT    Indication: Lower abdominal pain, nausea, diarrhea.    Comparison: None available.    Technique: Axial CT images were obtained of the abdomen and pelvis without the administration of contrast. Reconstructed coronal and sagittal images were also obtained. Automated exposure control and iterative construction methods were used.      Findings:      There is a small left pleural effusion.    The liver is unremarkable.    Cholecystectomy.      There are splenic calcifications which can be seen as a sequela of prior granulomatous disease.  The pancreases is unremarkable.    There are no adrenal nodules.    The kidneys are within normal limits.    There is no small bowel obstruction .    The appendix is unremarkable.    There are diffuse calcified atherosclerotic changes of the aorta mesenteric and iliac vessels.        Evaluation of the colon is limited by lack of oral contrast and under distention. There are multiple diverticuli throughout the colon. There is no definite colonic wall thickening or pericolonic stranding identified.      The urinary  bladder is unremarkable.    The patient is status post hysterectomy.    There is no pelvic free fluid.    The osseous structures are within normal limits.    IMPESSION :    No small bowel obstruction.     No free fluid.    No inflammatory stranding.    Diverticuli coli without evidence of diverticulitis.                Electronically Signed: Rafael Juan MD    9/5/2023 3:51 PM EDT    Workstation ID: [id number]            Assessment/Plan: A-fib RVR-the patient has known permanent A-fib and she is anticoagulated with Eliquis.  Heart rate was was tachycardic and irregular on admission.  She was placed on IV diltiazem.  She admits that she missed some of her medications because she felt so poorly.  She does not feel her heart racing when it is out of rhythm.  She denies dyspnea.  No palpitations and no angina.  At this time I recommend starting the patient on metoprolol 12.5 mg p.o. every 8 hours and DC the long-acting metoprolol.  I will get also DC her IV diltiazem and lisinopril      Luis Carlos Kahn MD  09/06/23  10:40 EDT

## 2023-09-06 NOTE — PROGRESS NOTES
[facility] Medicine Services  PROGRESS NOTE    Patient Name: Vickie Joshi  : 1950  MRN: [id number]    Date of Admission: 2023  Primary Care Physician: Dav Bryan MD    Subjective   Subjective     CC:  diarrhea, nausea, weak    HPI:  She is hungry today after several days of poor intake.  No pain.  No palpitations    ROS:  - no fever    Objective   Objective     Vital Signs:   Temp:  [98.1 °F (36.7 °C)-99.5 °F (37.5 °C)] 98.6 °F (37 °C)  Heart Rate:  [] 98  Resp:  [16-18] 18  BP: ()/() 97/59     Physical Exam:  Gen:  elderly woman in NAD, in bed eating lunch.   present   Neuro: alert and oriented, clear speech, follows commands, grossly nonfocal  HEENT:  NC/AT   Neck:  Supple, no LAD  Heart  irreg irreg.  Rate 80   Abd:  Soft, nontender, no rebound or guarding, pos BS  Extrem:  No c/c/e      Results Reviewed:  LAB RESULTS:      Lab 23  0956 23  0231 23  1809 23  1441   WBC 8.61  --   --  8.09   HEMOGLOBIN 14.5  --   --  14.7   HEMATOCRIT 45.9  --   --  46.2   PLATELETS 128*  --   --  147   NEUTROS ABS 5.67  --   --  5.15   IMMATURE GRANS (ABS) 0.03  --   --  0.02   LYMPHS ABS 1.93  --   --  2.04   MONOS ABS 0.85  --   --  0.72   EOS ABS 0.09  --   --  0.12   MCV 94.1  --   --  91.7   PROCALCITONIN  --  0.06  --   --    LACTATE  --   --  1.5 2.1*         Lab 23  0231 23  1441   SODIUM 143 145   POTASSIUM 3.4* 3.9   CHLORIDE 111* 112*   CO2 17.0* 18.0*   ANION GAP 15.0 15.0   BUN 10 12   CREATININE 0.78 0.94   EGFR 81.3 65.0   GLUCOSE 92 102*   CALCIUM 8.6 8.8   MAGNESIUM 1.5*  --    HEMOGLOBIN A1C 5.20  --    TSH  --  0.621         Lab 23  1441   TOTAL PROTEIN 7.1   ALBUMIN 3.8   GLOBULIN 3.3   ALT (SGPT) 14   AST (SGOT) 21   BILIRUBIN 1.3*   ALK PHOS 81   LIPASE 24         Lab 23  1441   PROBNP 1,762.0*   HSTROP T 14*                 Brief Urine Lab Results  (Last result in the past 365 days)         Color   Clarity   Blood   Leuk Est   Nitrite   Protein   CREAT   Urine HCG        09/05/23 1542 Yellow   Clear   Small (1+)   Small (1+)   Positive   30 mg/dL (1+)                   Microbiology Results Abnormal       Procedure Component Value - Date/Time    Gastrointestinal Panel, PCR - Stool, Per Rectum [[id number]]  (Normal) Collected: 09/06/23 0112    Lab Status: Final result Specimen: Stool from Per Rectum Updated: 09/06/23 0758     Campylobacter Not Detected     Plesiomonas shigelloides Not Detected     Salmonella Not Detected     Vibrio Not Detected     Vibrio cholerae Not Detected     Yersinia enterocolitica Not Detected     Enteroaggregative E. coli (EAEC) Not Detected     Enteropathogenic E. coli (EPEC) Not Detected     Enterotoxigenic E. coli (ETEC) lt/st Not Detected     Shiga-like toxin-producing E. coli (STEC) stx1/stx2 Not Detected     Shigella/Enteroinvasive E. coli (EIEC) Not Detected     Cryptosporidium Not Detected     Cyclospora cayetanensis Not Detected     Entamoeba histolytica Not Detected     Giardia lamblia Not Detected     Adenovirus F40/41 Not Detected     Astrovirus Not Detected     Norovirus GI/GII Not Detected     Rotavirus A Not Detected     Sapovirus (I, II, IV or V) Not Detected            CT Abdomen Pelvis Without Contrast    Result Date: 9/5/2023  CT ABDOMEN PELVIS WO CONTRAST Date of Exam: 9/5/2023 3:42 PM EDT Indication: Lower abdominal pain, nausea, diarrhea. Comparison: None available. Technique: Axial CT images were obtained of the abdomen and pelvis without the administration of contrast. Reconstructed coronal and sagittal images were also obtained. Automated exposure control and iterative construction methods were used. Findings: There is a small left pleural effusion. The liver is unremarkable. Cholecystectomy. There are splenic calcifications which can be seen as a sequela of prior granulomatous disease. The pancreases is unremarkable. There are no adrenal nodules.  The kidneys are within normal limits. There is no small bowel obstruction . The appendix is unremarkable. There are diffuse calcified atherosclerotic changes of the aorta mesenteric and iliac vessels. Evaluation of the colon is limited by lack of oral contrast and under distention. There are multiple diverticuli throughout the colon. There is no definite colonic wall thickening or pericolonic stranding identified. The urinary bladder is unremarkable. The patient is status post hysterectomy. There is no pelvic free fluid. The osseous structures are within normal limits. IMPESSION : No small bowel obstruction. No free fluid. No inflammatory stranding. Diverticuli coli without evidence of diverticulitis. Electronically Signed: Rafael Juan MD  9/5/2023 3:51 PM EDT  Workstation ID: [id number]    XR Chest 1 View    Result Date: 9/6/2023  XR CHEST 1 VW Date of Exam: 9/6/2023 12:26 AM EDT Indication: asthma Comparison: 7/24/2021. Findings: There are no airspace consolidations. Chronic interstitial changes are present bilaterally, similar as compared to the previous study. No pleural fluid. No pneumothorax. The pulmonary vasculature appears within normal limits. The heart appears mildly enlarged. No acute osseous abnormality identified.     Impression: Impression: No acute cardiopulmonary process. Stable chronic interstitial changes. Electronically Signed: Tessy Love MD  9/6/2023 12:43 AM EDT  Workstation ID: [id number]     Results for orders placed during the hospital encounter of 06/30/19    Adult Transthoracic Echo Complete W/ Cont if Necessary Per Protocol    Interpretation Summary  · Mild pulmonic valve regurgitation is present.  · Mild mitral valve regurgitation is present  · Left atrial cavity size is moderately dilated.  · Mild aortic valve regurgitation is present.  · Mild tricuspid valve regurgitation is present.  · Calculated right ventricular systolic pressure from tricuspid regurgitation is 39 mmHg.  ·  Estimated EF = 65%.  · Left ventricular systolic function is normal.      Current medications:  Scheduled Meds:apixaban, 5 mg, Oral, Q12H  atorvastatin, 10 mg, Oral, Nightly  budesonide-formoterol, 2 puff, Inhalation, BID  cetirizine, 10 mg, Oral, Daily  dilTIAZem CD, 240 mg, Oral, Q24H  levothyroxine, 137 mcg, Oral, Q AM  meropenem, 1,000 mg, Intravenous, Q8H  metoprolol tartrate, 12.5 mg, Oral, Q8H  montelukast, 10 mg, Oral, Nightly  pantoprazole, 40 mg, Oral, Daily  sodium chloride, 10 mL, Intravenous, Q12H  topiramate, 100 mg, Oral, Daily      Continuous Infusions:dilTIAZem, 5-15 mg/hr, Last Rate: Stopped (09/06/23 1209)  sodium chloride, 125 mL/hr, Last Rate: 125 mL/hr (09/06/23 0531)      PRN Meds:.  albuterol    Calcium Replacement - Follow Nurse / BPA Driven Protocol    dicyclomine    Magnesium Standard Dose Replacement - Follow Nurse / BPA Driven Protocol    ondansetron    Phosphorus Replacement - Follow Nurse / BPA Driven Protocol    Potassium Replacement - Follow Nurse / BPA Driven Protocol    pregabalin    Sodium Chloride (PF)    sodium chloride    sodium chloride    Assessment & Plan   Assessment & Plan     Active Hospital Problems    Diagnosis  POA    **Atrial fibrillation with RVR [I48.91]  Yes    Tremor, essential [G25.0]  Yes    Acute UTI (urinary tract infection) [N39.0]  Yes    Lactic acidosis [E87.20]  Yes    Diarrhea [R19.7]  Yes    Asthma [J45.909]  Yes    Hypothyroidism [E03.9]  Yes    Hyperlipidemia [E78.5]  Yes    GERD (gastroesophageal reflux disease) [K21.9]  Yes    Hypertension [I10]  Yes    Fibromyalgia [M79.7]  Yes      Resolved Hospital Problems   No resolved problems to display.        Brief Hospital Course to date:  Vickie Joshi is a 73 y.o. female w/ a hx of HTN, HLD, PAF on Eliquis, remote hx of hemorrhagic CVA, asthma, hypothyroidism, GERD, Fibromyalgia, neuropathy.  2-3 days of intermittent nausea and diarrhea. Found to be in A-fib RVR with rates in the 150s.  Urinalysis c/w  UTI.      Persistent A.Fib   RVR 150s on admission  Hx of HTN, HLD   -rate 150's initially.  Dilt gtt started.  Now rate controlled   - continue Eliquis  and oral cardizem; cards appreciated; changing metoprolol to short-acting TID .      **Acute UTI  - GNR   **Lactic acidosis   -blood culture pending   -UA c/w UTI;  - pt w/ multiple antibiotic allergies.  Merrem ordered.    - Cx pending    **Diarrhea   - resolving   - GI PCR panel negative.  -CT abd/pel without acute findings     Debility  - does not walk at home but can stand; has home PT   - OT PT here      **Asthma; stable   -CXR pending   -continue routine Symbicort, Zyrtec, Singulair      **Hypothyroidism   -tsh WNL  -continue synthroid      **Fibromyalgia   -continue Lyrica- dose decreased based on age/CrCl, ordered as PRN (appears infrequently filled by pt)      **GERD  -continue PPI     Expected Discharge Location and Transportation: home by car   Expected Discharge   Expected Discharge Date: 9/8/2023; Expected Discharge Time:      DVT prophylaxis:  Medical DVT prophylaxis orders are present.     AM-PAC 6 Clicks Score (PT): 9 (09/06/23 0800)    CODE STATUS:   Code Status and Medical Interventions:   Ordered at: 09/05/23 0748     Code Status (Patient has no pulse and is not breathing):    CPR (Attempt to Resuscitate)     Medical Interventions (Patient has pulse or is breathing):    Full Support       Gladis Bey MD  09/06/23

## 2023-09-06 NOTE — PLAN OF CARE
Problem: Adult Inpatient Plan of Care  Goal: Plan of Care Review  Outcome: Ongoing, Progressing  Flowsheets (Taken 9/6/2023 1623)  Outcome Evaluation: VSS. RA. No complaints of pain during the shift. Pt complains of nausea at times, relieved without medication. Frequent diarrhea during the shift. Rate controlled, Cardizem gtt stopped. IV abx infused. No other concerns at this time. Will continue with the plan of care.   Goal Outcome Evaluation:              Outcome Evaluation: VSS. RA. No complaints of pain during the shift. Pt complains of nausea at times, relieved without medication. Frequent diarrhea during the shift. Rate controlled, Cardizem gtt stopped. IV abx infused. No other concerns at this time. Will continue with the plan of care.

## 2023-09-06 NOTE — THERAPY EVALUATION
Patient Name: Vickie Joshi  : 1950    MRN: [id number]                              Today's Date: 2023       Admit Date: 2023    Visit Dx:     ICD-10-CM ICD-9-CM   1. Atrial fibrillation with RVR  I48.91 427.31   2. Urinary tract infection without hematuria, site unspecified  N39.0 599.0   3. Diarrhea, unspecified type  R19.7 787.91     Patient Active Problem List   Diagnosis    Hypertension    Hx of Migraine    GERD (gastroesophageal reflux disease)    Fibromyalgia    Neuropathy    Asthma    Allergic rhinitis    Morbid obesity    Acute on chronic blood loss anemia    Hypothyroidism    Hyperlipidemia    History of hemorrhagic stroke with residual hemiparesis    Atrial fibrillation, chronic    Hypotension    Hypocalcemia    GI bleed    Closed left hip fracture, sequela    Atrial fibrillation with RVR    Acute UTI (urinary tract infection)    Lactic acidosis    Diarrhea    Tremor, essential     Past Medical History:   Diagnosis Date    Arthritis     Asthma     Atrial fibrillation     Cardiac disorder     Closed fracture of neck of left femur 2017    Disease of thyroid gland     Fibromyalgia     GERD (gastroesophageal reflux disease)     Hyperlipidemia     Hypertension     Migraine     Neuropathy     Stroke      Past Surgical History:   Procedure Laterality Date    CHOLECYSTECTOMY      HEMORRHOIDECTOMY N/A 2021    Procedure: HEMORRHOIDECTOMY;  Surgeon: Adan Meadows MD;  Location:  [facility];  Service: General;  Laterality: N/A;    HIP PERCUTANEOUS PINNING Left 2017    Procedure: LEFT HIP PERCUTANEOUS PINNING FEMORAL NECK;  Surgeon: Ezra Barlow MD;  Location:  [facility];  Service:     HYSTERECTOMY      [place] CLTX HIP DISLOCATION TRAUMATIC W/O ANESTHESIA Left 2017    Procedure: LEFT HIP CLOSED REDUCTION;  Surgeon: Ezra Barlow MD;  Location:  [facility];  Service: Orthopedics    SHOULDER SURGERY        General Information       Row Name 23 1309          OT Time and  Intention    Document Type evaluation  -     Mode of Treatment occupational therapy  -       Row Name 09/06/23 1309          General Information    Patient Profile Reviewed yes  -JR     Prior Level of Function max assist:;dressing;independent:;feeding;grooming;bathing;mod assist:;transfer;dependent:;home management  -JR     Existing Precautions/Restrictions fall;other (see comments)  h/o hemorrhagic CVA with L sided deficits  -JR     Barriers to Rehab medically complex;previous functional deficit;physical barrier  -       Row Name 09/06/23 1309          Occupational Profile    Occupational History/Life Experiences (Occupational Profile) Pt reports she lives with  at home. Was recently receiving  PT 3x/week. Reports she was independent with feeding, grooming and bathing (after shower transfer.)  reports he assists with transfers., and pt can sometimes propel her w/c. Pt has been mostly bedbound and using a bedpan, but working toward standing and mobility. Pt reports she has ramps to enter home, w/c accessible van, adjustable bed, BSC and shower chair with roll in shower..  reports he cuts patient's food. pt has noticeable R UE tremor, however reports she is able to self feed with R UE.  -       Row Name 09/06/23 1309          Living Environment    People in Home spouse  -       Row Name 09/06/23 1309          Home Main Entrance    Number of Stairs, Main Entrance none  -       Row Name 09/06/23 1309          Stairs Within Home, Primary    Number of Stairs, Within Home, Primary none  -       Row Name 09/06/23 1309          Cognition    Orientation Status (Cognition) oriented x 4  -       Row Name 09/06/23 1309          Safety Issues, Functional Mobility    Safety Issues Affecting Function (Mobility) awareness of need for assistance;insight into deficits/self-awareness  -     Impairments Affecting Function (Mobility) balance;coordination;endurance/activity tolerance;grasp;motor  control;motor planning;muscle tone abnormal;strength;range of motion (ROM);postural/trunk control  -               User Key  (r) = Recorded By, (t) = Taken By, (c) = Cosigned By      Initials Name Provider Type    JR Milagros Sena OT Occupational Therapist                     Mobility/ADL's       Row Name 09/06/23 1316          Bed Mobility    Bed Mobility supine-sit;sit-supine;scooting/bridging;rolling left  -JR     Rolling Left [place] (Bed Mobility) maximum assist (25% patient effort);verbal cues  -JR     Scooting/Bridging [place] (Bed Mobility) maximum assist (25% patient effort);2 person assist;verbal cues  -JR     Supine-Sit [place] (Bed Mobility) moderate assist (50% patient effort);verbal cues  -JR     Sit-Supine [place] (Bed Mobility) moderate assist (50% patient effort);verbal cues  -JR     Bed Mobility, Safety Issues decreased use of arms for pushing/pulling;decreased use of legs for bridging/pushing;impaired trunk control for bed mobility  -JR     Assistive Device (Bed Mobility) bed rails;draw sheet;head of bed elevated  -     Comment, (Bed Mobility) Pt able to move LE's to EOB and scoot bottom toward EOB, required verbal cues for and physical assist to reach for bedrail, roll and bring trunk into sitting. Pt with increased HR in sitting. Reported she was anxious. RN in to give meds and reposition heart leads.  -       Row Name 09/06/23 1316          Transfers    Comment, (Transfers) Deferred standing due to increased HR  -       Row Name 09/06/23 1316          Activities of Daily Living    BADL Assessment/Intervention lower body dressing  -       Row Name 09/06/23 1316          Lower Body Dressing Assessment/Training    [place] Level (Lower Body Dressing) don;doff;shoes/slippers;dependent (less than 25% patient effort)  -     Position (Lower Body Dressing) supine  -               User Key  (r) = Recorded By, (t) = Taken By, (c) = Cosigned By      Initials Name  Provider Type    Milagros Avendaño OT Occupational Therapist                   Obj/Interventions       Row Name 09/06/23 1319          Sensory Assessment (Somatosensory)    Sensory Assessment (Somatosensory) sensation intact  -       Row Name 09/06/23 1319          Range of Motion Comprehensive    General Range of Motion upper extremity range of motion deficits identified  -     Comment, General Range of Motion L UE ROM WFL, R UE limited ROM due to previous CVA with L UE deficits.  -       Row Name 09/06/23 1319          Motor Skills    Motor Skills neuro-muscular function  -     Neuromuscular Function right;upper extremity;tremor, intention;tremor, resting  -       Row Name 09/06/23 1319          Balance    Balance Assessment sitting static balance  -     Static Sitting Balance contact guard  -               User Key  (r) = Recorded By, (t) = Taken By, (c) = Cosigned By      Initials Name Provider Type    Milagros Avendaño OT Occupational Therapist                   Goals/Plan       Row Name 09/06/23 1327          Bed Mobility Goal 1 (OT)    Activity/Assistive Device (Bed Mobility Goal 1, OT) bed mobility activities, all  -JR     [place] Level/Cues Needed (Bed Mobility Goal 1, OT) minimum assist (75% or more patient effort);verbal cues required  -JR     Time Frame (Bed Mobility Goal 1, OT) long term goal (LTG);by discharge  -       Row Name 09/06/23 1327          Transfer Goal 1 (OT)    Activity/Assistive Device (Transfer Goal 1, OT) wheelchair transfer  -     [place] Level/Cues Needed (Transfer Goal 1, OT) minimum assist (75% or more patient effort);verbal cues required  -JR     Time Frame (Transfer Goal 1, OT) long term goal (LTG);by discharge  -     Progress/Outcome (Transfer Goal 1, OT) new goal  -       Row Name 09/06/23 1327          Grooming Goal 1 (OT)    Activity/Device (Grooming Goal 1, OT) grooming skills, all  -JR     [place] (Grooming Goal 1, OT) minimum  assist (75% or more patient effort);verbal cues required  -JR     Time Frame (Grooming Goal 1, OT) long term goal (LTG);by discharge  -JR     Progress/Outcome (Grooming Goal 1, OT) new goal  -       Row Name 09/06/23 1327          Self-Feeding Goal 1 (OT)    Activity/Device (Self-Feeding Goal 1, OT) self-feeding skills, all  -JR     [place] Level/Cues Needed (Self-Feeding Goal 1, OT) minimum assist (75% or more patient effort);verbal cues required  -JR     Time Frame (Self-Feeding Goal 1, OT) long term goal (LTG);by discharge  -JR     Progress/Outcomes (Self-Feeding Goal 1, OT) new [facility]  -       Row Name 09/06/23 1321          Therapy Assessment/Plan (OT)    Planned Therapy Interventions (OT) activity tolerance training;functional balance retraining;adaptive equipment training;BADL retraining;occupation/activity based interventions;ROM/therapeutic exercise;strengthening exercise;transfer/mobility retraining;patient/caregiver education/training;neuromuscular control/coordination retraining  -               User Key  (r) = Recorded By, (t) = Taken By, (c) = Cosigned By      Initials Name Provider Type    JR Milagros Sena, OT Occupational Therapist                   Clinical Impression       Row Name 09/06/23 6131          Pain Assessment    Pretreatment Pain Rating 0/10 - no pain  -     Posttreatment Pain Rating 0/10 - no pain  -     Additional Documentation Pain Scale: Word Pre/Post-Treatment (Group)  -       Row Name 09/06/23 8015          Plan of Care Review    Plan of Care Reviewed With patient  -JR     Outcome Evaluation OT initial eval and expanded chart review completed. Pt presents with multiple comorbidities and decreased independence with ADL'sand mobility from baseline. Recommend ontinued skilled OT services to assist in returning pt to her baseline ADL status and reduce caregive burden. Recommend home with spouse and HH.  -       Row Name 09/06/23 1154          Therapy  Assessment/Plan (OT)    Patient/Family Therapy Goal Statement (OT) go home  -JR     Rehab Potential (OT) good, to achieve stated therapy goals  -JR     Criteria for Skilled Therapeutic Interventions Met (OT) yes;meets criteria;skilled treatment is necessary  -JR     Therapy Frequency (OT) daily  -JR       Row Name 09/06/23 1324          Therapy Plan Review/Discharge Plan (OT)    Anticipated Discharge Disposition (OT) home with assist;home with home health  -JR       Row Name 09/06/23 1324          Vital Signs    Pre Systolic BP Rehab 118  -JR     Pre Treatment Diastolic BP 92  -JR     Post Systolic BP Rehab 109  -JR     Post Treatment Diastolic BP 91  -JR     Pretreatment Heart Rate (beats/min) 116  -JR     Posttreatment Heart Rate (beats/min) 99  -JR     Pre SpO2 (%) 98  -JR     O2 Delivery Pre Treatment room air  -JR     Post SpO2 (%) 97  -JR     O2 Delivery Post Treatment room air  -JR     Pre Patient Position Supine  -JR     Intra Patient Position Sitting  -JR     Post Patient Position Supine  -JR       Row Name 09/06/23 1324          Positioning and Restraints    Pre-Treatment Position in bed  -JR     Post Treatment Position bed  -JR     In Bed notified nsg;supine;call light within reach;encouraged to call for assist;with family/caregiver  -JR               User Key  (r) = Recorded By, (t) = Taken By, (c) = Cosigned By      Initials Name Provider Type    JR Milagros Sena, OT Occupational Therapist                   Outcome Measures       Row Name 09/06/23 1327          How much help from another is currently needed...    Putting on and taking off regular lower body clothing? 1  -JR     Bathing (including washing, rinsing, and drying) 2  -JR     Toileting (which includes using toilet bed pan or urinal) 1  -JR     Putting on and taking off regular upper body clothing 2  -JR     Taking care of personal grooming (such as brushing teeth) 2  -JR     Eating meals 2  -JR     AM-PAC 6 Clicks Score (OT) 10  -JR        Row Name 09/06/23 0800          How much help from another person do you currently need...    Turning from your back to your side while in flat bed without using bedrails? 3  -MH     Moving from lying on back to sitting on the side of a flat bed without bedrails? 2  -MH     Moving to and from a bed to a chair (including a wheelchair)? 1  -MH     Standing up from a chair using your arms (e.g., wheelchair, bedside chair)? 1  -MH     Climbing 3-5 steps with a railing? 1  -MH     To walk in hospital room? 1  -MH     AM-PAC 6 Clicks Score (PT) 9  -MH     Highest level of mobility 3 --> Sat at edge of bed  -       Row Name 09/06/23 1329          Functional Assessment    Outcome Measure Options AM-PAC 6 Clicks Daily Activity (OT)  -               User Key  (r) = Recorded By, (t) = Taken By, (c) = Cosigned By      Initials Name Provider Type    Milagros Avendaño OT Occupational Therapist    Bettie Dao RN Registered Nurse                    Occupational Therapy Education       Title: PT OT SLP Therapies (In Progress)       Topic: Occupational Therapy (In Progress)       Point: ADL training (In Progress)       Description:   Instruct learner(s) on proper safety adaptation and remediation techniques during self care or transfers.   Instruct in proper use of assistive devices.                  Learning Progress Summary             Patient Acceptance, E, NR by  at 9/6/2023 1113    Comment: Educated pt and family regarding role of therapy   Family Acceptance, E, NR by  at 9/6/2023 1113    Comment: Educated pt and family regarding role of therapy                         Point: Home exercise program (Not Started)       Description:   Instruct learner(s) on appropriate technique for monitoring, assisting and/or progressing therapeutic exercises/activities.                  Learner Progress:  Not documented in this visit.              Point: Precautions (Not Started)       Description:   Instruct learner(s) on  prescribed precautions during self-care and functional transfers.                  Learner Progress:  Not documented in this visit.              Point: Body mechanics (Not Started)       Description:   Instruct learner(s) on proper positioning and spine alignment during self-care, functional mobility activities and/or exercises.                  Learner Progress:  Not documented in this visit.                              User Key       Initials Effective Dates Name Provider Type Discipline     02/03/23 -  Milagros Sena, OT Occupational Therapist OT                  OT Recommendation and Plan  Planned Therapy Interventions (OT): activity tolerance training, functional balance retraining, adaptive equipment training, BADL retraining, occupation/activity based interventions, ROM/therapeutic exercise, strengthening exercise, transfer/mobility retraining, patient/caregiver education/training, neuromuscular control/coordination retraining  Therapy Frequency (OT): daily  Plan of Care Review  Plan of Care Reviewed With: patient  Outcome Evaluation: OT initial eval and expanded chart review completed. Pt presents with multiple comorbidities and decreased independence with ADL'sand mobility from baseline. Recommend ontinued skilled OT services to assist in returning pt to her baseline ADL status and reduce caregive burden. Recommend home with spouse and HH.     Time Calculation:   Evaluation Complexity (OT)  Review Occupational Profile/Medical/Therapy History Complexity: expanded/moderate complexity  Assessment, Occupational Performance/Identification of Deficit Complexity: 5 or more performance deficits  Clinical Decision Making Complexity (OT): detailed assessment/moderate complexity  Overall Complexity of Evaluation (OT): moderate complexity     Time Calculation- OT       Row Name 09/06/23 1331             Time Calculation- OT    OT Start Time 1113  -JR      OT Received On 09/06/23  -      OT Goal Re-Cert Due Date  09/16/23  -JR         Timed Charges    37539 - OT Self Care/Mgmt Minutes 12  -JR         Untimed Charges    OT Eval/Re-eval Minutes 46  -JR         Total Minutes    Timed Charges Total Minutes 12  -JR      Untimed Charges Total Minutes 46  -JR       Total Minutes 58  -JR                User Key  (r) = Recorded By, (t) = Taken By, (c) = Cosigned By      Initials Name Provider Type     Milagros Sena, OT Occupational Therapist                  Therapy Charges for Today       Code Description Service Date Service Provider Modifiers Qty    89014032471 HC OT SELF CARE/MGMT/TRAIN EA 15 MIN 9/6/2023 Milagros Sena, OT GO 1    [id number] HC OT EVAL MOD COMPLEXITY 4 9/6/2023 Milagros Sena OT GO 1                 Milagros PRADHAN Nathen, OT  9/6/2023

## 2023-09-06 NOTE — H&P
[facility] Medicine Services  HISTORY AND PHYSICAL    Patient Name: Vickie Joshi  : 1950  MRN: [id number]  Primary Care Physician: Dav Bryan MD  Date of admission: 2023    Subjective   Subjective     Chief Complaint:  Nausea, diarrhea, generalized weakness     HPI:  Vickie Joshi is a 73 y.o. female w/ a hx of HTN, HLD, PAF on Eliquis, remote hx of hemorrhagic CVA, asthma, hypothyroidism, GERD, Fibromyalgia, neuropathy who presented to the ED w/ c/o nausea, diarrhea and generalized weakness.   Patient complains of feeling poorly for the past 2 to 3 days.  She complains of intermittent nausea and diarrhea that started on .  Patient describes loose stools.  Reports she has had 1 episode of diarrhea since waking this morning.  Patient complains of associated abdominal cramping.  Patient also complains of chills, urinary frequency, generalized weakness and fatigue.  Patient denies fever, chest pain, dyspnea, cough, vomiting, melena, dysuria, edema, syncope.  Patient evaluated in the emergency department.  Patient found to be in A-fib RVR with rates in the 150s.  Urinalysis c/w UTI.  Patient admitted to the hospital medicine service for further evaluation.    Review of Systems   Constitutional:  Positive for chills and fatigue. Negative for diaphoresis, fever and unexpected weight change.   HENT: Negative.  Negative for congestion, postnasal drip, rhinorrhea, sinus pressure, sinus pain, sneezing, sore throat and trouble swallowing.    Eyes: Negative.  Negative for visual disturbance.   Respiratory: Negative.  Negative for cough, chest tightness, shortness of breath and wheezing.    Cardiovascular: Negative.  Negative for chest pain, palpitations and leg swelling.   Gastrointestinal:  Positive for diarrhea and nausea. Negative for blood in stool, constipation and vomiting.        Abdominal cramping.    Endocrine: Negative.    Genitourinary:  Positive for  frequency. Negative for decreased urine volume, dysuria and flank pain.   Musculoskeletal: Negative.  Negative for arthralgias, back pain, myalgias, neck pain and neck stiffness.        Generalized weakness.    Skin: Negative.  Negative for wound.   Allergic/Immunologic: Negative.  Negative for immunocompromised state.   Neurological: Negative.  Negative for dizziness, tremors, seizures, syncope, facial asymmetry, speech difficulty, weakness, light-headedness, numbness and headaches.   Hematological: Negative.  Does not bruise/bleed easily.   Psychiatric/Behavioral: Negative.  Negative for confusion.    All other systems reviewed and are negative.     Personal History     Past Medical History:   Diagnosis Date    Arthritis     Asthma     Atrial fibrillation     Cardiac disorder     Closed fracture of neck of left femur 2/27/2017    Disease of thyroid gland     Fibromyalgia     GERD (gastroesophageal reflux disease)     Hyperlipidemia     Hypertension     Migraine     Neuropathy     Stroke      Past Surgical History:   Procedure Laterality Date    CHOLECYSTECTOMY      HEMORRHOIDECTOMY N/A 7/23/2021    Procedure: HEMORRHOIDECTOMY;  Surgeon: Adan Meadows MD;  Location: [facility] OR;  Service: General;  Laterality: N/A;    HIP PERCUTANEOUS PINNING Left 2/28/2017    Procedure: LEFT HIP PERCUTANEOUS PINNING FEMORAL NECK;  Surgeon: Ezra Barlow MD;  Location:  [facility] OR;  Service:     HYSTERECTOMY      IA CLTX HIP DISLOCATION TRAUMATIC W/O ANESTHESIA Left 2/28/2017    Procedure: LEFT HIP CLOSED REDUCTION;  Surgeon: Ezra Barlow MD;  Location:  [facility] OR;  Service: Orthopedics    SHOULDER SURGERY       Family History: family history includes Alcohol abuse in her mother; Colon cancer in her maternal grandmother; Diabetes in her maternal aunt; Heart disease in her mother; Hypertension in her mother; Parkinsonism in her mother; Stroke in her mother.     Social History:  reports that she has never smoked. She has never  used smokeless tobacco. She reports that she does not drink alcohol and does not use drugs.  Social History     Social History Narrative    Not on file     Medications:  Magnesium, albuterol, albuterol sulfate HFA, apixaban, atorvastatin, budesonide-formoterol, denosumab, dicyclomine, dilTIAZem SR, docusate sodium, hydrocortisone, levothyroxine, lisinopril, loperamide, loratadine, metoprolol succinate XL, montelukast, nystatin, omeprazole, potassium chloride ER, pregabalin, and topiramate    Allergies   Allergen Reactions    Cephalexin Swelling    Penicillins Hives    Sulfa Antibiotics Hives     Objective   Objective     Vital Signs:   Temp:  [98.9 °F (37.2 °C)-99.5 °F (37.5 °C)] 99.5 °F (37.5 °C)  Heart Rate:  [] 93  Resp:  [16-18] 18  BP: ()/() 100/64    Physical Exam     Constitutional: Awake, alert; chronically ill appearing   Eyes: PERRLA, sclerae anicteric, no conjunctival injection  HENT: NCAT, mucous membranes moist  Neck: Supple, no thyromegaly, no lymphadenopathy, trachea midline  Respiratory: Clear to auscultation bilaterally, nonlabored respirations   Cardiovascular: RRR, no murmurs, rubs, or gallops, no peripheral edema   Gastrointestinal: Positive bowel sounds, soft, nontender, nondistended  Musculoskeletal: Normal ROM bilaterally   Psychiatric: Appropriate affect, cooperative  Neurologic: Oriented x 3, strength symmetric in all extremities, Cranial Nerves grossly intact to confrontation, speech clear  Skin: No rashes, lesions or wounds     Result Review:  I have personally reviewed the results from the time of this admission to 9/6/2023 00:19 EDT and agree with these findings:  [x]  Laboratory list / accordion  []  Microbiology  [x]  Radiology  [x]  EKG/Telemetry   []  Cardiology/Vascular   []  Pathology  [x]  Old records    LAB RESULTS:      Lab 09/05/23  1809 09/05/23  1441   WBC  --  8.09   HEMOGLOBIN  --  14.7   HEMATOCRIT  --  46.2   PLATELETS  --  147   NEUTROS ABS  --  5.15    IMMATURE GRANS (ABS)  --  0.02   LYMPHS ABS  --  2.04   MONOS ABS  --  0.72   EOS ABS  --  0.12   MCV  --  91.7   LACTATE 1.5 2.1*         Lab 09/05/23  1441   SODIUM 145   POTASSIUM 3.9   CHLORIDE 112*   CO2 18.0*   ANION GAP 15.0   BUN 12   CREATININE 0.94   EGFR 65.0   GLUCOSE 102*   CALCIUM 8.8   TSH 0.621         Lab 09/05/23  1441   TOTAL PROTEIN 7.1   ALBUMIN 3.8   GLOBULIN 3.3   ALT (SGPT) 14   AST (SGOT) 21   BILIRUBIN 1.3*   ALK PHOS 81   LIPASE 24         Lab 09/05/23  1441   PROBNP 1,762.0*   HSTROP T 14*                 Brief Urine Lab Results  (Last result in the past 365 days)        Color   Clarity   Blood   Leuk Est   Nitrite   Protein   CREAT   Urine HCG        09/05/23 1542 Yellow   Clear   Small (1+)   Small (1+)   Positive   30 mg/dL (1+)                 Microbiology Results (last 10 days)       ** No results found for the last 240 hours. **          CT Abdomen Pelvis Without Contrast    Result Date: 9/5/2023  CT ABDOMEN PELVIS WO CONTRAST Date of Exam: 9/5/2023 3:42 PM EDT Indication: Lower abdominal pain, nausea, diarrhea. Comparison: None available. Technique: Axial CT images were obtained of the abdomen and pelvis without the administration of contrast. Reconstructed coronal and sagittal images were also obtained. Automated exposure control and iterative construction methods were used. Findings: There is a small left pleural effusion. The liver is unremarkable. Cholecystectomy. There are splenic calcifications which can be seen as a sequela of prior granulomatous disease. The pancreases is unremarkable. There are no adrenal nodules. The kidneys are within normal limits. There is no small bowel obstruction . The appendix is unremarkable. There are diffuse calcified atherosclerotic changes of the aorta mesenteric and iliac vessels. Evaluation of the colon is limited by lack of oral contrast and under distention. There are multiple diverticuli throughout the colon. There is no definite  colonic wall thickening or pericolonic stranding identified. The urinary bladder is unremarkable. The patient is status post hysterectomy. There is no pelvic free fluid. The osseous structures are within normal limits. IMPESSION : No small bowel obstruction. No free fluid. No inflammatory stranding. Diverticuli coli without evidence of diverticulitis. Electronically Signed: Rafael Juan MD  9/5/2023 3:51 PM EDT  Workstation ID: [id number]     Results for orders placed during the hospital encounter of 06/30/19    Adult Transthoracic Echo Complete W/ Cont if Necessary Per Protocol    Interpretation Summary  · Mild pulmonic valve regurgitation is present.  · Mild mitral valve regurgitation is present  · Left atrial cavity size is moderately dilated.  · Mild aortic valve regurgitation is present.  · Mild tricuspid valve regurgitation is present.  · Calculated right ventricular systolic pressure from tricuspid regurgitation is 39 mmHg.  · Estimated EF = 65%.  · Left ventricular systolic function is normal.    Assessment & Plan   Assessment & Plan       Atrial fibrillation with RVR    Hypertension    GERD (gastroesophageal reflux disease)    Fibromyalgia    Asthma    Hypothyroidism    Hyperlipidemia    Acute UTI (urinary tract infection)    Lactic acidosis    Diarrhea    Tremor, essential    Vickie NED Joshi is a 73 y.o. female w/ a hx of HTN, HLD, PAF on Eliquis, remote hx of hemorrhagic CVA, asthma, hypothyroidism, GERD, Fibromyalgia, neuropathy who presented to the ED w/ c/o nausea, diarrhea and generalized weakness.     **A.Fib RVR  **Hx of HTN, HLD   -rate 150's initially in ED    -EKG c/w A.fib; repeat EKG in am   -troponin 14, proBNP 1762 (pt without dyspnea or CP)   -Cardizem infusion started in ED; continue overnight   -continue routine Eliquis, lisinopril, Toprol, oral Cardizem   -NPO after MN pending Cardiology recommendations   -Cardiology consult in am     **Acute UTI   **Lactic acidosis   -blood culture  pending   -UA c/w UTI; urine culture pending (no recent urine cx on record- (no growth in 2015 and no growth at [facility] in 2021)  -lactic 2.1; repeat 1.5  -WBC WNL; procal pending   -afebrile   -pt w/ multiple antibiotic allergies; discussed w/ pharmacist- pt has tolerated Merrem in the past- Merrem ordered- will likely be able to de-escalate pending urine cx  -symptom mgt    **Diarrhea   -pt reports diarrhea since Sunday; reports only one episode since waking Tuesday morning   -GI panel pcr ordered  -CT abd/pel without acute findings     **Asthma; stable   -CXR pending   -continue routine Symbicort, Zyrtec, Singulair     **Hypothyroidism   -tsh WNL  -continue synthroid     **Fibromyalgia   -continue Lyrica- dose decreased based on age/CrCl, ordered as PRN (appears infrequently filled by pt)     **GERD  -continue PPI     DVT prophylaxis:      CODE STATUS:    Code Status (Patient has no pulse and is not breathing): CPR (Attempt to Resuscitate)  Medical Interventions (Patient has pulse or is breathing): Full Support    Expected Discharge  Expected discharge date/ time has not been documented.    This note has been completed as part of a split-shared workflow.     Signature: Electronically signed by FABIANA Fonseca, 09/06/23, 12:20 AM EDT.    Time spent: 55 minutes     Physician time spent: 22 minutes      Attending   Admission Attestation       I have performed an independent face-to-face diagnostic evaluation including performing an independent physical examination.  The documented plan of care above was reviewed and developed with the advanced practice clinician (APC).  I have updated the HPI as appropriate.    Brief HPI    This is a 74 y/o female w/ HTN, HLD, pAfib, remote hemorrhagic stroke, GERD, hypothyroidism, asthma, who presented w/ 2-3 days of abdominal pain, nausea, diarrhea. States that she has had profuse and frequent diarrhea. Denies recent antibiotic exposure. She does also report urinary frequency and  back pain during a similar period of time. In the ED she was additionally noted to be in Afib RVR     Attending Physical Exam:  Temp:  [98.9 °F (37.2 °C)-99.5 °F (37.5 °C)] 99.5 °F (37.5 °C)  Heart Rate:  [] 93  Resp:  [16-18] 18  BP: ()/() 100/64    Constitutional: Awake, chronically ill appearing female laying in bed in NAD  HENT: NCAT, mucous membranes moist  Respiratory: Clear to auscultation bilaterally, respiratory effort normal   Cardiovascular: IRIR w/ RR, palpable radial pulse  Gastrointestinal: Positive bowel sounds, soft, nontender, nondistended  Psychiatric: Appropriate affect, cooperative  Neurologic: Speech clear and fluent, resting tremor in the RT UE    Assessment and Plan:    See assessment and plan documented by APC above and updated/edited by me as appropriate.    Dimas Butler, DO  09/06/23

## 2023-09-06 NOTE — PLAN OF CARE
Goal Outcome Evaluation:  Plan of Care Reviewed With: patient           Outcome Evaluation: OT initial eval and expanded chart review completed. Pt presents with multiple comorbidities and decreased independence with ADL'sand mobility from baseline. Recommend ontinued skilled OT services to assist in returning pt to her baseline ADL status and reduce caregive burden. Recommend home with spouse and HH.      Anticipated Discharge Disposition (OT): home with assist, home with home health

## 2023-09-06 NOTE — PLAN OF CARE
Goal Outcome Evaluation:  Plan of Care Reviewed With: patient           Outcome Evaluation: PT initial evaluation completed for pt presenting with generalized weakness, impaired balance and coordination, and decreased functional mobility. Pt required maxA for bed mobility. Pt's decreased independence warrants PT skilled care. Recommend D/C home with assistance and  PT services. Recommend deborah lift for home to decrease caregiver burden.      Anticipated Discharge Disposition (PT): home with assist, home with home health

## 2023-09-06 NOTE — CONSULTS
"                  Clinical Nutrition   Nutrition Assessment  Reason for Visit: MST score 2+, Consult, \"Unsure\" unintentional weight loss    Patient Name: Vickie Joshi  YOB: 1950  MRN: [id number]  Date of Encounter: 09/06/23 09:13 EDT  Admission date: 9/5/2023    Comments:    Pt does not meet criteria for malnutrition at this time.     Provide Boost Breeze 2x daily    ? Benefit from antispasmotic due to frequency of BM in relation to meal intake    Nutrition Assessment   Admission Diagnosis:  Atrial fibrillation with RVR [I48.91]    Problem List:    Atrial fibrillation with RVR    Hypertension    GERD (gastroesophageal reflux disease)    Fibromyalgia    Asthma    Hypothyroidism    Hyperlipidemia    Acute UTI (urinary tract infection)    Lactic acidosis    Diarrhea    Tremor, essential      PMH:   She  has a past medical history of Arthritis, Asthma, Atrial fibrillation, Cardiac disorder, Closed fracture of neck of left femur (2/27/2017), Disease of thyroid gland, Fibromyalgia, GERD (gastroesophageal reflux disease), Hyperlipidemia, Hypertension, Migraine, Neuropathy, and Stroke.    PSH:  She  has a past surgical history that includes Shoulder surgery; Cholecystectomy; Hysterectomy; pr cltx hip dislocation traumatic w/o anesthesia (Left, 2/28/2017); Hip Percutaneous Pinning (Left, 2/28/2017); and Hemorrhoid surgery (N/A, 7/23/2021).    Applicable Nutrition Concerns:   Skin:  Oral:  GI: diarrhea    Applicable Interval History:       Reported/Observed/Food/Nutrition Related History:     Pt endorsed a poor appetite over pat 4-5 days as reported urgent need for BM/diarrhea following PO intake. Reports usual intake of ~2 meals daily but unsure if wt loss due to inability to stand on scale - reports suspected loss x1yr. Reports drinking clear ONS at home, less able to drink regular ONS and agreeable to receive Boost breeze. Denies difficulty chewing/swallowing. Endorsed nausea without emesis. NKFA. " "    Anthropometrics     Flowsheet Row First Filed Value   Admission Height 165.1 cm (65\") Documented at 09/05/2023 1419   Admission Weight 74.8 kg (165 lb) Documented at 09/05/2023 1419     Height: Height: 165.1 cm (65\")  Last Filed Weight: Weight: 74.8 kg (165 lb) (09/05/23 2039)  Method: Weight Method: Stated  BMI: BMI (Calculated): 27.5  BMI classification: Overweight: 25.0-29.9kg/m2   IBW:   125lbs    UBW:     Weight       Weight (kg) Weight (lbs) Weight Method Visit Report   6/17/2022 81.647 kg  180 lb   --    9/5/2023 74.844 kg  165 lb  Stated      74.844 kg  165 lb  Stated       Weight change:  UTD 2/2 limited documented wt hx    Nutrition Focused Physical Exam     Date:  9/6       Unable to perform due to Patient body position, Potential for patient discomfort     Current Nutrition Prescription   PO: NPO Diet NPO Type: Sips with Meds  Oral Nutrition Supplement: N/A  Intake: N/A    Nutrition Diagnosis   Date:  9/6            Updated:    Problem Predicted suboptimal energy intake    Etiology Diarrhea with PO intake   Signs/Symptoms Reported limited intake   Status: New    Date:   9/6    Updated:     Problem Altered GI function   Etiology Diarrhea in the context of suspected IBS   Signs/Symptoms Reported frequent BM following PO intake   Status: New    Goal:   General: Nutrition to support treatment  PO: Tolerate PO  EN/PN: N/A    Nutrition Intervention      Follow treatment progress, Care plan reviewed, Encourage intake, Supplement provided    Provide Boost Breeze 2x daily    ? Benefit from antispasmotic due to frequency of BM in relation to meal intake    Monitoring/Evaluation:   Per protocol, I&O, PO intake, Weight, GI status, POC/GOC      Rachelle Justice, MS,RD,LD  Time Spent: 25min    "

## 2023-09-06 NOTE — THERAPY EVALUATION
Patient Name: Vickie Joshi  : 1950    MRN: [id number]                              Today's Date: 2023       Admit Date: 2023    Visit Dx:     ICD-10-CM ICD-9-CM   1. Atrial fibrillation with RVR  I48.91 427.31   2. Urinary tract infection without hematuria, site unspecified  N39.0 599.0   3. Diarrhea, unspecified type  R19.7 787.91     Patient Active Problem List   Diagnosis    Hypertension    Hx of Migraine    GERD (gastroesophageal reflux disease)    Fibromyalgia    Neuropathy    Asthma    Allergic rhinitis    Morbid obesity    Acute on chronic blood loss anemia    Hypothyroidism    Hyperlipidemia    History of hemorrhagic stroke with residual hemiparesis    Atrial fibrillation, chronic    Hypotension    Hypocalcemia    GI bleed    Closed left hip fracture, sequela    Atrial fibrillation with RVR    Acute UTI (urinary tract infection)    Lactic acidosis    Diarrhea    Tremor, essential     Past Medical History:   Diagnosis Date    Arthritis     Asthma     Atrial fibrillation     Cardiac disorder     Closed fracture of neck of left femur 2017    Disease of thyroid gland     Fibromyalgia     GERD (gastroesophageal reflux disease)     Hyperlipidemia     Hypertension     Migraine     Neuropathy     Stroke      Past Surgical History:   Procedure Laterality Date    CHOLECYSTECTOMY      HEMORRHOIDECTOMY N/A 2021    Procedure: HEMORRHOIDECTOMY;  Surgeon: Adan Meadows MD;  Location:  [facility];  Service: General;  Laterality: N/A;    HIP PERCUTANEOUS PINNING Left 2017    Procedure: LEFT HIP PERCUTANEOUS PINNING FEMORAL NECK;  Surgeon: Ezra Barlow MD;  Location:  [facility];  Service:     HYSTERECTOMY      GA CLTX HIP DISLOCATION TRAUMATIC W/O ANESTHESIA Left 2017    Procedure: LEFT HIP CLOSED REDUCTION;  Surgeon: Ezra Barlow MD;  Location:  [facility];  Service: Orthopedics    SHOULDER SURGERY        General Information       Row Name 23 1523          Physical Therapy  Time and Intention    Document Type evaluation  -KG     Mode of Treatment physical therapy  -KG       Row Name 09/06/23 1523          General Information    Patient Profile Reviewed yes  -KG     Prior Level of Function max assist:;transfer;w/c or scooter;ADL's;dressing;bathing  -KG     Existing Precautions/Restrictions fall;other (see comments)  remote CVA with residual L sided deficits; confusion; incontinent  -KG     Barriers to Rehab medically complex;previous functional deficit;cognitive status  -KG       Row Name 09/06/23 1523          Living Environment    People in Home spouse  -KG       Row Name 09/06/23 1523          Home Main Entrance    Number of Stairs, Main Entrance none  -KG       Row Name 09/06/23 1523          Stairs Within Home, Primary    Number of Stairs, Within Home, Primary none  -KG       Row Name 09/06/23 1523          Cognition    Orientation Status (Cognition) oriented to;person;place  -KG       Row Name 09/06/23 1523          Safety Issues, Functional Mobility    Safety Issues Affecting Function (Mobility) awareness of need for assistance;insight into deficits/self-awareness;safety precaution awareness;safety precautions follow-through/compliance  -KG     Impairments Affecting Function (Mobility) balance;cognition;coordination;endurance/activity tolerance;postural/trunk control;strength  -KG     Cognitive Impairments, Mobility Safety/Performance awareness, need for assistance;insight into deficits/self-awareness;safety precaution awareness;safety precaution follow-through  -KG               User Key  (r) = Recorded By, (t) = Taken By, (c) = Cosigned By      Initials Name Provider Type    KG Lu Dewey, PT Physical Therapist                   Mobility       Row Name 09/06/23 1525          Bed Mobility    Bed Mobility rolling left;rolling right;scooting/bridging  -KG     Rolling Left [place] (Bed Mobility) maximum assist (25% patient effort);verbal cues  -KG     Rolling Right  [place] (Bed Mobility) maximum assist (25% patient effort);verbal cues  -KG     Scooting/Bridging [place] (Bed Mobility) maximum assist (25% patient effort);2 person assist;verbal cues  -KG     Assistive Device (Bed Mobility) bed rails;draw sheet  -KG     Comment, (Bed Mobility) Pt required maxA for rolling L and R for hygiene. Deferred additional mobility due to toileting needs.  -KG       Row Name 09/06/23 1525          Transfers    Comment, (Transfers) STS transfers deferred this date.  -KG       Row Name 09/06/23 1525          Bed-Chair Transfer    Bed-Chair [place] (Transfers) unable to assess  -KG       Row Name 09/06/23 1525          Sit-Stand Transfer    Sit-Stand [place] (Transfers) unable to assess  -KG       Row Name 09/06/23 1525          Gait/Stairs (Locomotion)    [place] Level (Gait) unable to assess  -KG     Comment, (Gait/Stairs) Ambulation deferred. Not appropriate to assess. Pt reports being non-ambulatory at baseline.  -KG               User Key  (r) = Recorded By, (t) = Taken By, (c) = Cosigned By      Initials Name Provider Type    Lu Keenan, PT Physical Therapist                   Obj/Interventions       Row Name 09/06/23 1527          Range of Motion Comprehensive    General Range of Motion no range of motion deficits identified  -KG     Comment, General Range of Motion B LE WFL except cb DF limited 50%  -KG       Row Name 09/06/23 1527          Strength Comprehensive (MMT)    Comment, General Manual Muscle Testing (MMT) Assessment unable to formally assess  -KG       Row Name 09/06/23 1527          Sensory Assessment (Somatosensory)    Sensory Assessment (Somatosensory) LE sensation intact  -KG               User Key  (r) = Recorded By, (t) = Taken By, (c) = Cosigned By      Initials Name Provider Type    Lu Keenan, PT Physical Therapist                   Goals/Plan       Row Name 09/06/23 1529          Bed Mobility Goal 1 (PT)     Activity/Assistive Device (Bed Mobility Goal 1, PT) sit to supine;supine to sit  -KG     [place] Level/Cues Needed (Bed Mobility Goal 1, PT) moderate assist (50-74% patient effort)  -KG     Time Frame (Bed Mobility Goal 1, PT) 2 weeks  -KG     Progress/Outcomes (Bed Mobility Goal 1, PT) goal ongoing  -KG       Row Name 09/06/23 1529          Transfer Goal 1 (PT)    Activity/Assistive Device (Transfer Goal 1, PT) sit-to-stand/stand-to-sit;bed-to-chair/chair-to-bed  -KG     [place] Level/Cues Needed (Transfer Goal 1, PT) moderate assist (50-74% patient effort)  -KG     Time Frame (Transfer Goal 1, PT) 2 weeks  -KG     Progress/Outcome (Transfer Goal 1, PT) goal ongoing  -KG       Row Name 09/06/23 1529          Therapy Assessment/Plan (PT)    Planned Therapy Interventions (PT) balance training;bed mobility training;gait training;strengthening;transfer training  -KG               User Key  (r) = Recorded By, (t) = Taken By, (c) = Cosigned By      Initials Name Provider Type    KG Lu Dewey N, PT Physical Therapist                   Clinical Impression       Row Name 09/06/23 1527          Pain    Pretreatment Pain Rating 0/10 - no pain  -KG     Posttreatment Pain Rating 0/10 - no pain  -KG       Row Name 09/06/23 1527          Plan of Care Review    Plan of Care Reviewed With patient  -KG     Outcome Evaluation PT initial evaluation completed for pt presenting with generalized weakness, impaired balance and coordination, and decreased functional mobility. Pt required maxA for bed mobility. Pt's decreased independence warrants PT skilled care. Recommend D/C home with assistance and  PT services. Recommend deborah lift for home to decrease caregiver burden.  -KG       Row Name 09/06/23 1527          Therapy Assessment/Plan (PT)    Patient/Family Therapy Goals Statement (PT) return to PLOF  -KG     Rehab Potential (PT) fair, will monitor progress closely  -KG     Criteria for Skilled Interventions Met  (PT) yes;skilled treatment is necessary  -KG     Therapy Frequency (PT) daily  -KG       Row Name 09/06/23 1527          Vital Signs    Pre Systolic BP Rehab 112  -KG     Pre Treatment Diastolic   -KG     Pretreatment Heart Rate (beats/min) 84  -KG     Posttreatment Heart Rate (beats/min) 89  -KG     Pre SpO2 (%) 95  -KG     O2 Delivery Pre Treatment room air  -KG     Post SpO2 (%) 94  -KG     O2 Delivery Post Treatment room air  -KG     Pre Patient Position Supine  -KG     Intra Patient Position Side Lying  -KG     Post Patient Position Supine  -KG       Row Name 09/06/23 1527          Positioning and Restraints    Pre-Treatment Position in bed  -KG     Post Treatment Position bed  -KG     In Bed notified nsg;supine;call light within reach;encouraged to call for assist;exit alarm on;side rails up x2  -KG               User Key  (r) = Recorded By, (t) = Taken By, (c) = Cosigned By      Initials Name Provider Type    KG Lu Dewey, PT Physical Therapist                   Outcome Measures       Row Name 09/06/23 1529 09/06/23 0800       How much help from another person do you currently need...    Turning from your back to your side while in flat bed without using bedrails? 2  -KG 3  -MH    Moving from lying on back to sitting on the side of a flat bed without bedrails? 2  -KG 2  -MH    Moving to and from a bed to a chair (including a wheelchair)? 1  -KG 1  -MH    Standing up from a chair using your arms (e.g., wheelchair, bedside chair)? 1  -KG 1  -MH    Climbing 3-5 steps with a railing? 1  -KG 1  -MH    To walk in hospital room? 1  -KG 1  -MH    AM-PAC 6 Clicks Score (PT) 8  -KG 9  -MH    Highest level of mobility 3 --> Sat at edge of bed  -KG 3 --> Sat at edge of bed  -MH      Row Name 09/06/23 1529 09/06/23 1329       Functional Assessment    Outcome Measure Options AM-PAC 6 Clicks Basic Mobility (PT)  -KG AM-PAC 6 Clicks Daily Activity (OT)  -JR              User Key  (r) = Recorded By, (t) =  Taken By, (c) = Cosigned By      Initials Name Provider Type    Milagros Avendaño, OT Occupational Therapist    KG Lu Dewey, PT Physical Therapist    Bettie Dao, RN Registered Nurse                                 Physical Therapy Education       Title: PT OT SLP Therapies (In Progress)       Topic: Physical Therapy (In Progress)       Point: Mobility training (In Progress)       Learning Progress Summary             Patient Acceptance, E, NR by KG at 9/6/2023 1350                         Point: Home exercise program (Not Started)       Learner Progress:  Not documented in this visit.              Point: Body mechanics (In Progress)       Learning Progress Summary             Patient Acceptance, E, NR by KG at 9/6/2023 1350                         Point: Precautions (In Progress)       Learning Progress Summary             Patient Acceptance, E, NR by KG at 9/6/2023 1350                                         User Key       Initials Effective Dates Name Provider Type Discipline    TRAVIS 05/22/20 -  Lu Dewey, PT Physical Therapist PT                  PT Recommendation and Plan  Planned Therapy Interventions (PT): balance training, bed mobility training, gait training, strengthening, transfer training  Plan of Care Reviewed With: patient  Outcome Evaluation: PT initial evaluation completed for pt presenting with generalized weakness, impaired balance and coordination, and decreased functional mobility. Pt required maxA for bed mobility. Pt's decreased independence warrants PT skilled care. Recommend D/C home with assistance and  PT services. Recommend deborah lift for home to decrease caregiver burden.     Time Calculation:   PT Evaluation Complexity  History, PT Evaluation Complexity: 3 or more personal factors and/or comorbidities  Examination of Body Systems (PT Eval Complexity): total of 3 or more elements  Clinical Presentation (PT Evaluation Complexity): stable  Clinical  Decision Making (PT Evaluation Complexity): low complexity  Overall Complexity (PT Evaluation Complexity): low complexity     PT Charges       Row Name 09/06/23 1350             Time Calculation    Start Time 1350  -KG      PT Received On 09/06/23  -KG      PT Goal Re-Cert Due Date 09/16/23  -KG         Untimed Charges    PT Eval/Re-eval Minutes 32  -KG         Total Minutes    Untimed Charges Total Minutes 32  -KG       Total Minutes 32  -KG                User Key  (r) = Recorded By, (t) = Taken By, (c) = Cosigned By      Initials Name Provider Type    KG Lu Dewey, PT Physical Therapist                  Therapy Charges for Today       Code Description Service Date Service Provider Modifiers Qty    33412445898 HC PT EVAL LOW COMPLEXITY 3 9/6/2023 Lu Dewey, PT GP 1            PT G-Codes  Outcome Measure Options: AM-PAC 6 Clicks Basic Mobility (PT)  AM-PAC 6 Clicks Score (PT): 8  AM-PAC 6 Clicks Score (OT): 10  PT Discharge Summary  Anticipated Discharge Disposition (PT): home with assist, home with home health    Rosaura Dewey PT  9/6/2023

## 2023-09-07 ENCOUNTER — READMISSION MANAGEMENT (OUTPATIENT)
Dept: CALL CENTER | Facility: HOSPITAL | Age: 73
End: 2023-09-07
Payer: MEDICARE

## 2023-09-07 VITALS
SYSTOLIC BLOOD PRESSURE: 118 MMHG | RESPIRATION RATE: 16 BRPM | OXYGEN SATURATION: 96 % | WEIGHT: 165 LBS | DIASTOLIC BLOOD PRESSURE: 79 MMHG | TEMPERATURE: 97.9 F | HEART RATE: 73 BPM | HEIGHT: 65 IN | BODY MASS INDEX: 27.49 KG/M2

## 2023-09-07 LAB
BACTERIA SPEC AEROBE CULT: ABNORMAL
QT INTERVAL: 386 MS
QTC INTERVAL: 469 MS

## 2023-09-07 PROCEDURE — 99214 OFFICE O/P EST MOD 30 MIN: CPT | Performed by: INTERNAL MEDICINE

## 2023-09-07 PROCEDURE — 25010000002 MEROPENEM PER 100 MG: Performed by: NURSE PRACTITIONER

## 2023-09-07 PROCEDURE — 94761 N-INVAS EAR/PLS OXIMETRY MLT: CPT

## 2023-09-07 PROCEDURE — G0378 HOSPITAL OBSERVATION PER HR: HCPCS

## 2023-09-07 PROCEDURE — 93010 ELECTROCARDIOGRAM REPORT: CPT | Performed by: STUDENT IN AN ORGANIZED HEALTH CARE EDUCATION/TRAINING PROGRAM

## 2023-09-07 PROCEDURE — 93005 ELECTROCARDIOGRAM TRACING: CPT | Performed by: NURSE PRACTITIONER

## 2023-09-07 PROCEDURE — 94799 UNLISTED PULMONARY SVC/PX: CPT

## 2023-09-07 RX ORDER — BUDESONIDE AND FORMOTEROL FUMARATE DIHYDRATE 80; 4.5 UG/1; UG/1
2 AEROSOL RESPIRATORY (INHALATION) 2 TIMES DAILY
Qty: 10.2 G | Refills: 1 | Status: SHIPPED | OUTPATIENT
Start: 2023-09-07

## 2023-09-07 RX ORDER — DILTIAZEM HYDROCHLORIDE 240 MG/1
240 CAPSULE, COATED, EXTENDED RELEASE ORAL
Qty: 30 CAPSULE | Refills: 5 | Status: SHIPPED | OUTPATIENT
Start: 2023-09-08

## 2023-09-07 RX ORDER — NITROFURANTOIN 25; 75 MG/1; MG/1
100 CAPSULE ORAL 2 TIMES DAILY
Qty: 10 CAPSULE | Refills: 0 | Status: SHIPPED | OUTPATIENT
Start: 2023-09-07 | End: 2023-09-13

## 2023-09-07 RX ADMIN — PANTOPRAZOLE SODIUM 40 MG: 40 TABLET, DELAYED RELEASE ORAL at 08:55

## 2023-09-07 RX ADMIN — LEVOTHYROXINE SODIUM 137 MCG: 137 TABLET ORAL at 05:49

## 2023-09-07 RX ADMIN — CETIRIZINE HYDROCHLORIDE 10 MG: 10 TABLET, FILM COATED ORAL at 08:55

## 2023-09-07 RX ADMIN — MEROPENEM 1000 MG: 1 INJECTION, POWDER, FOR SOLUTION INTRAVENOUS at 08:55

## 2023-09-07 RX ADMIN — MEROPENEM 1000 MG: 1 INJECTION, POWDER, FOR SOLUTION INTRAVENOUS at 00:35

## 2023-09-07 RX ADMIN — APIXABAN 5 MG: 5 TABLET, FILM COATED ORAL at 08:55

## 2023-09-07 RX ADMIN — TOPIRAMATE 100 MG: 100 TABLET, FILM COATED ORAL at 08:55

## 2023-09-07 RX ADMIN — DILTIAZEM HYDROCHLORIDE 240 MG: 240 CAPSULE, EXTENDED RELEASE ORAL at 08:55

## 2023-09-07 RX ADMIN — BUDESONIDE AND FORMOTEROL FUMARATE DIHYDRATE 2 PUFF: 160; 4.5 AEROSOL RESPIRATORY (INHALATION) at 07:50

## 2023-09-07 NOTE — CASE MANAGEMENT/SOCIAL WORK
Discharge Planning Assessment  [facility]     Patient Name: Vickie Joshi  MRN: [id number]  Today's Date: 9/7/2023    Admit Date: 9/5/2023    Plan: Home with spouse and services   Discharge Needs Assessment       Row Name 09/07/23 1202       Living Environment    People in Home spouse    Name(s) of People in Home Lives with spouse, Barak Joshi, in [facility]    Current Living Arrangements home    Potentially Unsafe Housing Conditions none    Primary Care Provided by spouse/significant other    Provides Primary Care For no one, unable/limited ability to care for self    Caregiving Concerns Spouse is primary caregiver    Family Caregiver if Needed spouse    Family Caregiver Names Spouse, Barak Joshi at [phone]    Quality of Family Relationships helpful;involved;supportive    Able to Return to Prior Arrangements yes    Living Arrangement Comments Resides in private residence with spouse       Resource/Environmental Concerns    Resource/Environmental Concerns none    Transportation Concerns none       Food Insecurity    Within the past 12 months, you worried that your food would run out before you got the money to buy more. Never true    Within the past 12 months, the food you bought just didn't last and you didn't have money to get more. Never true       Transition Planning    Patient/Family Anticipates Transition to home with family    Patient/Family Anticipated Services at Transition ;home health care    Transportation Anticipated family or friend will provide       Discharge Needs Assessment    Readmission Within the Last 30 Days no previous admission in last 30 days    Current Outpatient/Agency/Support Group homecare agency    Equipment Currently Used at Home power chair,(recliner lift);shower chair;wheelchair;other (see comments);lift device-deborah lift    Concerns to be Addressed discharge planning    Concerns Comments Case Management following for all needs/discharge  planning    Anticipated Changes Related to Illness inability to care for self    Equipment Needed After Discharge other (see comments)-DME in place prior to hospital admit    Outpatient/Agency/Support Group Needs homecare agency    Discharge Facility/Level of Care Needs home with home health    Patient's Choice of Community Agency(s) [facility], [phone]    Discharge Coordination/Progress Anticipate home with spouse                   Discharge Plan       Row Name 09/07/23 1204       Plan    Plan Home with spouse and services    Patient/Family in Agreement with Plan yes  patient    Plan Comments Met with patient at bedside, reports she resides with spouse in [facility] and spouse provides all care.  Discussed PT recommendations for deborah lift and home health-patient reports she has a edborah lift and [facility] for home PT.  No needs at this time, anticipate home with spouse today.  Discussed with hospital PT.    Final Discharge Disposition Code 06 - home with home health care                  Continued Care and Services - Admitted Since 9/5/2023    Coordination has not been started for this encounter.       Expected Discharge Date and Time       Expected Discharge Date Expected Discharge Time    Sep 8, 2023            Demographic Summary       Row Name 09/07/23 1202       General Information    Referral Source admission list    Reason for Consult discharge planning    Preferred Language English                   Functional Status    No documentation.                  Psychosocial    No documentation.                  Abuse/Neglect    No documentation.                  Legal    No documentation.                  Substance Abuse    No documentation.                  Patient Forms    No documentation.                     CARLEEN Beltran

## 2023-09-07 NOTE — PROGRESS NOTES
[facility]  IP Progress Note      Chief Complaint/Reason for service: #1 A-fib RVR    Subjective   Subjective: Patient is awake and alert.  She states she feels better today.  She states her energy is improved and appetite is slowly improving.  She would like to go home.  She denies chest pain or shortness of breath    Past medical, surgical, social and family history reviewed in the patient's electronic medical record.    Objective     Vital Sign Min/Max for last 24 hours  Temp  Min: 97.8 °F (36.6 °C)  Max: 99.1 °F (37.3 °C)   BP  Min: 90/71  Max: 132/87   Pulse  Min: 65  Max: 113   Resp  Min: 16  Max: 18   SpO2  Min: 95 %  Max: 98 %   Flow (L/min)  Min: 2  Max: 2      Intake/Output Summary (Last 24 hours) at 9/7/2023 0822  Last data filed at 9/7/2023 0600  Gross per 24 hour   Intake 240 ml   Output 600 ml   Net -360 ml             Current Facility-Administered Medications:     albuterol (PROVENTIL) nebulizer solution 0.042% 1.25 mg/3mL, 1 ampule, Nebulization, Q6H PRN, Dana Jones APRN    apixaban (ELIQUIS) tablet 5 mg, 5 mg, Oral, Q12H, Dana Jones APRN, 5 mg at 09/06/23 1949    atorvastatin (LIPITOR) tablet 10 mg, 10 mg, Oral, Nightly, Dana Jones APRN, 10 mg at 09/06/23 1949    budesonide-formoterol (SYMBICORT) 160-4.5 MCG/ACT inhaler 2 puff, 2 puff, Inhalation, BID, Dana Jones APRN, 2 puff at 09/07/23 0750    Calcium Replacement - Follow Nurse / BPA Driven Protocol, , Does not apply, PRN, Nehal Ruiz APRN    cetirizine (zyrTEC) tablet 10 mg, 10 mg, Oral, Daily, Dana Jones APRN, 10 mg at 09/06/23 0818    dicyclomine (BENTYL) capsule 10 mg, 10 mg, Oral, BID PRN, Dana Jones APRN    dilTIAZem CD (CARDIZEM CD) 24 hr capsule 240 mg, 240 mg, Oral, Q24H, Dana Jones APRN, 240 mg at 09/06/23 0820    levothyroxine (SYNTHROID, LEVOTHROID) tablet 137 mcg, 137 mcg, Oral, Q AM, Dana Jones APRN, 137 mcg at 09/07/23 0549    Magnesium Standard  Dose Replacement - Follow Nurse / BPA Driven Protocol, , Does not apply, PRN, Nehal Ruiz APRN    meropenem (MERREM) 1000 mg/100 mL 0.9% NS (mbp), 1,000 mg, Intravenous, Q8H, Dana Jones APRN, 1,000 mg at 09/07/23 0035    metoprolol tartrate (LOPRESSOR) half tablet 12.5 mg, 12.5 mg, Oral, Q12H, Luis Carlos Kahn MD    montelukast (SINGULAIR) tablet 10 mg, 10 mg, Oral, Nightly, Dana Jones APRN, 10 mg at 09/06/23 1948    ondansetron (ZOFRAN) injection 4 mg, 4 mg, Intravenous, Q6H PRN, Gladis Bey MD    pantoprazole (PROTONIX) EC tablet 40 mg, 40 mg, Oral, Daily, Dana Jones APRN, 40 mg at 09/06/23 0818    Phosphorus Replacement - Follow Nurse / BPA Driven Protocol, , Does not apply, PRN, Nehal Ruiz APRN    Potassium Replacement - Follow Nurse / BPA Driven Protocol, , Does not apply, PRN, Nehal Ruiz, APRN    pregabalin (LYRICA) capsule 150 mg, 150 mg, Oral, BID PRN, Dimas Butler DO    Sodium Chloride (PF) 0.9 % 10 mL, 10 mL, Intravenous, PRN, Aleksandr Truong DO    sodium chloride 0.9 % flush 10 mL, 10 mL, Intravenous, Q12H, Dana Jones APRN, 10 mL at 09/06/23 0037    sodium chloride 0.9 % flush 10 mL, 10 mL, Intravenous, PRN, Dana Jones APRN    sodium chloride 0.9 % infusion 40 mL, 40 mL, Intravenous, PRN, Dana Jones APRN    sodium chloride 0.9 % infusion, 125 mL/hr, Intravenous, Continuous, Aleksandr Truong DO, Last Rate: 125 mL/hr at 09/06/23 2225, 125 mL/hr at 09/06/23 2225    topiramate (TOPAMAX) tablet 100 mg, 100 mg, Oral, Daily, Dana Jones APRN, 100 mg at 09/06/23 0818    Physical Exam: General pleasant overweight female not dyspneic not tachypneic heart rate 80        HEENT: No JVP       Respiratory: Equal bilateral symmetrical expansion mostly clear       Cardiovascular: Irregular rate and rhythm without murmur       GI: Obese and soft       Lower Extremities: No lesions       Neuro: Facial expressions are symmetrical        Skin: Warm and dry       Psych: Pleasant affect    Results Review: Heart rate is fairly well controlled now blood pressure yesterday was 90 systolic to 114 systolic    Radiology Results:  Imaging Results (Last 72 Hours)       Procedure Component Value Units Date/Time    XR Chest 1 View [[id number]] Collected: 09/06/23 0042     Updated: 09/06/23 0046    Narrative:      XR CHEST 1 VW    Date of Exam: 9/6/2023 12:26 AM EDT    Indication: asthma    Comparison: 7/24/2021.    Findings:  There are no airspace consolidations. Chronic interstitial changes are present bilaterally, similar as compared to the previous study. No pleural fluid. No pneumothorax. The pulmonary vasculature appears within normal limits. The heart appears mildly   enlarged. No acute osseous abnormality identified.      Impression:      Impression:  No acute cardiopulmonary process. Stable chronic interstitial changes.      Electronically Signed: Tessy Love MD    9/6/2023 12:43 AM EDT    Workstation ID: [id number]    CT Abdomen Pelvis Without Contrast [[id number]] Collected: 09/05/23 1548     Updated: 09/05/23 1554    Narrative:      CT ABDOMEN PELVIS WO CONTRAST    Date of Exam: 9/5/2023 3:42 PM EDT    Indication: Lower abdominal pain, nausea, diarrhea.    Comparison: None available.    Technique: Axial CT images were obtained of the abdomen and pelvis without the administration of contrast. Reconstructed coronal and sagittal images were also obtained. Automated exposure control and iterative construction methods were used.      Findings:      There is a small left pleural effusion.    The liver is unremarkable.    Cholecystectomy.      There are splenic calcifications which can be seen as a sequela of prior granulomatous disease.  The pancreases is unremarkable.    There are no adrenal nodules.    The kidneys are within normal limits.    There is no small bowel obstruction .    The appendix is unremarkable.    There are diffuse calcified  atherosclerotic changes of the aorta mesenteric and iliac vessels.        Evaluation of the colon is limited by lack of oral contrast and under distention. There are multiple diverticuli throughout the colon. There is no definite colonic wall thickening or pericolonic stranding identified.      The urinary bladder is unremarkable.    The patient is status post hysterectomy.    There is no pelvic free fluid.    The osseous structures are within normal limits.    IMPESSION :    No small bowel obstruction.     No free fluid.    No inflammatory stranding.    Diverticuli coli without evidence of diverticulitis.                Electronically Signed: Rafael Juan MD    9/5/2023 3:51 PM EDT    Workstation ID: [id number]            EKG: A-fib    ECHO: Preserved EF    Tele: A-fib rates are much better there are pauses up to 2.6 seconds    Assessment   Assessment/Plan: A-fib-the rates are much improved.  Occasional pause up to 2.6 seconds in the early morning hours.  I will reduce metoprolol to 12.5 mg twice daily  The patient may be discharged home today from a cardiovascular point of view.  Would send her out on her current dose of diltiazem and metoprolol  Will see as needed and she should follow-up with her primary cardiologist  Luis Carlos Kahn MD  09/07/23  08:22 EDT

## 2023-09-07 NOTE — DISCHARGE SUMMARY
[facility] Medicine Services  DISCHARGE SUMMARY    Patient Name: Vickie Joshi  : 1950  MRN: [id number]    Date of Admission: 2023  2:16 PM  Date of Discharge:  2023  Primary Care Physician: Dav Bryan MD    Consults       Date and Time Order Name Status Description    2023 12:34 AM Inpatient Cardiology Consult Completed             Hospital Course     Presenting Problem: fatigue, nausea, diarrhea     Active Hospital Problems    Diagnosis  POA    **Atrial fibrillation with RVR [I48.91]  Yes    Tremor, essential [G25.0]  Yes    Acute UTI (urinary tract infection) [N39.0]  Yes    Lactic acidosis [E87.20]  Yes    Diarrhea [R19.7]  Yes    Asthma [J45.909]  Yes    Hypothyroidism [E03.9]  Yes    Hyperlipidemia [E78.5]  Yes    GERD (gastroesophageal reflux disease) [K21.9]  Yes    Hypertension [I10]  Yes    Fibromyalgia [M79.7]  Yes      Resolved Hospital Problems   No resolved problems to display.          Hospital Course:  Vickie Joshi is a 73 y.o. female w/ a hx of HTN, HLD, PAF on Eliquis, remote hx of hemorrhagic CVA, asthma, hypothyroidism, GERD, Fibromyalgia, neuropathy.  She lives at home where she does not walk but can stand/pivot.  She came to ED due to several days of nausea and diarrhea.  In the ED, she was in atrial fibrillation with HR of 150s.         Persistent A.Fib   RVR 150s on admission  Hx of HTN, HLD   - diltiazem gtt was started in ED.   She converted to sinus rhythm.  - Cardiologist was consulted and recommended to continue Eliquis and change her BID diltiazem to a longer acting version.   - On metoprolol 25 BID she had sinus pauses.  This is decreased to 12.5 BID.       Acute UTI  - E coli pansensitive  - she initially got Merrem due to her allergies  - She is discharged on macrobid to complete a 7 day course of abx.      Diarrhea  - resolving   - GI PCR panel negative.  -CT abd/pel without acute findings      Debility  - does not  "walk at home but can stand; has home PT       Discharge Follow Up Recommendations for outpatient labs/diagnostics:   - FU with PCP in 2 weeks or as needed.     Day of Discharge     HPI:   \"I am ready to go home.\"  Eating better.  No palpitations     Review of Systems  - more energy     Vital Signs:   Temp:  [97.8 °F (36.6 °C)-99.1 °F (37.3 °C)] 97.9 °F (36.6 °C)  Heart Rate:  [] 73  Resp:  [16-18] 16  BP: ()/(60-87) 118/79  Flow (L/min):  [2] 2      Physical Exam:  Constitutional: Awake, alert in bed NAD   Eyes: PER  HENT: NCAT, mucous membranes moist  Neck: Supple,  trachea midline  Respiratory: Clear to auscultation bilaterally, nonlabored respirations   Cardiovascular: RRR,   Gastrointestinal: soft nontender   Musculoskeletal:   Psychiatric: Appropriate affect, cooperative  Neurologic: Oriented x 3, clear speech, tremor bilat   Skin: No rashes      Pertinent  and/or Most Recent Results     LAB RESULTS:      Lab 09/06/23  0956 09/06/23  0231 09/05/23  1809 09/05/23  1441   WBC 8.61  --   --  8.09   HEMOGLOBIN 14.5  --   --  14.7   HEMATOCRIT 45.9  --   --  46.2   PLATELETS 128*  --   --  147   NEUTROS ABS 5.67  --   --  5.15   IMMATURE GRANS (ABS) 0.03  --   --  0.02   LYMPHS ABS 1.93  --   --  2.04   MONOS ABS 0.85  --   --  0.72   EOS ABS 0.09  --   --  0.12   MCV 94.1  --   --  91.7   PROCALCITONIN  --  0.06  --   --    LACTATE  --   --  1.5 2.1*         Lab 09/06/23  0231 09/05/23  1441   SODIUM 143 145   POTASSIUM 3.4* 3.9   CHLORIDE 111* 112*   CO2 17.0* 18.0*   ANION GAP 15.0 15.0   BUN 10 12   CREATININE 0.78 0.94   EGFR 81.3 65.0   GLUCOSE 92 102*   CALCIUM 8.6 8.8   MAGNESIUM 1.5*  --    HEMOGLOBIN A1C 5.20  --    TSH  --  0.621         Lab 09/05/23  1441   TOTAL PROTEIN 7.1   ALBUMIN 3.8   GLOBULIN 3.3   ALT (SGPT) 14   AST (SGOT) 21   BILIRUBIN 1.3*   ALK PHOS 81   LIPASE 24         Lab 09/05/23  1441   PROBNP 1,762.0*   HSTROP T 14*                 Brief Urine Lab Results  (Last result in " the past 365 days)        Color   Clarity   Blood   Leuk Est   Nitrite   Protein   CREAT   Urine HCG        09/05/23 1542 Yellow   Clear   Small (1+)   Small (1+)   Positive   30 mg/dL (1+)                 Microbiology Results (last 10 days)       Procedure Component Value - Date/Time    Gastrointestinal Panel, PCR - Stool, Per Rectum [[id number]]  (Normal) Collected: 09/06/23 0112    Lab Status: Final result Specimen: Stool from Per Rectum Updated: 09/06/23 0758     Campylobacter Not Detected     Plesiomonas shigelloides Not Detected     Salmonella Not Detected     Vibrio Not Detected     Vibrio cholerae Not Detected     Yersinia enterocolitica Not Detected     Enteroaggregative E. coli (EAEC) Not Detected     Enteropathogenic E. coli (EPEC) Not Detected     Enterotoxigenic E. coli (ETEC) lt/st Not Detected     Shiga-like toxin-producing E. coli (STEC) stx1/stx2 Not Detected     Shigella/Enteroinvasive E. coli (EIEC) Not Detected     Cryptosporidium Not Detected     Cyclospora cayetanensis Not Detected     Entamoeba histolytica Not Detected     Giardia lamblia Not Detected     Adenovirus F40/41 Not Detected     Astrovirus Not Detected     Norovirus GI/GII Not Detected     Rotavirus A Not Detected     Sapovirus (I, II, IV or V) Not Detected    Blood Culture - Blood, Wrist, Left [[id number]]  (Normal) Collected: 09/05/23 1945    Lab Status: Preliminary result Specimen: Blood from Wrist, Left Updated: 09/06/23 2015     Blood Culture No growth at 24 hours    Blood Culture - Blood, Arm, Right [[id number]]  (Normal) Collected: 09/05/23 1940    Lab Status: Preliminary result Specimen: Blood from Arm, Right Updated: 09/06/23 2015     Blood Culture No growth at 24 hours    Urine Culture - Urine, Urine, Clean Catch [[id number]]  (Abnormal)  (Susceptibility) Collected: 09/05/23 1542    Lab Status: Final result Specimen: Urine, Clean Catch Updated: 09/07/23 0709     Urine Culture >100,000 CFU/mL Escherichia coli    Narrative:       Colonization of the urinary tract without infection is common. Treatment is discouraged unless the patient is symptomatic, pregnant, or undergoing an invasive urologic procedure.    Susceptibility        Escherichia coli      ELLE      Ampicillin Susceptible      Ampicillin + Sulbactam Susceptible      Cefazolin Susceptible      Cefepime Susceptible      Ceftazidime Susceptible      Ceftriaxone Susceptible      Gentamicin Susceptible      Levofloxacin Susceptible      Nitrofurantoin Susceptible      Piperacillin + Tazobactam Susceptible      Trimethoprim + Sulfamethoxazole Susceptible                                   CT Abdomen Pelvis Without Contrast    Result Date: 9/5/2023  CT ABDOMEN PELVIS WO CONTRAST Date of Exam: 9/5/2023 3:42 PM EDT Indication: Lower abdominal pain, nausea, diarrhea. Comparison: None available. Technique: Axial CT images were obtained of the abdomen and pelvis without the administration of contrast. Reconstructed coronal and sagittal images were also obtained. Automated exposure control and iterative construction methods were used. Findings: There is a small left pleural effusion. The liver is unremarkable. Cholecystectomy. There are splenic calcifications which can be seen as a sequela of prior granulomatous disease. The pancreases is unremarkable. There are no adrenal nodules. The kidneys are within normal limits. There is no small bowel obstruction . The appendix is unremarkable. There are diffuse calcified atherosclerotic changes of the aorta mesenteric and iliac vessels. Evaluation of the colon is limited by lack of oral contrast and under distention. There are multiple diverticuli throughout the colon. There is no definite colonic wall thickening or pericolonic stranding identified. The urinary bladder is unremarkable. The patient is status post hysterectomy. There is no pelvic free fluid. The osseous structures are within normal limits. IMPESSION : No small bowel obstruction. No free  fluid. No inflammatory stranding. Diverticuli coli without evidence of diverticulitis. Electronically Signed: Rafael Juan MD  9/5/2023 3:51 PM EDT  Workstation ID: [id number]    XR Chest 1 View    Result Date: 9/6/2023  XR CHEST 1 VW Date of Exam: 9/6/2023 12:26 AM EDT Indication: asthma Comparison: 7/24/2021. Findings: There are no airspace consolidations. Chronic interstitial changes are present bilaterally, similar as compared to the previous study. No pleural fluid. No pneumothorax. The pulmonary vasculature appears within normal limits. The heart appears mildly enlarged. No acute osseous abnormality identified.     Impression: No acute cardiopulmonary process. Stable chronic interstitial changes. Electronically Signed: Tessy Love MD  9/6/2023 12:43 AM EDT  Workstation ID: [id number]             Results for orders placed during the hospital encounter of 06/30/19    Adult Transthoracic Echo Complete W/ Cont if Necessary Per Protocol    Interpretation Summary  · Mild pulmonic valve regurgitation is present.  · Mild mitral valve regurgitation is present  · Left atrial cavity size is moderately dilated.  · Mild aortic valve regurgitation is present.  · Mild tricuspid valve regurgitation is present.  · Calculated right ventricular systolic pressure from tricuspid regurgitation is 39 mmHg.  · Estimated EF = 65%.  · Left ventricular systolic function is normal.      Plan for Follow-up of Pending Labs/Results: I will FU   Pending Labs       Order Current Status    Blood Culture - Blood, Arm, Right Preliminary result    Blood Culture - Blood, Wrist, Left Preliminary result          Discharge Details        Discharge Medications        New Medications        Instructions Start Date   dilTIAZem  MG 24 hr capsule  Commonly known as: CARDIZEM CD  Replaces: dilTIAZem  MG 12 hr capsule   240 mg, Oral, Every 24 Hours Scheduled   Start Date: September 8, 2023     metoprolol tartrate 25 MG tablet  Commonly known  as: LOPRESSOR   Take 0.5 (one-half) tablet by mouth Every 12 (Twelve) Hours.      nitrofurantoin (macrocrystal-monohydrate) 100 MG capsule  Commonly known as: Macrobid   100 mg, Oral, 2 Times Daily             Changes to Medications        Instructions Start Date   Symbicort 80-4.5 MCG/ACT inhaler  Generic drug: budesonide-formoterol  What changed: how to take this   2 puffs, Inhalation, 2 Times Daily             Continue These Medications        Instructions Start Date   albuterol 1.25 MG/3ML nebulizer solution  Commonly known as: ACCUNEB   1.25 mg, Nebulization, Every 6 Hours PRN      albuterol sulfate  (90 Base) MCG/ACT inhaler  Commonly known as: PROVENTIL HFA;VENTOLIN HFA;PROAIR HFA   INHALE TWO PUFFS BY MOUTH EVERY 4 TO 6 HOURS AS NEEDED      atorvastatin 10 MG tablet  Commonly known as: LIPITOR   TAKE ONE TABLET BY MOUTH ONCE NIGHTLY      dicyclomine 10 MG capsule  Commonly known as: BENTYL   10 mg, Oral, 2 Times Daily PRN      docusate sodium 100 MG capsule  Commonly known as: COLACE   100 mg, Oral, 2 Times Daily      Eliquis 5 MG tablet tablet  Generic drug: apixaban   TAKE ONE TABLET BY MOUTH EVERY 12 HOURS      hydrocortisone 1 % cream   1 application , Topical, 4 Times Daily PRN      levothyroxine 137 MCG tablet  Commonly known as: SYNTHROID, LEVOTHROID   137 mcg, Oral, Daily      loratadine 10 MG tablet  Commonly known as: CLARITIN   10 mg, Oral, Daily      Magnesium 250 MG tablet   Oral      montelukast 10 MG tablet  Commonly known as: SINGULAIR   TAKE ONE TABLET BY MOUTH DAILY      omeprazole 40 MG capsule  Commonly known as: priLOSEC   TAKE ONE CAPSULE BY MOUTH DAILY      pregabalin 300 MG capsule  Commonly known as: LYRICA   TAKE ONE CAPSULE BY MOUTH TWICE A DAY      Prolia 60 MG/ML solution prefilled syringe syringe  Generic drug: denosumab   60 mg, Subcutaneous, Once      topiramate 100 MG tablet  Commonly known as: TOPAMAX   100 mg, Oral, Daily             Stop These Medications       dilTIAZem  MG 12 hr capsule  Commonly known as: CARDIZEM SR  Replaced by: dilTIAZem  MG 24 hr capsule     lisinopril 5 MG tablet  Commonly known as: PRINIVIL,ZESTRIL     loperamide 2 MG capsule  Commonly known as: IMODIUM     metoprolol succinate XL 50 MG 24 hr tablet  Commonly known as: TOPROL-XL     nystatin 215697 UNIT/GM cream  Commonly known as: MYCOSTATIN     potassium chloride ER 20 MEQ tablet controlled-release ER tablet  Commonly known as: K-TAB              Allergies   Allergen Reactions    Cephalexin Swelling    Penicillins Hives    Sulfa Antibiotics Hives         Discharge Disposition:  Home or Self Care    Diet:  Hospital:  Diet Order   Procedures    Diet: Cardiac Diets; Healthy Heart (2-3 Na+); Texture: Regular Texture (IDDSI 7); Fluid Consistency: Thin (IDDSI 0)       Activity:  as tolerated          CODE STATUS:    Code Status and Medical Interventions:   Ordered at: 09/05/23 6335     Code Status (Patient has no pulse and is not breathing):    CPR (Attempt to Resuscitate)     Medical Interventions (Patient has pulse or is breathing):    Full Support       Future Appointments   Date Time Provider Department Center   9/12/2023 10:00 AM Loc Mae APRN [facility]   2/9/2024  2:00 PM Riccardo Rae MD [facility]       Additional Instructions for the Follow-ups that You Need to Schedule       Discharge Follow-up with PCP   As directed       Currently Documented PCP:    Dav Bryan MD    PCP Phone Number:    [phone]     Follow Up Details: 2 weeks                      Gladis Bey MD  09/07/23      Time Spent on Discharge:  I spent  38  minutes on this discharge activity which included: face-to-face encounter with the patient, reviewing the data in the system, coordination of the care with the nursing staff as well as consultants, documentation, and entering orders.

## 2023-09-07 NOTE — OUTREACH NOTE
Prep Survey      Flowsheet Row Responses   [facility] patient discharged from? [place]   Is LACE score < 7 ? Yes   Eligibility [facility]   Date of Admission 09/05/23   Date of Discharge 09/07/23   Discharge Disposition Home or Self Care   Discharge diagnosis *A.Fib RVR, UTI   Does the patient have one of the following disease processes/diagnoses(primary or secondary)? Other   Prep survey completed? Yes            Caterina BOTELLO - Registered Nurse

## 2023-09-07 NOTE — PLAN OF CARE
Goal Outcome Evaluation:              Outcome Evaluation: VSS. RA. No complaints of pain or nausea during the shift. No episodes of diarrhea today. Pt ready for d/c home at this time.

## 2023-09-07 NOTE — PLAN OF CARE
Goal Outcome Evaluation:  Plan of Care Reviewed With: patient        Progress: no change  Outcome Evaluation: VSS on RA. Afib on monitor and AM EKG. No complaints of pain or nausea. One episode of diarrhea overnight. AM metoprolol held. Manual BPs had to be taken twice. No other concerns at this time. Plan of care ongoing.       *Patient had to be put on 2L NC while asleep overnight.

## 2023-09-08 ENCOUNTER — TRANSITIONAL CARE MANAGEMENT TELEPHONE ENCOUNTER (OUTPATIENT)
Dept: CALL CENTER | Facility: HOSPITAL | Age: 73
End: 2023-09-08
Payer: MEDICARE

## 2023-09-08 NOTE — OUTREACH NOTE
Call Center TCM Note      Flowsheet Row Responses   [facility] patient discharged from? [place]   Does the patient have one of the following disease processes/diagnoses(primary or secondary)? Other   TCM attempt successful? No   Unsuccessful attempts Attempt 1  [No VR]   Call Status Left message            Merced Multani RN    9/8/2023, 15:51 EDT

## 2023-09-08 NOTE — OUTREACH NOTE
Call Center TCM Note      Flowsheet Row Responses   [facility] patient discharged from? [place]   Does the patient have one of the following disease processes/diagnoses(primary or secondary)? Other   TCM attempt successful? No   Unsuccessful attempts Attempt 2            Merced Multani RN    9/8/2023, 17:16 EDT

## 2023-09-09 ENCOUNTER — TRANSITIONAL CARE MANAGEMENT TELEPHONE ENCOUNTER (OUTPATIENT)
Dept: CALL CENTER | Facility: HOSPITAL | Age: 73
End: 2023-09-09
Payer: MEDICARE

## 2023-09-09 NOTE — OUTREACH NOTE
Call Center TCM Note      Flowsheet Row Responses   [facility] [place] patient discharged from? [place]   Does the patient have one of the following disease processes/diagnoses(primary or secondary)? Other   TCM attempt successful? Yes   Call start time 1048   Call end time 1051   Discharge diagnosis *A.Fib RVR, UTI   Meds reviewed with patient/caregiver? Yes   Is the patient having any side effects they believe may be caused by any medication additions or changes? No   Does the patient have all medications ordered at discharge? Yes   Is the patient taking all medications as directed (includes completed medication regime)? Yes   Comments Hospital d/c f/u appt on 9/12/23 @10am   Does the patient have an appointment with their PCP within 7-14 days of discharge? Yes   What is the Home health agency?  [facility]   Has home health visited the patient within 72 hours of discharge? Yes   Psychosocial issues? No   Comments Enc pt to check her BP and HR at least twice a day, pt uses a w/c   Did the patient receive a copy of their discharge instructions? Yes   Nursing interventions Reviewed instructions with patient   What is the patient's perception of their health status since discharge? Improving   Is the patient/caregiver able to teach back the hierarchy of who to call/visit for symptoms/problems? PCP, Specialist, Home health nurse, Urgent Care, ED, 911 Yes   TCM call completed? Yes   Call end time 1051   Would this patient benefit from a Referral to Amb Social Work? No   Is the patient interested in additional calls from an ambulatory ? No            Marj Choe RN    9/9/2023, 10:54 EDT

## 2023-09-10 LAB
BACTERIA SPEC AEROBE CULT: NORMAL
BACTERIA SPEC AEROBE CULT: NORMAL

## 2023-09-13 ENCOUNTER — OFFICE VISIT (OUTPATIENT)
Dept: FAMILY MEDICINE CLINIC | Facility: CLINIC | Age: 73
End: 2023-09-13
Payer: MEDICARE

## 2023-09-13 VITALS
SYSTOLIC BLOOD PRESSURE: 124 MMHG | DIASTOLIC BLOOD PRESSURE: 82 MMHG | BODY MASS INDEX: 27.49 KG/M2 | WEIGHT: 165 LBS | HEART RATE: 75 BPM | HEIGHT: 65 IN | OXYGEN SATURATION: 99 % | TEMPERATURE: 98.4 F

## 2023-09-13 DIAGNOSIS — R17 ELEVATED BILIRUBIN: ICD-10-CM

## 2023-09-13 DIAGNOSIS — I10 PRIMARY HYPERTENSION: ICD-10-CM

## 2023-09-13 DIAGNOSIS — I48.91 ATRIAL FIBRILLATION WITH RVR: Primary | ICD-10-CM

## 2023-09-13 DIAGNOSIS — E83.42 HYPOMAGNESEMIA: ICD-10-CM

## 2023-09-13 DIAGNOSIS — R82.90 NONSPECIFIC FINDING ON EXAMINATION OF URINE: ICD-10-CM

## 2023-09-13 DIAGNOSIS — N39.0 ACUTE UTI (URINARY TRACT INFECTION): ICD-10-CM

## 2023-09-13 DIAGNOSIS — D69.6 LOW PLATELET COUNT: ICD-10-CM

## 2023-09-13 DIAGNOSIS — E87.6 HYPOKALEMIA: ICD-10-CM

## 2023-09-13 LAB
BILIRUB BLD-MCNC: NEGATIVE MG/DL
CLARITY, POC: CLEAR
COLOR UR: YELLOW
EXPIRATION DATE: ABNORMAL
GLUCOSE UR STRIP-MCNC: NEGATIVE MG/DL
KETONES UR QL: NEGATIVE
LEUKOCYTE EST, POC: ABNORMAL
Lab: ABNORMAL
NITRITE UR-MCNC: NEGATIVE MG/ML
PH UR: 8.5 [PH] (ref 5–8)
PROT UR STRIP-MCNC: NEGATIVE MG/DL
RBC # UR STRIP: NEGATIVE /UL
SP GR UR: 1.02 (ref 1–1.03)
UROBILINOGEN UR QL: NORMAL

## 2023-09-13 NOTE — ASSESSMENT & PLAN NOTE
Continue with current medications.  Needs to follow-up with her cardiologist whilst evaluating her sooner appointment.  Labs drawn.  Risk discussed understood.  Education provided.  Goal blood pressure less than 140/90.  Let me or cardiology know if gets to or above that.  Return to clinic or ED with any issues or concerns.

## 2023-09-13 NOTE — PROGRESS NOTES
Transitional Care Follow Up Visit  Subjective     Vickie Joshi is a 73 y.o. female who presents for a transitional care management visit.    Within 48 business hours after discharge our office contacted her via telephone to coordinate her care and needs.      I reviewed and discussed the details of that call along with the discharge summary, hospital problems, inpatient lab results, inpatient diagnostic studies, and consultation reports with Vickie.     Current outpatient and discharge medications have been reconciled for the patient.  Reviewed by: FABIANA Greenberg          9/7/2023     2:23 PM   Date of TCM Phone Call   [facility]   Date of Admission 9/5/2023   Date of Discharge 9/7/2023   Discharge Disposition Home or Self Care     Risk for Readmission (LACE) Score: 5 (9/7/2023  6:00 AM)      History of Present Illness   Course During Hospital Stay:     Patient was admitted at [facility] from 9/5/2023 until 9/7/2023.  Past history of hyperlipidemia hypertension atrial fibrillation and on Eliquis and has also history of hemorrhagic CVA asthma hypothyroidism GERD fibromyalgia neuropathy.  She does not walk but she can stand and pivot.  She went to the ED due to nausea and diarrhea.  She was found to be in A-fib with a heart rate of 150.    Dilt drip was started in the ED.  Cardio was consulted and recommended they continue Eliquis and change her twice daily D-looped to a longer acting version.  She is also on metoprolol.    She was found to have a UTI.  She was discharged with Macrobid for 7 days.    Diarrhea resolved.  CT abdomen pelvis was without acute findings.    9/6/2023 potassium was low at 3.4.  Magnesium was low at 1.5.  Platelets low at 128.    A1c 5.2.    States she is feeling much better and back to her baseline.  States initially before going to the hospital she was urinating fairly frequently and that has resolved.  No dizziness no headache no chest pain  no chest pressure no shortness of breath no trouble breathing no urinary or bowel issues.  Does not have an appointment scheduled with her cardiologist until February someone to see if we get her in sooner for a checkup.     The following portions of the patient's history were reviewed and updated as appropriate: allergies, current medications, past family history, past medical history, past social history, past surgical history, and problem list.    Vitals:    09/13/23 1130   BP: 124/82   Pulse: 75   Temp: 98.4 °F (36.9 °C)   SpO2: 99%        Review of Systems   Constitutional:  Negative for chills, fatigue and fever.   HENT:  Negative for congestion.    Eyes:  Negative for visual disturbance.   Respiratory:  Negative for cough, shortness of breath and wheezing.    Cardiovascular: Negative.    Gastrointestinal:  Negative for abdominal pain, constipation, diarrhea, nausea and vomiting.   Genitourinary:  Negative for decreased urine volume, dysuria, frequency, hematuria and urgency.   Musculoskeletal:  Negative for back pain.   Neurological:  Negative for dizziness, light-headedness and headaches.   Psychiatric/Behavioral:  Negative for suicidal ideas.      Objective   Physical Exam  Constitutional:       Appearance: Normal appearance.   Eyes:      Conjunctiva/sclera: Conjunctivae normal.      Pupils: Pupils are equal, round, and reactive to light.   Cardiovascular:      Rate and Rhythm: Normal rate and regular rhythm.      Heart sounds: Normal heart sounds.   Pulmonary:      Effort: Pulmonary effort is normal.      Breath sounds: Normal breath sounds.   Abdominal:      General: Abdomen is flat. Bowel sounds are normal. There is no distension.      Palpations: Abdomen is soft.      Tenderness: There is no abdominal tenderness. There is no right CVA tenderness, left CVA tenderness or guarding.   Neurological:      General: No focal deficit present.      Mental Status: She is alert and oriented to person, place, and  time. Mental status is at baseline.   Psychiatric:         Mood and Affect: Mood normal.         Behavior: Behavior normal.         Thought Content: Thought content normal.         Judgment: Judgment normal.       Assessment & Plan   Diagnoses and all orders for this visit:    1. Atrial fibrillation with RVR (Primary)  Assessment & Plan:  Continue with current medications.  Needs to follow-up with her cardiologist whilst evaluating her sooner appointment.  Labs drawn.  Risk discussed understood.  Education provided.  Goal blood pressure less than 140/90.  Let me or cardiology know if gets to or above that.  Return to clinic or ED with any issues or concerns.    Orders:  -     Ambulatory Referral to Cardiology    2. Primary hypertension  Assessment & Plan:  Continue with current medications.  Needs to follow-up with her cardiologist whilst evaluating her sooner appointment.  Labs drawn.  Risk discussed understood.  Education provided.  Goal blood pressure less than 140/90.  Let me or cardiology know if gets to or above that.  Return to clinic or ED with any issues or concerns.      3. Hypomagnesemia  Assessment & Plan:  Labs drawn.    Orders:  -     Magnesium    4. Hypokalemia  Assessment & Plan:  Labs drawn.      5. Low platelet count  Assessment & Plan:  Labs drawn.    Orders:  -     CBC & Differential    6. Acute UTI (urinary tract infection)  Assessment & Plan:  UA completed and shows leuks.  Culture sent.  Call in 2 days for results.  Stay hydrated with water.  Education provided.  Return to clinic or ED with any issues or concerns.    Orders:  -     POC Urinalysis Dipstick, Automated; Future    7. Elevated bilirubin  Assessment & Plan:  Labs drawn.    Orders:  -     Comprehensive Metabolic Panel      Return in about 3 months (around 12/13/2023) for with her PCP.

## 2023-09-13 NOTE — ASSESSMENT & PLAN NOTE
UA completed and shows leuks.  Culture sent.  Call in 2 days for results.  Stay hydrated with water.  Education provided.  Return to clinic or ED with any issues or concerns.

## 2023-09-14 LAB
ALBUMIN SERPL-MCNC: 3.8 G/DL (ref 3.8–4.8)
ALBUMIN/GLOB SERPL: 1.3 {RATIO} (ref 1.2–2.2)
ALP SERPL-CCNC: 85 IU/L (ref 44–121)
ALT SERPL-CCNC: 6 IU/L (ref 0–32)
AST SERPL-CCNC: 16 IU/L (ref 0–40)
BASOPHILS # BLD AUTO: 0 X10E3/UL (ref 0–0.2)
BASOPHILS NFR BLD AUTO: 1 %
BILIRUB SERPL-MCNC: 0.7 MG/DL (ref 0–1.2)
BUN SERPL-MCNC: 8 MG/DL (ref 8–27)
BUN/CREAT SERPL: 11 (ref 12–28)
CALCIUM SERPL-MCNC: 9 MG/DL (ref 8.7–10.3)
CHLORIDE SERPL-SCNC: 111 MMOL/L (ref 96–106)
CO2 SERPL-SCNC: 19 MMOL/L (ref 20–29)
CREAT SERPL-MCNC: 0.74 MG/DL (ref 0.57–1)
EGFRCR SERPLBLD CKD-EPI 2021: 86 ML/MIN/1.73
EOSINOPHIL # BLD AUTO: 0.1 X10E3/UL (ref 0–0.4)
EOSINOPHIL NFR BLD AUTO: 2 %
ERYTHROCYTE [DISTWIDTH] IN BLOOD BY AUTOMATED COUNT: 12.8 % (ref 11.7–15.4)
GLOBULIN SER CALC-MCNC: 2.9 G/DL (ref 1.5–4.5)
GLUCOSE SERPL-MCNC: 102 MG/DL (ref 70–99)
HCT VFR BLD AUTO: 42.1 % (ref 34–46.6)
HGB BLD-MCNC: 13.8 G/DL (ref 11.1–15.9)
IMM GRANULOCYTES # BLD AUTO: 0 X10E3/UL (ref 0–0.1)
IMM GRANULOCYTES NFR BLD AUTO: 0 %
LYMPHOCYTES # BLD AUTO: 1.7 X10E3/UL (ref 0.7–3.1)
LYMPHOCYTES NFR BLD AUTO: 24 %
MAGNESIUM SERPL-MCNC: 2 MG/DL (ref 1.6–2.3)
MCH RBC QN AUTO: 29.9 PG (ref 26.6–33)
MCHC RBC AUTO-ENTMCNC: 32.8 G/DL (ref 31.5–35.7)
MCV RBC AUTO: 91 FL (ref 79–97)
MONOCYTES # BLD AUTO: 0.5 X10E3/UL (ref 0.1–0.9)
MONOCYTES NFR BLD AUTO: 7 %
NEUTROPHILS # BLD AUTO: 4.8 X10E3/UL (ref 1.4–7)
NEUTROPHILS NFR BLD AUTO: 66 %
PLATELET # BLD AUTO: 178 X10E3/UL (ref 150–450)
POTASSIUM SERPL-SCNC: 4.6 MMOL/L (ref 3.5–5.2)
PROT SERPL-MCNC: 6.7 G/DL (ref 6–8.5)
RBC # BLD AUTO: 4.62 X10E6/UL (ref 3.77–5.28)
SODIUM SERPL-SCNC: 145 MMOL/L (ref 134–144)
WBC # BLD AUTO: 7.1 X10E3/UL (ref 3.4–10.8)

## 2023-09-15 ENCOUNTER — TELEPHONE (OUTPATIENT)
Dept: FAMILY MEDICINE CLINIC | Facility: CLINIC | Age: 73
End: 2023-09-15
Payer: MEDICARE

## 2023-09-15 ENCOUNTER — TELEPHONE (OUTPATIENT)
Dept: CARDIOLOGY | Facility: CLINIC | Age: 73
End: 2023-09-15
Payer: MEDICARE

## 2023-09-15 RX ORDER — CIPROFLOXACIN 500 MG/1
500 TABLET, FILM COATED ORAL 2 TIMES DAILY
Qty: 14 TABLET | Refills: 0 | Status: SHIPPED | OUTPATIENT
Start: 2023-09-15

## 2023-09-15 NOTE — TELEPHONE ENCOUNTER
Caller: Vickie Joshi    Relationship: Self    Best call back number: [phone]     What test was performed: LAB TESTS    When was the test performed: 09.13.23    Where was the test performed: IN OFFICE    Additional notes: CALL WITH RESULTS

## 2023-09-15 NOTE — TELEPHONE ENCOUNTER
PATIENTS  CALLED NEEDING THE RESULTS OF PATIENTS URINE TEST BUT ALSO WANTS TO REQUEST MEDICATIONS    PATIENT IS EXPERIENCING BURNING, REDNESS, PAIN, IRRITATION  FREQUENCY (GOING ABOUT EVERY 15 MINUTES)    HE STATED HE CAN'T GET ANYTHING DONE AS HE IS RUNNING THE BEDPAN TO HER AND SHE CAN'T REST AS SHE IS CONSTANTLY URINATING    PATIENTS  WANTS TO REQUEST A STRONG MEDICATION FOR AN UTI ASAP. PATIENT CAN NOT GO THROUGH THE WEEKEND WITH THIS ISSUE. PLEASE ADVISE AND CALL RISA [phone]    [facility] [id number] - [place], [place]  [address] - [phone] [facility] [phone] FX

## 2023-09-15 NOTE — TELEPHONE ENCOUNTER
LVM FOR PT TO CALL AND SCHEDULE REFERRAL, HUB CAN MOVE CURRENT FOLLOW UP TO A CLOSER DATE 9/15/23

## 2023-09-18 ENCOUNTER — TELEPHONE (OUTPATIENT)
Dept: FAMILY MEDICINE CLINIC | Facility: CLINIC | Age: 73
End: 2023-09-18

## 2023-09-18 NOTE — TELEPHONE ENCOUNTER
Called patient let know Dr Bryan will still be her doctor but she will need to get her reference number from her insurance

## 2023-09-18 NOTE — TELEPHONE ENCOUNTER
Caller: Vickie Joshi    Relationship: Self    Best call back number: [phone]    What is the best time to reach you: ANYTIME    Who are you requesting to speak with (clinical staff, provider,  specific staff member): DR ALONZO    What was the call regarding: ABOUT HER BEING  DROPPED FROM INSURANCE

## 2023-09-19 ENCOUNTER — TELEPHONE (OUTPATIENT)
Dept: CARDIOLOGY | Facility: CLINIC | Age: 73
End: 2023-09-19
Payer: MEDICARE

## 2023-09-19 ENCOUNTER — TELEPHONE (OUTPATIENT)
Dept: FAMILY MEDICINE CLINIC | Facility: CLINIC | Age: 73
End: 2023-09-19
Payer: MEDICARE

## 2023-09-19 NOTE — TELEPHONE ENCOUNTER
Caller: Vickie Joshi    Relationship: Self    Best call back number: [phone]  PLEASE CALL ABOUT THIS     Who are you requesting to speak with (clinical staff, provider,  specific staff member): CLINICAL     What was the call regarding: A [employer] MEDICARE REPRESENTATIVE CALLED  WITH THE PATIENT ON THE LINE AND SAID   IF DR ALONZO AUTHORIZES THAT THE PATIENT NEEDS TO BE SEEN FOR HER MEDICAL CARE WITH DR ALONZO THEN [facility] WOULD CONTINUE PAYING FOR HER CARE  PATIENT SAID SHE HAD A STROKE 8 YEARS AGO AND IS TAKING PHYSICAL THERAPY AN ON A LOT OF MEDICATIONS   THE [facility] REPRESENTATIVE SAID IT WOULD BE LIKE A REGULAR REFERRAL FROM DR ALONZO  THEY WOULD NEED TO KNOW WHY DOES VICKIE NEED TO CONTINUE CARE WITH DR ALONZO     PLEASE CALL PATIENT AND ADVISE

## 2023-09-19 NOTE — TELEPHONE ENCOUNTER
LVM FOR PT TO CALL AND SCHEDULE REFERRAL, HUB CAN MOVE CURRENT FOLLOW UP TO A CLOSER DATE 9/19/23

## 2023-09-20 NOTE — TELEPHONE ENCOUNTER
Continuity of care.  I have seen pt for >10 yrs.  Prior major stroke, multiple meds.   Taxing effort for pt to seek primary care in a different town.

## 2023-09-21 LAB
BACTERIA UR CULT: ABNORMAL
OTHER ANTIBIOTIC SUSC ISLT: ABNORMAL

## 2023-09-21 NOTE — TELEPHONE ENCOUNTER
"HUB TO READ--\"Dr Bryan said that yes,   Continuity of care.  I have seen pt for >10 yrs.  Prior major stroke, multiple meds.   Taxing effort for pt to seek primary care in a different town.     "

## 2023-09-25 ENCOUNTER — TELEPHONE (OUTPATIENT)
Dept: CARDIOLOGY | Facility: CLINIC | Age: 73
End: 2023-09-25

## 2023-09-25 NOTE — TELEPHONE ENCOUNTER
SPOKE WITH PT REGARDING REFERAL AND INSURANCE. SHE IS CALLING [employer], CHECKING CONTINUITY OF CARE - REQUESTED REFERENCE NUMBER, CONTACT AND CALL BACK. PT IS FEELING GOOD - MIGHT NOT SCHEDULE REFERRAL. 9/25/23

## 2023-09-29 ENCOUNTER — TELEPHONE (OUTPATIENT)
Dept: FAMILY MEDICINE CLINIC | Facility: CLINIC | Age: 73
End: 2023-09-29
Payer: MEDICARE

## 2023-09-29 NOTE — TELEPHONE ENCOUNTER
Caller: Barak Joshi    Relationship: Emergency Contact    Best call back number: [phone]    What medication are you requesting: A MEDICATED CREAM    What are your current symptoms: RASH ON BUTTOCKS,BLISTERED BUMPS, AND SKIN CRACKED, RAW    How long have you been experiencing symptoms: A WEEK    Have you had these symptoms before:    [] Yes  [x] No    Have you been treated for these symptoms before:   [] Yes  [x] No    If a prescription is needed, what is your preferred pharmacy and phone number: [facility] [id number] [place], [place] - [address] [place] [address]  - [phone] [facility] [phone] FX     Additional notes:  STATES THAT HE HAS USED OTHER CREAMS AND CHANGED PATIENT'S BRAND OF DEPENDS AND STILL HAS THE RASH

## 2023-09-30 ENCOUNTER — HOSPITAL ENCOUNTER (EMERGENCY)
Facility: HOSPITAL | Age: 73
Discharge: HOME | End: 2023-09-30
Attending: EMERGENCY MEDICINE
Payer: MEDICARE

## 2023-09-30 VITALS
SYSTOLIC BLOOD PRESSURE: 127 MMHG | OXYGEN SATURATION: 95 % | HEIGHT: 65 IN | RESPIRATION RATE: 14 BRPM | BODY MASS INDEX: 27.49 KG/M2 | DIASTOLIC BLOOD PRESSURE: 49 MMHG | TEMPERATURE: 97.5 F | WEIGHT: 165 LBS | HEART RATE: 58 BPM

## 2023-09-30 DIAGNOSIS — B37.89 CANDIDA RASH OF GROIN: Primary | ICD-10-CM

## 2023-09-30 PROCEDURE — 99282 EMERGENCY DEPT VISIT SF MDM: CPT

## 2023-09-30 RX ORDER — NYSTATIN 100000 [USP'U]/G
POWDER TOPICAL 3 TIMES DAILY
Qty: 60 G | Refills: 0 | Status: SHIPPED | OUTPATIENT
Start: 2023-09-30

## 2023-09-30 NOTE — ED PROVIDER NOTES
Subjective  History of Present Illness:    Chief Complaint: Rash in groin area  History of Present Illness: 73-year-old female presents with a rash, she states she just recently had 2 rounds of antibiotics for urinary tract infection and now she has a red burning painful rash in her groin, she has been using ointment in the area with zinc oxide with no relief.  Onset: Gradual onset  Duration: Symptoms for several days,  Exacerbating / Alleviating factors: Pain with movement, no relief with current ointment  Associated symptoms: Burning sensation in the skin      Nurses Notes reviewed and agree, including vitals, allergies, social history and prior medical history.     REVIEW OF SYSTEMS: All systems reviewed and not pertinent unless noted.    Review of Systems   Skin:  Positive for rash.   All other systems reviewed and are negative.    Past Medical History:   Diagnosis Date    Arthritis     Asthma     Atrial fibrillation     Cardiac disorder     Closed fracture of neck of left femur 2/27/2017    Disease of thyroid gland     Fibromyalgia     GERD (gastroesophageal reflux disease)     Hyperlipidemia     Hypertension     Migraine     Neuropathy     Stroke        Allergies:    Cephalexin, Penicillins, Sulfa antibiotics, and Cephalosporins      Past Surgical History:   Procedure Laterality Date    CHOLECYSTECTOMY      HEMORRHOIDECTOMY N/A 7/23/2021    Procedure: HEMORRHOIDECTOMY;  Surgeon: Adan Meadows MD;  Location:  [facility];  Service: General;  Laterality: N/A;    HIP PERCUTANEOUS PINNING Left 2/28/2017    Procedure: LEFT HIP PERCUTANEOUS PINNING FEMORAL NECK;  Surgeon: Ezra Barlow MD;  Location:  [facility];  Service:     HYSTERECTOMY      MN CLTX HIP DISLOCATION TRAUMATIC W/O ANESTHESIA Left 2/28/2017    Procedure: LEFT HIP CLOSED REDUCTION;  Surgeon: Ezra Barlow MD;  Location:  [facility];  Service: Orthopedics    SHOULDER SURGERY           Social History     Socioeconomic History    Marital status:   "  Tobacco Use    Smoking status: Never    Smokeless tobacco: Never   Vaping Use    Vaping Use: Never used   Substance and Sexual Activity    Alcohol use: No    Drug use: No    Sexual activity: Defer         Family History   Problem Relation Age of Onset    Alcohol abuse Mother     Heart disease Mother         Cardiac Disorder    Stroke Mother     Hypertension Mother     Parkinsonism Mother     Colon cancer Maternal Grandmother     Diabetes Maternal Aunt        Objective  Physical Exam:  /49 (BP Location: Right arm, Patient Position: Sitting)   Pulse 58   Temp 97.5 °F (36.4 °C) (Oral)   Resp 14   Ht 165.1 cm (65\")   Wt 74.8 kg (165 lb)   SpO2 95%   BMI 27.46 kg/m²      Physical Exam  Vitals and nursing note reviewed. Exam conducted with a chaperone present.   Constitutional:       Appearance: She is well-developed.   HENT:      Head: Normocephalic and atraumatic.   Cardiovascular:      Rate and Rhythm: Normal rate and regular rhythm.   Pulmonary:      Effort: Pulmonary effort is normal.      Breath sounds: Normal breath sounds.   Abdominal:      General: Bowel sounds are normal.      Palpations: Abdomen is soft.   Musculoskeletal:         General: Normal range of motion.      Cervical back: Normal range of motion and neck supple.   Skin:     General: Skin is warm and dry.          Neurological:      Mental Status: She is alert and oriented to person, place, and time.      Deep Tendon Reflexes: Reflexes are normal and symmetric.         Procedures    ED Course:         Lab Results (last 24 hours)       ** No results found for the last 24 hours. **             No radiology results from the last 24 hrs       Medical Decision Making  Patient Presentation 73-year-old female presents with a rash in her groin area, itching burning sensation after completing antibiotics vital signs stable, on physical exam she had a candidiasis rash in her groin.    DDX Candida, cellulitis,    Data Review/ Non ED Records " ?Analysis/Ordering unique tests no labs or procedures performed    Independent Review Studies no review of previous study    Intervention/Re-evaluation intervention included antifungal powder prescription    Independent Clinician no consultation    Risk Stratification tools/clinical decision rules patient has a candidiasis rash, she does wear a diaper, she is debilitated, and because she wears a diaper and there is moisture warmth and a dark area, this is perfect breeding ground for Candida which is consistent with a rash, will try nystatin cream and patient was educated on care of the area    Shared Decision Making discussed the plan with the patient and family they agree    Disposition patient stable for discharge    Problems Addressed:  Candida rash of groin: complicated acute illness or injury    Risk  Prescription drug management.          Final diagnoses:   Candida rash of groin          Adan Byrnes Jr., PA-C  09/30/23 [id number]

## 2023-10-01 NOTE — TELEPHONE ENCOUNTER
I cannot tell without seeing it.  We can make an appointment with dermatology or she can come and see whoever acutely has an appointment available.

## 2023-10-06 ENCOUNTER — OFFICE VISIT (OUTPATIENT)
Dept: FAMILY MEDICINE CLINIC | Facility: CLINIC | Age: 73
End: 2023-10-06
Payer: MEDICARE

## 2023-10-06 VITALS — OXYGEN SATURATION: 100 % | DIASTOLIC BLOOD PRESSURE: 62 MMHG | HEART RATE: 39 BPM | SYSTOLIC BLOOD PRESSURE: 122 MMHG

## 2023-10-06 DIAGNOSIS — B37.2 YEAST INFECTION OF THE SKIN: Primary | ICD-10-CM

## 2023-10-06 PROCEDURE — 99214 OFFICE O/P EST MOD 30 MIN: CPT | Performed by: STUDENT IN AN ORGANIZED HEALTH CARE EDUCATION/TRAINING PROGRAM

## 2023-10-06 PROCEDURE — 3074F SYST BP LT 130 MM HG: CPT | Performed by: STUDENT IN AN ORGANIZED HEALTH CARE EDUCATION/TRAINING PROGRAM

## 2023-10-06 PROCEDURE — 3078F DIAST BP <80 MM HG: CPT | Performed by: STUDENT IN AN ORGANIZED HEALTH CARE EDUCATION/TRAINING PROGRAM

## 2023-10-06 RX ORDER — NYSTATIN 100000 U/G
1 CREAM TOPICAL 2 TIMES DAILY
Qty: 60 G | Refills: 1 | Status: SHIPPED | OUTPATIENT
Start: 2023-10-06

## 2023-10-06 NOTE — PROGRESS NOTES
Chief Complaint  Rash (Pt was seen at the ER for yeast rash. The rash looks better.)    Subjective          Vickie Joshi presents to [facility]  History of Present Illness    Patient was seen in the emergency department at [facility] on 9/30/2023 for the rash in groin.  Patient states that she has had significant improvement after being put on the nystatin powder, but states that she continues to have itching especially in the vaginal area.  States that she feels she is unable to get the powder where it needs to be in that area.        Objective   Vital Signs:   /62   Pulse (!) 39   SpO2 100%     There is no height or weight on file to calculate BMI.    Review of Systems    Past History:  Medical History: has a past medical history of Arthritis, Asthma, Atrial fibrillation, Cardiac disorder, Closed fracture of neck of left femur (2/27/2017), Disease of thyroid gland, Fibromyalgia, GERD (gastroesophageal reflux disease), Hyperlipidemia, Hypertension, Migraine, Neuropathy, and Stroke.   Surgical History: has a past surgical history that includes Shoulder surgery; Cholecystectomy; Hysterectomy; pr cltx hip dislocation traumatic w/o anesthesia (Left, 2/28/2017); Hip Percutaneous Pinning (Left, 2/28/2017); and Hemorrhoid surgery (N/A, 7/23/2021).   Family History: family history includes Alcohol abuse in her mother; Colon cancer in her maternal grandmother; Diabetes in her maternal aunt; Heart disease in her mother; Hypertension in her mother; Parkinsonism in her mother; Stroke in her mother.   Social History: reports that she has never smoked. She has never used smokeless tobacco. She reports that she does not drink alcohol and does not use drugs.      Current Outpatient Medications:     albuterol (ACCUNEB) 1.25 MG/3ML nebulizer solution, Take 3 mL by nebulization Every 6 (Six) Hours As Needed for Wheezing or Shortness of Air., Disp: 90 vial, Rfl: 12    albuterol sulfate   (90 Base) MCG/ACT inhaler, INHALE TWO PUFFS BY MOUTH EVERY 4 TO 6 HOURS AS NEEDED, Disp: 8.5 g, Rfl: 0    atorvastatin (LIPITOR) 10 MG tablet, TAKE ONE TABLET BY MOUTH ONCE NIGHTLY, Disp: 30 tablet, Rfl: 2    budesonide-formoterol (Symbicort) 80-4.5 MCG/ACT inhaler, Inhale 2 puffs 2 (Two) Times a Day., Disp: 10.2 g, Rfl: 1    ciprofloxacin (Cipro) 500 MG tablet, Take 1 tablet by mouth 2 (Two) Times a Day., Disp: 14 tablet, Rfl: 0    denosumab (Prolia) 60 MG/ML solution prefilled syringe syringe, Inject 1 mL under the skin into the appropriate area as directed 1 (One) Time for 1 dose., Disp: 180 mL, Rfl: 1    dicyclomine (BENTYL) 10 MG capsule, Take 1 capsule by mouth 2 (Two) Times a Day As Needed for Abdominal Cramping., Disp: 180 capsule, Rfl: 1    dilTIAZem CD (CARDIZEM CD) 240 MG 24 hr capsule, Take 1 capsule by mouth Daily., Disp: 30 capsule, Rfl: 5    docusate sodium (COLACE) 100 MG capsule, Take 1 capsule by mouth 2 (Two) Times a Day., Disp: , Rfl:     Eliquis 5 MG tablet tablet, TAKE ONE TABLET BY MOUTH EVERY 12 HOURS, Disp: 90 tablet, Rfl: 1    levothyroxine (SYNTHROID, LEVOTHROID) 137 MCG tablet, Take 1 tablet by mouth Daily., Disp: 90 tablet, Rfl: 1    loratadine (CLARITIN) 10 MG tablet, Take 1 tablet by mouth Daily., Disp: , Rfl:     Magnesium 250 MG tablet, Take  by mouth., Disp: , Rfl:     metoprolol tartrate (LOPRESSOR) 25 MG tablet, Take 0.5 (one-half) tablet by mouth Every 12 (Twelve) Hours., Disp: 30 tablet, Rfl: 5    montelukast (SINGULAIR) 10 MG tablet, TAKE ONE TABLET BY MOUTH DAILY, Disp: 30 tablet, Rfl: 5    nystatin (MYCOSTATIN) 782507 UNIT/GM cream, Apply 1 application  topically to the appropriate area as directed 2 (Two) Times a Day., Disp: 60 g, Rfl: 1    nystatin (MYCOSTATIN) 276966 UNIT/GM powder, Apply  topically to the appropriate area as directed 3 (Three) Times a Day., Disp: 60 g, Rfl: 0    omeprazole (priLOSEC) 40 MG capsule, TAKE ONE CAPSULE BY MOUTH DAILY, Disp: 30  capsule, Rfl: 2    pregabalin (LYRICA) 300 MG capsule, TAKE ONE CAPSULE BY MOUTH TWICE A DAY, Disp: 180 capsule, Rfl: 1    topiramate (TOPAMAX) 100 MG tablet, Take 1 tablet by mouth Daily., Disp: 90 tablet, Rfl: 1    Allergies: Cephalexin, Penicillins, Sulfa antibiotics, and Cephalosporins    Physical Exam  Constitutional:       General: She is not in acute distress.     Appearance: She is not ill-appearing or toxic-appearing.   HENT:      Head: Normocephalic and atraumatic.   Cardiovascular:      Rate and Rhythm: Normal rate and regular rhythm.      Heart sounds: No murmur heard.  Pulmonary:      Effort: Pulmonary effort is normal. No respiratory distress.   Skin:     Comments: Unable to stand as patient is in a wheelchair.   Neurological:      General: No focal deficit present.      Mental Status: She is alert and oriented to person, place, and time.   Psychiatric:         Mood and Affect: Mood normal.         Thought Content: Thought content normal.        Result Review :   The following data was reviewed by: Cha Dhaliwal DO on 10/06/2023:    Data reviewed : Recent hospitalization notes ED note from 9/30/23              Assessment and Plan    Diagnoses and all orders for this visit:    1. Yeast infection of the skin (Primary)    Other orders  -     nystatin (MYCOSTATIN) 007128 UNIT/GM cream; Apply 1 application  topically to the appropriate area as directed 2 (Two) Times a Day.  Dispense: 60 g; Refill: 1    Discussed with patient that I would recommend that she continue to use the nystatin powder but will add nystatin cream in order to help with the areas of the vulva that are difficult to get to with the powder.  If patient does not have any significant improvement over the next 10 days recommend that she come back in for further evaluation and exam.    Follow Up   No follow-ups on file.  Patient was given instructions and counseling regarding her condition or for health maintenance advice. Please see  specific information pulled into the AVS if appropriate.     Cha Dhaliwal, DO

## 2023-10-25 DIAGNOSIS — G62.9 NEUROPATHY: ICD-10-CM

## 2023-10-25 RX ORDER — PREGABALIN 300 MG/1
CAPSULE ORAL
Qty: 180 CAPSULE | Refills: 0 | Status: SHIPPED | OUTPATIENT
Start: 2023-10-25

## 2023-11-06 RX ORDER — OMEPRAZOLE 40 MG/1
40 CAPSULE, DELAYED RELEASE ORAL DAILY
Qty: 90 CAPSULE | Refills: 1 | Status: SHIPPED | OUTPATIENT
Start: 2023-11-06

## 2023-11-06 RX ORDER — ATORVASTATIN CALCIUM 10 MG/1
TABLET, FILM COATED ORAL
Qty: 90 TABLET | Refills: 1 | Status: SHIPPED | OUTPATIENT
Start: 2023-11-06

## 2023-12-07 ENCOUNTER — OFFICE VISIT (OUTPATIENT)
Dept: FAMILY MEDICINE CLINIC | Facility: CLINIC | Age: 73
End: 2023-12-07
Payer: MEDICARE

## 2023-12-07 VITALS — HEART RATE: 76 BPM | SYSTOLIC BLOOD PRESSURE: 130 MMHG | OXYGEN SATURATION: 97 % | DIASTOLIC BLOOD PRESSURE: 76 MMHG

## 2023-12-07 DIAGNOSIS — Z12.31 ENCOUNTER FOR SCREENING MAMMOGRAM FOR MALIGNANT NEOPLASM OF BREAST: ICD-10-CM

## 2023-12-07 DIAGNOSIS — I48.91 ATRIAL FIBRILLATION WITH RVR: ICD-10-CM

## 2023-12-07 DIAGNOSIS — I10 PRIMARY HYPERTENSION: ICD-10-CM

## 2023-12-07 DIAGNOSIS — R73.9 HYPERGLYCEMIA: ICD-10-CM

## 2023-12-07 DIAGNOSIS — Z87.81 HISTORY OF HIP FRACTURE: ICD-10-CM

## 2023-12-07 DIAGNOSIS — E03.9 ACQUIRED HYPOTHYROIDISM: ICD-10-CM

## 2023-12-07 DIAGNOSIS — M79.7 FIBROMYALGIA: ICD-10-CM

## 2023-12-07 DIAGNOSIS — K58.9 IRRITABLE BOWEL SYNDROME, UNSPECIFIED TYPE: ICD-10-CM

## 2023-12-07 DIAGNOSIS — Z00.00 MEDICARE ANNUAL WELLNESS VISIT, SUBSEQUENT: Primary | ICD-10-CM

## 2023-12-07 DIAGNOSIS — G62.9 NEUROPATHY: ICD-10-CM

## 2023-12-07 DIAGNOSIS — M81.6 LOCALIZED OSTEOPOROSIS WITHOUT CURRENT PATHOLOGICAL FRACTURE: ICD-10-CM

## 2023-12-07 DIAGNOSIS — I69.359 HISTORY OF HEMORRHAGIC STROKE WITH RESIDUAL HEMIPARESIS: ICD-10-CM

## 2023-12-07 DIAGNOSIS — I69.352 HEMIPLGA FOLLOWING CEREBRAL INFRC AFF LEFT DOMINANT SIDE: ICD-10-CM

## 2023-12-07 DIAGNOSIS — N95.8 OTHER SPECIFIED MENOPAUSAL AND PERIMENOPAUSAL DISORDERS: ICD-10-CM

## 2023-12-07 RX ORDER — PREGABALIN 300 MG/1
300 CAPSULE ORAL 2 TIMES DAILY
Qty: 180 CAPSULE | Refills: 1 | Status: SHIPPED | OUTPATIENT
Start: 2023-12-07

## 2023-12-07 RX ORDER — TOPIRAMATE 100 MG/1
100 TABLET, FILM COATED ORAL DAILY
Qty: 90 TABLET | Refills: 1 | Status: SHIPPED | OUTPATIENT
Start: 2023-12-07

## 2023-12-07 RX ORDER — DENOSUMAB 60 MG/ML
60 INJECTION SUBCUTANEOUS ONCE
Qty: 1 ML | Refills: 0 | Status: SHIPPED | OUTPATIENT
Start: 2023-12-07 | End: 2023-12-07

## 2023-12-07 RX ORDER — DILTIAZEM HYDROCHLORIDE 240 MG/1
240 CAPSULE, COATED, EXTENDED RELEASE ORAL
Qty: 90 CAPSULE | Refills: 1 | Status: SHIPPED | OUTPATIENT
Start: 2023-12-07

## 2023-12-07 RX ORDER — MONTELUKAST SODIUM 10 MG/1
10 TABLET ORAL DAILY
Qty: 90 TABLET | Refills: 1 | Status: SHIPPED | OUTPATIENT
Start: 2023-12-07

## 2023-12-07 RX ORDER — BUDESONIDE AND FORMOTEROL FUMARATE DIHYDRATE 80; 4.5 UG/1; UG/1
2 AEROSOL RESPIRATORY (INHALATION) 2 TIMES DAILY
Qty: 10.2 G | Refills: 1 | Status: SHIPPED | OUTPATIENT
Start: 2023-12-07

## 2023-12-07 RX ORDER — DICYCLOMINE HYDROCHLORIDE 10 MG/1
10 CAPSULE ORAL 2 TIMES DAILY PRN
Qty: 180 CAPSULE | Refills: 1 | Status: SHIPPED | OUTPATIENT
Start: 2023-12-07

## 2023-12-07 RX ORDER — LEVOTHYROXINE SODIUM 137 UG/1
137 TABLET ORAL DAILY
Qty: 90 TABLET | Refills: 1 | Status: SHIPPED | OUTPATIENT
Start: 2023-12-07

## 2023-12-07 NOTE — PROGRESS NOTES
The ABCs of the Annual Wellness Visit  Subsequent Medicare Wellness Visit    Subjective      Vickie Joshi is a 73 y.o. female who presents for a Subsequent Medicare Wellness Visit.    The following portions of the patient's history were reviewed and   updated as appropriate: allergies, current medications, past family history, past medical history, past social history, past surgical history, and problem list.    Compared to one year ago, the patient feels her physical   health is the same.    Compared to one year ago, the patient feels her mental   health is the same.    Recent Hospitalizations:  This patient has had a [facility] admission record on file within the last 365 days.    Current Medical Providers:  Patient Care Team:  Dva Bryan MD as PCP - General (Family Medicine)  Riccardo Rae MD as Cardiologist (Cardiology)    Outpatient Medications Prior to Visit   Medication Sig Dispense Refill    albuterol (ACCUNEB) 1.25 MG/3ML nebulizer solution Take 3 mL by nebulization Every 6 (Six) Hours As Needed for Wheezing or Shortness of Air. 90 vial 12    albuterol sulfate  (90 Base) MCG/ACT inhaler INHALE TWO PUFFS BY MOUTH EVERY 4 TO 6 HOURS AS NEEDED 8.5 g 0    atorvastatin (LIPITOR) 10 MG tablet TAKE ONE TABLET BY MOUTH ONCE NIGHTLY 90 tablet 1    docusate sodium (COLACE) 100 MG capsule Take 1 capsule by mouth 2 (Two) Times a Day.      loratadine (CLARITIN) 10 MG tablet Take 1 tablet by mouth Daily.      Magnesium 250 MG tablet Take  by mouth.      nystatin (MYCOSTATIN) 306591 UNIT/GM cream Apply 1 application  topically to the appropriate area as directed 2 (Two) Times a Day. 60 g 1    nystatin (MYCOSTATIN) 499392 UNIT/GM powder Apply  topically to the appropriate area as directed 3 (Three) Times a Day. 60 g 0    omeprazole (priLOSEC) 40 MG capsule TAKE 1 CAPSULE BY MOUTH DAILY 90 capsule 1    pregabalin (LYRICA) 300 MG capsule TAKE ONE CAPSULE BY MOUTH TWICE A  capsule 0     budesonide-formoterol (Symbicort) 80-4.5 MCG/ACT inhaler Inhale 2 puffs 2 (Two) Times a Day. 10.2 g 1    ciprofloxacin (Cipro) 500 MG tablet Take 1 tablet by mouth 2 (Two) Times a Day. 14 tablet 0    denosumab (Prolia) 60 MG/ML solution prefilled syringe syringe Inject 1 mL under the skin into the appropriate area as directed 1 (One) Time for 1 dose. 180 mL 1    dicyclomine (BENTYL) 10 MG capsule Take 1 capsule by mouth 2 (Two) Times a Day As Needed for Abdominal Cramping. 180 capsule 1    dilTIAZem CD (CARDIZEM CD) 240 MG 24 hr capsule Take 1 capsule by mouth Daily. 30 capsule 5    Eliquis 5 MG tablet tablet TAKE ONE TABLET BY MOUTH EVERY 12 HOURS 90 tablet 1    levothyroxine (SYNTHROID, LEVOTHROID) 137 MCG tablet Take 1 tablet by mouth Daily. 90 tablet 1    metoprolol tartrate (LOPRESSOR) 25 MG tablet Take 0.5 (one-half) tablet by mouth Every 12 (Twelve) Hours. 30 tablet 5    montelukast (SINGULAIR) 10 MG tablet TAKE ONE TABLET BY MOUTH DAILY 30 tablet 5    topiramate (TOPAMAX) 100 MG tablet Take 1 tablet by mouth Daily. 90 tablet 1     No facility-administered medications prior to visit.       No opioid medication identified on active medication list. I have reviewed chart for other potential  high risk medication/s and harmful drug interactions in the elderly.        Aspirin is not on active medication list.  Aspirin use is not indicated based on review of current medical condition/s. Risk of harm outweighs potential benefits.  .    Patient Active Problem List   Diagnosis    Hypertension    Hx of Migraine    GERD (gastroesophageal reflux disease)    Fibromyalgia    Neuropathy    Asthma    Allergic rhinitis    Morbid obesity    Acute on chronic blood loss anemia    Hypothyroidism    Hyperlipidemia    History of hemorrhagic stroke with residual hemiparesis    Atrial fibrillation, chronic    Hypotension    Hypocalcemia    GI bleed    Closed left hip fracture, sequela    Atrial fibrillation with RVR    Acute UTI  "(urinary tract infection)    Lactic acidosis    Diarrhea    Tremor, essential    Hypomagnesemia    Hypokalemia    Low platelet count    Elevated bilirubin     Advance Care Planning   Advance Care Planning     Advance Directive is not on file.  ACP discussion was held with the patient during this visit. Patient does not have an advance directive, information provided.     Objective    Vitals:    23 0809   BP: 130/76   Pulse: 76   SpO2: 97%     Estimated body mass index is 27.46 kg/m² as calculated from the following:    Height as of 23: 165.1 cm (65\").    Weight as of 23: 74.8 kg (165 lb).           Does the patient have evidence of cognitive impairment?   No            HEALTH RISK ASSESSMENT    Smoking Status:  Social History     Tobacco Use   Smoking Status Never   Smokeless Tobacco Never     Alcohol Consumption:  Social History     Substance and Sexual Activity   Alcohol Use No     Fall Risk Screen:    STEADI Fall Risk Assessment was completed, and patient is at MODERATE risk for falls. Assessment completed on:2023    Depression Screenin/7/2023     8:22 AM   PHQ-2/PHQ-9 Depression Screening   Little Interest or Pleasure in Doing Things 0-->not at all   Feeling Down, Depressed or Hopeless 0-->not at all   PHQ-9: Brief Depression Severity Measure Score 0       Health Habits and Functional and Cognitive Screenin/7/2023     8:10 AM   Functional & Cognitive Status   Do you have difficulty preparing food and eating? Yes   Do you have difficulty bathing yourself, getting dressed or grooming yourself? Yes   Do you have difficulty using the toilet? Yes   Do you have difficulty moving around from place to place? Yes   Do you have trouble with steps or getting out of a bed or a chair? Yes   Current Diet Well Balanced Diet   Dental Exam Up to date   Eye Exam Up to date   Exercise (times per week) 0 times per week   Current Exercises Include No Regular Exercise   Do you need help using " the phone?  No   Are you deaf or do you have serious difficulty hearing?  No   Do you need help to go to places out of walking distance? Yes   Do you need help shopping? Yes   Do you need help preparing meals?  Yes   Do you need help with housework?  Yes   Do you need help with laundry? Yes   Do you need help taking your medications? No   Do you need help managing money? No   Do you ever drive or ride in a car without wearing a seat belt? No   Have you felt unusual stress, anger or loneliness in the last month? No   Who do you live with? Spouse   If you need help, do you have trouble finding someone available to you? No   Have you been bothered in the last four weeks by sexual problems? No       Age-appropriate Screening Schedule:  Refer to the list below for future screening recommendations based on patient's age, sex and/or medical conditions. Orders for these recommended tests are listed in the plan section. The patient has been provided with a written plan.    Health Maintenance   Topic Date Due    MAMMOGRAM  Never done    DXA SCAN  Never done    TDAP/TD VACCINES (1 - Tdap) Never done    ZOSTER VACCINE (2 of 3) 12/31/2015    INFLUENZA VACCINE  08/01/2023    COVID-19 Vaccine (3 - 2023-24 season) 09/01/2023    LIPID PANEL  01/05/2024    BMI FOLLOWUP  06/12/2024    ANNUAL WELLNESS VISIT  12/07/2024    COLORECTAL CANCER SCREENING  11/05/2029    HEPATITIS C SCREENING  Completed    Pneumococcal Vaccine 65+  Completed                  CMS Preventative Services Quick Reference  Risk Factors Identified During Encounter:    None Identified    The above risks/problems have been discussed with the patient.  Pertinent information has been shared with the patient in the After Visit Summary.    Diagnoses and all orders for this visit:    1. Medicare annual wellness visit, subsequent (Primary)    2. Neuropathy    3. Atrial fibrillation with RVR  -     CBC Auto Differential  -     Comprehensive Metabolic Panel  -     Lipid  Panel  -     TSH  -     Hemoglobin A1c    4. History of hemorrhagic stroke with residual hemiparesis    5. Primary hypertension  -     CBC Auto Differential  -     Comprehensive Metabolic Panel  -     Lipid Panel  -     TSH    6. Acquired hypothyroidism  -     TSH  -     levothyroxine (SYNTHROID, LEVOTHROID) 137 MCG tablet; Take 1 tablet by mouth Daily.  Dispense: 90 tablet; Refill: 1    7. Fibromyalgia  -     topiramate (TOPAMAX) 100 MG tablet; Take 1 tablet by mouth Daily.  Dispense: 90 tablet; Refill: 1    8. Hemiplga following cerebral infrc aff left dominant side    9. Hyperglycemia  -     Hemoglobin A1c    10. Irritable bowel syndrome, unspecified type  -     dicyclomine (BENTYL) 10 MG capsule; Take 1 capsule by mouth 2 (Two) Times a Day As Needed for Abdominal Cramping.  Dispense: 180 capsule; Refill: 1    11. History of hip fracture  -     denosumab (Prolia) 60 MG/ML solution prefilled syringe syringe; Inject 1 mL under the skin into the appropriate area as directed 1 (One) Time for 1 dose.  Dispense: 1 mL; Refill: 0    12. Other specified menopausal and perimenopausal disorders  -     denosumab (Prolia) 60 MG/ML solution prefilled syringe syringe; Inject 1 mL under the skin into the appropriate area as directed 1 (One) Time for 1 dose.  Dispense: 1 mL; Refill: 0    13. Encounter for screening mammogram for malignant neoplasm of breast  -     Mammo Screening Digital Tomosynthesis Bilateral With CAD; Future    14. Localized osteoporosis without current pathological fracture  -     DEXA Bone Density Axial; Future    Other orders  -     montelukast (SINGULAIR) 10 MG tablet; Take 1 tablet by mouth Daily.  Dispense: 90 tablet; Refill: 1  -     metoprolol tartrate (LOPRESSOR) 25 MG tablet; Take 0.5 (one-half) tablet by mouth Every 12 (Twelve) Hours.  Dispense: 90 tablet; Refill: 1  -     dilTIAZem CD (CARDIZEM CD) 240 MG 24 hr capsule; Take 1 capsule by mouth Daily.  Dispense: 90 capsule; Refill: 1  -     apixaban  (Eliquis) 5 MG tablet tablet; Take 1 tablet by mouth Every 12 (Twelve) Hours.  Dispense: 90 tablet; Refill: 1  -     budesonide-formoterol (Symbicort) 80-4.5 MCG/ACT inhaler; Inhale 2 puffs 2 (Two) Times a Day.  Dispense: 10.2 g; Refill: 1  -     Fluzone High-Dose 65+yrs ([id number])      Updated annual wellness visit checklist.  Immunizations discussed.  Screening up-to-date.  Recommend yearly dental and eye exams. Also discussed monitoring of blood pressure and lipids. We addressed patient self-assessment of health status, frailty, and physical functioning. We reviewed psychosocial risks, behavioral risks, instrumental activities of daily living, and patient health risk assessment. Patient was given a personalized prevention plan.   Follow Up:   Next Medicare Wellness visit to be scheduled in 1 year.      An After Visit Summary and PPPS were made available to the patient.

## 2023-12-08 LAB
ALBUMIN SERPL-MCNC: 4 G/DL (ref 3.8–4.8)
ALBUMIN/GLOB SERPL: 1.4 {RATIO} (ref 1.2–2.2)
ALP SERPL-CCNC: 93 IU/L (ref 44–121)
ALT SERPL-CCNC: 9 IU/L (ref 0–32)
AST SERPL-CCNC: 15 IU/L (ref 0–40)
BASOPHILS # BLD AUTO: 0 X10E3/UL (ref 0–0.2)
BASOPHILS NFR BLD AUTO: 0 %
BILIRUB SERPL-MCNC: 0.6 MG/DL (ref 0–1.2)
BUN SERPL-MCNC: 13 MG/DL (ref 8–27)
BUN/CREAT SERPL: 15 (ref 12–28)
CALCIUM SERPL-MCNC: 8.7 MG/DL (ref 8.7–10.3)
CHLORIDE SERPL-SCNC: 113 MMOL/L (ref 96–106)
CHOLEST SERPL-MCNC: 126 MG/DL (ref 100–199)
CO2 SERPL-SCNC: 19 MMOL/L (ref 20–29)
CREAT SERPL-MCNC: 0.88 MG/DL (ref 0.57–1)
EGFRCR SERPLBLD CKD-EPI 2021: 70 ML/MIN/1.73
EOSINOPHIL # BLD AUTO: 0.2 X10E3/UL (ref 0–0.4)
EOSINOPHIL NFR BLD AUTO: 2 %
ERYTHROCYTE [DISTWIDTH] IN BLOOD BY AUTOMATED COUNT: 12.8 % (ref 11.7–15.4)
GLOBULIN SER CALC-MCNC: 2.9 G/DL (ref 1.5–4.5)
GLUCOSE SERPL-MCNC: 100 MG/DL (ref 70–99)
HBA1C MFR BLD: 5.7 % (ref 4.8–5.6)
HCT VFR BLD AUTO: 41.5 % (ref 34–46.6)
HDLC SERPL-MCNC: 48 MG/DL
HGB BLD-MCNC: 13.2 G/DL (ref 11.1–15.9)
IMM GRANULOCYTES # BLD AUTO: 0 X10E3/UL (ref 0–0.1)
IMM GRANULOCYTES NFR BLD AUTO: 0 %
LDLC SERPL CALC-MCNC: 62 MG/DL (ref 0–99)
LYMPHOCYTES # BLD AUTO: 1.6 X10E3/UL (ref 0.7–3.1)
LYMPHOCYTES NFR BLD AUTO: 24 %
MCH RBC QN AUTO: 28.8 PG (ref 26.6–33)
MCHC RBC AUTO-ENTMCNC: 31.8 G/DL (ref 31.5–35.7)
MCV RBC AUTO: 91 FL (ref 79–97)
MONOCYTES # BLD AUTO: 0.6 X10E3/UL (ref 0.1–0.9)
MONOCYTES NFR BLD AUTO: 9 %
NEUTROPHILS # BLD AUTO: 4.4 X10E3/UL (ref 1.4–7)
NEUTROPHILS NFR BLD AUTO: 65 %
PLATELET # BLD AUTO: 146 X10E3/UL (ref 150–450)
POTASSIUM SERPL-SCNC: 4.5 MMOL/L (ref 3.5–5.2)
PROT SERPL-MCNC: 6.9 G/DL (ref 6–8.5)
RBC # BLD AUTO: 4.58 X10E6/UL (ref 3.77–5.28)
SODIUM SERPL-SCNC: 147 MMOL/L (ref 134–144)
TRIGL SERPL-MCNC: 82 MG/DL (ref 0–149)
TSH SERPL DL<=0.005 MIU/L-ACNC: 0.15 UIU/ML (ref 0.45–4.5)
VLDLC SERPL CALC-MCNC: 16 MG/DL (ref 5–40)
WBC # BLD AUTO: 6.8 X10E3/UL (ref 3.4–10.8)

## 2024-01-23 RX ORDER — POTASSIUM CHLORIDE 1500 MG/1
20 TABLET, EXTENDED RELEASE ORAL DAILY
Qty: 90 TABLET | Refills: 1 | OUTPATIENT
Start: 2024-01-23

## 2024-02-09 ENCOUNTER — OFFICE VISIT (OUTPATIENT)
Dept: CARDIOLOGY | Facility: CLINIC | Age: 74
End: 2024-02-09
Payer: MEDICARE

## 2024-02-09 VITALS
TEMPERATURE: 96 F | OXYGEN SATURATION: 98 % | RESPIRATION RATE: 18 BRPM | HEART RATE: 78 BPM | SYSTOLIC BLOOD PRESSURE: 128 MMHG | WEIGHT: 164 LBS | BODY MASS INDEX: 27.32 KG/M2 | DIASTOLIC BLOOD PRESSURE: 74 MMHG | HEIGHT: 65 IN

## 2024-02-09 DIAGNOSIS — I48.20 ATRIAL FIBRILLATION, CHRONIC: Primary | ICD-10-CM

## 2024-02-09 DIAGNOSIS — E78.00 PURE HYPERCHOLESTEROLEMIA: ICD-10-CM

## 2024-02-09 DIAGNOSIS — I10 PRIMARY HYPERTENSION: ICD-10-CM

## 2024-02-09 PROBLEM — I48.91 ATRIAL FIBRILLATION WITH RVR: Status: RESOLVED | Noted: 2023-09-05 | Resolved: 2024-02-09

## 2024-02-09 PROCEDURE — 93000 ELECTROCARDIOGRAM COMPLETE: CPT | Performed by: INTERNAL MEDICINE

## 2024-02-09 PROCEDURE — 1160F RVW MEDS BY RX/DR IN RCRD: CPT | Performed by: INTERNAL MEDICINE

## 2024-02-09 PROCEDURE — 3078F DIAST BP <80 MM HG: CPT | Performed by: INTERNAL MEDICINE

## 2024-02-09 PROCEDURE — 3074F SYST BP LT 130 MM HG: CPT | Performed by: INTERNAL MEDICINE

## 2024-02-09 PROCEDURE — 99214 OFFICE O/P EST MOD 30 MIN: CPT | Performed by: INTERNAL MEDICINE

## 2024-02-09 PROCEDURE — 1159F MED LIST DOCD IN RCRD: CPT | Performed by: INTERNAL MEDICINE

## 2024-02-10 NOTE — PROGRESS NOTES
[facility]  [facility] CARDIOLOGY  [address] [place] [address]  Dept: [phone]  Dept Fax: [phone]    Vickie Joshi  1950    Follow Up Office Visit Note    History of Present Illness:  Vickie Joshi is a 73 y.o. female who presents to the clinic for Follow-up.CAF- No major complaints on Metoprolol 25 bid , Cardizem 240 mg and also Eliquis 5 mg bid, Hr today is 101. Will increase the Metoprolol from 12,5 bid to 25 bid BP is 110.60    The following portions of the patient's history were reviewed and updated as appropriate: allergies, current medications, past family history, past medical history, past social history, past surgical history, and problem list.    Medications:  albuterol  albuterol sulfate HFA  apixaban tablet  atorvastatin  budesonide-formoterol  dicyclomine  dilTIAZem CD  docusate sodium  levothyroxine  loratadine  Magnesium tablet  metoprolol tartrate  montelukast  nystatin  nystatin cream  omeprazole  pregabalin  topiramate    Subjective  Allergies   Allergen Reactions    Cephalexin Swelling    Penicillins Hives    Sulfa Antibiotics Hives    Cephalosporins Unknown - High Severity        Past Medical History:   Diagnosis Date    Arthritis     Asthma     Atrial fibrillation     Cardiac disorder     Closed fracture of neck of left femur 2/27/2017    Disease of thyroid gland     Fibromyalgia     GERD (gastroesophageal reflux disease)     Hyperlipidemia     Hypertension     Migraine     Neuropathy     Stroke        Past Surgical History:   Procedure Laterality Date    CHOLECYSTECTOMY      HEMORRHOIDECTOMY N/A 7/23/2021    Procedure: HEMORRHOIDECTOMY;  Surgeon: Adan Meadows MD;  Location: [facility];  Service: General;  Laterality: N/A;    HIP PERCUTANEOUS PINNING Left 2/28/2017    Procedure: LEFT HIP PERCUTANEOUS PINNING FEMORAL NECK;  Surgeon: Ezra Barlow MD;  Location:  [facility];  Service:     HYSTERECTOMY      MA CLTX HIP DISLOCATION  "TRAUMATIC W/O ANESTHESIA Left 2/28/2017    Procedure: LEFT HIP CLOSED REDUCTION;  Surgeon: Ezra Barlow MD;  Location: [facility];  Service: Orthopedics    SHOULDER SURGERY         Family History   Problem Relation Age of Onset    Alcohol abuse Mother     Heart disease Mother         Cardiac Disorder    Stroke Mother     Hypertension Mother     Parkinsonism Mother     Colon cancer Maternal Grandmother     Diabetes Maternal Aunt         Social History     Socioeconomic History    Marital status:    Tobacco Use    Smoking status: Never    Smokeless tobacco: Never   Vaping Use    Vaping Use: Never used   Substance and Sexual Activity    Alcohol use: No    Drug use: No    Sexual activity: Defer       Review of Systems   Constitutional: Negative.    HENT: Negative.     Respiratory: Negative.     Cardiovascular: Negative.    Endocrine: Negative.    Genitourinary: Negative.    Musculoskeletal: Negative.    Skin: Negative.    Allergic/Immunologic: Negative.    Neurological: Negative.    Hematological: Negative.    Psychiatric/Behavioral: Negative.         Cardiovascular Procedures    ECHO/MUGA:  STRESS TESTS:   CARDIAC CATH:   DEVICES:   HOLTER:   CT/MRI:   VASCULAR:   CARDIOTHORACIC:     Objective  Vitals:    02/09/24 1426   BP: 128/74   BP Location: Right arm   Patient Position: Sitting   Cuff Size: Adult   Pulse: 78   Resp: 18   Temp: 96 °F (35.6 °C)   TempSrc: Infrared   SpO2: 98%   Weight: 74.4 kg (164 lb)   Height: 165.1 cm (65\")   PainSc: 0-No pain     Body mass index is 27.29 kg/m².     Physical Exam  Vitals reviewed.   Constitutional:       Appearance: Healthy appearance. Not in distress.   Neck:      Vascular: No JVR. JVD normal.   Pulmonary:      Effort: Pulmonary effort is normal.      Breath sounds: Normal breath sounds. No wheezing. No rhonchi. No rales.   Chest:      Chest wall: Not tender to palpatation.   Cardiovascular:      PMI at left midclavicular line. Normal rate. Irregularly irregular " rhythm. Normal S1. Normal S2.       Murmurs: There is no murmur.      No gallop.  No click. No rub.   Pulses:     Intact distal pulses.   Edema:     Peripheral edema absent.   Abdominal:      General: Bowel sounds are normal.      Palpations: Abdomen is soft.      Tenderness: There is no abdominal tenderness.   Musculoskeletal: Normal range of motion.         General: No tenderness. Skin:     General: Skin is warm and dry.   Neurological:      General: No focal deficit present.      Mental Status: Alert and oriented to person, place and time.        Diagnostic Data    ECG 12 Lead    Date/Time: 2/9/2024 9:08 PM  Performed by: Riccardo Rae MD    Authorized by: Riccardo Rae MD  Comparison: compared with previous ECG from 9/7/2023  Similar to previous ECG  Rhythm: atrial fibrillation  Rate: tachycardic  BPM: 101  QRS axis: normal    Clinical impression: abnormal EKG        Assessment and Plan  Diagnoses and all orders for this visit:    Atrial fibrillation, chronic- Rate control on Cardizem and Metoprolol plus Eliquis 5 mg bid    Primary hypertension- BP is 110.60 on above meds    Pure hypercholesterolemia- on Atorvastatin 10 mg    Other orders  -     metoprolol tartrate (LOPRESSOR) 25 MG tablet; Take 25 mg bid         Return in about 6 months (around 8/9/2024) for Recheck with Dr. Rae.    Riccardo Rae MD  02/09/2024

## 2024-02-19 ENCOUNTER — TELEPHONE (OUTPATIENT)
Dept: FAMILY MEDICINE CLINIC | Facility: CLINIC | Age: 74
End: 2024-02-19
Payer: MEDICARE

## 2024-02-19 NOTE — TELEPHONE ENCOUNTER
Provider: Dav Bryan MD     Caller: EVELINE HAWKINS    Relationship to Patient: PATIENT     Phone Number: [phone]    Reason for Call: PATIENT WANTED TO KNOW IF THE OFFICE PROVIDES SHINGLES VACCINATION ?

## 2024-02-19 NOTE — TELEPHONE ENCOUNTER
Patient called and is stating that since she switched insurance companies they need a new referral for a physical therapist.

## 2024-02-19 NOTE — TELEPHONE ENCOUNTER
We do offer that vaccine but she has to get it at her pharmacy so they will cover it. Pt contacted and I will send the message up front about the PT.

## 2024-02-20 ENCOUNTER — TELEPHONE (OUTPATIENT)
Dept: FAMILY MEDICINE CLINIC | Facility: CLINIC | Age: 74
End: 2024-02-20
Payer: MEDICARE

## 2024-02-20 NOTE — TELEPHONE ENCOUNTER
Caller: Vickie Joshi    Relationship to patient: Self    Best call back number: [phone]     Patient is needing: PATIENT STATES SHE RECEIVED NOTICE OF NOT COMPLETING THE MAMMOGRAM. SHE STATES SHE HAD IT SCHEDULED  BUT DUE TO NOT BEING ABLE TO STAND FOR THE ORDER AND THE FACILITY NOT BEING ABLE TO COMPLETE THE MAMMOGRAM IN HER WHEELCHAIR, SHE WAS NOT ABLE TO DO THE ORDER.     PLEASE CALL PATIENT BACK, IF NO ANSWER LEAVE A DETAILED MESSAGE.

## 2024-02-29 RX ORDER — LISINOPRIL 5 MG/1
TABLET ORAL
Qty: 90 TABLET | Refills: 1 | OUTPATIENT
Start: 2024-02-29

## 2024-03-11 RX ORDER — APIXABAN 5 MG/1
5 TABLET, FILM COATED ORAL EVERY 12 HOURS
Qty: 180 TABLET | Refills: 1 | Status: SHIPPED | OUTPATIENT
Start: 2024-03-11

## 2024-04-15 RX ORDER — LISINOPRIL 5 MG/1
TABLET ORAL
Qty: 90 TABLET | Refills: 1 | OUTPATIENT
Start: 2024-04-15

## 2024-04-22 ENCOUNTER — OFFICE VISIT (OUTPATIENT)
Dept: FAMILY MEDICINE CLINIC | Facility: CLINIC | Age: 74
End: 2024-04-22
Payer: MEDICARE

## 2024-04-22 VITALS — HEART RATE: 68 BPM | SYSTOLIC BLOOD PRESSURE: 100 MMHG | OXYGEN SATURATION: 96 % | DIASTOLIC BLOOD PRESSURE: 64 MMHG

## 2024-04-22 DIAGNOSIS — I69.352 HEMIPLGA FOLLOWING CEREBRAL INFRC AFF LEFT DOMINANT SIDE: ICD-10-CM

## 2024-04-22 DIAGNOSIS — R30.0 DYSURIA: ICD-10-CM

## 2024-04-22 DIAGNOSIS — M81.0 OSTEOPOROSIS, UNSPECIFIED OSTEOPOROSIS TYPE, UNSPECIFIED PATHOLOGICAL FRACTURE PRESENCE: ICD-10-CM

## 2024-04-22 DIAGNOSIS — I69.359 HISTORY OF HEMORRHAGIC STROKE WITH RESIDUAL HEMIPARESIS: Primary | ICD-10-CM

## 2024-04-22 LAB
BILIRUB BLD-MCNC: NEGATIVE MG/DL
CLARITY, POC: ABNORMAL
COLOR UR: YELLOW
EXPIRATION DATE: ABNORMAL
GLUCOSE UR STRIP-MCNC: NEGATIVE MG/DL
KETONES UR QL: NEGATIVE
LEUKOCYTE EST, POC: ABNORMAL
Lab: ABNORMAL
NITRITE UR-MCNC: NEGATIVE MG/ML
PH UR: 6 [PH] (ref 5–8)
PROT UR STRIP-MCNC: ABNORMAL MG/DL
RBC # UR STRIP: ABNORMAL /UL
SP GR UR: 1.01 (ref 1–1.03)
UROBILINOGEN UR QL: ABNORMAL

## 2024-04-22 PROCEDURE — 3078F DIAST BP <80 MM HG: CPT | Performed by: FAMILY MEDICINE

## 2024-04-22 PROCEDURE — 3074F SYST BP LT 130 MM HG: CPT | Performed by: FAMILY MEDICINE

## 2024-04-22 PROCEDURE — 81003 URINALYSIS AUTO W/O SCOPE: CPT | Performed by: FAMILY MEDICINE

## 2024-04-22 PROCEDURE — 99214 OFFICE O/P EST MOD 30 MIN: CPT | Performed by: FAMILY MEDICINE

## 2024-04-22 RX ORDER — IBANDRONATE SODIUM 150 MG/1
150 TABLET, FILM COATED ORAL
Qty: 4 TABLET | Refills: 3 | Status: SHIPPED | OUTPATIENT
Start: 2024-04-22

## 2024-04-22 RX ORDER — CIPROFLOXACIN 500 MG/1
500 TABLET, FILM COATED ORAL 2 TIMES DAILY
Qty: 14 TABLET | Refills: 0 | Status: SHIPPED | OUTPATIENT
Start: 2024-04-22

## 2024-04-23 ENCOUNTER — CLINICAL SUPPORT (OUTPATIENT)
Dept: FAMILY MEDICINE CLINIC | Facility: CLINIC | Age: 74
End: 2024-04-23
Payer: MEDICARE

## 2024-04-23 DIAGNOSIS — R30.0 DYSURIA: Primary | ICD-10-CM

## 2024-04-23 NOTE — PROGRESS NOTES
Follow Up Office Visit      Date of Visit:  2024   Patient Name: Vickie Joshi  : 1950   MRN: [id number]     Chief Complaint:    Chief Complaint   Patient presents with    Urinary Tract Infection    go over DEXA       History of Present Illness: Vickie Joshi is a 73 y.o. female who is here today for follow up.  Patient seen today with concerns of a UTI.  Patient has had dysuria and urinary frequency.  Patient also here to review previous DEXA scan.  Patient with osteoporosis and previous fractures.        Subjective      Review of Systems:   Review of Systems   Constitutional:  Negative for fatigue and fever.   HENT:  Negative for congestion and ear pain.    Respiratory:  Negative for apnea, cough, chest tightness and shortness of breath.    Cardiovascular:  Negative for chest pain.   Gastrointestinal:  Negative for abdominal pain, constipation, diarrhea and nausea.   Musculoskeletal:  Negative for arthralgias.   Psychiatric/Behavioral:  Negative for depressed mood and stress.        Past Medical History:   Past Medical History:   Diagnosis Date    Arthritis     Asthma     Atrial fibrillation     Cardiac disorder     Closed fracture of neck of left femur 2017    Disease of thyroid gland     Fibromyalgia     GERD (gastroesophageal reflux disease)     Hyperlipidemia     Hypertension     Migraine     Neuropathy     Stroke        Past Surgical History:   Past Surgical History:   Procedure Laterality Date    CHOLECYSTECTOMY      HEMORRHOIDECTOMY N/A 2021    Procedure: HEMORRHOIDECTOMY;  Surgeon: Adan Meadows MD;  Location: [facility] OR;  Service: General;  Laterality: N/A;    HIP PERCUTANEOUS PINNING Left 2017    Procedure: LEFT HIP PERCUTANEOUS PINNING FEMORAL NECK;  Surgeon: Ezra Barlow MD;  Location: [facility] OR;  Service:     HYSTERECTOMY      CT CLTX HIP DISLOCATION TRAUMATIC W/O ANESTHESIA Left 2017    Procedure: LEFT HIP CLOSED REDUCTION;  Surgeon: Ezra Barlow  MD;  Location: [facility];  Service: Orthopedics    SHOULDER SURGERY         Family History:   Family History   Problem Relation Age of Onset    Alcohol abuse Mother     Heart disease Mother         Cardiac Disorder    Stroke Mother     Hypertension Mother     Parkinsonism Mother     Colon cancer Maternal Grandmother     Diabetes Maternal Aunt        Social History:   Social History     Socioeconomic History    Marital status:    Tobacco Use    Smoking status: Never    Smokeless tobacco: Never   Vaping Use    Vaping status: Never Used   Substance and Sexual Activity    Alcohol use: No    Drug use: No    Sexual activity: Defer       Medications:     Current Outpatient Medications:     albuterol (ACCUNEB) 1.25 MG/3ML nebulizer solution, Take 3 mL by nebulization Every 6 (Six) Hours As Needed for Wheezing or Shortness of Air., Disp: 90 vial, Rfl: 12    albuterol sulfate  (90 Base) MCG/ACT inhaler, INHALE TWO PUFFS BY MOUTH EVERY 4 TO 6 HOURS AS NEEDED, Disp: 8.5 g, Rfl: 0    apixaban (Eliquis) 5 MG tablet tablet, TAKE ONE TABLET BY MOUTH EVERY 12 HOURS, Disp: 180 tablet, Rfl: 1    atorvastatin (LIPITOR) 10 MG tablet, TAKE ONE TABLET BY MOUTH ONCE NIGHTLY, Disp: 90 tablet, Rfl: 1    budesonide-formoterol (Symbicort) 80-4.5 MCG/ACT inhaler, Inhale 2 puffs 2 (Two) Times a Day., Disp: 10.2 g, Rfl: 1    ciprofloxacin (Cipro) 500 MG tablet, Take 1 tablet by mouth 2 (Two) Times a Day., Disp: 14 tablet, Rfl: 0    dicyclomine (BENTYL) 10 MG capsule, Take 1 capsule by mouth 2 (Two) Times a Day As Needed for Abdominal Cramping., Disp: 180 capsule, Rfl: 1    dilTIAZem CD (CARDIZEM CD) 240 MG 24 hr capsule, Take 1 capsule by mouth Daily., Disp: 90 capsule, Rfl: 1    docusate sodium (COLACE) 100 MG capsule, Take 1 capsule by mouth 2 (Two) Times a Day., Disp: , Rfl:     ibandronate (Boniva) 150 MG tablet, Take 1 tablet by mouth Every 30 (Thirty) Days., Disp: 4 tablet, Rfl: 3    levothyroxine (SYNTHROID, LEVOTHROID) 137  MCG tablet, Take 1 tablet by mouth Daily., Disp: 90 tablet, Rfl: 1    loratadine (CLARITIN) 10 MG tablet, Take 1 tablet by mouth Daily. (Patient not taking: Reported on 2/9/2024), Disp: , Rfl:     Magnesium 250 MG tablet, Take  by mouth., Disp: , Rfl:     metoprolol tartrate (LOPRESSOR) 25 MG tablet, Take 25 mg bid, Disp: 90 tablet, Rfl: 1    montelukast (SINGULAIR) 10 MG tablet, Take 1 tablet by mouth Daily., Disp: 90 tablet, Rfl: 1    nystatin (MYCOSTATIN) 848355 UNIT/GM cream, Apply 1 application  topically to the appropriate area as directed 2 (Two) Times a Day., Disp: 60 g, Rfl: 1    nystatin (MYCOSTATIN) 786116 UNIT/GM powder, Apply  topically to the appropriate area as directed 3 (Three) Times a Day., Disp: 60 g, Rfl: 0    omeprazole (priLOSEC) 40 MG capsule, TAKE 1 CAPSULE BY MOUTH DAILY, Disp: 90 capsule, Rfl: 1    pregabalin (LYRICA) 300 MG capsule, Take 1 capsule by mouth 2 (Two) Times a Day., Disp: 180 capsule, Rfl: 1    topiramate (TOPAMAX) 100 MG tablet, Take 1 tablet by mouth Daily., Disp: 90 tablet, Rfl: 1    Allergies:   Allergies   Allergen Reactions    Cephalexin Swelling    Penicillins Hives    Sulfa Antibiotics Hives    Cephalosporins Unknown - High Severity       Objective     Physical Exam:  Vital Signs:   Vitals:    04/22/24 1435   BP: 100/64   Pulse: 68   SpO2: 96%     There is no height or weight on file to calculate BMI.     Physical Exam  Vitals and nursing note reviewed.   Constitutional:       General: She is not in acute distress.     Appearance: Normal appearance. She is not ill-appearing.   HENT:      Head: Normocephalic and atraumatic.      Right Ear: Tympanic membrane and ear canal normal.      Left Ear: Tympanic membrane and ear canal normal.      Nose: Nose normal.   Cardiovascular:      Rate and Rhythm: Normal rate and regular rhythm.      Heart sounds: Normal heart sounds.   Pulmonary:      Effort: Pulmonary effort is normal.      Breath sounds: Normal breath sounds.    Neurological:      Mental Status: She is alert and oriented to person, place, and time. Mental status is at baseline.   Psychiatric:         Mood and Affect: Mood normal.         Procedures      Assessment / Plan      Assessment/Plan:   Diagnoses and all orders for this visit:    1. History of hemorrhagic stroke with residual hemiparesis (Primary)  -     Ambulatory Referral to Physical Therapy Evaluate and treat    2. Hemiplga following cerebral infrc aff left dominant side  -     Ambulatory Referral to Physical Therapy Evaluate and treat    3. Dysuria  -     ciprofloxacin (Cipro) 500 MG tablet; Take 1 tablet by mouth 2 (Two) Times a Day.  Dispense: 14 tablet; Refill: 0  -     POCT urinalysis dipstick, automated    4. Osteoporosis, unspecified osteoporosis type, unspecified pathological fracture presence  -     ibandronate (Boniva) 150 MG tablet; Take 1 tablet by mouth Every 30 (Thirty) Days.  Dispense: 4 tablet; Refill: 3         Urinalysis appears to show UTI.  Will culture urine.  Gave Cipro for now.  Gave Boniva for osteoporosis.    Follow Up:   No follow-ups on file.    Dav Bryan  [facility]

## 2024-04-25 LAB
BACTERIA UR CULT: ABNORMAL
OTHER ANTIBIOTIC SUSC ISLT: ABNORMAL

## 2024-05-13 RX ORDER — NYSTATIN 100000 U/G
CREAM TOPICAL 2 TIMES DAILY
Qty: 60 G | Refills: 1 | Status: SHIPPED | OUTPATIENT
Start: 2024-05-13

## 2024-05-28 RX ORDER — ALBUTEROL SULFATE 90 UG/1
AEROSOL, METERED RESPIRATORY (INHALATION) SEE ADMIN INSTRUCTIONS
Qty: 8.5 G | Refills: 0 | Status: SHIPPED | OUTPATIENT
Start: 2024-05-28

## 2024-05-29 RX ORDER — OMEPRAZOLE 40 MG/1
40 CAPSULE, DELAYED RELEASE ORAL DAILY
Qty: 90 CAPSULE | Refills: 1 | Status: SHIPPED | OUTPATIENT
Start: 2024-05-29

## 2024-05-29 RX ORDER — ATORVASTATIN CALCIUM 10 MG/1
TABLET, FILM COATED ORAL
Qty: 90 TABLET | Refills: 1 | Status: SHIPPED | OUTPATIENT
Start: 2024-05-29

## 2024-05-30 ENCOUNTER — TELEPHONE (OUTPATIENT)
Dept: FAMILY MEDICINE CLINIC | Facility: CLINIC | Age: 74
End: 2024-05-30
Payer: MEDICARE

## 2024-05-30 RX ORDER — LISINOPRIL 5 MG/1
TABLET ORAL
Qty: 90 TABLET | Refills: 1 | OUTPATIENT
Start: 2024-05-30

## 2024-05-30 NOTE — TELEPHONE ENCOUNTER
Caller: Vickie Joshi    Relationship: Self    Best call back number: [phone]     Which medication are you concerned about: SYMBICORT    Who prescribed you this medication: DR ALONZO    What are your concerns: PATIENT STATES THAT HER INSURANCE IS NO LONGER COVERING SYMBICORT, AND SHE IS RUNNING LOW ON HER CURRENT INHALER    PATIENT WOULD LIKE TO KNOW IF DR ALONZO CAN PRESCRIBED HER ANOTHER INHALER BESIDES SYMBICORT    PLEASE ADVISE

## 2024-05-31 NOTE — TELEPHONE ENCOUNTER
She might need to see what inhaler her insurance will cover.  I will really would not have any idea unless she calls her insurance.  She would need to tell them that she needs one that is comparable to the Symbicort.  I otherwise would just be guessing and this could go on and on and on

## 2024-05-31 NOTE — TELEPHONE ENCOUNTER
HUB TO RELAY--She might need to see what inhaler her insurance will cover.  I will really would not have any idea unless she calls her insurance.  She would need to tell them that she needs one that is comparable to the Symbicort.  I otherwise would just be guessing and this could go on and on and on.    I called patient and she couldn't hear me and hung up and then no answer when I called back

## 2024-06-10 RX ORDER — DILTIAZEM HYDROCHLORIDE 240 MG/1
240 CAPSULE, COATED, EXTENDED RELEASE ORAL
Qty: 90 CAPSULE | Refills: 1 | Status: SHIPPED | OUTPATIENT
Start: 2024-06-10

## 2024-06-10 RX ORDER — MONTELUKAST SODIUM 10 MG/1
10 TABLET ORAL DAILY
Qty: 90 TABLET | Refills: 1 | Status: SHIPPED | OUTPATIENT
Start: 2024-06-10

## 2024-06-19 ENCOUNTER — TELEPHONE (OUTPATIENT)
Dept: FAMILY MEDICINE CLINIC | Facility: CLINIC | Age: 74
End: 2024-06-19
Payer: MEDICARE

## 2024-06-19 NOTE — TELEPHONE ENCOUNTER
Caller: Vickie Joshi    Relationship: Self    Best call back number: [phone]    What is the best time to reach you: ANYTIME IN THE AFTERNOON    Who are you requesting to speak with (clinical staff, provider,  specific staff member): PROVIDER    What was the call regarding: PATIENT STATES THAT SHE NEEDS AN INHALER AND HER INSURANCE WILL NOT COVER. PLEASE ADVISE    Is it okay if the provider responds through MyChart: NO

## 2024-06-20 NOTE — TELEPHONE ENCOUNTER
I am assuming she is talking about the Symbicort.  They probably sent her some letter.  You can see if the letter says what they will pay for or not.  If not I will just try to send something in and we will see if they will pay for it.

## 2024-06-24 RX ORDER — FLUTICASONE PROPIONATE AND SALMETEROL 250; 50 UG/1; UG/1
1 POWDER RESPIRATORY (INHALATION)
Qty: 1 EACH | Refills: 3 | Status: SHIPPED | OUTPATIENT
Start: 2024-06-24

## 2024-06-24 NOTE — TELEPHONE ENCOUNTER
She said she doesn't have a list that they will pay for so just send in one and she'll check with jordan to see if it goes through

## 2024-07-01 ENCOUNTER — TELEPHONE (OUTPATIENT)
Dept: FAMILY MEDICINE CLINIC | Facility: CLINIC | Age: 74
End: 2024-07-01
Payer: MEDICARE

## 2024-07-01 DIAGNOSIS — I69.359 HISTORY OF HEMORRHAGIC STROKE WITH RESIDUAL HEMIPARESIS: Primary | ICD-10-CM

## 2024-07-01 DIAGNOSIS — I69.352 HEMIPLGA FOLLOWING CEREBRAL INFRC AFF LEFT DOMINANT SIDE: ICD-10-CM

## 2024-07-01 NOTE — TELEPHONE ENCOUNTER
Caller: Vickie Joshi    Relationship: Self    Best call back number: [phone]     What is the medical concern/diagnosis: CAN'T WALK DUE TO STROKE    What specialty or service is being requested: HOME HEALTH    What is the provider, practice or medical service name: [facility]    Any additional details: INSURANCE SAID THAT TO CONTINUE SEEING THE PHYSICAL THERAPIST AT HOME IT WOULD BE OVER $400 A MONTH AT $34 A VISIT 3 TIMES A WEEK. THEY WOULD FULLY COVER HOME HEALTH HOWEVER

## 2024-07-02 NOTE — TELEPHONE ENCOUNTER
HUB TO RELAY--Home health often is not covered unless there is an acute new issue going on.  Where her stroke was a long time ago they typically most likely would not be covered.  We can always order and they can come see but at most they often are there for a short amount of time.  It would not be an ongoing thing where she would be able to continue physical therapy ongoing.  Typically about a month would be TOPS.

## 2024-07-02 NOTE — TELEPHONE ENCOUNTER
Home health often is not covered unless there is an acute new issue going on.  Where her stroke was a long time ago they typically most likely would not be covered.  We can always order and they can come see but at most they often are there for a short amount of time.  It would not be an ongoing thing where she would be able to continue physical therapy ongoing.  Typically about a month would be TOPS.

## 2024-07-02 NOTE — TELEPHONE ENCOUNTER
Name: Vickie Joshi    Relationship: Self    Best Callback Number: [phone]     HUB PROVIDED THE RELAY MESSAGE FROM THE OFFICE   PATIENT HAS FURTHER QUESTIONS AND WOULD LIKE A CALL BACK AT THE FOLLOWING PHONE NUMBER [phone]    ADDITIONAL INFORMATION:  PATIENT STATED THAT SHE WAS INFORMED BY HER INSURANCE THAT THEY WOULD COVER HER HOME HEALTH AND PHYSICAL THERAPY AND PATIENT WOULD LIKE FOR A ORDER TO BE PLACED       Any Garcia MA Medical Assistant Signed [id number]       HUB TO RELAY--Home health often is not covered unless there is an acute new issue going on.  Where her stroke was a long time ago they typically most likely would not be covered.  We can always order and they can come see but at most they often are there for a short amount of time.  It would not be an ongoing thing where she would be able to continue physical therapy ongoing.  Typically about a month would be TOPS.

## 2024-07-03 NOTE — TELEPHONE ENCOUNTER
Name: Barak Joshi     Relationship: Self    Best Callback Number:     [phone]        HUB PROVIDED THE RELAY MESSAGE FROM THE OFFICE   PATIENT HAS FURTHER QUESTIONS AND WOULD LIKE A CALL BACK AT THE FOLLOWING PHONE NUMBER      ADDITIONAL INFORMATION:

## 2024-07-03 NOTE — TELEPHONE ENCOUNTER
Not sure why this was routed back to me.  Says patient has further questions.  Someone please call the patient and see what questions they have.

## 2024-07-03 NOTE — TELEPHONE ENCOUNTER
LVM- Calling to see what further questions patient has regarding provider message.     Hub to Relay-     What further questions do you related to the provider message regarding home health?

## 2024-07-05 ENCOUNTER — TELEPHONE (OUTPATIENT)
Dept: FAMILY MEDICINE CLINIC | Facility: CLINIC | Age: 74
End: 2024-07-05
Payer: MEDICARE

## 2024-07-05 NOTE — TELEPHONE ENCOUNTER
Caller: Barak Joshi    Relationship: Emergency Contact    Best call back number: [phone]     What is the medical concern/diagnosis: STROKE    What specialty or service is being requested: HOME HEALTH    What is the provider, practice or medical service name: [facility]    What is the office location: [place]    What is the office phone number: ?    Any additional details: PT NEEDS NEW REFERRAL TO [facility]. MEDICARE WILL COVER IT.

## 2024-07-09 NOTE — TELEPHONE ENCOUNTER
"I think the message was forwarded on 07/02/24 because this message was below from the hub:    \"ADDITIONAL INFORMATION:  PATIENT STATED THAT SHE WAS INFORMED BY HER INSURANCE THAT THEY WOULD COVER HER HOME HEALTH AND PHYSICAL THERAPY AND PATIENT WOULD LIKE FOR A ORDER TO BE PLACED \"    Called  and he said he would like an order for home health and physical therapy to be sent to be with [facility] in [place]. Patient's  is aware Dr. Bryan is on vacation and may not get to this until his return  "

## 2024-07-22 ENCOUNTER — HOME HEALTH ADMISSION (OUTPATIENT)
Dept: HOME HEALTH SERVICES | Facility: HOME HEALTHCARE | Age: 74
End: 2024-07-22
Payer: MEDICARE

## 2024-07-26 ENCOUNTER — TELEPHONE (OUTPATIENT)
Dept: FAMILY MEDICINE CLINIC | Facility: CLINIC | Age: 74
End: 2024-07-26
Payer: MEDICARE

## 2024-07-29 ENCOUNTER — TELEPHONE (OUTPATIENT)
Dept: FAMILY MEDICINE CLINIC | Facility: CLINIC | Age: 74
End: 2024-07-29
Payer: MEDICARE

## 2024-07-29 NOTE — TELEPHONE ENCOUNTER
"Relay     \"[facility] cannot accept patient for home health at this time. Do they want to be referred elsewhere? \"                "

## 2024-08-01 DIAGNOSIS — G62.9 NEUROPATHY: ICD-10-CM

## 2024-08-03 DIAGNOSIS — E03.9 ACQUIRED HYPOTHYROIDISM: ICD-10-CM

## 2024-08-04 RX ORDER — PREGABALIN 300 MG/1
300 CAPSULE ORAL 2 TIMES DAILY
Qty: 180 CAPSULE | Refills: 0 | Status: SHIPPED | OUTPATIENT
Start: 2024-08-04

## 2024-08-05 ENCOUNTER — TELEPHONE (OUTPATIENT)
Dept: FAMILY MEDICINE CLINIC | Facility: CLINIC | Age: 74
End: 2024-08-05
Payer: MEDICARE

## 2024-08-05 RX ORDER — LEVOTHYROXINE SODIUM 137 UG/1
137 TABLET ORAL DAILY
Qty: 90 TABLET | Refills: 1 | Status: SHIPPED | OUTPATIENT
Start: 2024-08-05

## 2024-08-07 ENCOUNTER — OFFICE VISIT (OUTPATIENT)
Dept: FAMILY MEDICINE CLINIC | Facility: CLINIC | Age: 74
End: 2024-08-07
Payer: MEDICARE

## 2024-08-07 ENCOUNTER — TELEPHONE (OUTPATIENT)
Dept: FAMILY MEDICINE CLINIC | Facility: CLINIC | Age: 74
End: 2024-08-07
Payer: MEDICARE

## 2024-08-07 VITALS
DIASTOLIC BLOOD PRESSURE: 76 MMHG | HEART RATE: 52 BPM | BODY MASS INDEX: 27.29 KG/M2 | HEIGHT: 65 IN | SYSTOLIC BLOOD PRESSURE: 118 MMHG | OXYGEN SATURATION: 96 %

## 2024-08-07 DIAGNOSIS — I69.359 HISTORY OF HEMORRHAGIC STROKE WITH RESIDUAL HEMIPARESIS: Primary | ICD-10-CM

## 2024-08-07 DIAGNOSIS — I69.352 HEMIPLGA FOLLOWING CEREBRAL INFRC AFF LEFT DOMINANT SIDE: ICD-10-CM

## 2024-08-07 DIAGNOSIS — Z87.81 HISTORY OF HIP FRACTURE: ICD-10-CM

## 2024-08-07 PROCEDURE — 1159F MED LIST DOCD IN RCRD: CPT | Performed by: NURSE PRACTITIONER

## 2024-08-07 PROCEDURE — 99213 OFFICE O/P EST LOW 20 MIN: CPT | Performed by: NURSE PRACTITIONER

## 2024-08-07 PROCEDURE — 1126F AMNT PAIN NOTED NONE PRSNT: CPT | Performed by: NURSE PRACTITIONER

## 2024-08-07 PROCEDURE — 3078F DIAST BP <80 MM HG: CPT | Performed by: NURSE PRACTITIONER

## 2024-08-07 PROCEDURE — 1160F RVW MEDS BY RX/DR IN RCRD: CPT | Performed by: NURSE PRACTITIONER

## 2024-08-07 PROCEDURE — 3074F SYST BP LT 130 MM HG: CPT | Performed by: NURSE PRACTITIONER

## 2024-08-07 NOTE — TELEPHONE ENCOUNTER
"Patient was seen today by Amelia for home health eval, but did not discuss \"shots for osteoporosis\"   Is this something you have discussed with her before?  "

## 2024-08-07 NOTE — TELEPHONE ENCOUNTER
"Relay     \"Left detailed msg for . Patient needs a home health eval visit within the next 30 days. Can be with a NP or PA, does not have to be with T[facility].\"                "

## 2024-08-07 NOTE — PROGRESS NOTES
Office Note     Name: Vickie Joshi    : 1950     MRN: [id number]     Chief Complaint  Extremity Weakness (Pt states she is needing a referral for Physical therapy since the change of insurance )    Subjective     History of Present Illness:  Vickie Joshi is a 74 y.o. female who presents today for needing a referral for home health for PT/OT services. She has been having PT come into her home, but this is no longer approved on her new insurance.     She has history of CVA with left-sided hemiplegia. She has also has left hip and left femur fractures, both requiring surgeries as two separate injuries. She is wheelchair-bound, lives at home with her  who is her primary caregiver.       Objective     Past Medical History:   Diagnosis Date   • Arthritis    • Asthma    • Atrial fibrillation    • Cardiac disorder    • Closed fracture of neck of left femur 2017   • Disease of thyroid gland    • Fibromyalgia    • GERD (gastroesophageal reflux disease)    • Hyperlipidemia    • Hypertension    • Migraine    • Neuropathy    • Stroke      Past Surgical History:   Procedure Laterality Date   • CHOLECYSTECTOMY     • HEMORRHOIDECTOMY N/A 2021    Procedure: HEMORRHOIDECTOMY;  Surgeon: Adan Meadows MD;  Location:  [facility] OR;  Service: General;  Laterality: N/A;   • HIP PERCUTANEOUS PINNING Left 2017    Procedure: LEFT HIP PERCUTANEOUS PINNING FEMORAL NECK;  Surgeon: Ezra Barlow MD;  Location:  [facility];  Service:    • HYSTERECTOMY     • [place] CLTX HIP DISLOCATION TRAUMATIC W/O ANESTHESIA Left 2017    Procedure: LEFT HIP CLOSED REDUCTION;  Surgeon: Ezra Barlow MD;  Location:  [facility];  Service: Orthopedics   • SHOULDER SURGERY       Family History   Problem Relation Age of Onset   • Alcohol abuse Mother    • Heart disease Mother         Cardiac Disorder   • Stroke Mother    • Hypertension Mother    • Parkinsonism Mother    • Colon cancer Maternal Grandmother    • Diabetes  "Maternal Aunt        Vital Signs  /76 (BP Location: Left arm, Patient Position: Sitting)   Pulse 52   Ht 165.1 cm (65\")   SpO2 96%   BMI 27.29 kg/m²   Estimated body mass index is 27.29 kg/m² as calculated from the following:    Height as of this encounter: 165.1 cm (65\").    Weight as of 2/9/24: 74.4 kg (164 lb).      Physical Exam  Vitals reviewed.   Constitutional:       Appearance: Normal appearance.      Comments: Wheelchair bound   Cardiovascular:      Rate and Rhythm: Normal rate and regular rhythm.      Heart sounds: Normal heart sounds.   Pulmonary:      Effort: Pulmonary effort is normal.      Breath sounds: Normal breath sounds.   Skin:     General: Skin is warm and dry.      Capillary Refill: Capillary refill takes less than 2 seconds.   Neurological:      Mental Status: She is alert and oriented to person, place, and time.      Comments: Left-sided weakness and mobility deficit   Psychiatric:         Mood and Affect: Mood normal.         Behavior: Behavior normal.           Assessment and Plan     Diagnoses and all orders for this visit:    1. History of hemorrhagic stroke with residual hemiparesis (Primary)  -     Ambulatory Referral to Home Health    2. Hemiplga following cerebral infrc aff left dominant side  -     Ambulatory Referral to Home Health    3. History of hip fracture  -     Ambulatory Referral to Home Health      Medications and medical history reviewed.     Home health referral placed. Will follow accordingly.        Follow Up  Return for Next scheduled follow up with Dr. Bryan.    FABIANA Polk  "

## 2024-08-08 RX ORDER — DENOSUMAB 60 MG/ML
60 INJECTION SUBCUTANEOUS ONCE
Qty: 1 ML | Refills: 1 | Status: SHIPPED | OUTPATIENT
Start: 2024-08-08 | End: 2024-08-08

## 2024-08-09 ENCOUNTER — OFFICE VISIT (OUTPATIENT)
Dept: CARDIOLOGY | Facility: CLINIC | Age: 74
End: 2024-08-09
Payer: MEDICARE

## 2024-08-09 ENCOUNTER — TELEPHONE (OUTPATIENT)
Dept: CARDIOLOGY | Facility: CLINIC | Age: 74
End: 2024-08-09

## 2024-08-09 VITALS
SYSTOLIC BLOOD PRESSURE: 126 MMHG | OXYGEN SATURATION: 96 % | WEIGHT: 162 LBS | DIASTOLIC BLOOD PRESSURE: 80 MMHG | BODY MASS INDEX: 26.99 KG/M2 | HEIGHT: 65 IN | HEART RATE: 61 BPM | RESPIRATION RATE: 18 BRPM

## 2024-08-09 DIAGNOSIS — E78.00 PURE HYPERCHOLESTEROLEMIA: ICD-10-CM

## 2024-08-09 DIAGNOSIS — I10 PRIMARY HYPERTENSION: ICD-10-CM

## 2024-08-09 DIAGNOSIS — I48.20 ATRIAL FIBRILLATION, CHRONIC: Primary | ICD-10-CM

## 2024-08-09 PROBLEM — I95.9 HYPOTENSION: Status: RESOLVED | Noted: 2021-07-13 | Resolved: 2024-08-09

## 2024-08-09 NOTE — TELEPHONE ENCOUNTER
PT WAS IN OFFICE TODAY - WE BELIEVE SHE LEFT SOME ORANGE CONES    CALLED PT AND REQUESTED A CALL BACK    IF PT CALL BACKS HUB CAN CONFIRM OR DENY IF THESE ARE HERS - AND DOCUMENT

## 2024-08-09 NOTE — PROGRESS NOTES
[facility]  [facility]IOLOGY  [address] [place] [address]  Dept: [phone]  Dept Fax: [phone]    Vickie Joshi  1950    Follow Up Office Visit Note    History of Present Illness:  Vickie Joshi is a 74 y.o. female who presents to the clinic for Follow-up. CAF-she denies any complaints on metoprolol 25 bid and also Cardizem 240 mg plus Elliquis 5 mg bid, EKG AF Hr 59, was 1001 last visit, advised to check frequentlyHRr at home    The following portions of the patient's history were reviewed and updated as appropriate: allergies, current medications, past family history, past medical history, past social history, past surgical history, and problem list.    Medications:  albuterol  albuterol sulfate HFA  atorvastatin  dicyclomine  dilTIAZem CD  Eliquis tablet  Fluticasone-Salmeterol  levothyroxine  Magnesium tablet  metoprolol tartrate  nystatin  nystatin cream  omeprazole  pregabalin  topiramate    Subjective  Allergies   Allergen Reactions    Cephalexin Swelling    Penicillins Hives    Sulfa Antibiotics Hives    Cephalosporins Unknown - High Severity        Past Medical History:   Diagnosis Date    Arthritis     Asthma     Atrial fibrillation     Cardiac disorder     Closed fracture of neck of left femur 2/27/2017    Disease of thyroid gland     Fibromyalgia     GERD (gastroesophageal reflux disease)     Hyperlipidemia     Hypertension     Migraine     Neuropathy     Stroke        Past Surgical History:   Procedure Laterality Date    CHOLECYSTECTOMY      HEMORRHOIDECTOMY N/A 7/23/2021    Procedure: HEMORRHOIDECTOMY;  Surgeon: Adan Meadows MD;  Location:  [facility];  Service: General;  Laterality: N/A;    HIP PERCUTANEOUS PINNING Left 2/28/2017    Procedure: LEFT HIP PERCUTANEOUS PINNING FEMORAL NECK;  Surgeon: Ezra Barlow MD;  Location:  [facility];  Service:     HYSTERECTOMY      [place] CLTX HIP DISLOCATION TRAUMATIC W/O ANESTHESIA Left 2/28/2017     "Procedure: LEFT HIP CLOSED REDUCTION;  Surgeon: Ezra Barlow MD;  Location: [facility];  Service: Orthopedics    SHOULDER SURGERY         Family History   Problem Relation Age of Onset    Alcohol abuse Mother     Heart disease Mother         Cardiac Disorder    Stroke Mother     Hypertension Mother     Parkinsonism Mother     Colon cancer Maternal Grandmother     Diabetes Maternal Aunt         Social History     Socioeconomic History    Marital status:    Tobacco Use    Smoking status: Never    Smokeless tobacco: Never   Vaping Use    Vaping status: Never Used   Substance and Sexual Activity    Alcohol use: No    Drug use: No    Sexual activity: Defer       Review of Systems   Constitutional: Negative.    HENT: Negative.     Respiratory: Negative.     Cardiovascular: Negative.    Endocrine: Negative.    Genitourinary: Negative.    Musculoskeletal: Negative.    Skin: Negative.    Allergic/Immunologic: Negative.    Neurological: Negative.    Hematological: Negative.    Psychiatric/Behavioral: Negative.         Cardiovascular Procedures    ECHO/MUGA:  STRESS TESTS:   CARDIAC CATH:   DEVICES:   HOLTER:   CT/MRI:   VASCULAR:   CARDIOTHORACIC:     Objective  Vitals:    08/09/24 1410   BP: 126/80   BP Location: Right arm   Patient Position: Lying   Cuff Size: Adult   Pulse: 61   Resp: 18   SpO2: 96%   Weight: 73.5 kg (162 lb)   Height: 165.1 cm (65\")   PainSc: 0-No pain     Body mass index is 26.96 kg/m².     Physical Exam  Vitals reviewed.   Constitutional:       Appearance: Healthy appearance. Not in distress.   Neck:      Vascular: No JVR. JVD normal.   Pulmonary:      Effort: Pulmonary effort is normal.      Breath sounds: Normal breath sounds. No wheezing. No rhonchi. No rales.   Chest:      Chest wall: Not tender to palpatation.   Cardiovascular:      PMI at left midclavicular line. Normal rate. Regular rhythm. Normal S1. Normal S2.       Murmurs: There is no murmur.      No gallop.  No click. No rub. "   Pulses:     Intact distal pulses.   Edema:     Peripheral edema absent.   Abdominal:      General: Bowel sounds are normal.      Palpations: Abdomen is soft.      Tenderness: There is no abdominal tenderness.   Musculoskeletal: Normal range of motion.         General: No tenderness. Skin:     General: Skin is warm and dry.   Neurological:      General: No focal deficit present.      Mental Status: Alert and oriented to person, place and time.        Diagnostic Data    ECG 12 Lead    Date/Time: 8/9/2024 2:48 PM  Performed by: Riccardo Rae MD    Authorized by: Riccardo Rae MD  Comparison: compared with previous ECG from 2/9/2024  Similar to previous ECG  Rhythm: atrial fibrillation  Rate: bradycardic  BPM: 59  QRS axis: normal    Clinical impression: abnormal EKG        Assessment and Plan  Diagnoses and all orders for this visit:    Atrial fibrillation, chronic- rate 59 on Cardizem  240 mg and  Metoprolol 25 bid, plus Eliquis, 5mg bid, will watch HR  -     Comprehensive Metabolic Panel  -     CBC & Differential  -     TSH    Primary hypertension- BP is 130.80 on Cardizem 240 mg, Metoprolol 25 bid    Pure hypercholesterolemia- on Lipitor 10 mg last LDL 62 in 2023         Return in about 6 months (around 2/9/2025) for Recheck with Dr. Rae.    Riccardo Rae MD  08/09/2024

## 2024-08-10 LAB
ALBUMIN SERPL-MCNC: 4 G/DL (ref 3.8–4.8)
ALP SERPL-CCNC: 100 IU/L (ref 44–121)
ALT SERPL-CCNC: 9 IU/L (ref 0–32)
AST SERPL-CCNC: 24 IU/L (ref 0–40)
BASOPHILS # BLD AUTO: 0.1 X10E3/UL (ref 0–0.2)
BASOPHILS NFR BLD AUTO: 1 %
BILIRUB SERPL-MCNC: 0.4 MG/DL (ref 0–1.2)
BUN SERPL-MCNC: 12 MG/DL (ref 8–27)
BUN/CREAT SERPL: 13 (ref 12–28)
CALCIUM SERPL-MCNC: 8.6 MG/DL (ref 8.7–10.3)
CHLORIDE SERPL-SCNC: 110 MMOL/L (ref 96–106)
CO2 SERPL-SCNC: 16 MMOL/L (ref 20–29)
CREAT SERPL-MCNC: 0.91 MG/DL (ref 0.57–1)
EGFRCR SERPLBLD CKD-EPI 2021: 67 ML/MIN/1.73
EOSINOPHIL # BLD AUTO: 0.2 X10E3/UL (ref 0–0.4)
EOSINOPHIL NFR BLD AUTO: 2 %
ERYTHROCYTE [DISTWIDTH] IN BLOOD BY AUTOMATED COUNT: 13.7 % (ref 11.7–15.4)
GLOBULIN SER CALC-MCNC: 3.6 G/DL (ref 1.5–4.5)
GLUCOSE SERPL-MCNC: ABNORMAL MG/DL
HCT VFR BLD AUTO: 44.9 % (ref 34–46.6)
HGB BLD-MCNC: 14.4 G/DL (ref 11.1–15.9)
IMM GRANULOCYTES # BLD AUTO: 0 X10E3/UL (ref 0–0.1)
IMM GRANULOCYTES NFR BLD AUTO: 0 %
LYMPHOCYTES # BLD AUTO: 1.8 X10E3/UL (ref 0.7–3.1)
LYMPHOCYTES NFR BLD AUTO: 20 %
MCH RBC QN AUTO: 28.7 PG (ref 26.6–33)
MCHC RBC AUTO-ENTMCNC: 32.1 G/DL (ref 31.5–35.7)
MCV RBC AUTO: 89 FL (ref 79–97)
MONOCYTES # BLD AUTO: 0.8 X10E3/UL (ref 0.1–0.9)
MONOCYTES NFR BLD AUTO: 9 %
NEUTROPHILS # BLD AUTO: 6 X10E3/UL (ref 1.4–7)
NEUTROPHILS NFR BLD AUTO: 68 %
PLATELET # BLD AUTO: 215 X10E3/UL (ref 150–450)
POTASSIUM SERPL-SCNC: ABNORMAL MMOL/L
PROT SERPL-MCNC: 7.6 G/DL (ref 6–8.5)
RBC # BLD AUTO: 5.02 X10E6/UL (ref 3.77–5.28)
SODIUM SERPL-SCNC: 141 MMOL/L (ref 134–144)
TSH SERPL DL<=0.005 MIU/L-ACNC: 0.17 UIU/ML (ref 0.45–4.5)
WBC # BLD AUTO: 8.9 X10E3/UL (ref 3.4–10.8)

## 2024-08-12 ENCOUNTER — TELEPHONE (OUTPATIENT)
Dept: CARDIOLOGY | Facility: CLINIC | Age: 74
End: 2024-08-12
Payer: MEDICARE

## 2024-08-12 NOTE — TELEPHONE ENCOUNTER
Left a message for Ms. Joshi that her labs overall were good, however, TSH level is low.  Dr. Rae would like you to discuss with the MD that follows your thyroid issue.  Thank you.   I will also mail a copy to patient.

## 2024-08-12 NOTE — TELEPHONE ENCOUNTER
----- Message from Riccardo Rae sent at 8/10/2024  1:14 PM EDT -----  TSH is low please discuss with the doctor who manage the thyroid

## 2024-08-14 ENCOUNTER — TELEPHONE (OUTPATIENT)
Dept: FAMILY MEDICINE CLINIC | Facility: CLINIC | Age: 74
End: 2024-08-14

## 2024-08-14 NOTE — TELEPHONE ENCOUNTER
Caller: Barak Joshi    Relationship: Emergency Contact    Best call back number: [phone]    What form or medical record are you requesting: OFFICE NOTES RESULTS     Who is requesting this form or medical record from you: PATIENT     How would you like to receive the form or medical records (pick-up, mail, fax):     [employer]   PHONE: [phone]  FAX: [phone]    Timeframe paperwork needed: ASAP    Additional notes: PATIENT NEEDS HER OFFICE NOTES FROM 08/07/2024 FAX TO [facility]

## 2024-08-24 DIAGNOSIS — M79.7 FIBROMYALGIA: ICD-10-CM

## 2024-08-26 RX ORDER — TOPIRAMATE 100 MG/1
100 TABLET, FILM COATED ORAL DAILY
Qty: 90 TABLET | Refills: 1 | Status: SHIPPED | OUTPATIENT
Start: 2024-08-26

## 2024-09-03 ENCOUNTER — OUTSIDE FACILITY SERVICE (OUTPATIENT)
Dept: FAMILY MEDICINE CLINIC | Facility: CLINIC | Age: 74
End: 2024-09-03
Payer: MEDICARE

## 2024-09-06 ENCOUNTER — TELEPHONE (OUTPATIENT)
Dept: FAMILY MEDICINE CLINIC | Facility: CLINIC | Age: 74
End: 2024-09-06
Payer: MEDICARE

## 2024-09-06 NOTE — TELEPHONE ENCOUNTER
Caller:   [facility])  Relationship:     Best call back number: [phone]    What is the best time to reach you: ANYTIME     Who are you requesting to speak with (clinical staff, provider,  specific staff member): CLINICAL STAFF    [facility] IS CALLING TO CHECK ON THE STATUS OF PAPERWORK THAT WAS FAXED. WHEELCHAIR ORDERS FROM [facility]. HAS BEEN SENT NUMEROUS TIMES VIA FAX.

## 2024-09-09 RX ORDER — APIXABAN 5 MG/1
5 TABLET, FILM COATED ORAL EVERY 12 HOURS
Qty: 180 TABLET | Refills: 1 | Status: SHIPPED | OUTPATIENT
Start: 2024-09-09

## 2024-09-11 NOTE — TELEPHONE ENCOUNTER
Looks like the order was signed and faxed back on 09/04/24. Called [facility] back, but they are not open yet. Re-faxed signed order to [phone]

## 2024-09-16 NOTE — PROGRESS NOTES
Continued Stay Note  [facility]     Patient Name: Vickie Joshi  MRN: [id number]  Today's Date: 10/4/2018    Admit Date: 9/27/2018          Discharge Plan     Row Name 10/04/18 1430       Plan    Plan Comments Not medically ready for DC.  WBC up to 14.35.  Cont IV abx:  Vanc, Azactam.  DCP will depend on final abx plans per ID.  Will cont to follow.              Discharge Codes    No documentation.       Expected Discharge Date and Time     Expected Discharge Date Expected Discharge Time    Oct 4, 2018             Jacki March     Additional Notes: I told him his Coumadin contributes to this problem Render Risk Assessment In Note?: no Detail Level: Detailed

## 2024-09-26 ENCOUNTER — OUTSIDE FACILITY SERVICE (OUTPATIENT)
Dept: FAMILY MEDICINE CLINIC | Facility: CLINIC | Age: 74
End: 2024-09-26
Payer: MEDICARE

## 2024-09-26 PROCEDURE — G0180 MD CERTIFICATION HHA PATIENT: HCPCS | Performed by: FAMILY MEDICINE

## 2024-10-21 ENCOUNTER — OFFICE VISIT (OUTPATIENT)
Dept: FAMILY MEDICINE CLINIC | Facility: CLINIC | Age: 74
End: 2024-10-21
Payer: MEDICARE

## 2024-10-21 VITALS
TEMPERATURE: 98.3 F | DIASTOLIC BLOOD PRESSURE: 70 MMHG | HEART RATE: 60 BPM | HEIGHT: 65 IN | OXYGEN SATURATION: 98 % | SYSTOLIC BLOOD PRESSURE: 118 MMHG | BODY MASS INDEX: 26.96 KG/M2

## 2024-10-21 DIAGNOSIS — H91.93 BILATERAL HEARING LOSS, UNSPECIFIED HEARING LOSS TYPE: ICD-10-CM

## 2024-10-21 DIAGNOSIS — H61.23 BILATERAL IMPACTED CERUMEN: ICD-10-CM

## 2024-10-21 DIAGNOSIS — H92.22 OTORRHAGIA OF LEFT EAR: Primary | ICD-10-CM

## 2024-10-21 PROCEDURE — 3078F DIAST BP <80 MM HG: CPT | Performed by: NURSE PRACTITIONER

## 2024-10-21 PROCEDURE — 3074F SYST BP LT 130 MM HG: CPT | Performed by: NURSE PRACTITIONER

## 2024-10-21 PROCEDURE — 1126F AMNT PAIN NOTED NONE PRSNT: CPT | Performed by: NURSE PRACTITIONER

## 2024-10-21 PROCEDURE — 99214 OFFICE O/P EST MOD 30 MIN: CPT | Performed by: NURSE PRACTITIONER

## 2024-10-21 RX ORDER — CIPROFLOXACIN AND DEXAMETHASONE 3; 1 MG/ML; MG/ML
4 SUSPENSION/ DROPS AURICULAR (OTIC) 2 TIMES DAILY
Qty: 7.5 ML | Refills: 0 | Status: SHIPPED | OUTPATIENT
Start: 2024-10-21

## 2024-10-21 NOTE — PROGRESS NOTES
"Chief Complaint  blood in ear (Woke up this morning had blood in left ear )    Subjective          Vickie Joshi presents to [facility]  History of Present Illness  Pt noticed red blood from her left ear canal this morning. She has allergies which she feels are unchanged. She has noticed hearing loss in the past several years. She has had ear wax in her ears but could not tolerate irrigation well.       History of Present Illness      Objective   Vital Signs:   /70   Pulse 60   Temp 98.3 °F (36.8 °C)   Ht 165.1 cm (65\")   SpO2 98%   BMI 26.96 kg/m²     Body mass index is 26.96 kg/m².            Current Outpatient Medications:     albuterol (ACCUNEB) 1.25 MG/3ML nebulizer solution, Take 3 mL by nebulization Every 6 (Six) Hours As Needed for Wheezing or Shortness of Air., Disp: 90 vial, Rfl: 12    albuterol sulfate  (90 Base) MCG/ACT inhaler, INHALE 2 PUFFS BY MOUTH EVERY 4 TO 6 HOURS AS NEEDED, Disp: 8.5 g, Rfl: 0    atorvastatin (LIPITOR) 10 MG tablet, TAKE ONE TABLET BY MOUTH ONCE NIGHTLY, Disp: 90 tablet, Rfl: 1    dicyclomine (BENTYL) 10 MG capsule, Take 1 capsule by mouth 2 (Two) Times a Day As Needed for Abdominal Cramping., Disp: 180 capsule, Rfl: 1    dilTIAZem CD (CARDIZEM CD) 240 MG 24 hr capsule, TAKE 1 CAPSULE BY MOUTH DAILY, Disp: 90 capsule, Rfl: 1    Eliquis 5 MG tablet tablet, TAKE ONE TABLET BY MOUTH EVERY 12 HOURS, Disp: 180 tablet, Rfl: 1    Fluticasone-Salmeterol (ADVAIR/WIXELA) 250-50 MCG/ACT DISKUS, Inhale 1 puff 2 (Two) Times a Day., Disp: 1 each, Rfl: 3    levothyroxine (SYNTHROID, LEVOTHROID) 137 MCG tablet, TAKE 1 TABLET BY MOUTH DAILY, Disp: 90 tablet, Rfl: 1    Magnesium 250 MG tablet, Take  by mouth., Disp: , Rfl:     metoprolol tartrate (LOPRESSOR) 25 MG tablet, Take 25 mg bid, Disp: 90 tablet, Rfl: 1    nystatin (MYCOSTATIN) 238436 UNIT/GM cream, APPLY TO AFFECTED AREA(S) TWO TIMES A DAY, Disp: 60 g, Rfl: 1    nystatin (MYCOSTATIN) " 482006 UNIT/GM powder, Apply  topically to the appropriate area as directed 3 (Three) Times a Day., Disp: 60 g, Rfl: 0    omeprazole (priLOSEC) 40 MG capsule, TAKE 1 CAPSULE BY MOUTH DAILY, Disp: 90 capsule, Rfl: 1    pregabalin (LYRICA) 300 MG capsule, TAKE 1 CAPSULE BY MOUTH TWICE A DAY, Disp: 180 capsule, Rfl: 0    topiramate (TOPAMAX) 100 MG tablet, TAKE 1 TABLET BY MOUTH DAILY, Disp: 90 tablet, Rfl: 1    ciprofloxacin-dexAMETHasone (Ciprodex) 0.3-0.1 % otic suspension, Administer 4 drops into the left ear 2 (Two) Times a Day., Disp: 7.5 mL, Rfl: 0    denosumab (Prolia) 60 MG/ML solution prefilled syringe syringe, Inject 1 mL under the skin into the appropriate area as directed 1 (One) Time for 1 dose., Disp: 1 mL, Rfl: 1      Allergies: Cephalexin, Penicillins, Sulfa antibiotics, and Cephalosporins    Physical Exam     Physical Exam         Result Review :                Results            Assessment and Plan    Diagnoses and all orders for this visit:    1. Otorrhagia of left ear (Primary)  Comments:  Avoid trauma to ear. Use ear drops and F/U with ENT. Continue Eliquis. RTC for worsened sx.  Orders:  -     ciprofloxacin-dexAMETHasone (Ciprodex) 0.3-0.1 % otic suspension; Administer 4 drops into the left ear 2 (Two) Times a Day.  Dispense: 7.5 mL; Refill: 0  -     Ambulatory Referral to ENT (Otolaryngology)    2. Bilateral impacted cerumen  Comments:  Pt did not tolerate irrigation well in the past so F/U with ENT for suction cerumen removal.  Orders:  -     Ambulatory Referral to ENT (Otolaryngology)    3. Bilateral hearing loss, unspecified hearing loss type  Comments:  F/U with ENT.  Orders:  -     Ambulatory Referral to ENT (Otolaryngology)        Assessment & Plan                   Follow Up   No follow-ups on file.  Patient was given instructions and counseling regarding her condition or for health maintenance advice. Please see specific information pulled into the AVS if appropriate.     Enma LEONARD  [place], [place]

## 2024-10-25 ENCOUNTER — TELEPHONE (OUTPATIENT)
Dept: FAMILY MEDICINE CLINIC | Facility: CLINIC | Age: 74
End: 2024-10-25

## 2024-10-28 RX ORDER — FLUTICASONE PROPIONATE AND SALMETEROL 250; 50 UG/1; UG/1
POWDER RESPIRATORY (INHALATION)
Qty: 60 EACH | Refills: 5 | Status: SHIPPED | OUTPATIENT
Start: 2024-10-28

## 2024-11-06 ENCOUNTER — TELEPHONE (OUTPATIENT)
Dept: FAMILY MEDICINE CLINIC | Facility: CLINIC | Age: 74
End: 2024-11-06

## 2024-11-06 DIAGNOSIS — I69.359 HISTORY OF HEMORRHAGIC STROKE WITH RESIDUAL HEMIPARESIS: Primary | ICD-10-CM

## 2024-11-06 NOTE — TELEPHONE ENCOUNTER
Caller: Barak Joshi    Relationship: Emergency Contact    Best call back number: [phone]     What is the medical concern/diagnosis: STRENGTHEN MOBILITY FROM STROKE     What specialty or service is being requested: PHYSICAL THERAPY     What is the provider, practice or medical service name:   [facility]   PHONE: [phone]    Any additional details:   PATIENT'S () BILL STATED THAT PATIENT WAS GETTING HOME HEALTH PHYSICAL THERAPY BUT THIS HAS ENDED AND PATIENT WOULD LIKE FOR A REFERRAL TO CONTINUE PHYSICAL THERAPY WITH [facility]PAT[facility]

## 2024-12-02 RX ORDER — OMEPRAZOLE 40 MG/1
40 CAPSULE, DELAYED RELEASE ORAL DAILY
Qty: 90 CAPSULE | Refills: 1 | Status: SHIPPED | OUTPATIENT
Start: 2024-12-02

## 2024-12-02 RX ORDER — ATORVASTATIN CALCIUM 10 MG/1
TABLET, FILM COATED ORAL
Qty: 90 TABLET | Refills: 1 | Status: SHIPPED | OUTPATIENT
Start: 2024-12-02

## 2024-12-04 DIAGNOSIS — G62.9 NEUROPATHY: ICD-10-CM

## 2024-12-04 RX ORDER — PREGABALIN 300 MG/1
300 CAPSULE ORAL 2 TIMES DAILY
Qty: 180 CAPSULE | Refills: 0 | Status: SHIPPED | OUTPATIENT
Start: 2024-12-04

## 2024-12-04 RX ORDER — METOPROLOL TARTRATE 25 MG/1
TABLET, FILM COATED ORAL
Qty: 180 TABLET | Refills: 1 | Status: SHIPPED | OUTPATIENT
Start: 2024-12-04

## 2024-12-10 ENCOUNTER — OFFICE VISIT (OUTPATIENT)
Dept: FAMILY MEDICINE CLINIC | Facility: CLINIC | Age: 74
End: 2024-12-10
Payer: MEDICARE

## 2024-12-10 DIAGNOSIS — I10 PRIMARY HYPERTENSION: ICD-10-CM

## 2024-12-10 DIAGNOSIS — I69.352 HEMIPLGA FOLLOWING CEREBRAL INFRC AFF LEFT DOMINANT SIDE: ICD-10-CM

## 2024-12-10 DIAGNOSIS — Z00.00 MEDICARE ANNUAL WELLNESS VISIT, SUBSEQUENT: Primary | ICD-10-CM

## 2024-12-10 DIAGNOSIS — E03.9 ACQUIRED HYPOTHYROIDISM: ICD-10-CM

## 2024-12-10 DIAGNOSIS — Z87.81 HISTORY OF HIP FRACTURE: ICD-10-CM

## 2024-12-10 DIAGNOSIS — G62.9 NEUROPATHY: ICD-10-CM

## 2024-12-10 DIAGNOSIS — I69.359 HISTORY OF HEMORRHAGIC STROKE WITH RESIDUAL HEMIPARESIS: ICD-10-CM

## 2024-12-10 DIAGNOSIS — M79.7 FIBROMYALGIA: ICD-10-CM

## 2024-12-10 DIAGNOSIS — M81.0 OSTEOPOROSIS, UNSPECIFIED OSTEOPOROSIS TYPE, UNSPECIFIED PATHOLOGICAL FRACTURE PRESENCE: ICD-10-CM

## 2024-12-10 RX ORDER — METOPROLOL TARTRATE 25 MG/1
TABLET, FILM COATED ORAL
Qty: 180 TABLET | Refills: 1 | Status: SHIPPED | OUTPATIENT
Start: 2024-12-10

## 2024-12-11 VITALS
BODY MASS INDEX: 31.16 KG/M2 | SYSTOLIC BLOOD PRESSURE: 138 MMHG | HEIGHT: 65 IN | OXYGEN SATURATION: 97 % | HEART RATE: 57 BPM | WEIGHT: 187 LBS | DIASTOLIC BLOOD PRESSURE: 80 MMHG

## 2024-12-11 LAB
ALBUMIN SERPL-MCNC: 3.9 G/DL (ref 3.8–4.8)
ALP SERPL-CCNC: 108 IU/L (ref 44–121)
ALT SERPL-CCNC: 11 IU/L (ref 0–32)
AST SERPL-CCNC: 20 IU/L (ref 0–40)
BASOPHILS # BLD AUTO: 0.1 X10E3/UL (ref 0–0.2)
BASOPHILS NFR BLD AUTO: 1 %
BILIRUB SERPL-MCNC: 0.8 MG/DL (ref 0–1.2)
BUN SERPL-MCNC: 9 MG/DL (ref 8–27)
BUN/CREAT SERPL: 8 (ref 12–28)
CALCIUM SERPL-MCNC: 8.3 MG/DL (ref 8.7–10.3)
CHLORIDE SERPL-SCNC: 111 MMOL/L (ref 96–106)
CHOLEST SERPL-MCNC: 124 MG/DL (ref 100–199)
CO2 SERPL-SCNC: 17 MMOL/L (ref 20–29)
CREAT SERPL-MCNC: 1.09 MG/DL (ref 0.57–1)
EGFRCR SERPLBLD CKD-EPI 2021: 54 ML/MIN/1.73
EOSINOPHIL # BLD AUTO: 0.2 X10E3/UL (ref 0–0.4)
EOSINOPHIL NFR BLD AUTO: 3 %
ERYTHROCYTE [DISTWIDTH] IN BLOOD BY AUTOMATED COUNT: 14.1 % (ref 11.7–15.4)
GLOBULIN SER CALC-MCNC: 3.2 G/DL (ref 1.5–4.5)
GLUCOSE SERPL-MCNC: 92 MG/DL (ref 70–99)
HCT VFR BLD AUTO: 41.1 % (ref 34–46.6)
HDLC SERPL-MCNC: 45 MG/DL
HGB BLD-MCNC: 12.9 G/DL (ref 11.1–15.9)
IMM GRANULOCYTES # BLD AUTO: 0 X10E3/UL (ref 0–0.1)
IMM GRANULOCYTES NFR BLD AUTO: 0 %
LDLC SERPL CALC-MCNC: 53 MG/DL (ref 0–99)
LYMPHOCYTES # BLD AUTO: 2 X10E3/UL (ref 0.7–3.1)
LYMPHOCYTES NFR BLD AUTO: 29 %
MCH RBC QN AUTO: 27.9 PG (ref 26.6–33)
MCHC RBC AUTO-ENTMCNC: 31.4 G/DL (ref 31.5–35.7)
MCV RBC AUTO: 89 FL (ref 79–97)
MONOCYTES # BLD AUTO: 0.6 X10E3/UL (ref 0.1–0.9)
MONOCYTES NFR BLD AUTO: 8 %
NEUTROPHILS # BLD AUTO: 4.1 X10E3/UL (ref 1.4–7)
NEUTROPHILS NFR BLD AUTO: 59 %
PLATELET # BLD AUTO: 185 X10E3/UL (ref 150–450)
POTASSIUM SERPL-SCNC: 4.1 MMOL/L (ref 3.5–5.2)
PROT SERPL-MCNC: 7.1 G/DL (ref 6–8.5)
RBC # BLD AUTO: 4.63 X10E6/UL (ref 3.77–5.28)
SODIUM SERPL-SCNC: 143 MMOL/L (ref 134–144)
TRIGL SERPL-MCNC: 149 MG/DL (ref 0–149)
TSH SERPL DL<=0.005 MIU/L-ACNC: 0.75 UIU/ML (ref 0.45–4.5)
VLDLC SERPL CALC-MCNC: 26 MG/DL (ref 5–40)
WBC # BLD AUTO: 6.9 X10E3/UL (ref 3.4–10.8)

## 2024-12-11 NOTE — PROGRESS NOTES
Subjective   The ABCs of the Annual Wellness Visit  Medicare Wellness Visit      Vickie Joshi is a 74 y.o. patient who presents for a Medicare Wellness Visit.    The following portions of the patient's history were reviewed and   updated as appropriate: allergies, current medications, past family history, past medical history, past social history, past surgical history, and problem list.    Compared to one year ago, the patient's physical   health is the same.  Compared to one year ago, the patient's mental   health is the same.    Recent Hospitalizations:  She was not admitted to the hospital during the last year.     Current Medical Providers:  Patient Care Team:  Dav Bryan MD as PCP - General (Family Medicine)  Riccardo Rae MD as Cardiologist (Cardiology)    Outpatient Medications Prior to Visit   Medication Sig Dispense Refill    albuterol (ACCUNEB) 1.25 MG/3ML nebulizer solution Take 3 mL by nebulization Every 6 (Six) Hours As Needed for Wheezing or Shortness of Air. 90 vial 12    albuterol sulfate  (90 Base) MCG/ACT inhaler INHALE 2 PUFFS BY MOUTH EVERY 4 TO 6 HOURS AS NEEDED 8.5 g 0    atorvastatin (LIPITOR) 10 MG tablet TAKE ONE TABLET BY MOUTH ONCE NIGHTLY 90 tablet 1    ciprofloxacin-dexAMETHasone (Ciprodex) 0.3-0.1 % otic suspension Administer 4 drops into the left ear 2 (Two) Times a Day. 7.5 mL 0    denosumab (Prolia) 60 MG/ML solution prefilled syringe syringe Inject 1 mL under the skin into the appropriate area as directed 1 (One) Time for 1 dose. 1 mL 1    dicyclomine (BENTYL) 10 MG capsule Take 1 capsule by mouth 2 (Two) Times a Day As Needed for Abdominal Cramping. 180 capsule 1    dilTIAZem CD (CARDIZEM CD) 240 MG 24 hr capsule TAKE 1 CAPSULE BY MOUTH DAILY 90 capsule 1    Eliquis 5 MG tablet tablet TAKE ONE TABLET BY MOUTH EVERY 12 HOURS 180 tablet 1    Fluticasone-Salmeterol (ADVAIR/WIXELA) 250-50 MCG/ACT DISKUS INHALE 1 PUFF BY MOUTH 2 TIMES A DAY 60 each 5     levothyroxine (SYNTHROID, LEVOTHROID) 137 MCG tablet TAKE 1 TABLET BY MOUTH DAILY 90 tablet 1    Magnesium 250 MG tablet Take  by mouth.      nystatin (MYCOSTATIN) 637435 UNIT/GM cream APPLY TO AFFECTED AREA(S) TWO TIMES A DAY 60 g 1    nystatin (MYCOSTATIN) 875158 UNIT/GM powder Apply  topically to the appropriate area as directed 3 (Three) Times a Day. 60 g 0    omeprazole (priLOSEC) 40 MG capsule TAKE 1 CAPSULE BY MOUTH DAILY 90 capsule 1    pregabalin (LYRICA) 300 MG capsule TAKE 1 CAPSULE BY MOUTH 2 TIMES A  capsule 0    topiramate (TOPAMAX) 100 MG tablet TAKE 1 TABLET BY MOUTH DAILY 90 tablet 1    metoprolol tartrate (LOPRESSOR) 25 MG tablet TAKE 1 TABLET BY MOUTH 2 TIMES A  tablet 1     No facility-administered medications prior to visit.     No opioid medication identified on active medication list. I have reviewed chart for other potential  high risk medication/s and harmful drug interactions in the elderly.      Aspirin is not on active medication list.  Aspirin use is not indicated based on review of current medical condition/s. Risk of harm outweighs potential benefits.  .    Patient Active Problem List   Diagnosis    Hypertension    Hx of Migraine    GERD (gastroesophageal reflux disease)    Fibromyalgia    Neuropathy    Asthma    Allergic rhinitis    Morbid obesity    Acute on chronic blood loss anemia    Hypothyroidism    Hyperlipidemia    History of hemorrhagic stroke with residual hemiparesis    Atrial fibrillation, chronic    Hypocalcemia    GI bleed    Closed left hip fracture, sequela    Acute UTI (urinary tract infection)    Lactic acidosis    Diarrhea    Tremor, essential    Hypomagnesemia    Hypokalemia    Low platelet count    Elevated bilirubin     Advance Care Planning Advance Directive is not on file.  ACP discussion was held with the patient during this visit. Patient has an advance directive (not in EMR), copy requested.            Objective   There were no vitals filed for  "this visit.    Estimated body mass index is 26.96 kg/m² as calculated from the following:    Height as of 10/21/24: 165.1 cm (65\").    Weight as of 24: 73.5 kg (162 lb).            Does the patient have evidence of cognitive impairment? No                                                                                                Health  Risk Assessment    Smoking Status:  Social History     Tobacco Use   Smoking Status Never   Smokeless Tobacco Never     Alcohol Consumption:  Social History     Substance and Sexual Activity   Alcohol Use No       Fall Risk Screen  STEADI Fall Risk Assessment was completed, and patient is at MODERATE risk for falls. Assessment completed on:2024    Depression Screening   The PHQ has not been completed during this encounter.     Health Habits and Functional and Cognitive Screenin/7/2023     8:10 AM   Functional & Cognitive Status   Do you have difficulty preparing food and eating? Yes   Do you have difficulty bathing yourself, getting dressed or grooming yourself? Yes   Do you have difficulty using the toilet? Yes   Do you have difficulty moving around from place to place? Yes   Do you have trouble with steps or getting out of a bed or a chair? Yes   Current Diet Well Balanced Diet   Dental Exam Up to date   Eye Exam Up to date   Exercise (times per week) 0 times per week   Current Exercises Include No Regular Exercise   Do you need help using the phone?  No   Are you deaf or do you have serious difficulty hearing?  No   Do you need help to go to places out of walking distance? Yes   Do you need help shopping? Yes   Do you need help preparing meals?  Yes   Do you need help with housework?  Yes   Do you need help with laundry? Yes   Do you need help taking your medications? No   Do you need help managing money? No   Do you ever drive or ride in a car without wearing a seat belt? No   Have you felt unusual stress, anger or loneliness in the last month? No   Who do " you live with? Spouse   If you need help, do you have trouble finding someone available to you? No   Have you been bothered in the last four weeks by sexual problems? No           Age-appropriate Screening Schedule:  Refer to the list below for future screening recommendations based on patient's age, sex and/or medical conditions. Orders for these recommended tests are listed in the plan section. The patient has been provided with a written plan.    Health Maintenance List  Health Maintenance   Topic Date Due    TDAP/TD VACCINES (1 - Tdap) Never done    MAMMOGRAM  Never done    ZOSTER VACCINE (2 of 3) 12/31/2015    INFLUENZA VACCINE  07/01/2024    COVID-19 Vaccine (4 - 2024-25 season) 09/01/2024    ANNUAL WELLNESS VISIT  12/07/2024    LIPID PANEL  12/07/2024    BMI FOLLOWUP  11/10/2025    DXA SCAN  01/19/2026    COLORECTAL CANCER SCREENING  11/05/2029    HEPATITIS C SCREENING  Completed    Pneumococcal Vaccine 65+  Completed                                                                                                                                                CMS Preventative Services Quick Reference  Risk Factors Identified During Encounter  None Identified    The above risks/problems have been discussed with the patient.  Pertinent information has been shared with the patient in the After Visit Summary.  An After Visit Summary and PPPS were made available to the patient.    Follow Up:   Next Medicare Wellness visit to be scheduled in 1 year.         Additional E&M Note during same encounter follows:  Patient has additional, significant, and separately identifiable condition(s)/problem(s) that require work above and beyond the Medicare Wellness Visit     Chief Complaint  No chief complaint on file.    Subjective    HPI  Vickie is also being seen today for additional medical problem/s.       The patient is a 74-year-old female who presents for a Medicare annual wellness exam. She is accompanied by her  .    Patient treated for hypertension fibromyalgia neuropathy and hypothyroidism.  Also treated for osteoporosis.  She has a history of a prior stroke leaving her hemiplegic.  She has a history of a hip fracture as well.  She has undergone physical therapy and continues to do so.    Review of Systems   Constitutional:  Negative for fatigue.   HENT:  Negative for congestion.    Respiratory:  Negative for apnea, cough and shortness of breath.    Cardiovascular:  Negative for chest pain.   Gastrointestinal:  Negative for abdominal pain.   Musculoskeletal:  Negative for arthralgias.   Neurological:  Negative for dizziness.   Psychiatric/Behavioral:  The patient is not nervous/anxious.           Objective   Vital Signs:  There were no vitals taken for this visit.  Physical Exam  Vitals and nursing note reviewed.   Constitutional:       General: She is not in acute distress.     Appearance: Normal appearance. She is not ill-appearing.   HENT:      Head: Normocephalic and atraumatic.      Right Ear: Tympanic membrane and ear canal normal.      Left Ear: Tympanic membrane and ear canal normal.      Nose: Nose normal.   Cardiovascular:      Rate and Rhythm: Normal rate and regular rhythm.      Heart sounds: Normal heart sounds.   Pulmonary:      Effort: Pulmonary effort is normal.      Breath sounds: Normal breath sounds.   Neurological:      Mental Status: She is alert and oriented to person, place, and time. Mental status is at baseline.   Psychiatric:         Mood and Affect: Mood normal.                       Results                Assessment and Plan Additional age appropriate preventative wellness advice topics were discussed during today's preventative wellness exam(some topics already addressed during AWV portion of the note above):    Physical Activity: Advised cardiovascular activity 150 minutes per week as tolerated. (example brisk walk for 30 minutes, 5 days a week).     Nutrition: Discussed nutrition plan  with patient. Information shared in after visit summary. Goal is for a well balanced diet to enhance overall health.     Healthy Weight: Discussed current and goal BMI with patient. Steps to attain this goal discussed. Information shared in after visit summary.         Continue home health.  Refill medications as needed.  Check labs.         Follow Up   No follow-ups on file.  Patient was given instructions and counseling regarding her condition or for health maintenance advice. Please see specific information pulled into the AVS if appropriate.  Patient or patient representative verbalized consent for the use of Ambient Listening during the visit with  Dav Bryan MD for chart documentation. 12/10/2024  20:49 EST

## 2024-12-12 RX ORDER — DILTIAZEM HYDROCHLORIDE 240 MG/1
240 CAPSULE, COATED, EXTENDED RELEASE ORAL
Qty: 90 CAPSULE | Refills: 1 | Status: SHIPPED | OUTPATIENT
Start: 2024-12-12

## 2024-12-12 RX ORDER — MONTELUKAST SODIUM 10 MG/1
10 TABLET ORAL DAILY
Qty: 90 TABLET | Refills: 1 | OUTPATIENT
Start: 2024-12-12

## 2024-12-16 ENCOUNTER — TELEPHONE (OUTPATIENT)
Dept: FAMILY MEDICINE CLINIC | Facility: CLINIC | Age: 74
End: 2024-12-16

## 2024-12-16 NOTE — TELEPHONE ENCOUNTER
Name: Vickie Joshi      Relationship: Self      Best Callback Number:     [phone]         HUB PROVIDED THE RELAY MESSAGE FROM THE OFFICE      PATIENT: VOICED UNDERSTANDING AND HAS NO FURTHER QUESTIONS AT THIS TIME    ADDITIONAL INFORMATION: PATIENT STATED THAT SHE IS STILL WAITING ON THEM TO CALL HER TO GET IT SCHEDULED.   PLEASE CALL AND ADVISE WHAT NEEDS TO BE DONE NEXT

## 2024-12-16 NOTE — TELEPHONE ENCOUNTER
"Relay     \"Called pt and left msg to see if she has started physical therapy with [facility] yet?\"                "

## 2025-01-13 ENCOUNTER — CLINICAL SUPPORT (OUTPATIENT)
Dept: FAMILY MEDICINE CLINIC | Facility: CLINIC | Age: 75
End: 2025-01-13
Payer: MEDICARE

## 2025-01-13 DIAGNOSIS — R30.0 DYSURIA: Primary | ICD-10-CM

## 2025-01-13 LAB
BILIRUB BLD-MCNC: NEGATIVE MG/DL
CLARITY, POC: ABNORMAL
COLOR UR: YELLOW
EXPIRATION DATE: ABNORMAL
GLUCOSE UR STRIP-MCNC: NEGATIVE MG/DL
KETONES UR QL: NEGATIVE
LEUKOCYTE EST, POC: ABNORMAL
Lab: 1.02
NITRITE UR-MCNC: NEGATIVE MG/ML
PH UR: 6 [PH] (ref 5–8)
PROT UR STRIP-MCNC: ABNORMAL MG/DL
RBC # UR STRIP: NEGATIVE /UL
SP GR UR: 1.02 (ref 1–1.03)
UROBILINOGEN UR QL: ABNORMAL

## 2025-01-13 PROCEDURE — 81003 URINALYSIS AUTO W/O SCOPE: CPT | Performed by: FAMILY MEDICINE

## 2025-01-16 ENCOUNTER — TELEPHONE (OUTPATIENT)
Dept: FAMILY MEDICINE CLINIC | Facility: CLINIC | Age: 75
End: 2025-01-16
Payer: MEDICARE

## 2025-01-16 LAB
BACTERIA UR CULT: ABNORMAL
BACTERIA UR CULT: ABNORMAL
OTHER ANTIBIOTIC SUSC ISLT: ABNORMAL

## 2025-01-16 NOTE — TELEPHONE ENCOUNTER
Pt's  called to check on urine results and said he had [place] [facility] [place] And she is currently admitted. Per Dr. Bryan we printed the results and advised pt's  to make sure the hospital knew so this could assist them in their treatment for the patient. Pt will  results today and take to the hospital.   Placed at  in drawer for p/u.

## 2025-01-20 ENCOUNTER — TELEPHONE (OUTPATIENT)
Dept: FAMILY MEDICINE CLINIC | Facility: CLINIC | Age: 75
End: 2025-01-20
Payer: MEDICARE

## 2025-01-20 RX ORDER — CIPROFLOXACIN 500 MG/1
500 TABLET, FILM COATED ORAL 2 TIMES DAILY
Qty: 14 TABLET | Refills: 0 | Status: SHIPPED | OUTPATIENT
Start: 2025-01-20

## 2025-01-20 NOTE — TELEPHONE ENCOUNTER
LM on  to call back.    HUB to relay     ----- Message from Dav Bryan sent at 1/20/2025  7:10 AM EST -----  Please let her and her  know that she did have an infection.  I am sending an antibiotic to the pharmacy.

## 2025-01-21 NOTE — TELEPHONE ENCOUNTER
Name: EVELINE HAWKINS       Relationship: PATIENT       HUB PROVIDED THE RELAY MESSAGE FROM THE OFFICE      PATIENT: VOICED UNDERSTANDING AND HAS NO FURTHER QUESTIONS AT THIS TIME

## 2025-01-27 ENCOUNTER — OFFICE VISIT (OUTPATIENT)
Dept: FAMILY MEDICINE CLINIC | Facility: CLINIC | Age: 75
End: 2025-01-27
Payer: MEDICARE

## 2025-01-27 VITALS — OXYGEN SATURATION: 96 % | HEART RATE: 60 BPM | SYSTOLIC BLOOD PRESSURE: 140 MMHG | DIASTOLIC BLOOD PRESSURE: 70 MMHG

## 2025-01-27 DIAGNOSIS — I69.352 HEMIPLGA FOLLOWING CEREBRAL INFRC AFF LEFT DOMINANT SIDE: ICD-10-CM

## 2025-01-27 DIAGNOSIS — N39.0 URINARY TRACT INFECTION WITHOUT HEMATURIA, SITE UNSPECIFIED: Primary | ICD-10-CM

## 2025-01-27 DIAGNOSIS — R11.2 NAUSEA AND VOMITING, UNSPECIFIED VOMITING TYPE: ICD-10-CM

## 2025-01-27 PROCEDURE — 3078F DIAST BP <80 MM HG: CPT | Performed by: FAMILY MEDICINE

## 2025-01-27 PROCEDURE — 99214 OFFICE O/P EST MOD 30 MIN: CPT | Performed by: FAMILY MEDICINE

## 2025-01-27 PROCEDURE — 3077F SYST BP >= 140 MM HG: CPT | Performed by: FAMILY MEDICINE

## 2025-01-27 PROCEDURE — 1126F AMNT PAIN NOTED NONE PRSNT: CPT | Performed by: FAMILY MEDICINE

## 2025-01-27 PROCEDURE — 1111F DSCHRG MED/CURRENT MED MERGE: CPT | Performed by: FAMILY MEDICINE

## 2025-01-27 RX ORDER — NITROFURANTOIN 25; 75 MG/1; MG/1
100 CAPSULE ORAL DAILY
Qty: 90 CAPSULE | Refills: 1 | Status: SHIPPED | OUTPATIENT
Start: 2025-01-27

## 2025-01-27 NOTE — PROGRESS NOTES
Transitional Care Follow Up Visit  Subjective     Vickie Joshi is a 74 y.o. female who presents for a transitional care management visit.    Within 48 business hours after discharge our office contacted her via telephone to coordinate her care and needs.      I reviewed and discussed the details of that call along with the discharge summary, hospital problems, inpatient lab results, inpatient diagnostic studies, and consultation reports with Vickie.     Current outpatient and discharge medications have been reconciled for the patient.  Reviewed by: Dav Bryan MD          9/7/2023     2:23 PM   Date of TCM Phone Call   [facility]   Date of Admission 9/5/2023   Date of Discharge 9/7/2023   Discharge Disposition Home or Self Care     Risk for Readmission (LACE) No data recorded    History of Present Illness   Course During Hospital Stay: Patient was seen and examined at the hospital found to have UTI and treated.  Overall patient has had improvement.  Still on outpatient course of antibiotics.       The patient presents for evaluation of urinary tract infection.    She experienced episodes of vomiting during her recent hospital stay, which she attributes to the delay in receiving treatment. She was informed that she did not have a UTI, kidney infection, influenza, or COVID-19, and was subsequently discharged. However, following the culture results, she was diagnosed with a Klebsiella infection and initiated on antibiotic therapy. She reports experiencing right flank pain and lower back discomfort, which persisted for several days. She has a history of similar symptoms associated with previous UTIs. She has been prescribed cefdinir, with two doses remaining. She continues to experience pain, albeit less severe than before, and reports no changes in her urinary habits. She has been taking Zofran every 6 hours as needed, which she finds beneficial in managing her pain. She also reports a  correlation between her UTIs and episodes of diarrhea. She has had approximately three UTIs in the past year, one of which required hospitalization.            The following portions of the patient's history were reviewed and updated as appropriate: allergies, current medications, past family history, past medical history, past social history, past surgical history, and problem list.    Review of Systems   Constitutional:  Negative for fatigue and fever.   HENT:  Negative for congestion, ear pain, sinus pressure, sinus pain and sore throat.    Respiratory:  Negative for apnea, cough, chest tightness, shortness of breath and wheezing.    Cardiovascular:  Negative for chest pain and leg swelling.   Gastrointestinal:  Negative for abdominal pain, blood in stool, constipation, diarrhea, nausea and vomiting.   Genitourinary:  Negative for dysuria, frequency and urgency.   Musculoskeletal:  Negative for arthralgias and back pain.   Skin:  Negative for rash.   Neurological:  Negative for dizziness and headaches.       Objective   /70   Pulse 60   SpO2 96%   Physical Exam  Vitals and nursing note reviewed.   Constitutional:       General: She is not in acute distress.     Appearance: Normal appearance. She is not ill-appearing.   HENT:      Head: Normocephalic and atraumatic.      Right Ear: Tympanic membrane and ear canal normal.      Left Ear: Tympanic membrane and ear canal normal.      Nose: Nose normal.   Cardiovascular:      Rate and Rhythm: Normal rate and regular rhythm.      Heart sounds: Normal heart sounds.   Pulmonary:      Effort: Pulmonary effort is normal.      Breath sounds: Normal breath sounds.   Neurological:      Mental Status: She is alert and oriented to person, place, and time. Mental status is at baseline.   Psychiatric:         Mood and Affect: Mood normal.         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 Urinary Tract Infection (UTI).  The initial urinalysis did not reveal significant findings, but subsequent cultures identified Klebsiella. She was treated with cefdinir and has two days left of the medication. She reports persistent flank pain and lower back pain, though less severe. She is advised to complete her current course of cefdinir. A prescription for Cipro has been provided, which she should collect and keep at home for potential future use. She is also advised to continue taking Macrobid 100 mg once daily at night as a prophylactic measure against recurrent UTIs. The prescription will be sent to [facility] pharmacy.    2. Nausea.  She was prescribed Zofran for nausea, which she reports has been effective in alleviating her stomach pain. She is instructed to take Zofran as needed, up to every 6 hours, but not on a continuous basis.            Patient or patient representative verbalized consent for the use of Ambient Listening during the visit with  Dav Bryan MD for chart documentation. 1/27/2025  13:36 EST

## 2025-02-05 ENCOUNTER — OFFICE VISIT (OUTPATIENT)
Dept: CARDIOLOGY | Facility: CLINIC | Age: 75
End: 2025-02-05
Payer: MEDICARE

## 2025-02-05 VITALS
RESPIRATION RATE: 18 BRPM | HEIGHT: 65 IN | OXYGEN SATURATION: 99 % | WEIGHT: 162 LBS | TEMPERATURE: 98 F | SYSTOLIC BLOOD PRESSURE: 134 MMHG | HEART RATE: 85 BPM | BODY MASS INDEX: 26.99 KG/M2 | DIASTOLIC BLOOD PRESSURE: 80 MMHG

## 2025-02-05 DIAGNOSIS — I48.20 ATRIAL FIBRILLATION, CHRONIC: ICD-10-CM

## 2025-02-05 DIAGNOSIS — I10 PRIMARY HYPERTENSION: Primary | ICD-10-CM

## 2025-02-05 DIAGNOSIS — E03.9 ACQUIRED HYPOTHYROIDISM: ICD-10-CM

## 2025-02-05 DIAGNOSIS — E78.00 PURE HYPERCHOLESTEROLEMIA: ICD-10-CM

## 2025-02-05 RX ORDER — LEVOTHYROXINE SODIUM 137 UG/1
137 TABLET ORAL DAILY
Qty: 90 TABLET | Refills: 0 | Status: SHIPPED | OUTPATIENT
Start: 2025-02-05

## 2025-02-05 NOTE — PROGRESS NOTES
[facility] [place]  [facility] CARDIOLOGY  [address] [place] [address]  Dept: [phone]  Dept Fax: [phone]    Vickie Joshi  1950    Follow Up Office Visit Note    History of Present Illness:  Vickie Joshi is a 74 y.o. female who presents to the clinic for Follow-up. CAF- She has been at the hospital with UTI,  and pneumonia, no heart issues, on Metoprolol 25 bid and also Cardizem 240 mg, and Eliquis 5 mg bid,  EKG F with HR 66, will keep same meds, BP is 120.70    The following portions of the patient's history were reviewed and updated as appropriate: allergies, current medications, past family history, past medical history, past social history, past surgical history, and problem list.    Medications:  albuterol  albuterol sulfate HFA  atorvastatin  ciprofloxacin  ciprofloxacin-dexAMETHasone  dicyclomine  dilTIAZem CD  Eliquis tablet  Fluticasone-Salmeterol  levothyroxine  Magnesium tablet  metoprolol tartrate  nitrofurantoin (macrocrystal-monohydrate)  nystatin  nystatin cream  omeprazole  pregabalin  topiramate    Subjective  Allergies   Allergen Reactions   • Cephalexin Swelling   • Penicillins Hives   • Sulfa Antibiotics Hives   • Cephalosporins Unknown - High Severity        Past Medical History:   Diagnosis Date   • Arthritis    • Asthma    • Atrial fibrillation    • Cardiac disorder    • Closed fracture of neck of left femur 2/27/2017   • Disease of thyroid gland    • Fibromyalgia    • GERD (gastroesophageal reflux disease)    • Hyperlipidemia    • Hypertension    • Migraine    • Neuropathy    • Stroke        Past Surgical History:   Procedure Laterality Date   • CHOLECYSTECTOMY     • HEMORRHOIDECTOMY N/A 7/23/2021    Procedure: HEMORRHOIDECTOMY;  Surgeon: Adan Meadows MD;  Location: [facility];  Service: General;  Laterality: N/A;   • HIP PERCUTANEOUS PINNING Left 2/28/2017    Procedure: LEFT HIP PERCUTANEOUS PINNING FEMORAL NECK;  Surgeon: Ezra  "MD Cain;  Location:  [facility];  Service:    • HYSTERECTOMY     • ID CLTX HIP DISLOCATION TRAUMATIC W/O ANESTHESIA Left 2/28/2017    Procedure: LEFT HIP CLOSED REDUCTION;  Surgeon: Ezra Barlow MD;  Location:  [facility];  Service: Orthopedics   • SHOULDER SURGERY         Family History   Problem Relation Age of Onset   • Alcohol abuse Mother    • Heart disease Mother         Cardiac Disorder   • Stroke Mother    • Hypertension Mother    • Parkinsonism Mother    • Colon cancer Maternal Grandmother    • Diabetes Maternal Aunt         Social History     Socioeconomic History   • Marital status:    Tobacco Use   • Smoking status: Never   • Smokeless tobacco: Never   Vaping Use   • Vaping status: Never Used   Substance and Sexual Activity   • Alcohol use: No   • Drug use: No   • Sexual activity: Defer       Review of Systems   Constitutional: Negative.    HENT: Negative.     Respiratory: Negative.     Cardiovascular: Negative.    Endocrine: Negative.    Genitourinary: Negative.    Musculoskeletal: Negative.    Skin: Negative.    Allergic/Immunologic: Negative.    Neurological: Negative.    Hematological: Negative.    Psychiatric/Behavioral: Negative.       Cardiovascular Procedures    ECHO/MUGA:  STRESS TESTS:   CARDIAC CATH:   DEVICES:   HOLTER:   CT/MRI:   VASCULAR:   CARDIOTHORACIC:     Objective  Vitals:    02/05/25 1404   BP: 134/80   BP Location: Right arm   Patient Position: Lying   Cuff Size: Adult   Pulse: 85   Resp: 18   Temp: 98 °F (36.7 °C)   TempSrc: Infrared   SpO2: 99%   Weight: 73.5 kg (162 lb)   Height: 165.1 cm (65\")   PainSc: 0-No pain     Body mass index is 26.96 kg/m².     Physical Exam  Vitals reviewed.   Constitutional:       Appearance: Healthy appearance. Not in distress.   Neck:      Vascular: No JVR. JVD normal.   Pulmonary:      Effort: Pulmonary effort is normal.      Breath sounds: Normal breath sounds. No wheezing. No rhonchi. No rales.   Chest:      Chest wall: Not tender to " palpatation.   Cardiovascular:      PMI at left midclavicular line. Normal rate. Regular rhythm. Normal S1. Normal S2.       Murmurs: There is no murmur.      No gallop.  No click. No rub.   Pulses:     Intact distal pulses.   Edema:     Peripheral edema absent.   Abdominal:      General: Bowel sounds are normal.      Palpations: Abdomen is soft.      Tenderness: There is no abdominal tenderness.   Musculoskeletal: Normal range of motion.         General: No tenderness. Skin:     General: Skin is warm and dry.   Neurological:      General: No focal deficit present.      Mental Status: Alert and oriented to person, place and time.        Diagnostic Data    ECG 12 Lead    Date/Time: 2/5/2025 2:40 PM  Performed by: Riccardo Rea MD    Authorized by: Riccardo Rae MD  Comparison: compared with previous ECG from 8/9/2024  Similar to previous ECG  Rhythm: atrial fibrillation  Rate: normal  BPM: 66  QRS axis: normal    Clinical impression: abnormal EKG        Assessment and Plan  Diagnoses and all orders for this visit:    Primary hypertension- BP is fine 120.80 on Cardizem and also Metoprolol 25 bid, no cardiac complaints    Atrial fibrillation, chronic- rate 66 on above meds plus Eliquis 5 mg bid    Pure hypercholesterolemia- on Atorvastatin 10 mg         Return in about 6 months (around 8/5/2025) for Recheck with Dr. Rae.    Riccardo Rae MD  02/05/2025

## 2025-02-07 ENCOUNTER — TELEPHONE (OUTPATIENT)
Dept: FAMILY MEDICINE CLINIC | Facility: CLINIC | Age: 75
End: 2025-02-07
Payer: MEDICARE

## 2025-02-07 NOTE — TELEPHONE ENCOUNTER
Called pts  Barak (on verbal) advised pt should be seen in the ER for her symptoms. Blood and imaging can be preformed if needed. He voice understanding,

## 2025-02-07 NOTE — TELEPHONE ENCOUNTER
Caller: Barak Joshi    Relationship: Emergency Contact    Best call back number: [phone]    What is the best time to reach you: ANYTIME     Who are you requesting to speak with (clinical staff, provider,  specific staff member): CLINICAL STAFF     PATIENTS  STATES PATIENT HAS BEEN CONFUSED. SEEING THINGS THAT AREN'T THERE. PATIENT THINKS PEOPLE ARE IN THE HOUSE THAT'S NOT THERE. PATIENT HAS BEEN SHAKING A LOT AS WELL AS BEING WEAK. PATIENT HAS BEEN FATIGUE REALLY BAD AS WELL AS DRY MOUTH. PATIENTS  IS WANTING TO SPEAK WITH STAFF ABOUT ADVISE ON WHAT HE NEEDS TO DO FOR HER. STATES HER URINE LOOKS GOOD SO HE DOESN'T THINK ITS A UTI AGAIN.

## 2025-02-10 ENCOUNTER — HOSPITAL ENCOUNTER (INPATIENT)
Facility: HOSPITAL | Age: 75
LOS: 1 days | Discharge: HOME | DRG: 057 | End: 2025-02-14
Attending: EMERGENCY MEDICINE | Admitting: INTERNAL MEDICINE
Payer: MEDICARE

## 2025-02-10 ENCOUNTER — APPOINTMENT (OUTPATIENT)
Dept: CT IMAGING | Facility: HOSPITAL | Age: 75
DRG: 057 | End: 2025-02-10
Payer: MEDICARE

## 2025-02-10 ENCOUNTER — APPOINTMENT (OUTPATIENT)
Dept: GENERAL RADIOLOGY | Facility: HOSPITAL | Age: 75
DRG: 057 | End: 2025-02-10
Payer: MEDICARE

## 2025-02-10 DIAGNOSIS — R41.0 CONFUSION: Primary | ICD-10-CM

## 2025-02-10 DIAGNOSIS — Z87.440 RECENT URINARY TRACT INFECTION: ICD-10-CM

## 2025-02-10 DIAGNOSIS — I69.319 COGNITIVE DEFICIT DUE TO AND NOT CONCURRENT WITH HEMORRHAGIC CEREBROVASCULAR ACCIDENT (CVA): ICD-10-CM

## 2025-02-10 DIAGNOSIS — N17.9 AKI (ACUTE KIDNEY INJURY): ICD-10-CM

## 2025-02-10 DIAGNOSIS — R44.0 AUDITORY HALLUCINATION: ICD-10-CM

## 2025-02-10 PROBLEM — R79.89 ELEVATED SERUM CREATININE: Status: ACTIVE | Noted: 2025-02-10

## 2025-02-10 PROBLEM — S32.020A CLOSED COMPRESSION FRACTURE OF L2 LUMBAR VERTEBRA, INITIAL ENCOUNTER: Status: ACTIVE | Noted: 2025-02-10

## 2025-02-10 PROBLEM — R41.82 ALTERED MENTAL STATUS: Status: ACTIVE | Noted: 2025-02-10

## 2025-02-10 LAB
ALBUMIN SERPL-MCNC: 3.9 G/DL (ref 3.5–5.2)
ALBUMIN/GLOB SERPL: 0.8 G/DL
ALP SERPL-CCNC: 88 U/L (ref 39–117)
ALT SERPL W P-5'-P-CCNC: 7 U/L (ref 1–33)
ANION GAP SERPL CALCULATED.3IONS-SCNC: 16 MMOL/L (ref 5–15)
AST SERPL-CCNC: 15 U/L (ref 1–32)
BACTERIA UR QL AUTO: ABNORMAL /HPF
BASOPHILS # BLD AUTO: 0.04 10*3/MM3 (ref 0–0.2)
BASOPHILS NFR BLD AUTO: 0.3 % (ref 0–1.5)
BILIRUB SERPL-MCNC: 0.5 MG/DL (ref 0–1.2)
BILIRUB UR QL STRIP: NEGATIVE
BUN SERPL-MCNC: 33 MG/DL (ref 8–23)
BUN/CREAT SERPL: 11.4 (ref 7–25)
CALCIUM SPEC-SCNC: 8.9 MG/DL (ref 8.6–10.5)
CHLORIDE SERPL-SCNC: 105 MMOL/L (ref 98–107)
CLARITY UR: CLEAR
CO2 SERPL-SCNC: 20 MMOL/L (ref 22–29)
COLOR UR: YELLOW
CREAT SERPL-MCNC: 2.9 MG/DL (ref 0.57–1)
D-LACTATE SERPL-SCNC: 1.3 MMOL/L (ref 0.5–2)
DEPRECATED RDW RBC AUTO: 49 FL (ref 37–54)
EGFRCR SERPLBLD CKD-EPI 2021: 16.7 ML/MIN/1.73
EOSINOPHIL # BLD AUTO: 0.14 10*3/MM3 (ref 0–0.4)
EOSINOPHIL NFR BLD AUTO: 1.2 % (ref 0.3–6.2)
ERYTHROCYTE [DISTWIDTH] IN BLOOD BY AUTOMATED COUNT: 15.2 % (ref 12.3–15.4)
GEN 5 1HR TROPONIN T REFLEX: 20 NG/L
GLOBULIN UR ELPH-MCNC: 5 GM/DL
GLUCOSE SERPL-MCNC: 141 MG/DL (ref 65–99)
GLUCOSE UR STRIP-MCNC: NEGATIVE MG/DL
HCT VFR BLD AUTO: 44.1 % (ref 34–46.6)
HGB BLD-MCNC: 13.4 G/DL (ref 12–15.9)
HGB UR QL STRIP.AUTO: ABNORMAL
HOLD SPECIMEN: NORMAL
HYALINE CASTS UR QL AUTO: ABNORMAL /LPF
IMM GRANULOCYTES # BLD AUTO: 0.05 10*3/MM3 (ref 0–0.05)
IMM GRANULOCYTES NFR BLD AUTO: 0.4 % (ref 0–0.5)
KETONES UR QL STRIP: NEGATIVE
LEUKOCYTE ESTERASE UR QL STRIP.AUTO: ABNORMAL
LYMPHOCYTES # BLD AUTO: 1.15 10*3/MM3 (ref 0.7–3.1)
LYMPHOCYTES NFR BLD AUTO: 9.8 % (ref 19.6–45.3)
MAGNESIUM SERPL-MCNC: 2 MG/DL (ref 1.6–2.4)
MCH RBC QN AUTO: 26.8 PG (ref 26.6–33)
MCHC RBC AUTO-ENTMCNC: 30.4 G/DL (ref 31.5–35.7)
MCV RBC AUTO: 88.2 FL (ref 79–97)
MONOCYTES # BLD AUTO: 0.76 10*3/MM3 (ref 0.1–0.9)
MONOCYTES NFR BLD AUTO: 6.5 % (ref 5–12)
NEUTROPHILS NFR BLD AUTO: 81.8 % (ref 42.7–76)
NEUTROPHILS NFR BLD AUTO: 9.64 10*3/MM3 (ref 1.7–7)
NITRITE UR QL STRIP: NEGATIVE
NRBC BLD AUTO-RTO: 0 /100 WBC (ref 0–0.2)
NT-PROBNP SERPL-MCNC: 3842 PG/ML (ref 0–900)
PH UR STRIP.AUTO: 5.5 [PH] (ref 5–8)
PLATELET # BLD AUTO: 284 10*3/MM3 (ref 140–450)
PMV BLD AUTO: 11.9 FL (ref 6–12)
POTASSIUM SERPL-SCNC: 3.9 MMOL/L (ref 3.5–5.2)
PROCALCITONIN SERPL-MCNC: 0.11 NG/ML (ref 0–0.25)
PROT SERPL-MCNC: 8.9 G/DL (ref 6–8.5)
PROT UR QL STRIP: NEGATIVE
RBC # BLD AUTO: 5 10*6/MM3 (ref 3.77–5.28)
RBC # UR STRIP: ABNORMAL /HPF
REF LAB TEST METHOD: ABNORMAL
SODIUM SERPL-SCNC: 141 MMOL/L (ref 136–145)
SP GR UR STRIP: 1.01 (ref 1–1.03)
SQUAMOUS #/AREA URNS HPF: ABNORMAL /HPF
TROPONIN T % DELTA: -13
TROPONIN T NUMERIC DELTA: -3 NG/L
TROPONIN T SERPL HS-MCNC: 23 NG/L
UROBILINOGEN UR QL STRIP: ABNORMAL
WBC # UR STRIP: ABNORMAL /HPF
WBC NRBC COR # BLD AUTO: 11.78 10*3/MM3 (ref 3.4–10.8)
WHOLE BLOOD HOLD COAG: NORMAL
WHOLE BLOOD HOLD SPECIMEN: NORMAL

## 2025-02-10 PROCEDURE — 80053 COMPREHEN METABOLIC PANEL: CPT | Performed by: EMERGENCY MEDICINE

## 2025-02-10 PROCEDURE — 74176 CT ABD & PELVIS W/O CONTRAST: CPT

## 2025-02-10 PROCEDURE — 93005 ELECTROCARDIOGRAM TRACING: CPT

## 2025-02-10 PROCEDURE — G0378 HOSPITAL OBSERVATION PER HR: HCPCS

## 2025-02-10 PROCEDURE — 87086 URINE CULTURE/COLONY COUNT: CPT | Performed by: EMERGENCY MEDICINE

## 2025-02-10 PROCEDURE — 83735 ASSAY OF MAGNESIUM: CPT | Performed by: EMERGENCY MEDICINE

## 2025-02-10 PROCEDURE — 70450 CT HEAD/BRAIN W/O DYE: CPT

## 2025-02-10 PROCEDURE — 99222 1ST HOSP IP/OBS MODERATE 55: CPT

## 2025-02-10 PROCEDURE — 36415 COLL VENOUS BLD VENIPUNCTURE: CPT

## 2025-02-10 PROCEDURE — 84145 PROCALCITONIN (PCT): CPT | Performed by: INTERNAL MEDICINE

## 2025-02-10 PROCEDURE — 25810000003 SODIUM CHLORIDE 0.9 % SOLUTION: Performed by: EMERGENCY MEDICINE

## 2025-02-10 PROCEDURE — 99223 1ST HOSP IP/OBS HIGH 75: CPT | Performed by: INTERNAL MEDICINE

## 2025-02-10 PROCEDURE — P9612 CATHETERIZE FOR URINE SPEC: HCPCS

## 2025-02-10 PROCEDURE — 84484 ASSAY OF TROPONIN QUANT: CPT | Performed by: EMERGENCY MEDICINE

## 2025-02-10 PROCEDURE — 85025 COMPLETE CBC W/AUTO DIFF WBC: CPT | Performed by: EMERGENCY MEDICINE

## 2025-02-10 PROCEDURE — 71045 X-RAY EXAM CHEST 1 VIEW: CPT

## 2025-02-10 PROCEDURE — 83605 ASSAY OF LACTIC ACID: CPT | Performed by: INTERNAL MEDICINE

## 2025-02-10 PROCEDURE — 81001 URINALYSIS AUTO W/SCOPE: CPT | Performed by: EMERGENCY MEDICINE

## 2025-02-10 PROCEDURE — 99285 EMERGENCY DEPT VISIT HI MDM: CPT

## 2025-02-10 PROCEDURE — 93005 ELECTROCARDIOGRAM TRACING: CPT | Performed by: EMERGENCY MEDICINE

## 2025-02-10 PROCEDURE — 83880 ASSAY OF NATRIURETIC PEPTIDE: CPT | Performed by: INTERNAL MEDICINE

## 2025-02-10 RX ORDER — SODIUM CHLORIDE 0.9 % (FLUSH) 0.9 %
10 SYRINGE (ML) INJECTION AS NEEDED
Status: DISCONTINUED | OUTPATIENT
Start: 2025-02-10 | End: 2025-02-14 | Stop reason: HOSPADM

## 2025-02-10 RX ADMIN — SODIUM CHLORIDE 1000 ML: 9 INJECTION, SOLUTION INTRAVENOUS at 22:46

## 2025-02-11 ENCOUNTER — APPOINTMENT (OUTPATIENT)
Dept: CARDIOLOGY | Facility: HOSPITAL | Age: 75
DRG: 057 | End: 2025-02-11
Payer: MEDICARE

## 2025-02-11 ENCOUNTER — APPOINTMENT (OUTPATIENT)
Dept: MRI IMAGING | Facility: HOSPITAL | Age: 75
DRG: 057 | End: 2025-02-11
Payer: MEDICARE

## 2025-02-11 LAB
ALBUMIN SERPL-MCNC: 2.8 G/DL (ref 3.5–5.2)
ALBUMIN/GLOB SERPL: 0.8 G/DL
ALP SERPL-CCNC: 69 U/L (ref 39–117)
ALT SERPL W P-5'-P-CCNC: 7 U/L (ref 1–33)
ANION GAP SERPL CALCULATED.3IONS-SCNC: 10 MMOL/L (ref 5–15)
AST SERPL-CCNC: 18 U/L (ref 1–32)
AV MEAN PRESS GRAD SYS DOP V1V2: 3.5 MMHG
AV VMAX SYS DOP: 124 CM/SEC
BASOPHILS # BLD AUTO: 0.04 10*3/MM3 (ref 0–0.2)
BASOPHILS NFR BLD AUTO: 0.4 % (ref 0–1.5)
BH CV ECHO MEAS - AO MAX PG: 6.2 MMHG
BH CV ECHO MEAS - AO ROOT DIAM: 2.5 CM
BH CV ECHO MEAS - AO V2 VTI: 26.7 CM
BH CV ECHO MEAS - AVA(I,D): 1.87 CM2
BH CV ECHO MEAS - EDV(CUBED): 91.1 ML
BH CV ECHO MEAS - EDV(MOD-SP2): 52.3 ML
BH CV ECHO MEAS - EDV(MOD-SP4): 73.2 ML
BH CV ECHO MEAS - EF(MOD-SP2): 50.1 %
BH CV ECHO MEAS - EF(MOD-SP4): 52 %
BH CV ECHO MEAS - ESV(CUBED): 19.7 ML
BH CV ECHO MEAS - ESV(MOD-SP2): 26.1 ML
BH CV ECHO MEAS - ESV(MOD-SP4): 35.1 ML
BH CV ECHO MEAS - FS: 40 %
BH CV ECHO MEAS - IVS/LVPW: 0.9 CM
BH CV ECHO MEAS - IVSD: 0.9 CM
BH CV ECHO MEAS - LA DIMENSION: 4.6 CM
BH CV ECHO MEAS - LAT PEAK E' VEL: 9.3 CM/SEC
BH CV ECHO MEAS - LV MASS(C)D: 142.9 GRAMS
BH CV ECHO MEAS - LV MAX PG: 2.7 MMHG
BH CV ECHO MEAS - LV MEAN PG: 1 MMHG
BH CV ECHO MEAS - LV V1 MAX: 82.5 CM/SEC
BH CV ECHO MEAS - LV V1 VTI: 15.9 CM
BH CV ECHO MEAS - LVIDD: 4.5 CM
BH CV ECHO MEAS - LVIDS: 2.7 CM
BH CV ECHO MEAS - LVOT AREA: 3.1 CM2
BH CV ECHO MEAS - LVOT DIAM: 2 CM
BH CV ECHO MEAS - LVPWD: 1 CM
BH CV ECHO MEAS - MED PEAK E' VEL: 14.3 CM/SEC
BH CV ECHO MEAS - MV DEC SLOPE: 227 CM/SEC2
BH CV ECHO MEAS - MV DEC TIME: 0.67 SEC
BH CV ECHO MEAS - MV E MAX VEL: 191 CM/SEC
BH CV ECHO MEAS - MV MAX PG: 15 MMHG
BH CV ECHO MEAS - MV MEAN PG: 7 MMHG
BH CV ECHO MEAS - MV P1/2T: 242.6 MSEC
BH CV ECHO MEAS - MV V2 VTI: 63.7 CM
BH CV ECHO MEAS - MVA(P1/2T): 0.91 CM2
BH CV ECHO MEAS - MVA(VTI): 0.78 CM2
BH CV ECHO MEAS - PA ACC TIME: 0.08 SEC
BH CV ECHO MEAS - PI END-D VEL: 144 CM/SEC
BH CV ECHO MEAS - RAP SYSTOLE: 3 MMHG
BH CV ECHO MEAS - RVSP: 23.3 MMHG
BH CV ECHO MEAS - SV(LVOT): 50 ML
BH CV ECHO MEAS - SV(MOD-SP2): 26.2 ML
BH CV ECHO MEAS - SV(MOD-SP4): 38.1 ML
BH CV ECHO MEAS - TAPSE (>1.6): 1.72 CM
BH CV ECHO MEAS - TR MAX PG: 20.3 MMHG
BH CV ECHO MEAS - TR MAX VEL: 225 CM/SEC
BH CV ECHO MEASUREMENTS AVERAGE E/E' RATIO: 16.19
BH CV XLRA - RV BASE: 4.7 CM
BH CV XLRA - RV LENGTH: 6.1 CM
BH CV XLRA - RV MID: 3.5 CM
BH CV XLRA - TDI S': 12.7 CM/SEC
BILIRUB SERPL-MCNC: 0.4 MG/DL (ref 0–1.2)
BUN SERPL-MCNC: 33 MG/DL (ref 8–23)
BUN/CREAT SERPL: 11.9 (ref 7–25)
CALCIUM SPEC-SCNC: 8.4 MG/DL (ref 8.6–10.5)
CHLORIDE SERPL-SCNC: 109 MMOL/L (ref 98–107)
CHOLEST SERPL-MCNC: 124 MG/DL (ref 0–200)
CO2 SERPL-SCNC: 20 MMOL/L (ref 22–29)
CREAT SERPL-MCNC: 2.78 MG/DL (ref 0.57–1)
DEPRECATED RDW RBC AUTO: 49.5 FL (ref 37–54)
EGFRCR SERPLBLD CKD-EPI 2021: 17.6 ML/MIN/1.73
EOSINOPHIL # BLD AUTO: 0.18 10*3/MM3 (ref 0–0.4)
EOSINOPHIL NFR BLD AUTO: 1.7 % (ref 0.3–6.2)
ERYTHROCYTE [DISTWIDTH] IN BLOOD BY AUTOMATED COUNT: 15.1 % (ref 12.3–15.4)
GLOBULIN UR ELPH-MCNC: 3.6 GM/DL
GLUCOSE BLDC GLUCOMTR-MCNC: 84 MG/DL (ref 70–130)
GLUCOSE BLDC GLUCOMTR-MCNC: 87 MG/DL (ref 70–130)
GLUCOSE BLDC GLUCOMTR-MCNC: 95 MG/DL (ref 70–130)
GLUCOSE SERPL-MCNC: 95 MG/DL (ref 65–99)
HBA1C MFR BLD: 5.2 % (ref 4.8–5.6)
HCT VFR BLD AUTO: 36.8 % (ref 34–46.6)
HDLC SERPL-MCNC: 40 MG/DL (ref 40–60)
HGB BLD-MCNC: 11.1 G/DL (ref 12–15.9)
IMM GRANULOCYTES # BLD AUTO: 0.03 10*3/MM3 (ref 0–0.05)
IMM GRANULOCYTES NFR BLD AUTO: 0.3 % (ref 0–0.5)
LDLC SERPL CALC-MCNC: 67 MG/DL (ref 0–100)
LDLC/HDLC SERPL: 1.66 {RATIO}
LEFT ATRIUM VOLUME INDEX: 40.2 ML/M2
LV EF 2D ECHO EST: 50 %
LV EF BIPLANE MOD: 51.3 %
LYMPHOCYTES # BLD AUTO: 1.81 10*3/MM3 (ref 0.7–3.1)
LYMPHOCYTES NFR BLD AUTO: 16.9 % (ref 19.6–45.3)
MAGNESIUM SERPL-MCNC: 1.7 MG/DL (ref 1.6–2.4)
MCH RBC QN AUTO: 26.9 PG (ref 26.6–33)
MCHC RBC AUTO-ENTMCNC: 30.2 G/DL (ref 31.5–35.7)
MCV RBC AUTO: 89.3 FL (ref 79–97)
MONOCYTES # BLD AUTO: 1.03 10*3/MM3 (ref 0.1–0.9)
MONOCYTES NFR BLD AUTO: 9.6 % (ref 5–12)
NEUTROPHILS NFR BLD AUTO: 7.64 10*3/MM3 (ref 1.7–7)
NEUTROPHILS NFR BLD AUTO: 71.1 % (ref 42.7–76)
NRBC BLD AUTO-RTO: 0 /100 WBC (ref 0–0.2)
PHOSPHATE SERPL-MCNC: 4.5 MG/DL (ref 2.5–4.5)
PLATELET # BLD AUTO: 215 10*3/MM3 (ref 140–450)
PMV BLD AUTO: 12.1 FL (ref 6–12)
POTASSIUM SERPL-SCNC: 4.2 MMOL/L (ref 3.5–5.2)
PROT SERPL-MCNC: 6.4 G/DL (ref 6–8.5)
QT INTERVAL: 386 MS
QTC INTERVAL: 461 MS
RBC # BLD AUTO: 4.12 10*6/MM3 (ref 3.77–5.28)
SODIUM SERPL-SCNC: 139 MMOL/L (ref 136–145)
TRIGL SERPL-MCNC: 88 MG/DL (ref 0–150)
TSH SERPL DL<=0.05 MIU/L-ACNC: 3.25 UIU/ML (ref 0.27–4.2)
VLDLC SERPL-MCNC: 17 MG/DL (ref 5–40)
WBC NRBC COR # BLD AUTO: 10.73 10*3/MM3 (ref 3.4–10.8)

## 2025-02-11 PROCEDURE — 84443 ASSAY THYROID STIM HORMONE: CPT | Performed by: INTERNAL MEDICINE

## 2025-02-11 PROCEDURE — 92523 SPEECH SOUND LANG COMPREHEN: CPT

## 2025-02-11 PROCEDURE — 70551 MRI BRAIN STEM W/O DYE: CPT

## 2025-02-11 PROCEDURE — 80053 COMPREHEN METABOLIC PANEL: CPT | Performed by: INTERNAL MEDICINE

## 2025-02-11 PROCEDURE — 82948 REAGENT STRIP/BLOOD GLUCOSE: CPT

## 2025-02-11 PROCEDURE — G0378 HOSPITAL OBSERVATION PER HR: HCPCS

## 2025-02-11 PROCEDURE — 99232 SBSQ HOSP IP/OBS MODERATE 35: CPT | Performed by: INTERNAL MEDICINE

## 2025-02-11 PROCEDURE — 83735 ASSAY OF MAGNESIUM: CPT | Performed by: INTERNAL MEDICINE

## 2025-02-11 PROCEDURE — 93306 TTE W/DOPPLER COMPLETE: CPT

## 2025-02-11 PROCEDURE — 80061 LIPID PANEL: CPT | Performed by: INTERNAL MEDICINE

## 2025-02-11 PROCEDURE — 85025 COMPLETE CBC W/AUTO DIFF WBC: CPT | Performed by: INTERNAL MEDICINE

## 2025-02-11 PROCEDURE — 93306 TTE W/DOPPLER COMPLETE: CPT | Performed by: INTERNAL MEDICINE

## 2025-02-11 PROCEDURE — 84100 ASSAY OF PHOSPHORUS: CPT | Performed by: INTERNAL MEDICINE

## 2025-02-11 PROCEDURE — 99233 SBSQ HOSP IP/OBS HIGH 50: CPT | Performed by: STUDENT IN AN ORGANIZED HEALTH CARE EDUCATION/TRAINING PROGRAM

## 2025-02-11 PROCEDURE — 83036 HEMOGLOBIN GLYCOSYLATED A1C: CPT

## 2025-02-11 RX ORDER — POLYETHYLENE GLYCOL 3350 17 G/17G
17 POWDER, FOR SOLUTION ORAL DAILY PRN
Status: DISCONTINUED | OUTPATIENT
Start: 2025-02-11 | End: 2025-02-14 | Stop reason: HOSPADM

## 2025-02-11 RX ORDER — AMOXICILLIN 250 MG
2 CAPSULE ORAL 2 TIMES DAILY PRN
Status: DISCONTINUED | OUTPATIENT
Start: 2025-02-11 | End: 2025-02-14 | Stop reason: HOSPADM

## 2025-02-11 RX ORDER — SODIUM CHLORIDE 0.9 % (FLUSH) 0.9 %
10 SYRINGE (ML) INJECTION AS NEEDED
Status: DISCONTINUED | OUTPATIENT
Start: 2025-02-11 | End: 2025-02-14 | Stop reason: HOSPADM

## 2025-02-11 RX ORDER — SODIUM CHLORIDE 0.9 % (FLUSH) 0.9 %
10 SYRINGE (ML) INJECTION EVERY 12 HOURS SCHEDULED
Status: DISCONTINUED | OUTPATIENT
Start: 2025-02-11 | End: 2025-02-14 | Stop reason: HOSPADM

## 2025-02-11 RX ORDER — SODIUM CHLORIDE 9 MG/ML
40 INJECTION, SOLUTION INTRAVENOUS AS NEEDED
Status: DISCONTINUED | OUTPATIENT
Start: 2025-02-11 | End: 2025-02-14 | Stop reason: HOSPADM

## 2025-02-11 RX ORDER — ACETAMINOPHEN 650 MG/1
650 SUPPOSITORY RECTAL EVERY 4 HOURS PRN
Status: DISCONTINUED | OUTPATIENT
Start: 2025-02-11 | End: 2025-02-14 | Stop reason: HOSPADM

## 2025-02-11 RX ORDER — ALBUTEROL SULFATE 90 UG/1
2 INHALANT RESPIRATORY (INHALATION) EVERY 4 HOURS PRN
Status: DISCONTINUED | OUTPATIENT
Start: 2025-02-11 | End: 2025-02-11 | Stop reason: SDUPTHER

## 2025-02-11 RX ORDER — ALBUTEROL SULFATE 1.25 MG/3ML
1 SOLUTION RESPIRATORY (INHALATION) EVERY 6 HOURS PRN
Status: DISCONTINUED | OUTPATIENT
Start: 2025-02-11 | End: 2025-02-14 | Stop reason: HOSPADM

## 2025-02-11 RX ORDER — LEVOTHYROXINE SODIUM 137 UG/1
137 TABLET ORAL DAILY
Status: DISCONTINUED | OUTPATIENT
Start: 2025-02-11 | End: 2025-02-14 | Stop reason: HOSPADM

## 2025-02-11 RX ORDER — NITROGLYCERIN 0.4 MG/1
0.4 TABLET SUBLINGUAL
Status: DISCONTINUED | OUTPATIENT
Start: 2025-02-11 | End: 2025-02-14 | Stop reason: HOSPADM

## 2025-02-11 RX ORDER — DILTIAZEM HYDROCHLORIDE 240 MG/1
240 CAPSULE, COATED, EXTENDED RELEASE ORAL
Status: DISCONTINUED | OUTPATIENT
Start: 2025-02-11 | End: 2025-02-14 | Stop reason: HOSPADM

## 2025-02-11 RX ORDER — TOPIRAMATE 100 MG/1
100 TABLET, FILM COATED ORAL DAILY
Status: DISCONTINUED | OUTPATIENT
Start: 2025-02-11 | End: 2025-02-14 | Stop reason: HOSPADM

## 2025-02-11 RX ORDER — ATORVASTATIN CALCIUM 10 MG/1
10 TABLET, FILM COATED ORAL NIGHTLY
Status: DISCONTINUED | OUTPATIENT
Start: 2025-02-11 | End: 2025-02-14 | Stop reason: HOSPADM

## 2025-02-11 RX ORDER — PREGABALIN 100 MG/1
300 CAPSULE ORAL 2 TIMES DAILY
Status: DISCONTINUED | OUTPATIENT
Start: 2025-02-11 | End: 2025-02-14 | Stop reason: HOSPADM

## 2025-02-11 RX ORDER — PANTOPRAZOLE SODIUM 40 MG/1
40 TABLET, DELAYED RELEASE ORAL
Status: DISCONTINUED | OUTPATIENT
Start: 2025-02-11 | End: 2025-02-14 | Stop reason: HOSPADM

## 2025-02-11 RX ORDER — ACETAMINOPHEN 325 MG/1
650 TABLET ORAL EVERY 4 HOURS PRN
Status: DISCONTINUED | OUTPATIENT
Start: 2025-02-11 | End: 2025-02-14 | Stop reason: HOSPADM

## 2025-02-11 RX ORDER — ACETAMINOPHEN 160 MG/5ML
650 SOLUTION ORAL EVERY 4 HOURS PRN
Status: DISCONTINUED | OUTPATIENT
Start: 2025-02-11 | End: 2025-02-14 | Stop reason: HOSPADM

## 2025-02-11 RX ORDER — BISACODYL 10 MG
10 SUPPOSITORY, RECTAL RECTAL DAILY PRN
Status: DISCONTINUED | OUTPATIENT
Start: 2025-02-11 | End: 2025-02-14 | Stop reason: HOSPADM

## 2025-02-11 RX ORDER — BISACODYL 5 MG/1
5 TABLET, DELAYED RELEASE ORAL DAILY PRN
Status: DISCONTINUED | OUTPATIENT
Start: 2025-02-11 | End: 2025-02-14 | Stop reason: HOSPADM

## 2025-02-11 RX ORDER — ASPIRIN 300 MG/1
300 SUPPOSITORY RECTAL DAILY
Status: DISCONTINUED | OUTPATIENT
Start: 2025-02-11 | End: 2025-02-14 | Stop reason: HOSPADM

## 2025-02-11 RX ORDER — BUDESONIDE AND FORMOTEROL FUMARATE DIHYDRATE 160; 4.5 UG/1; UG/1
2 AEROSOL RESPIRATORY (INHALATION)
Status: DISCONTINUED | OUTPATIENT
Start: 2025-02-11 | End: 2025-02-14 | Stop reason: HOSPADM

## 2025-02-11 RX ORDER — PREGABALIN 150 MG/1
300 CAPSULE ORAL 2 TIMES DAILY
Status: DISCONTINUED | OUTPATIENT
Start: 2025-02-11 | End: 2025-02-11

## 2025-02-11 RX ORDER — ASPIRIN 81 MG/1
81 TABLET, CHEWABLE ORAL DAILY
Status: DISCONTINUED | OUTPATIENT
Start: 2025-02-11 | End: 2025-02-14 | Stop reason: HOSPADM

## 2025-02-11 RX ADMIN — Medication 12.5 MG: at 21:40

## 2025-02-11 RX ADMIN — ASPIRIN 81 MG CHEWABLE TABLET 81 MG: 81 TABLET CHEWABLE at 01:37

## 2025-02-11 RX ADMIN — Medication 10 ML: at 01:41

## 2025-02-11 RX ADMIN — Medication 10 ML: at 21:45

## 2025-02-11 RX ADMIN — DILTIAZEM HYDROCHLORIDE 240 MG: 240 CAPSULE, COATED, EXTENDED RELEASE ORAL at 08:40

## 2025-02-11 RX ADMIN — ATORVASTATIN CALCIUM 10 MG: 10 TABLET, FILM COATED ORAL at 03:23

## 2025-02-11 RX ADMIN — METOPROLOL TARTRATE 12.5 MG: 25 TABLET, FILM COATED ORAL at 01:37

## 2025-02-11 RX ADMIN — PANTOPRAZOLE SODIUM 40 MG: 40 TABLET, DELAYED RELEASE ORAL at 06:36

## 2025-02-11 RX ADMIN — LEVOTHYROXINE SODIUM 137 MCG: 0.14 TABLET ORAL at 06:37

## 2025-02-11 RX ADMIN — Medication 10 ML: at 08:43

## 2025-02-11 RX ADMIN — PREGABALIN 300 MG: 150 CAPSULE ORAL at 01:37

## 2025-02-11 RX ADMIN — ATORVASTATIN CALCIUM 10 MG: 10 TABLET, FILM COATED ORAL at 21:41

## 2025-02-11 RX ADMIN — PREGABALIN 300 MG: 100 CAPSULE ORAL at 21:41

## 2025-02-11 RX ADMIN — TOPIRAMATE 100 MG: 100 TABLET, FILM COATED ORAL at 12:17

## 2025-02-11 NOTE — PLAN OF CARE
Goal Outcome Evaluation:                   Anticipated Discharge Disposition (SLP): unknown, other (see comments) (pending further w/u)

## 2025-02-11 NOTE — ED NOTES
Vickie Joshi    Nursing Report ED to Floor:  Mental status: oriented to self and situation  Ambulatory status: wheelchair bound  Oxygen Therapy:  room air  Cardiac Rhythm: a fib  Admitted from: home  Safety Concerns:  fall risk  Precautions: none  Social Issues: none  ED Room #:  22    ED Nurse Phone Extension - [phone] or may call [phone].      HPI:   Chief Complaint   Patient presents with    Altered Mental Status       Past Medical History:  Past Medical History:   Diagnosis Date    Arthritis     Asthma     Atrial fibrillation     Cardiac disorder     Closed fracture of neck of left femur 2/27/2017    Disease of thyroid gland     Fibromyalgia     GERD (gastroesophageal reflux disease)     Hyperlipidemia     Hypertension     Migraine     Neuropathy     Stroke         Past Surgical History:  Past Surgical History:   Procedure Laterality Date    CHOLECYSTECTOMY      HEMORRHOIDECTOMY N/A 7/23/2021    Procedure: HEMORRHOIDECTOMY;  Surgeon: Adan Meadows MD;  Location:  [facility];  Service: General;  Laterality: N/A;    HIP PERCUTANEOUS PINNING Left 2/28/2017    Procedure: LEFT HIP PERCUTANEOUS PINNING FEMORAL NECK;  Surgeon: Ezra Barlow MD;  Location:  [facility];  Service:     HYSTERECTOMY      ID CLTX HIP DISLOCATION TRAUMATIC W/O ANESTHESIA Left 2/28/2017    Procedure: LEFT HIP CLOSED REDUCTION;  Surgeon: Ezra Barlow MD;  Location:  [facility];  Service: Orthopedics    SHOULDER SURGERY          Admitting Doctor:   Lucy Crouch MD    Consulting Provider(s):  Consults       No orders found for last 30 day(s).             Admitting Diagnosis:   The primary encounter diagnosis was Confusion. Diagnoses of Auditory hallucination, Recent urinary tract infection, and BARBARA (acute kidney injury) were also pertinent to this visit.    Most Recent Vitals:   Vitals:    02/10/25 2031 02/10/25 2130 02/10/25 2230 02/10/25 2330   BP:  106/54 103/67 106/54   Pulse: 73 56 71 64   Resp:   16 18   Temp:       SpO2: 98% 95% 98% 99%    Weight:       Height:           Active LDAs/IV Access:   Lines, Drains & Airways       Active LDAs       Name Placement date Placement time Site Days    Peripheral IV 02/10/25 2246 Posterior;Right Hand 02/10/25  2246  Hand  less than 1                    Labs (abnormal labs have a star):   Labs Reviewed   COMPREHENSIVE METABOLIC PANEL - Abnormal; Notable for the following components:       Result Value    Glucose 141 (*)     BUN 33 (*)     Creatinine 2.90 (*)     CO2 20.0 (*)     Total Protein 8.9 (*)     Anion Gap 16.0 (*)     eGFR 16.7 (*)     All other components within normal limits    Narrative:     GFR Categories in Chronic Kidney Disease (CKD)      GFR Category          GFR (mL/min/1.73)    Interpretation  G1                     90 or greater         Normal or high (1)  G2                      60-89                Mild decrease (1)  G3a                   45-59                Mild to moderate decrease  G3b                   30-44                Moderate to severe decrease  G4                    15-29                Severe decrease  G5                    14 or less           Kidney failure          (1)In the absence of evidence of kidney disease, neither GFR category G1 or G2 fulfill the criteria for CKD.    eGFR calculation 2021 CKD-EPI creatinine equation, which does not include race as a factor   TROPONIN - Abnormal; Notable for the following components:    HS Troponin T 23 (*)     All other components within normal limits    Narrative:     High Sensitive Troponin T Reference Range:  <14.0 ng/L- Negative Female for AMI  <22.0 ng/L- Negative Male for AMI  >=14 - Abnormal Female indicating possible myocardial injury.  >=22 - Abnormal Male indicating possible myocardial injury.   Clinicians would have to utilize clinical acumen, EKG, Troponin, and serial changes to determine if it is an Acute Myocardial Infarction or myocardial injury due to an underlying chronic condition.        CBC WITH AUTO DIFFERENTIAL -  Abnormal; Notable for the following components:    WBC 11.78 (*)     MCHC 30.4 (*)     Neutrophil % 81.8 (*)     Lymphocyte % 9.8 (*)     Neutrophils, Absolute 9.64 (*)     All other components within normal limits   URINALYSIS W/ CULTURE IF INDICATED - Abnormal; Notable for the following components:    Blood, UA Trace (*)     Leuk Esterase, UA Moderate (2+) (*)     All other components within normal limits    Narrative:     In absence of clinical symptoms, the presence of pyuria, bacteria, and/or nitrites on the urinalysis result does not correlate with infection.   HIGH SENSITIVITIY TROPONIN T 1HR - Abnormal; Notable for the following components:    HS Troponin T 20 (*)     All other components within normal limits    Narrative:     High Sensitive Troponin T Reference Range:  <14.0 ng/L- Negative Female for AMI  <22.0 ng/L- Negative Male for AMI  >=14 - Abnormal Female indicating possible myocardial injury.  >=22 - Abnormal Male indicating possible myocardial injury.   Clinicians would have to utilize clinical acumen, EKG, Troponin, and serial changes to determine if it is an Acute Myocardial Infarction or myocardial injury due to an underlying chronic condition.        URINALYSIS, MICROSCOPIC ONLY - Abnormal; Notable for the following components:    WBC, UA 11-20 (*)     Squamous Epithelial Cells, UA 13-20 (*)     All other components within normal limits   PROBNP (REFERENCE) - Abnormal; Notable for the following components:    proBNP 3,842.0 (*)     All other components within normal limits    Narrative:     This assay is used as an aid in the diagnosis of individuals suspected of having heart failure. It can be used as an aid in the diagnosis of acute decompensated heart failure (ADHF) in patients presenting with signs and symptoms of ADHF to the emergency department (ED). In addition, NT-proBNP of <300 pg/mL indicates ADHF is not likely.    Age Range Result Interpretation  NT-proBNP Concentration  "(pg/mL:      <50             Positive            >450                   Gray                 300-450                    Negative             <300    50-75           Positive            >900                  Gray                300-900                  Negative            <300      >75             Positive            >1800                  Gray                300-1800                  Negative            <300   MAGNESIUM - Normal   PROCALCITONIN - Normal    Narrative:     As a Marker for Sepsis (Non-Neonates):    1. <0.5 ng/mL represents a low risk of severe sepsis and/or septic shock.  2. >2 ng/mL represents a high risk of severe sepsis and/or septic shock.    As a Marker for Lower Respiratory Tract Infections that require antibiotic therapy:    PCT on Admission    Antibiotic Therapy       6-12 Hrs later    >0.5                Strongly Recommended  >0.25 - <0.5        Recommended   0.1 - 0.25          Discouraged              Remeasure/reassess PCT  <0.1                Strongly Discouraged     Remeasure/reassess PCT    As 28 day mortality risk marker: \"Change in Procalcitonin Result\" (>80% or <=80%) if Day 0 (or Day 1) and Day 4 values are available. Refer to http://www.[facility]-pct-calculator.com    Change in PCT <=80%  A decrease of PCT levels below or equal to 80% defines a positive change in PCT test result representing a higher risk for 28-day all-cause mortality of patients diagnosed with severe sepsis for septic shock.    Change in PCT >80%  A decrease of PCT levels of more than 80% defines a negative change in PCT result representing a lower risk for 28-day all-cause mortality of patients diagnosed with severe sepsis or septic shock.      LACTIC ACID, PLASMA - Normal   URINE CULTURE   RAINBOW DRAW    Narrative:     The following orders were created for panel order [place] Draw.  Procedure                               Abnormality         Status                     ---------                               " -----------         ------                     Green Top (Gel)[[id number]]                                  Final result               Lavender Top[[id number]]                                     Final result               Gold Top - SST[[id number]]                                   Final result               Matos Top[[id number]]                                         Final result               Light Blue Top[[id number]]                                   Final result                 Please view results for these tests on the individual orders.   HEMOGLOBIN A1C   CBC WITH AUTO DIFFERENTIAL   COMPREHENSIVE METABOLIC PANEL   MAGNESIUM   PHOSPHORUS   LIPID PANEL   TSH   POCT GLUCOSE FINGERSTICK   POCT GLUCOSE FINGERSTICK   POCT GLUCOSE FINGERSTICK   POCT GLUCOSE FINGERSTICK   POCT GLUCOSE FINGERSTICK   CBC AND DIFFERENTIAL    Narrative:     The following orders were created for panel order CBC & Differential.  Procedure                               Abnormality         Status                     ---------                               -----------         ------                     CBC Auto Differential[[id number]]        Abnormal            Final result                 Please view results for these tests on the individual orders.   GREEN TOP   LAVENDER TOP   GOLD TOP - SST   GRAY TOP   LIGHT BLUE TOP       Meds Given in ED:   Medications   sodium chloride 0.9 % flush 10 mL (has no administration in time range)   sodium chloride 0.9 % flush 10 mL (has no administration in time range)   sodium chloride 0.9 % flush 10 mL (has no administration in time range)   sodium chloride 0.9 % infusion 40 mL (has no administration in time range)   atorvastatin (LIPITOR) tablet 10 mg (has no administration in time range)   albuterol (PROVENTIL) nebulizer solution 0.042% 1.25 mg/3mL (has no administration in time range)   dilTIAZem CD (CARDIZEM CD) 24 hr capsule 240 mg (has no administration in time range)   budesonide-formoterol (SYMBICORT)  160-4.5 MCG/ACT inhaler 2 puff (has no administration in time range)   levothyroxine (SYNTHROID, LEVOTHROID) tablet 137 mcg (has no administration in time range)   metoprolol tartrate (LOPRESSOR) tablet 12.5 mg (has no administration in time range)   pantoprazole (PROTONIX) EC tablet 40 mg (has no administration in time range)   pregabalin (LYRICA) capsule 300 mg (has no administration in time range)   topiramate (TOPAMAX) tablet 100 mg (has no administration in time range)   sodium chloride 0.9 % flush 10 mL (has no administration in time range)   sodium chloride 0.9 % flush 10 mL (has no administration in time range)   sodium chloride 0.9 % infusion 40 mL (has no administration in time range)   nitroglycerin (NITROSTAT) SL tablet 0.4 mg (has no administration in time range)   Potassium Replacement - Follow Nurse / BPA Driven Protocol (has no administration in time range)   Magnesium Standard Dose Replacement - Follow Nurse / BPA Driven Protocol (has no administration in time range)   Phosphorus Replacement - Follow Nurse / BPA Driven Protocol (has no administration in time range)   Calcium Replacement - Follow Nurse / BPA Driven Protocol (has no administration in time range)   acetaminophen (TYLENOL) tablet 650 mg (has no administration in time range)     Or   acetaminophen (TYLENOL) 160 MG/5ML oral solution 650 mg (has no administration in time range)     Or   acetaminophen (TYLENOL) suppository 650 mg (has no administration in time range)   sennosides-docusate (PERICOLACE) 8.6-50 MG per tablet 2 tablet (has no administration in time range)     And   polyethylene glycol (MIRALAX) packet 17 g (has no administration in time range)     And   bisacodyl (DULCOLAX) EC tablet 5 mg (has no administration in time range)     And   bisacodyl (DULCOLAX) suppository 10 mg (has no administration in time range)   aspirin chewable tablet 81 mg (has no administration in time range)     Or   aspirin suppository 300 mg (has no  administration in time range)   sodium chloride 0.9 % bolus 1,000 mL (0 mL Intravenous Stopped 2/11/25 0007)           Last NIH score:  Interval: baseline  1a. Level of Consciousness: 0-->Alert, keenly responsive  1b. LOC Questions: 0-->Answers both questions correctly  1c. LOC Commands: 0-->Performs both tasks correctly  2. Best Gaze: 0-->Normal  3. Visual: 1-->Partial hemianopia  4. Facial Palsy: 2-->Partial paralysis (total or near-total paralysis of lower face)  5a. Motor Arm, Left: 3-->No effort against gravity, limb falls  5b. Motor Arm, Right: 1-->Drift, limb holds 90 (or 45) degrees, but drifts down before full 10 secs, does not hit bed or other support  6a. Motor Leg, Left: 2-->Some effort against gravity, leg falls to bed by 5 secs, but has some effort against gravity  6b. Motor Leg, Right: 1-->Drift, leg falls by the end of the 5-sec period but does not hit bed  7. Limb Ataxia: 0-->Absent  8. Sensory: 0-->Normal, no sensory loss  9. Best Language: 1-->Mild-to-moderate aphasia, some obvious loss of fluency or facility of comprehension, without significant limitation on ideas expressed or form of expression. Reduction of speech and/or comprehension, however, makes conversation. . . (see row details)  10. Dysarthria: 1-->Mild-to-moderate dysarthria, patient slurs at least some words and, at worst, can be understood with some difficulty  11. Extinction and Inattention (formerly Neglect): 0-->No abnormality    Total (NIH Stroke Scale): 12     Dysphagia screening results:        [place] Coma Scale:  No data recorded     CIWA:        Restraint Type:            Isolation Status:  No active isolations

## 2025-02-11 NOTE — CASE MANAGEMENT/SOCIAL WORK
Discharge Planning Assessment  [facility]     Patient Name: Vickie Joshi  MRN: [id number]  Today's Date: 2/11/2025    Admit Date: 2/10/2025    Plan: Home with    Discharge Needs Assessment       Row Name 02/11/25 1506       Living Environment    People in Home spouse    Current Living Arrangements home    Potentially Unsafe Housing Conditions none    Primary Care Provided by self;spouse/significant other    Provides Primary Care For no one, unable/limited ability to care for self    Family Caregiver if Needed spouse    Quality of Family Relationships supportive    Able to Return to Prior Arrangements yes       Resource/Environmental Concerns    Resource/Environmental Concerns none    Transportation Concerns none       Transition Planning    Patient/Family Anticipates Transition to home with family    Patient/Family Anticipated Services at Transition none    Transportation Anticipated family or friend will provide       Discharge Needs Assessment    Readmission Within the Last 30 Days no previous admission in last 30 days    Equipment Currently Used at Home grab bar;lift device;wheelchair;slide board;walker, rolling;shower chair;power chair,(recliner lift)    Concerns to be Addressed no discharge needs identified    Equipment Needed After Discharge none    Provided Post Acute Provider List? N/A    N/A Provider List Comment pt's  did not anticipate any discharge needs at this time    Provided Post Acute Provider Quality & Resource List? N/A                   Discharge Plan       Row Name 02/11/25 1516       Plan    Plan Home with     Patient/Family in Agreement with Plan yes    Plan Comments MSW spoke with pt's  at bedside. Pt lives with her  in [facility]. Pt's PCP is Dav Bryan. Pt's  reports that pt has been to [facility] in the past and has had [facility] home health as well as done a lot of outpatient physical therapy. Pt's  reports he has a  wheelchair van so he provides transportation for pt. Pt's  reports that plan will be home with him when ready.    Final Discharge Disposition Code 01 - home or self-care                  Continued Care and Services - Admitted Since 2/10/2025    No active coordination exists for this encounter.          Demographic Summary       Row Name 02/11/25 1506       General Information    Reason for Consult discharge planning                   Functional Status       Row Name 02/11/25 1506       Functional Status, IADL    Medications independent    Meal Preparation assistive equipment and person    Housekeeping assistive equipment and person    Laundry assistive equipment and person    Shopping assistive equipment and person                   Psychosocial    No documentation.                  Abuse/Neglect    No documentation.                  Legal    No documentation.                  Substance Abuse    No documentation.                  Patient Forms    No documentation.                     CARLEEN Richardson

## 2025-02-11 NOTE — CONSULTS
Chart review for diabetes educator consult.     At the time of this review patient A1c is 5.2% , they have no noted history of diabetes and no home medications noted for treatment of diabetes. At this time we do not feel the patient would benefit from diabetes education. Thank you for this consult, should patient needs change please re consult us.

## 2025-02-11 NOTE — CONSULTS
Stroke Consult Note    Patient Name: Vcikie Joshi   MRN: [id number]  Age: 74 y.o.  Sex: female  : 1950    Primary Care Physician: Dav Bryan MD  Referring Physician:  Dr. Crouch    TIME STROKE TEAM CALLED: 0 EST     TIME PATIENT SEEN: 0 EST    Handedness: Right  Race: White    Chief Complaint/Reason for Consultation: AMS, left facial droop    HPI: Ms. Joshi is a 74-year-old female with PMH of arthritis, asthma, A-fib (on Eliquis), HTN, HLD, hypothyroidism, migraine and prior CVA.  She presents to [facility] ED with hallucinations and worsening left facial droop.  Patient has baseline residual left-sided weakness from prior stroke.   states patient has not felt well since being discharged from hospital in 2025.  Patient's  noticed on Friday she was having hallucinations and facial droop was more pronounced.  Patient refused to go to the hospital over the weekend.  Mr. Joshi brought patient into [facility] ED today for further evaluation of presenting symptoms.    Initial NIH 12.  Blood pressure 106/54.  On exam patient is able to answer questions appropriately and follow one-step commands.  Decreased left peripheral vision.  No headache, nausea, falls or recent trauma.  Denies any sensory deficits at this time.  Left facial droop with mild expressive aphasia and mild dysarthria noted during conversation.  Strength left upper extremity 1/5.  Strength left lower extremity 3/5.  Strength right upper extremity and right lower extremity 3/5.  Patient is wheelchair-bound at baseline.  Non-smoker and no EtOH use.  Takes prescribed medications routinely.    Last Known Normal Date/Time: 2025 EST     Review of Systems   Constitutional:  Negative for fever.   HENT:  Negative for trouble swallowing and voice change.    Eyes:  Negative for visual disturbance.   Respiratory:  Negative for shortness of breath.    Cardiovascular:  Negative for chest pain.   Gastrointestinal:  Negative for  abdominal pain and nausea.   Neurological:  Positive for facial asymmetry, speech difficulty and weakness. Negative for dizziness, light-headedness, numbness and headaches.   Psychiatric/Behavioral:  Positive for confusion.       Past Medical History:   Diagnosis Date    Arthritis     Asthma     Atrial fibrillation     Cardiac disorder     Closed fracture of neck of left femur 2/27/2017    Disease of thyroid gland     Fibromyalgia     GERD (gastroesophageal reflux disease)     Hyperlipidemia     Hypertension     Migraine     Neuropathy     Stroke      Past Surgical History:   Procedure Laterality Date    CHOLECYSTECTOMY      HEMORRHOIDECTOMY N/A 7/23/2021    Procedure: HEMORRHOIDECTOMY;  Surgeon: Adan Meadows MD;  Location:  [facility];  Service: General;  Laterality: N/A;    HIP PERCUTANEOUS PINNING Left 2/28/2017    Procedure: LEFT HIP PERCUTANEOUS PINNING FEMORAL NECK;  Surgeon: Ezra Barlow MD;  Location:  [facility];  Service:     HYSTERECTOMY      IL CLTX HIP DISLOCATION TRAUMATIC W/O ANESTHESIA Left 2/28/2017    Procedure: LEFT HIP CLOSED REDUCTION;  Surgeon: Ezra Barlow MD;  Location:  [facility];  Service: Orthopedics    SHOULDER SURGERY       Family History   Problem Relation Age of Onset    Alcohol abuse Mother     Heart disease Mother         Cardiac Disorder    Stroke Mother     Hypertension Mother     Parkinsonism Mother     Colon cancer Maternal Grandmother     Diabetes Maternal Aunt      Social History     Socioeconomic History    Marital status:    Tobacco Use    Smoking status: Never    Smokeless tobacco: Never   Vaping Use    Vaping status: Never Used   Substance and Sexual Activity    Alcohol use: No    Drug use: No    Sexual activity: Defer     Allergies   Allergen Reactions    Cephalexin Swelling     Tolerates cefdinir    Penicillins Hives     Tolerates cefdinir    Sulfa Antibiotics Hives     Prior to Admission medications    Medication Sig Start Date End Date Taking? Authorizing  Provider   albuterol (ACCUNEB) 1.25 MG/3ML nebulizer solution Take 3 mL by nebulization Every 6 (Six) Hours As Needed for Wheezing or Shortness of Air. 7/3/19   Silverio Evans MD   albuterol sulfate  (90 Base) MCG/ACT inhaler INHALE 2 PUFFS BY MOUTH EVERY 4 TO 6 HOURS AS NEEDED 5/28/24   Dav Bryan MD   atorvastatin (LIPITOR) 10 MG tablet TAKE ONE TABLET BY MOUTH ONCE NIGHTLY 12/2/24   Dav Bryan MD   ciprofloxacin (Cipro) 500 MG tablet Take 1 tablet by mouth 2 (Two) Times a Day. 1/20/25   Dav Bryan MD   ciprofloxacin-dexAMETHasone (Ciprodex) 0.3-0.1 % otic suspension Administer 4 drops into the left ear 2 (Two) Times a Day. 10/21/24   Enma Boyle APRN   denosumab (Prolia) 60 MG/ML solution prefilled syringe syringe Inject 1 mL under the skin into the appropriate area as directed 1 (One) Time for 1 dose. 8/8/24 8/8/24  Dav Bryan MD   dicyclomine (BENTYL) 10 MG capsule Take 1 capsule by mouth 2 (Two) Times a Day As Needed for Abdominal Cramping. 12/7/23   Dav Bryan MD   dilTIAZem CD (CARDIZEM CD) 240 MG 24 hr capsule TAKE 1 CAPSULE BY MOUTH DAILY 12/12/24   Dav Bryan MD   Eliquis 5 MG tablet tablet TAKE ONE TABLET BY MOUTH EVERY 12 HOURS 9/9/24   Dav Bryan MD   Fluticasone-Salmeterol (ADVAIR/WIXELA) 250-50 MCG/ACT DISKUS INHALE 1 PUFF BY MOUTH 2 TIMES A DAY 10/28/24   Dav Bryan MD   levothyroxine (SYNTHROID, LEVOTHROID) 137 MCG tablet TAKE 1 TABLET BY MOUTH DAILY 2/5/25   Dav Bryan MD   Magnesium 250 MG tablet Take  by mouth.    ProviderNargis MD   metoprolol tartrate (LOPRESSOR) 25 MG tablet TAKE 1 TABLET BY MOUTH 2 TIMES A DAY 12/10/24   Dav Bryan MD   nitrofurantoin, macrocrystal-monohydrate, (Macrobid) 100 MG capsule Take 1 capsule by mouth Daily. 1/27/25   Dav Bryan MD   nystatin (MYCOSTATIN) 605960 UNIT/GM cream APPLY TO AFFECTED AREA(S) TWO TIMES A DAY 5/13/24   Irvin  Dav PERALTA MD   nystatin (MYCOSTATIN) 717265 UNIT/GM powder Apply  topically to the appropriate area as directed 3 (Three) Times a Day. 9/30/23   Adan Byrnes Jr., PA-C   omeprazole (priLOSEC) 40 MG capsule TAKE 1 CAPSULE BY MOUTH DAILY 12/2/24   Dav Bryan MD   pregabalin (LYRICA) 300 MG capsule TAKE 1 CAPSULE BY MOUTH 2 TIMES A DAY 12/4/24   Dav Bryan MD   topiramate (TOPAMAX) 100 MG tablet TAKE 1 TABLET BY MOUTH DAILY 8/26/24   Dav Bryan MD         Temp:  [98.7 °F (37.1 °C)] 98.7 °F (37.1 °C)  Heart Rate:  [] 71  Resp:  [16-18] 16  BP: ()/(54-96) 103/67  Neurological Exam  Mental Status  Drowsy. Oriented to person, place and time. Oriented to person, place, and time. Mild dysarthria present. Expressive aphasia present.    Cranial Nerves  CN II: Right visual acuity: Counts fingers. Left visual acuity: Counts fingers. Decreased left peripheral vision.  CN III, IV, VI: Extraocular movements intact bilaterally. Pupils equal round and reactive to light bilaterally.  CN V: Facial sensation is normal.  CN VII:  Left: There is central facial weakness.  CN VIII: Hearing intact.  CN XI:  Right: Sternocleidomastoid strength is normal. Trapezius strength is normal.  Left: Sternocleidomastoid strength is weak. Trapezius strength is weak.  CN XII: Tongue midline without atrophy or fasciculations.    Motor  Normal muscle bulk throughout. Normal muscle tone.  Strength left upper extremity 1/5.  Strength left lower extremity 3/5.  Strength right upper extremity and right lower extremity 3/5..    Sensory  Light touch is normal in upper and lower extremities.     Coordination    Unable to assess.    Gait    Unable to assess.      Physical Exam  Constitutional:       General: She is not in acute distress.     Comments: Drowsy   HENT:      Head: Normocephalic and atraumatic.      Nose: Nose normal.      Mouth/Throat:      Mouth: Mucous membranes are dry.   Eyes:      Extraocular Movements:  Extraocular movements intact.      Pupils: Pupils are equal, round, and reactive to light.   Cardiovascular:      Rate and Rhythm: Rhythm irregular.   Pulmonary:      Effort: Pulmonary effort is normal.   Abdominal:      General: Abdomen is flat.   Musculoskeletal:         General: Normal range of motion.      Cervical back: Normal range of motion.   Skin:     General: Skin is warm and dry.   Neurological:      Mental Status: She is oriented to person, place, and time.      Cranial Nerves: Cranial nerve deficit and dysarthria present.      Sensory: No sensory deficit.      Motor: Weakness present.   Psychiatric:         Mood and Affect: Mood normal.         Behavior: Behavior normal.         Acute Stroke Data    Thrombolytic Inclusion / Exclusion Criteria    Time: 23:02 EST  Person Administering Scale: FABIANA Guajardo    Inclusion Criteria  [x]   18 years of age or greater   []   Onset of symptoms < 4.5 hours before beginning treatment (stroke onset = time patient was last seen well or without symptoms).   []   Diagnosis of acute ischemic stroke causing measurable disabling deficit (Complete Hemianopia, Any Aphasia, Visual or Sensory Extinction, Any weakness limiting sustained effort against gravity)   []   Any remaining deficit considered potentially disabling in view of patient and practitioner   Exclusion criteria (Do not proceed with Alteplase if any are checked under exclusion criteria)  [x]   Onset unknown or GREATER than 4.5 hours   []   ICH on CT/MRI   []   CT demonstrates hypodensity representing acute or subacute infarct   []   Significant head trauma or prior stroke in the previous 3 months   []   Symptoms suggestive of subarachnoid hemorrhage   []   History of un-ruptured intracranial aneurysm GREATER than 10 mm   []   Recent intracranial or intraspinal surgery within the last 3 months   []   Arterial puncture at a non-compressible site in the previous 7 days   []   Active internal bleeding   []    Acute bleeding tendency   []   Platelet count LESS than 100,000 for known hematological diseases such as leukemia, thrombocytopenia or chronic cirrhosis   []   Current use of anticoagulant with INR GREATER than 1.7 or PT GREATER than 15 seconds, aPTT GREATER than 40 seconds   []   Heparin received within 48 hours, resulting in abnormally elevated aPTT GREATER than upper limit of normal   [x]   Current use of direct thrombin inhibitors or direct factor Xa inhibitors in the past 48 hours   []   Elevated blood pressure refractory to treatment (systolic GREATER than 185 mm/Hg or diastolic  GREATER than 110 mm/Hg   []   Suspected infective endocarditis and aortic arch dissection   []   Current use of therapeutic treatment dose of low-molecular-weight heparin (LMWH) within the previous 24 hours   []   Structural GI malignancy or bleed   Relative exclusion for all patients  []   Only minor non-disabling symptoms   []   Pregnancy   []   Seizure at onset with postictal residual neurological impairments   []   Major surgery or previous trauma within past 14 days   []   History of previous spontaneous ICH, intracranial neoplasm, or AV malformation   []   Postpartum (within previous 14 days)   []   Recent GI or urinary tract hemorrhage (within previous 21 days)   []   Recent acute MI (within previous 3 months)   []   History of un-ruptured intracranial aneurysm LESS than 10 mm   []   History of ruptured intracranial aneurysm   []   Blood glucose LESS than 50 mg/dL (2.7 mmol/L)   []   Dural puncture within the last 7 days   []   Known GREATER than 10 cerebral microbleeds   Additional exclusions for patients with symptoms onset between 3 and 4.5 hours.  []   Age > 80.   []   On any anticoagulants regardless of INR  >>> Warfarin (Coumadin), Heparin, Enoxaparin (Lovenox), fondaparinux (Arixtra), bivalirudin (Angiomax), Argatroban, dabigatran (Pradaxa), rivaroxaban (Xarelto), or apixaban (Eliquis)   []   Severe stroke (NIHSS > 25).    []   History of BOTH diabetes and previous ischemic stroke.   []   The risks and benefits have been discussed with the patient or family related to the administration of IV thrombolytic therapy for stroke symptoms.   []   I have discussed and reviewed the patient's case and imaging with the attending prior to IV thrombolytic therapy.   NA Time IV thrombolytic administered       Hospital Meds:  Scheduled- sodium chloride, 1,000 mL, Intravenous, Once      Infusions-     PRNs-   sodium chloride    Functional Status Prior to Current Stroke/Tawny Score:   MODIFIED TAWNY SCALE (to be assessed for each patient having history of stroke) []Stroke history but not assessed  []0: No symptoms at all  []1: No significant disability despite symptoms  []2: Slight disability  []3: Moderate disability  [x]4: Moderately severe disability  []5: Severe disability  []6: Death        NIH Stroke Scale  Time: 23:02 EST  Person Administering Scale: FABIANA Guajardo  Interval: baseline  1a. Level of Consciousness: 0-->Alert, keenly responsive  1b. LOC Questions: 0-->Answers both questions correctly  1c. LOC Commands: 0-->Performs both tasks correctly  2. Best Gaze: 0-->Normal  3. Visual: 1-->Partial hemianopia  4. Facial Palsy: 2-->Partial paralysis (total or near-total paralysis of lower face)  5a. Motor Arm, Left: 3-->No effort against gravity, limb falls  5b. Motor Arm, Right: 1-->Drift, limb holds 90 (or 45) degrees, but drifts down before full 10 secs, does not hit bed or other support  6a. Motor Leg, Left: 2-->Some effort against gravity, leg falls to bed by 5 secs, but has some effort against gravity  6b. Motor Leg, Right: 1-->Drift, leg falls by the end of the 5-sec period but does not hit bed  7. Limb Ataxia: 0-->Absent  8. Sensory: 0-->Normal, no sensory loss  9. Best Language: 1-->Mild-to-moderate aphasia, some obvious loss of fluency or facility of comprehension, without significant limitation on ideas expressed or form of  expression. Reduction of speech and/or comprehension, however, makes conversation. . . (see row details)  10. Dysarthria: 1-->Mild-to-moderate dysarthria, patient slurs at least some words and, at worst, can be understood with some difficulty  11. Extinction and Inattention (formerly Neglect): 0-->No abnormality    Total (NIH Stroke Scale): 12     Results Reviewed:  I have personally reviewed current lab, radiology, and data and agree with results.    Results for orders placed during the hospital encounter of 06/30/19    Adult Transthoracic Echo Complete W/ Cont if Necessary Per Protocol    Interpretation Summary  · Mild pulmonic valve regurgitation is present.  · Mild mitral valve regurgitation is present  · Left atrial cavity size is moderately dilated.  · Mild aortic valve regurgitation is present.  · Mild tricuspid valve regurgitation is present.  · Calculated right ventricular systolic pressure from tricuspid regurgitation is 39 mmHg.  · Estimated EF = 65%.  · Left ventricular systolic function is normal.     WBC   Date Value Ref Range Status   02/10/2025 11.78 (H) 3.40 - 10.80 10*3/mm3 Final     RBC   Date Value Ref Range Status   02/10/2025 5.00 3.77 - 5.28 10*6/mm3 Final     Hemoglobin   Date Value Ref Range Status   02/10/2025 13.4 12.0 - 15.9 g/dL Final     Hematocrit   Date Value Ref Range Status   02/10/2025 44.1 34.0 - 46.6 % Final     MCV   Date Value Ref Range Status   02/10/2025 88.2 79.0 - 97.0 fL Final     MCH   Date Value Ref Range Status   02/10/2025 26.8 26.6 - 33.0 pg Final     MCHC   Date Value Ref Range Status   02/10/2025 30.4 (L) 31.5 - 35.7 g/dL Final     RDW   Date Value Ref Range Status   02/10/2025 15.2 12.3 - 15.4 % Final     RDW-SD   Date Value Ref Range Status   02/10/2025 49.0 37.0 - 54.0 fl Final     MPV   Date Value Ref Range Status   02/10/2025 11.9 6.0 - 12.0 fL Final     Platelets   Date Value Ref Range Status   02/10/2025 284 140 - 450 10*3/mm3 Final     Neutrophil %   Date  Value Ref Range Status   02/10/2025 81.8 (H) 42.7 - 76.0 % Final     Lymphocyte %   Date Value Ref Range Status   02/10/2025 9.8 (L) 19.6 - 45.3 % Final     Monocyte %   Date Value Ref Range Status   02/10/2025 6.5 5.0 - 12.0 % Final     Eosinophil %   Date Value Ref Range Status   02/10/2025 1.2 0.3 - 6.2 % Final     Basophil %   Date Value Ref Range Status   02/10/2025 0.3 0.0 - 1.5 % Final     Immature Grans %   Date Value Ref Range Status   02/10/2025 0.4 0.0 - 0.5 % Final     Neutrophils, Absolute   Date Value Ref Range Status   02/10/2025 9.64 (H) 1.70 - 7.00 10*3/mm3 Final     Lymphocytes, Absolute   Date Value Ref Range Status   02/10/2025 1.15 0.70 - 3.10 10*3/mm3 Final     Monocytes, Absolute   Date Value Ref Range Status   02/10/2025 0.76 0.10 - 0.90 10*3/mm3 Final     Eosinophils, Absolute   Date Value Ref Range Status   02/10/2025 0.14 0.00 - 0.40 10*3/mm3 Final     Basophils, Absolute   Date Value Ref Range Status   02/10/2025 0.04 0.00 - 0.20 10*3/mm3 Final     Immature Grans, Absolute   Date Value Ref Range Status   02/10/2025 0.05 0.00 - 0.05 10*3/mm3 Final     nRBC   Date Value Ref Range Status   02/10/2025 0.0 0.0 - 0.2 /100 WBC Final      Lab Results   Component Value Date    GLUCOSE 141 (H) 02/10/2025    BUN 33 (H) 02/10/2025    CREATININE 2.90 (H) 02/10/2025     02/10/2025    K 3.9 02/10/2025     02/10/2025    CALCIUM 8.9 02/10/2025    PROTEINTOT 8.9 (H) 02/10/2025    ALBUMIN 3.9 02/10/2025    ALT 7 02/10/2025    AST 15 02/10/2025    ALKPHOS 88 02/10/2025    BILITOT 0.5 02/10/2025    GLOB 5.0 02/10/2025    AGRATIO 0.8 02/10/2025    BCR 11.4 02/10/2025    ANIONGAP 16.0 (H) 02/10/2025    EGFR 16.7 (L) 02/10/2025    CT Head Without Contrast    Result Date: 2/10/2025  Impression: 1.No acute intracranial process identified. 2.Encephalomalacia within right basal ganglia and adjacent right frontal lobe. 3.Mild frothy mucosal disease within left sphenoid sinus. Correlate for acute  sinusitis. Electronically Signed: Tang Johnson MD  2/10/2025 3:13 PM EST  Workstation ID: [id number]    Assessment/Plan:  Ms. Joshi is a 74-year-old female with PMH of arthritis, asthma, A-fib (on Eliquis), HTN, HLD, hypothyroidism, migraine and prior CVA.  She presents to [facility] ED with hallucinations and worsening left facial droop.  Patient has baseline residual left-sided weakness from prior stroke.   states patient has not felt well since being discharged from hospital in January 2025.  Patient's  noticed on Friday she was having hallucinations and facial droop was more pronounced.  Patient refused to go to the hospital over the weekend.  Mr. Joshi brought patient into [facility] ED today for further evaluation of presenting symptoms. Patient is not a candidate for IV thrombolytic therapy due to consistent Eliquis use.  Patient is not a candidate for endovascular therapy due to last known well greater than 24 hours.    Antiplatelet PTA: None  Anticoagulant PTA: Eliquis        AMS, left facial droop  Differentials to include TIA, CVA, stroke recrudescence, metabolic encephalopathy  Initiate TIA/CVA without IV thrombolytic therapy order set  N.p.o. until bedside dysphagia screening complete by RN  Activity as tolerated  Aspirin 81 mg p.o. daily  Hold Eliquis for now until MRI can be evaluated for stroke burden  MRI brain without contrast routine  A1c routine  TTE routine  Neurochecks per unit protocol  Blood pressure goals, SBP less than 180  Diabetes educator to see for appropriate  PT/OT/SLP eval and treat  Case management to follow    Plan of care discussed with hospital team, patient, family at bedside and primary RN.  Stroke neurology will continue to follow.  Thank you for this consult.  Call with any questions or concerns.    Eb Masters, FABIANA  February 10, 2025  23:02 EST

## 2025-02-11 NOTE — THERAPY EVALUATION
Acute Care - Speech Language Pathology Initial Evaluation  [facility]  Cognitive-Communication Evaluation       Patient Name: Vickie Joshi  : 1950  MRN: [id number]  Today's Date: 2025               Admit Date: 2/10/2025     Visit Dx:    ICD-10-CM ICD-9-CM   1. Confusion  R41.0 298.9   2. Auditory hallucination  R44.0 780.1   3. Recent urinary tract infection  Z87.440 V13.02   4. BARBARA (acute kidney injury)  N17.9 584.9   5. Cognitive deficit due to and not concurrent with hemorrhagic cerebrovascular accident (CVA)  I69.319 438.0     Patient Active Problem List   Diagnosis    Hypertension    Hx of Migraine    GERD (gastroesophageal reflux disease)    Fibromyalgia    Neuropathy    Asthma    Allergic rhinitis    Morbid obesity    Acute on chronic blood loss anemia    Hypothyroidism    Hyperlipidemia    History of hemorrhagic stroke with residual hemiparesis    Atrial fibrillation, chronic    Hypocalcemia    GI bleed    Closed left hip fracture, sequela    Acute UTI (urinary tract infection)    Lactic acidosis    Diarrhea    Tremor, essential    Hypomagnesemia    Hypokalemia    Low platelet count    Elevated bilirubin    Altered mental status    Elevated serum creatinine    Closed compression fracture of L2 lumbar vertebra, initial encounter     Past Medical History:   Diagnosis Date    Arthritis     Asthma     Atrial fibrillation     Cardiac disorder     Closed fracture of neck of left femur 2017    Disease of thyroid gland     Fibromyalgia     GERD (gastroesophageal reflux disease)     Hyperlipidemia     Hypertension     Migraine     Neuropathy     Stroke      Past Surgical History:   Procedure Laterality Date    CHOLECYSTECTOMY      HEMORRHOIDECTOMY N/A 2021    Procedure: HEMORRHOIDECTOMY;  Surgeon: Adan Meadows MD;  Location: [facility];  Service: General;  Laterality: N/A;    HIP PERCUTANEOUS PINNING Left 2017    Procedure: LEFT HIP PERCUTANEOUS PINNING FEMORAL NECK;  Surgeon:  Ezra Barlow MD;  Location:  [facility];  Service:     HYSTERECTOMY      [place] CL[place] HIP DISLOCATION TRAUMATIC W/O ANESTHESIA Left 2/28/2017    Procedure: LEFT HIP CLOSED REDUCTION;  Surgeon: Ezra Barlow MD;  Location:  [facility];  Service: Orthopedics    SHOULDER SURGERY         SLP Recommendation and Plan  SLP Diagnosis: functional speech/language skills, mild, cognitive-linguistic disorder (02/11/25 1435)  SLP Diagnosis Comments: Pt presents w/ mild cognitive linguistic deficits per this eval. Speech and language @ simple level appears intact. Spouse reports cognitive status is acutely worse than baseline. SLP will f/u for tx as appropriate (02/11/25 1435)           SLC Criteria for Skilled Therapy Interventions Met: yes (02/11/25 1435)  Anticipated Discharge Disposition (SLP): unknown, other (see comments) (pending further w/u) (02/11/25 1435)        Therapy Frequency (SLP SLC): 3 days per week (02/11/25 1435)  Predicted Duration Therapy Intervention (Days): 1 week (02/11/25 1435)                                    SLP EVALUATION (Last 72 Hours)       SLP SLC Evaluation       Row Name 02/11/25 1435                   Communication Assessment/Intervention    Document Type evaluation  -MH        Subjective Information no complaints  -MH        Patient Observations cooperative;lethargic  -MH        Patient/Family/Caregiver Comments/Observations Spouse present  -MH        Patient Effort good  -MH        Symptoms Noted During/After Treatment none  -MH           General Information    Patient Profile Reviewed yes  -        Pertinent History Of Current Problem Adm w/ AMS. Hx: CVA w/ residual L sided paralysis, GERD, HTN, HLD, a-fib. MRI w/o evidence of acute intracranial abnormalities  -MH        Precautions/Limitations, Vision WFL;for purposes of eval  -MH        Precautions/Limitations, Hearing WFL;for purposes of eval  -MH        Prior Level of Function-Communication unknown  -        Plans/Goals Discussed with  patient;spouse/S.O.;agreed upon  [facility]        Barriers to Rehab none identified  -        Patient's Goals for Discharge patient did not state  -        Family Goals for Discharge family did not state  -           Pain    Additional Documentation Pain Scale: FACES Pre/Post-Treatment (Group)  [facility]           Pain Scale: FACES Pre/Post-Treatment    Pain: FACES Scale, Pretreatment 0-->no hurt  -        Posttreatment Pain Rating 0-->no hurt  -           Comprehension Assessment/Intervention    Comprehension Assessment/Intervention Auditory Comprehension  -           Auditory Comprehension Assessment/Intervention    Auditory Comprehension (Communication) [facility]        Able to Identify Objects/Pictures (Communication) body part;familiar objects;[facility]        Answers Questions (Communication) wh questions;yes/no;personal;simple;[facility]        Able to Follow Commands (Communication) 1-step;2-step;[facility]           Expression Assessment/Intervention    Expression Assessment/Intervention verbal expression  -           Verbal Expression Assessment/Intervention    Verbal Expression other (see comments)  Fxnl @ simple level  -        Automatic Speech (Communication) response to greeting;days of week;months of year;[facility]        Repetition phrases;[facility]        Phrase Completion automatic/predictable;unpredictable;[facility]        Responsive Naming simple;complex;[facility]        Confrontational Naming high frequency;low frequency;[facility]        Spontaneous/Functional Words simple;complex;[facility]        Sentence Formulation simple;[facility]           Motor Speech Assessment/Intervention    Motor Speech Function [facility]           Cognitive Assessment Intervention- SLP    Cognitive Function (Cognition) mild impairment  [facility]        Orientation Status (Cognition) awareness of basic personal information;person;place;situation;WFL;time;mild impairment  [facility]        Memory (Cognitive) simple;immediate;mild impairment   -        Attention (Cognitive) selective;sustained;mild impairment  -        Thought Organization (Cognitive) concrete divergent;concrete convergent;mild impairment  -        Reasoning (Cognitive) simple;mild impairment  -        Problem Solving (Cognitive) simple;temporal;mild impairment  -        Functional Math (Cognitive) simple;word problems;mild impairment  -        Executive Function (Cognition) deficit awareness;mild impairment  -           SLP Evaluation Clinical Impressions    SLP Diagnosis functional speech/language skills;mild;cognitive-linguistic disorder  -        SLP Diagnosis Comments Pt presents w/ mild cognitive linguistic deficits per this eval. Speech and language @ simple level appears intact. Spouse reports cognitive status is acutely worse than baseline. SLP will f/u for tx as appropriate  -        Rehab Potential/Prognosis good  -        SLC Criteria for Skilled Therapy Interventions Met yes  -        Functional Impact functional impact in ADLs;difficulty in expressing complex messages  -           Recommendations    Therapy Frequency (SLP SLC) 3 days per week  -        Predicted Duration Therapy Intervention (Days) 1 week  -        Anticipated Discharge Disposition (SLP) unknown;other (see comments)  pending further w/u  -                  User Key  (r) = Recorded By, (t) = Taken By, (c) = Cosigned By      Initials Name Effective Dates     Dhara Rosas, MS CCC-SLP 05/12/23 -                        EDUCATION  The patient has been educated in the following areas:     Cognitive Impairment Communication Impairment.           SLP GOALS       Row Name 02/11/25 0852             Patient will demonstrate functional cognitive-linguistic skills for return to discharge environment    Maxie with minimal cues  -      Time frame 1 week  -      Progress/Outcomes new goal  -         SLP Diagnostic Treatment     Patient will participate in further assessment  in the following areas reading comprehension;graphic expression;clarification of baseline cognitive communication status  -MH      Time Frame (Diagnostic) 1 week  -MH      Progress/Outcomes (Additional Goal 1, SLP) new goal  -MH         Attention Goal 1 (SLP)    Improve Attention by Goal 1 (SLP) looking at speaker;attending to task;80%;with minimal cues (75-90%)  -MH      Time Frame (Attention Goal 1, SLP) 1 week  -MH      Progress/Outcomes (Attention Goal 1, SLP) new goal  -MH         Orientation Goal 1 (SLP)    Improve Orientation Through Goal 1 (SLP) demonstrating orientation to day;demonstrating orientation to year;demonstrating orientation to month;demonstrating orientation to place;demonstrating orientation to disease/impairment;use environmental aids to assist with orientation;80%;with minimal cues (75-90%)  -MH      Time Frame (Orientation Goal 1, SLP) 1 week  -MH      Progress/Outcomes (Orientation Goal 1, SLP) new goal  -MH         Memory Skills Goal 1 (SLP)    Improve Memory Skills Through Goal 1 (SLP) recalling related word lists immediately;recalling unrelated word lists immediately;use memory strategies;80%;with minimal cues (75-90%)  -MH      Time Frame (Memory Skills Goal 1, SLP) 1 week  -MH      Progress/Outcomes (Memory Skills Goal 1, SLP) new goal  -MH         Organizational Skills Goal 1 (SLP)    Improve Thought Organization Through Goal 1 (SLP) completing a divergent naming task;completing a convergent naming task;naming similarities and differences;completing a verbal sequencing task;80%;with minimal cues (75-90%)  -      Time Frame (Thought Organization Skills Goal 1, SLP) 1 week  -MH      Progress/Outcomes (Thought Organization Skills Goal 1, SLP) new goal  -                User Key  (r) = Recorded By, (t) = Taken By, (c) = Cosigned By      Initials Name Provider Type    Dhara Marquez MS CCC-SLP Speech and Language Pathologist                              Time Calculation:       Time Calculation- SLP       Row Name 02/11/25 1617             Time Calculation- SLP    SLP Start Time 1435  -MH      SLP Received On 02/11/25  -         Untimed Charges    [id number]-[id number] Eval Speech and Production w/ Language Minutes 40  -MH         Total Minutes    Untimed Charges Total Minutes 40  -MH       Total Minutes 40  -MH                User Key  (r) = Recorded By, (t) = Taken By, (c) = Cosigned By      Initials Name Provider Type     Dhara Rosas, MS CCC-SLP Speech and Language Pathologist                    Therapy Charges for Today       Code Description Service Date Service Provider Modifiers Qty    [id number]  ST EVAL SPEECH AND PROD W LANG  3 2/11/2025 Dhara Rosas, MS CCC-SLP GN 1                       Dhara Rosas MS CCC-SLP  2/11/2025

## 2025-02-11 NOTE — H&P
[facility] Medicine Services  HISTORY AND PHYSICAL    Patient Name: Vickie Joshi  : 1950  MRN: [id number]  Primary Care Physician: Dav Bryan MD  Date of admission: 2/10/2025      Subjective   Subjective     Chief Complaint:   Confusion, hallucinations     HPI:  Vickie Joshi is a 74 y.o. female with hx of prior CVA, afib, GERD, HLD, HTN who presents from home with  who notes pt has declined over past 2-3 weeks.  Has been confused and having hallucinations. Thinks her kids are still in the house, etc.  Pt does not provide hx and asks that I ask her .  She does tell me she has had some n/v/d and mild abd pain.  Denies chest pain, shortness of breath, cough, f/c.  No urinary sx or leg swelling per pt.   states he brought her in because she is not improving and he feels her left sided weakness is getting worse with more pronounced left facial droop and now unable to assist with transfers.  [place] [facility] but no significant clinical improvement.        Personal History     Past Medical History:   Diagnosis Date    Arthritis     Asthma     Atrial fibrillation     Cardiac disorder     Closed fracture of neck of left femur 2017    Disease of thyroid gland     Fibromyalgia     GERD (gastroesophageal reflux disease)     Hyperlipidemia     Hypertension     Migraine     Neuropathy     Stroke            Past Surgical History:   Procedure Laterality Date    CHOLECYSTECTOMY      HEMORRHOIDECTOMY N/A 2021    Procedure: HEMORRHOIDECTOMY;  Surgeon: Adan Meadows MD;  Location: [facility];  Service: General;  Laterality: N/A;    HIP PERCUTANEOUS PINNING Left 2017    Procedure: LEFT HIP PERCUTANEOUS PINNING FEMORAL NECK;  Surgeon: Ezra Barlow MD;  Location: [facility];  Service:     HYSTERECTOMY      [place] CLTX HIP DISLOCATION TRAUMATIC W/O ANESTHESIA Left 2017    Procedure: LEFT HIP CLOSED REDUCTION;  Surgeon:  Ezra Barlow MD;  Location: [facility];  Service: Orthopedics    SHOULDER SURGERY         Family History: family history includes Alcohol abuse in her mother; Colon cancer in her maternal grandmother; Diabetes in her maternal aunt; Heart disease in her mother; Hypertension in her mother; Parkinsonism in her mother; Stroke in her mother.     Social History:  reports that she has never smoked. She has never used smokeless tobacco. She reports that she does not drink alcohol and does not use drugs.  Social History     Social History Narrative    Not on file       Medications:  Available home medication information reviewed.  Fluticasone-Salmeterol, Magnesium, albuterol, albuterol sulfate HFA, apixaban, atorvastatin, ciprofloxacin, ciprofloxacin-dexAMETHasone, denosumab, dicyclomine, dilTIAZem CD, levothyroxine, metoprolol tartrate, nitrofurantoin (macrocrystal-monohydrate), nystatin, omeprazole, pregabalin, and topiramate    Allergies   Allergen Reactions    Cephalexin Swelling     Tolerates cefdinir    Penicillins Hives     Tolerates cefdinir    Sulfa Antibiotics Hives       Objective   Objective     Vital Signs:   Temp:  [98.7 °F (37.1 °C)] 98.7 °F (37.1 °C)  Heart Rate:  [] 71  Resp:  [16-18] 16  BP: ()/(54-96) 103/67       Physical Exam    Gen; alert, defers questions to   Heent; perrla, eomi, slight left facial droop  Cv; irr, no mrg  L; cta w/ fair inspir effort  Abd; soft, +bs, ntnd, no r/g  Ext; no cce, palpable pulses  Skin; cdi, warm  Neuro; slight left facial droop, 4/5 lue motor, 3+/5 lle motor, clenched left hand   Psych; mood and affect appropriate; pleasant     Result Review:  I have personally reviewed the results from the time of this admission to 2/10/2025 23:40 EST and agree with these findings:  [x]  Laboratory list / accordion  [x]  Microbiology  [x]  Radiology  [x]  EKG/Telemetry   [x]  Cardiology/Vascular   []  Pathology  []  Old records  []  Other:  Most notable findings  include:        LAB RESULTS:      Lab 02/10/25  1824 02/10/25  1633   WBC  --  11.78*   HEMOGLOBIN  --  13.4   HEMATOCRIT  --  44.1   PLATELETS  --  284   NEUTROS ABS  --  9.64*   IMMATURE GRANS (ABS)  --  0.05   LYMPHS ABS  --  1.15   MONOS ABS  --  0.76   EOS ABS  --  0.14   MCV  --  88.2   PROCALCITONIN 0.11  --    LACTATE  --  1.3         Lab 02/10/25  1633   SODIUM 141   POTASSIUM 3.9   CHLORIDE 105   CO2 20.0*   ANION GAP 16.0*   BUN 33*   CREATININE 2.90*   EGFR 16.7*   GLUCOSE 141*   CALCIUM 8.9   MAGNESIUM 2.0         Lab 02/10/25  1633   TOTAL PROTEIN 8.9*   ALBUMIN 3.9   GLOBULIN 5.0   ALT (SGPT) 7   AST (SGOT) 15   BILIRUBIN 0.5   ALK PHOS 88         Lab 02/10/25  1824 02/10/25  1633   PROBNP 3,842.0*  --    HSTROP T 20* 23*                 UA          4/22/2024    16:20 1/13/2025    16:28 2/10/2025    21:01   Urinalysis   Squamous Epithelial Cells, UA   13-20    Specific Gravity, UA   1.014    Ketones, UA Negative  Negative  Negative    Blood, UA   Trace    Leukocytes, UA Moderate (2+)  Small (1+)  Moderate (2+)    Nitrite, UA   Negative    RBC, UA   None Seen    WBC, UA   11-20    Bacteria, UA   None Seen        Microbiology Results (last 10 days)       ** No results found for the last 240 hours. **            CT Abdomen Pelvis Without Contrast    Result Date: 2/10/2025  CT ABDOMEN PELVIS WO CONTRAST Date of Exam: 2/10/2025 9:41 PM EST Indication: BARBARA. Comparison: 9/5/2023 Technique: Axial CT images were obtained of the abdomen and pelvis without the administration of contrast. Reconstructed coronal and sagittal images were also obtained. Automated exposure control and iterative construction methods were used. Findings: Lower Chest: Distortion of the lung bases due to patient motion. Atelectasis present in the lower lobes. Chronic pleural thickening present on the left Organs: Some distortion of the solid organs due to patient motion. No urinary calculi or hydronephrosis. Kidneys, adrenal glands,  liver, spleen, pancreas have a grossly unremarkable noncontrast appearance within the limits of motion artifact. Gallbladder  surgically absent Gastrointestinal: No intestinal distention or evident wall thickening. Diverticula of the sigmoid colon without inflammation. Appendix not visualized Pelvis: No abnormal fluid collection. Uterus surgically absent. Urinary bladder unremarkable Peritoneum/Retroperitoneum: No ascites or pneumoperitoneum. Normal caliber aorta Bones/Soft Tissues: New L2 superior endplate compression with sclerosis deep to the endplate which may be due trabecular compression or healing     Impression: 1. No acute abdominopelvic process. No obstructive uropathy 2. Colonic diverticulosis 3. Acute or subacute L2 superior endplate compression fracture Electronically Signed: Silverio Chopra  2/10/2025 10:03 PM EST  Workstation ID: [id number]    XR Chest 1 View    Result Date: 2/10/2025  XR CHEST 1 VW Date of Exam: 2/10/2025 3:19 PM EST Indication: Weak/Dizzy/AMS triage protocol Comparison: 9/6/2023 Findings: Patient rotated to the right. Low lung volumes. Heart size indeterminate. Prominence of the central pulmonary vessels. No gross pulmonary edema. Airspace disease in the left lung base. Minimal blunting of the costophrenic angles     Impression: Impression: 1. Hypoinspiratory film demonstrating pulmonary vascular congestion with no gross pulmonary edema. There are suspected trace pleural effusions. 2. Left basilar airspace disease which may be atelectasis or pneumonia Electronically Signed: Silverio Chopra  2/10/2025 3:44 PM EST  Workstation ID: [id number]    CT Head Without Contrast    Result Date: 2/10/2025  CT HEAD WO CONTRAST Date of Exam: 2/10/2025 2:50 PM EST Indication: confusion, hallucinations, history of stroke. Comparison: February 27, 2017 Technique: Axial CT images were obtained of the head without contrast administration.  Automated exposure control and iterative construction methods were  used. Findings: No acute intracranial hemorrhage or extra-axial collection is identified. The ventricles appear stable in caliber, with no evidence of mass effect or midline shift. The basal cisterns appear patent. The gray-white differentiation appears preserved. There  is encephalomalacia within the right basal ganglia and adjacent right frontal lobe. The calvarium appear intact. There is mild frothy mucosal disease in the left sphenoid sinus. The mastoid air cells are well-aerated.     Impression: Impression: 1.No acute intracranial process identified. 2.Encephalomalacia within right basal ganglia and adjacent right frontal lobe. 3.Mild frothy mucosal disease within left sphenoid sinus. Correlate for acute sinusitis. Electronically Signed: Tang Johnson MD  2/10/2025 3:13 PM EST  Workstation ID: [id number]     Results for orders placed during the hospital encounter of 06/30/19    Adult Transthoracic Echo Complete W/ Cont if Necessary Per Protocol    Interpretation Summary  · Mild pulmonic valve regurgitation is present.  · Mild mitral valve regurgitation is present  · Left atrial cavity size is moderately dilated.  · Mild aortic valve regurgitation is present.  · Mild tricuspid valve regurgitation is present.  · Calculated right ventricular systolic pressure from tricuspid regurgitation is 39 mmHg.  · Estimated EF = 65%.  · Left ventricular systolic function is normal.      Assessment & Plan   Assessment & Plan       Altered mental status    Hypertension    Hyperlipidemia    History of hemorrhagic stroke with residual hemiparesis    Atrial fibrillation, chronic    Tremor, essential    Elevated serum creatinine    Closed compression fracture of L2 lumbar vertebra, initial encounter    73 y/o female w/ hx of CVA, HTN, HLD, GERD, afib on chronic anticoagulation with admission to [facility] [place] [facility] last month for UTI here now w/  Altered mental status  -UA not c/w UTI  -head CT not revealing  -no new meds though on several  potentially sedating meds  -not hypoxic in ER  - concerned regarding possible stroke so will ask stroke team to see as pt with worsening left sided weakness, speech changes, confusion  -neuro checks  -if stroke eval negative, can consult general neurology given chronicity with tremor, confusion, flat affect   2. Elevated creatinine  -may be related to recent abx for uti as well as poor po intake and pt's report of n/v/d  -lactate normal  -gentle fluids given in ER  -repeat bmp in a.m.  3. Afib, chronic on eliquis  -rate controlled  -continue bb and ccb w/ hold parameters  -holding eliquis for now pending stroke eval   4. L2 compression fracture  -pt notes bending over and hearing pop in back; no uncontrollable pain  -outpt follow up                VTE Prophylaxis:  Mechanical VTE prophylaxis orders are signed & held.            CODE STATUS:    Code Status and Medical Interventions: CPR (Attempt to Resuscitate); Full Support   Ordered at: 02/10/25 2338     Code Status (Patient has no pulse and is not breathing):    CPR (Attempt to Resuscitate)     Medical Interventions (Patient has pulse or is breathing):    Full Support       Expected Discharge   Expected discharge date/ time has not been documented.     Lucy Crouch MD  02/10/25  Electronically signed by Lucy Crouch MD, 02/10/25, 11:49 PM EST.

## 2025-02-11 NOTE — ED PROVIDER NOTES
Subjective   History of Present Illness  Patient is a pleasant 74-year-old female presents to the emergency department, brought in by her , for auditory hallucinations and confusion.  Patient has a history of CVA with baseline left-sided paralysis.  She is a wheelchair-bound secondary to this.  CVA was approximately 9 years ago.  As of late she has been having auditory hallucinations and hearing people talking in the house.  The voices are not telling her anything specific she just hears voices in another room almost like a television being turned on or other nonspecific conversation.  She has had the symptoms intermittently in the past, often times associated with urinary tract infections.  She was diagnosed with UTI approximately 2 weeks ago and  states that the urine is cleared and is visibly clear now.  The patient denies acute pain and patient actually states that she does not feel that she has been confused although she does admit to hearing these conversations in other rooms.  Denies fever, chills, chest pain, shortness of breath, abdominal pain, vomiting, diarrhea, or other acute complaint.        Review of Systems   All other systems reviewed and are negative.      Past Medical History:   Diagnosis Date    Arthritis     Asthma     Atrial fibrillation     Cardiac disorder     Closed fracture of neck of left femur 2/27/2017    Disease of thyroid gland     Fibromyalgia     GERD (gastroesophageal reflux disease)     Hyperlipidemia     Hypertension     Migraine     Neuropathy     Stroke        Allergies   Allergen Reactions    Cephalexin Swelling     Tolerates cefdinir    Penicillins Hives     Tolerates cefdinir    Sulfa Antibiotics Hives       Past Surgical History:   Procedure Laterality Date    CHOLECYSTECTOMY      HEMORRHOIDECTOMY N/A 7/23/2021    Procedure: HEMORRHOIDECTOMY;  Surgeon: Adan Meadows MD;  Location: [facility];  Service: General;  Laterality: N/A;    HIP PERCUTANEOUS PINNING  Left 2/28/2017    Procedure: LEFT HIP PERCUTANEOUS PINNING FEMORAL NECK;  Surgeon: Ezra Barlow MD;  Location:  [facility];  Service:     HYSTERECTOMY      [place] [facility] HIP DISLOCATION TRAUMATIC W/O ANESTHESIA Left 2/28/2017    Procedure: LEFT HIP CLOSED REDUCTION;  Surgeon: Ezra Barlow MD;  Location:  [facility];  Service: Orthopedics    SHOULDER SURGERY         Family History   Problem Relation Age of Onset    Alcohol abuse Mother     Heart disease Mother         Cardiac Disorder    Stroke Mother     Hypertension Mother     Parkinsonism Mother     Colon cancer Maternal Grandmother     Diabetes Maternal Aunt        Social History     Socioeconomic History    Marital status:    Tobacco Use    Smoking status: Never    Smokeless tobacco: Never   Vaping Use    Vaping status: Never Used   Substance and Sexual Activity    Alcohol use: No    Drug use: No    Sexual activity: Defer           Objective   Physical Exam  Vitals and nursing note reviewed.   Constitutional:       Appearance: She is well-developed.   HENT:      Head: Normocephalic and atraumatic.      Mouth/Throat:      Mouth: Mucous membranes are moist.   Eyes:      Extraocular Movements: Extraocular movements intact.      Pupils: Pupils are equal, round, and reactive to light.   Cardiovascular:      Rate and Rhythm: Normal rate and regular rhythm.      Heart sounds: Normal heart sounds.   Pulmonary:      Effort: Pulmonary effort is normal.      Breath sounds: Normal breath sounds.   Abdominal:      General: Bowel sounds are normal.      Palpations: Abdomen is soft.   Musculoskeletal:         General: Normal range of motion.   Skin:     General: Skin is warm and dry.      Capillary Refill: Capillary refill takes less than 2 seconds.   Neurological:      Mental Status: She is alert and oriented to person, place, and time. Mental status is at baseline.      Comments: Significant left-sided weakness and facial droop.  Baseline from CVA 9 years ago.    Psychiatric:         Mood and Affect: Mood normal.         Behavior: Behavior normal.         Procedures           ED Course      Recent Results (from the past 24 hours)   Comprehensive Metabolic Panel    Collection Time: 02/10/25  4:33 PM    Specimen: Blood   Result Value Ref Range    Glucose 141 (H) 65 - 99 mg/dL    BUN 33 (H) 8 - 23 mg/dL    Creatinine 2.90 (H) 0.57 - 1.00 mg/dL    Sodium 141 136 - 145 mmol/L    Potassium 3.9 3.5 - 5.2 mmol/L    Chloride 105 98 - 107 mmol/L    CO2 20.0 (L) 22.0 - 29.0 mmol/L    Calcium 8.9 8.6 - 10.5 mg/dL    Total Protein 8.9 (H) 6.0 - 8.5 g/dL    Albumin 3.9 3.5 - 5.2 g/dL    ALT (SGPT) 7 1 - 33 U/L    AST (SGOT) 15 1 - 32 U/L    Alkaline Phosphatase 88 39 - 117 U/L    Total Bilirubin 0.5 0.0 - 1.2 mg/dL    Globulin 5.0 gm/dL    A/G Ratio 0.8 g/dL    BUN/Creatinine Ratio 11.4 7.0 - 25.0    Anion Gap 16.0 (H) 5.0 - 15.0 mmol/L    eGFR 16.7 (L) >60.0 mL/min/1.73   High Sensitivity Troponin T    Collection Time: 02/10/25  4:33 PM    Specimen: Blood   Result Value Ref Range    HS Troponin T 23 (H) <14 ng/L   Magnesium    Collection Time: 02/10/25  4:33 PM    Specimen: Blood   Result Value Ref Range    Magnesium 2.0 1.6 - 2.4 mg/dL   Green Top (Gel)    Collection Time: 02/10/25  4:33 PM   Result Value Ref Range    Extra Tube Hold for add-ons.    Lavender Top    Collection Time: 02/10/25  4:33 PM   Result Value Ref Range    Extra Tube hold for add-on    Gold Top - SST    Collection Time: 02/10/25  4:33 PM   Result Value Ref Range    Extra Tube Hold for add-ons.    Gray Top    Collection Time: 02/10/25  4:33 PM   Result Value Ref Range    Extra Tube Hold for add-ons.    Light Blue Top    Collection Time: 02/10/25  4:33 PM   Result Value Ref Range    Extra Tube Hold for add-ons.    CBC Auto Differential    Collection Time: 02/10/25  4:33 PM    Specimen: Blood   Result Value Ref Range    WBC 11.78 (H) 3.40 - 10.80 10*3/mm3    RBC 5.00 3.77 - 5.28 10*6/mm3    Hemoglobin 13.4 12.0 -  15.9 g/dL    Hematocrit 44.1 34.0 - 46.6 %    MCV 88.2 79.0 - 97.0 fL    MCH 26.8 26.6 - 33.0 pg    MCHC 30.4 (L) 31.5 - 35.7 g/dL    RDW 15.2 12.3 - 15.4 %    RDW-SD 49.0 37.0 - 54.0 fl    MPV 11.9 6.0 - 12.0 fL    Platelets 284 140 - 450 10*3/mm3    Neutrophil % 81.8 (H) 42.7 - 76.0 %    Lymphocyte % 9.8 (L) 19.6 - 45.3 %    Monocyte % 6.5 5.0 - 12.0 %    Eosinophil % 1.2 0.3 - 6.2 %    Basophil % 0.3 0.0 - 1.5 %    Immature Grans % 0.4 0.0 - 0.5 %    Neutrophils, Absolute 9.64 (H) 1.70 - 7.00 10*3/mm3    Lymphocytes, Absolute 1.15 0.70 - 3.10 10*3/mm3    Monocytes, Absolute 0.76 0.10 - 0.90 10*3/mm3    Eosinophils, Absolute 0.14 0.00 - 0.40 10*3/mm3    Basophils, Absolute 0.04 0.00 - 0.20 10*3/mm3    Immature Grans, Absolute 0.05 0.00 - 0.05 10*3/mm3    nRBC 0.0 0.0 - 0.2 /100 WBC   Lactic Acid, Plasma    Collection Time: 02/10/25  4:33 PM    Specimen: Blood   Result Value Ref Range    Lactate 1.3 0.5 - 2.0 mmol/L   High Sensitivity Troponin T 1Hr    Collection Time: 02/10/25  6:24 PM    Specimen: Blood   Result Value Ref Range    HS Troponin T 20 (H) <14 ng/L    Troponin T Numeric Delta -3 ng/L    Troponin T % Delta -13 Abnormal if >/= 20%   proBNP    Collection Time: 02/10/25  6:24 PM    Specimen: Blood   Result Value Ref Range    proBNP 3,842.0 (H) 0.0 - 900.0 pg/mL   Procalcitonin    Collection Time: 02/10/25  6:24 PM    Specimen: Blood   Result Value Ref Range    Procalcitonin 0.11 0.00 - 0.25 ng/mL   Urinalysis With Culture If Indicated - Straight Cath    Collection Time: 02/10/25  9:01 PM    Specimen: Straight Cath; Urine   Result Value Ref Range    Color, UA Yellow Yellow, Straw    Appearance, UA Clear Clear    pH, UA 5.5 5.0 - 8.0    Specific Gravity, UA 1.014 1.001 - 1.030    Glucose, UA Negative Negative    Ketones, UA Negative Negative    Bilirubin, UA Negative Negative    Blood, UA Trace (A) Negative    Protein, UA Negative Negative    Leuk Esterase, UA Moderate (2+) (A) Negative    Nitrite, UA  Negative Negative    Urobilinogen, UA 0.2 E.U./dL 0.2 - 1.0 E.U./dL   Urinalysis, Microscopic Only - Straight Cath    Collection Time: 02/10/25  9:01 PM    Specimen: Straight Cath; Urine   Result Value Ref Range    RBC, UA None Seen None Seen, 0-2 /HPF    WBC, UA 11-20 (A) None Seen, 0-2 /HPF    Bacteria, UA None Seen None Seen, Trace /HPF    Squamous Epithelial Cells, UA 13-20 (A) None Seen, 0-2 /HPF    Hyaline Casts, UA None Seen 0 - 6 /LPF    Methodology Manual Light Microscopy    Urine Culture - Urine, Straight Cath    Collection Time: 02/10/25  9:01 PM    Specimen: Straight Cath; Urine   Result Value Ref Range    Urine Culture No growth    Hemoglobin A1c    Collection Time: 02/11/25  6:36 AM    Specimen: Blood   Result Value Ref Range    Hemoglobin A1C 5.20 4.80 - 5.60 %   CBC Auto Differential    Collection Time: 02/11/25  6:36 AM    Specimen: Blood   Result Value Ref Range    WBC 10.73 3.40 - 10.80 10*3/mm3    RBC 4.12 3.77 - 5.28 10*6/mm3    Hemoglobin 11.1 (L) 12.0 - 15.9 g/dL    Hematocrit 36.8 34.0 - 46.6 %    MCV 89.3 79.0 - 97.0 fL    MCH 26.9 26.6 - 33.0 pg    MCHC 30.2 (L) 31.5 - 35.7 g/dL    RDW 15.1 12.3 - 15.4 %    RDW-SD 49.5 37.0 - 54.0 fl    MPV 12.1 (H) 6.0 - 12.0 fL    Platelets 215 140 - 450 10*3/mm3    Neutrophil % 71.1 42.7 - 76.0 %    Lymphocyte % 16.9 (L) 19.6 - 45.3 %    Monocyte % 9.6 5.0 - 12.0 %    Eosinophil % 1.7 0.3 - 6.2 %    Basophil % 0.4 0.0 - 1.5 %    Immature Grans % 0.3 0.0 - 0.5 %    Neutrophils, Absolute 7.64 (H) 1.70 - 7.00 10*3/mm3    Lymphocytes, Absolute 1.81 0.70 - 3.10 10*3/mm3    Monocytes, Absolute 1.03 (H) 0.10 - 0.90 10*3/mm3    Eosinophils, Absolute 0.18 0.00 - 0.40 10*3/mm3    Basophils, Absolute 0.04 0.00 - 0.20 10*3/mm3    Immature Grans, Absolute 0.03 0.00 - 0.05 10*3/mm3    nRBC 0.0 0.0 - 0.2 /100 WBC   Comprehensive Metabolic Panel    Collection Time: 02/11/25  6:36 AM    Specimen: Blood   Result Value Ref Range    Glucose 95 65 - 99 mg/dL    BUN 33 (H) 8  - 23 mg/dL    Creatinine 2.78 (H) 0.57 - 1.00 mg/dL    Sodium 139 136 - 145 mmol/L    Potassium 4.2 3.5 - 5.2 mmol/L    Chloride 109 (H) 98 - 107 mmol/L    CO2 20.0 (L) 22.0 - 29.0 mmol/L    Calcium 8.4 (L) 8.6 - 10.5 mg/dL    Total Protein 6.4 6.0 - 8.5 g/dL    Albumin 2.8 (L) 3.5 - 5.2 g/dL    ALT (SGPT) 7 1 - 33 U/L    AST (SGOT) 18 1 - 32 U/L    Alkaline Phosphatase 69 39 - 117 U/L    Total Bilirubin 0.4 0.0 - 1.2 mg/dL    Globulin 3.6 gm/dL    A/G Ratio 0.8 g/dL    BUN/Creatinine Ratio 11.9 7.0 - 25.0    Anion Gap 10.0 5.0 - 15.0 mmol/L    eGFR 17.6 (L) >60.0 mL/min/1.73   Magnesium    Collection Time: 02/11/25  6:36 AM    Specimen: Blood   Result Value Ref Range    Magnesium 1.7 1.6 - 2.4 mg/dL   Phosphorus    Collection Time: 02/11/25  6:36 AM    Specimen: Blood   Result Value Ref Range    Phosphorus 4.5 2.5 - 4.5 mg/dL   Lipid Panel    Collection Time: 02/11/25  6:36 AM    Specimen: Blood   Result Value Ref Range    Total Cholesterol 124 0 - 200 mg/dL    Triglycerides 88 0 - 150 mg/dL    HDL Cholesterol 40 40 - 60 mg/dL    LDL Cholesterol  67 0 - 100 mg/dL    VLDL Cholesterol 17 5 - 40 mg/dL    LDL/HDL Ratio 1.66    TSH    Collection Time: 02/11/25  6:36 AM    Specimen: Blood   Result Value Ref Range    TSH 3.250 0.270 - 4.200 uIU/mL   POC Glucose Once    Collection Time: 02/11/25  6:36 AM    Specimen: Blood   Result Value Ref Range    Glucose 84 70 - 130 mg/dL   Adult Transthoracic Echo Complete W/ Cont if Necessary Per Protocol (With Agitated Saline)    Collection Time: 02/11/25  9:35 AM   Result Value Ref Range    EF(MOD-bp) 51.3 %    LVIDd 4.5 cm    LVIDs 2.7 cm    IVSd 0.90 cm    LVPWd 1.00 cm    FS 40.0 %    IVS/LVPW 0.90 cm    ESV(cubed) 19.7 ml    EDV(cubed) 91.1 ml    LV mass(C)d 142.9 grams    LVOT area 3.1 cm2    LVOT diam 2.00 cm    EDV(MOD-sp2) 52.3 ml    EDV(MOD-sp4) 73.2 ml    ESV(MOD-sp2) 26.1 ml    ESV(MOD-sp4) 35.1 ml    SV(MOD-sp2) 26.2 ml    SV(MOD-sp4) 38.1 ml    EF(MOD-sp2) 50.1 %     EF(MOD-sp4) 52.0 %    MV E max osmin 191.0 cm/sec    MV dec time 0.67 sec    LA ESV Index (BP) 40.2 ml/m2    Med Peak E' Osmin 14.3 cm/sec    Lat Peak E' Osmin 9.3 cm/sec    TR max osmin 225.0 cm/sec    Avg E/e' ratio 16.19     SV(LVOT) 50.0 ml    RV Base 4.7 cm    RV Mid 3.5 cm    RV Length 6.1 cm    TAPSE (>1.6) 1.72 cm    RV S' 12.7 cm/sec    LA dimension (2D)  4.6 cm    LV V1 max 82.5 cm/sec    LV V1 max PG 2.7 mmHg    LV V1 mean PG 1.00 mmHg    LV V1 VTI 15.9 cm    Ao pk osmin 124.0 cm/sec    Ao max PG 6.2 mmHg    Ao mean PG 3.5 mmHg    Ao V2 VTI 26.7 cm    CHIRAG(I,D) 1.87 cm2    MV max PG 15.00 mmHg    MV mean PG 7.00 mmHg    MV V2 VTI 63.7 cm    MV P1/2t 242.6 msec    MVA(P1/2t) 0.91 cm2    MVA(VTI) 0.78 cm2    MV dec slope 227.0 cm/sec2    TR max PG 20.3 mmHg    RVSP(TR) 23.3 mmHg    RAP systole 3.0 mmHg    PA acc time 0.08 sec    PI end-d osmin 144.0 cm/sec    Ao root diam 2.5 cm    Echo EF Estimated 50.0 %   POC Glucose Once    Collection Time: 02/11/25 11:38 AM    Specimen: Blood   Result Value Ref Range    Glucose 87 70 - 130 mg/dL     Note: In addition to lab results from this visit, the labs listed above may include labs taken at another facility or during a different encounter within the last 24 hours. Please correlate lab times with ED admission and discharge times for further clarification of the services performed during this visit.    MRI Brain Without Contrast   Final Result   Impression:   1.No evidence of hemorrhage, mass effect or midline shift. No evidence of recent or acute ischemia.   2.Mild to moderate periventricular and subcortical FLAIR signal changes likely related to chronic microvascular ischemic change. Stable encephalomalacia present within the right basal ganglia and posterior right frontal lobe related to previous infarct.                  Electronically Signed: Tessy Love MD     2/11/2025 1:11 AM EST     Workstation ID: [id number]      CT Abdomen Pelvis Without Contrast   Final Result      1.  No acute abdominopelvic process. No obstructive uropathy   2. Colonic diverticulosis   3. Acute or subacute L2 superior endplate compression fracture                              Electronically Signed: Silverio Chopra     2/10/2025 10:03 PM EST     Workstation ID: [id number]      XR Chest 1 View   Final Result   Impression:      1. Hypoinspiratory film demonstrating pulmonary vascular congestion with no gross pulmonary edema. There are suspected trace pleural effusions.   2. Left basilar airspace disease which may be atelectasis or pneumonia         Electronically Signed: Silverio Chopra     2/10/2025 3:44 PM EST     Workstation ID: [id number]      CT Head Without Contrast   Final Result   Impression:   1.No acute intracranial process identified.   2.Encephalomalacia within right basal ganglia and adjacent right frontal lobe.   3.Mild frothy mucosal disease within left sphenoid sinus. Correlate for acute sinusitis.            Electronically Signed: Tang Johnson MD     2/10/2025 3:13 PM EST     Workstation ID: [id number]        Vitals:    02/11/25 0840 02/11/25 1020 02/11/25 1100 02/11/25 1135   BP: 118/81 116/84  107/58   BP Location:  Right arm  Right arm   Patient Position:  Lying  Lying   Pulse: 60 66 63 58   Resp:  18  18   Temp:  97.9 °F (36.6 °C)  97.9 °F (36.6 °C)   TempSrc:  Oral  Oral   SpO2:  98% 100% 100%   Weight:       Height:         Medications   sodium chloride 0.9 % flush 10 mL (has no administration in time range)   sodium chloride 0.9 % flush 10 mL (10 mL Intravenous Not Given 2/11/25 0844)   sodium chloride 0.9 % flush 10 mL (has no administration in time range)   sodium chloride 0.9 % infusion 40 mL (has no administration in time range)   atorvastatin (LIPITOR) tablet 10 mg (10 mg Oral Given 2/11/25 0323)   albuterol (PROVENTIL) nebulizer solution 0.042% 1.25 mg/3mL (has no administration in time range)   dilTIAZem CD (CARDIZEM CD) 24 hr capsule 240 mg (240 mg Oral Given 2/11/25 0840)    budesonide-formoterol (SYMBICORT) 160-4.5 MCG/ACT inhaler 2 puff ( Inhalation Canceled Entry 2/11/25 1212)   levothyroxine (SYNTHROID, LEVOTHROID) tablet 137 mcg (137 mcg Oral Given 2/11/25 0637)   pantoprazole (PROTONIX) EC tablet 40 mg (40 mg Oral Given 2/11/25 0636)   topiramate (TOPAMAX) tablet 100 mg (100 mg Oral Given 2/11/25 1217)   sodium chloride 0.9 % flush 10 mL (10 mL Intravenous Given 2/11/25 0843)   sodium chloride 0.9 % flush 10 mL (has no administration in time range)   sodium chloride 0.9 % infusion 40 mL (has no administration in time range)   nitroglycerin (NITROSTAT) SL tablet 0.4 mg (has no administration in time range)   Potassium Replacement - Follow Nurse / BPA Driven Protocol (has no administration in time range)   Magnesium Standard Dose Replacement - Follow Nurse / BPA Driven Protocol (has no administration in time range)   Phosphorus Replacement - Follow Nurse / BPA Driven Protocol (has no administration in time range)   Calcium Replacement - Follow Nurse / BPA Driven Protocol (has no administration in time range)   acetaminophen (TYLENOL) tablet 650 mg (has no administration in time range)     Or   acetaminophen (TYLENOL) 160 MG/5ML oral solution 650 mg (has no administration in time range)     Or   acetaminophen (TYLENOL) suppository 650 mg (has no administration in time range)   sennosides-docusate (PERICOLACE) 8.6-50 MG per tablet 2 tablet (has no administration in time range)     And   polyethylene glycol (MIRALAX) packet 17 g (has no administration in time range)     And   bisacodyl (DULCOLAX) EC tablet 5 mg (has no administration in time range)     And   bisacodyl (DULCOLAX) suppository 10 mg (has no administration in time range)   aspirin chewable tablet 81 mg (81 mg Oral Given 2/11/25 0137)     Or   aspirin suppository 300 mg ( Rectal Not Given:  See Alt 2/11/25 0137)   metoprolol tartrate (LOPRESSOR) half tablet 12.5 mg (has no administration in time range)   pregabalin  (LYRICA) capsule 300 mg (has no administration in time range)   sodium chloride 0.9 % bolus 1,000 mL (0 mL Intravenous Stopped 2/11/25 0007)     ECG/EMG Results (last 24 hours)       Procedure Component Value Units Date/Time    Adult Transthoracic Echo Complete W/ Cont if Necessary Per Protocol (With Agitated Saline) [[id number]] Resulted: 02/11/25 1204     Updated: 02/11/25 1204     EF(MOD-bp) 51.3 %      LVIDd 4.5 cm      LVIDs 2.7 cm      IVSd 0.90 cm      LVPWd 1.00 cm      FS 40.0 %      IVS/LVPW 0.90 cm      ESV(cubed) 19.7 ml      EDV(cubed) 91.1 ml      LV mass(C)d 142.9 grams      LVOT area 3.1 cm2      LVOT diam 2.00 cm      EDV(MOD-sp2) 52.3 ml      EDV(MOD-sp4) 73.2 ml      ESV(MOD-sp2) 26.1 ml      ESV(MOD-sp4) 35.1 ml      SV(MOD-sp2) 26.2 ml      SV(MOD-sp4) 38.1 ml      EF(MOD-sp2) 50.1 %      EF(MOD-sp4) 52.0 %      MV E max osmin 191.0 cm/sec      MV dec time 0.67 sec      LA ESV Index (BP) 40.2 ml/m2      Med Peak E' Osmin 14.3 cm/sec      Lat Peak E' Osmin 9.3 cm/sec      TR max osmin 225.0 cm/sec      Avg E/e' ratio 16.19     SV(LVOT) 50.0 ml      RV Base 4.7 cm      RV Mid 3.5 cm      RV Length 6.1 cm      TAPSE (>1.6) 1.72 cm      RV S' 12.7 cm/sec      LA dimension (2D)  4.6 cm      LV V1 max 82.5 cm/sec      LV V1 max PG 2.7 mmHg      LV V1 mean PG 1.00 mmHg      LV V1 VTI 15.9 cm      Ao pk osmin 124.0 cm/sec      Ao max PG 6.2 mmHg      Ao mean PG 3.5 mmHg      Ao V2 VTI 26.7 cm      CHIRAG(I,D) 1.87 cm2      MV max PG 15.00 mmHg      MV mean PG 7.00 mmHg      MV V2 VTI 63.7 cm      MV P1/2t 242.6 msec      MVA(P1/2t) 0.91 cm2      MVA(VTI) 0.78 cm2      MV dec slope 227.0 cm/sec2      TR max PG 20.3 mmHg      RVSP(TR) 23.3 mmHg      RAP systole 3.0 mmHg      PA acc time 0.08 sec      PI end-d osmin 144.0 cm/sec      Ao root diam 2.5 cm      Echo EF Estimated 50.0 %     Narrative:        Left ventricular systolic function is normal. Estimated left   ventricular EF = 50%    The right ventricular cavity is  mildly dilated.    Moderate mitral valve regurgitation is present.    Mild aortic valve regurgitation is present.      ECG 12 Lead ED Triage Standing Order; Weak / Dizzy / AMS [[id number]] Collected: 02/10/25 1410     Updated: 02/11/25 1316     QT Interval 386 ms      QTC Interval 461 ms     Narrative:      Test Reason : ED Triage Standing Order~  Blood Pressure :   */*   mmHG  Vent. Rate :  86 BPM     Atrial Rate :  76 BPM     P-R Int :   * ms          QRS Dur :  78 ms      QT Int : 386 ms       P-R-T Axes :   *   1  20 degrees    QTcB Int : 461 ms    Atrial fibrillation  Nonspecific ST and T wave abnormality  Abnormal ECG  When compared with ECG of 07-Sep-2023 05:31,  Questionable change in QRS axis  Nonspecific T wave abnormality, improved in Inferior leads  Nonspecific T wave abnormality has replaced inverted T waves in Anterior  leads  Confirmed by MD BELTRE CORY ([id number]) on 2/11/2025 1:15:25 PM    Referred By: MD ED           Confirmed By: DONNELL BELTRE MD          ECG 12 Lead ED Triage Standing Order; Weak / Dizzy / AMS   Final Result   Test Reason : ED Triage Standing Order~   Blood Pressure :   */*   mmHG   Vent. Rate :  86 BPM     Atrial Rate :  76 BPM      P-R Int :   * ms          QRS Dur :  78 ms       QT Int : 386 ms       P-R-T Axes :   *   1  20 degrees     QTcB Int : 461 ms      Atrial fibrillation   Nonspecific ST and T wave abnormality   Abnormal ECG   When compared with ECG of 07-Sep-2023 05:31,   Questionable change in QRS axis   Nonspecific T wave abnormality, improved in Inferior leads   Nonspecific T wave abnormality has replaced inverted T waves in Anterior   leads   Confirmed by MD BELTRE CORY ([id number]) on 2/11/2025 1:15:25 PM      Referred By: MD ED           Confirmed By: DONNELL BELTRE MD                                                           Medical Decision Making  Complex presentation with complex decision making. Differential including CVA, acute psychosis, baseline poor cognition,  hypertensive encephalopathy, medication reaction, drug reaction, seizure, ICH, MS, UTI, Metabolic disturbance, MI, PNA, and additional acute, emergent conditions considered. Extensive workup including imaging and labs, as discussed separately, performed and emergent condition managed.      Problems Addressed:  BARBARA (acute kidney injury): complicated acute illness or injury  Auditory hallucination: complicated acute illness or injury  Confusion: complicated acute illness or injury  Recent urinary tract infection: complicated acute illness or injury    Amount and/or Complexity of Data Reviewed  External Data Reviewed: notes.  Labs: ordered. Decision-making details documented in ED Course.  Radiology: ordered and independent interpretation performed. Decision-making details documented in ED Course.  ECG/medicine tests: ordered and independent interpretation performed. Decision-making details documented in ED Course.    Risk  Prescription drug management.  Decision regarding hospitalization.        Final diagnoses:   Confusion   Auditory hallucination   Recent urinary tract infection   BARBARA (acute kidney injury)       ED Disposition  ED Disposition       ED Disposition   Decision to Admit    Condition   --    Comment   Level of Care: Telemetry [5]   Diagnosis: Altered mental status [780.97.ICD-9-CM]   Admitting Physician: ANGELA KEYS [[id number]]   Attending Physician: ANGELA KEYS [[id number]]                    Eliot Recio DO  02/11/25 1456

## 2025-02-11 NOTE — PROGRESS NOTES
"    [facility] Medicine Services  PROGRESS NOTE    Patient Name: Vickie Joshi  : 1950  MRN: [id number]    Date of Admission: 2/10/2025  Primary Care Physician: Dav Bryan MD    Subjective   Subjective     CC:  confusion    HPI:  Patient sitting in ED bed, alone, no family. Answers all my orientation questions appropriately. Reports her  \"made her come\". Feels essentially normal this morning and has no other complaints or questions    Ros  As above      Objective   Objective     Vital Signs:   Temp:  [97.9 °F (36.6 °C)-98.7 °F (37.1 °C)] 97.9 °F (36.6 °C)  Heart Rate:  [] 58  Resp:  [16-18] 18  BP: ()/(54-96) 107/58     Physical Exam:  GEN: NAD, resting in bed, awake, pleasant  HEENT: on room air, atraumatic, normocephalic  NECK: supple, no masses  RESP: on room air, normal effort  CV: on tele, sinus rhythm  PSYCH: normal affect, appropriate  NEURO: awake, alert, oriented and answers questions appropriately  MSK: no edema noted  SKIN: no rashes noted       Results Reviewed:  LAB RESULTS:      Lab 25  0636 02/10/25  1824 02/10/25  1633   WBC 10.73  --  11.78*   HEMOGLOBIN 11.1*  --  13.4   HEMATOCRIT 36.8  --  44.1   PLATELETS 215  --  284   NEUTROS ABS 7.64*  --  9.64*   IMMATURE GRANS (ABS) 0.03  --  0.05   LYMPHS ABS 1.81  --  1.15   MONOS ABS 1.03*  --  0.76   EOS ABS 0.18  --  0.14   MCV 89.3  --  88.2   PROCALCITONIN  --  0.11  --    LACTATE  --   --  1.3   HSTROP T  --  20* 23*         Lab 25  0636 02/10/25  1633   SODIUM 139 141   POTASSIUM 4.2 3.9   CHLORIDE 109* 105   CO2 20.0* 20.0*   ANION GAP 10.0 16.0*   BUN 33* 33*   CREATININE 2.78* 2.90*   EGFR 17.6* 16.7*   GLUCOSE 95 141*   CALCIUM 8.4* 8.9   MAGNESIUM 1.7 2.0   PHOSPHORUS 4.5  --    HEMOGLOBIN A1C 5.20  --    TSH 3.250  --          Lab 25  0636 02/10/25  1633   TOTAL PROTEIN 6.4 8.9*   ALBUMIN 2.8* 3.9   GLOBULIN 3.6 5.0   ALT (SGPT) 7 7   AST (SGOT) 18 15   BILIRUBIN " 0.4 0.5   ALK PHOS 69 88         Lab 02/10/25  1824 02/10/25  1633   PROBNP 3,842.0*  --    HSTROP T 20* 23*         Lab 02/11/25  0636   CHOLESTEROL 124   LDL CHOL 67   HDL CHOL 40   TRIGLYCERIDES 88             Brief Urine Lab Results  (Last result in the past 365 days)        Color   Clarity   Blood   Leuk Est   Nitrite   Protein   CREAT   Urine HCG        02/10/25 2101 Yellow   Clear   Trace   Moderate (2+)   Negative   Negative                   Microbiology Results Abnormal       None            MRI Brain Without Contrast    Result Date: 2/11/2025  MRI BRAIN WO CONTRAST Date of Exam: 2/11/2025 12:48 AM EST Indication: AMS, left weakness.  Comparison: 2/10/2025. Technique:  Routine multiplanar/multisequence sequence images of the brain were obtained without contrast administration. Findings: There is no diffusion restriction to suggest acute infarct. There is no evidence of acute or chronic intracranial hemorrhage. No mass effect or midline shift. Stable encephalomalacia is present within the right basal ganglia and posterior right frontal lobe related to previous infarct. Surrounding gliosis is present with FLAIR signal changes. Mild to moderate periventricular and subcortical FLAIR signal changes are present. No abnormal extra-axial collections. The major vascular flow voids appear intact. The basal ganglia, brainstem and cerebellum appear within normal limits. Calvarial and superficial soft tissue signal is within normal limits. Orbits appear unremarkable. The paranasal sinuses and the mastoid air cells appear well aerated. Midline structures are intact.     Impression: Impression: 1.No evidence of hemorrhage, mass effect or midline shift. No evidence of recent or acute ischemia. 2.Mild to moderate periventricular and subcortical FLAIR signal changes likely related to chronic microvascular ischemic change. Stable encephalomalacia present within the right basal ganglia and posterior right frontal lobe related  to previous infarct. Electronically Signed: Tessy Love MD  2/11/2025 1:11 AM EST  Workstation ID: [id number]    CT Abdomen Pelvis Without Contrast    Result Date: 2/10/2025  CT ABDOMEN PELVIS WO CONTRAST Date of Exam: 2/10/2025 9:41 PM EST Indication: BARBARA. Comparison: 9/5/2023 Technique: Axial CT images were obtained of the abdomen and pelvis without the administration of contrast. Reconstructed coronal and sagittal images were also obtained. Automated exposure control and iterative construction methods were used. Findings: Lower Chest: Distortion of the lung bases due to patient motion. Atelectasis present in the lower lobes. Chronic pleural thickening present on the left Organs: Some distortion of the solid organs due to patient motion. No urinary calculi or hydronephrosis. Kidneys, adrenal glands, liver, spleen, pancreas have a grossly unremarkable noncontrast appearance within the limits of motion artifact. Gallbladder  surgically absent Gastrointestinal: No intestinal distention or evident wall thickening. Diverticula of the sigmoid colon without inflammation. Appendix not visualized Pelvis: No abnormal fluid collection. Uterus surgically absent. Urinary bladder unremarkable Peritoneum/Retroperitoneum: No ascites or pneumoperitoneum. Normal caliber aorta Bones/Soft Tissues: New L2 superior endplate compression with sclerosis deep to the endplate which may be due trabecular compression or healing     Impression: 1. No acute abdominopelvic process. No obstructive uropathy 2. Colonic diverticulosis 3. Acute or subacute L2 superior endplate compression fracture Electronically Signed: Silverio Chopra  2/10/2025 10:03 PM EST  Workstation ID: [id number]    XR Chest 1 View    Result Date: 2/10/2025  XR CHEST 1 VW Date of Exam: 2/10/2025 3:19 PM EST Indication: Weak/Dizzy/AMS triage protocol Comparison: 9/6/2023 Findings: Patient rotated to the right. Low lung volumes. Heart size indeterminate. Prominence of the central  pulmonary vessels. No gross pulmonary edema. Airspace disease in the left lung base. Minimal blunting of the costophrenic angles     Impression: Impression: 1. Hypoinspiratory film demonstrating pulmonary vascular congestion with no gross pulmonary edema. There are suspected trace pleural effusions. 2. Left basilar airspace disease which may be atelectasis or pneumonia Electronically Signed: Silverio Nav  2/10/2025 3:44 PM EST  Workstation ID: [id number]    CT Head Without Contrast    Result Date: 2/10/2025  CT HEAD WO CONTRAST Date of Exam: 2/10/2025 2:50 PM EST Indication: confusion, hallucinations, history of stroke. Comparison: February 27, 2017 Technique: Axial CT images were obtained of the head without contrast administration.  Automated exposure control and iterative construction methods were used. Findings: No acute intracranial hemorrhage or extra-axial collection is identified. The ventricles appear stable in caliber, with no evidence of mass effect or midline shift. The basal cisterns appear patent. The gray-white differentiation appears preserved. There  is encephalomalacia within the right basal ganglia and adjacent right frontal lobe. The calvarium appear intact. There is mild frothy mucosal disease in the left sphenoid sinus. The mastoid air cells are well-aerated.     Impression: Impression: 1.No acute intracranial process identified. 2.Encephalomalacia within right basal ganglia and adjacent right frontal lobe. 3.Mild frothy mucosal disease within left sphenoid sinus. Correlate for acute sinusitis. Electronically Signed: Tang Johnson MD  2/10/2025 3:13 PM EST  Workstation ID: [id number]     Results for orders placed during the hospital encounter of 02/10/25    Adult Transthoracic Echo Complete W/ Cont if Necessary Per Protocol (With Agitated Saline)    Interpretation Summary    Left ventricular systolic function is normal. Estimated left ventricular EF = 50%    The right ventricular cavity is  mildly dilated.    Moderate mitral valve regurgitation is present.    Mild aortic valve regurgitation is present.      Current medications:  Scheduled Meds:aspirin, 81 mg, Oral, Daily   Or  aspirin, 300 mg, Rectal, Daily  atorvastatin, 10 mg, Oral, Nightly  budesonide-formoterol, 2 puff, Inhalation, BID - RT  dilTIAZem CD, 240 mg, Oral, Q24H  levothyroxine, 137 mcg, Oral, Daily  metoprolol tartrate, 12.5 mg, Oral, Q12H  pantoprazole, 40 mg, Oral, Q AM  pregabalin, 300 mg, Oral, BID  sodium chloride, 10 mL, Intravenous, Q12H  sodium chloride, 10 mL, Intravenous, Q12H  topiramate, 100 mg, Oral, Daily      Continuous Infusions:   PRN Meds:.  acetaminophen **OR** acetaminophen **OR** acetaminophen    albuterol    senna-docusate sodium **AND** polyethylene glycol **AND** bisacodyl **AND** bisacodyl    Calcium Replacement - Follow Nurse / BPA Driven Protocol    Magnesium Standard Dose Replacement - Follow Nurse / BPA Driven Protocol    nitroglycerin    Phosphorus Replacement - Follow Nurse / BPA Driven Protocol    Potassium Replacement - Follow Nurse / BPA Driven Protocol    sodium chloride    sodium chloride    sodium chloride    sodium chloride    sodium chloride    Assessment & Plan   Assessment & Plan     Active Hospital Problems    Diagnosis  POA    **Altered mental status [R41.82]  Yes    Elevated serum creatinine [R79.89]  Yes    Closed compression fracture of L2 lumbar vertebra, initial encounter [S32.020A]  Yes    Tremor, essential [G25.0]  Yes    Atrial fibrillation, chronic [I48.20]  Yes    History of hemorrhagic stroke with residual hemiparesis [I69.359]  Not Applicable    Hyperlipidemia [E78.5]  Yes    Hypertension [I10]  Yes      Resolved Hospital Problems   No resolved problems to display.        Brief Hospital Course to date:  Vickie Joshi is a 74 y.o. female with history of CVA, HTN, HLD, GERD, afib who presents altered mental status. Notably had admission to [facility] with UTI last month.      AMS  ?UTI  -UA not c/w UTI  -head CT not revealing  -no new meds though on several potentially sedating meds  -not hypoxic in ER  - concerned regarding possible stroke so will ask stroke team to see as pt with worsening left sided weakness, speech changes, confusion  -neuro checks  -if stroke eval negative, can consult general neurology given chronicity with tremor, confusion, flat affect     BARBARA  -may be related to recent abx for uti as well as poor po intake and pt's report of n/v/d  -lactate normal  -gentle fluids given in ER  -repeat bmp in a.m.    Afib, chronic on eliquis  -rate controlled  -continue bb and ccb w/ hold parameters  -holding eliquis for now pending stroke eval     L2 compression fracture  -pt notes bending over and hearing pop in back; no uncontrollable pain  -outpt follow up    Expected Discharge Location and Transportation: pending above  Expected Discharge   Expected discharge date/ time has not been documented.     VTE Prophylaxis:  Mechanical VTE prophylaxis orders are present.         AM-PAC 6 Clicks Score (PT): 12 (02/11/25 1013)    CODE STATUS:   Code Status and Medical Interventions: CPR (Attempt to Resuscitate); Full Support   Ordered at: 02/10/25 2331     Code Status (Patient has no pulse and is not breathing):    CPR (Attempt to Resuscitate)     Medical Interventions (Patient has pulse or is breathing):    Full Support       Sherley Bowling MD  02/11/25

## 2025-02-11 NOTE — PROGRESS NOTES
Stroke Progress Note       Chief Complaint:  confusion    Subjective    Subjective     Confused,   Able to only follow some commands      Objective      Temp:  [98.7 °F (37.1 °C)] 98.7 °F (37.1 °C)  Heart Rate:  [] 60  Resp:  [16-18] 18  BP: ()/(54-96) 118/81              Alert to self ,   BUE 2/5   Does not move BLE 1/5          Results Review:    I reviewed the patient's new clinical results.    Lab Results (last 24 hours)       Procedure Component Value Units Date/Time    Hemoglobin A1c [[id number]]  (Normal) Collected: 02/11/25 0636    Specimen: Blood Updated: 02/11/25 0910     Hemoglobin A1C 5.20 %     Narrative:      Hemoglobin A1C Ranges:    Increased Risk for Diabetes  5.7% to 6.4%  Diabetes                     >= 6.5%  Diabetic Goal                < 7.0%    Comprehensive Metabolic Panel [[id number]]  (Abnormal) Collected: 02/11/25 0636    Specimen: Blood Updated: 02/11/25 0807     Glucose 95 mg/dL      BUN 33 mg/dL      Creatinine 2.78 mg/dL      Sodium 139 mmol/L      Potassium 4.2 mmol/L      Chloride 109 mmol/L      CO2 20.0 mmol/L      Calcium 8.4 mg/dL      Total Protein 6.4 g/dL      Albumin 2.8 g/dL      ALT (SGPT) 7 U/L      AST (SGOT) 18 U/L      Alkaline Phosphatase 69 U/L      Total Bilirubin 0.4 mg/dL      Globulin 3.6 gm/dL      Comment: Calculated Result        A/G Ratio 0.8 g/dL      BUN/Creatinine Ratio 11.9     Anion Gap 10.0 mmol/L      eGFR 17.6 mL/min/1.73     Narrative:      GFR Categories in Chronic Kidney Disease (CKD)      GFR Category          GFR (mL/min/1.73)    Interpretation  G1                     90 or greater         Normal or high (1)  G2                      60-89                Mild decrease (1)  G3a                   45-59                Mild to moderate decrease  G3b                   30-44                Moderate to severe decrease  G4                    15-29                Severe decrease  G5                    14 or less           Kidney  failure          (1)In the absence of evidence of kidney disease, neither GFR category G1 or G2 fulfill the criteria for CKD.    eGFR calculation 2021 CKD-EPI creatinine equation, which does not include race as a factor    Magnesium [[id number]]  (Normal) Collected: 02/11/25 0636    Specimen: Blood Updated: 02/11/25 0807     Magnesium 1.7 mg/dL     Phosphorus [[id number]]  (Normal) Collected: 02/11/25 0636    Specimen: Blood Updated: 02/11/25 0755     Phosphorus 4.5 mg/dL     Lipid Panel [[id number]] Collected: 02/11/25 0636    Specimen: Blood Updated: 02/11/25 0755     Total Cholesterol 124 mg/dL      Triglycerides 88 mg/dL      HDL Cholesterol 40 mg/dL      LDL Cholesterol  67 mg/dL      VLDL Cholesterol 17 mg/dL      LDL/HDL Ratio 1.66    Narrative:      Cholesterol Reference Ranges  (U.S. Department of Health and Human Services ATP III Classifications)    Desirable          <200 mg/dL  Borderline High    200-239 mg/dL  High Risk          >240 mg/dL      Triglyceride Reference Ranges  (U.S. Department of Health and Human Services ATP III Classifications)    Normal           <150 mg/dL  Borderline High  150-199 mg/dL  High             200-499 mg/dL  Very High        >500 mg/dL    HDL Reference Ranges  (U.S. Department of Health and Human Services ATP III Classifications)    Low     <40 mg/dl (major risk factor for CHD)  High    >60 mg/dl ('negative' risk factor for CHD)        LDL Reference Ranges  (U.S. Department of Health and Human Services ATP III Classifications)    Optimal          <100 mg/dL  Near Optimal     100-129 mg/dL  Borderline High  130-159 mg/dL  High             160-189 mg/dL  Very High        >189 mg/dL    LDL is calculated using the NIH LDL-C calculation.      CBC Auto Differential [[id number]]  (Abnormal) Collected: 02/11/25 0636    Specimen: Blood Updated: 02/11/25 0754     WBC 10.73 10*3/mm3      RBC 4.12 10*6/mm3      Hemoglobin 11.1 g/dL      Hematocrit 36.8 %      MCV 89.3 fL      MCH 26.9 pg   "    MCHC 30.2 g/dL      RDW 15.1 %      RDW-SD 49.5 fl      MPV 12.1 fL      Platelets 215 10*3/mm3      Neutrophil % 71.1 %      Lymphocyte % 16.9 %      Monocyte % 9.6 %      Eosinophil % 1.7 %      Basophil % 0.4 %      Immature Grans % 0.3 %      Neutrophils, Absolute 7.64 10*3/mm3      Lymphocytes, Absolute 1.81 10*3/mm3      Monocytes, Absolute 1.03 10*3/mm3      Eosinophils, Absolute 0.18 10*3/mm3      Basophils, Absolute 0.04 10*3/mm3      Immature Grans, Absolute 0.03 10*3/mm3      nRBC 0.0 /100 WBC     TSH [[id number]]  (Normal) Collected: 02/11/25 0636    Specimen: Blood Updated: 02/11/25 0745     TSH 3.250 uIU/mL     POC Glucose Once [[id number]]  (Normal) Collected: 02/11/25 0636    Specimen: Blood Updated: 02/11/25 0637     Glucose 84 mg/dL     Procalcitonin [[id number]]  (Normal) Collected: 02/10/25 1824    Specimen: Blood Updated: 02/10/25 2237     Procalcitonin 0.11 ng/mL     Narrative:      As a Marker for Sepsis (Non-Neonates):    1. <0.5 ng/mL represents a low risk of severe sepsis and/or septic shock.  2. >2 ng/mL represents a high risk of severe sepsis and/or septic shock.    As a Marker for Lower Respiratory Tract Infections that require antibiotic therapy:    PCT on Admission    Antibiotic Therapy       6-12 Hrs later    >0.5                Strongly Recommended  >0.25 - <0.5        Recommended   0.1 - 0.25          Discouraged              Remeasure/reassess PCT  <0.1                Strongly Discouraged     Remeasure/reassess PCT    As 28 day mortality risk marker: \"Change in Procalcitonin Result\" (>80% or <=80%) if Day 0 (or Day 1) and Day 4 values are available. Refer to [email or web address]://[email or web address]-pct-calculator.[email or web address]    Change in PCT <=80%  A decrease of PCT levels below or equal to 80% defines a positive change in PCT test result representing a higher risk for 28-day all-cause mortality of patients diagnosed with severe sepsis for septic shock.    Change in PCT >80%  A decrease of PCT levels of more " than 80% defines a negative change in PCT result representing a lower risk for 28-day all-cause mortality of patients diagnosed with severe sepsis or septic shock.       proBNP [[id number]]  (Abnormal) Collected: 02/10/25 1824    Specimen: Blood Updated: 02/10/25 2224     proBNP 3,842.0 pg/mL     Narrative:      This assay is used as an aid in the diagnosis of individuals suspected of having heart failure. It can be used as an aid in the diagnosis of acute decompensated heart failure (ADHF) in patients presenting with signs and symptoms of ADHF to the emergency department (ED). In addition, NT-proBNP of <300 pg/mL indicates ADHF is not likely.    Age Range Result Interpretation  NT-proBNP Concentration (pg/mL:      <50             Positive            >450                   Gray                 300-450                    Negative             <300    50-75           Positive            >900                  Gray                300-900                  Negative            <300      >75             Positive            >1800                  Gray                300-1800                  Negative            <300    Lactic Acid, Plasma [[id number]]  (Normal) Collected: 02/10/25 1633    Specimen: Blood Updated: 02/10/25 2220     Lactate 1.3 mmol/L      Comment: Falsely depressed results may occur on samples drawn from patients receiving N-Acetylcysteine (NAC) or Metamizole.       Urinalysis, Microscopic Only - Straight Cath [[id number]]  (Abnormal) Collected: 02/10/25 2101    Specimen: Urine from Straight Cath Updated: 02/10/25 2132     RBC, UA None Seen /HPF      WBC, UA 11-20 /HPF      Bacteria, UA None Seen /HPF      Squamous Epithelial Cells, UA 13-20 /HPF      Hyaline Casts, UA None Seen /LPF      Methodology Manual Light Microscopy    Urine Culture - Urine, Straight Cath [[id number]] Collected: 02/10/25 2101    Specimen: Urine from Straight Cath Updated: 02/10/25 2132    Urinalysis With Culture If Indicated - Straight  Cath [[id number]]  (Abnormal) Collected: 02/10/25 2101    Specimen: Urine from Straight Cath Updated: 02/10/25 2123     Color, UA Yellow     Appearance, UA Clear     pH, UA 5.5     Specific Gravity, UA 1.014     Glucose, UA Negative     Ketones, UA Negative     Bilirubin, UA Negative     Blood, UA Trace     Protein, UA Negative     Leuk Esterase, UA Moderate (2+)     Nitrite, UA Negative     Urobilinogen, UA 0.2 E.U./dL    Narrative:      In absence of clinical symptoms, the presence of pyuria, bacteria, and/or nitrites on the urinalysis result does not correlate with infection.    High Sensitivity Troponin T 1Hr [[id number]]  (Abnormal) Collected: 02/10/25 1824    Specimen: Blood Updated: 02/10/25 1848     HS Troponin T 20 ng/L      Troponin T Numeric Delta -3 ng/L      Troponin T % Delta -13    Narrative:      High Sensitive Troponin T Reference Range:  <14.0 ng/L- Negative Female for AMI  <22.0 ng/L- Negative Male for AMI  >=14 - Abnormal Female indicating possible myocardial injury.  >=22 - Abnormal Male indicating possible myocardial injury.   Clinicians would have to utilize clinical acumen, EKG, Troponin, and serial changes to determine if it is an Acute Myocardial Infarction or myocardial injury due to an underlying chronic condition.         High Sensitivity Troponin T [[id number]]  (Abnormal) Collected: 02/10/25 1633    Specimen: Blood Updated: 02/10/25 1714     HS Troponin T 23 ng/L     Narrative:      High Sensitive Troponin T Reference Range:  <14.0 ng/L- Negative Female for AMI  <22.0 ng/L- Negative Male for AMI  >=14 - Abnormal Female indicating possible myocardial injury.  >=22 - Abnormal Male indicating possible myocardial injury.   Clinicians would have to utilize clinical acumen, EKG, Troponin, and serial changes to determine if it is an Acute Myocardial Infarction or myocardial injury due to an underlying chronic condition.         Comprehensive Metabolic Panel [[id number]]  (Abnormal) Collected:  02/10/25 1633    Specimen: Blood Updated: 02/10/25 1711     Glucose 141 mg/dL      BUN 33 mg/dL      Creatinine 2.90 mg/dL      Sodium 141 mmol/L      Potassium 3.9 mmol/L      Comment: Slight hemolysis detected by analyzer. Result may be falsely elevated.        Chloride 105 mmol/L      CO2 20.0 mmol/L      Calcium 8.9 mg/dL      Total Protein 8.9 g/dL      Albumin 3.9 g/dL      ALT (SGPT) 7 U/L      AST (SGOT) 15 U/L      Alkaline Phosphatase 88 U/L      Total Bilirubin 0.5 mg/dL      Globulin 5.0 gm/dL      Comment: Calculated Result        A/G Ratio 0.8 g/dL      BUN/Creatinine Ratio 11.4     Anion Gap 16.0 mmol/L      eGFR 16.7 mL/min/1.73     Narrative:      GFR Categories in Chronic Kidney Disease (CKD)      GFR Category          GFR (mL/min/1.73)    Interpretation  G1                     90 or greater         Normal or high (1)  G2                      60-89                Mild decrease (1)  G3a                   45-59                Mild to moderate decrease  G3b                   30-44                Moderate to severe decrease  G4                    15-29                Severe decrease  G5                    14 or less           Kidney failure          (1)In the absence of evidence of kidney disease, neither GFR category G1 or G2 fulfill the criteria for CKD.    eGFR calculation 2021 CKD-EPI creatinine equation, which does not include race as a factor    Magnesium [[id number]]  (Normal) Collected: 02/10/25 1633    Specimen: Blood Updated: 02/10/25 1711     Magnesium 2.0 mg/dL     [place] Draw [[id number]] Collected: 02/10/25 1633    Specimen: Blood Updated: 02/10/25 1645    Narrative:      The following orders were created for panel order [place] Draw.  Procedure                               Abnormality         Status                     ---------                               -----------         ------                     Green Top (Gel)[[id number]]                                  Final result                Lavender Top[[id number]]                                     Final result               Gold Top - SST[[id number]]                                   Final result               Matos Top[[id number]]                                         Final result               Light Blue Top[[id number]]                                   Final result                 Please view results for these tests on the individual orders.    Green Top (Gel) [[id number]] Collected: 02/10/25 1633    Specimen: Blood Updated: 02/10/25 1645     Extra Tube Hold for add-ons.     Comment: Auto resulted.       Lavender Top [[id number]] Collected: 02/10/25 1633    Specimen: Blood Updated: 02/10/25 1645     Extra Tube hold for add-on     Comment: Auto resulted       Gold Top - SST [[id number]] Collected: 02/10/25 1633    Specimen: Blood Updated: 02/10/25 1645     Extra Tube Hold for add-ons.     Comment: Auto resulted.       Matos Top [[id number]] Collected: 02/10/25 1633    Specimen: Blood Updated: 02/10/25 1645     Extra Tube Hold for add-ons.     Comment: Auto resulted.       Light Blue Top [[id number]] Collected: 02/10/25 1633    Specimen: Blood Updated: 02/10/25 1645     Extra Tube Hold for add-ons.     Comment: Auto resulted       CBC & Differential [[id number]]  (Abnormal) Collected: 02/10/25 1633    Specimen: Blood Updated: 02/10/25 1644    Narrative:      The following orders were created for panel order CBC & Differential.  Procedure                               Abnormality         Status                     ---------                               -----------         ------                     CBC Auto Differential[[id number]]        Abnormal            Final result                 Please view results for these tests on the individual orders.    CBC Auto Differential [[id number]]  (Abnormal) Collected: 02/10/25 1633    Specimen: Blood Updated: 02/10/25 1644     WBC 11.78 10*3/mm3      RBC 5.00 10*6/mm3      Hemoglobin 13.4 g/dL      Hematocrit 44.1 %       MCV 88.2 fL      MCH 26.8 pg      MCHC 30.4 g/dL      RDW 15.2 %      RDW-SD 49.0 fl      MPV 11.9 fL      Platelets 284 10*3/mm3      Neutrophil % 81.8 %      Lymphocyte % 9.8 %      Monocyte % 6.5 %      Eosinophil % 1.2 %      Basophil % 0.3 %      Immature Grans % 0.4 %      Neutrophils, Absolute 9.64 10*3/mm3      Lymphocytes, Absolute 1.15 10*3/mm3      Monocytes, Absolute 0.76 10*3/mm3      Eosinophils, Absolute 0.14 10*3/mm3      Basophils, Absolute 0.04 10*3/mm3      Immature Grans, Absolute 0.05 10*3/mm3      nRBC 0.0 /100 WBC           MRI Brain Without Contrast    Result Date: 2/11/2025  Impression: 1.No evidence of hemorrhage, mass effect or midline shift. No evidence of recent or acute ischemia. 2.Mild to moderate periventricular and subcortical FLAIR signal changes likely related to chronic microvascular ischemic change. Stable encephalomalacia present within the right basal ganglia and posterior right frontal lobe related to previous infarct. Electronically Signed: Tessy Love MD  2/11/2025 1:11 AM EST  Workstation ID: [id number]    CT Abdomen Pelvis Without Contrast    Result Date: 2/10/2025  1. No acute abdominopelvic process. No obstructive uropathy 2. Colonic diverticulosis 3. Acute or subacute L2 superior endplate compression fracture Electronically Signed: Silverio Chopra  2/10/2025 10:03 PM EST  Workstation ID: [id number]    XR Chest 1 View    Result Date: 2/10/2025  Impression: 1. Hypoinspiratory film demonstrating pulmonary vascular congestion with no gross pulmonary edema. There are suspected trace pleural effusions. 2. Left basilar airspace disease which may be atelectasis or pneumonia Electronically Signed: Silverio Chopra  2/10/2025 3:44 PM EST  Workstation ID: [id number]    CT Head Without Contrast    Result Date: 2/10/2025  Impression: 1.No acute intracranial process identified. 2.Encephalomalacia within right basal ganglia and adjacent right frontal lobe. 3.Mild frothy mucosal disease  within left sphenoid sinus. Correlate for acute sinusitis. Electronically Signed: Tang Johnson MD  2/10/2025 3:13 PM EST  Workstation ID: [id number]   Results for orders placed during the hospital encounter of 06/30/19    Adult Transthoracic Echo Complete W/ Cont if Necessary Per Protocol    Interpretation Summary  · Mild pulmonic valve regurgitation is present.  · Mild mitral valve regurgitation is present  · Left atrial cavity size is moderately dilated.  · Mild aortic valve regurgitation is present.  · Mild tricuspid valve regurgitation is present.  · Calculated right ventricular systolic pressure from tricuspid regurgitation is 39 mmHg.  · Estimated EF = 65%.  · Left ventricular systolic function is normal.            Assessment/Plan     Assessment/Plan:  A 74-year-old female with a history of arthritis, asthma, atrial fibrillation (on Eliquis), hypertension, hyperlipidemia, hypothyroidism, migraines, and prior CVA presented with hallucinations and a left facial droop. Her symptoms are suspected to be a recrudescence of old stroke deficits in the setting of an infectious or metabolic cause. MRI brain showed no acute infarct. Given her history of atrial fibrillation, Eliquis should be continued.      We will continue to follow one more day as family requested.   If patent continues to not improve, we will get a repeat CT head.       Homero Medeiros MD  02/11/25  09:16 EST

## 2025-02-12 LAB
ANION GAP SERPL CALCULATED.3IONS-SCNC: 13 MMOL/L (ref 5–15)
BACTERIA SPEC AEROBE CULT: NO GROWTH
BACTERIA UR QL AUTO: ABNORMAL /HPF
BILIRUB UR QL STRIP: NEGATIVE
BUN SERPL-MCNC: 30 MG/DL (ref 8–23)
BUN/CREAT SERPL: 11.2 (ref 7–25)
CALCIUM SPEC-SCNC: 8.4 MG/DL (ref 8.6–10.5)
CHLORIDE SERPL-SCNC: 106 MMOL/L (ref 98–107)
CLARITY UR: CLEAR
CO2 SERPL-SCNC: 22 MMOL/L (ref 22–29)
COLOR UR: YELLOW
CREAT SERPL-MCNC: 2.67 MG/DL (ref 0.57–1)
EGFRCR SERPLBLD CKD-EPI 2021: 18.5 ML/MIN/1.73
GLUCOSE BLDC GLUCOMTR-MCNC: 107 MG/DL (ref 70–130)
GLUCOSE BLDC GLUCOMTR-MCNC: 112 MG/DL (ref 70–130)
GLUCOSE SERPL-MCNC: 115 MG/DL (ref 65–99)
GLUCOSE UR STRIP-MCNC: NEGATIVE MG/DL
HGB UR QL STRIP.AUTO: ABNORMAL
HYALINE CASTS UR QL AUTO: ABNORMAL /LPF
KETONES UR QL STRIP: NEGATIVE
LEUKOCYTE ESTERASE UR QL STRIP.AUTO: ABNORMAL
NITRITE UR QL STRIP: NEGATIVE
PH UR STRIP.AUTO: 6 [PH] (ref 5–8)
POTASSIUM SERPL-SCNC: 3.7 MMOL/L (ref 3.5–5.2)
PROT UR QL STRIP: NEGATIVE
RBC # UR STRIP: ABNORMAL /HPF
REF LAB TEST METHOD: ABNORMAL
SODIUM SERPL-SCNC: 141 MMOL/L (ref 136–145)
SP GR UR STRIP: 1.01 (ref 1–1.03)
SQUAMOUS #/AREA URNS HPF: ABNORMAL /HPF
UROBILINOGEN UR QL STRIP: ABNORMAL
WBC # UR STRIP: ABNORMAL /HPF

## 2025-02-12 PROCEDURE — G0378 HOSPITAL OBSERVATION PER HR: HCPCS

## 2025-02-12 PROCEDURE — 82948 REAGENT STRIP/BLOOD GLUCOSE: CPT

## 2025-02-12 PROCEDURE — 94799 UNLISTED PULMONARY SVC/PX: CPT

## 2025-02-12 PROCEDURE — 94640 AIRWAY INHALATION TREATMENT: CPT

## 2025-02-12 PROCEDURE — 80048 BASIC METABOLIC PNL TOTAL CA: CPT | Performed by: STUDENT IN AN ORGANIZED HEALTH CARE EDUCATION/TRAINING PROGRAM

## 2025-02-12 PROCEDURE — 94761 N-INVAS EAR/PLS OXIMETRY MLT: CPT

## 2025-02-12 PROCEDURE — 99232 SBSQ HOSP IP/OBS MODERATE 35: CPT | Performed by: INTERNAL MEDICINE

## 2025-02-12 PROCEDURE — 97162 PT EVAL MOD COMPLEX 30 MIN: CPT

## 2025-02-12 PROCEDURE — 81001 URINALYSIS AUTO W/SCOPE: CPT | Performed by: INTERNAL MEDICINE

## 2025-02-12 PROCEDURE — 97166 OT EVAL MOD COMPLEX 45 MIN: CPT

## 2025-02-12 PROCEDURE — 87086 URINE CULTURE/COLONY COUNT: CPT | Performed by: INTERNAL MEDICINE

## 2025-02-12 RX ADMIN — Medication 10 ML: at 22:26

## 2025-02-12 RX ADMIN — APIXABAN 5 MG: 5 TABLET, FILM COATED ORAL at 12:23

## 2025-02-12 RX ADMIN — ASPIRIN 81 MG CHEWABLE TABLET 81 MG: 81 TABLET CHEWABLE at 08:16

## 2025-02-12 RX ADMIN — Medication 10 ML: at 08:17

## 2025-02-12 RX ADMIN — LEVOTHYROXINE SODIUM 137 MCG: 0.14 TABLET ORAL at 05:51

## 2025-02-12 RX ADMIN — TOPIRAMATE 100 MG: 100 TABLET, FILM COATED ORAL at 08:16

## 2025-02-12 RX ADMIN — SENNOSIDES AND DOCUSATE SODIUM 2 TABLET: 50; 8.6 TABLET ORAL at 09:55

## 2025-02-12 RX ADMIN — PANTOPRAZOLE SODIUM 40 MG: 40 TABLET, DELAYED RELEASE ORAL at 05:52

## 2025-02-12 RX ADMIN — APIXABAN 5 MG: 5 TABLET, FILM COATED ORAL at 22:12

## 2025-02-12 RX ADMIN — DILTIAZEM HYDROCHLORIDE 240 MG: 240 CAPSULE, COATED, EXTENDED RELEASE ORAL at 08:16

## 2025-02-12 RX ADMIN — BUDESONIDE AND FORMOTEROL FUMARATE DIHYDRATE 2 PUFF: 160; 4.5 AEROSOL RESPIRATORY (INHALATION) at 10:13

## 2025-02-12 RX ADMIN — Medication 12.5 MG: at 08:16

## 2025-02-12 RX ADMIN — PREGABALIN 300 MG: 100 CAPSULE ORAL at 22:12

## 2025-02-12 RX ADMIN — ATORVASTATIN CALCIUM 10 MG: 10 TABLET, FILM COATED ORAL at 22:13

## 2025-02-12 RX ADMIN — PREGABALIN 300 MG: 100 CAPSULE ORAL at 08:16

## 2025-02-12 RX ADMIN — BUDESONIDE AND FORMOTEROL FUMARATE DIHYDRATE 2 PUFF: 160; 4.5 AEROSOL RESPIRATORY (INHALATION) at 21:18

## 2025-02-12 RX ADMIN — BISACODYL 10 MG: 10 SUPPOSITORY RECTAL at 13:52

## 2025-02-12 NOTE — PLAN OF CARE
Goal Outcome Evaluation:  Plan of Care Reviewed With: patient        Progress: no change  Outcome Evaluation: Pt presents at/near recent baseline for ADL completion, oriented x 3 this morning. Baseline L-sided strength/coordination deficit observed, completed simple seated grooming tasks with setup and RUE. OT will follow while admitted to promote maintenance of baseline and increased Ind with ADLs. Rec d/c to home with assist.    Anticipated Discharge Disposition (OT): home with assist

## 2025-02-12 NOTE — NURSING NOTE
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    Recommend allevyn and teal perineal wipes, barrier film spray. If patient incontinent frequently can shift to barrier cream.     Will sign off.  "

## 2025-02-12 NOTE — PLAN OF CARE
Goal Outcome Evaluation:  Plan of Care Reviewed With: patient           Outcome Evaluation: Pt presents with strength, balance, and endurance deficits requiring 2-person assist with bed to chair transfers this date d/t heavy posterior lean. Pt will benefit from PT to address aforementioned deficits and return to PLOF. PT rec home with assist and  PT upon dc.    Anticipated Discharge Disposition (PT): home with assist, home with home health

## 2025-02-12 NOTE — PROGRESS NOTES
Stroke Progress Note       Chief Complaint:  confusion    Subjective    Subjective     Subjective:  This is a 74 year old white female with a pmhx. CVA, left arm weakness, HTN, HLD, GERD, and Afib (on Eliquis) who presented to [facility] ED with altered mental status. The patient was seen at bedside today, she is was sleeping and easily woke up when I called her name.  She is oriented x3 and seems quite comfortable.        Objective    Objective      Temp:  [97.1 °F (36.2 °C)-97.9 °F (36.6 °C)] 97.6 °F (36.4 °C)  Heart Rate:  [49-69] 64  Resp:  [16-18] 18  BP: (113-126)/(56-98) 120/79    No current facility-administered medications on file prior to encounter.     Current Outpatient Medications on File Prior to Encounter   Medication Sig Dispense Refill    albuterol (ACCUNEB) 1.25 MG/3ML nebulizer solution Take 3 mL by nebulization Every 6 (Six) Hours As Needed for Wheezing or Shortness of Air. 90 vial 12    albuterol sulfate  (90 Base) MCG/ACT inhaler INHALE 2 PUFFS BY MOUTH EVERY 4 TO 6 HOURS AS NEEDED 8.5 g 0    atorvastatin (LIPITOR) 10 MG tablet TAKE ONE TABLET BY MOUTH ONCE NIGHTLY 90 tablet 1    ciprofloxacin (Cipro) 500 MG tablet Take 1 tablet by mouth 2 (Two) Times a Day. 14 tablet 0    ciprofloxacin-dexAMETHasone (Ciprodex) 0.3-0.1 % otic suspension Administer 4 drops into the left ear 2 (Two) Times a Day. 7.5 mL 0    denosumab (Prolia) 60 MG/ML solution prefilled syringe syringe Inject 1 mL under the skin into the appropriate area as directed 1 (One) Time for 1 dose. 1 mL 1    dicyclomine (BENTYL) 10 MG capsule Take 1 capsule by mouth 2 (Two) Times a Day As Needed for Abdominal Cramping. 180 capsule 1    dilTIAZem CD (CARDIZEM CD) 240 MG 24 hr capsule TAKE 1 CAPSULE BY MOUTH DAILY 90 capsule 1    Eliquis 5 MG tablet tablet TAKE ONE TABLET BY MOUTH EVERY 12 HOURS 180 tablet 1    Fluticasone-Salmeterol (ADVAIR/WIXELA) 250-50 MCG/ACT DISKUS INHALE 1 PUFF BY MOUTH 2 TIMES A DAY 60 each 5    levothyroxine  (SYNTHROID, LEVOTHROID) 137 MCG tablet TAKE 1 TABLET BY MOUTH DAILY 90 tablet 0    Magnesium 250 MG tablet Take  by mouth.      metoprolol tartrate (LOPRESSOR) 25 MG tablet TAKE 1 TABLET BY MOUTH 2 TIMES A  tablet 1    nitrofurantoin, macrocrystal-monohydrate, (Macrobid) 100 MG capsule Take 1 capsule by mouth Daily. 90 capsule 1    nystatin (MYCOSTATIN) 812156 UNIT/GM cream APPLY TO AFFECTED AREA(S) TWO TIMES A DAY 60 g 1    nystatin (MYCOSTATIN) 253336 UNIT/GM powder Apply  topically to the appropriate area as directed 3 (Three) Times a Day. 60 g 0    omeprazole (priLOSEC) 40 MG capsule TAKE 1 CAPSULE BY MOUTH DAILY 90 capsule 1    pregabalin (LYRICA) 300 MG capsule TAKE 1 CAPSULE BY MOUTH 2 TIMES A  capsule 0    topiramate (TOPAMAX) 100 MG tablet TAKE 1 TABLET BY MOUTH DAILY 90 tablet 1          Physical Exam:  General Appearance: Awake   Eyes: Anicteric sclera  HEENT: no scleral injection   Lungs: respirations appear comfortable, no obvious increased work of breathing, on 2LNC  Extremities: No cyanosis or fingernail clubbing   Skin: No rashes in exposed skin areas     Neurological Examination:   Mental status: Alert and oriented to person, place, no dysarthria, able to name and repeat with no difficulty.    Cranial Nerves: Visual fields intact. Extraocular movements are intact with no nystagmus. Facial sensation intact. Face symmetrical. Hearing grossly intact.  Full shoulder shrug bilaterally. Tongue protrudes midline.   Sensory: Sensory exam to light touch in all four extremities distally is normal. Double simultaneous sensory stimulation shows no extinction  Motor: Normal tone of right upper and both lower extremities.  Right hand contracture.    Left upper extremity: 4/5 deltoid, tricep, bicep, interosseous, hand .   Right upper extremity: 5/5 deltoid, tricep, bicep, interosseous, hand .   Left lower extremity: 5/5 iliopsoas, knee extension/flexion, foot dorsi/plantarflexion.  Right  lower extremity: 5/5 iliopsoas, knee extension/flexion, foot dorsi/plantarflexion.  Cerebellar: Finger-to-nose intact with RUE.   Results Review:    I reviewed the patient's new clinical results.    WBC   Date Value Ref Range Status   02/11/2025 10.73 3.40 - 10.80 10*3/mm3 Final     RBC   Date Value Ref Range Status   02/11/2025 4.12 3.77 - 5.28 10*6/mm3 Final     Hemoglobin   Date Value Ref Range Status   02/11/2025 11.1 (L) 12.0 - 15.9 g/dL Final     Hematocrit   Date Value Ref Range Status   02/11/2025 36.8 34.0 - 46.6 % Final     MCV   Date Value Ref Range Status   02/11/2025 89.3 79.0 - 97.0 fL Final     MCH   Date Value Ref Range Status   02/11/2025 26.9 26.6 - 33.0 pg Final     MCHC   Date Value Ref Range Status   02/11/2025 30.2 (L) 31.5 - 35.7 g/dL Final     RDW   Date Value Ref Range Status   02/11/2025 15.1 12.3 - 15.4 % Final     RDW-SD   Date Value Ref Range Status   02/11/2025 49.5 37.0 - 54.0 fl Final     MPV   Date Value Ref Range Status   02/11/2025 12.1 (H) 6.0 - 12.0 fL Final     Platelets   Date Value Ref Range Status   02/11/2025 215 140 - 450 10*3/mm3 Final     Neutrophil %   Date Value Ref Range Status   02/11/2025 71.1 42.7 - 76.0 % Final     Lymphocyte %   Date Value Ref Range Status   02/11/2025 16.9 (L) 19.6 - 45.3 % Final     Monocyte %   Date Value Ref Range Status   02/11/2025 9.6 5.0 - 12.0 % Final     Eosinophil %   Date Value Ref Range Status   02/11/2025 1.7 0.3 - 6.2 % Final     Basophil %   Date Value Ref Range Status   02/11/2025 0.4 0.0 - 1.5 % Final     Immature Grans %   Date Value Ref Range Status   02/11/2025 0.3 0.0 - 0.5 % Final     Neutrophils, Absolute   Date Value Ref Range Status   02/11/2025 7.64 (H) 1.70 - 7.00 10*3/mm3 Final     Lymphocytes, Absolute   Date Value Ref Range Status   02/11/2025 1.81 0.70 - 3.10 10*3/mm3 Final     Monocytes, Absolute   Date Value Ref Range Status   02/11/2025 1.03 (H) 0.10 - 0.90 10*3/mm3 Final     Eosinophils, Absolute   Date Value  Ref Range Status   02/11/2025 0.18 0.00 - 0.40 10*3/mm3 Final     Basophils, Absolute   Date Value Ref Range Status   02/11/2025 0.04 0.00 - 0.20 10*3/mm3 Final     Immature Grans, Absolute   Date Value Ref Range Status   02/11/2025 0.03 0.00 - 0.05 10*3/mm3 Final     nRBC   Date Value Ref Range Status   02/11/2025 0.0 0.0 - 0.2 /100 WBC Final        Lab Results   Component Value Date    GLUCOSE 95 02/11/2025    BUN 33 (H) 02/11/2025    CREATININE 2.78 (H) 02/11/2025     02/11/2025    K 4.2 02/11/2025     (H) 02/11/2025    CALCIUM 8.4 (L) 02/11/2025    PROTEINTOT 6.4 02/11/2025    ALBUMIN 2.8 (L) 02/11/2025    ALT 7 02/11/2025    AST 18 02/11/2025    ALKPHOS 69 02/11/2025    BILITOT 0.4 02/11/2025    GLOB 3.6 02/11/2025    AGRATIO 0.8 02/11/2025    BCR 11.9 02/11/2025    ANIONGAP 10.0 02/11/2025    EGFR 17.6 (L) 02/11/2025           Lab 02/11/25  0636   HEMOGLOBIN A1C 5.20      Lipid Panel          12/10/2024    16:13 2/11/2025    06:36   Lipid Panel   Total Cholesterol  124    Total Cholesterol 124     Triglycerides 149  88    HDL Cholesterol 45  40    VLDL Cholesterol 26  17    LDL Cholesterol  53  67    LDL/HDL Ratio  1.66         Results for orders placed during the hospital encounter of 02/10/25    Adult Transthoracic Echo Complete W/ Cont if Necessary Per Protocol (With Agitated Saline)    Interpretation Summary    Left ventricular systolic function is normal. Estimated left ventricular EF = 50%    The right ventricular cavity is mildly dilated.    Moderate mitral valve regurgitation is present.    Mild aortic valve regurgitation is present.    MRI Brain Without Contrast    Result Date: 2/11/2025  Impression: 1.No evidence of hemorrhage, mass effect or midline shift. No evidence of recent or acute ischemia. 2.Mild to moderate periventricular and subcortical FLAIR signal changes likely related to chronic microvascular ischemic change. Stable encephalomalacia present within the right basal ganglia  and posterior right frontal lobe related to previous infarct. Electronically Signed: Tessy Love MD  2/11/2025 1:11 AM EST  Workstation ID: [id number]    CT Abdomen Pelvis Without Contrast    Result Date: 2/10/2025  1. No acute abdominopelvic process. No obstructive uropathy 2. Colonic diverticulosis 3. Acute or subacute L2 superior endplate compression fracture Electronically Signed: Silverio Chopra  2/10/2025 10:03 PM EST  Workstation ID: [id number]    XR Chest 1 View    Result Date: 2/10/2025  Impression: 1. Hypoinspiratory film demonstrating pulmonary vascular congestion with no gross pulmonary edema. There are suspected trace pleural effusions. 2. Left basilar airspace disease which may be atelectasis or pneumonia Electronically Signed: Silverio Chopra  2/10/2025 3:44 PM EST  Workstation ID: [id number]    CT Head Without Contrast    Result Date: 2/10/2025  Impression: 1.No acute intracranial process identified. 2.Encephalomalacia within right basal ganglia and adjacent right frontal lobe. 3.Mild frothy mucosal disease within left sphenoid sinus. Correlate for acute sinusitis. Electronically Signed: Tang Johnson MD  2/10/2025 3:13 PM EST  Workstation ID: [id number]          Assessment/Plan     Assessment/Plan:  74-year-old female with a history of arthritis, asthma, atrial fibrillation (on Eliquis), hypertension, hyperlipidemia, hypothyroidism, migraines, and prior right frontal and right basal ganglia ischmeic stroke with residual left hand weakness/contracture.  The patient presented to [facility] ED with hallucinations and a left facial droop, which lasted for a short period and resolved.  Patient has completed a stroke work up which is unrevealing of any new or acute ischemic findings on head CT and MRI.          Acute Mental Status Changes  -Possible etiology, recrudescence of old stroke deficits in the setting of an infectious or metabolic cause.       History of CVA  -CT Head and MRI Brain negative for any acute  intracranial process there is encephalomalcaia noted within the right basal ganglia an adjacent right frontal lobe, which is expected given the patient's history of right hemisphere stroke .    -TTE EF 50%, no noted PFO  -A1c 5.2%, at goal of < 70   -LDL 67 at goal (<70)  -Meds: Eliquis 5 mg q 12 hour   -Activity as tolerated, fall risk precautions  -PT/OT/SLP evaluation    2.  Atrial fibrillation   -Meds: Eliquis 5 mg q 12 hour         Plan of care was discussed with Dr. Medeiros.  Stroke neurology will follow peripherally, if needed.  Please call with any questions or concerns.  Thank you for this consult.            FABIANA Wall  02/12/25  12:10 EST    This was an audio and video enabled telemedicine encounter.

## 2025-02-12 NOTE — THERAPY EVALUATION
Patient Name: Vickie Joshi  : 1950    MRN: [id number]                              Today's Date: 2025       Admit Date: 2/10/2025    Visit Dx:     ICD-10-CM ICD-9-CM   1. Confusion  R41.0 298.9   2. Auditory hallucination  R44.0 780.1   3. Recent urinary tract infection  Z87.440 V13.02   4. BARBARA (acute kidney injury)  N17.9 584.9   5. Cognitive deficit due to and not concurrent with hemorrhagic cerebrovascular accident (CVA)  I69.319 438.0     Patient Active Problem List   Diagnosis    Hypertension    Hx of Migraine    GERD (gastroesophageal reflux disease)    Fibromyalgia    Neuropathy    Asthma    Allergic rhinitis    Morbid obesity    Acute on chronic blood loss anemia    Hypothyroidism    Hyperlipidemia    History of hemorrhagic stroke with residual hemiparesis    Atrial fibrillation, chronic    Hypocalcemia    GI bleed    Closed left hip fracture, sequela    Acute UTI (urinary tract infection)    Lactic acidosis    Diarrhea    Tremor, essential    Hypomagnesemia    Hypokalemia    Low platelet count    Elevated bilirubin    Altered mental status    Elevated serum creatinine    Closed compression fracture of L2 lumbar vertebra, initial encounter     Past Medical History:   Diagnosis Date    Arthritis     Asthma     Atrial fibrillation     Cardiac disorder     Closed fracture of neck of left femur 2017    Disease of thyroid gland     Fibromyalgia     GERD (gastroesophageal reflux disease)     Hyperlipidemia     Hypertension     Migraine     Neuropathy     Stroke      Past Surgical History:   Procedure Laterality Date    CHOLECYSTECTOMY      HEMORRHOIDECTOMY N/A 2021    Procedure: HEMORRHOIDECTOMY;  Surgeon: Adan Meadows MD;  Location: [facility] OR;  Service: General;  Laterality: N/A;    HIP PERCUTANEOUS PINNING Left 2017    Procedure: LEFT HIP PERCUTANEOUS PINNING FEMORAL NECK;  Surgeon: Ezra Barlow MD;  Location:  [facility];  Service:     HYSTERECTOMY      LA CLTX HIP  DISLOCATION TRAUMATIC W/O ANESTHESIA Left 2/28/2017    Procedure: LEFT HIP CLOSED REDUCTION;  Surgeon: Ezra Barlow MD;  Location: [facility];  Service: Orthopedics    SHOULDER SURGERY        General Information       Row Name 02/12/25 0855          OT Time and Intention    Document Type evaluation  -CS     Mode of Treatment occupational therapy  -CS     Patient Effort good  -CS       Row Name 02/12/25 0855          General Information    Patient Profile Reviewed yes  -CS     Prior Level of Function min assist:;grooming;feeding;mod assist:;dressing;max assist:;transfer;all household mobility  Pt reports SPT with gait belt and spouse assist, rolling shower chair, wheelchair van for transport  -CS     Existing Precautions/Restrictions fall;other (see comments)  remote CVA w/ L-sided weakness  -CS     Barriers to Rehab medically complex;previous functional deficit  -CS       Row Name 02/12/25 0855          Occupational Profile    Reason for Services/Referral (Occupational Profile) stroke eval  -CS       Row Name 02/12/25 0855          Living Environment    People in Home spouse  -CS       Row Name 02/12/25 0855          Home Main Entrance    Number of Stairs, Main Entrance other (see comments)  ramp in from garage  -CS       Row Name 02/12/25 0855          Cognition    Orientation Status (Cognition) oriented x 4  -CS       Row Name 02/12/25 0855          Safety Issues/Impairments Affecting Functional Mobility    Safety Issues Affecting Function (Mobility) safety precaution awareness;safety precautions follow-through/compliance  -CS     Impairments Affecting Function (Mobility) balance;coordination;endurance/activity tolerance;grasp;strength;muscle tone abnormal;postural/trunk control  -CS     Comment, Safety Issues/Impairments (Mobility) posterior lean in sitting  -CS               User Key  (r) = Recorded By, (t) = Taken By, (c) = Cosigned By      Initials Name Provider Type    CS Colton Regalado, OT Occupational  Therapist                     Mobility/ADL's       Row Name 02/12/25 0859          Bed Mobility    Bed Mobility supine-sit;scooting/bridging  -CS     Scooting/Bridging [place] (Bed Mobility) moderate assist (50% patient effort);1 person assist;nonverbal cues (demo/gesture);verbal cues  -CS     Supine-Sit [place] (Bed Mobility) moderate assist (50% patient effort);1 person assist;verbal cues;nonverbal cues (demo/gesture)  -CS     Assistive Device (Bed Mobility) bed rails;head of bed elevated  -CS     Comment, (Bed Mobility) Pt able to initiate BLEs to EOB, increased assist at trunk, significant posterior lean in sitting  -CS       Row Name 02/12/25 0859          Transfers    Transfers bed-chair transfer  -CS     Comment, (Transfers) BUE support and cues for anterior weight shifting  -CS       Row Name 02/12/25 0859          Bed-Chair Transfer    Bed-Chair [place] (Transfers) maximum assist (25% patient effort);1 person assist;verbal cues;nonverbal cues (demo/gesture)  -     Assistive Device (Bed-Chair Transfers) other (see comments)  -       Row Name 02/12/25 0859          Functional Mobility    Patient was able to Ambulate no, other medical factors prevent ambulation  -CS     Reason Patient was unable to Ambulate Non-Ambulatory at Baseline  -CS       Row Name 02/12/25 0859          Activities of Daily Living    BADL Assessment/Intervention lower body dressing;grooming;feeding  -CS       Row Name 02/12/25 0859          Lower Body Dressing Assessment/Training    [place] Level (Lower Body Dressing) don;shoes/slippers;dependent (less than 25% patient effort)  -CS     Position (Lower Body Dressing) edge of bed sitting  -CS     Comment, (Lower Body Dressing) personal shoes, Dep baseline  -CS       Row Name 02/12/25 0859          Grooming Assessment/Training    [place] Level (Grooming) hair care, combing/brushing;wash face, hands;set up  -CS     Position (Grooming) edge of bed sitting  -CS      Comment, (Grooming) performed w/ RUE  -CS       Row Name 02/12/25 0859          Self-Feeding Assessment/Training    [place] Level (Feeding) prepare tray/open items;maximum assist (25% patient effort);liquids to mouth;scoop food and bring to mouth;independent  -CS     Position (Feeding) supported sitting  -     Comment, (Feeding) RUE  -               User Key  (r) = Recorded By, (t) = Taken By, (c) = Cosigned By      Initials Name Provider Type     Colton Regalado OT Occupational Therapist                   Obj/Interventions       Row Name 02/12/25 0902          Sensory Assessment (Somatosensory)    Sensory Assessment (Somatosensory) bilateral UE  -CS     Bilateral UE Sensory Assessment general sensation;light touch awareness;intact  -[facility] Name 02/12/25 0902          Vision Assessment/Intervention    Visual Impairment/Limitations WFL;unable/difficult to assess  -     Vision Assessment Comment tracking intact, confrontation testing comprimised  -       Row Name 02/12/25 0902          Range of Motion Comprehensive    General Range of Motion bilateral upper extremity ROM WFL  -[facility] Name 02/12/25 0902          Strength Comprehensive (MMT)    General Manual Muscle Testing (MMT) Assessment upper extremity strength deficits identified  -     Comment, General Manual Muscle Testing (MMT) Assessment R shoulder flex 3-/5, R elbow flex 2+/5, R grasp 1/5  -       Row Name 02/12/25 0902          Motor Skills    Motor Skills coordination;functional endurance  -     Coordination right;upper extremity;fine motor deficit;gross motor deficit;bimanual skills;moderate impairment  -     Functional Endurance fair, stable on RA  -CS       Row Name 02/12/25 0902          Balance    Balance Assessment sitting static balance;sitting dynamic balance;standing static balance;standing dynamic balance  -     Static Sitting Balance moderate assist;1-person assist;verbal cues;non-verbal cues (demo/gesture)   -CS     Dynamic Sitting Balance maximum assist;1-person assist;verbal cues;non-verbal cues (demo/gesture)  -CS     Position, Sitting Balance sitting edge of bed  -CS     Static Standing Balance maximum assist;2-person assist;verbal cues;non-verbal cues (demo/gesture)  -CS     Dynamic Standing Balance dependent;2-person assist;verbal cues;non-verbal cues (demo/gesture)  -CS     Position/Device Used, Standing Balance supported  -CS     Balance Interventions sitting;standing;sit to stand  -CS               User Key  (r) = Recorded By, (t) = Taken By, (c) = Cosigned By      Initials Name Provider Type    CS Colton Regalado, OT Occupational Therapist                   Goals/Plan       Row Name 02/12/25 0911          Bed Mobility Goal 1 (OT)    Activity/Assistive Device (Bed Mobility Goal 1, OT) sit to supine;supine to sit;scooting  -CS     [place] Level/Cues Needed (Bed Mobility Goal 1, OT) minimum assist (75% or more patient effort)  -CS     Time Frame (Bed Mobility Goal 1, OT) long term goal (LTG);1 week  -CS     Strategies/Barriers (Bed Mobility Goal 1, OT) assist of one  -CS     Progress/Outcomes (Bed Mobility Goal 1, OT) new goal  -CS       Row Name 02/12/25 0911          Dressing Goal 1 (OT)    Activity/Device (Dressing Goal 1, OT) upper body dressing  -CS     [place]/Cues Needed (Dressing Goal 1, OT) minimum assist (75% or more patient effort)  -CS     Time Frame (Dressing Goal 1, OT) short term goal (STG);5 days  -CS     Strategies/Barriers (Dressing Goal 1, OT) improved daisy techniques  -CS     Progress/Outcome (Dressing Goal 1, OT) new goal  -CS       Row Name 02/12/25 0911          ROM Goal 1 (OT)    ROM Goal 1 (OT) Pt will perform self-assist LUE AAROM HEP Ind by discharge to promote improved ROM and mitigate edema  -CS     Time Frame (ROM Goal 1, OT) long term goal (LTG);by discharge  -CS       Row Name 02/12/25 0911          Therapy Assessment/Plan (OT)    Planned Therapy Interventions (OT)  activity tolerance training;functional balance retraining;occupation/activity based interventions;ROM/therapeutic exercise;strengthening exercise;transfer/mobility retraining;patient/caregiver education/training;neuromuscular control/coordination retraining;cognitive/visual perception retraining;BADL retraining;adaptive equipment training  -               User Key  (r) = Recorded By, (t) = Taken By, (c) = Cosigned By      Initials Name Provider Type     Colton Regalado OT Occupational Therapist                   Clinical Impression       Row Name 02/12/25 0904          Pain Assessment    Additional Documentation Pain Scale: FACES Pre/Post-Treatment (Group)  -[facility] Name 02/12/25 0904          Pain Scale: FACES Pre/Post-Treatment    Pain: FACES Scale, Pretreatment 0-->no hurt  -CS     Posttreatment Pain Rating 0-->no hurt  -CS       Row Name 02/12/25 0904          Plan of Care Review    Plan of Care Reviewed With patient  -CS     Progress no change  -CS     Outcome Evaluation Pt presents at/near recent baseline for ADL completion, oriented x 3 this morning. Baseline L-sided strength/coordination deficit observed, completed simple seated grooming tasks with setup and RUE. OT will follow while admitted to promote maintenance of baseline and increased Ind with ADLs. Rec d/c to home with assist.  -CS       Row Name 02/12/25 0904          Therapy Assessment/Plan (OT)    Rehab Potential (OT) good  -CS     Criteria for Skilled Therapeutic Interventions Met (OT) meets criteria;skilled treatment is necessary;yes  -CS     Therapy Frequency (OT) daily  -CS       Row Name 02/12/25 0904          Therapy Plan Review/Discharge Plan (OT)    Anticipated Discharge Disposition (OT) home with assist  -       Row Name 02/12/25 0904          Vital Signs    Pre Systolic BP Rehab 113  RN cleared for eval  -CS     Pre Treatment Diastolic BP 67  -CS     Post Systolic BP Rehab 125  -CS     Post Treatment Diastolic   -CS      Pretreatment Heart Rate (beats/min) 62  -CS     O2 Delivery Pre Treatment room air  -CS     O2 Delivery Intra Treatment room air  -CS     O2 Delivery Post Treatment room air  -CS     Pre Patient Position Supine  -CS     Intra Patient Position Standing  -CS     Post Patient Position Sitting  -CS       Row Name 02/12/25 0904          Positioning and Restraints    Pre-Treatment Position in bed  -CS     Post Treatment Position chair  -CS     In Chair notified nsg;reclined;sitting;call light within reach;encouraged to call for assist;exit alarm on;waffle cushion;on mechanical lift sling;legs elevated  -CS               User Key  (r) = Recorded By, (t) = Taken By, (c) = Cosigned By      Initials Name Provider Type    CS Colton Regalado OT Occupational Therapist                   Outcome Measures       Row Name 02/12/25 0912          How much help from another is currently needed...    Putting on and taking off regular lower body clothing? 2  -CS     Bathing (including washing, rinsing, and drying) 2  -CS     Toileting (which includes using toilet bed pan or urinal) 2  -CS     Putting on and taking off regular upper body clothing 2  -CS     Taking care of personal grooming (such as brushing teeth) 3  -CS     Eating meals 3  -CS     AM-PAC 6 Clicks Score (OT) 14  -CS       Row Name 02/12/25 0912          Modified Adams Scale    Modified Tawny Scale 4 - Moderately severe disability.  Unable to walk without assistance, and unable to attend to own bodily needs without assistance.  -CS       Row Name 02/12/25 0912          Functional Assessment    Outcome Measure Options AM-PAC 6 Clicks Daily Activity (OT);Modified Adams  -CS               User Key  (r) = Recorded By, (t) = Taken By, (c) = Cosigned By      Initials Name Provider Type    Colton Huynh OT Occupational Therapist                    Occupational Therapy Education       Title: PT OT SLP Therapies (In Progress)       Topic: Occupational Therapy (Done)        Point: ADL training (Done)       Description:   Instruct learner(s) on proper safety adaptation and remediation techniques during self care or transfers.   Instruct in proper use of assistive devices.                  Learning Progress Summary            Patient Acceptance, E,D, VU,NR by  at 2/12/2025 0913                      Point: Home exercise program (Done)       Description:   Instruct learner(s) on appropriate technique for monitoring, assisting and/or progressing therapeutic exercises/activities.                  Learning Progress Summary            Patient Acceptance, E,D, VU,NR by  at 2/12/2025 0913                      Point: Precautions (Done)       Description:   Instruct learner(s) on prescribed precautions during self-care and functional transfers.                  Learning Progress Summary            Patient Acceptance, E,D, VU,NR by  at 2/12/2025 0913                      Point: Body mechanics (Done)       Description:   Instruct learner(s) on proper positioning and spine alignment during self-care, functional mobility activities and/or exercises.                  Learning Progress Summary            Patient Acceptance, E,D, VU,NR by  at 2/12/2025 0913                                      User Key       Initials Effective Dates Name Provider Type Discipline     06/16/21 -  Colton Regalado, OT Occupational Therapist OT                  OT Recommendation and Plan  Planned Therapy Interventions (OT): activity tolerance training, functional balance retraining, occupation/activity based interventions, ROM/therapeutic exercise, strengthening exercise, transfer/mobility retraining, patient/caregiver education/training, neuromuscular control/coordination retraining, cognitive/visual perception retraining, BADL retraining, adaptive equipment training  Therapy Frequency (OT): daily  Plan of Care Review  Plan of Care Reviewed With: patient  Progress: no change  Outcome Evaluation: Pt presents  at/near recent baseline for ADL completion, oriented x 3 this morning. Baseline L-sided strength/coordination deficit observed, completed simple seated grooming tasks with setup and RUE. OT will follow while admitted to promote maintenance of baseline and increased Ind with ADLs. Rec d/c to home with assist.     Time Calculation:   Evaluation Complexity (OT)  Review Occupational Profile/Medical/Therapy History Complexity: expanded/moderate complexity  Assessment, Occupational Performance/Identification of Deficit Complexity: 3-5 performance deficits  Clinical Decision Making Complexity (OT): detailed assessment/moderate complexity  Overall Complexity of Evaluation (OT): moderate complexity     Time Calculation- OT       Row Name 02/12/25 0913             Time Calculation- OT    OT Start Time 0815  -CS      OT Received On 02/12/25  -CS      OT Goal Re-Cert Due Date 02/22/25  -CS         Untimed Charges    OT Eval/Re-eval Minutes 48  -CS         Total Minutes    Untimed Charges Total Minutes 48  -CS       Total Minutes 48  -CS                User Key  (r) = Recorded By, (t) = Taken By, (c) = Cosigned By      Initials Name Provider Type    CS Colton Regalado OT Occupational Therapist                  Therapy Charges for Today       Code Description Service Date Service Provider Modifiers Qty    [id number] HC OT EVAL MOD COMPLEXITY 4 2/12/2025 Colton Regalado OT GO 1                 Colton Regalado OT  2/12/2025

## 2025-02-12 NOTE — THERAPY EVALUATION
Patient Name: Vickie Joshi  : 1950    MRN: [id number]                              Today's Date: 2025       Admit Date: 2/10/2025    Visit Dx:     ICD-10-CM ICD-9-CM   1. Confusion  R41.0 298.9   2. Auditory hallucination  R44.0 780.1   3. Recent urinary tract infection  Z87.440 V13.02   4. BARBARA (acute kidney injury)  N17.9 584.9   5. Cognitive deficit due to and not concurrent with hemorrhagic cerebrovascular accident (CVA)  I69.319 438.0     Patient Active Problem List   Diagnosis    Hypertension    Hx of Migraine    GERD (gastroesophageal reflux disease)    Fibromyalgia    Neuropathy    Asthma    Allergic rhinitis    Morbid obesity    Acute on chronic blood loss anemia    Hypothyroidism    Hyperlipidemia    History of hemorrhagic stroke with residual hemiparesis    Atrial fibrillation, chronic    Hypocalcemia    GI bleed    Closed left hip fracture, sequela    Acute UTI (urinary tract infection)    Lactic acidosis    Diarrhea    Tremor, essential    Hypomagnesemia    Hypokalemia    Low platelet count    Elevated bilirubin    Altered mental status    Elevated serum creatinine    Closed compression fracture of L2 lumbar vertebra, initial encounter     Past Medical History:   Diagnosis Date    Arthritis     Asthma     Atrial fibrillation     Cardiac disorder     Closed fracture of neck of left femur 2017    Disease of thyroid gland     Fibromyalgia     GERD (gastroesophageal reflux disease)     Hyperlipidemia     Hypertension     Migraine     Neuropathy     Stroke      Past Surgical History:   Procedure Laterality Date    CHOLECYSTECTOMY      HEMORRHOIDECTOMY N/A 2021    Procedure: HEMORRHOIDECTOMY;  Surgeon: Adan Meadows MD;  Location: [facility] OR;  Service: General;  Laterality: N/A;    HIP PERCUTANEOUS PINNING Left 2017    Procedure: LEFT HIP PERCUTANEOUS PINNING FEMORAL NECK;  Surgeon: Ezra Barlow MD;  Location:  [facility];  Service:     HYSTERECTOMY      ME CLTX HIP  DISLOCATION TRAUMATIC W/O ANESTHESIA Left 2/28/2017    Procedure: LEFT HIP CLOSED REDUCTION;  Surgeon: Ezra Barlow MD;  Location: [facility];  Service: Orthopedics    SHOULDER SURGERY        General Information       Row Name 02/12/25 0939          Physical Therapy Time and Intention    Document Type evaluation  -KR     Mode of Treatment physical therapy  -KR       Row Name 02/12/25 0939          General Information    Patient Profile Reviewed yes  -KR     Prior Level of Function max assist:;transfer;bed mobility;dependent:;w/c or scooter  pt able to stand pivot to wc with spouse assist  -KR     Existing Precautions/Restrictions fall;other (see comments)  remote CVA w/ L-sided weakness  -KR     Barriers to Rehab previous functional deficit;medically complex  -KR       Row Name 02/12/25 0939          Living Environment    People in Home spouse  -KR       Row Name 02/12/25 0939          Home Main Entrance    Number of Stairs, Main Entrance none;other (see comments)  ramp  -KR       Row Name 02/12/25 0939          Cognition    Orientation Status (Cognition) oriented x 4  -KR       Row Name 02/12/25 0939          Safety Issues/Impairments Affecting Functional Mobility    Safety Issues Affecting Function (Mobility) safety precaution awareness;safety precautions follow-through/compliance  -KR     Impairments Affecting Function (Mobility) balance;coordination;endurance/activity tolerance;grasp;strength;muscle tone abnormal;postural/trunk control  -KR               User Key  (r) = Recorded By, (t) = Taken By, (c) = Cosigned By      Initials Name Provider Type    KR Janneth Naqvi PT Physical Therapist                   Mobility       Row Name 02/12/25 0940          Bed-Chair Transfer    Bed-Chair [place] (Transfers) maximum assist (25% patient effort);verbal cues;nonverbal cues (demo/gesture);2 person assist  -KR     Assistive Device (Bed-Chair Transfers) other (see comments)  BUE support  -KR     Comment,  (Bed-Chair Transfer) stand pivot towards R with blocking at B feet to prevent sliding and heavy cues for anterior weight shift. 2nd person behind to guide hips.  -KR       Row Name 02/12/25 0940          Sit-Stand Transfer    Sit-Stand [place] (Transfers) maximum assist (25% patient effort);2 person assist  -KR     Assistive Device (Sit-Stand Transfers) other (see comments)  BUE support  -KR     Comment, (Sit-Stand Transfer) 1x from chair with cues for anterior weight shift and increase knee flexion B for improved [place]. Hips and knees maintained in flexion during stand. Pt able to tolerate standing ~10 seconds prior to return to sit d/t fatigue.  -KR       Row Name 02/12/25 0940          Gait/Stairs (Locomotion)    [place] Level (Gait) unable to assess  -KR               User Key  (r) = Recorded By, (t) = Taken By, (c) = Cosigned By      Initials Name Provider Type    Janneth Cabrera, PT Physical Therapist                   Obj/Interventions       Row Name 02/12/25 0941          Range of Motion Comprehensive    General Range of Motion no range of motion deficits identified  -KR       Row Name 02/12/25 0941          Strength Comprehensive (MMT)    General Manual Muscle Testing (MMT) Assessment lower extremity strength deficits identified  -KR     Comment, General Manual Muscle Testing (MMT) Assessment R knee extension and flexion 4-/5, R ankle DF/PF 3+/5; L knee extension and flexion 2+/5, L ankle DF 1/5, L ankle PF 1/5  -KR       Row Name 02/12/25 0941          Balance    Balance Assessment sitting static balance;sitting dynamic balance;standing static balance;standing dynamic balance  -KR     Static Sitting Balance moderate assist;1-person assist  heavy posterior lean  -KR     Dynamic Sitting Balance maximum assist;1-person assist;verbal cues;non-verbal cues (demo/gesture)  -KR     Position, Sitting Balance unsupported;sitting in chair  -KR     Static Standing Balance maximum assist;2-person  assist;non-verbal cues (demo/gesture);verbal cues  -KR     Dynamic Standing Balance maximum assist;2-person assist;non-verbal cues (demo/gesture);verbal cues  -KR     Position/Device Used, Standing Balance supported;other (see comments)  BUE support  -KR     Balance Interventions sitting;standing;sit to stand;supported;static;dynamic  -KR       Row Name 02/12/25 3858          Sensory Assessment (Somatosensory)    Sensory Assessment (Somatosensory) LE sensation intact  -KR               User Key  (r) = Recorded By, (t) = Taken By, (c) = Cosigned By      Initials Name Provider Type    KR Janneth Naqvi, PT Physical Therapist                   Goals/Plan       Row Name 02/12/25 8667          Bed Mobility Goal 1 (PT)    Activity/Assistive Device (Bed Mobility Goal 1, PT) bed mobility activities, all  -KR     [place] Level/Cues Needed (Bed Mobility Goal 1, PT) moderate assist (50-74% patient effort)  -KR     Time Frame (Bed Mobility Goal 1, PT) short term goal (STG);1 week  -KR       Row Name 02/12/25 7552          Transfer Goal 1 (PT)    Activity/Assistive Device (Transfer Goal 1, PT) sit-to-stand/stand-to-sit;bed-to-chair/chair-to-bed;wheelchair transfer  -KR     [place] Level/Cues Needed (Transfer Goal 1, PT) maximum assist (25-49% patient effort)  -KR     Time Frame (Transfer Goal 1, PT) long term goal (LTG);2 weeks  -KR       Row Name 02/12/25 0625          Balance Goal 1 (PT)    Activity/Assistive Device (Balance Goal) sitting static balance;sitting dynamic balance  -KR     [place] Level/Cues Needed (Balance Goal 1, PT) contact guard required  -KR     Time Frame (Balance Goal 1, PT) short-term goal (STG);1 week  -KR       Row Name 02/12/25 2013          Problem Specific Goal 1 (PT)    Problem Specific Goal 1 (PT) Pt will self propel manual wc 50' for increased independence with household navigation.  -KR     Time Frame (Problem Specific Goal 1, PT) 2 weeks;long-term goal (LTG)  -KR       Row Name  02/12/25 0945          Therapy Assessment/Plan (PT)    Planned Therapy Interventions (PT) balance training;bed mobility training;gait training;home exercise program;postural re-education;patient/family education;neuromuscular re-education;ROM (range of motion);strengthening;stretching;transfer training;wheelchair management/propulsion training  -KR               User Key  (r) = Recorded By, (t) = Taken By, (c) = Cosigned By      Initials Name Provider Type    KR Janneth Naqvi, PT Physical Therapist                   Clinical Impression       Row Name 02/12/25 0943          Pain    Pretreatment Pain Rating 0/10 - no pain  -KR     Posttreatment Pain Rating 0/10 - no pain  -KR       Row Name 02/12/25 0943          Plan of Care Review    Plan of Care Reviewed With patient  -KR     Outcome Evaluation Pt presents with strength, balance, and endurance deficits requiring 2-person assist with bed to chair transfers this date d/t heavy posterior lean. Pt will benefit from PT to address aforementioned deficits and return to PLOF. PT rec home with assist and  PT upon dc.  -KR       Row Name 02/12/25 0943          Therapy Assessment/Plan (PT)    Patient/Family Therapy Goals Statement (PT) to go home  -KR     Rehab Potential (PT) fair  -KR     Criteria for Skilled Interventions Met (PT) skilled treatment is necessary;yes  -KR     Therapy Frequency (PT) daily  -KR     Predicted Duration of Therapy Intervention (PT) 2 weeks  -KR       Row Name 02/12/25 0943          Vital Signs    Pre Systolic BP Rehab 113  -KR     Pre Treatment Diastolic BP 67  -KR     Post Systolic BP Rehab 125  -KR     Post Treatment Diastolic   -KR     Pre Patient Position Sitting  -KR     Intra Patient Position Standing  -KR     Post Patient Position Sitting  -KR       Row Name 02/12/25 0943          Positioning and Restraints    Pre-Treatment Position in bed  -KR     Post Treatment Position chair  -KR     In Chair notified nsg;reclined;call light  within reach;encouraged to call for assist;exit alarm on;with family/caregiver;waffle cushion;with nsg;on mechanical lift sling;legs elevated;LUE elevated;heels elevated  -KR               User Key  (r) = Recorded By, (t) = Taken By, (c) = Cosigned By      Initials Name Provider Type    Janneth Cabrera PT Physical Therapist                   Outcome Measures       Row Name 02/12/25 0946          How much help from another person do you currently need...    Turning from your back to your side while in flat bed without using bedrails? 2  -KR     Moving from lying on back to sitting on the side of a flat bed without bedrails? 2  -KR     Moving to and from a bed to a chair (including a wheelchair)? 2  -KR     Standing up from a chair using your arms (e.g., wheelchair, bedside chair)? 2  -KR     Climbing 3-5 steps with a railing? 1  -KR     To walk in hospital room? 1  -KR     AM-PAC 6 Clicks Score (PT) 10  -KR     Highest Level of Mobility Goal 4 --> Transfer to chair/commode  -KR       Row Name 02/12/25 0946 02/12/25 0912       Modified [place] Scale    Pre-Stroke Modified [place] Scale 6 - Unable to determine (UTD) from the medical record documentation  -KR --    Modified [place] Scale 5 - Severe disability.  Bedridden, incontinent, and requiring constant nursing care and attention.  -KR 4 - Moderately severe disability.  Unable to walk without assistance, and unable to attend to own bodily needs without assistance.  -CS      Row Name 02/12/25 0946 02/12/25 0912       Functional Assessment    Outcome Measure Options AM-PAC 6 Clicks Basic Mobility (PT);Modified [place]  -KR AM-PAC 6 Clicks Daily Activity (OT);Modified [place]  -CS              User Key  (r) = Recorded By, (t) = Taken By, (c) = Cosigned By      Initials Name Provider Type    Colton Huynh OT Occupational Therapist    Janneth Cabrera PT Physical Therapist                                 Physical Therapy Education       Title: PT OT SLP  Therapies (In Progress)       Topic: Physical Therapy (In Progress)       Point: Mobility training (In Progress)       Learning Progress Summary            Patient Acceptance, E, NR by KR at 2/12/2025 0947                      Point: Home exercise program (Not Started)       Learner Progress:  Not documented in this visit.              Point: Body mechanics (In Progress)       Learning Progress Summary            Patient Acceptance, E, NR by KR at 2/12/2025 0947                      Point: Precautions (In Progress)       Learning Progress Summary            Patient Acceptance, E, NR by KR at 2/12/2025 0947                                      User Key       Initials Effective Dates Name Provider Type Discipline    KR 12/30/22 -  Janneth Naqvi, PT Physical Therapist PT                  PT Recommendation and Plan  Planned Therapy Interventions (PT): balance training, bed mobility training, gait training, home exercise program, postural re-education, patient/family education, neuromuscular re-education, ROM (range of motion), strengthening, stretching, transfer training, wheelchair management/propulsion training  Outcome Evaluation: Pt presents with strength, balance, and endurance deficits requiring 2-person assist with bed to chair transfers this date d/t heavy posterior lean. Pt will benefit from PT to address aforementioned deficits and return to PLOF. PT rec home with assist and  PT upon dc.     Time Calculation:   PT Evaluation Complexity  History, PT Evaluation Complexity: 3 or more personal factors and/or comorbidities  Examination of Body Systems (PT Eval Complexity): total of 4 or more elements  Clinical Presentation (PT Evaluation Complexity): evolving  Clinical Decision Making (PT Evaluation Complexity): moderate complexity  Overall Complexity (PT Evaluation Complexity): moderate complexity     PT Charges       Row Name 02/12/25 0947             Time Calculation    Start Time 0822  -KR      PT Received  On 02/12/25  -KR      PT Goal Re-Cert Due Date 02/22/25  -KR         Untimed Charges    PT Eval/Re-eval Minutes 47  -KR         Total Minutes    Untimed Charges Total Minutes 47  -KR       Total Minutes 47  -KR                User Key  (r) = Recorded By, (t) = Taken By, (c) = Cosigned By      Initials Name Provider Type    Janneth Cabrera, PT Physical Therapist                  Therapy Charges for Today       Code Description Service Date Service Provider Modifiers Qty    [id number] HC PT EVAL MOD COMPLEXITY 4 2/12/2025 Janneth Naqvi, PT GP 1            PT G-Codes  Outcome Measure Options: AM-PAC 6 Clicks Basic Mobility (PT), Modified [place]  AM-PAC 6 Clicks Score (PT): 10  AM-PAC 6 Clicks Score (OT): 14  Modified [place] Scale: 5 - Severe disability.  Bedridden, incontinent, and requiring constant nursing care and attention.  PT Discharge Summary  Anticipated Discharge Disposition (PT): home with assist, home with home health    Janneth Naqvi PT  2/12/2025

## 2025-02-12 NOTE — CONSULTS
NAL Consult Note    Vickie Joshi  1950  [id number]    Date of Admit:  2/10/2025  Date of Consult: 2/12/2025    Reason for Consultation: Acute kidney injury    History of present illness:    Patient is a 74 y.o.  Yr old female with past medical history of hypertension, dyslipidemia, CVA s/p left-sided residual weakness, atrial fibrillation, fibromyalgia, GERD who presented to hospital for confusion and hallucination per family.  Per HPI patient was having nausea vomiting and diarrhea associate with some abdominal pain prior to admission.  Labs on admission notable for creatinine 2.9 mg/dL.  During the hospital course creatinine slightly improved today 2.6.  Nephrology is consulted for abnormal kidney function.     On evaluation at bedside, patient is more alert and awake, she denies history of kidney disease, she denies NSAID use, denies history of kidney stones.  She reports that she was having nausea vomiting and some diarrhea prior to admission.  She denies chest pain or shortness of breath, denies dysuria urgency or frequency.  Of note patient was recently admitted at outside hospital for urinary tract infection.     Past Medical History:   Diagnosis Date    Arthritis     Asthma     Atrial fibrillation     Cardiac disorder     Closed fracture of neck of left femur 2/27/2017    Disease of thyroid gland     Fibromyalgia     GERD (gastroesophageal reflux disease)     Hyperlipidemia     Hypertension     Migraine     Neuropathy     Stroke        Past Surgical History:   Procedure Laterality Date    CHOLECYSTECTOMY      HEMORRHOIDECTOMY N/A 7/23/2021    Procedure: HEMORRHOIDECTOMY;  Surgeon: Adan Meadows MD;  Location: [facility] OR;  Service: General;  Laterality: N/A;    HIP PERCUTANEOUS PINNING Left 2/28/2017    Procedure: LEFT HIP PERCUTANEOUS PINNING FEMORAL NECK;  Surgeon: Ezra Barlow MD;  Location:  [facility];  Service:     HYSTERECTOMY      CT CLTX HIP DISLOCATION TRAUMATIC W/O ANESTHESIA Left  2/28/2017    Procedure: LEFT HIP CLOSED REDUCTION;  Surgeon: Ezra Barlow MD;  Location: [facility];  Service: Orthopedics    SHOULDER SURGERY         Social History     Socioeconomic History    Marital status:    Tobacco Use    Smoking status: Never    Smokeless tobacco: Never   Vaping Use    Vaping status: Never Used   Substance and Sexual Activity    Alcohol use: No    Drug use: No    Sexual activity: Defer       family history includes Alcohol abuse in her mother; Colon cancer in her maternal grandmother; Diabetes in her maternal aunt; Heart disease in her mother; Hypertension in her mother; Parkinsonism in her mother; Stroke in her mother.    Allergies   Allergen Reactions    Cephalexin Swelling     Tolerates cefdinir    Penicillins Hives     Tolerates cefdinir    Sulfa Antibiotics Hives       Medication:    Current Facility-Administered Medications:     acetaminophen (TYLENOL) tablet 650 mg, 650 mg, Oral, Q4H PRN **OR** acetaminophen (TYLENOL) 160 MG/5ML oral solution 650 mg, 650 mg, Oral, Q4H PRN **OR** acetaminophen (TYLENOL) suppository 650 mg, 650 mg, Rectal, Q4H PRN, DayLucy MD    albuterol (PROVENTIL) nebulizer solution 0.042% 1.25 mg/3mL, 1 ampule, Nebulization, Q6H PRN, Day, Lucy BOTELLO MD    apixaban (ELIQUIS) tablet 5 mg, 5 mg, Oral, Q12H, DashSherley MD, 5 mg at 02/12/25 1223    aspirin chewable tablet 81 mg, 81 mg, Oral, Daily, 81 mg at 02/12/25 0816 **OR** aspirin suppository 300 mg, 300 mg, Rectal, Daily, Eb Masters APRN    atorvastatin (LIPITOR) tablet 10 mg, 10 mg, Oral, Nightly, Eb Masters APRN, 10 mg at 02/11/25 2141    sennosides-docusate (PERICOLACE) 8.6-50 MG per tablet 2 tablet, 2 tablet, Oral, BID PRN, 2 tablet at 02/12/25 0955 **AND** polyethylene glycol (MIRALAX) packet 17 g, 17 g, Oral, Daily PRN **AND** bisacodyl (DULCOLAX) EC tablet 5 mg, 5 mg, Oral, Daily PRN **AND** bisacodyl (DULCOLAX) suppository 10 mg, 10 mg, Rectal, Daily PRN, Day, Lucy BOTELLO MD, 10  mg at 02/12/25 1352    budesonide-formoterol (SYMBICORT) 160-4.5 MCG/ACT inhaler 2 puff, 2 puff, Inhalation, BID - RT, Lucy Crouch MD, 2 puff at 02/12/25 1013    Calcium Replacement - Follow Nurse / BPA Driven Protocol, , Not Applicable, PRN, Lucy Crouch MD    dilTIAZem CD (CARDIZEM CD) 24 hr capsule 240 mg, 240 mg, Oral, Q24H, Day, Lucy BOTELLO MD, 240 mg at 02/12/25 0816    levothyroxine (SYNTHROID, LEVOTHROID) tablet 137 mcg, 137 mcg, Oral, Daily, Lucy Crouch MD, 137 mcg at 02/12/25 0551    Magnesium Standard Dose Replacement - Follow Nurse / BPA Driven Protocol, , Not Applicable, PRN, Lucy Crouch MD    metoprolol tartrate (LOPRESSOR) half tablet 12.5 mg, 12.5 mg, Oral, Q12H, Desi Ruggiero PharmD, 12.5 mg at 02/12/25 0816    nitroglycerin (NITROSTAT) SL tablet 0.4 mg, 0.4 mg, Sublingual, Q5 Min PRN, Lucy Crouch MD    pantoprazole (PROTONIX) EC tablet 40 mg, 40 mg, Oral, Q AM, Lucy Crocuh MD, 40 mg at 02/12/25 0552    Phosphorus Replacement - Follow Nurse / BPA Driven Protocol, , Not Applicable, PRN, Lucy Crouch MD    Potassium Replacement - Follow Nurse / BPA Driven Protocol, , Not Applicable, PRN, Lucy Crouch MD    pregabalin (LYRICA) capsule 300 mg, 300 mg, Oral, BID, Desi Ruggiero, PharmD, 300 mg at 02/12/25 0816    sodium chloride 0.9 % flush 10 mL, 10 mL, Intravenous, PRN, Eliot Recio,     sodium chloride 0.9 % flush 10 mL, 10 mL, Intravenous, Q12H, Eb Masters APRN, 10 mL at 02/11/25 0141    sodium chloride 0.9 % flush 10 mL, 10 mL, Intravenous, PRN, Eb Masters, APRN    sodium chloride 0.9 % flush 10 mL, 10 mL, Intravenous, Q12H, Day, Lucy BOTELLO MD, 10 mL at 02/12/25 0817    sodium chloride 0.9 % flush 10 mL, 10 mL, Intravenous, PRN, Lucy Crouch MD    sodium chloride 0.9 % infusion 40 mL, 40 mL, Intravenous, PRN, Eb Masters APRN    sodium chloride 0.9 % infusion 40 mL, 40 mL, Intravenous, PRN, Lucy Crouch MD    topiramate (TOPAMAX) tablet 100 mg, 100 mg, Oral,  Daily, Day, Lucy BOTELLO MD, 100 mg at 02/12/25 0816    Medications Prior to Admission   Medication Sig Dispense Refill Last Dose/Taking    albuterol (ACCUNEB) 1.25 MG/3ML nebulizer solution Take 3 mL by nebulization Every 6 (Six) Hours As Needed for Wheezing or Shortness of Air. 90 vial 12     albuterol sulfate  (90 Base) MCG/ACT inhaler INHALE 2 PUFFS BY MOUTH EVERY 4 TO 6 HOURS AS NEEDED 8.5 g 0     atorvastatin (LIPITOR) 10 MG tablet TAKE ONE TABLET BY MOUTH ONCE NIGHTLY 90 tablet 1     ciprofloxacin (Cipro) 500 MG tablet Take 1 tablet by mouth 2 (Two) Times a Day. 14 tablet 0     ciprofloxacin-dexAMETHasone (Ciprodex) 0.3-0.1 % otic suspension Administer 4 drops into the left ear 2 (Two) Times a Day. 7.5 mL 0     denosumab (Prolia) 60 MG/ML solution prefilled syringe syringe Inject 1 mL under the skin into the appropriate area as directed 1 (One) Time for 1 dose. 1 mL 1     dicyclomine (BENTYL) 10 MG capsule Take 1 capsule by mouth 2 (Two) Times a Day As Needed for Abdominal Cramping. 180 capsule 1     dilTIAZem CD (CARDIZEM CD) 240 MG 24 hr capsule TAKE 1 CAPSULE BY MOUTH DAILY 90 capsule 1     Eliquis 5 MG tablet tablet TAKE ONE TABLET BY MOUTH EVERY 12 HOURS 180 tablet 1     Fluticasone-Salmeterol (ADVAIR/WIXELA) 250-50 MCG/ACT DISKUS INHALE 1 PUFF BY MOUTH 2 TIMES A DAY 60 each 5     levothyroxine (SYNTHROID, LEVOTHROID) 137 MCG tablet TAKE 1 TABLET BY MOUTH DAILY 90 tablet 0     Magnesium 250 MG tablet Take  by mouth.       metoprolol tartrate (LOPRESSOR) 25 MG tablet TAKE 1 TABLET BY MOUTH 2 TIMES A  tablet 1     nitrofurantoin, macrocrystal-monohydrate, (Macrobid) 100 MG capsule Take 1 capsule by mouth Daily. 90 capsule 1     nystatin (MYCOSTATIN) 241989 UNIT/GM cream APPLY TO AFFECTED AREA(S) TWO TIMES A DAY 60 g 1     nystatin (MYCOSTATIN) 294271 UNIT/GM powder Apply  topically to the appropriate area as directed 3 (Three) Times a Day. 60 g 0     omeprazole (priLOSEC) 40 MG capsule TAKE 1  "CAPSULE BY MOUTH DAILY 90 capsule 1     pregabalin (LYRICA) 300 MG capsule TAKE 1 CAPSULE BY MOUTH 2 TIMES A  capsule 0     topiramate (TOPAMAX) 100 MG tablet TAKE 1 TABLET BY MOUTH DAILY 90 tablet 1        Review of Systems:  Full review of systems reviewed and negative otherwise for acute complaints    Physical Exam:   Vital Signs   Blood pressure 120/79, pulse 64, temperature 97.6 °F (36.4 °C), temperature source Axillary, resp. rate 18, height 165.1 cm (65\"), weight 84.8 kg (187 lb), SpO2 94%.     GENERAL: Awake and alert, in no acute distress.   HEENT: Normocephalic, atraumatic.  NECK: Supple   HEART: RRR; No murmur, rubs, gallops. no edema  LUNGS:  Clear to auscultation bilaterally without wheezing, rales, rhonchi.  ABDOMEN: Soft, nontender, nondistended.  EXT:  No cyanosis, clubbing or edema.  : No Gunderson   SKIN: Warm and dry without rash  NEURO: No focal neurological deficits. Strength equal bilateral  PSYCHIATRIC: Cooperative with PE    Laboratory Data  Results from last 7 days   Lab Units 02/11/25  0636 02/10/25  1633   HEMOGLOBIN g/dL 11.1* 13.4   HEMATOCRIT % 36.8 44.1     Results from last 7 days   Lab Units 02/12/25  1309 02/11/25  0636 02/10/25  1633   SODIUM mmol/L 141 139 141   POTASSIUM mmol/L 3.7 4.2 3.9   CHLORIDE mmol/L 106 109* 105   CO2 mmol/L 22.0 20.0* 20.0*   BUN mg/dL 30* 33* 33*   CREATININE mg/dL 2.67* 2.78* 2.90*   CALCIUM mg/dL 8.4* 8.4* 8.9   PHOSPHORUS mg/dL  --  4.5  --    MAGNESIUM mg/dL  --  1.7 2.0   ALBUMIN g/dL  --  2.8* 3.9     Results from last 7 days   Lab Units 02/12/25  1309   GLUCOSE mg/dL 115*     Results from last 7 days   Lab Units 02/12/25  1353   COLOR UA  Yellow   CLARITY UA  Clear   PH, URINE  6.0   SPECIFIC GRAVITY, URINE  1.007   GLUCOSE UA  Negative   KETONES UA  Negative   BILIRUBIN UA  Negative   PROTEIN UA  Negative   BLOOD UA  Trace*   LEUKOCYTES UA  Small (1+)*   NITRITE UA  Negative     Results from last 7 days   Lab Units 02/11/25  0636   ALK PHOS " U/L 69   BILIRUBIN mg/dL 0.4   ALT (SGPT) U/L 7   AST (SGOT) U/L 18     Estimated Creatinine Clearance: 20.5 mL/min (A) (by C-G formula based on SCr of 2.67 mg/dL (H)).    Radiology:  Chest X ray: 1. Hypoinspiratory film demonstrating pulmonary vascular congestion with no gross pulmonary edema. There are suspected trace pleural effusions.  2. Left basilar airspace disease which may be atelectasis or pneumonia    CT abdomen and pelvis:  IMPRESSION:  1. No acute abdominopelvic process. No obstructive uropathy  2. Colonic diverticulosis  3. Acute or subacute L2 superior endplate compression fracture    MRI stephen:   Impression:  1.No evidence of hemorrhage, mass effect or midline shift. No evidence of recent or acute ischemia.  2. Mild to moderate periventricular and subcortical FLAIR signal changes likely related to chronic microvascular ischemic change. Stable encephalomalacia present within the right basal ganglia and posterior right frontal lobe related to previous infarct.    Impression:     Acute kidney injury  Altered mental status  A-fib  Hypertension   Dyslipidemia  History of CVA  L2 compression fracture.     PLAN: Thank you for asking us to see Vickie Joshi, I recommend the following:     Acute kidney injury:   - Baseline normal kidney function  -Creatinine admission 2.9 >> now 2.6 mg/dL.   -Urine analysis bland.  -Likely BARBARA due to dehydration volume depletion.   -Status post IV fluids.   - Encourage oral hydration.  -CT abdomen reviewed no hydro or stone.   -Will monitor renal function closely.  Continue conservative care.  -Dose meds to GFR.   -Strict BEAU's  -No indication for dialysis at this time  -Hopefully renal function will improve; if renal function is not improving we will consider further workup.   -Continue the rest of the care as per primary team.      Thank you for the consult, will follow with you closely.        Jensen Vu MD  2/12/2025  15:11 EST

## 2025-02-12 NOTE — PROGRESS NOTES
[facility] Medicine Services  PROGRESS NOTE    Patient Name: Vickie Joshi  : 1950  MRN: [id number]    Date of Admission: 2/10/2025  Primary Care Physician: Dav Bryan MD    Subjective   Subjective     CC:  confusion    HPI:  Patient sitting in bed, doing well. No family present. Oriented and answering questions appropriately. Kidney fxn still abnl, she otherwise feels she did well with therapy this am- they have recommended home    Ros  As above      Objective   Objective     Vital Signs:   Temp:  [97.1 °F (36.2 °C)-97.9 °F (36.6 °C)] 97.6 °F (36.4 °C)  Heart Rate:  [49-69] 64  Resp:  [16-18] 18  BP: (113-126)/(56-98) 120/79     Physical Exam:  GEN: NAD, resting in bed, awake, pleasant  HEENT: on room air, atraumatic, normocephalic  NECK: supple, no masses  RESP: on room air, normal effort  CV: on tele, sinus rhythm  PSYCH: normal affect, appropriate  NEURO: awake, alert, oriented and answers questions appropriately  MSK: no edema noted  SKIN: no rashes noted       Results Reviewed:  LAB RESULTS:      Lab 25  0636 02/10/25  1824 02/10/25  1633   WBC 10.73  --  11.78*   HEMOGLOBIN 11.1*  --  13.4   HEMATOCRIT 36.8  --  44.1   PLATELETS 215  --  284   NEUTROS ABS 7.64*  --  9.64*   IMMATURE GRANS (ABS) 0.03  --  0.05   LYMPHS ABS 1.81  --  1.15   MONOS ABS 1.03*  --  0.76   EOS ABS 0.18  --  0.14   MCV 89.3  --  88.2   PROCALCITONIN  --  0.11  --    LACTATE  --   --  1.3   HSTROP T  --  20* 23*         Lab 25  0636 02/10/25  1633   SODIUM 139 141   POTASSIUM 4.2 3.9   CHLORIDE 109* 105   CO2 20.0* 20.0*   ANION GAP 10.0 16.0*   BUN 33* 33*   CREATININE 2.78* 2.90*   EGFR 17.6* 16.7*   GLUCOSE 95 141*   CALCIUM 8.4* 8.9   MAGNESIUM 1.7 2.0   PHOSPHORUS 4.5  --    HEMOGLOBIN A1C 5.20  --    TSH 3.250  --          Lab 25  0636 02/10/25  1633   TOTAL PROTEIN 6.4 8.9*   ALBUMIN 2.8* 3.9   GLOBULIN 3.6 5.0   ALT (SGPT) 7 7   AST (SGOT) 18 15   BILIRUBIN 0.4  0.5   ALK PHOS 69 88         Lab 02/10/25  1824 02/10/25  1633   PROBNP 3,842.0*  --    HSTROP T 20* 23*         Lab 02/11/25  0636   CHOLESTEROL 124   LDL CHOL 67   HDL CHOL 40   TRIGLYCERIDES 88             Brief Urine Lab Results  (Last result in the past 365 days)        Color   Clarity   Blood   Leuk Est   Nitrite   Protein   CREAT   Urine HCG        02/10/25 2101 Yellow   Clear   Trace   Moderate (2+)   Negative   Negative                   Microbiology Results Abnormal       None            MRI Brain Without Contrast    Result Date: 2/11/2025  MRI BRAIN WO CONTRAST Date of Exam: 2/11/2025 12:48 AM EST Indication: AMS, left weakness.  Comparison: 2/10/2025. Technique:  Routine multiplanar/multisequence sequence images of the brain were obtained without contrast administration. Findings: There is no diffusion restriction to suggest acute infarct. There is no evidence of acute or chronic intracranial hemorrhage. No mass effect or midline shift. Stable encephalomalacia is present within the right basal ganglia and posterior right frontal lobe related to previous infarct. Surrounding gliosis is present with FLAIR signal changes. Mild to moderate periventricular and subcortical FLAIR signal changes are present. No abnormal extra-axial collections. The major vascular flow voids appear intact. The basal ganglia, brainstem and cerebellum appear within normal limits. Calvarial and superficial soft tissue signal is within normal limits. Orbits appear unremarkable. The paranasal sinuses and the mastoid air cells appear well aerated. Midline structures are intact.     Impression: Impression: 1.No evidence of hemorrhage, mass effect or midline shift. No evidence of recent or acute ischemia. 2.Mild to moderate periventricular and subcortical FLAIR signal changes likely related to chronic microvascular ischemic change. Stable encephalomalacia present within the right basal ganglia and posterior right frontal lobe related to  previous infarct. Electronically Signed: Tessy Love MD  2/11/2025 1:11 AM EST  Workstation ID: [id number]    CT Abdomen Pelvis Without Contrast    Result Date: 2/10/2025  CT ABDOMEN PELVIS WO CONTRAST Date of Exam: 2/10/2025 9:41 PM EST Indication: BARBARA. Comparison: 9/5/2023 Technique: Axial CT images were obtained of the abdomen and pelvis without the administration of contrast. Reconstructed coronal and sagittal images were also obtained. Automated exposure control and iterative construction methods were used. Findings: Lower Chest: Distortion of the lung bases due to patient motion. Atelectasis present in the lower lobes. Chronic pleural thickening present on the left Organs: Some distortion of the solid organs due to patient motion. No urinary calculi or hydronephrosis. Kidneys, adrenal glands, liver, spleen, pancreas have a grossly unremarkable noncontrast appearance within the limits of motion artifact. Gallbladder  surgically absent Gastrointestinal: No intestinal distention or evident wall thickening. Diverticula of the sigmoid colon without inflammation. Appendix not visualized Pelvis: No abnormal fluid collection. Uterus surgically absent. Urinary bladder unremarkable Peritoneum/Retroperitoneum: No ascites or pneumoperitoneum. Normal caliber aorta Bones/Soft Tissues: New L2 superior endplate compression with sclerosis deep to the endplate which may be due trabecular compression or healing     Impression: 1. No acute abdominopelvic process. No obstructive uropathy 2. Colonic diverticulosis 3. Acute or subacute L2 superior endplate compression fracture Electronically Signed: Silverio Chopra  2/10/2025 10:03 PM EST  Workstation ID: [id number]    XR Chest 1 View    Result Date: 2/10/2025  XR CHEST 1 VW Date of Exam: 2/10/2025 3:19 PM EST Indication: Weak/Dizzy/AMS triage protocol Comparison: 9/6/2023 Findings: Patient rotated to the right. Low lung volumes. Heart size indeterminate. Prominence of the central  pulmonary vessels. No gross pulmonary edema. Airspace disease in the left lung base. Minimal blunting of the costophrenic angles     Impression: Impression: 1. Hypoinspiratory film demonstrating pulmonary vascular congestion with no gross pulmonary edema. There are suspected trace pleural effusions. 2. Left basilar airspace disease which may be atelectasis or pneumonia Electronically Signed: Silverio Nav  2/10/2025 3:44 PM EST  Workstation ID: [id number]    CT Head Without Contrast    Result Date: 2/10/2025  CT HEAD WO CONTRAST Date of Exam: 2/10/2025 2:50 PM EST Indication: confusion, hallucinations, history of stroke. Comparison: February 27, 2017 Technique: Axial CT images were obtained of the head without contrast administration.  Automated exposure control and iterative construction methods were used. Findings: No acute intracranial hemorrhage or extra-axial collection is identified. The ventricles appear stable in caliber, with no evidence of mass effect or midline shift. The basal cisterns appear patent. The gray-white differentiation appears preserved. There  is encephalomalacia within the right basal ganglia and adjacent right frontal lobe. The calvarium appear intact. There is mild frothy mucosal disease in the left sphenoid sinus. The mastoid air cells are well-aerated.     Impression: Impression: 1.No acute intracranial process identified. 2.Encephalomalacia within right basal ganglia and adjacent right frontal lobe. 3.Mild frothy mucosal disease within left sphenoid sinus. Correlate for acute sinusitis. Electronically Signed: Tang Johnson MD  2/10/2025 3:13 PM EST  Workstation ID: [id number]     Results for orders placed during the hospital encounter of 02/10/25    Adult Transthoracic Echo Complete W/ Cont if Necessary Per Protocol (With Agitated Saline)    Interpretation Summary    Left ventricular systolic function is normal. Estimated left ventricular EF = 50%    The right ventricular cavity is  mildly dilated.    Moderate mitral valve regurgitation is present.    Mild aortic valve regurgitation is present.      Current medications:  Scheduled Meds:apixaban, 5 mg, Oral, Q12H  aspirin, 81 mg, Oral, Daily   Or  aspirin, 300 mg, Rectal, Daily  atorvastatin, 10 mg, Oral, Nightly  budesonide-formoterol, 2 puff, Inhalation, BID - RT  dilTIAZem CD, 240 mg, Oral, Q24H  levothyroxine, 137 mcg, Oral, Daily  metoprolol tartrate, 12.5 mg, Oral, Q12H  pantoprazole, 40 mg, Oral, Q AM  pregabalin, 300 mg, Oral, BID  sodium chloride, 10 mL, Intravenous, Q12H  sodium chloride, 10 mL, Intravenous, Q12H  topiramate, 100 mg, Oral, Daily      Continuous Infusions:   PRN Meds:.  acetaminophen **OR** acetaminophen **OR** acetaminophen    albuterol    senna-docusate sodium **AND** polyethylene glycol **AND** bisacodyl **AND** bisacodyl    Calcium Replacement - Follow Nurse / BPA Driven Protocol    Magnesium Standard Dose Replacement - Follow Nurse / BPA Driven Protocol    nitroglycerin    Phosphorus Replacement - Follow Nurse / BPA Driven Protocol    Potassium Replacement - Follow Nurse / BPA Driven Protocol    sodium chloride    sodium chloride    sodium chloride    sodium chloride    sodium chloride    Assessment & Plan   Assessment & Plan     Active Hospital Problems    Diagnosis  POA    **Altered mental status [R41.82]  Yes    Elevated serum creatinine [R79.89]  Yes    Closed compression fracture of L2 lumbar vertebra, initial encounter [S32.020A]  Yes    Tremor, essential [G25.0]  Yes    Atrial fibrillation, chronic [I48.20]  Yes    History of hemorrhagic stroke with residual hemiparesis [I69.359]  Not Applicable    Hyperlipidemia [E78.5]  Yes    Hypertension [I10]  Yes      Resolved Hospital Problems   No resolved problems to display.        Brief Hospital Course to date:  Vickie Joshi is a 74 y.o. female with history of CVA, HTN, HLD, GERD, afib who presents altered mental status. [place] [facility] [place].     AMS  ?UTI  -UA not c/w UTI though sample contaminated so will try to repeat sample - ordered  -head CT not revealing, MRI done as well and not c/w stroke   -no new meds though on several potentially sedating meds  -not hypoxic in ER  - concerned regarding possible stroke so will ask stroke team to see as pt with worsening left sided weakness, speech changes, confusion  -neuro checks  -appreciate stroke neuro assistance with case     BARBARA  -may be related to recent abx for uti as well as poor po intake and pt's report of n/v/d  -lactate normal  -gentle fluids given in ER without much improvement, Cr 2.8 today  -ask nephro to weigh in given large bump- normal baseline as recently as Dec 2024    Afib, chronic on eliquis  -rate controlled  -continue bb and ccb w/ hold parameters  -eliquis resumed as stroke r/o     L2 compression fracture  -pt notes bending over and hearing pop in back; no uncontrollable pain  -outpt follow up      Updated patient's  Bill via phone this afternoon    Expected Discharge Location and Transportation: pending above  Expected Discharge   Expected discharge date/ time has not been documented.     VTE Prophylaxis:  Pharmacologic & mechanical VTE prophylaxis orders are present.         AM-PAC 6 Clicks Score (PT): 10 (02/12/25 7450)    CODE STATUS:   Code Status and Medical Interventions: CPR (Attempt to Resuscitate); Full Support   Ordered at: 02/10/25 8868     Code Status (Patient has no pulse and is not breathing):    CPR (Attempt to Resuscitate)     Medical Interventions (Patient has pulse or is breathing):    Full Support       Sherley Bowling MD  02/12/25

## 2025-02-13 PROBLEM — N17.9 AKI (ACUTE KIDNEY INJURY): Status: ACTIVE | Noted: 2025-02-13

## 2025-02-13 PROCEDURE — 25810000003 SODIUM CHLORIDE 0.9 % SOLUTION: Performed by: INTERNAL MEDICINE

## 2025-02-13 PROCEDURE — 99232 SBSQ HOSP IP/OBS MODERATE 35: CPT | Performed by: INTERNAL MEDICINE

## 2025-02-13 PROCEDURE — 94799 UNLISTED PULMONARY SVC/PX: CPT

## 2025-02-13 RX ORDER — SODIUM CHLORIDE 9 MG/ML
100 INJECTION, SOLUTION INTRAVENOUS CONTINUOUS
Status: ACTIVE | OUTPATIENT
Start: 2025-02-13 | End: 2025-02-14

## 2025-02-13 RX ADMIN — ASPIRIN 81 MG CHEWABLE TABLET 81 MG: 81 TABLET CHEWABLE at 08:46

## 2025-02-13 RX ADMIN — ATORVASTATIN CALCIUM 10 MG: 10 TABLET, FILM COATED ORAL at 20:25

## 2025-02-13 RX ADMIN — APIXABAN 5 MG: 5 TABLET, FILM COATED ORAL at 20:24

## 2025-02-13 RX ADMIN — BUDESONIDE AND FORMOTEROL FUMARATE DIHYDRATE 2 PUFF: 160; 4.5 AEROSOL RESPIRATORY (INHALATION) at 20:10

## 2025-02-13 RX ADMIN — Medication 10 ML: at 20:25

## 2025-02-13 RX ADMIN — PREGABALIN 300 MG: 100 CAPSULE ORAL at 20:25

## 2025-02-13 RX ADMIN — BUDESONIDE AND FORMOTEROL FUMARATE DIHYDRATE 2 PUFF: 160; 4.5 AEROSOL RESPIRATORY (INHALATION) at 09:46

## 2025-02-13 RX ADMIN — PREGABALIN 300 MG: 100 CAPSULE ORAL at 08:45

## 2025-02-13 RX ADMIN — APIXABAN 5 MG: 5 TABLET, FILM COATED ORAL at 08:45

## 2025-02-13 RX ADMIN — Medication 12.5 MG: at 20:25

## 2025-02-13 RX ADMIN — SODIUM CHLORIDE 100 ML/HR: 9 INJECTION, SOLUTION INTRAVENOUS at 21:24

## 2025-02-13 RX ADMIN — TOPIRAMATE 100 MG: 100 TABLET, FILM COATED ORAL at 08:44

## 2025-02-13 RX ADMIN — LEVOTHYROXINE SODIUM 137 MCG: 0.14 TABLET ORAL at 05:30

## 2025-02-13 RX ADMIN — PANTOPRAZOLE SODIUM 40 MG: 40 TABLET, DELAYED RELEASE ORAL at 05:30

## 2025-02-13 NOTE — PROGRESS NOTES
[facility] Medicine Services  PROGRESS NOTE    Patient Name: Vickie Joshi  : 1950  MRN: [id number]    Date of Admission: 2/10/2025  Primary Care Physician: Dav Bryan MD    Subjective   Subjective     CC:  confusion    HPI:  Laying in bed, no complaints. Urinating well per her reports. No family present. Hoping to go home soon. Cr not much better. Mentation appropriate    Ros  As above      Objective   Objective     Vital Signs:   Temp:  [97.6 °F (36.4 °C)-98 °F (36.7 °C)] 97.7 °F (36.5 °C)  Heart Rate:  [63-73] 63  Resp:  [16-18] 18  BP: (113-132)/(48-95) 118/49     Physical Exam:  GEN: NAD, resting in bed, awake, pleasant  HEENT: on room air, atraumatic, normocephalic  NECK: supple, no masses  RESP: on room air, normal effort  CV: on tele, sinus rhythm  PSYCH: normal affect, appropriate  NEURO: awake, alert, oriented and answers questions appropriately  MSK: no edema noted  SKIN: no rashes noted       Results Reviewed:  LAB RESULTS:      Lab 25  0636 02/10/25  1824 02/10/25  1633   WBC 10.73  --  11.78*   HEMOGLOBIN 11.1*  --  13.4   HEMATOCRIT 36.8  --  44.1   PLATELETS 215  --  284   NEUTROS ABS 7.64*  --  9.64*   IMMATURE GRANS (ABS) 0.03  --  0.05   LYMPHS ABS 1.81  --  1.15   MONOS ABS 1.03*  --  0.76   EOS ABS 0.18  --  0.14   MCV 89.3  --  88.2   PROCALCITONIN  --  0.11  --    LACTATE  --   --  1.3   HSTROP T  --  20* 23*         Lab 25  1309 25  0636 02/10/25  1633   SODIUM 141 139 141   POTASSIUM 3.7 4.2 3.9   CHLORIDE 106 109* 105   CO2 22.0 20.0* 20.0*   ANION GAP 13.0 10.0 16.0*   BUN 30* 33* 33*   CREATININE 2.67* 2.78* 2.90*   EGFR 18.5* 17.6* 16.7*   GLUCOSE 115* 95 141*   CALCIUM 8.4* 8.4* 8.9   MAGNESIUM  --  1.7 2.0   PHOSPHORUS  --  4.5  --    HEMOGLOBIN A1C  --  5.20  --    TSH  --  3.250  --          Lab 25  0636 02/10/25  1633   TOTAL PROTEIN 6.4 8.9*   ALBUMIN 2.8* 3.9   GLOBULIN 3.6 5.0   ALT (SGPT) 7 7   AST (SGOT) 18  15   BILIRUBIN 0.4 0.5   ALK PHOS 69 88         Lab 02/10/25  1824 02/10/25  1633   PROBNP 3,842.0*  --    HSTROP T 20* 23*         Lab 02/11/25  0636   CHOLESTEROL 124   LDL CHOL 67   HDL CHOL 40   TRIGLYCERIDES 88             Brief Urine Lab Results  (Last result in the past 365 days)        Color   Clarity   Blood   Leuk Est   Nitrite   Protein   CREAT   Urine HCG        02/12/25 1353 Yellow   Clear   Trace   Small (1+)   Negative   Negative                   Microbiology Results Abnormal       None            No radiology results from the last 24 hrs    Results for orders placed during the hospital encounter of 02/10/25    Adult Transthoracic Echo Complete W/ Cont if Necessary Per Protocol (With Agitated Saline)    Interpretation Summary    Left ventricular systolic function is normal. Estimated left ventricular EF = 50%    The right ventricular cavity is mildly dilated.    Moderate mitral valve regurgitation is present.    Mild aortic valve regurgitation is present.      Current medications:  Scheduled Meds:apixaban, 5 mg, Oral, Q12H  aspirin, 81 mg, Oral, Daily   Or  aspirin, 300 mg, Rectal, Daily  atorvastatin, 10 mg, Oral, Nightly  budesonide-formoterol, 2 puff, Inhalation, BID - RT  dilTIAZem CD, 240 mg, Oral, Q24H  levothyroxine, 137 mcg, Oral, Daily  metoprolol tartrate, 12.5 mg, Oral, Q12H  pantoprazole, 40 mg, Oral, Q AM  pregabalin, 300 mg, Oral, BID  sodium chloride, 10 mL, Intravenous, Q12H  sodium chloride, 10 mL, Intravenous, Q12H  topiramate, 100 mg, Oral, Daily      Continuous Infusions:   PRN Meds:.  acetaminophen **OR** acetaminophen **OR** acetaminophen    albuterol    senna-docusate sodium **AND** polyethylene glycol **AND** bisacodyl **AND** bisacodyl    Calcium Replacement - Follow Nurse / BPA Driven Protocol    Magnesium Standard Dose Replacement - Follow Nurse / BPA Driven Protocol    nitroglycerin    Phosphorus Replacement - Follow Nurse / BPA Driven Protocol    Potassium Replacement -  Follow Nurse / BPA Driven Protocol    sodium chloride    sodium chloride    sodium chloride    sodium chloride    sodium chloride    Assessment & Plan   Assessment & Plan     Active Hospital Problems    Diagnosis  POA    **Altered mental status [R41.82]  Yes    Elevated serum creatinine [R79.89]  Yes    Closed compression fracture of L2 lumbar vertebra, initial encounter [S32.020A]  Yes    Tremor, essential [G25.0]  Yes    Atrial fibrillation, chronic [I48.20]  Yes    History of hemorrhagic stroke with residual hemiparesis [I69.359]  Not Applicable    Hyperlipidemia [E78.5]  Yes    Hypertension [I10]  Yes      Resolved Hospital Problems   No resolved problems to display.        Brief Hospital Course to date:  Vickie Joshi is a 74 y.o. female with history of CVA, HTN, HLD, GERD, afib who presents altered mental status. Notably had admission to [facility] [place] with UTI last month.     AMS-resolved   -UA not c/w UTI, sample repeated due to first sample contaminated, again not impressive for infection  -head CT not revealing, MRI done as well and not c/w stroke   -no new meds though on several potentially sedating meds  -not hypoxic in ER  -stroke team has evaluated patient, appreciate their help  -neuro checks  -seems to be back to baseline mentation    BARBARA  -may be related to recent abx for uti as well as poor po intake and pt's report of n/v/d  -lactate normal  -gentle fluids given in ER without much improvement, Cr 2.6  -appreciate nephro assistance     Afib, chronic on eliquis  -rate controlled  -continue bb and ccb w/ hold parameters  -eliquis resumed as stroke r/o     L2 compression fracture  -pt notes bending over and hearing pop in back; no uncontrollable pain  -outpt follow up        Expected Discharge Location and Transportation: home when Cr better/cleared by nephrology  Expected Discharge   Expected discharge date/ time has not been documented.     VTE Prophylaxis:  Pharmacologic & mechanical VTE prophylaxis  orders are present.         AM-PAC 6 Clicks Score (PT): 10 (02/13/25 0000)    CODE STATUS:   Code Status and Medical Interventions: CPR (Attempt to Resuscitate); Full Support   Ordered at: 02/10/25 2334     Code Status (Patient has no pulse and is not breathing):    CPR (Attempt to Resuscitate)     Medical Interventions (Patient has pulse or is breathing):    Full Support       Sherley Bowling MD  02/13/25

## 2025-02-13 NOTE — CASE MANAGEMENT/SOCIAL WORK
Continued Stay Note   [facility]     Patient Name: Vickie Joshi  MRN: [id number]  Today's Date: 2/13/2025    Admit Date: 2/10/2025    Plan: Home with    Discharge Plan       Row Name 02/13/25 1446       Plan    Plan Comments MSW spoke with pt at bedside. Therapy did recommend HH for pt. Pt reports she is unsure if she will need this at discharge as she has had home health in the past. Pt reports she will let MSW know if she chagnes her mind. MSW continues to follow for discharge needs as arise.                   Discharge Codes    No documentation.                       CARLEEN Richardson

## 2025-02-13 NOTE — PLAN OF CARE
Goal Outcome Evaluation:  Plan of Care Reviewed With: patient        Progress: no change  Outcome Evaluation: VSS on RA. A&O x4. No PRNs necessary so far this shift. Pt denies pain, n/v, or soa. Large BM this evening. Pt resting well in between care at this time.

## 2025-02-14 ENCOUNTER — READMISSION MANAGEMENT (OUTPATIENT)
Dept: CALL CENTER | Facility: HOSPITAL | Age: 75
End: 2025-02-14
Payer: MEDICARE

## 2025-02-14 VITALS
BODY MASS INDEX: 31.16 KG/M2 | OXYGEN SATURATION: 100 % | HEART RATE: 52 BPM | TEMPERATURE: 96.8 F | WEIGHT: 187 LBS | SYSTOLIC BLOOD PRESSURE: 106 MMHG | RESPIRATION RATE: 18 BRPM | DIASTOLIC BLOOD PRESSURE: 68 MMHG | HEIGHT: 65 IN

## 2025-02-14 LAB
ALBUMIN SERPL-MCNC: 3.1 G/DL (ref 3.5–5.2)
ALBUMIN/GLOB SERPL: 0.9 G/DL
ALP SERPL-CCNC: 75 U/L (ref 39–117)
ALT SERPL W P-5'-P-CCNC: 6 U/L (ref 1–33)
ANION GAP SERPL CALCULATED.3IONS-SCNC: 13 MMOL/L (ref 5–15)
AST SERPL-CCNC: 12 U/L (ref 1–32)
BACTERIA SPEC AEROBE CULT: NO GROWTH
BILIRUB SERPL-MCNC: 0.3 MG/DL (ref 0–1.2)
BUN SERPL-MCNC: 29 MG/DL (ref 8–23)
BUN/CREAT SERPL: 11.9 (ref 7–25)
CALCIUM SPEC-SCNC: 8.5 MG/DL (ref 8.6–10.5)
CHLORIDE SERPL-SCNC: 110 MMOL/L (ref 98–107)
CO2 SERPL-SCNC: 21 MMOL/L (ref 22–29)
CREAT SERPL-MCNC: 2.43 MG/DL (ref 0.57–1)
EGFRCR SERPLBLD CKD-EPI 2021: 20.7 ML/MIN/1.73
GLOBULIN UR ELPH-MCNC: 3.6 GM/DL
GLUCOSE SERPL-MCNC: 97 MG/DL (ref 65–99)
POTASSIUM SERPL-SCNC: 4 MMOL/L (ref 3.5–5.2)
PROT SERPL-MCNC: 6.7 G/DL (ref 6–8.5)
SODIUM SERPL-SCNC: 144 MMOL/L (ref 136–145)

## 2025-02-14 PROCEDURE — 94799 UNLISTED PULMONARY SVC/PX: CPT

## 2025-02-14 PROCEDURE — 80053 COMPREHEN METABOLIC PANEL: CPT | Performed by: INTERNAL MEDICINE

## 2025-02-14 PROCEDURE — 99238 HOSP IP/OBS DSCHRG MGMT 30/<: CPT | Performed by: INTERNAL MEDICINE

## 2025-02-14 PROCEDURE — 94664 DEMO&/EVAL PT USE INHALER: CPT

## 2025-02-14 RX ADMIN — APIXABAN 5 MG: 5 TABLET, FILM COATED ORAL at 08:03

## 2025-02-14 RX ADMIN — DILTIAZEM HYDROCHLORIDE 240 MG: 240 CAPSULE, COATED, EXTENDED RELEASE ORAL at 08:03

## 2025-02-14 RX ADMIN — ASPIRIN 81 MG CHEWABLE TABLET 81 MG: 81 TABLET CHEWABLE at 08:03

## 2025-02-14 RX ADMIN — PANTOPRAZOLE SODIUM 40 MG: 40 TABLET, DELAYED RELEASE ORAL at 05:26

## 2025-02-14 RX ADMIN — BUDESONIDE AND FORMOTEROL FUMARATE DIHYDRATE 2 PUFF: 160; 4.5 AEROSOL RESPIRATORY (INHALATION) at 07:56

## 2025-02-14 RX ADMIN — Medication 12.5 MG: at 08:03

## 2025-02-14 RX ADMIN — PREGABALIN 300 MG: 100 CAPSULE ORAL at 08:03

## 2025-02-14 RX ADMIN — TOPIRAMATE 100 MG: 100 TABLET, FILM COATED ORAL at 08:03

## 2025-02-14 RX ADMIN — LEVOTHYROXINE SODIUM 137 MCG: 0.14 TABLET ORAL at 05:26

## 2025-02-14 NOTE — PAYOR COMM NOTE
"Eveline Hawkins (74 y.o. Female)       Date of Birth   1950    Social Security Number       Address   [address] [place] [address]    Home Phone   [phone]    MRN   [id number]       [facility]    Marital Status                               Admission Date   2/10/25    Admission Type   Emergency    Admitting Provider   Sherley Bowling MD    Attending Provider   Sherley Bowling MD    Department, Room/Bed   [facility] 3E, S331/1       Discharge Date       Discharge Disposition   Home or Self Care    Discharge Destination                                 Attending Provider: Sherley Bowling MD    Allergies: Cephalexin, Penicillins, Sulfa Antibiotics    Isolation: None   Infection: MRSA/History Only (21)   Code Status: CPR    Ht: 165.1 cm (65\")   Wt: 84.8 kg (187 lb)    Admission Cmt: None   Principal Problem: Altered mental status [R41.82]                   Active Insurance as of 2/10/2025       Primary Coverage       Payor Plan Insurance Group Employer/Plan Group    ANTHEM MEDICARE REPLACEMENT ANTHEM MED ADV HMO [id number]       Payor Plan Address Payor Plan Phone Number Payor Plan Fax Number Effective Dates    [address] [phone]  2024 - None Entered    [facility] [id number]         Subscriber Name Subscriber Birth Date Member ID       EVELINE HAWKINS 1950 [id number]                     Emergency Contacts        (Rel.) Home Phone Work Phone Mobile Phone    Barak Hawkins (Spouse) [phone] -- --                 History & Physical        DayLucy MD at 02/10/25 [address] [place] [address] Medicine Services  HISTORY AND PHYSICAL    Patient Name: Eveline Hawkins  : 1950  MRN: [id number]  Primary Care Physician: Dav Bryan MD  Date of admission: 2/10/2025      Subjective  Subjective     Chief Complaint:   Confusion, hallucinations     HPI:  Eveline Hawkins is a 74 " y.o. female with hx of prior CVA, afib, GERD, HLD, HTN who presents from home with  who notes pt has declined over past 2-3 weeks.  Has been confused and having hallucinations. Thinks her kids are still in the house, etc.  Pt does not provide hx and asks that I ask her .  She does tell me she has had some n/v/d and mild abd pain.  Denies chest pain, shortness of breath, cough, f/c.  No urinary sx or leg swelling per pt.   states he brought her in because she is not improving and he feels her left sided weakness is getting worse with more pronounced left facial droop and now unable to assist with transfers.  Pt was [place] [facility] [place] but no significant clinical improvement.        Personal History     Past Medical History:   Diagnosis Date    Arthritis     Asthma     Atrial fibrillation     Cardiac disorder     Closed fracture of neck of left femur 2/27/2017    Disease of thyroid gland     Fibromyalgia     GERD (gastroesophageal reflux disease)     Hyperlipidemia     Hypertension     Migraine     Neuropathy     Stroke            Past Surgical History:   Procedure Laterality Date    CHOLECYSTECTOMY      HEMORRHOIDECTOMY N/A 7/23/2021    Procedure: HEMORRHOIDECTOMY;  Surgeon: Adan Meadows MD;  Location:  [facility];  Service: General;  Laterality: N/A;    HIP PERCUTANEOUS PINNING Left 2/28/2017    Procedure: LEFT HIP PERCUTANEOUS PINNING FEMORAL NECK;  Surgeon: Ezra Barlow MD;  Location:  [facility];  Service:     HYSTERECTOMY      SD CLTX HIP DISLOCATION TRAUMATIC W/O ANESTHESIA Left 2/28/2017    Procedure: LEFT HIP CLOSED REDUCTION;  Surgeon: Ezra Barlow MD;  Location:  [facility];  Service: Orthopedics    SHOULDER SURGERY         Family History: family history includes Alcohol abuse in her mother; Colon cancer in her maternal grandmother; Diabetes in her maternal aunt; Heart disease in her mother; Hypertension in her mother; Parkinsonism in her mother; Stroke in her mother.      Social History:  reports that she has never smoked. She has never used smokeless tobacco. She reports that she does not drink alcohol and does not use drugs.  Social History     Social History Narrative    Not on file       Medications:  Available home medication information reviewed.  Fluticasone-Salmeterol, Magnesium, albuterol, albuterol sulfate HFA, apixaban, atorvastatin, ciprofloxacin, ciprofloxacin-dexAMETHasone, denosumab, dicyclomine, dilTIAZem CD, levothyroxine, metoprolol tartrate, nitrofurantoin (macrocrystal-monohydrate), nystatin, omeprazole, pregabalin, and topiramate    Allergies   Allergen Reactions    Cephalexin Swelling     Tolerates cefdinir    Penicillins Hives     Tolerates cefdinir    Sulfa Antibiotics Hives       Objective  Objective     Vital Signs:   Temp:  [98.7 °F (37.1 °C)] 98.7 °F (37.1 °C)  Heart Rate:  [] 71  Resp:  [16-18] 16  BP: ()/(54-96) 103/67       Physical Exam    Gen; alert, defers questions to   Heent; perrla, eomi, slight left facial droop  Cv; irr, no mrg  L; cta w/ fair inspir effort  Abd; soft, +bs, ntnd, no r/g  Ext; no cce, palpable pulses  Skin; cdi, warm  Neuro; slight left facial droop, 4/5 lue motor, 3+/5 lle motor, clenched left hand   Psych; mood and affect appropriate; pleasant     Result Review:  I have personally reviewed the results from the time of this admission to 2/10/2025 23:40 EST and agree with these findings:  [x]  Laboratory list / accordion  [x]  Microbiology  [x]  Radiology  [x]  EKG/Telemetry   [x]  Cardiology/Vascular   []  Pathology  []  Old records  []  Other:  Most notable findings include:        LAB RESULTS:      Lab 02/10/25  1824 02/10/25  1633   WBC  --  11.78*   HEMOGLOBIN  --  13.4   HEMATOCRIT  --  44.1   PLATELETS  --  284   NEUTROS ABS  --  9.64*   IMMATURE GRANS (ABS)  --  0.05   LYMPHS ABS  --  1.15   MONOS ABS  --  0.76   EOS ABS  --  0.14   MCV  --  88.2   PROCALCITONIN 0.11  --    LACTATE  --  1.3          Lab 02/10/25  1633   SODIUM 141   POTASSIUM 3.9   CHLORIDE 105   CO2 20.0*   ANION GAP 16.0*   BUN 33*   CREATININE 2.90*   EGFR 16.7*   GLUCOSE 141*   CALCIUM 8.9   MAGNESIUM 2.0         Lab 02/10/25  1633   TOTAL PROTEIN 8.9*   ALBUMIN 3.9   GLOBULIN 5.0   ALT (SGPT) 7   AST (SGOT) 15   BILIRUBIN 0.5   ALK PHOS 88         Lab 02/10/25  1824 02/10/25  1633   PROBNP 3,842.0*  --    HSTROP T 20* 23*                 UA          4/22/2024    16:20 1/13/2025    16:28 2/10/2025    21:01   Urinalysis   Squamous Epithelial Cells, UA   13-20    Specific Gravity, UA   1.014    Ketones, UA Negative  Negative  Negative    Blood, UA   Trace    Leukocytes, UA Moderate (2+)  Small (1+)  Moderate (2+)    Nitrite, UA   Negative    RBC, UA   None Seen    WBC, UA   11-20    Bacteria, UA   None Seen        Microbiology Results (last 10 days)       ** No results found for the last 240 hours. **            CT Abdomen Pelvis Without Contrast    Result Date: 2/10/2025  CT ABDOMEN PELVIS WO CONTRAST Date of Exam: 2/10/2025 9:41 PM EST Indication: BARBARA. Comparison: 9/5/2023 Technique: Axial CT images were obtained of the abdomen and pelvis without the administration of contrast. Reconstructed coronal and sagittal images were also obtained. Automated exposure control and iterative construction methods were used. Findings: Lower Chest: Distortion of the lung bases due to patient motion. Atelectasis present in the lower lobes. Chronic pleural thickening present on the left Organs: Some distortion of the solid organs due to patient motion. No urinary calculi or hydronephrosis. Kidneys, adrenal glands, liver, spleen, pancreas have a grossly unremarkable noncontrast appearance within the limits of motion artifact. Gallbladder  surgically absent Gastrointestinal: No intestinal distention or evident wall thickening. Diverticula of the sigmoid colon without inflammation. Appendix not visualized Pelvis: No abnormal fluid collection. Uterus  surgically absent. Urinary bladder unremarkable Peritoneum/Retroperitoneum: No ascites or pneumoperitoneum. Normal caliber aorta Bones/Soft Tissues: New L2 superior endplate compression with sclerosis deep to the endplate which may be due trabecular compression or healing     Impression: 1. No acute abdominopelvic process. No obstructive uropathy 2. Colonic diverticulosis 3. Acute or subacute L2 superior endplate compression fracture Electronically Signed: Silverio Nav  2/10/2025 10:03 PM EST  Workstation ID: [id number]    XR Chest 1 View    Result Date: 2/10/2025  XR CHEST 1 VW Date of Exam: 2/10/2025 3:19 PM EST Indication: Weak/Dizzy/AMS triage protocol Comparison: 9/6/2023 Findings: Patient rotated to the right. Low lung volumes. Heart size indeterminate. Prominence of the central pulmonary vessels. No gross pulmonary edema. Airspace disease in the left lung base. Minimal blunting of the costophrenic angles     Impression: Impression: 1. Hypoinspiratory film demonstrating pulmonary vascular congestion with no gross pulmonary edema. There are suspected trace pleural effusions. 2. Left basilar airspace disease which may be atelectasis or pneumonia Electronically Signed: Silverio Chopra  2/10/2025 3:44 PM EST  Workstation ID: [id number]    CT Head Without Contrast    Result Date: 2/10/2025  CT HEAD WO CONTRAST Date of Exam: 2/10/2025 2:50 PM EST Indication: confusion, hallucinations, history of stroke. Comparison: February 27, 2017 Technique: Axial CT images were obtained of the head without contrast administration.  Automated exposure control and iterative construction methods were used. Findings: No acute intracranial hemorrhage or extra-axial collection is identified. The ventricles appear stable in caliber, with no evidence of mass effect or midline shift. The basal cisterns appear patent. The gray-white differentiation appears preserved. There  is encephalomalacia within the right basal ganglia and adjacent right  frontal lobe. The calvarium appear intact. There is mild frothy mucosal disease in the left sphenoid sinus. The mastoid air cells are well-aerated.     Impression: Impression: 1.No acute intracranial process identified. 2.Encephalomalacia within right basal ganglia and adjacent right frontal lobe. 3.Mild frothy mucosal disease within left sphenoid sinus. Correlate for acute sinusitis. Electronically Signed: Tang Johnson MD  2/10/2025 3:13 PM EST  Workstation ID: [id number]     Results for orders placed during the hospital encounter of 06/30/19    Adult Transthoracic Echo Complete W/ Cont if Necessary Per Protocol    Interpretation Summary  · Mild pulmonic valve regurgitation is present.  · Mild mitral valve regurgitation is present  · Left atrial cavity size is moderately dilated.  · Mild aortic valve regurgitation is present.  · Mild tricuspid valve regurgitation is present.  · Calculated right ventricular systolic pressure from tricuspid regurgitation is 39 mmHg.  · Estimated EF = 65%.  · Left ventricular systolic function is normal.      Assessment & Plan  Assessment & Plan       Altered mental status    Hypertension    Hyperlipidemia    History of hemorrhagic stroke with residual hemiparesis    Atrial fibrillation, chronic    Tremor, essential    Elevated serum creatinine    Closed compression fracture of L2 lumbar vertebra, initial encounter    73 y/o female w/ hx of CVA, HTN, HLD, GERD, afib on chronic anticoagulation with admission to [facility] last month for UTI here now w/  Altered mental status  -UA not c/w UTI  -head CT not revealing  -no new meds though on several potentially sedating meds  -not hypoxic in ER  - concerned regarding possible stroke so will ask stroke team to see as pt with worsening left sided weakness, speech changes, confusion  -neuro checks  -if stroke eval negative, can consult general neurology given chronicity with tremor, confusion, flat affect   2. Elevated creatinine  -may be  related to recent abx for uti as well as poor po intake and pt's report of n/v/d  -lactate normal  -gentle fluids given in ER  -repeat bmp in a.m.  3. Afib, chronic on eliquis  -rate controlled  -continue bb and ccb w/ hold parameters  -holding eliquis for now pending stroke eval   4. L2 compression fracture  -pt notes bending over and hearing pop in back; no uncontrollable pain  -outpt follow up                VTE Prophylaxis:  Mechanical VTE prophylaxis orders are signed & held.            CODE STATUS:    Code Status and Medical Interventions: CPR (Attempt to Resuscitate); Full Support   Ordered at: 02/10/25 2331     Code Status (Patient has no pulse and is not breathing):    CPR (Attempt to Resuscitate)     Medical Interventions (Patient has pulse or is breathing):    Full Support       Expected Discharge   Expected discharge date/ time has not been documented.     Lucy Crouch MD  02/10/25  Electronically signed by Lucy Crouch MD, 02/10/25, 11:49 PM EST.      Electronically signed by Lucy Crouch MD at 02/10/25 2348          Physician Progress Notes (all)        Sherley Bowling MD at 25 1041              [facility] Medicine Services  PROGRESS NOTE    Patient Name: Vickie Joshi  : 1950  MRN: [id number]    Date of Admission: 2/10/2025  Primary Care Physician: Dav Bryan MD    Subjective   Subjective     CC:  confusion    HPI:  Laying in bed, no complaints. Urinating well per her reports. No family present. Hoping to go home soon. Cr not much better. Mentation appropriate    Ros  As above      Objective   Objective     Vital Signs:   Temp:  [97.6 °F (36.4 °C)-98 °F (36.7 °C)] 97.7 °F (36.5 °C)  Heart Rate:  [63-73] 63  Resp:  [16-18] 18  BP: (113-132)/(48-95) 118/49     Physical Exam:  GEN: NAD, resting in bed, awake, pleasant  HEENT: on room air, atraumatic, normocephalic  NECK: supple, no masses  RESP: on room air, normal effort  CV: on tele, sinus  rhythm  PSYCH: normal affect, appropriate  NEURO: awake, alert, oriented and answers questions appropriately  MSK: no edema noted  SKIN: no rashes noted       Results Reviewed:  LAB RESULTS:      Lab 02/11/25  0636 02/10/25  1824 02/10/25  1633   WBC 10.73  --  11.78*   HEMOGLOBIN 11.1*  --  13.4   HEMATOCRIT 36.8  --  44.1   PLATELETS 215  --  284   NEUTROS ABS 7.64*  --  9.64*   IMMATURE GRANS (ABS) 0.03  --  0.05   LYMPHS ABS 1.81  --  1.15   MONOS ABS 1.03*  --  0.76   EOS ABS 0.18  --  0.14   MCV 89.3  --  88.2   PROCALCITONIN  --  0.11  --    LACTATE  --   --  1.3   HSTROP T  --  20* 23*         Lab 02/12/25  1309 02/11/25  0636 02/10/25  1633   SODIUM 141 139 141   POTASSIUM 3.7 4.2 3.9   CHLORIDE 106 109* 105   CO2 22.0 20.0* 20.0*   ANION GAP 13.0 10.0 16.0*   BUN 30* 33* 33*   CREATININE 2.67* 2.78* 2.90*   EGFR 18.5* 17.6* 16.7*   GLUCOSE 115* 95 141*   CALCIUM 8.4* 8.4* 8.9   MAGNESIUM  --  1.7 2.0   PHOSPHORUS  --  4.5  --    HEMOGLOBIN A1C  --  5.20  --    TSH  --  3.250  --          Lab 02/11/25  0636 02/10/25  1633   TOTAL PROTEIN 6.4 8.9*   ALBUMIN 2.8* 3.9   GLOBULIN 3.6 5.0   ALT (SGPT) 7 7   AST (SGOT) 18 15   BILIRUBIN 0.4 0.5   ALK PHOS 69 88         Lab 02/10/25  1824 02/10/25  1633   PROBNP 3,842.0*  --    HSTROP T 20* 23*         Lab 02/11/25  0636   CHOLESTEROL 124   LDL CHOL 67   HDL CHOL 40   TRIGLYCERIDES 88             Brief Urine Lab Results  (Last result in the past 365 days)        Color   Clarity   Blood   Leuk Est   Nitrite   Protein   CREAT   Urine HCG        02/12/25 1353 Yellow   Clear   Trace   Small (1+)   Negative   Negative                   Microbiology Results Abnormal       None            No radiology results from the last 24 hrs    Results for orders placed during the hospital encounter of 02/10/25    Adult Transthoracic Echo Complete W/ Cont if Necessary Per Protocol (With Agitated Saline)    Interpretation Summary    Left ventricular systolic function is normal.  Estimated left ventricular EF = 50%    The right ventricular cavity is mildly dilated.    Moderate mitral valve regurgitation is present.    Mild aortic valve regurgitation is present.      Current medications:  Scheduled Meds:apixaban, 5 mg, Oral, Q12H  aspirin, 81 mg, Oral, Daily   Or  aspirin, 300 mg, Rectal, Daily  atorvastatin, 10 mg, Oral, Nightly  budesonide-formoterol, 2 puff, Inhalation, BID - RT  dilTIAZem CD, 240 mg, Oral, Q24H  levothyroxine, 137 mcg, Oral, Daily  metoprolol tartrate, 12.5 mg, Oral, Q12H  pantoprazole, 40 mg, Oral, Q AM  pregabalin, 300 mg, Oral, BID  sodium chloride, 10 mL, Intravenous, Q12H  sodium chloride, 10 mL, Intravenous, Q12H  topiramate, 100 mg, Oral, Daily      Continuous Infusions:   PRN Meds:.  acetaminophen **OR** acetaminophen **OR** acetaminophen    albuterol    senna-docusate sodium **AND** polyethylene glycol **AND** bisacodyl **AND** bisacodyl    Calcium Replacement - Follow Nurse / BPA Driven Protocol    Magnesium Standard Dose Replacement - Follow Nurse / BPA Driven Protocol    nitroglycerin    Phosphorus Replacement - Follow Nurse / BPA Driven Protocol    Potassium Replacement - Follow Nurse / BPA Driven Protocol    sodium chloride    sodium chloride    sodium chloride    sodium chloride    sodium chloride    Assessment & Plan   Assessment & Plan     Active Hospital Problems    Diagnosis  POA    **Altered mental status [R41.82]  Yes    Elevated serum creatinine [R79.89]  Yes    Closed compression fracture of L2 lumbar vertebra, initial encounter [S32.020A]  Yes    Tremor, essential [G25.0]  Yes    Atrial fibrillation, chronic [I48.20]  Yes    History of hemorrhagic stroke with residual hemiparesis [I69.359]  Not Applicable    Hyperlipidemia [E78.5]  Yes    Hypertension [I10]  Yes      Resolved Hospital Problems   No resolved problems to display.        Brief Hospital Course to date:  Vickie Joshi is a 74 y.o. female with history of CVA, HTN, HLD, GERD, afib who  presents altered mental status. Notably had admission to [facility] with UTI last month.     AMS-resolved   -UA not c/w UTI, sample repeated due to first sample contaminated, again not impressive for infection  -head CT not revealing, MRI done as well and not c/w stroke   -no new meds though on several potentially sedating meds  -not hypoxic in ER  -stroke team has evaluated patient, appreciate their help  -neuro checks  -seems to be back to baseline mentation    BARBARA  -may be related to recent abx for uti as well as poor po intake and pt's report of n/v/d  -lactate normal  -gentle fluids given in ER without much improvement, Cr 2.6  -appreciate nephro assistance     Afib, chronic on eliquis  -rate controlled  -continue bb and ccb w/ hold parameters  -eliquis resumed as stroke r/o     L2 compression fracture  -pt notes bending over and hearing pop in back; no uncontrollable pain  -outpt follow up        Expected Discharge Location and Transportation: home when Cr better/cleared by nephrology  Expected Discharge   Expected discharge date/ time has not been documented.     VTE Prophylaxis:  Pharmacologic & mechanical VTE prophylaxis orders are present.         AM-PAC 6 Clicks Score (PT): 10 (25 0000)    CODE STATUS:   Code Status and Medical Interventions: CPR (Attempt to Resuscitate); Full Support   Ordered at: 02/10/25 2331     Code Status (Patient has no pulse and is not breathing):    CPR (Attempt to Resuscitate)     Medical Interventions (Patient has pulse or is breathing):    Full Support       Sherley Bowling MD  25        Electronically signed by Sherley Bowling MD at 25 1052       Sherley Bowling MD at 25 1208              [facility] Medicine Services  PROGRESS NOTE    Patient Name: Vickie Joshi  : 1950  MRN: [id number]    Date of Admission: 2/10/2025  Primary Care Physician: Dav Bryan MD    Subjective   Subjective      CC:  confusion    HPI:  Patient sitting in bed, doing well. No family present. Oriented and answering questions appropriately. Kidney fxn still abnl, she otherwise feels she did well with therapy this am- they have recommended home    Ros  As above      Objective   Objective     Vital Signs:   Temp:  [97.1 °F (36.2 °C)-97.9 °F (36.6 °C)] 97.6 °F (36.4 °C)  Heart Rate:  [49-69] 64  Resp:  [16-18] 18  BP: (113-126)/(56-98) 120/79     Physical Exam:  GEN: NAD, resting in bed, awake, pleasant  HEENT: on room air, atraumatic, normocephalic  NECK: supple, no masses  RESP: on room air, normal effort  CV: on tele, sinus rhythm  PSYCH: normal affect, appropriate  NEURO: awake, alert, oriented and answers questions appropriately  MSK: no edema noted  SKIN: no rashes noted       Results Reviewed:  LAB RESULTS:      Lab 02/11/25  0636 02/10/25  1824 02/10/25  1633   WBC 10.73  --  11.78*   HEMOGLOBIN 11.1*  --  13.4   HEMATOCRIT 36.8  --  44.1   PLATELETS 215  --  284   NEUTROS ABS 7.64*  --  9.64*   IMMATURE GRANS (ABS) 0.03  --  0.05   LYMPHS ABS 1.81  --  1.15   MONOS ABS 1.03*  --  0.76   EOS ABS 0.18  --  0.14   MCV 89.3  --  88.2   PROCALCITONIN  --  0.11  --    LACTATE  --   --  1.3   HSTROP T  --  20* 23*         Lab 02/11/25  0636 02/10/25  1633   SODIUM 139 141   POTASSIUM 4.2 3.9   CHLORIDE 109* 105   CO2 20.0* 20.0*   ANION GAP 10.0 16.0*   BUN 33* 33*   CREATININE 2.78* 2.90*   EGFR 17.6* 16.7*   GLUCOSE 95 141*   CALCIUM 8.4* 8.9   MAGNESIUM 1.7 2.0   PHOSPHORUS 4.5  --    HEMOGLOBIN A1C 5.20  --    TSH 3.250  --          Lab 02/11/25  0636 02/10/25  1633   TOTAL PROTEIN 6.4 8.9*   ALBUMIN 2.8* 3.9   GLOBULIN 3.6 5.0   ALT (SGPT) 7 7   AST (SGOT) 18 15   BILIRUBIN 0.4 0.5   ALK PHOS 69 88         Lab 02/10/25  1824 02/10/25  1633   PROBNP 3,842.0*  --    HSTROP T 20* 23*         Lab 02/11/25  0636   CHOLESTEROL 124   LDL CHOL 67   HDL CHOL 40   TRIGLYCERIDES 88             Brief Urine Lab Results  (Last result  in the past 365 days)        Color   Clarity   Blood   Leuk Est   Nitrite   Protein   CREAT   Urine HCG        02/10/25 2101 Yellow   Clear   Trace   Moderate (2+)   Negative   Negative                   Microbiology Results Abnormal       None            MRI Brain Without Contrast    Result Date: 2/11/2025  MRI BRAIN WO CONTRAST Date of Exam: 2/11/2025 12:48 AM EST Indication: AMS, left weakness.  Comparison: 2/10/2025. Technique:  Routine multiplanar/multisequence sequence images of the brain were obtained without contrast administration. Findings: There is no diffusion restriction to suggest acute infarct. There is no evidence of acute or chronic intracranial hemorrhage. No mass effect or midline shift. Stable encephalomalacia is present within the right basal ganglia and posterior right frontal lobe related to previous infarct. Surrounding gliosis is present with FLAIR signal changes. Mild to moderate periventricular and subcortical FLAIR signal changes are present. No abnormal extra-axial collections. The major vascular flow voids appear intact. The basal ganglia, brainstem and cerebellum appear within normal limits. Calvarial and superficial soft tissue signal is within normal limits. Orbits appear unremarkable. The paranasal sinuses and the mastoid air cells appear well aerated. Midline structures are intact.     Impression: Impression: 1.No evidence of hemorrhage, mass effect or midline shift. No evidence of recent or acute ischemia. 2.Mild to moderate periventricular and subcortical FLAIR signal changes likely related to chronic microvascular ischemic change. Stable encephalomalacia present within the right basal ganglia and posterior right frontal lobe related to previous infarct. Electronically Signed: Tessy Love MD  2/11/2025 1:11 AM EST  Workstation ID: [id number]    CT Abdomen Pelvis Without Contrast    Result Date: 2/10/2025  CT ABDOMEN PELVIS WO CONTRAST Date of Exam: 2/10/2025 9:41 PM EST  Indication: BARBARA. Comparison: 9/5/2023 Technique: Axial CT images were obtained of the abdomen and pelvis without the administration of contrast. Reconstructed coronal and sagittal images were also obtained. Automated exposure control and iterative construction methods were used. Findings: Lower Chest: Distortion of the lung bases due to patient motion. Atelectasis present in the lower lobes. Chronic pleural thickening present on the left Organs: Some distortion of the solid organs due to patient motion. No urinary calculi or hydronephrosis. Kidneys, adrenal glands, liver, spleen, pancreas have a grossly unremarkable noncontrast appearance within the limits of motion artifact. Gallbladder  surgically absent Gastrointestinal: No intestinal distention or evident wall thickening. Diverticula of the sigmoid colon without inflammation. Appendix not visualized Pelvis: No abnormal fluid collection. Uterus surgically absent. Urinary bladder unremarkable Peritoneum/Retroperitoneum: No ascites or pneumoperitoneum. Normal caliber aorta Bones/Soft Tissues: New L2 superior endplate compression with sclerosis deep to the endplate which may be due trabecular compression or healing     Impression: 1. No acute abdominopelvic process. No obstructive uropathy 2. Colonic diverticulosis 3. Acute or subacute L2 superior endplate compression fracture Electronically Signed: Silverio Chopra  2/10/2025 10:03 PM EST  Workstation ID: [id number]    XR Chest 1 View    Result Date: 2/10/2025  XR CHEST 1 VW Date of Exam: 2/10/2025 3:19 PM EST Indication: Weak/Dizzy/AMS triage protocol Comparison: 9/6/2023 Findings: Patient rotated to the right. Low lung volumes. Heart size indeterminate. Prominence of the central pulmonary vessels. No gross pulmonary edema. Airspace disease in the left lung base. Minimal blunting of the costophrenic angles     Impression: Impression: 1. Hypoinspiratory film demonstrating pulmonary vascular congestion with no gross  pulmonary edema. There are suspected trace pleural effusions. 2. Left basilar airspace disease which may be atelectasis or pneumonia Electronically Signed: Silverio Chopra  2/10/2025 3:44 PM EST  Workstation ID: [id number]    CT Head Without Contrast    Result Date: 2/10/2025  CT HEAD WO CONTRAST Date of Exam: 2/10/2025 2:50 PM EST Indication: confusion, hallucinations, history of stroke. Comparison: February 27, 2017 Technique: Axial CT images were obtained of the head without contrast administration.  Automated exposure control and iterative construction methods were used. Findings: No acute intracranial hemorrhage or extra-axial collection is identified. The ventricles appear stable in caliber, with no evidence of mass effect or midline shift. The basal cisterns appear patent. The gray-white differentiation appears preserved. There  is encephalomalacia within the right basal ganglia and adjacent right frontal lobe. The calvarium appear intact. There is mild frothy mucosal disease in the left sphenoid sinus. The mastoid air cells are well-aerated.     Impression: Impression: 1.No acute intracranial process identified. 2.Encephalomalacia within right basal ganglia and adjacent right frontal lobe. 3.Mild frothy mucosal disease within left sphenoid sinus. Correlate for acute sinusitis. Electronically Signed: Tang Johnson MD  2/10/2025 3:13 PM EST  Workstation ID: [id number]     Results for orders placed during the hospital encounter of 02/10/25    Adult Transthoracic Echo Complete W/ Cont if Necessary Per Protocol (With Agitated Saline)    Interpretation Summary    Left ventricular systolic function is normal. Estimated left ventricular EF = 50%    The right ventricular cavity is mildly dilated.    Moderate mitral valve regurgitation is present.    Mild aortic valve regurgitation is present.      Current medications:  Scheduled Meds:apixaban, 5 mg, Oral, Q12H  aspirin, 81 mg, Oral, Daily   Or  aspirin, 300 mg, Rectal,  Daily  atorvastatin, 10 mg, Oral, Nightly  budesonide-formoterol, 2 puff, Inhalation, BID - RT  dilTIAZem CD, 240 mg, Oral, Q24H  levothyroxine, 137 mcg, Oral, Daily  metoprolol tartrate, 12.5 mg, Oral, Q12H  pantoprazole, 40 mg, Oral, Q AM  pregabalin, 300 mg, Oral, BID  sodium chloride, 10 mL, Intravenous, Q12H  sodium chloride, 10 mL, Intravenous, Q12H  topiramate, 100 mg, Oral, Daily      Continuous Infusions:   PRN Meds:.  acetaminophen **OR** acetaminophen **OR** acetaminophen    albuterol    senna-docusate sodium **AND** polyethylene glycol **AND** bisacodyl **AND** bisacodyl    Calcium Replacement - Follow Nurse / BPA Driven Protocol    Magnesium Standard Dose Replacement - Follow Nurse / BPA Driven Protocol    nitroglycerin    Phosphorus Replacement - Follow Nurse / BPA Driven Protocol    Potassium Replacement - Follow Nurse / BPA Driven Protocol    sodium chloride    sodium chloride    sodium chloride    sodium chloride    sodium chloride    Assessment & Plan   Assessment & Plan     Active Hospital Problems    Diagnosis  POA    **Altered mental status [R41.82]  Yes    Elevated serum creatinine [R79.89]  Yes    Closed compression fracture of L2 lumbar vertebra, initial encounter [S32.020A]  Yes    Tremor, essential [G25.0]  Yes    Atrial fibrillation, chronic [I48.20]  Yes    History of hemorrhagic stroke with residual hemiparesis [I69.359]  Not Applicable    Hyperlipidemia [E78.5]  Yes    Hypertension [I10]  Yes      Resolved Hospital Problems   No resolved problems to display.        Brief Hospital Course to date:  Vickie Joshi is a 74 y.o. female with history of CVA, HTN, HLD, GERD, afib who presents altered mental status. [place] [facility] [place]     AMS  ?UTI  -UA not c/w UTI though sample contaminated so will try to repeat sample - ordered  -head CT not revealing, MRI done as well and not c/w stroke   -no new meds though on several potentially sedating meds  -not hypoxic  in ER  - concerned regarding possible stroke so will ask stroke team to see as pt with worsening left sided weakness, speech changes, confusion  -neuro checks  -appreciate stroke neuro assistance with case     BARBARA  -may be related to recent abx for uti as well as poor po intake and pt's report of n/v/d  -lactate normal  -gentle fluids given in ER without much improvement, Cr 2.8 today  -ask nephro to weigh in given large bump- normal baseline as recently as Dec 2024    Afib, chronic on eliquis  -rate controlled  -continue bb and ccb w/ hold parameters  -eliquis resumed as stroke r/o     L2 compression fracture  -pt notes bending over and hearing pop in back; no uncontrollable pain  -outpt follow up      Updated patient's  Bill via phone this afternoon    Expected Discharge Location and Transportation: pending above  Expected Discharge   Expected discharge date/ time has not been documented.     VTE Prophylaxis:  Pharmacologic & mechanical VTE prophylaxis orders are present.         AM-PAC 6 Clicks Score (PT): 10 (02/12/25 0777)    CODE STATUS:   Code Status and Medical Interventions: CPR (Attempt to Resuscitate); Full Support   Ordered at: 02/10/25 2331     Code Status (Patient has no pulse and is not breathing):    CPR (Attempt to Resuscitate)     Medical Interventions (Patient has pulse or is breathing):    Full Support       Sherley Bowling MD  02/12/25        Electronically signed by Sherley Bowling MD at 02/12/25 1233       Milagros Troy APRN at 02/12/25 1156          Stroke Progress Note       Chief Complaint:  confusion    Subjective    Subjective     Subjective:  This is a 74 year old white female with a pmhx. CVA, left arm weakness, HTN, HLD, GERD, and Afib (on Eliquis) who presented to [facility] ED with altered mental status. The patient was seen at bedside today, she is was sleeping and easily woke up when I called her name.  She is oriented x3 and seems quite comfortable.       Objective     Objective      Temp:  [97.1 °F (36.2 °C)-97.9 °F (36.6 °C)] 97.6 °F (36.4 °C)  Heart Rate:  [49-69] 64  Resp:  [16-18] 18  BP: (113-126)/(56-98) 120/79    No current facility-administered medications on file prior to encounter.     Current Outpatient Medications on File Prior to Encounter   Medication Sig Dispense Refill    albuterol (ACCUNEB) 1.25 MG/3ML nebulizer solution Take 3 mL by nebulization Every 6 (Six) Hours As Needed for Wheezing or Shortness of Air. 90 vial 12    albuterol sulfate  (90 Base) MCG/ACT inhaler INHALE 2 PUFFS BY MOUTH EVERY 4 TO 6 HOURS AS NEEDED 8.5 g 0    atorvastatin (LIPITOR) 10 MG tablet TAKE ONE TABLET BY MOUTH ONCE NIGHTLY 90 tablet 1    ciprofloxacin (Cipro) 500 MG tablet Take 1 tablet by mouth 2 (Two) Times a Day. 14 tablet 0    ciprofloxacin-dexAMETHasone (Ciprodex) 0.3-0.1 % otic suspension Administer 4 drops into the left ear 2 (Two) Times a Day. 7.5 mL 0    denosumab (Prolia) 60 MG/ML solution prefilled syringe syringe Inject 1 mL under the skin into the appropriate area as directed 1 (One) Time for 1 dose. 1 mL 1    dicyclomine (BENTYL) 10 MG capsule Take 1 capsule by mouth 2 (Two) Times a Day As Needed for Abdominal Cramping. 180 capsule 1    dilTIAZem CD (CARDIZEM CD) 240 MG 24 hr capsule TAKE 1 CAPSULE BY MOUTH DAILY 90 capsule 1    Eliquis 5 MG tablet tablet TAKE ONE TABLET BY MOUTH EVERY 12 HOURS 180 tablet 1    Fluticasone-Salmeterol (ADVAIR/WIXELA) 250-50 MCG/ACT DISKUS INHALE 1 PUFF BY MOUTH 2 TIMES A DAY 60 each 5    levothyroxine (SYNTHROID, LEVOTHROID) 137 MCG tablet TAKE 1 TABLET BY MOUTH DAILY 90 tablet 0    Magnesium 250 MG tablet Take  by mouth.      metoprolol tartrate (LOPRESSOR) 25 MG tablet TAKE 1 TABLET BY MOUTH 2 TIMES A  tablet 1    nitrofurantoin, macrocrystal-monohydrate, (Macrobid) 100 MG capsule Take 1 capsule by mouth Daily. 90 capsule 1    nystatin (MYCOSTATIN) 467811 UNIT/GM cream APPLY TO AFFECTED AREA(S) TWO TIMES A DAY 60 g 1     nystatin (MYCOSTATIN) 120202 UNIT/GM powder Apply  topically to the appropriate area as directed 3 (Three) Times a Day. 60 g 0    omeprazole (priLOSEC) 40 MG capsule TAKE 1 CAPSULE BY MOUTH DAILY 90 capsule 1    pregabalin (LYRICA) 300 MG capsule TAKE 1 CAPSULE BY MOUTH 2 TIMES A  capsule 0    topiramate (TOPAMAX) 100 MG tablet TAKE 1 TABLET BY MOUTH DAILY 90 tablet 1          Physical Exam:  General Appearance: Awake   Eyes: Anicteric sclera  HEENT: no scleral injection   Lungs: respirations appear comfortable, no obvious increased work of breathing, on 2LNC  Extremities: No cyanosis or fingernail clubbing   Skin: No rashes in exposed skin areas     Neurological Examination:   Mental status: Alert and oriented to person, place, no dysarthria, able to name and repeat with no difficulty.    Cranial Nerves: Visual fields intact. Extraocular movements are intact with no nystagmus. Facial sensation intact. Face symmetrical. Hearing grossly intact.  Full shoulder shrug bilaterally. Tongue protrudes midline.   Sensory: Sensory exam to light touch in all four extremities distally is normal. Double simultaneous sensory stimulation shows no extinction  Motor: Normal tone of right upper and both lower extremities.  Right hand contracture.    Left upper extremity: 4/5 deltoid, tricep, bicep, interosseous, hand .   Right upper extremity: 5/5 deltoid, tricep, bicep, interosseous, hand .   Left lower extremity: 5/5 iliopsoas, knee extension/flexion, foot dorsi/plantarflexion.  Right lower extremity: 5/5 iliopsoas, knee extension/flexion, foot dorsi/plantarflexion.  Cerebellar: Finger-to-nose intact with RUE.   Results Review:    I reviewed the patient's new clinical results.    WBC   Date Value Ref Range Status   02/11/2025 10.73 3.40 - 10.80 10*3/mm3 Final     RBC   Date Value Ref Range Status   02/11/2025 4.12 3.77 - 5.28 10*6/mm3 Final     Hemoglobin   Date Value Ref Range Status   02/11/2025 11.1 (L) 12.0 -  15.9 g/dL Final     Hematocrit   Date Value Ref Range Status   02/11/2025 36.8 34.0 - 46.6 % Final     MCV   Date Value Ref Range Status   02/11/2025 89.3 79.0 - 97.0 fL Final     MCH   Date Value Ref Range Status   02/11/2025 26.9 26.6 - 33.0 pg Final     MCHC   Date Value Ref Range Status   02/11/2025 30.2 (L) 31.5 - 35.7 g/dL Final     RDW   Date Value Ref Range Status   02/11/2025 15.1 12.3 - 15.4 % Final     RDW-SD   Date Value Ref Range Status   02/11/2025 49.5 37.0 - 54.0 fl Final     MPV   Date Value Ref Range Status   02/11/2025 12.1 (H) 6.0 - 12.0 fL Final     Platelets   Date Value Ref Range Status   02/11/2025 215 140 - 450 10*3/mm3 Final     Neutrophil %   Date Value Ref Range Status   02/11/2025 71.1 42.7 - 76.0 % Final     Lymphocyte %   Date Value Ref Range Status   02/11/2025 16.9 (L) 19.6 - 45.3 % Final     Monocyte %   Date Value Ref Range Status   02/11/2025 9.6 5.0 - 12.0 % Final     Eosinophil %   Date Value Ref Range Status   02/11/2025 1.7 0.3 - 6.2 % Final     Basophil %   Date Value Ref Range Status   02/11/2025 0.4 0.0 - 1.5 % Final     Immature Grans %   Date Value Ref Range Status   02/11/2025 0.3 0.0 - 0.5 % Final     Neutrophils, Absolute   Date Value Ref Range Status   02/11/2025 7.64 (H) 1.70 - 7.00 10*3/mm3 Final     Lymphocytes, Absolute   Date Value Ref Range Status   02/11/2025 1.81 0.70 - 3.10 10*3/mm3 Final     Monocytes, Absolute   Date Value Ref Range Status   02/11/2025 1.03 (H) 0.10 - 0.90 10*3/mm3 Final     Eosinophils, Absolute   Date Value Ref Range Status   02/11/2025 0.18 0.00 - 0.40 10*3/mm3 Final     Basophils, Absolute   Date Value Ref Range Status   02/11/2025 0.04 0.00 - 0.20 10*3/mm3 Final     Immature Grans, Absolute   Date Value Ref Range Status   02/11/2025 0.03 0.00 - 0.05 10*3/mm3 Final     nRBC   Date Value Ref Range Status   02/11/2025 0.0 0.0 - 0.2 /100 WBC Final        Lab Results   Component Value Date    GLUCOSE 95 02/11/2025    BUN 33 (H) 02/11/2025     CREATININE 2.78 (H) 02/11/2025     02/11/2025    K 4.2 02/11/2025     (H) 02/11/2025    CALCIUM 8.4 (L) 02/11/2025    PROTEINTOT 6.4 02/11/2025    ALBUMIN 2.8 (L) 02/11/2025    ALT 7 02/11/2025    AST 18 02/11/2025    ALKPHOS 69 02/11/2025    BILITOT 0.4 02/11/2025    GLOB 3.6 02/11/2025    AGRATIO 0.8 02/11/2025    BCR 11.9 02/11/2025    ANIONGAP 10.0 02/11/2025    EGFR 17.6 (L) 02/11/2025           Lab 02/11/25  0636   HEMOGLOBIN A1C 5.20      Lipid Panel          12/10/2024    16:13 2/11/2025    06:36   Lipid Panel   Total Cholesterol  124    Total Cholesterol 124     Triglycerides 149  88    HDL Cholesterol 45  40    VLDL Cholesterol 26  17    LDL Cholesterol  53  67    LDL/HDL Ratio  1.66         Results for orders placed during the hospital encounter of 02/10/25    Adult Transthoracic Echo Complete W/ Cont if Necessary Per Protocol (With Agitated Saline)    Interpretation Summary    Left ventricular systolic function is normal. Estimated left ventricular EF = 50%    The right ventricular cavity is mildly dilated.    Moderate mitral valve regurgitation is present.    Mild aortic valve regurgitation is present.    MRI Brain Without Contrast    Result Date: 2/11/2025  Impression: 1.No evidence of hemorrhage, mass effect or midline shift. No evidence of recent or acute ischemia. 2.Mild to moderate periventricular and subcortical FLAIR signal changes likely related to chronic microvascular ischemic change. Stable encephalomalacia present within the right basal ganglia and posterior right frontal lobe related to previous infarct. Electronically Signed: Tessy Love MD  2/11/2025 1:11 AM EST  Workstation ID: [id number]    CT Abdomen Pelvis Without Contrast    Result Date: 2/10/2025  1. No acute abdominopelvic process. No obstructive uropathy 2. Colonic diverticulosis 3. Acute or subacute L2 superior endplate compression fracture Electronically Signed: Silverio Chopra  2/10/2025 10:03 PM EST  Workstation  ID: [id number]    XR Chest 1 View    Result Date: 2/10/2025  Impression: 1. Hypoinspiratory film demonstrating pulmonary vascular congestion with no gross pulmonary edema. There are suspected trace pleural effusions. 2. Left basilar airspace disease which may be atelectasis or pneumonia Electronically Signed: Silverio Chopra  2/10/2025 3:44 PM EST  Workstation ID: [id number]    CT Head Without Contrast    Result Date: 2/10/2025  Impression: 1.No acute intracranial process identified. 2.Encephalomalacia within right basal ganglia and adjacent right frontal lobe. 3.Mild frothy mucosal disease within left sphenoid sinus. Correlate for acute sinusitis. Electronically Signed: Tang Johnson MD  2/10/2025 3:13 PM EST  Workstation ID: [id number]         Assessment/Plan     Assessment/Plan:  74-year-old female with a history of arthritis, asthma, atrial fibrillation (on Eliquis), hypertension, hyperlipidemia, hypothyroidism, migraines, and prior right frontal and right basal ganglia ischmeic stroke with residual left hand weakness/contracture.  The patient presented to [facility] ED with hallucinations and a left facial droop, which lasted for a short period and resolved.  Patient has completed a stroke work up which is unrevealing of any new or acute ischemic findings on head CT and MRI.          Acute Mental Status Changes  -Possible etiology, recrudescence of old stroke deficits in the setting of an infectious or metabolic cause.       History of CVA  -CT Head and MRI Brain negative for any acute intracranial process there is encephalomalcaia noted within the right basal ganglia an adjacent right frontal lobe, which is expected given the patient's history of right hemisphere stroke .    -TTE EF 50%, no noted PFO  -A1c 5.2%, at goal of < 70   -LDL 67 at goal (<70)  -Meds: Eliquis 5 mg q 12 hour   -Activity as tolerated, fall risk precautions  -PT/OT/SLP evaluation    2.  Atrial fibrillation   -Meds: Eliquis 5 mg q 12 hour         Plan  "of care was discussed with Dr. Medeiros.  Stroke neurology will follow peripherally, if needed.  Please call with any questions or concerns.  Thank you for this consult.            FABIANA Wall  25  12:10 EST    This was an audio and video enabled telemedicine encounter.        Electronically signed by Milagros Troy APRN at 25 1615       Sherley Bowling MD at 25 1225              [facility] Medicine Services  PROGRESS NOTE    Patient Name: Vickie Joshi  : 1950  MRN: [id number]    Date of Admission: 2/10/2025  Primary Care Physician: Dav Bryan MD    Subjective   Subjective     CC:  confusion    HPI:  Patient sitting in ED bed, alone, no family. Answers all my orientation questions appropriately. Reports her  \"made her come\". Feels essentially normal this morning and has no other complaints or questions    Ros  As above      Objective   Objective     Vital Signs:   Temp:  [97.9 °F (36.6 °C)-98.7 °F (37.1 °C)] 97.9 °F (36.6 °C)  Heart Rate:  [] 58  Resp:  [16-18] 18  BP: ()/(54-96) 107/58     Physical Exam:  GEN: NAD, resting in bed, awake, pleasant  HEENT: on room air, atraumatic, normocephalic  NECK: supple, no masses  RESP: on room air, normal effort  CV: on tele, sinus rhythm  PSYCH: normal affect, appropriate  NEURO: awake, alert, oriented and answers questions appropriately  MSK: no edema noted  SKIN: no rashes noted       Results Reviewed:  LAB RESULTS:      Lab 25  0636 02/10/25  1824 02/10/25  1633   WBC 10.73  --  11.78*   HEMOGLOBIN 11.1*  --  13.4   HEMATOCRIT 36.8  --  44.1   PLATELETS 215  --  284   NEUTROS ABS 7.64*  --  9.64*   IMMATURE GRANS (ABS) 0.03  --  0.05   LYMPHS ABS 1.81  --  1.15   MONOS ABS 1.03*  --  0.76   EOS ABS 0.18  --  0.14   MCV 89.3  --  88.2   PROCALCITONIN  --  0.11  --    LACTATE  --   --  1.3   HSTROP T  --  20* 23*         Lab 25  0636 02/10/25  1633   SODIUM 139 141 "   POTASSIUM 4.2 3.9   CHLORIDE 109* 105   CO2 20.0* 20.0*   ANION GAP 10.0 16.0*   BUN 33* 33*   CREATININE 2.78* 2.90*   EGFR 17.6* 16.7*   GLUCOSE 95 141*   CALCIUM 8.4* 8.9   MAGNESIUM 1.7 2.0   PHOSPHORUS 4.5  --    HEMOGLOBIN A1C 5.20  --    TSH 3.250  --          Lab 02/11/25  0636 02/10/25  1633   TOTAL PROTEIN 6.4 8.9*   ALBUMIN 2.8* 3.9   GLOBULIN 3.6 5.0   ALT (SGPT) 7 7   AST (SGOT) 18 15   BILIRUBIN 0.4 0.5   ALK PHOS 69 88         Lab 02/10/25  1824 02/10/25  1633   PROBNP 3,842.0*  --    HSTROP T 20* 23*         Lab 02/11/25  0636   CHOLESTEROL 124   LDL CHOL 67   HDL CHOL 40   TRIGLYCERIDES 88             Brief Urine Lab Results  (Last result in the past 365 days)        Color   Clarity   Blood   Leuk Est   Nitrite   Protein   CREAT   Urine HCG        02/10/25 2101 Yellow   Clear   Trace   Moderate (2+)   Negative   Negative                   Microbiology Results Abnormal       None            MRI Brain Without Contrast    Result Date: 2/11/2025  MRI BRAIN WO CONTRAST Date of Exam: 2/11/2025 12:48 AM EST Indication: AMS, left weakness.  Comparison: 2/10/2025. Technique:  Routine multiplanar/multisequence sequence images of the brain were obtained without contrast administration. Findings: There is no diffusion restriction to suggest acute infarct. There is no evidence of acute or chronic intracranial hemorrhage. No mass effect or midline shift. Stable encephalomalacia is present within the right basal ganglia and posterior right frontal lobe related to previous infarct. Surrounding gliosis is present with FLAIR signal changes. Mild to moderate periventricular and subcortical FLAIR signal changes are present. No abnormal extra-axial collections. The major vascular flow voids appear intact. The basal ganglia, brainstem and cerebellum appear within normal limits. Calvarial and superficial soft tissue signal is within normal limits. Orbits appear unremarkable. The paranasal sinuses and the mastoid air  cells appear well aerated. Midline structures are intact.     Impression: Impression: 1.No evidence of hemorrhage, mass effect or midline shift. No evidence of recent or acute ischemia. 2.Mild to moderate periventricular and subcortical FLAIR signal changes likely related to chronic microvascular ischemic change. Stable encephalomalacia present within the right basal ganglia and posterior right frontal lobe related to previous infarct. Electronically Signed: Tessy Love MD  2/11/2025 1:11 AM EST  Workstation ID: [id number]    CT Abdomen Pelvis Without Contrast    Result Date: 2/10/2025  CT ABDOMEN PELVIS WO CONTRAST Date of Exam: 2/10/2025 9:41 PM EST Indication: BARBARA. Comparison: 9/5/2023 Technique: Axial CT images were obtained of the abdomen and pelvis without the administration of contrast. Reconstructed coronal and sagittal images were also obtained. Automated exposure control and iterative construction methods were used. Findings: Lower Chest: Distortion of the lung bases due to patient motion. Atelectasis present in the lower lobes. Chronic pleural thickening present on the left Organs: Some distortion of the solid organs due to patient motion. No urinary calculi or hydronephrosis. Kidneys, adrenal glands, liver, spleen, pancreas have a grossly unremarkable noncontrast appearance within the limits of motion artifact. Gallbladder  surgically absent Gastrointestinal: No intestinal distention or evident wall thickening. Diverticula of the sigmoid colon without inflammation. Appendix not visualized Pelvis: No abnormal fluid collection. Uterus surgically absent. Urinary bladder unremarkable Peritoneum/Retroperitoneum: No ascites or pneumoperitoneum. Normal caliber aorta Bones/Soft Tissues: New L2 superior endplate compression with sclerosis deep to the endplate which may be due trabecular compression or healing     Impression: 1. No acute abdominopelvic process. No obstructive uropathy 2. Colonic diverticulosis 3.  Acute or subacute L2 superior endplate compression fracture Electronically Signed: Silverio Martineznes  2/10/2025 10:03 PM EST  Workstation ID: [id number]    XR Chest 1 View    Result Date: 2/10/2025  XR CHEST 1 VW Date of Exam: 2/10/2025 3:19 PM EST Indication: Weak/Dizzy/AMS triage protocol Comparison: 9/6/2023 Findings: Patient rotated to the right. Low lung volumes. Heart size indeterminate. Prominence of the central pulmonary vessels. No gross pulmonary edema. Airspace disease in the left lung base. Minimal blunting of the costophrenic angles     Impression: Impression: 1. Hypoinspiratory film demonstrating pulmonary vascular congestion with no gross pulmonary edema. There are suspected trace pleural effusions. 2. Left basilar airspace disease which may be atelectasis or pneumonia Electronically Signed: Silverio Chopra  2/10/2025 3:44 PM EST  Workstation ID: [id number]    CT Head Without Contrast    Result Date: 2/10/2025  CT HEAD WO CONTRAST Date of Exam: 2/10/2025 2:50 PM EST Indication: confusion, hallucinations, history of stroke. Comparison: February 27, 2017 Technique: Axial CT images were obtained of the head without contrast administration.  Automated exposure control and iterative construction methods were used. Findings: No acute intracranial hemorrhage or extra-axial collection is identified. The ventricles appear stable in caliber, with no evidence of mass effect or midline shift. The basal cisterns appear patent. The gray-white differentiation appears preserved. There  is encephalomalacia within the right basal ganglia and adjacent right frontal lobe. The calvarium appear intact. There is mild frothy mucosal disease in the left sphenoid sinus. The mastoid air cells are well-aerated.     Impression: Impression: 1.No acute intracranial process identified. 2.Encephalomalacia within right basal ganglia and adjacent right frontal lobe. 3.Mild frothy mucosal disease within left sphenoid sinus. Correlate for acute  sinusitis. Electronically Signed: Tang Johnson MD  2/10/2025 3:13 PM EST  Workstation ID: [id number]     Results for orders placed during the hospital encounter of 02/10/25    Adult Transthoracic Echo Complete W/ Cont if Necessary Per Protocol (With Agitated Saline)    Interpretation Summary    Left ventricular systolic function is normal. Estimated left ventricular EF = 50%    The right ventricular cavity is mildly dilated.    Moderate mitral valve regurgitation is present.    Mild aortic valve regurgitation is present.      Current medications:  Scheduled Meds:aspirin, 81 mg, Oral, Daily   Or  aspirin, 300 mg, Rectal, Daily  atorvastatin, 10 mg, Oral, Nightly  budesonide-formoterol, 2 puff, Inhalation, BID - RT  dilTIAZem CD, 240 mg, Oral, Q24H  levothyroxine, 137 mcg, Oral, Daily  metoprolol tartrate, 12.5 mg, Oral, Q12H  pantoprazole, 40 mg, Oral, Q AM  pregabalin, 300 mg, Oral, BID  sodium chloride, 10 mL, Intravenous, Q12H  sodium chloride, 10 mL, Intravenous, Q12H  topiramate, 100 mg, Oral, Daily      Continuous Infusions:   PRN Meds:.  acetaminophen **OR** acetaminophen **OR** acetaminophen    albuterol    senna-docusate sodium **AND** polyethylene glycol **AND** bisacodyl **AND** bisacodyl    Calcium Replacement - Follow Nurse / BPA Driven Protocol    Magnesium Standard Dose Replacement - Follow Nurse / BPA Driven Protocol    nitroglycerin    Phosphorus Replacement - Follow Nurse / BPA Driven Protocol    Potassium Replacement - Follow Nurse / BPA Driven Protocol    sodium chloride    sodium chloride    sodium chloride    sodium chloride    sodium chloride    Assessment & Plan   Assessment & Plan     Active Hospital Problems    Diagnosis  POA    **Altered mental status [R41.82]  Yes    Elevated serum creatinine [R79.89]  Yes    Closed compression fracture of L2 lumbar vertebra, initial encounter [S32.020A]  Yes    Tremor, essential [G25.0]  Yes    Atrial fibrillation, chronic [I48.20]  Yes    History of  hemorrhagic stroke with residual hemiparesis [I69.359]  Not Applicable    Hyperlipidemia [E78.5]  Yes    Hypertension [I10]  Yes      Resolved Hospital Problems   No resolved problems to display.        Brief Hospital Course to date:  Vickie Joshi is a 74 y.o. female with history of CVA, HTN, HLD, GERD, afib who presents altered mental status. Notably had admission to [facility] with UTI last month.     AMS  ?UTI  -UA not c/w UTI  -head CT not revealing  -no new meds though on several potentially sedating meds  -not hypoxic in ER  - concerned regarding possible stroke so will ask stroke team to see as pt with worsening left sided weakness, speech changes, confusion  -neuro checks  -if stroke eval negative, can consult general neurology given chronicity with tremor, confusion, flat affect     BARBARA  -may be related to recent abx for uti as well as poor po intake and pt's report of n/v/d  -lactate normal  -gentle fluids given in ER  -repeat bmp in a.m.    Afib, chronic on eliquis  -rate controlled  -continue bb and ccb w/ hold parameters  -holding eliquis for now pending stroke eval     L2 compression fracture  -pt notes bending over and hearing pop in back; no uncontrollable pain  -outpt follow up    Expected Discharge Location and Transportation: pending above  Expected Discharge   Expected discharge date/ time has not been documented.     VTE Prophylaxis:  Mechanical VTE prophylaxis orders are present.         AM-PAC 6 Clicks Score (PT): 12 (02/11/25 1013)    CODE STATUS:   Code Status and Medical Interventions: CPR (Attempt to Resuscitate); Full Support   Ordered at: 02/10/25 8469     Code Status (Patient has no pulse and is not breathing):    CPR (Attempt to Resuscitate)     Medical Interventions (Patient has pulse or is breathing):    Full Support       Sherley Bowling MD  02/11/25        Electronically signed by Sherley Bowling MD at 02/12/25 7330       Homero Medeiros MD at 02/11/25 6875           Stroke Progress Note       Chief Complaint:  confusion    Subjective    Subjective     Confused,   Able to only follow some commands      Objective      Temp:  [98.7 °F (37.1 °C)] 98.7 °F (37.1 °C)  Heart Rate:  [] 60  Resp:  [16-18] 18  BP: ()/(54-96) 118/81             Alert to self ,   BUE 2/5   Does not move BLE 1/5          Results Review:    I reviewed the patient's new clinical results.    Lab Results (last 24 hours)       Procedure Component Value Units Date/Time    Hemoglobin A1c [[id number]]  (Normal) Collected: 02/11/25 0636    Specimen: Blood Updated: 02/11/25 0910     Hemoglobin A1C 5.20 %     Narrative:      Hemoglobin A1C Ranges:    Increased Risk for Diabetes  5.7% to 6.4%  Diabetes                     >= 6.5%  Diabetic Goal                < 7.0%    Comprehensive Metabolic Panel [[id number]]  (Abnormal) Collected: 02/11/25 0636    Specimen: Blood Updated: 02/11/25 0807     Glucose 95 mg/dL      BUN 33 mg/dL      Creatinine 2.78 mg/dL      Sodium 139 mmol/L      Potassium 4.2 mmol/L      Chloride 109 mmol/L      CO2 20.0 mmol/L      Calcium 8.4 mg/dL      Total Protein 6.4 g/dL      Albumin 2.8 g/dL      ALT (SGPT) 7 U/L      AST (SGOT) 18 U/L      Alkaline Phosphatase 69 U/L      Total Bilirubin 0.4 mg/dL      Globulin 3.6 gm/dL      Comment: Calculated Result        A/G Ratio 0.8 g/dL      BUN/Creatinine Ratio 11.9     Anion Gap 10.0 mmol/L      eGFR 17.6 mL/min/1.73     Narrative:      GFR Categories in Chronic Kidney Disease (CKD)      GFR Category          GFR (mL/min/1.73)    Interpretation  G1                     90 or greater         Normal or high (1)  G2                      60-89                Mild decrease (1)  G3a                   45-59                Mild to moderate decrease  G3b                   30-44                Moderate to severe decrease  G4                    15-29                Severe decrease  G5                    14 or less           Kidney  failure          (1)In the absence of evidence of kidney disease, neither GFR category G1 or G2 fulfill the criteria for CKD.    eGFR calculation 2021 CKD-EPI creatinine equation, which does not include race as a factor    Magnesium [[id number]]  (Normal) Collected: 02/11/25 0636    Specimen: Blood Updated: 02/11/25 0807     Magnesium 1.7 mg/dL     Phosphorus [[id number]]  (Normal) Collected: 02/11/25 0636    Specimen: Blood Updated: 02/11/25 0755     Phosphorus 4.5 mg/dL     Lipid Panel [[id number]] Collected: 02/11/25 0636    Specimen: Blood Updated: 02/11/25 0755     Total Cholesterol 124 mg/dL      Triglycerides 88 mg/dL      HDL Cholesterol 40 mg/dL      LDL Cholesterol  67 mg/dL      VLDL Cholesterol 17 mg/dL      LDL/HDL Ratio 1.66    Narrative:      Cholesterol Reference Ranges  (U.S. Department of Health and Human Services ATP III Classifications)    Desirable          <200 mg/dL  Borderline High    200-239 mg/dL  High Risk          >240 mg/dL      Triglyceride Reference Ranges  (U.S. Department of Health and Human Services ATP III Classifications)    Normal           <150 mg/dL  Borderline High  150-199 mg/dL  High             200-499 mg/dL  Very High        >500 mg/dL    HDL Reference Ranges  (U.S. Department of Health and Human Services ATP III Classifications)    Low     <40 mg/dl (major risk factor for CHD)  High    >60 mg/dl ('negative' risk factor for CHD)        LDL Reference Ranges  (U.S. Department of Health and Human Services ATP III Classifications)    Optimal          <100 mg/dL  Near Optimal     100-129 mg/dL  Borderline High  130-159 mg/dL  High             160-189 mg/dL  Very High        >189 mg/dL    LDL is calculated using the NIH LDL-C calculation.      CBC Auto Differential [[id number]]  (Abnormal) Collected: 02/11/25 0636    Specimen: Blood Updated: 02/11/25 0754     WBC 10.73 10*3/mm3      RBC 4.12 10*6/mm3      Hemoglobin 11.1 g/dL      Hematocrit 36.8 %      MCV 89.3 fL      MCH 26.9 pg   "    MCHC 30.2 g/dL      RDW 15.1 %      RDW-SD 49.5 fl      MPV 12.1 fL      Platelets 215 10*3/mm3      Neutrophil % 71.1 %      Lymphocyte % 16.9 %      Monocyte % 9.6 %      Eosinophil % 1.7 %      Basophil % 0.4 %      Immature Grans % 0.3 %      Neutrophils, Absolute 7.64 10*3/mm3      Lymphocytes, Absolute 1.81 10*3/mm3      Monocytes, Absolute 1.03 10*3/mm3      Eosinophils, Absolute 0.18 10*3/mm3      Basophils, Absolute 0.04 10*3/mm3      Immature Grans, Absolute 0.03 10*3/mm3      nRBC 0.0 /100 WBC     TSH [[id number]]  (Normal) Collected: 02/11/25 0636    Specimen: Blood Updated: 02/11/25 0745     TSH 3.250 uIU/mL     POC Glucose Once [[id number]]  (Normal) Collected: 02/11/25 0636    Specimen: Blood Updated: 02/11/25 0637     Glucose 84 mg/dL     Procalcitonin [[id number]]  (Normal) Collected: 02/10/25 1824    Specimen: Blood Updated: 02/10/25 2237     Procalcitonin 0.11 ng/mL     Narrative:      As a Marker for Sepsis (Non-Neonates):    1. <0.5 ng/mL represents a low risk of severe sepsis and/or septic shock.  2. >2 ng/mL represents a high risk of severe sepsis and/or septic shock.    As a Marker for Lower Respiratory Tract Infections that require antibiotic therapy:    PCT on Admission    Antibiotic Therapy       6-12 Hrs later    >0.5                Strongly Recommended  >0.25 - <0.5        Recommended   0.1 - 0.25          Discouraged              Remeasure/reassess PCT  <0.1                Strongly Discouraged     Remeasure/reassess PCT    As 28 day mortality risk marker: \"Change in Procalcitonin Result\" (>80% or <=80%) if Day 0 (or Day 1) and Day 4 values are available. Refer to [email or web address]-[email or web address]    Change in PCT <=80%  A decrease of PCT levels below or equal to 80% defines a positive change in PCT test result representing a higher risk for 28-day all-cause mortality of patients diagnosed with severe sepsis for septic shock.    Change in PCT >80%  A decrease of PCT levels of more " than 80% defines a negative change in PCT result representing a lower risk for 28-day all-cause mortality of patients diagnosed with severe sepsis or septic shock.       proBNP [[id number]]  (Abnormal) Collected: 02/10/25 1824    Specimen: Blood Updated: 02/10/25 2224     proBNP 3,842.0 pg/mL     Narrative:      This assay is used as an aid in the diagnosis of individuals suspected of having heart failure. It can be used as an aid in the diagnosis of acute decompensated heart failure (ADHF) in patients presenting with signs and symptoms of ADHF to the emergency department (ED). In addition, NT-proBNP of <300 pg/mL indicates ADHF is not likely.    Age Range Result Interpretation  NT-proBNP Concentration (pg/mL:      <50             Positive            >450                   Gray                 300-450                    Negative             <300    50-75           Positive            >900                  Gray                300-900                  Negative            <300      >75             Positive            >1800                  Gray                300-1800                  Negative            <300    Lactic Acid, Plasma [[id number]]  (Normal) Collected: 02/10/25 1633    Specimen: Blood Updated: 02/10/25 2220     Lactate 1.3 mmol/L      Comment: Falsely depressed results may occur on samples drawn from patients receiving N-Acetylcysteine (NAC) or Metamizole.       Urinalysis, Microscopic Only - Straight Cath [[id number]]  (Abnormal) Collected: 02/10/25 2101    Specimen: Urine from Straight Cath Updated: 02/10/25 2132     RBC, UA None Seen /HPF      WBC, UA 11-20 /HPF      Bacteria, UA None Seen /HPF      Squamous Epithelial Cells, UA 13-20 /HPF      Hyaline Casts, UA None Seen /LPF      Methodology Manual Light Microscopy    Urine Culture - Urine, Straight Cath [[id number]] Collected: 02/10/25 2101    Specimen: Urine from Straight Cath Updated: 02/10/25 2132    Urinalysis With Culture If Indicated - Straight  Cath [[id number]]  (Abnormal) Collected: 02/10/25 2101    Specimen: Urine from Straight Cath Updated: 02/10/25 2123     Color, UA Yellow     Appearance, UA Clear     pH, UA 5.5     Specific Gravity, UA 1.014     Glucose, UA Negative     Ketones, UA Negative     Bilirubin, UA Negative     Blood, UA Trace     Protein, UA Negative     Leuk Esterase, UA Moderate (2+)     Nitrite, UA Negative     Urobilinogen, UA 0.2 E.U./dL    Narrative:      In absence of clinical symptoms, the presence of pyuria, bacteria, and/or nitrites on the urinalysis result does not correlate with infection.    High Sensitivity Troponin T 1Hr [[id number]]  (Abnormal) Collected: 02/10/25 1824    Specimen: Blood Updated: 02/10/25 1848     HS Troponin T 20 ng/L      Troponin T Numeric Delta -3 ng/L      Troponin T % Delta -13    Narrative:      High Sensitive Troponin T Reference Range:  <14.0 ng/L- Negative Female for AMI  <22.0 ng/L- Negative Male for AMI  >=14 - Abnormal Female indicating possible myocardial injury.  >=22 - Abnormal Male indicating possible myocardial injury.   Clinicians would have to utilize clinical acumen, EKG, Troponin, and serial changes to determine if it is an Acute Myocardial Infarction or myocardial injury due to an underlying chronic condition.         High Sensitivity Troponin T [[id number]]  (Abnormal) Collected: 02/10/25 1633    Specimen: Blood Updated: 02/10/25 1714     HS Troponin T 23 ng/L     Narrative:      High Sensitive Troponin T Reference Range:  <14.0 ng/L- Negative Female for AMI  <22.0 ng/L- Negative Male for AMI  >=14 - Abnormal Female indicating possible myocardial injury.  >=22 - Abnormal Male indicating possible myocardial injury.   Clinicians would have to utilize clinical acumen, EKG, Troponin, and serial changes to determine if it is an Acute Myocardial Infarction or myocardial injury due to an underlying chronic condition.         Comprehensive Metabolic Panel [[id number]]  (Abnormal) Collected:  02/10/25 1633    Specimen: Blood Updated: 02/10/25 1711     Glucose 141 mg/dL      BUN 33 mg/dL      Creatinine 2.90 mg/dL      Sodium 141 mmol/L      Potassium 3.9 mmol/L      Comment: Slight hemolysis detected by analyzer. Result may be falsely elevated.        Chloride 105 mmol/L      CO2 20.0 mmol/L      Calcium 8.9 mg/dL      Total Protein 8.9 g/dL      Albumin 3.9 g/dL      ALT (SGPT) 7 U/L      AST (SGOT) 15 U/L      Alkaline Phosphatase 88 U/L      Total Bilirubin 0.5 mg/dL      Globulin 5.0 gm/dL      Comment: Calculated Result        A/G Ratio 0.8 g/dL      BUN/Creatinine Ratio 11.4     Anion Gap 16.0 mmol/L      eGFR 16.7 mL/min/1.73     Narrative:      GFR Categories in Chronic Kidney Disease (CKD)      GFR Category          GFR (mL/min/1.73)    Interpretation  G1                     90 or greater         Normal or high (1)  G2                      60-89                Mild decrease (1)  G3a                   45-59                Mild to moderate decrease  G3b                   30-44                Moderate to severe decrease  G4                    15-29                Severe decrease  G5                    14 or less           Kidney failure          (1)In the absence of evidence of kidney disease, neither GFR category G1 or G2 fulfill the criteria for CKD.    eGFR calculation 2021 CKD-EPI creatinine equation, which does not include race as a factor    Magnesium [[id number]]  (Normal) Collected: 02/10/25 1633    Specimen: Blood Updated: 02/10/25 1711     Magnesium 2.0 mg/dL     [place] Draw [[id number]] Collected: 02/10/25 1633    Specimen: Blood Updated: 02/10/25 1645    Narrative:      The following orders were created for panel order [place] Draw.  Procedure                               Abnormality         Status                     ---------                               -----------         ------                     Green Top (Gel)[[id number]]                                  Final result                Lavender Top[[id number]]                                     Final result               Gold Top - SST[[id number]]                                   Final result               Matos Top[[id number]]                                         Final result               Light Blue Top[[id number]]                                   Final result                 Please view results for these tests on the individual orders.    Green Top (Gel) [[id number]] Collected: 02/10/25 1633    Specimen: Blood Updated: 02/10/25 1645     Extra Tube Hold for add-ons.     Comment: Auto resulted.       Lavender Top [[id number]] Collected: 02/10/25 1633    Specimen: Blood Updated: 02/10/25 1645     Extra Tube hold for add-on     Comment: Auto resulted       Gold Top - SST [[id number]] Collected: 02/10/25 1633    Specimen: Blood Updated: 02/10/25 1645     Extra Tube Hold for add-ons.     Comment: Auto resulted.       Matos Top [[id number]] Collected: 02/10/25 1633    Specimen: Blood Updated: 02/10/25 1645     Extra Tube Hold for add-ons.     Comment: Auto resulted.       Light Blue Top [[id number]] Collected: 02/10/25 1633    Specimen: Blood Updated: 02/10/25 1645     Extra Tube Hold for add-ons.     Comment: Auto resulted       CBC & Differential [[id number]]  (Abnormal) Collected: 02/10/25 1633    Specimen: Blood Updated: 02/10/25 1644    Narrative:      The following orders were created for panel order CBC & Differential.  Procedure                               Abnormality         Status                     ---------                               -----------         ------                     CBC Auto Differential[[id number]]        Abnormal            Final result                 Please view results for these tests on the individual orders.    CBC Auto Differential [[id number]]  (Abnormal) Collected: 02/10/25 1633    Specimen: Blood Updated: 02/10/25 1644     WBC 11.78 10*3/mm3      RBC 5.00 10*6/mm3      Hemoglobin 13.4 g/dL      Hematocrit 44.1 %       MCV 88.2 fL      MCH 26.8 pg      MCHC 30.4 g/dL      RDW 15.2 %      RDW-SD 49.0 fl      MPV 11.9 fL      Platelets 284 10*3/mm3      Neutrophil % 81.8 %      Lymphocyte % 9.8 %      Monocyte % 6.5 %      Eosinophil % 1.2 %      Basophil % 0.3 %      Immature Grans % 0.4 %      Neutrophils, Absolute 9.64 10*3/mm3      Lymphocytes, Absolute 1.15 10*3/mm3      Monocytes, Absolute 0.76 10*3/mm3      Eosinophils, Absolute 0.14 10*3/mm3      Basophils, Absolute 0.04 10*3/mm3      Immature Grans, Absolute 0.05 10*3/mm3      nRBC 0.0 /100 WBC           MRI Brain Without Contrast    Result Date: 2/11/2025  Impression: 1.No evidence of hemorrhage, mass effect or midline shift. No evidence of recent or acute ischemia. 2.Mild to moderate periventricular and subcortical FLAIR signal changes likely related to chronic microvascular ischemic change. Stable encephalomalacia present within the right basal ganglia and posterior right frontal lobe related to previous infarct. Electronically Signed: Tessy Love MD  2/11/2025 1:11 AM EST  Workstation ID: [id number]    CT Abdomen Pelvis Without Contrast    Result Date: 2/10/2025  1. No acute abdominopelvic process. No obstructive uropathy 2. Colonic diverticulosis 3. Acute or subacute L2 superior endplate compression fracture Electronically Signed: Silverio Chopra  2/10/2025 10:03 PM EST  Workstation ID: [id number]    XR Chest 1 View    Result Date: 2/10/2025  Impression: 1. Hypoinspiratory film demonstrating pulmonary vascular congestion with no gross pulmonary edema. There are suspected trace pleural effusions. 2. Left basilar airspace disease which may be atelectasis or pneumonia Electronically Signed: Silverio Chopra  2/10/2025 3:44 PM EST  Workstation ID: [id number]    CT Head Without Contrast    Result Date: 2/10/2025  Impression: 1.No acute intracranial process identified. 2.Encephalomalacia within right basal ganglia and adjacent right frontal lobe. 3.Mild frothy mucosal disease  within left sphenoid sinus. Correlate for acute sinusitis. Electronically Signed: Tang Johnson MD  2/10/2025 3:13 PM EST  Workstation ID: [id number]   Results for orders placed during the hospital encounter of 06/30/19    Adult Transthoracic Echo Complete W/ Cont if Necessary Per Protocol    Interpretation Summary  · Mild pulmonic valve regurgitation is present.  · Mild mitral valve regurgitation is present  · Left atrial cavity size is moderately dilated.  · Mild aortic valve regurgitation is present.  · Mild tricuspid valve regurgitation is present.  · Calculated right ventricular systolic pressure from tricuspid regurgitation is 39 mmHg.  · Estimated EF = 65%.  · Left ventricular systolic function is normal.            Assessment/Plan     Assessment/Plan:  A 74-year-old female with a history of arthritis, asthma, atrial fibrillation (on Eliquis), hypertension, hyperlipidemia, hypothyroidism, migraines, and prior CVA presented with hallucinations and a left facial droop. Her symptoms are suspected to be a recrudescence of old stroke deficits in the setting of an infectious or metabolic cause. MRI brain showed no acute infarct. Given her history of atrial fibrillation, Eliquis should be continued.      We will continue to follow one more day as family requested.   If patent continues to not improve, we will get a repeat CT head.       Homero Medeiros MD  02/11/25  09:16 EST       Electronically signed by Homero Medeiros MD at 02/11/25 8898

## 2025-02-14 NOTE — PLAN OF CARE
Problem: Adult Inpatient Plan of Care  Goal: Plan of Care Review  Outcome: Progressing  Goal: Patient-Specific Goal (Individualized)  Outcome: Progressing  Goal: Absence of Hospital-Acquired Illness or Injury  Outcome: Progressing  Intervention: Identify and Manage Fall Risk  Recent Flowsheet Documentation  Taken 2/14/2025 0400 by Dhara Clark RN  Safety Promotion/Fall Prevention:   activity supervised   assistive device/personal items within reach   clutter free environment maintained   fall prevention program maintained   lighting adjusted   nonskid shoes/slippers when out of bed   room organization consistent   safety round/check completed  Taken 2/14/2025 0200 by Dhara Clark RN  Safety Promotion/Fall Prevention:   activity supervised   assistive device/personal items within reach   clutter free environment maintained   fall prevention program maintained   lighting adjusted   nonskid shoes/slippers when out of bed   room organization consistent   safety round/check completed  Taken 2/14/2025 0000 by Dhara Clark RN  Safety Promotion/Fall Prevention:   activity supervised   assistive device/personal items within reach   clutter free environment maintained   fall prevention program maintained   lighting adjusted   nonskid shoes/slippers when out of bed   room organization consistent   safety round/check completed  Taken 2/13/2025 2200 by Dhara Clark RN  Safety Promotion/Fall Prevention:   activity supervised   assistive device/personal items within reach   clutter free environment maintained   fall prevention program maintained   lighting adjusted   nonskid shoes/slippers when out of bed   room organization consistent   safety round/check completed  Taken 2/13/2025 2000 by Dhara Clark RN  Safety Promotion/Fall Prevention:   activity supervised   assistive device/personal items within reach   clutter free environment maintained   fall prevention program maintained   lighting adjusted    nonskid shoes/slippers when out of bed   room organization consistent   safety round/check completed  Intervention: Prevent Skin Injury  Recent Flowsheet Documentation  Taken 2/14/2025 0400 by Dhara Clark RN  Body Position:   turned   right  Skin Protection:   incontinence pads utilized   transparent dressing maintained  Taken 2/14/2025 0200 by Dhara Clark RN  Body Position:   turned   left  Skin Protection:   incontinence pads utilized   transparent dressing maintained  Taken 2/14/2025 0000 by Dhara Clark RN  Body Position: supine  Skin Protection:   incontinence pads utilized   transparent dressing maintained  Taken 2/13/2025 2200 by Dhara Clark RN  Body Position:   turned   right  Skin Protection:   incontinence pads utilized   transparent dressing maintained  Taken 2/13/2025 2000 by Dhara Clark RN  Body Position:   turned   left  Skin Protection:   incontinence pads utilized   transparent dressing maintained  Intervention: Prevent and Manage VTE (Venous Thromboembolism) Risk  Recent Flowsheet Documentation  Taken 2/13/2025 2000 by Dhara Clark RN  VTE Prevention/Management: (eliquis)   bilateral   SCDs (sequential compression devices) off  Intervention: Prevent Infection  Recent Flowsheet Documentation  Taken 2/14/2025 0400 by Dhara Clark RN  Infection Prevention:   cohorting utilized   environmental surveillance performed   equipment surfaces disinfected   hand hygiene promoted   rest/sleep promoted  Taken 2/14/2025 0200 by Dhara Clark RN  Infection Prevention:   cohorting utilized   environmental surveillance performed   equipment surfaces disinfected   hand hygiene promoted   rest/sleep promoted  Taken 2/14/2025 0000 by Dhara Clark RN  Infection Prevention:   cohorting utilized   environmental surveillance performed   equipment surfaces disinfected   hand hygiene promoted   rest/sleep promoted  Taken 2/13/2025 2200 by Dhara Clark RN  Infection  Prevention:   cohorting utilized   environmental surveillance performed   equipment surfaces disinfected   hand hygiene promoted   rest/sleep promoted  Taken 2/13/2025 2000 by Dhara Clark RN  Infection Prevention:   cohorting utilized   environmental surveillance performed   equipment surfaces disinfected  Goal: Optimal Comfort and Wellbeing  Outcome: Progressing  Intervention: Provide Person-Centered Care  Recent Flowsheet Documentation  Taken 2/13/2025 2000 by Dhara Clark RN  Trust Relationship/Rapport:   care explained   choices provided  Goal: Readiness for Transition of Care  Outcome: Progressing     Problem: Skin Injury Risk Increased  Goal: Skin Health and Integrity  Outcome: Progressing  Intervention: Optimize Skin Protection  Recent Flowsheet Documentation  Taken 2/14/2025 0400 by Dhara Clark RN  Activity Management: activity encouraged  Pressure Reduction Techniques:   frequent weight shift encouraged   heels elevated off bed   pressure points protected   weight shift assistance provided  Head of Bed (HOB) Positioning: HOB elevated  Pressure Reduction Devices:   pressure-redistributing mattress utilized   positioning supports utilized  Skin Protection:   incontinence pads utilized   transparent dressing maintained  Taken 2/14/2025 0200 by Dhara Clark RN  Activity Management: activity encouraged  Pressure Reduction Techniques:   frequent weight shift encouraged   heels elevated off bed   pressure points protected   weight shift assistance provided  Head of Bed (HOB) Positioning: HOB elevated  Pressure Reduction Devices:   pressure-redistributing mattress utilized   positioning supports utilized  Skin Protection:   incontinence pads utilized   transparent dressing maintained  Taken 2/14/2025 0000 by Dhara Clark RN  Activity Management: activity encouraged  Pressure Reduction Techniques:   frequent weight shift encouraged   heels elevated off bed   pressure points protected    weight shift assistance provided  Head of Bed (HOB) Positioning: HOB elevated  Pressure Reduction Devices:   pressure-redistributing mattress utilized   positioning supports utilized  Skin Protection:   incontinence pads utilized   transparent dressing maintained  Taken 2/13/2025 2200 by Dhara Clark RN  Activity Management: activity encouraged  Pressure Reduction Techniques:   frequent weight shift encouraged   heels elevated off bed   pressure points protected   weight shift assistance provided  Head of Bed (HOB) Positioning: HOB elevated  Pressure Reduction Devices:   pressure-redistributing mattress utilized   positioning supports utilized  Skin Protection:   incontinence pads utilized   transparent dressing maintained  Taken 2/13/2025 2000 by Dhara Clark RN  Activity Management: activity encouraged  Pressure Reduction Techniques:   frequent weight shift encouraged   heels elevated off bed   positioned off wounds   pressure points protected  Head of Bed (HOB) Positioning: HOB elevated  Pressure Reduction Devices:   pressure-redistributing mattress utilized   positioning supports utilized  Skin Protection:   incontinence pads utilized   transparent dressing maintained     Problem: Fall Injury Risk  Goal: Absence of Fall and Fall-Related Injury  Outcome: Progressing  Intervention: Identify and Manage Contributors  Recent Flowsheet Documentation  Taken 2/14/2025 0400 by Dhara Clark RN  Self-Care Promotion:   independence encouraged   BADL personal objects within reach  Taken 2/14/2025 0200 by Dhara Clark RN  Self-Care Promotion:   independence encouraged   BADL personal objects within reach  Taken 2/14/2025 0000 by Dhara Clark RN  Self-Care Promotion:   independence encouraged   BADL personal objects within reach  Taken 2/13/2025 2200 by Dhara Clark RN  Self-Care Promotion:   independence encouraged   BADL personal objects within reach  Taken 2/13/2025 2000 by Dhara Clark  RN  Medication Review/Management: medications reviewed  Self-Care Promotion:   independence encouraged   BADL personal objects within reach  Intervention: Promote Injury-Free Environment  Recent Flowsheet Documentation  Taken 2/14/2025 0400 by Dhara Clark RN  Safety Promotion/Fall Prevention:   activity supervised   assistive device/personal items within reach   clutter free environment maintained   fall prevention program maintained   lighting adjusted   nonskid shoes/slippers when out of bed   room organization consistent   safety round/check completed  Taken 2/14/2025 0200 by Dhara Clark RN  Safety Promotion/Fall Prevention:   activity supervised   assistive device/personal items within reach   clutter free environment maintained   fall prevention program maintained   lighting adjusted   nonskid shoes/slippers when out of bed   room organization consistent   safety round/check completed  Taken 2/14/2025 0000 by Dhara Clark RN  Safety Promotion/Fall Prevention:   activity supervised   assistive device/personal items within reach   clutter free environment maintained   fall prevention program maintained   lighting adjusted   nonskid shoes/slippers when out of bed   room organization consistent   safety round/check completed  Taken 2/13/2025 2200 by Dhara Clark RN  Safety Promotion/Fall Prevention:   activity supervised   assistive device/personal items within reach   clutter free environment maintained   fall prevention program maintained   lighting adjusted   nonskid shoes/slippers when out of bed   room organization consistent   safety round/check completed  Taken 2/13/2025 2000 by Dhara Clark RN  Safety Promotion/Fall Prevention:   activity supervised   assistive device/personal items within reach   clutter free environment maintained   fall prevention program maintained   lighting adjusted   nonskid shoes/slippers when out of bed   room organization consistent   safety round/check  completed   Goal Outcome Evaluation:

## 2025-02-14 NOTE — OUTREACH NOTE
Prep Survey      Flowsheet Row Responses   [facility] patient discharged from? [facility]   Is LACE score < 7 ? No   Eligibility [facility]   Date of Admission 02/10/25   Date of Discharge 02/14/25   Discharge diagnosis Confusion   Does the patient have one of the following disease processes/diagnoses(primary or secondary)? Other   Prep survey completed? Yes            Xin ENCARNACION - Registered Nurse

## 2025-02-14 NOTE — CASE MANAGEMENT/SOCIAL WORK
Continued Stay Note  [facility]     Patient Name: Vickie Joshi  MRN: [id number]  Today's Date: 2/14/2025    Admit Date: 2/10/2025    Plan: Home with    Discharge Plan       Row Name 02/14/25 1218       Plan    Plan Comments MSW spoke with pt at bedside again today. Pt reports her plan remains home with  when ready. Pt denies needing home health. Pt's  assists pt at home and is also able to provide transportation home. Pt had no current discharge needs or concerns.    Final Discharge Disposition Code 01 - home or self-care                   Discharge Codes    No documentation.                       CARLEEN Richardson

## 2025-02-14 NOTE — DISCHARGE SUMMARY
[facility]  DISCHARGE SUMMARY    Patient Name: Vickie Joshi  : 1950  MRN: [id number]    Date of Admission: 2/10/2025  5:44 PM  Date of Discharge:  25  Primary Care Physician: Dav Bryan MD    Consults       Date and Time Order Name Status Description    2025 12:10 PM Inpatient Nephrology Consult Completed             Hospital Course     Presenting Problem: AMS    Active Hospital Problems    Diagnosis  POA    **Altered mental status [R41.82]  Yes    BARBARA (acute kidney injury) [N17.9]  Yes    Elevated serum creatinine [R79.89]  Yes    Closed compression fracture of L2 lumbar vertebra, initial encounter [S32.020A]  Yes    Tremor, essential [G25.0]  Yes    Atrial fibrillation, chronic [I48.20]  Yes    History of hemorrhagic stroke with residual hemiparesis [I69.359]  Not Applicable    Hyperlipidemia [E78.5]  Yes    Hypertension [I10]  Yes      Resolved Hospital Problems   No resolved problems to display.          Hospital Course:  Vickie Joshi is a 74 y.o. female with history of CVA, HTN, HLD, GERD, afib who presents altered mental status. Notably [place] [facility]. Below is a summary of hospital course      AMS-resolved   -UA not c/w UTI, sample repeated due to first sample contaminated, again not impressive for infection  -head CT not revealing, MRI done as well and not c/w stroke   -no new meds though on several potentially sedating meds  -not hypoxic in ER  -stroke team has evaluated patient, appreciate their help, no stroke noted on imaging  -seems to be back to baseline mentation     BARBARA  -may be related to recent abx for uti as well as poor po intake and pt's report of n/v/d  -lactate normal  -Cr improved at time of discharge to 2.4, still elevated beyond her baseline, but improved    Afib, chronic on eliquis  -rate controlled  -continue bb and ccb w/ hold parameters  -eliquis resumed as stroke r/o      L2  compression fracture  -pt notes bending over and hearing pop in back; no uncontrollable pain  -outpt follow up      Discharge Follow Up Recommendations for outpatient labs/diagnostics:   PCP 1-2 weeks- recommend lab recheck Cr at this time  Nephrology 1 month    Day of Discharge     HPI:   Patient doing well, she is hoping to go home today. Awaiting labs     Review of Systems  As above     Vital Signs:   Temp:  [96.4 °F (35.8 °C)-98.6 °F (37 °C)] 96.8 °F (36 °C)  Heart Rate:  [52-71] 52  Resp:  [16-18] 18  BP: ()/(55-75) 106/68      Physical Exam:  GEN: NAD, resting in bed, awake  HEENT: on room air, atraumatic, normocephalic  NECK: supple, no masses  RESP: on room air, normal effort  CV: on tele, sinus rhythm  PSYCH: normal affect, appropriate  NEURO: awake, alert, no focal deficits noted  MSK: no edema noted  SKIN: no rashes noted       Pertinent  and/or Most Recent Results     LAB RESULTS:      Lab 02/11/25  0636 02/10/25  1824 02/10/25  1633   WBC 10.73  --  11.78*   HEMOGLOBIN 11.1*  --  13.4   HEMATOCRIT 36.8  --  44.1   PLATELETS 215  --  284   NEUTROS ABS 7.64*  --  9.64*   IMMATURE GRANS (ABS) 0.03  --  0.05   LYMPHS ABS 1.81  --  1.15   MONOS ABS 1.03*  --  0.76   EOS ABS 0.18  --  0.14   MCV 89.3  --  88.2   PROCALCITONIN  --  0.11  --    LACTATE  --   --  1.3         Lab 02/14/25  1152 02/12/25  1309 02/11/25  0636 02/10/25  1633   SODIUM 144 141 139 141   POTASSIUM 4.0 3.7 4.2 3.9   CHLORIDE 110* 106 109* 105   CO2 21.0* 22.0 20.0* 20.0*   ANION GAP 13.0 13.0 10.0 16.0*   BUN 29* 30* 33* 33*   CREATININE 2.43* 2.67* 2.78* 2.90*   EGFR 20.7* 18.5* 17.6* 16.7*   GLUCOSE 97 115* 95 141*   CALCIUM 8.5* 8.4* 8.4* 8.9   MAGNESIUM  --   --  1.7 2.0   PHOSPHORUS  --   --  4.5  --    HEMOGLOBIN A1C  --   --  5.20  --    TSH  --   --  3.250  --          Lab 02/14/25  1152 02/11/25  0636 02/10/25  1633   TOTAL PROTEIN 6.7 6.4 8.9*   ALBUMIN 3.1* 2.8* 3.9   GLOBULIN 3.6 3.6 5.0   ALT (SGPT) 6 7 7   AST (SGOT)  12 18 15   BILIRUBIN 0.3 0.4 0.5   ALK PHOS 75 69 88         Lab 02/10/25  1824 02/10/25  1633   PROBNP 3,842.0*  --    HSTROP T 20* 23*         Lab 02/11/25  0636   CHOLESTEROL 124   LDL CHOL 67   HDL CHOL 40   TRIGLYCERIDES 88             Brief Urine Lab Results  (Last result in the past 365 days)        Color   Clarity   Blood   Leuk Est   Nitrite   Protein   CREAT   Urine HCG        02/12/25 1353 Yellow   Clear   Trace   Small (1+)   Negative   Negative                 Microbiology Results (last 10 days)       Procedure Component Value - Date/Time    Urine Culture - Urine, Straight Cath [[id number]]  (Normal) Collected: 02/12/25 1353    Lab Status: Final result Specimen: Urine from Straight Cath Updated: 02/14/25 1158     Urine Culture No growth    Urine Culture - Urine, Straight Cath [[id number]]  (Normal) Collected: 02/10/25 2101    Lab Status: Final result Specimen: Urine from Straight Cath Updated: 02/12/25 0411     Urine Culture No growth            MRI Brain Without Contrast    Result Date: 2/11/2025  MRI BRAIN WO CONTRAST Date of Exam: 2/11/2025 12:48 AM EST Indication: AMS, left weakness.  Comparison: 2/10/2025. Technique:  Routine multiplanar/multisequence sequence images of the brain were obtained without contrast administration. Findings: There is no diffusion restriction to suggest acute infarct. There is no evidence of acute or chronic intracranial hemorrhage. No mass effect or midline shift. Stable encephalomalacia is present within the right basal ganglia and posterior right frontal lobe related to previous infarct. Surrounding gliosis is present with FLAIR signal changes. Mild to moderate periventricular and subcortical FLAIR signal changes are present. No abnormal extra-axial collections. The major vascular flow voids appear intact. The basal ganglia, brainstem and cerebellum appear within normal limits. Calvarial and superficial soft tissue signal is within normal limits. Orbits appear  unremarkable. The paranasal sinuses and the mastoid air cells appear well aerated. Midline structures are intact.     Impression: 1.No evidence of hemorrhage, mass effect or midline shift. No evidence of recent or acute ischemia. 2.Mild to moderate periventricular and subcortical FLAIR signal changes likely related to chronic microvascular ischemic change. Stable encephalomalacia present within the right basal ganglia and posterior right frontal lobe related to previous infarct. Electronically Signed: Tessy Love MD  2/11/2025 1:11 AM EST  Workstation ID: [id number]    CT Abdomen Pelvis Without Contrast    Result Date: 2/10/2025  CT ABDOMEN PELVIS WO CONTRAST Date of Exam: 2/10/2025 9:41 PM EST Indication: BARBARA. Comparison: 9/5/2023 Technique: Axial CT images were obtained of the abdomen and pelvis without the administration of contrast. Reconstructed coronal and sagittal images were also obtained. Automated exposure control and iterative construction methods were used. Findings: Lower Chest: Distortion of the lung bases due to patient motion. Atelectasis present in the lower lobes. Chronic pleural thickening present on the left Organs: Some distortion of the solid organs due to patient motion. No urinary calculi or hydronephrosis. Kidneys, adrenal glands, liver, spleen, pancreas have a grossly unremarkable noncontrast appearance within the limits of motion artifact. Gallbladder  surgically absent Gastrointestinal: No intestinal distention or evident wall thickening. Diverticula of the sigmoid colon without inflammation. Appendix not visualized Pelvis: No abnormal fluid collection. Uterus surgically absent. Urinary bladder unremarkable Peritoneum/Retroperitoneum: No ascites or pneumoperitoneum. Normal caliber aorta Bones/Soft Tissues: New L2 superior endplate compression with sclerosis deep to the endplate which may be due trabecular compression or healing     1. No acute abdominopelvic process. No obstructive  uropathy 2. Colonic diverticulosis 3. Acute or subacute L2 superior endplate compression fracture Electronically Signed: Silverio Chopra  2/10/2025 10:03 PM EST  Workstation ID: [id number]    XR Chest 1 View    Result Date: 2/10/2025  XR CHEST 1 VW Date of Exam: 2/10/2025 3:19 PM EST Indication: Weak/Dizzy/AMS triage protocol Comparison: 9/6/2023 Findings: Patient rotated to the right. Low lung volumes. Heart size indeterminate. Prominence of the central pulmonary vessels. No gross pulmonary edema. Airspace disease in the left lung base. Minimal blunting of the costophrenic angles     Impression: 1. Hypoinspiratory film demonstrating pulmonary vascular congestion with no gross pulmonary edema. There are suspected trace pleural effusions. 2. Left basilar airspace disease which may be atelectasis or pneumonia Electronically Signed: Silverio Chopra  2/10/2025 3:44 PM EST  Workstation ID: [id number]    CT Head Without Contrast    Result Date: 2/10/2025  CT HEAD WO CONTRAST Date of Exam: 2/10/2025 2:50 PM EST Indication: confusion, hallucinations, history of stroke. Comparison: February 27, 2017 Technique: Axial CT images were obtained of the head without contrast administration.  Automated exposure control and iterative construction methods were used. Findings: No acute intracranial hemorrhage or extra-axial collection is identified. The ventricles appear stable in caliber, with no evidence of mass effect or midline shift. The basal cisterns appear patent. The gray-white differentiation appears preserved. There  is encephalomalacia within the right basal ganglia and adjacent right frontal lobe. The calvarium appear intact. There is mild frothy mucosal disease in the left sphenoid sinus. The mastoid air cells are well-aerated.     Impression: 1.No acute intracranial process identified. 2.Encephalomalacia within right basal ganglia and adjacent right frontal lobe. 3.Mild frothy mucosal disease within left sphenoid sinus.  Correlate for acute sinusitis. Electronically Signed: Tang Johnson MD  2/10/2025 3:13 PM EST  Workstation ID: [id number]             Results for orders placed during the hospital encounter of 02/10/25    Adult Transthoracic Echo Complete W/ Cont if Necessary Per Protocol (With Agitated Saline)    Interpretation Summary    Left ventricular systolic function is normal. Estimated left ventricular EF = 50%    The right ventricular cavity is mildly dilated.    Moderate mitral valve regurgitation is present.    Mild aortic valve regurgitation is present.      Plan for Follow-up of Pending Labs/Results:       Discharge Details        Discharge Medications        Continue These Medications        Instructions Start Date   albuterol 1.25 MG/3ML nebulizer solution  Commonly known as: ACCUNEB   1.25 mg, Nebulization, Every 6 Hours PRN      albuterol sulfate  (90 Base) MCG/ACT inhaler  Commonly known as: PROVENTIL HFA;VENTOLIN HFA;PROAIR HFA   Inhalation, See Admin Instructions, Inhale 2 puffs by mouth every 4 to 6 hours as needed      atorvastatin 10 MG tablet  Commonly known as: LIPITOR   TAKE ONE TABLET BY MOUTH ONCE NIGHTLY      dicyclomine 10 MG capsule  Commonly known as: BENTYL   10 mg, Oral, 2 Times Daily PRN      dilTIAZem  MG 24 hr capsule  Commonly known as: CARDIZEM CD   240 mg, Oral, Every 24 Hours Scheduled      Eliquis 5 MG tablet tablet  Generic drug: apixaban   5 mg, Oral, Every 12 Hours      Fluticasone-Salmeterol 250-50 MCG/ACT DISKUS  Commonly known as: ADVAIR/WIXELA   INHALE 1 PUFF BY MOUTH 2 TIMES A DAY      levothyroxine 137 MCG tablet  Commonly known as: SYNTHROID, LEVOTHROID   137 mcg, Oral, Daily      Magnesium 250 MG tablet   Take  by mouth.      metoprolol tartrate 25 MG tablet  Commonly known as: LOPRESSOR   TAKE 1 TABLET BY MOUTH 2 TIMES A DAY      nystatin 727187 UNIT/GM powder  Commonly known as: MYCOSTATIN   Topical, 3 Times Daily      nystatin 618034 UNIT/GM cream  Commonly known  as: MYCOSTATIN   2 Times Daily, to affected area(s)      omeprazole 40 MG capsule  Commonly known as: priLOSEC   40 mg, Oral, Daily      pregabalin 300 MG capsule  Commonly known as: LYRICA   300 mg, Oral, 2 Times Daily      Prolia 60 MG/ML solution prefilled syringe syringe  Generic drug: denosumab   60 mg, Subcutaneous, Once      topiramate 100 MG tablet  Commonly known as: TOPAMAX   100 mg, Oral, Daily             Stop These Medications      ciprofloxacin 500 MG tablet  Commonly known as: Cipro     ciprofloxacin-dexAMETHasone 0.3-0.1 % otic suspension  Commonly known as: Ciprodex     nitrofurantoin (macrocrystal-monohydrate) 100 MG capsule  Commonly known as: Macrobid              Allergies   Allergen Reactions    Cephalexin Swelling     Tolerates cefdinir    Penicillins Hives     Tolerates cefdinir    Sulfa Antibiotics Hives         Discharge Disposition:  Home or Self Care    Diet:  Hospital:  Diet Order   Procedures    Diet: Regular/House; Fluid Consistency: Thin (IDDSI 0)            Activity:      Restrictions or Other Recommendations:  As tolerated        CODE STATUS:    Code Status and Medical Interventions: CPR (Attempt to Resuscitate); Full Support   Ordered at: 02/10/25 2331     Code Status (Patient has no pulse and is not breathing):    CPR (Attempt to Resuscitate)     Medical Interventions (Patient has pulse or is breathing):    Full Support       Future Appointments   Date Time Provider Department Center   8/5/2025  2:00 PM Riccardo Rae MD [facility]       Additional Instructions for the Follow-ups that You Need to Schedule       Discharge Follow-up with PCP   As directed       Currently Documented PCP:    Dav Bryan MD    PCP Phone Number:    [phone]     Follow Up Details: 1-2 weeks        Discharge Follow-up with Specified Provider: Nephrology; 1 Month   As directed      To: Nephrology   Follow Up: 1 Month                      Sherley Bowling MD  02/14/25      Time  Spent on Discharge:  I spent  25  minutes on this discharge activity which included: face-to-face encounter with the patient, reviewing the data in the system, coordination of the care with the nursing staff as well as consultants, documentation, and entering orders.

## 2025-02-17 ENCOUNTER — TRANSITIONAL CARE MANAGEMENT TELEPHONE ENCOUNTER (OUTPATIENT)
Dept: CALL CENTER | Facility: HOSPITAL | Age: 75
End: 2025-02-17
Payer: MEDICARE

## 2025-02-17 NOTE — OUTREACH NOTE
Call Center TCM Note      Flowsheet Row Responses   [facility] patient discharged from? [place]   Does the patient have one of the following disease processes/diagnoses(primary or secondary)? Other   TCM attempt successful? No   Unsuccessful attempts Attempt 2            Enmanuel Shah RN    2/17/2025, 14:53 EST

## 2025-02-17 NOTE — OUTREACH NOTE
Call Center TCM Note      Flowsheet Row Responses   [facility] patient discharged from? [place]   Does the patient have one of the following disease processes/diagnoses(primary or secondary)? Other   TCM attempt successful? No   Unsuccessful attempts Attempt 1   Call Status Left message            Enmanuel Shah RN    2/17/2025, 14:29 EST

## 2025-02-18 ENCOUNTER — TRANSITIONAL CARE MANAGEMENT TELEPHONE ENCOUNTER (OUTPATIENT)
Dept: CALL CENTER | Facility: HOSPITAL | Age: 75
End: 2025-02-18
Payer: MEDICARE

## 2025-02-18 NOTE — OUTREACH NOTE
Call Center TCM Note      Flowsheet Row Responses   [facility] [place] patient discharged from? [place]   Does the patient have one of the following disease processes/diagnoses(primary or secondary)? Other   TCM attempt successful? Yes   Call start time 1045   Call end time 1049   Discharge diagnosis Confusion   Meds reviewed with patient/caregiver? Yes   Does the patient have all medications ordered at discharge? N/A   Is the patient taking all medications as directed (includes completed medication regime)? Yes   Comments HOSP DC FU appt 2/28/25 1 pm   Does the patient have an appointment with their PCP within 7-14 days of discharge? Yes   Has home health visited the patient within 72 hours of discharge? N/A   Psychosocial issues? No   Did the patient receive a copy of their discharge instructions? Yes   Nursing interventions Reviewed instructions with patient   What is the patient's perception of their health status since discharge? Improving   Is the patient/caregiver able to teach back signs and symptoms related to disease process for when to call PCP? Yes   Is the patient/caregiver able to teach back signs and symptoms related to disease process for when to call 911? Yes   Is the patient/caregiver able to teach back the hierarchy of who to call/visit for symptoms/problems? PCP, Specialist, Home health nurse, Urgent Care, ED, 911 Yes   TCM call completed? Yes   Wrap up additional comments  reports Pt is doing well. Insurance nurse came out to see Pt.  states Nurse is going to work with insurance to get Home Health.  declined for this nurse to ask PCP or AMB CM about HH and states the insurance nurse will work on it because it might cost too much.   Call end time 1049            Rhea Kearns RN    2/18/2025, 10:50 EST

## 2025-02-25 ENCOUNTER — READMISSION MANAGEMENT (OUTPATIENT)
Dept: CALL CENTER | Facility: HOSPITAL | Age: 75
End: 2025-02-25
Payer: MEDICARE

## 2025-02-25 NOTE — OUTREACH NOTE
Medical Week 2 Survey      Flowsheet Row Responses   [facility] patient discharged from? [place]   Does the patient have one of the following disease processes/diagnoses(primary or secondary)? Other   Week 2 attempt successful? Yes   Call start time 1417   Discharge diagnosis Confusion   Call end time 1418   Person spoke with today (if not patient) and relationship Bill   Meds reviewed with patient/caregiver? Yes   Is the patient having any side effects they believe may be caused by any medication additions or changes? No   Does the patient have all medications ordered at discharge? Yes   Is the patient taking all medications as directed (includes completed medication regime)? Yes   Does the patient have a primary care provider?  Yes   Does the patient have an appointment with their PCP within 7 days of discharge? Yes   Has the patient kept scheduled appointments due by today? N/A   Has home health visited the patient within 72 hours of discharge? N/A   Psychosocial issues? No   What is the patient's perception of their health status since discharge? Improving   Week 2 Call Completed? Yes   Graduated Yes   Wrap up additional comments  states pt is doing better and sees  Friday.   Call end time 1418            Tiana BOTELLO - Registered Nurse

## 2025-02-28 ENCOUNTER — OFFICE VISIT (OUTPATIENT)
Dept: FAMILY MEDICINE CLINIC | Facility: CLINIC | Age: 75
End: 2025-02-28
Payer: MEDICARE

## 2025-02-28 VITALS — OXYGEN SATURATION: 97 % | DIASTOLIC BLOOD PRESSURE: 78 MMHG | SYSTOLIC BLOOD PRESSURE: 148 MMHG | HEART RATE: 73 BPM

## 2025-02-28 DIAGNOSIS — R41.82 ALTERED MENTAL STATUS, UNSPECIFIED ALTERED MENTAL STATUS TYPE: ICD-10-CM

## 2025-02-28 DIAGNOSIS — N39.0 URINARY TRACT INFECTION WITHOUT HEMATURIA, SITE UNSPECIFIED: Primary | ICD-10-CM

## 2025-02-28 DIAGNOSIS — R79.89 ELEVATED SERUM CREATININE: ICD-10-CM

## 2025-02-28 DIAGNOSIS — M79.7 FIBROMYALGIA: ICD-10-CM

## 2025-02-28 RX ORDER — TOPIRAMATE 100 MG/1
100 TABLET, FILM COATED ORAL DAILY
Qty: 90 TABLET | Refills: 1 | Status: SHIPPED | OUTPATIENT
Start: 2025-02-28

## 2025-03-01 LAB
ALBUMIN SERPL-MCNC: 3.5 G/DL (ref 3.8–4.8)
ALP SERPL-CCNC: 93 IU/L (ref 44–121)
ALT SERPL-CCNC: 5 IU/L (ref 0–32)
AST SERPL-CCNC: 13 IU/L (ref 0–40)
BILIRUB SERPL-MCNC: 0.3 MG/DL (ref 0–1.2)
BUN SERPL-MCNC: 15 MG/DL (ref 8–27)
BUN/CREAT SERPL: 11 (ref 12–28)
CALCIUM SERPL-MCNC: 8.4 MG/DL (ref 8.7–10.3)
CHLORIDE SERPL-SCNC: 109 MMOL/L (ref 96–106)
CO2 SERPL-SCNC: 17 MMOL/L (ref 20–29)
CREAT SERPL-MCNC: 1.31 MG/DL (ref 0.57–1)
EGFRCR SERPLBLD CKD-EPI 2021: 43 ML/MIN/1.73
GLOBULIN SER CALC-MCNC: 3.4 G/DL (ref 1.5–4.5)
GLUCOSE SERPL-MCNC: 129 MG/DL (ref 70–99)
POTASSIUM SERPL-SCNC: 4.1 MMOL/L (ref 3.5–5.2)
PROT SERPL-MCNC: 6.9 G/DL (ref 6–8.5)
SODIUM SERPL-SCNC: 144 MMOL/L (ref 134–144)

## 2025-03-02 NOTE — PROGRESS NOTES
Follow Up Office Visit      Date of Visit:  2025   Patient Name: Vickie Joshi  : 1950   MRN: [id number]     Chief Complaint:    Chief Complaint   Patient presents with    Hospital Follow Up Visit       History of Present Illness: Vickie Joshi is a 74 y.o. female who is here today for follow up.    History of Present Illness  The patient presents for evaluation of urinary tract infection, kidney disease, and hallucinations.    She has been experiencing persistent urinary frequency for several months, which she attributes to her kidney disease. She is currently utilizing a PureWick system, which she finds beneficial as it allows her to enjoy beverages without concern for urinary control. She reports no current symptoms of infection and describes her urine as appearing normal, with a yellowish hue. She was previously prescribed Levaquin for her condition. She does not require any medication refills at this time.    She was recently hospitalized due to a urinary tract infection, during which her kidney function was observed to be slightly diminished. Prior to this hospitalization, her kidney function tests had consistently yielded normal results. She has an upcoming appointment with a nephrologist next month. During her recent hospital stay, she received intravenous antibiotics and potassium supplements. She also underwent a period of fasting for three days due to abdominal discomfort.    She experienced hallucinations during her recent urinary tract infection, which led to a suspicion of stroke. However, subsequent imaging studies, including a CT scan and MRI, ruled out this possibility.     MEDICATIONS  Current: Tylenol      Subjective      Review of Systems:   Review of Systems    Past Medical History:   Past Medical History:   Diagnosis Date    Arthritis     Asthma     Atrial fibrillation     Cardiac disorder     Closed fracture of neck of left femur 2017    Disease of thyroid gland      Fibromyalgia     GERD (gastroesophageal reflux disease)     Hyperlipidemia     Hypertension     Migraine     Neuropathy     Stroke        Past Surgical History:   Past Surgical History:   Procedure Laterality Date    CHOLECYSTECTOMY      HEMORRHOIDECTOMY N/A 7/23/2021    Procedure: HEMORRHOIDECTOMY;  Surgeon: Adan Meadows MD;  Location:  [facility];  Service: General;  Laterality: N/A;    HIP PERCUTANEOUS PINNING Left 2/28/2017    Procedure: LEFT HIP PERCUTANEOUS PINNING FEMORAL NECK;  Surgeon: Ezra Barlow MD;  Location:  [facility];  Service:     HYSTERECTOMY      UT CLTX HIP DISLOCATION TRAUMATIC W/O ANESTHESIA Left 2/28/2017    Procedure: LEFT HIP CLOSED REDUCTION;  Surgeon: Ezra Barlow MD;  Location:  [facility];  Service: Orthopedics    SHOULDER SURGERY         Family History:   Family History   Problem Relation Age of Onset    Alcohol abuse Mother     Heart disease Mother         Cardiac Disorder    Stroke Mother     Hypertension Mother     Parkinsonism Mother     Colon cancer Maternal Grandmother     Diabetes Maternal Aunt        Social History:   Social History     Socioeconomic History    Marital status:    Tobacco Use    Smoking status: Never    Smokeless tobacco: Never   Vaping Use    Vaping status: Never Used   Substance and Sexual Activity    Alcohol use: No    Drug use: No    Sexual activity: Defer       Medications:     Current Outpatient Medications:     albuterol (ACCUNEB) 1.25 MG/3ML nebulizer solution, Take 3 mL by nebulization Every 6 (Six) Hours As Needed for Wheezing or Shortness of Air., Disp: 90 vial, Rfl: 12    albuterol sulfate  (90 Base) MCG/ACT inhaler, INHALE 2 PUFFS BY MOUTH EVERY 4 TO 6 HOURS AS NEEDED, Disp: 8.5 g, Rfl: 0    atorvastatin (LIPITOR) 10 MG tablet, TAKE ONE TABLET BY MOUTH ONCE NIGHTLY, Disp: 90 tablet, Rfl: 1    denosumab (Prolia) 60 MG/ML solution prefilled syringe syringe, Inject 1 mL under the skin into the appropriate area as directed 1 (One)  Time for 1 dose., Disp: 1 mL, Rfl: 1    dicyclomine (BENTYL) 10 MG capsule, Take 1 capsule by mouth 2 (Two) Times a Day As Needed for Abdominal Cramping., Disp: 180 capsule, Rfl: 1    dilTIAZem CD (CARDIZEM CD) 240 MG 24 hr capsule, TAKE 1 CAPSULE BY MOUTH DAILY, Disp: 90 capsule, Rfl: 1    Eliquis 5 MG tablet tablet, TAKE ONE TABLET BY MOUTH EVERY 12 HOURS, Disp: 180 tablet, Rfl: 1    Fluticasone-Salmeterol (ADVAIR/WIXELA) 250-50 MCG/ACT DISKUS, INHALE 1 PUFF BY MOUTH 2 TIMES A DAY, Disp: 60 each, Rfl: 5    levothyroxine (SYNTHROID, LEVOTHROID) 137 MCG tablet, TAKE 1 TABLET BY MOUTH DAILY, Disp: 90 tablet, Rfl: 0    Magnesium 250 MG tablet, Take  by mouth., Disp: , Rfl:     metoprolol tartrate (LOPRESSOR) 25 MG tablet, TAKE 1 TABLET BY MOUTH 2 TIMES A DAY, Disp: 180 tablet, Rfl: 1    nystatin (MYCOSTATIN) 514406 UNIT/GM cream, APPLY TO AFFECTED AREA(S) TWO TIMES A DAY, Disp: 60 g, Rfl: 1    nystatin (MYCOSTATIN) 939747 UNIT/GM powder, Apply  topically to the appropriate area as directed 3 (Three) Times a Day., Disp: 60 g, Rfl: 0    omeprazole (priLOSEC) 40 MG capsule, TAKE 1 CAPSULE BY MOUTH DAILY, Disp: 90 capsule, Rfl: 1    pregabalin (LYRICA) 300 MG capsule, TAKE 1 CAPSULE BY MOUTH 2 TIMES A DAY, Disp: 180 capsule, Rfl: 0    topiramate (TOPAMAX) 100 MG tablet, TAKE 1 TABLET BY MOUTH DAILY, Disp: 90 tablet, Rfl: 1    Allergies:   Allergies   Allergen Reactions    Cephalexin Swelling     Tolerates cefdinir    Penicillins Hives     Tolerates cefdinir    Sulfa Antibiotics Hives       Objective     Physical Exam:  Vital Signs:   Vitals:    02/28/25 1307   BP: 148/78   Pulse: 73   SpO2: 97%     There is no height or weight on file to calculate BMI.     Physical Exam  Vitals and nursing note reviewed.   Constitutional:       General: She is not in acute distress.     Appearance: Normal appearance. She is not ill-appearing.   HENT:      Head: Normocephalic and atraumatic.      Right Ear: Tympanic membrane and ear canal  normal.      Left Ear: Tympanic membrane and ear canal normal.      Nose: Nose normal.   Cardiovascular:      Rate and Rhythm: Normal rate and regular rhythm.      Heart sounds: Normal heart sounds.   Pulmonary:      Effort: Pulmonary effort is normal.      Breath sounds: Normal breath sounds.   Neurological:      Mental Status: She is alert and oriented to person, place, and time. Mental status is at baseline.   Psychiatric:         Mood and Affect: Mood normal.       Physical Exam      Procedures    Results  Laboratory Studies  Kidney function test showed a slight decrease in kidney function.  Assessment / Plan      Assessment/Plan:   Diagnoses and all orders for this visit:    1. Urinary tract infection without hematuria, site unspecified (Primary)  -     POCT urinalysis dipstick, automated    2. Elevated serum creatinine  -     Cancel: Comprehensive metabolic panel; Future  -     Comprehensive metabolic panel    3. Altered mental status, unspecified altered mental status type       Assessment & Plan  1. Urinary Tract Infection (UTI).  The patient experienced confusion and hallucinations during the infection, which have since resolved. A urine test will be conducted at home to confirm the resolution of the infection. Supplies for the urine test will be provided, and the sample should be returned next week.    2. Kidney Disease.  Her kidney function was noted to be slightly decreased during her recent hospital stay. She has an appointment with a nephrologist next month. A blood test will be performed today to assess her kidney function and potassium levels.    3. Hallucinations.  The hallucinations were likely due to the UTI and have resolved.    PROCEDURE  The patient has undergone cataract surgery in the past.        Follow Up:   No follow-ups on file.    Dav Bryan  [facility]     Patient or patient representative verbalized consent for the use of Ambient Listening during the visit with   Dav Bryan MD for chart documentation. 3/2/2025  09:48 EST

## 2025-03-04 ENCOUNTER — TELEPHONE (OUTPATIENT)
Dept: FAMILY MEDICINE CLINIC | Facility: CLINIC | Age: 75
End: 2025-03-04
Payer: MEDICARE

## 2025-03-04 NOTE — TELEPHONE ENCOUNTER
HUB TO RELAY: [facility] for pt to call back.   ----- Message from Dav Irvin sent at 3/2/2025  8:14 PM EST -----  Please let them know that her kidney function have returned to her baseline.

## 2025-03-05 ENCOUNTER — CLINICAL SUPPORT (OUTPATIENT)
Dept: FAMILY MEDICINE CLINIC | Facility: CLINIC | Age: 75
End: 2025-03-05
Payer: MEDICARE

## 2025-03-05 DIAGNOSIS — N39.0 ACUTE UTI (URINARY TRACT INFECTION): Primary | ICD-10-CM

## 2025-03-05 LAB
BILIRUB BLD-MCNC: NEGATIVE MG/DL
CLARITY, POC: ABNORMAL
COLOR UR: YELLOW
EXPIRATION DATE: ABNORMAL
GLUCOSE UR STRIP-MCNC: NEGATIVE MG/DL
KETONES UR QL: NEGATIVE
LEUKOCYTE EST, POC: ABNORMAL
Lab: ABNORMAL
NITRITE UR-MCNC: NEGATIVE MG/ML
PH UR: 6 [PH] (ref 5–8)
PROT UR STRIP-MCNC: NEGATIVE MG/DL
RBC # UR STRIP: ABNORMAL /UL
SP GR UR: 1.01 (ref 1–1.03)
UROBILINOGEN UR QL: ABNORMAL

## 2025-03-05 PROCEDURE — 81003 URINALYSIS AUTO W/O SCOPE: CPT | Performed by: FAMILY MEDICINE

## 2025-03-05 NOTE — TELEPHONE ENCOUNTER
CELESTINO on  to call back.    HUB to relay.      ----- Message from Dav Bryan sent at 3/2/2025  8:14 PM EST -----  Please let them know that her kidney function have returned to her baseline.

## 2025-03-06 NOTE — TELEPHONE ENCOUNTER
LM on  to call back.     HUB to relay.       ----- Message from Dav Bryan sent at 3/2/2025  8:14 PM EST -----  Please let them know that her kidney function have returned to her baseline.       **Sent Koko message.

## 2025-03-07 LAB
BACTERIA UR CULT: NORMAL
BACTERIA UR CULT: NORMAL

## 2025-03-10 ENCOUNTER — TELEPHONE (OUTPATIENT)
Dept: FAMILY MEDICINE CLINIC | Facility: CLINIC | Age: 75
End: 2025-03-10
Payer: MEDICARE

## 2025-03-11 NOTE — TELEPHONE ENCOUNTER
LM on VM to call back.    HUB to relay message     Let them know this urine culture was negative.

## 2025-03-25 ENCOUNTER — OFFICE VISIT (OUTPATIENT)
Dept: FAMILY MEDICINE CLINIC | Facility: CLINIC | Age: 75
End: 2025-03-25
Payer: MEDICARE

## 2025-03-25 VITALS
WEIGHT: 187 LBS | BODY MASS INDEX: 31.16 KG/M2 | HEART RATE: 64 BPM | SYSTOLIC BLOOD PRESSURE: 144 MMHG | DIASTOLIC BLOOD PRESSURE: 80 MMHG | HEIGHT: 65 IN | OXYGEN SATURATION: 96 %

## 2025-03-25 DIAGNOSIS — G50.0 TRIGEMINAL NEURALGIA OF LEFT SIDE OF FACE: Primary | ICD-10-CM

## 2025-03-25 RX ORDER — PREDNISONE 20 MG/1
TABLET ORAL
Qty: 18 TABLET | Refills: 0 | Status: SHIPPED | OUTPATIENT
Start: 2025-03-25

## 2025-03-25 RX ORDER — TRAMADOL HYDROCHLORIDE 50 MG/1
50 TABLET ORAL EVERY 6 HOURS PRN
Qty: 30 TABLET | Refills: 0 | Status: SHIPPED | OUTPATIENT
Start: 2025-03-25

## 2025-03-25 NOTE — PROGRESS NOTES
Office Note     Name: Vickie Joshi    : 1950     MRN: [id number]     Chief Complaint  Headache (Pt has been having sharp pains in her head, Pt states it happens often. Pt states it makes it hard to talk,eat or drink )    Subjective     History of Present Illness:  Vickie Joshi is a 74 y.o. female who presents today for left-sided headache since Saturday.     History of Present Illness  The patient presents for evaluation of sharp pain in her head that radiates down into her face.    She has been experiencing recurrent episodes of sharp, radiating head pain, typically manifesting during the spring and fall seasons. The onset of the current episode was on , with a progressive worsening of symptoms throughout the night. The pain initially presented in the parietal region, subsequently spreading across the forehead and encompassing the entire left side of her head. The pain is not only localized to one side and can affect different areas of the head. However, the current episode is confined to the left side. She has attempted to manage the pain with Tylenol, which provided initial relief but was ineffective upon subsequent use. She has taken Tylenol twice. She also takes Topamax 100 mg daily as part of her regular medication regimen. She has previously sought treatment from Dr. Bryan, who prescribed analgesics and steroids. It has been several years since her last episode. Exposure to wind appears to trigger these episodes.    She has a significant amount of wax in both ears. She was supposed to have an appointment with ENT, but they did not call her.    MEDICATIONS  Current: Topamax, Lyrica, Tylenol      Objective     Past Medical History:   Diagnosis Date    Arthritis     Asthma     Atrial fibrillation     Cardiac disorder     Closed fracture of neck of left femur 2017    Disease of thyroid gland     Fibromyalgia     GERD (gastroesophageal reflux disease)     Hyperlipidemia      "Hypertension     Migraine     Neuropathy     Stroke      Past Surgical History:   Procedure Laterality Date    CHOLECYSTECTOMY      HEMORRHOIDECTOMY N/A 7/23/2021    Procedure: HEMORRHOIDECTOMY;  Surgeon: Adan Meadows MD;  Location:  [facility];  Service: General;  Laterality: N/A;    HIP PERCUTANEOUS PINNING Left 2/28/2017    Procedure: LEFT HIP PERCUTANEOUS PINNING FEMORAL NECK;  Surgeon: Ezra Barlow MD;  Location:  [facility];  Service:     HYSTERECTOMY      AR CLTX HIP DISLOCATION TRAUMATIC W/O ANESTHESIA Left 2/28/2017    Procedure: LEFT HIP CLOSED REDUCTION;  Surgeon: Ezra Barlow MD;  Location:  [facility];  Service: Orthopedics    SHOULDER SURGERY       Family History   Problem Relation Age of Onset    Alcohol abuse Mother     Heart disease Mother         Cardiac Disorder    Stroke Mother     Hypertension Mother     Parkinsonism Mother     Colon cancer Maternal Grandmother     Diabetes Maternal Aunt        Vital Signs  /80 (BP Location: Right arm, Patient Position: Sitting)   Pulse 64   Ht 165.1 cm (65\")   Wt 84.8 kg (187 lb)   SpO2 96%   BMI 31.12 kg/m²   Estimated body mass index is 31.12 kg/m² as calculated from the following:    Height as of this encounter: 165.1 cm (65\").    Weight as of this encounter: 84.8 kg (187 lb).    Physical Exam  Vitals reviewed.   Constitutional:       Appearance: Normal appearance.   HENT:      Right Ear: External ear normal. There is impacted cerumen.      Left Ear: External ear normal. There is impacted cerumen.   Cardiovascular:      Rate and Rhythm: Normal rate and regular rhythm.      Heart sounds: Normal heart sounds.   Pulmonary:      Effort: Pulmonary effort is normal.      Breath sounds: Normal breath sounds.   Skin:     General: Skin is warm and dry.   Neurological:      Mental Status: She is alert and oriented to person, place, and time. Mental status is at baseline.   Psychiatric:         Mood and Affect: Mood normal.         Behavior: Behavior " normal.       Physical Exam  There is a good amount of wax in both ears. Throat was examined.  Lungs were auscultated.  Heart was examined.    Results         Assessment and Plan     Diagnoses and all orders for this visit:    1. Trigeminal neuralgia of left side of face (Primary)  -     predniSONE (DELTASONE) 20 MG tablet; Take 3 tablets together once daily for 3 days, then take 2 tablets together once daily for 3 days, then take 1 tablet once daily for 3 days.  Dispense: 18 tablet; Refill: 0  -     traMADol (ULTRAM) 50 MG tablet; Take 1 tablet by mouth Every 6 (Six) Hours As Needed for Moderate Pain.  Dispense: 30 tablet; Refill: 0      Assessment & Plan  1. Trigeminal neuralgia.  The condition is likely a flare-up of trigeminal neuralgia, as per Dr. Bryan's previous assessment. A prescription for prednisone will be issued. Additionally, tramadol will be prescribed for pain management, pending prior authorization. She is advised to continue her current Lyrica dosage. A follow-up appointment with Dr. Bryan will be scheduled to monitor her progress and determine if any adjustments to her medication regimen are necessary.    2. Cerumen impaction.  A significant amount of wax is present in both ears.      Follow Up  Return for Recheck with Dr. Bryan in 7-10 days.      Patient or patient representative verbalized consent for the use of Ambient Listening during the visit with  FABIANA Polk for chart documentation. 3/27/2025  13:57 EDT    FABIANA Polk

## 2025-04-08 ENCOUNTER — OFFICE VISIT (OUTPATIENT)
Dept: FAMILY MEDICINE CLINIC | Facility: CLINIC | Age: 75
End: 2025-04-08
Payer: MEDICARE

## 2025-04-08 VITALS
DIASTOLIC BLOOD PRESSURE: 64 MMHG | OXYGEN SATURATION: 97 % | HEIGHT: 65 IN | BODY MASS INDEX: 31.12 KG/M2 | HEART RATE: 51 BPM | SYSTOLIC BLOOD PRESSURE: 98 MMHG

## 2025-04-08 DIAGNOSIS — H61.20 IMPACTED CERUMEN, UNSPECIFIED LATERALITY: ICD-10-CM

## 2025-04-08 DIAGNOSIS — G50.0 TRIGEMINAL NEURALGIA OF LEFT SIDE OF FACE: Primary | ICD-10-CM

## 2025-04-08 DIAGNOSIS — I95.9 HYPOTENSION, UNSPECIFIED HYPOTENSION TYPE: ICD-10-CM

## 2025-04-08 PROCEDURE — 99214 OFFICE O/P EST MOD 30 MIN: CPT | Performed by: FAMILY MEDICINE

## 2025-04-08 PROCEDURE — 1126F AMNT PAIN NOTED NONE PRSNT: CPT | Performed by: FAMILY MEDICINE

## 2025-04-08 PROCEDURE — 3078F DIAST BP <80 MM HG: CPT | Performed by: FAMILY MEDICINE

## 2025-04-08 PROCEDURE — 3074F SYST BP LT 130 MM HG: CPT | Performed by: FAMILY MEDICINE

## 2025-04-13 NOTE — PROGRESS NOTES
Follow Up Office Visit      Date of Visit:  2025   Patient Name: Vickie Joshi  : 1950   MRN: [id number]     Chief Complaint:    Chief Complaint   Patient presents with    Follow-up     Pt is here for a follow up on head pain today. States currently she does not have any.        History of Present Illness: Vickie Joshi is a 74 y.o. female who is here today for follow up.    History of Present Illness  The patient presents for evaluation of trigeminal neuralgia, cerumen impaction, and hypotension.    She has been experiencing sharp, stabbing headaches, which have shown significant improvement following a course of prednisone and pain medication. The pain is mostly gone. She has completed the steroid treatment. She has been on Lyrica for several years, which generally manages her symptoms, but she occasionally experiences flare-ups. She recalls a severe episode of pain that rendered her immobile while caring for her mother post-cataract surgery. She also reports that her blood pressure tends to be low, although not as low as today's reading. She maintains adequate hydration. She has a history of dizziness, which she attributes to her previous stroke. She has attempted to alleviate her symptoms by covering her head to protect it from wind exposure.    She reports a sensation of a hard, flat object in her ear, which has been present for an extended period. She has previously used over-the-counter ear drops to soften the wax, which subsequently dislodged on its own. She is currently unable to submerge her head in the bathtub due to mobility issues. She experiences dizziness during ear cleaning procedures. She has considered using hearing aids but decided against it. She was referred to an ENT specialist by her ophthalmologist for ear wax removal, but the appointment was not scheduled. Her therapist attempted to assist with her dizziness by instructing her to position her head in a specific  way, but this resulted in nausea.    She has expressed interest in undergoing a mammogram but is unable to stand for the duration of the procedure. She prefers to have the procedure performed at a hospital rather than our office.    FAMILY HISTORY  Her mother, brother, and daughter all experience trigeminal neuralgia.    MEDICATIONS  Current: Lyrica      Subjective      Review of Systems:   Review of Systems    Past Medical History:   Past Medical History:   Diagnosis Date    Arthritis     Asthma     Atrial fibrillation     Cardiac disorder     Closed fracture of neck of left femur 2/27/2017    Disease of thyroid gland     Fibromyalgia     GERD (gastroesophageal reflux disease)     Hyperlipidemia     Hypertension     Migraine     Neuropathy     Stroke        Past Surgical History:   Past Surgical History:   Procedure Laterality Date    CHOLECYSTECTOMY      HEMORRHOIDECTOMY N/A 7/23/2021    Procedure: HEMORRHOIDECTOMY;  Surgeon: Adan Meadows MD;  Location:  [facility];  Service: General;  Laterality: N/A;    HIP PERCUTANEOUS PINNING Left 2/28/2017    Procedure: LEFT HIP PERCUTANEOUS PINNING FEMORAL NECK;  Surgeon: Ezra Barlow MD;  Location:  [facility];  Service:     HYSTERECTOMY      AK CLTX HIP DISLOCATION TRAUMATIC W/O ANESTHESIA Left 2/28/2017    Procedure: LEFT HIP CLOSED REDUCTION;  Surgeon: Ezra Barlow MD;  Location:  [facility];  Service: Orthopedics    SHOULDER SURGERY         Family History:   Family History   Problem Relation Age of Onset    Alcohol abuse Mother     Heart disease Mother         Cardiac Disorder    Stroke Mother     Hypertension Mother     Parkinsonism Mother     Colon cancer Maternal Grandmother     Diabetes Maternal Aunt        Social History:   Social History     Socioeconomic History    Marital status:    Tobacco Use    Smoking status: Never    Smokeless tobacco: Never   Vaping Use    Vaping status: Never Used   Substance and Sexual Activity    Alcohol use: No    Drug use:  No    Sexual activity: Defer       Medications:     Current Outpatient Medications:     albuterol (ACCUNEB) 1.25 MG/3ML nebulizer solution, Take 3 mL by nebulization Every 6 (Six) Hours As Needed for Wheezing or Shortness of Air., Disp: 90 vial, Rfl: 12    albuterol sulfate  (90 Base) MCG/ACT inhaler, INHALE 2 PUFFS BY MOUTH EVERY 4 TO 6 HOURS AS NEEDED, Disp: 8.5 g, Rfl: 0    atorvastatin (LIPITOR) 10 MG tablet, TAKE ONE TABLET BY MOUTH ONCE NIGHTLY, Disp: 90 tablet, Rfl: 1    dicyclomine (BENTYL) 10 MG capsule, Take 1 capsule by mouth 2 (Two) Times a Day As Needed for Abdominal Cramping., Disp: 180 capsule, Rfl: 1    dilTIAZem CD (CARDIZEM CD) 240 MG 24 hr capsule, TAKE 1 CAPSULE BY MOUTH DAILY, Disp: 90 capsule, Rfl: 1    Eliquis 5 MG tablet tablet, TAKE ONE TABLET BY MOUTH EVERY 12 HOURS, Disp: 180 tablet, Rfl: 1    Fluticasone-Salmeterol (ADVAIR/WIXELA) 250-50 MCG/ACT DISKUS, INHALE 1 PUFF BY MOUTH 2 TIMES A DAY, Disp: 60 each, Rfl: 5    levothyroxine (SYNTHROID, LEVOTHROID) 137 MCG tablet, TAKE 1 TABLET BY MOUTH DAILY, Disp: 90 tablet, Rfl: 0    Magnesium 250 MG tablet, Take  by mouth., Disp: , Rfl:     metoprolol tartrate (LOPRESSOR) 25 MG tablet, TAKE 1 TABLET BY MOUTH 2 TIMES A DAY, Disp: 180 tablet, Rfl: 1    nystatin (MYCOSTATIN) 831790 UNIT/GM cream, APPLY TO AFFECTED AREA(S) TWO TIMES A DAY, Disp: 60 g, Rfl: 1    nystatin (MYCOSTATIN) 402111 UNIT/GM powder, Apply  topically to the appropriate area as directed 3 (Three) Times a Day., Disp: 60 g, Rfl: 0    omeprazole (priLOSEC) 40 MG capsule, TAKE 1 CAPSULE BY MOUTH DAILY, Disp: 90 capsule, Rfl: 1    pregabalin (LYRICA) 300 MG capsule, TAKE 1 CAPSULE BY MOUTH 2 TIMES A DAY, Disp: 180 capsule, Rfl: 0    topiramate (TOPAMAX) 100 MG tablet, TAKE 1 TABLET BY MOUTH DAILY, Disp: 90 tablet, Rfl: 1    traMADol (ULTRAM) 50 MG tablet, Take 1 tablet by mouth Every 6 (Six) Hours As Needed for Moderate Pain., Disp: 30 tablet, Rfl: 0    denosumab (Prolia) 60  "MG/ML solution prefilled syringe syringe, Inject 1 mL under the skin into the appropriate area as directed 1 (One) Time for 1 dose., Disp: 1 mL, Rfl: 1    predniSONE (DELTASONE) 20 MG tablet, Take 3 tablets together once daily for 3 days, then take 2 tablets together once daily for 3 days, then take 1 tablet once daily for 3 days. (Patient not taking: Reported on 4/8/2025), Disp: 18 tablet, Rfl: 0    Allergies:   Allergies   Allergen Reactions    Cephalexin Swelling     Tolerates cefdinir    Penicillins Hives     Tolerates cefdinir    Sulfa Antibiotics Hives       Objective     Physical Exam:  Vital Signs:   Vitals:    04/08/25 1552   BP: 98/64   Pulse: 51   SpO2: 97%   Height: 165.1 cm (65\")     Body mass index is 31.12 kg/m².     Physical Exam  Vitals and nursing note reviewed.   Constitutional:       General: She is not in acute distress.     Appearance: Normal appearance. She is not ill-appearing.   HENT:      Head: Normocephalic and atraumatic.      Right Ear: Tympanic membrane and ear canal normal.      Left Ear: Tympanic membrane and ear canal normal.      Nose: Nose normal.   Cardiovascular:      Rate and Rhythm: Normal rate and regular rhythm.      Heart sounds: Normal heart sounds.   Pulmonary:      Effort: Pulmonary effort is normal.      Breath sounds: Normal breath sounds.   Neurological:      Mental Status: She is alert and oriented to person, place, and time. Mental status is at baseline.   Psychiatric:         Mood and Affect: Mood normal.       Physical Exam  There is some wax in both ears, but it is not obstructing the ear canal.    Vital Signs  Blood pressure is 98/64.    Procedures    Results    Assessment / Plan      Assessment/Plan:   Diagnoses and all orders for this visit:    1. Trigeminal neuralgia of left side of face (Primary)    2. Impacted cerumen, unspecified laterality    3. Hypotension, unspecified hypotension type       Assessment & Plan  1. Trigeminal neuralgia.  Her condition has " shown significant improvement with the use of prednisone and pain medication. She has completed the steroid course. The current dosage of Lyrica appears to be effective in managing her symptoms. No changes to her current treatment plan are necessary at this time. If she experiences a severe outbreak again, adjustments to her medication may be considered.    2. Cerumen impaction.  She reports a sensation of a hard piece of wax in her ears. Examination confirmed the presence of wax in both ears, though not obstructed. She was advised to use hydrogen peroxide or liquid Colace to soften the wax. Debrox was also suggested as an over-the-counter option. Once the wax is softened, it can be flushed out during a follow-up visit.    3. Hypotension.  Her blood pressure was recorded at 98/64 today, which is lower than her usual readings. She was advised to ensure adequate fluid intake. Home monitoring of blood pressure was recommended to ensure it does not remain low.    4. Health maintenance.  She was informed that mammograms are typically discontinued after the age of 75. Given her age and the fact that she has never had a positive result, the likelihood of detecting an abnormality is minimal. She was given the option to continue or discontinue mammograms based on her preference.        Follow Up:   No follow-ups on file.    Dav Bryan  [facility]     Patient or patient representative verbalized consent for the use of Ambient Listening during the visit with  Dav Bryan MD for chart documentation. 4/13/2025  15:49 EDT

## 2025-04-25 DIAGNOSIS — G62.9 NEUROPATHY: ICD-10-CM

## 2025-04-25 RX ORDER — MONTELUKAST SODIUM 10 MG/1
10 TABLET ORAL DAILY
Qty: 90 TABLET | Refills: 1 | OUTPATIENT
Start: 2025-04-25

## 2025-04-25 RX ORDER — PREGABALIN 300 MG/1
300 CAPSULE ORAL 2 TIMES DAILY
Qty: 180 CAPSULE | Refills: 0 | Status: SHIPPED | OUTPATIENT
Start: 2025-04-25

## 2025-05-06 DIAGNOSIS — E03.9 ACQUIRED HYPOTHYROIDISM: ICD-10-CM

## 2025-05-07 RX ORDER — LEVOTHYROXINE SODIUM 137 UG/1
137 TABLET ORAL DAILY
Qty: 90 TABLET | Refills: 0 | Status: SHIPPED | OUTPATIENT
Start: 2025-05-07

## 2025-05-20 RX ORDER — APIXABAN 5 MG/1
5 TABLET, FILM COATED ORAL
Qty: 180 TABLET | Refills: 1 | Status: SHIPPED | OUTPATIENT
Start: 2025-05-20

## 2025-05-20 RX ORDER — FLUTICASONE PROPIONATE AND SALMETEROL 250; 50 UG/1; UG/1
POWDER RESPIRATORY (INHALATION)
Qty: 60 EACH | Refills: 5 | Status: SHIPPED | OUTPATIENT
Start: 2025-05-20

## 2025-05-27 ENCOUNTER — READMISSION MANAGEMENT (OUTPATIENT)
Dept: CALL CENTER | Facility: HOSPITAL | Age: 75
End: 2025-05-27
Payer: MEDICARE

## 2025-05-27 NOTE — OUTREACH NOTE
Prep Survey      Flowsheet Row Responses   [facility] facility patient discharged from? Non-[facility]   Is LACE score < 7 ? Non- Discharge   Eligibility [facility]   Date of Discharge 05/25/25   Discharge diagnosis PNEUMONIA   Does the patient have one of the following disease processes/diagnoses(primary or secondary)? Pneumonia   Prep survey completed? Yes            Caterina Tijerina Registered Nurse

## 2025-05-28 ENCOUNTER — TRANSITIONAL CARE MANAGEMENT TELEPHONE ENCOUNTER (OUTPATIENT)
Dept: CALL CENTER | Facility: HOSPITAL | Age: 75
End: 2025-05-28
Payer: MEDICARE

## 2025-05-28 NOTE — OUTREACH NOTE
Call Center TCM Note      Flowsheet Row Responses   [facility] patient discharged from? Non-   Does the patient have one of the following disease processes/diagnoses(primary or secondary)? Pneumonia   TCM attempt successful? Yes   Call start time 1552   Call end time 1554   Discharge diagnosis PNEUMONIA   Is patient permission given to speak with other caregiver? Yes   List who call center can speak with Spouse Bill   Person spoke with today (if not patient) and relationship Spouse Bill   Meds reviewed with patient/caregiver? Yes   Is the patient having any side effects they believe may be caused by any medication additions or changes? No   Does the patient have all medications ordered at discharge? Yes   Is the patient taking all medications as directed (includes completed medication regime)? Yes   Comments Apt was scheduled prior to call ,  the date is not within the TCM guidelines,  will route message to PCP group   Does the patient have an appointment with their PCP within 7-14 days of discharge? Other   Nursing Interventions Routed TCM call to PCP office   Has home health visited the patient within 72 hours of discharge? N/A   Did the patient receive a copy of their discharge instructions? Yes   Nursing interventions Reviewed instructions with patient   What is the patient's perception of their health status since discharge? Improving   Is the patient/caregiver able to teach back signs and symptoms related to disease process for when to call PCP? Yes   Is the patient/caregiver able to teach back signs and symptoms related to disease process for when to call 911? Yes   Is the patient/caregiver able to teach back the hierarchy of who to call/visit for symptoms/problems? PCP, Specialist, Home health nurse, Urgent Care, ED, 911 Yes   If the patient is a current smoker, are they able to teach back resources for cessation? Not a smoker   TCM call completed? Yes   Call end time 1554            Vika H -  Registered Nurse    5/28/2025, 15:58 EDT

## 2025-05-29 RX ORDER — OMEPRAZOLE 40 MG/1
40 CAPSULE, DELAYED RELEASE ORAL DAILY
Qty: 30 CAPSULE | Refills: 0 | Status: SHIPPED | OUTPATIENT
Start: 2025-05-29

## 2025-05-29 RX ORDER — ATORVASTATIN CALCIUM 10 MG/1
10 TABLET, FILM COATED ORAL NIGHTLY
Qty: 30 TABLET | Refills: 0 | Status: SHIPPED | OUTPATIENT
Start: 2025-05-29

## 2025-06-10 RX ORDER — DILTIAZEM HYDROCHLORIDE 240 MG/1
240 CAPSULE, COATED, EXTENDED RELEASE ORAL
Qty: 90 CAPSULE | Refills: 1 | Status: SHIPPED | OUTPATIENT
Start: 2025-06-10

## 2025-06-11 ENCOUNTER — OFFICE VISIT (OUTPATIENT)
Dept: FAMILY MEDICINE CLINIC | Facility: CLINIC | Age: 75
End: 2025-06-11
Payer: MEDICARE

## 2025-06-11 VITALS — OXYGEN SATURATION: 95 % | DIASTOLIC BLOOD PRESSURE: 80 MMHG | HEART RATE: 45 BPM | SYSTOLIC BLOOD PRESSURE: 110 MMHG

## 2025-06-11 DIAGNOSIS — A41.9 SEPSIS, DUE TO UNSPECIFIED ORGANISM, UNSPECIFIED WHETHER ACUTE ORGAN DYSFUNCTION PRESENT: Primary | ICD-10-CM

## 2025-06-11 DIAGNOSIS — J18.9 COMMUNITY ACQUIRED PNEUMONIA, UNSPECIFIED LATERALITY: ICD-10-CM

## 2025-06-11 DIAGNOSIS — I48.20 ATRIAL FIBRILLATION, CHRONIC: ICD-10-CM

## 2025-06-11 RX ORDER — BUDESONIDE, GLYCOPYRROLATE, AND FORMOTEROL FUMARATE 160; 9; 4.8 UG/1; UG/1; UG/1
2 AEROSOL, METERED RESPIRATORY (INHALATION) 2 TIMES DAILY
Qty: 1 EACH | Refills: 5 | Status: SHIPPED | OUTPATIENT
Start: 2025-06-11

## 2025-06-11 NOTE — PROGRESS NOTES
Follow Up Office Visit      Date of Visit:  2025   Patient Name: Vickie Joshi  : 1950   MRN: [id number]     Chief Complaint:  No chief complaint on file.      History of Present Illness: Vickie Joshi is a 74 y.o. female who is here today for follow up.    History of Present Illness  The patient presents for evaluation of pneumonia, atrial fibrillation, and left arm pain.    She was hospitalized a week ago due to pneumonia, which led to sepsis. During her hospital stay, she received antibiotic therapy and a brief period of BiPAP support. She was discharged on levofloxacin and reports feeling well at present. A follow-up appointment with a pulmonologist was scheduled for the previous day, but due to a scheduling error, it was not attended. She has been using albuterol in conjunction with Advair, although she expresses uncertainty about the correct use of the latter. During her hospital stay, she received respiratory treatments with Advair, albuterol, and Atrovent. She has completed her course of antibiotics but experienced some confusion with her prednisone regimen, resulting in missed doses. She discontinued Symbicort and does not believe Advair is effective for her.    She experiences atrial fibrillation during hospital admissions. Her pulse rate today is 45, which is slightly low. She has two oxygen meters at home to monitor her heart rate, but they are not functioning properly. Her physical therapist usually checks her heart rate. Dr. Guzman increased her metoprolol dosage to 25 mg twice daily about a year ago. She also takes diltiazem once daily.    She reports experiencing significant pain in her left arm, numbness in her hand, and shoulder discomfort. She also experienced neck pain this morning. She has neuropathy. She takes Tylenol as needed for headaches.      Subjective      Review of Systems:   Review of Systems    Past Medical History:   Past Medical History:   Diagnosis Date     Arthritis     Asthma     Atrial fibrillation     Cardiac disorder     Closed fracture of neck of left femur 2/27/2017    Disease of thyroid gland     Fibromyalgia     GERD (gastroesophageal reflux disease)     Hyperlipidemia     Hypertension     Migraine     Neuropathy     Stroke        Past Surgical History:   Past Surgical History:   Procedure Laterality Date    CHOLECYSTECTOMY      HEMORRHOIDECTOMY N/A 7/23/2021    Procedure: HEMORRHOIDECTOMY;  Surgeon: Adan Meadows MD;  Location:  [facility];  Service: General;  Laterality: N/A;    HIP PERCUTANEOUS PINNING Left 2/28/2017    Procedure: LEFT HIP PERCUTANEOUS PINNING FEMORAL NECK;  Surgeon: Ezra Barlow MD;  Location:  [facility];  Service:     HYSTERECTOMY      [place] CLTX HIP DISLOCATION TRAUMATIC W/O ANESTHESIA Left 2/28/2017    Procedure: LEFT HIP CLOSED REDUCTION;  Surgeon: Ezra Barlow MD;  Location:  [facility];  Service: Orthopedics    SHOULDER SURGERY         Family History:   Family History   Problem Relation Age of Onset    Alcohol abuse Mother     Heart disease Mother         Cardiac Disorder    Stroke Mother     Hypertension Mother     Parkinsonism Mother     Colon cancer Maternal Grandmother     Diabetes Maternal Aunt        Social History:   Social History     Socioeconomic History    Marital status:    Tobacco Use    Smoking status: Never    Smokeless tobacco: Never   Vaping Use    Vaping status: Never Used   Substance and Sexual Activity    Alcohol use: No    Drug use: No    Sexual activity: Defer       Medications:     Current Outpatient Medications:     albuterol (ACCUNEB) 1.25 MG/3ML nebulizer solution, Take 3 mL by nebulization Every 6 (Six) Hours As Needed for Wheezing or Shortness of Air., Disp: 90 vial, Rfl: 12    albuterol sulfate  (90 Base) MCG/ACT inhaler, INHALE 2 PUFFS BY MOUTH EVERY 4 TO 6 HOURS AS NEEDED, Disp: 8.5 g, Rfl: 0    atorvastatin (LIPITOR) 10 MG tablet, TAKE ONE TABLET BY MOUTH ONCE NIGHTLY, Disp: 30 tablet,  Rfl: 0    denosumab (Prolia) 60 MG/ML solution prefilled syringe syringe, Inject 1 mL under the skin into the appropriate area as directed 1 (One) Time for 1 dose., Disp: 1 mL, Rfl: 1    dicyclomine (BENTYL) 10 MG capsule, Take 1 capsule by mouth 2 (Two) Times a Day As Needed for Abdominal Cramping., Disp: 180 capsule, Rfl: 1    dilTIAZem CD (CARDIZEM CD) 240 MG 24 hr capsule, TAKE 1 CAPSULE BY MOUTH DAILY, Disp: 90 capsule, Rfl: 1    Eliquis 5 MG tablet tablet, TAKE ONE TABLET BY MOUTH EVERY 12 HOURS, Disp: 180 tablet, Rfl: 1    Fluticasone-Salmeterol (ADVAIR/WIXELA) 250-50 MCG/ACT DISKUS, INHALE 1 PUFF BY MOUTH 2 TIMES A DAY, Disp: 60 each, Rfl: 5    levothyroxine (SYNTHROID, LEVOTHROID) 137 MCG tablet, TAKE 1 TABLET BY MOUTH DAILY, Disp: 90 tablet, Rfl: 0    Magnesium 250 MG tablet, Take  by mouth., Disp: , Rfl:     metoprolol tartrate (LOPRESSOR) 25 MG tablet, TAKE 1 TABLET BY MOUTH 2 TIMES A DAY, Disp: 180 tablet, Rfl: 1    nystatin (MYCOSTATIN) 794438 UNIT/GM cream, APPLY TO AFFECTED AREA(S) TWO TIMES A DAY, Disp: 60 g, Rfl: 1    nystatin (MYCOSTATIN) 940095 UNIT/GM powder, Apply  topically to the appropriate area as directed 3 (Three) Times a Day., Disp: 60 g, Rfl: 0    omeprazole (priLOSEC) 40 MG capsule, TAKE 1 CAPSULE BY MOUTH DAILY, Disp: 30 capsule, Rfl: 0    predniSONE (DELTASONE) 20 MG tablet, Take 3 tablets together once daily for 3 days, then take 2 tablets together once daily for 3 days, then take 1 tablet once daily for 3 days. (Patient not taking: Reported on 4/8/2025), Disp: 18 tablet, Rfl: 0    pregabalin (LYRICA) 300 MG capsule, TAKE 1 CAPSULE BY MOUTH 2 TIMES A DAY, Disp: 180 capsule, Rfl: 0    topiramate (TOPAMAX) 100 MG tablet, TAKE 1 TABLET BY MOUTH DAILY, Disp: 90 tablet, Rfl: 1    traMADol (ULTRAM) 50 MG tablet, Take 1 tablet by mouth Every 6 (Six) Hours As Needed for Moderate Pain., Disp: 30 tablet, Rfl: 0    Allergies:   Allergies   Allergen Reactions    Cephalexin Swelling      Tolerates cefdinir    Penicillins Hives     Tolerates cefdinir    Sulfa Antibiotics Hives       Objective     Physical Exam:  Vital Signs:   Vitals:    06/11/25 1311   BP: 110/80   Pulse: (!) 45   SpO2: 95%     There is no height or weight on file to calculate BMI.     Physical Exam  Vitals and nursing note reviewed.   Constitutional:       General: She is not in acute distress.     Appearance: Normal appearance. She is not ill-appearing.   HENT:      Head: Normocephalic and atraumatic.      Right Ear: Tympanic membrane and ear canal normal.      Left Ear: Tympanic membrane and ear canal normal.      Nose: Nose normal.   Cardiovascular:      Rate and Rhythm: Normal rate and regular rhythm.      Heart sounds: Normal heart sounds.   Pulmonary:      Effort: Pulmonary effort is normal.      Breath sounds: Normal breath sounds.   Neurological:      Mental Status: She is alert and oriented to person, place, and time. Mental status is at baseline.   Psychiatric:         Mood and Affect: Mood normal.       Physical Exam  Neurological: Awake, alert, oriented x4, no focal deficit  Respiratory: Clear to auscultation, no wheezing, rales or rhonchi  Cardiovascular: Regular rate and rhythm, no murmurs, rubs, or gallops  Musculoskeletal: No joint or muscular abnormalities noted    Procedures    Results    Assessment / Plan      Assessment/Plan:   There are no diagnoses linked to this encounter.   Assessment & Plan  1. Pneumonia.  - Recently hospitalized for pneumonia and treated with antibiotics and BiPAP.  - Reports feeling better now.  - Prescription for Breztri, 2 puffs in the morning and 2 in the evening, will replace current Advair regimen.  - Advised to retain any remaining prednisone for potential future use.    2. Atrial Fibrillation.  - Heart rate noted to be low at 45 bpm today, but was high during hospital stay.  - Currently on metoprolol 25 mg twice a day and diltiazem once a day.  - No changes to medication regimen  today.  - Advised to monitor heart rate at home and report any significant changes.    3. Left Arm Pain.  - Reports pain and numbness in left arm and hand, possibly due to a pinched nerve.  - Strength is equal bilaterally, no signs of stroke.  - Advised to take Tylenol as needed for pain relief and monitor symptoms over the next few days.        Follow Up:   No follow-ups on file.    Dav Bryan  [facility]     Patient or patient representative verbalized consent for the use of Ambient Listening during the visit with  Dav Bryan MD for chart documentation. 6/11/2025  19:43 EDT

## 2025-06-18 ENCOUNTER — OUTSIDE FACILITY SERVICE (OUTPATIENT)
Dept: FAMILY MEDICINE CLINIC | Facility: CLINIC | Age: 75
End: 2025-06-18
Payer: MEDICARE

## 2025-06-19 ENCOUNTER — TELEPHONE (OUTPATIENT)
Dept: FAMILY MEDICINE CLINIC | Facility: CLINIC | Age: 75
End: 2025-06-19

## 2025-06-19 NOTE — TELEPHONE ENCOUNTER
Caller: HAMIDA WITH [employer]     Relationship: [unfilled]     Best call back number: [phone]    What is your medical concern? CALLED TO MAKE PCP AWARE THAT PT DISCHARGED NURSING YESTERDAY

## 2025-06-20 RX ORDER — ALBUTEROL SULFATE 90 UG/1
INHALANT RESPIRATORY (INHALATION) SEE ADMIN INSTRUCTIONS
Qty: 8.5 G | Refills: 0 | Status: SHIPPED | OUTPATIENT
Start: 2025-06-20

## 2025-07-10 RX ORDER — ATORVASTATIN CALCIUM 10 MG/1
10 TABLET, FILM COATED ORAL NIGHTLY
Qty: 30 TABLET | Refills: 1 | Status: SHIPPED | OUTPATIENT
Start: 2025-07-10

## 2025-07-10 RX ORDER — OMEPRAZOLE 40 MG/1
40 CAPSULE, DELAYED RELEASE ORAL DAILY
Qty: 30 CAPSULE | Refills: 1 | Status: SHIPPED | OUTPATIENT
Start: 2025-07-10

## 2025-07-12 NOTE — TELEPHONE ENCOUNTER
Called and spoke to patient and was able to schedule the patient to establish with Amelia Toscano

## 2025-07-30 ENCOUNTER — TELEPHONE (OUTPATIENT)
Dept: FAMILY MEDICINE CLINIC | Facility: CLINIC | Age: 75
End: 2025-07-30
Payer: MEDICARE

## 2025-07-30 NOTE — TELEPHONE ENCOUNTER
Attempted. No Answer        HUB TO RELAY    Amelia saw that Vickie was on the schedule for 7/31/25 with concerns about symptoms of pneumonia, shortness of breath, cough, wheezing, and lethargy.     Amelia feels it is best for this patient to go to the ER for further evaluation sooner than later given her extensive medical history.     She does not feel it is in the best interest of the patient's health to wait to be seen in clinic.     Please recommend to patient to go to the ER today. If she declines to go, please send her to [facility].

## 2025-07-31 ENCOUNTER — OFFICE VISIT (OUTPATIENT)
Dept: FAMILY MEDICINE CLINIC | Facility: CLINIC | Age: 75
End: 2025-07-31
Payer: MEDICARE

## 2025-07-31 VITALS
SYSTOLIC BLOOD PRESSURE: 118 MMHG | BODY MASS INDEX: 31.12 KG/M2 | HEART RATE: 65 BPM | HEIGHT: 65 IN | OXYGEN SATURATION: 98 % | DIASTOLIC BLOOD PRESSURE: 72 MMHG

## 2025-07-31 DIAGNOSIS — R09.89 CHOKING IN ADULT: ICD-10-CM

## 2025-07-31 DIAGNOSIS — Z86.73 HISTORY OF STROKE: ICD-10-CM

## 2025-07-31 DIAGNOSIS — Z87.01 HISTORY OF COMMUNITY ACQUIRED PNEUMONIA: ICD-10-CM

## 2025-07-31 DIAGNOSIS — K21.9 GASTROESOPHAGEAL REFLUX DISEASE WITHOUT ESOPHAGITIS: ICD-10-CM

## 2025-07-31 DIAGNOSIS — R05.8 NONPRODUCTIVE COUGH: Primary | ICD-10-CM

## 2025-07-31 RX ORDER — ESOMEPRAZOLE MAGNESIUM 40 MG/1
40 CAPSULE, DELAYED RELEASE ORAL
Qty: 30 CAPSULE | Refills: 1 | Status: SHIPPED | OUTPATIENT
Start: 2025-07-31

## 2025-07-31 NOTE — PROGRESS NOTES
Office Note     Name: Vickie Joshi    : 1950     MRN: [id number]     Chief Complaint  Cough and Shortness of Breath (Pt states a couple days ago she was SOA and feeling better now)    Subjective     History of Present Illness:  Vickie Joshi is a 75 y.o. female who presents today for cough and wheezing.     History of Present Illness  The patient presents for evaluation of recent pneumonia, shortness of breath, coughing, wheezing, and lethargy.    Based on the phone call report received yesterday, she was advised to seek emergency care due to her symptoms but did not follow this advice. Starting a few days ago, she experienced shortness of breath and general malaise, which have since improved. However, she continues to cough and wheeze, although without any expectoration. Her condition has improved compared to yesterday when today's appointment was made. She began feeling unwell 2 to 3 days ago but reports no recent contact with sick individuals. She has been using an albuterol inhaler, primarily at night, with the most recent use being this morning.    She was hospitalized in May 2025 due to pneumonia, during which time she had 4 pockets of infection. She was seen by Dr. Bryan on 2025 for a follow-up after her hospital visit, and everything sounded normal that day. During her hospital stay in May 2025, she experienced difficulty swallowing and could only consume soft foods. Recently, she has started choking on her food again, similar to her symptoms before the pneumonia.    She is considering switching from Prilosec to Nexium, as she has found Nexium to be more effective in managing her esophageal reflux. She feels that reflux is the cause of her choking on her food.       Objective     Past Medical History:   Diagnosis Date    Allergic     Arthritis     Asthma     Atrial fibrillation     Cardiac disorder     Cataract     Closed fracture of neck of left femur 2017    Coronary  "artery disease     Disease of thyroid gland     Fibromyalgia     Fibromyalgia, primary     GERD (gastroesophageal reflux disease)     Heart murmur     Hyperlipidemia     Hypertension     Hypothyroidism     Irritable bowel syndrome     Migraine     Neuropathy     Seizures     Stroke     Tremor     Visual impairment      Past Surgical History:   Procedure Laterality Date    BRAIN SURGERY      CHOLECYSTECTOMY      COLONOSCOPY      HEMORRHOIDECTOMY N/A 07/23/2021    Procedure: HEMORRHOIDECTOMY;  Surgeon: Adan Meadows MD;  Location:  [facility];  Service: General;  Laterality: N/A;    HIP PERCUTANEOUS PINNING Left 02/28/2017    Procedure: LEFT HIP PERCUTANEOUS PINNING FEMORAL NECK;  Surgeon: Ezra Barlow MD;  Location:  [facility];  Service:     HYSTERECTOMY      [place] CLTX HIP DISLOCATION TRAUMATIC W/O ANESTHESIA Left 02/28/2017    Procedure: LEFT HIP CLOSED REDUCTION;  Surgeon: Ezra Barlow MD;  Location:  [facility];  Service: Orthopedics    SHOULDER SURGERY       Family History   Problem Relation Age of Onset    Alcohol abuse Mother     Heart disease Mother         Cardiac Disorder    Stroke Mother     Hypertension Mother     Parkinsonism Mother     Colon cancer Maternal Grandmother     Diabetes Maternal Aunt        Vital Signs  /72 (BP Location: Left arm)   Pulse 65   Ht 165.1 cm (65\")   SpO2 98%   BMI 31.12 kg/m²   Estimated body mass index is 31.12 kg/m² as calculated from the following:    Height as of this encounter: 165.1 cm (65\").    Weight as of 3/25/25: 84.8 kg (187 lb).    Physical Exam  Vitals and nursing note reviewed.   Constitutional:       Appearance: Normal appearance.   HENT:      Head: Normocephalic and atraumatic.   Cardiovascular:      Rate and Rhythm: Normal rate and regular rhythm.      Heart sounds: Normal heart sounds.   Pulmonary:      Effort: Pulmonary effort is normal.      Breath sounds: Normal breath sounds.   Neurological:      Mental Status: She is alert and oriented to " person, place, and time. Mental status is at baseline.   Psychiatric:         Mood and Affect: Mood normal.        Physical Exam  Respiratory: Clear to auscultation, no wheezing, rales or rhonchi  Cardiovascular: Regular rate and rhythm, no murmurs, rubs, or gallops    Results         Assessment and Plan     Diagnoses and all orders for this visit:    1. Nonproductive cough (Primary)    2. History of community acquired pneumonia    3. Gastroesophageal reflux disease without esophagitis  -     esomeprazole (nexIUM) 40 MG capsule; Take 1 capsule by mouth Every Morning Before Breakfast.  Dispense: 30 capsule; Refill: 1    4. Choking in adult  -     SLP Consult: Eval & Treat; Future    5. History of stroke  -     SLP Consult: Eval & Treat; Future      Assessment & Plan  1. Recent pneumonia.  - Reports recent pneumonia with new onset symptoms of shortness of breath, coughing, wheezing, and lethargy.  - Lungs sound clear with no wheezing or signs of pneumonia on physical exam today.  - Discussed the importance of seeking immediate medical attention at the ER if symptoms worsen, including fever, productive cough, or severe difficulty breathing.  - Advised to continue using albuterol inhaler as needed.    2. Cough.  - Reports persistent cough but is not coughing up anything.  - Lungs sound clear with no wheezing or signs of pneumonia on physical exam.  - Discussed the importance of seeking immediate medical attention at the ER if symptoms worsen, including fever, productive cough, or severe difficulty breathing.  - Advised to continue using albuterol inhaler as needed.    3. Wheezing.  - Reports wheezing and uses a plain albuterol inhaler mostly at night.  - Lungs sound clear with no wheezing or signs of pneumonia on physical exam.  - Discussed the importance of seeking immediate medical attention at the ER if symptoms worsen, including fever, productive cough, or severe difficulty breathing.  - Advised to continue using  albuterol inhaler as needed.    4. Esophageal reflux.  - Reports esophageal reflux and has been using omeprazole.  - Prefers Nexium as it has worked better for her in the past.  - Prescription for Nexium will be sent to pharmacy.  - Advised to discontinue omeprazole and switch to Nexium.    5. Choking on food.   - Discussed concern for issues with swallowing, which could lead to aspiration pneumonia.  - Patient has history of CVA with residual deficits.  - Referral to Speech for swallow study discussed and order placed today. Patient will be contacted regarding appointment for evaluation.    Follow-up: 08/26/2025 for an establish care appointment.      Follow Up  Return if symptoms worsen or fail to improve.      Patient or patient representative verbalized consent for the use of Ambient Listening during the visit with  FABIANA Polk for chart documentation. 8/2/2025  11:37 EDT    FABIANA Polk

## 2025-08-05 ENCOUNTER — OFFICE VISIT (OUTPATIENT)
Dept: CARDIOLOGY | Facility: CLINIC | Age: 75
End: 2025-08-05
Payer: MEDICARE

## 2025-08-05 VITALS
BODY MASS INDEX: 29.16 KG/M2 | HEIGHT: 65 IN | OXYGEN SATURATION: 96 % | SYSTOLIC BLOOD PRESSURE: 116 MMHG | WEIGHT: 175 LBS | RESPIRATION RATE: 18 BRPM | DIASTOLIC BLOOD PRESSURE: 74 MMHG | HEART RATE: 81 BPM

## 2025-08-05 DIAGNOSIS — I10 PRIMARY HYPERTENSION: ICD-10-CM

## 2025-08-05 DIAGNOSIS — E78.00 PURE HYPERCHOLESTEROLEMIA: ICD-10-CM

## 2025-08-05 DIAGNOSIS — I48.20 ATRIAL FIBRILLATION, CHRONIC: Primary | ICD-10-CM

## 2025-08-05 PROCEDURE — 3078F DIAST BP <80 MM HG: CPT | Performed by: INTERNAL MEDICINE

## 2025-08-05 PROCEDURE — 99214 OFFICE O/P EST MOD 30 MIN: CPT | Performed by: INTERNAL MEDICINE

## 2025-08-05 PROCEDURE — 93000 ELECTROCARDIOGRAM COMPLETE: CPT | Performed by: INTERNAL MEDICINE

## 2025-08-05 PROCEDURE — 1159F MED LIST DOCD IN RCRD: CPT | Performed by: INTERNAL MEDICINE

## 2025-08-05 PROCEDURE — 1160F RVW MEDS BY RX/DR IN RCRD: CPT | Performed by: INTERNAL MEDICINE

## 2025-08-05 PROCEDURE — 3074F SYST BP LT 130 MM HG: CPT | Performed by: INTERNAL MEDICINE

## 2025-08-09 DIAGNOSIS — E03.9 ACQUIRED HYPOTHYROIDISM: ICD-10-CM

## 2025-08-11 RX ORDER — LEVOTHYROXINE SODIUM 137 UG/1
137 TABLET ORAL DAILY
Qty: 30 TABLET | Refills: 0 | Status: SHIPPED | OUTPATIENT
Start: 2025-08-11

## 2025-08-21 DIAGNOSIS — G62.9 NEUROPATHY: ICD-10-CM

## 2025-08-21 RX ORDER — PREGABALIN 300 MG/1
300 CAPSULE ORAL 2 TIMES DAILY
Qty: 12 CAPSULE | Refills: 0 | Status: SHIPPED | OUTPATIENT
Start: 2025-08-21

## 2025-08-26 ENCOUNTER — OFFICE VISIT (OUTPATIENT)
Dept: FAMILY MEDICINE CLINIC | Facility: CLINIC | Age: 75
End: 2025-08-26
Payer: MEDICARE

## 2025-08-26 VITALS
DIASTOLIC BLOOD PRESSURE: 76 MMHG | BODY MASS INDEX: 29.16 KG/M2 | HEIGHT: 65 IN | WEIGHT: 175 LBS | SYSTOLIC BLOOD PRESSURE: 120 MMHG | OXYGEN SATURATION: 97 % | HEART RATE: 69 BPM

## 2025-08-26 DIAGNOSIS — I48.20 ATRIAL FIBRILLATION, CHRONIC: ICD-10-CM

## 2025-08-26 DIAGNOSIS — I69.359 HISTORY OF HEMORRHAGIC STROKE WITH RESIDUAL HEMIPARESIS: ICD-10-CM

## 2025-08-26 DIAGNOSIS — M79.7 FIBROMYALGIA: Primary | ICD-10-CM

## 2025-08-26 DIAGNOSIS — K21.9 GASTROESOPHAGEAL REFLUX DISEASE WITHOUT ESOPHAGITIS: ICD-10-CM

## 2025-08-26 DIAGNOSIS — B37.2 YEAST DERMATITIS: ICD-10-CM

## 2025-08-26 DIAGNOSIS — G62.9 NEUROPATHY: ICD-10-CM

## 2025-08-26 DIAGNOSIS — I10 PRIMARY HYPERTENSION: ICD-10-CM

## 2025-08-26 DIAGNOSIS — E03.9 ACQUIRED HYPOTHYROIDISM: ICD-10-CM

## 2025-08-26 DIAGNOSIS — Z87.81 HISTORY OF HIP FRACTURE: ICD-10-CM

## 2025-08-26 DIAGNOSIS — E78.2 MIXED HYPERLIPIDEMIA: ICD-10-CM

## 2025-08-26 DIAGNOSIS — N39.45 CONTINUOUS LEAKAGE OF URINE: ICD-10-CM

## 2025-08-27 RX ORDER — DILTIAZEM HYDROCHLORIDE 240 MG/1
240 CAPSULE, COATED, EXTENDED RELEASE ORAL
Qty: 90 CAPSULE | Refills: 0 | Status: SHIPPED | OUTPATIENT
Start: 2025-08-27

## 2025-08-27 RX ORDER — METOPROLOL TARTRATE 25 MG/1
TABLET, FILM COATED ORAL
Qty: 180 TABLET | Refills: 0 | Status: SHIPPED | OUTPATIENT
Start: 2025-08-27

## 2025-08-27 RX ORDER — NYSTATIN 100000 U/G
CREAM TOPICAL 2 TIMES DAILY
Qty: 60 G | Refills: 2 | Status: SHIPPED | OUTPATIENT
Start: 2025-08-27

## 2025-08-27 RX ORDER — BUDESONIDE, GLYCOPYRROLATE, AND FORMOTEROL FUMARATE 160; 9; 4.8 UG/1; UG/1; UG/1
2 AEROSOL, METERED RESPIRATORY (INHALATION) 2 TIMES DAILY
Qty: 1 EACH | Refills: 5 | Status: SHIPPED | OUTPATIENT
Start: 2025-08-27

## 2025-08-27 RX ORDER — ESOMEPRAZOLE MAGNESIUM 40 MG/1
40 CAPSULE, DELAYED RELEASE ORAL
Qty: 90 CAPSULE | Refills: 0 | Status: SHIPPED | OUTPATIENT
Start: 2025-08-27

## 2025-08-27 RX ORDER — ALBUTEROL SULFATE 90 UG/1
2 INHALANT RESPIRATORY (INHALATION) EVERY 4 HOURS PRN
Qty: 8.5 G | Refills: 2 | Status: SHIPPED | OUTPATIENT
Start: 2025-08-27

## 2025-08-27 RX ORDER — NYSTATIN 100000 [USP'U]/G
POWDER TOPICAL 3 TIMES DAILY
Qty: 60 G | Refills: 2 | Status: SHIPPED | OUTPATIENT
Start: 2025-08-27

## 2025-08-27 RX ORDER — LEVOTHYROXINE SODIUM 137 UG/1
137 TABLET ORAL DAILY
Qty: 90 TABLET | Refills: 0 | Status: SHIPPED | OUTPATIENT
Start: 2025-08-27

## 2025-08-27 RX ORDER — ATORVASTATIN CALCIUM 10 MG/1
10 TABLET, FILM COATED ORAL NIGHTLY
Qty: 90 TABLET | Refills: 0 | Status: SHIPPED | OUTPATIENT
Start: 2025-08-27

## 2025-08-27 RX ORDER — PREGABALIN 300 MG/1
300 CAPSULE ORAL 2 TIMES DAILY
Qty: 60 CAPSULE | Refills: 0 | Status: SHIPPED | OUTPATIENT
Start: 2025-08-27

## 2025-08-27 RX ORDER — TOPIRAMATE 100 MG/1
100 TABLET, FILM COATED ORAL DAILY
Qty: 90 TABLET | Refills: 0 | Status: SHIPPED | OUTPATIENT
Start: 2025-08-27

## (undated) DEVICE — INTENDED FOR TISSUE SEPARATION, AND OTHER PROCEDURES THAT REQUIRE A SHARP SURGICAL BLADE TO PUNCTURE OR CUT.: Brand: BARD-PARKER ® SAFETYLOCK CARBON RIB-BACK BLADES

## (undated) DEVICE — NDL HYPO ECLPS SFTY 22G 1 1/2IN

## (undated) DEVICE — GLV SURG TRIUMPH ORTHO W/ALOE PF LTX 8 STRL

## (undated) DEVICE — DRSNG WND BORDR/ADHS NONADHR/GZ LF 2X2IN STRL

## (undated) DEVICE — SYR CONTRL LUERLOK 10CC

## (undated) DEVICE — ANTIBACTERIAL UNDYED BRAIDED (POLYGLACTIN 910), SYNTHETIC ABSORBABLE SUTURE: Brand: COATED VICRYL

## (undated) DEVICE — DRAPE,UNDERBUTTOCKS,STERILE: Brand: MEDLINE

## (undated) DEVICE — PK MAJ FX HIP 10

## (undated) DEVICE — LEGGINGS, PAIR, 29X43, STERILE: Brand: MEDLINE

## (undated) DEVICE — GLV SURG SENSICARE PI MIC PF SZ7.5 LF STRL

## (undated) DEVICE — THREADED GUIDE WIRE

## (undated) DEVICE — CANNULATED DRILL

## (undated) DEVICE — HDRST POSTIN FM CRDL TRACH SLOT 9X8X4IN

## (undated) DEVICE — CANNULATED SCREW
Type: IMPLANTABLE DEVICE | Status: NON-FUNCTIONAL
Brand: ASNIS
Removed: 2017-02-28

## (undated) DEVICE — DRSNG WND GZ PAD BORDERED 4X8IN STRL

## (undated) DEVICE — SHEET,DRAPE,40X58,STERILE: Brand: MEDLINE

## (undated) DEVICE — JELLY,LUBE,STERILE,FLIP TOP,TUBE,2-OZ: Brand: MEDLINE

## (undated) DEVICE — CVR HNDL LT SURG ACCSSRY BLU STRL

## (undated) DEVICE — SOL BETADINE 1GL

## (undated) DEVICE — GLV SURG SIGNATURE TOUCH PF LTX 8 STRL BX/50

## (undated) DEVICE — CVR HNDL LIGHT RIGID

## (undated) DEVICE — GOWN,PREVENTION PLUS,XXLARGE,STERILE: Brand: MEDLINE

## (undated) DEVICE — ENCORE® LATEX MICRO SIZE 8, STERILE LATEX POWDER-FREE SURGICAL GLOVE: Brand: ENCORE

## (undated) DEVICE — SPNG GZ WOVN 4X4IN 12PLY 10/BX STRL

## (undated) DEVICE — TP PAPR MICROPORE 2IN

## (undated) DEVICE — DRSNG WND BORDR/ADHS NONADHR/GZ LF 4X4IN STRL

## (undated) DEVICE — PENCL ROCKRSWCH MEGADYNE W/HOLSTR 10FT SS

## (undated) DEVICE — C-ARM: Brand: UNBRANDED

## (undated) DEVICE — SUT ETHLN 3/0 PC5 18IN 1893G

## (undated) DEVICE — STRAP STIRUP WO/RNG 19X3.5IN DISP

## (undated) DEVICE — NEEDLE, QUINCKE 22GX3.5": Brand: MEDLINE INDUSTRIES, INC.

## (undated) DEVICE — BNDG ELAS CO-FLEX SLF ADHR 4IN5YD LF STRL

## (undated) DEVICE — DRSNG GZ CURAD XEROFORM NONADHR OVERWRAP 5X9IN

## (undated) DEVICE — Device